# Patient Record
Sex: MALE | Race: WHITE | HISPANIC OR LATINO | Employment: OTHER | URBAN - METROPOLITAN AREA
[De-identification: names, ages, dates, MRNs, and addresses within clinical notes are randomized per-mention and may not be internally consistent; named-entity substitution may affect disease eponyms.]

---

## 2023-02-11 ENCOUNTER — HOSPITAL ENCOUNTER (EMERGENCY)
Dept: DATA CONVERSION | Facility: HOSPITAL | Age: 68
Discharge: HOME | End: 2023-02-12
Attending: EMERGENCY MEDICINE
Payer: MEDICARE

## 2023-02-11 DIAGNOSIS — J44.9 CHRONIC OBSTRUCTIVE PULMONARY DISEASE, UNSPECIFIED (MULTI): ICD-10-CM

## 2023-02-11 DIAGNOSIS — K22.2 ESOPHAGEAL OBSTRUCTION: ICD-10-CM

## 2023-02-11 DIAGNOSIS — R13.10 DYSPHAGIA, UNSPECIFIED: ICD-10-CM

## 2023-02-11 DIAGNOSIS — Z95.828 PRESENCE OF OTHER VASCULAR IMPLANTS AND GRAFTS: ICD-10-CM

## 2023-02-11 DIAGNOSIS — G47.33 OBSTRUCTIVE SLEEP APNEA (ADULT) (PEDIATRIC): ICD-10-CM

## 2023-02-11 DIAGNOSIS — Z86.711 PERSONAL HISTORY OF PULMONARY EMBOLISM: ICD-10-CM

## 2023-02-11 DIAGNOSIS — Z86.718 PERSONAL HISTORY OF OTHER VENOUS THROMBOSIS AND EMBOLISM: ICD-10-CM

## 2023-02-11 DIAGNOSIS — F32.9 MAJOR DEPRESSIVE DISORDER, SINGLE EPISODE, UNSPECIFIED: ICD-10-CM

## 2023-02-11 DIAGNOSIS — E11.40 TYPE 2 DIABETES MELLITUS WITH DIABETIC NEUROPATHY, UNSPECIFIED (MULTI): ICD-10-CM

## 2023-02-11 DIAGNOSIS — Z20.822 CONTACT WITH AND (SUSPECTED) EXPOSURE TO COVID-19: ICD-10-CM

## 2023-02-11 DIAGNOSIS — E78.5 HYPERLIPIDEMIA, UNSPECIFIED: ICD-10-CM

## 2023-02-11 DIAGNOSIS — R94.31 ABNORMAL ELECTROCARDIOGRAM (ECG) (EKG): ICD-10-CM

## 2023-02-11 DIAGNOSIS — K22.89 OTHER SPECIFIED DISEASE OF ESOPHAGUS: ICD-10-CM

## 2023-02-11 DIAGNOSIS — J96.01 ACUTE RESPIRATORY FAILURE WITH HYPOXIA (MULTI): ICD-10-CM

## 2023-02-11 DIAGNOSIS — K44.9 DIAPHRAGMATIC HERNIA WITHOUT OBSTRUCTION OR GANGRENE: ICD-10-CM

## 2023-02-11 DIAGNOSIS — T18.128A FOOD IN ESOPHAGUS CAUSING OTHER INJURY, INITIAL ENCOUNTER: ICD-10-CM

## 2023-02-11 DIAGNOSIS — E66.9 OBESITY, UNSPECIFIED: ICD-10-CM

## 2023-02-11 DIAGNOSIS — D64.9 ANEMIA, UNSPECIFIED: ICD-10-CM

## 2023-02-11 DIAGNOSIS — Z87.891 PERSONAL HISTORY OF NICOTINE DEPENDENCE: ICD-10-CM

## 2023-02-11 LAB — SARS-COV-2 RESULT: NOT DETECTED

## 2023-02-17 PROBLEM — M85.80 OSTEOPENIA: Status: ACTIVE | Noted: 2023-02-17

## 2023-02-17 PROBLEM — K63.5 COLON POLYP: Status: ACTIVE | Noted: 2023-02-17

## 2023-02-17 PROBLEM — K22.2 ESOPHAGEAL STRICTURE: Status: ACTIVE | Noted: 2023-02-17

## 2023-02-17 PROBLEM — E55.9 VITAMIN D DEFICIENCY: Status: ACTIVE | Noted: 2023-02-17

## 2023-02-17 PROBLEM — R07.81 RIB PAIN ON LEFT SIDE: Status: ACTIVE | Noted: 2023-02-17

## 2023-02-17 PROBLEM — N52.9 ED (ERECTILE DYSFUNCTION): Status: ACTIVE | Noted: 2023-02-17

## 2023-02-17 PROBLEM — L70.9 ACNE: Status: ACTIVE | Noted: 2023-02-17

## 2023-02-17 PROBLEM — G47.33 OBSTRUCTIVE SLEEP APNEA, ADULT: Status: ACTIVE | Noted: 2023-02-17

## 2023-02-17 PROBLEM — R35.1 NOCTURIA: Status: ACTIVE | Noted: 2023-02-17

## 2023-02-17 PROBLEM — R13.10 DYSPHAGIA: Status: ACTIVE | Noted: 2023-02-17

## 2023-02-17 PROBLEM — R94.4 DECREASED GFR: Status: ACTIVE | Noted: 2023-02-17

## 2023-02-17 PROBLEM — Z86.718 HISTORY OF DVT (DEEP VEIN THROMBOSIS): Status: ACTIVE | Noted: 2023-02-17

## 2023-02-17 PROBLEM — D50.9 IRON DEFICIENCY ANEMIA: Status: ACTIVE | Noted: 2023-02-17

## 2023-02-17 PROBLEM — E78.5 HYPERLIPIDEMIA: Status: ACTIVE | Noted: 2023-02-17

## 2023-02-17 PROBLEM — M10.9 GOUT: Status: ACTIVE | Noted: 2023-02-17

## 2023-02-17 PROBLEM — K22.89 ESOPHAGEAL MASS: Status: ACTIVE | Noted: 2023-02-17

## 2023-02-17 PROBLEM — R74.8 ELEVATED ALKALINE PHOSPHATASE LEVEL: Status: ACTIVE | Noted: 2023-02-17

## 2023-02-17 PROBLEM — K25.9 GASTRIC ULCER: Status: ACTIVE | Noted: 2023-02-17

## 2023-02-17 PROBLEM — K11.20 SIALOADENITIS: Status: ACTIVE | Noted: 2023-02-17

## 2023-02-17 PROBLEM — E66.9 OBESITY (BMI 30-39.9): Status: ACTIVE | Noted: 2023-02-17

## 2023-02-17 PROBLEM — F32.A DEPRESSION: Status: ACTIVE | Noted: 2023-02-17

## 2023-02-17 PROBLEM — R80.9 MICROALBUMINURIA: Status: ACTIVE | Noted: 2023-02-17

## 2023-02-17 PROBLEM — Z86.711 HISTORY OF PULMONARY EMBOLISM: Status: ACTIVE | Noted: 2023-02-17

## 2023-02-17 PROBLEM — E11.40 DIABETIC NEUROPATHY ASSOCIATED WITH TYPE 2 DIABETES MELLITUS (MULTI): Status: ACTIVE | Noted: 2023-02-17

## 2023-02-17 RX ORDER — ATORVASTATIN CALCIUM 10 MG/1
1 TABLET, FILM COATED ORAL NIGHTLY
COMMUNITY
Start: 2019-02-06 | End: 2024-01-22 | Stop reason: SDUPTHER

## 2023-02-17 RX ORDER — SYRINGE-NEEDLE,INSULIN,0.5 ML 27GX1/2"
SYRINGE, EMPTY DISPOSABLE MISCELLANEOUS
COMMUNITY
Start: 2017-09-23 | End: 2023-11-28 | Stop reason: WASHOUT

## 2023-02-17 RX ORDER — INSULIN GLARGINE 100 [IU]/ML
35 INJECTION, SOLUTION SUBCUTANEOUS NIGHTLY
COMMUNITY
Start: 2018-05-10 | End: 2023-07-24 | Stop reason: SDUPTHER

## 2023-02-17 RX ORDER — PIOGLITAZONEHYDROCHLORIDE 15 MG/1
1 TABLET ORAL DAILY
COMMUNITY
Start: 2017-09-23 | End: 2023-07-24 | Stop reason: ALTCHOICE

## 2023-02-17 RX ORDER — ASCORBIC ACID 125 MG
TABLET,CHEWABLE ORAL
COMMUNITY
End: 2023-07-24 | Stop reason: ALTCHOICE

## 2023-02-17 RX ORDER — ALLOPURINOL 300 MG/1
1 TABLET ORAL DAILY
COMMUNITY
Start: 2017-10-07 | End: 2023-07-24 | Stop reason: ALTCHOICE

## 2023-02-17 RX ORDER — SYRINGE,SAFETY WITH NEEDLE,1ML 25GX1"
SYRINGE (EA) MISCELLANEOUS
COMMUNITY
Start: 2017-09-23 | End: 2023-11-28 | Stop reason: WASHOUT

## 2023-02-17 RX ORDER — GABAPENTIN 300 MG/1
1 CAPSULE ORAL 3 TIMES DAILY
COMMUNITY
Start: 2017-12-14 | End: 2023-11-15 | Stop reason: ALTCHOICE

## 2023-02-17 RX ORDER — FERROUS SULFATE 325(65) MG
1 TABLET ORAL 2 TIMES DAILY
COMMUNITY
Start: 2016-08-08 | End: 2023-11-28 | Stop reason: WASHOUT

## 2023-02-17 RX ORDER — LANCETS 33 GAUGE
EACH MISCELLANEOUS 2 TIMES DAILY
COMMUNITY
End: 2023-11-28 | Stop reason: WASHOUT

## 2023-02-17 RX ORDER — BLOOD-GLUCOSE METER
EACH MISCELLANEOUS
COMMUNITY
Start: 2017-09-23 | End: 2023-11-28 | Stop reason: WASHOUT

## 2023-02-17 RX ORDER — SERTRALINE HYDROCHLORIDE 50 MG/1
50 TABLET, FILM COATED ORAL DAILY
COMMUNITY
Start: 2016-09-13 | End: 2024-02-13 | Stop reason: SDUPTHER

## 2023-02-17 RX ORDER — PANTOPRAZOLE SODIUM 40 MG/1
1 TABLET, DELAYED RELEASE ORAL
COMMUNITY
Start: 2016-07-22 | End: 2023-07-24 | Stop reason: ALTCHOICE

## 2023-02-17 RX ORDER — PHENYLEPHRINE HCL 10 MG
TABLET ORAL
COMMUNITY
End: 2023-07-24 | Stop reason: ALTCHOICE

## 2023-02-28 LAB — SARS-COV-2 RESULT: NOT DETECTED

## 2023-03-03 NOTE — H&P
History of Present Illness:   History Present Illness:  Reason for surgery: Food impaction   HPI:    67 M who underwent EGD with Dr. Huizar at [facility] in 11/22 for a food impaction. Food was removed from the lower esophagus. Class D esophagitis, Schatzki's  ring and HH noted. Also had food impaction in 1/21- dilated that time. No bx either time.    Has been on pantoprazole since 2019, increased to bid in 11/22. Has not had recurrent dysphagia since EGD in 11/22. Avoiding steak and apples with skin which had been giving him problems.     Has lost 30 lbs since summer, but is trying to lose wt through diet.       Patient reports that he has been experiencing dysphagia symptoms every time he eats, he is on pantoprazole twice a day do not get any overt reflux symptoms.  Last upper endoscopy  few months ago showed a large hiatal hernia and grade D esophagitis.  Along with a Schatzki ring.  He was recently seen in outpatient GI office, at that time another endoscopy was offered which patient refused.  However this time when discussion was done in the emergency room patient wants to proceed with endoscopy and if required surgical management of large hiatal hernia.      Allergies:        Allergies:  ·  No Known Allergies :     Home Medication Review:   Home Medications Reviewed: yes     Impression/Procedure:   ·  Impression and Planned Procedure: Food Impaction : Plan for EGD with removal of food bolus with possible dilation.       ERAS (Enhanced Recovery After Surgery):  ·  ERAS Patient: no       Vital Signs:  Temperature C: 36.6 degrees C   Temperature F: 97.8 degrees F   Heart Rate: 71 beats per minute   Respiratory Rate: 20 breath per minute   Blood Pressure Systolic: 140 mm/Hg   Blood Pressure Diastolic: 77 mm/Hg     Physical Exam Narrative:  ·  Physical Exam:    General appearance: No acute distress, cooperative.  Ears, nose, mouth, and throat: Moist mucous membranes, tongue normal; no oral lesions; Mallampati  Neck:  Supple, no lymphadenopathy or thyromegaly  Lungs: Clear to auscultation bilaterally  CV: Regular rate and rhythm, no murmur; no pitting edema in the lower extremities  Abd: soft, non-tender; non-distended; normal active bowel sounds; no scars      Physical Exam by System:    Eyes: PERRL, EOMI, clear sclera   Respiratory/Thorax: Patent airways, CTAB, normal  breath sounds with good chest expansion, thorax symmetric   Cardiovascular: Regular, rate and rhythm, no murmurs,  2+ equal pulses of the extremities, normal S 1and S 2   Skin: Warm and dry, no lesions, no rashes     Consent:   COVID-19 Consent:  ·  COVID-19 Risk Consent Surgeon has reviewed key risks related to the risk of mahamed COVID-19 and if they contract COVID-19 what the risks are.       Electronic Signatures:  Cheo Snowden)  (Signed 11-Feb-2023 23:14)   Authored: History of Present Illness, Allergies, Home  Medication Review, Impression/Procedure, ERAS, Physical Exam, Consent, Note Completion      Last Updated: 11-Feb-2023 23:14 by Cheo Snowden)

## 2023-04-04 ENCOUNTER — APPOINTMENT (OUTPATIENT)
Dept: PRIMARY CARE | Facility: CLINIC | Age: 68
End: 2023-04-04
Payer: MEDICARE

## 2023-04-18 ENCOUNTER — APPOINTMENT (OUTPATIENT)
Dept: PRIMARY CARE | Facility: CLINIC | Age: 68
End: 2023-04-18
Payer: MEDICARE

## 2023-05-22 ENCOUNTER — HOSPITAL ENCOUNTER (OUTPATIENT)
Dept: DATA CONVERSION | Facility: HOSPITAL | Age: 68
End: 2023-06-15
Attending: FAMILY MEDICINE
Payer: MEDICARE

## 2023-05-22 DIAGNOSIS — J98.11 ATELECTASIS: ICD-10-CM

## 2023-05-22 DIAGNOSIS — T85.848A PAIN DUE TO OTHER INTERNAL PROSTHETIC DEVICES, IMPLANTS AND GRAFTS, INITIAL ENCOUNTER: ICD-10-CM

## 2023-05-22 DIAGNOSIS — Z46.82 ENCOUNTER FOR FITTING AND ADJUSTMENT OF NON-VASCULAR CATHETER: ICD-10-CM

## 2023-05-22 DIAGNOSIS — E87.70 FLUID OVERLOAD, UNSPECIFIED: ICD-10-CM

## 2023-05-22 DIAGNOSIS — J90 PLEURAL EFFUSION, NOT ELSEWHERE CLASSIFIED: ICD-10-CM

## 2023-05-22 DIAGNOSIS — R91.8 OTHER NONSPECIFIC ABNORMAL FINDING OF LUNG FIELD: ICD-10-CM

## 2023-05-23 LAB
ALANINE AMINOTRANSFERASE (SGPT) (U/L) IN SER/PLAS: 19 U/L (ref 10–52)
ALBUMIN (G/DL) IN SER/PLAS: 2 G/DL (ref 3.4–5)
ALKALINE PHOSPHATASE (U/L) IN SER/PLAS: 632 U/L (ref 33–136)
AMORPHOUS CRYSTALS, URINE: ABNORMAL /HPF
ANION GAP IN SER/PLAS: 18 MMOL/L (ref 10–20)
APPEARANCE, URINE: ABNORMAL
ASPARTATE AMINOTRANSFERASE (SGOT) (U/L) IN SER/PLAS: 12 U/L (ref 9–39)
BACTERIA, URINE: ABNORMAL /HPF
BASOPHILS (10*3/UL) IN BLOOD BY AUTOMATED COUNT: 0.02 X10E9/L (ref 0–0.1)
BASOPHILS/100 LEUKOCYTES IN BLOOD BY AUTOMATED COUNT: 0.3 % (ref 0–2)
BILIRUBIN DIRECT (MG/DL) IN SER/PLAS: 0.1 MG/DL (ref 0–0.3)
BILIRUBIN TOTAL (MG/DL) IN SER/PLAS: 0.5 MG/DL (ref 0–1.2)
BILIRUBIN, URINE: NEGATIVE
BLOOD, URINE: NEGATIVE
CALCIUM (MG/DL) IN SER/PLAS: 7.6 MG/DL (ref 8.6–10.3)
CARBON DIOXIDE, TOTAL (MMOL/L) IN SER/PLAS: 22 MMOL/L (ref 21–32)
CHLORIDE (MMOL/L) IN SER/PLAS: 106 MMOL/L (ref 98–107)
COLOR, URINE: YELLOW
CREATINE KINASE (U/L) IN SER/PLAS: <10 U/L (ref 0–325)
CREATININE (MG/DL) IN SER/PLAS: 4.65 MG/DL (ref 0.5–1.3)
EOSINOPHILS (10*3/UL) IN BLOOD BY AUTOMATED COUNT: 0.09 X10E9/L (ref 0–0.7)
EOSINOPHILS/100 LEUKOCYTES IN BLOOD BY AUTOMATED COUNT: 1.4 % (ref 0–6)
ERYTHROCYTE DISTRIBUTION WIDTH (RATIO) BY AUTOMATED COUNT: 17.7 % (ref 11.5–14.5)
ERYTHROCYTE MEAN CORPUSCULAR HEMOGLOBIN CONCENTRATION (G/DL) BY AUTOMATED: 32.3 G/DL (ref 32–36)
ERYTHROCYTE MEAN CORPUSCULAR VOLUME (FL) BY AUTOMATED COUNT: 95 FL (ref 80–100)
ERYTHROCYTES (10*6/UL) IN BLOOD BY AUTOMATED COUNT: 3.05 X10E12/L (ref 4.5–5.9)
GFR MALE: 13 ML/MIN/1.73M2
GLUCOSE (MG/DL) IN SER/PLAS: 155 MG/DL (ref 74–99)
GLUCOSE, URINE: ABNORMAL MG/DL
HEMATOCRIT (%) IN BLOOD BY AUTOMATED COUNT: 29.1 % (ref 41–52)
HEMOGLOBIN (G/DL) IN BLOOD: 9.4 G/DL (ref 13.5–17.5)
IMMATURE GRANULOCYTES/100 LEUKOCYTES IN BLOOD BY AUTOMATED COUNT: 0.6 % (ref 0–0.9)
KETONES, URINE: NEGATIVE MG/DL
LEUKOCYTE ESTERASE, URINE: NEGATIVE
LEUKOCYTES (10*3/UL) IN BLOOD BY AUTOMATED COUNT: 6.3 X10E9/L (ref 4.4–11.3)
LYMPHOCYTES (10*3/UL) IN BLOOD BY AUTOMATED COUNT: 0.85 X10E9/L (ref 1.2–4.8)
LYMPHOCYTES/100 LEUKOCYTES IN BLOOD BY AUTOMATED COUNT: 13.5 % (ref 13–44)
MONOCYTES (10*3/UL) IN BLOOD BY AUTOMATED COUNT: 0.31 X10E9/L (ref 0.1–1)
MONOCYTES/100 LEUKOCYTES IN BLOOD BY AUTOMATED COUNT: 4.9 % (ref 2–10)
MUCUS, URINE: ABNORMAL /LPF
NEUTROPHILS (10*3/UL) IN BLOOD BY AUTOMATED COUNT: 5 X10E9/L (ref 1.2–7.7)
NEUTROPHILS/100 LEUKOCYTES IN BLOOD BY AUTOMATED COUNT: 79.3 % (ref 40–80)
NITRITE, URINE: NEGATIVE
PH, URINE: 5 (ref 5–8)
PLATELETS (10*3/UL) IN BLOOD AUTOMATED COUNT: 158 X10E9/L (ref 150–450)
POTASSIUM (MMOL/L) IN SER/PLAS: 3.2 MMOL/L (ref 3.5–5.3)
PROTEIN TOTAL: 5.6 G/DL (ref 6.4–8.2)
PROTEIN, URINE: ABNORMAL MG/DL
RBC, URINE: 3 /HPF (ref 0–5)
SODIUM (MMOL/L) IN SER/PLAS: 143 MMOL/L (ref 136–145)
SPECIFIC GRAVITY, URINE: 1.01 (ref 1–1.03)
UREA NITROGEN (MG/DL) IN SER/PLAS: 142 MG/DL (ref 6–23)
UROBILINOGEN, URINE: <2 MG/DL (ref 0–1.9)
WBC, URINE: 3 /HPF (ref 0–5)

## 2023-05-24 LAB
ANION GAP IN SER/PLAS: 16 MMOL/L (ref 10–20)
CALCIUM (MG/DL) IN SER/PLAS: 7.5 MG/DL (ref 8.6–10.3)
CARBON DIOXIDE, TOTAL (MMOL/L) IN SER/PLAS: 21 MMOL/L (ref 21–32)
CHLORIDE (MMOL/L) IN SER/PLAS: 109 MMOL/L (ref 98–107)
CREATININE (MG/DL) IN SER/PLAS: 4.51 MG/DL (ref 0.5–1.3)
ERYTHROCYTE DISTRIBUTION WIDTH (RATIO) BY AUTOMATED COUNT: 17.4 % (ref 11.5–14.5)
ERYTHROCYTE MEAN CORPUSCULAR HEMOGLOBIN CONCENTRATION (G/DL) BY AUTOMATED: 31.4 G/DL (ref 32–36)
ERYTHROCYTE MEAN CORPUSCULAR VOLUME (FL) BY AUTOMATED COUNT: 97 FL (ref 80–100)
ERYTHROCYTES (10*6/UL) IN BLOOD BY AUTOMATED COUNT: 2.36 X10E12/L (ref 4.5–5.9)
GFR MALE: 14 ML/MIN/1.73M2
GLUCOSE (MG/DL) IN SER/PLAS: 146 MG/DL (ref 74–99)
HEMATOCRIT (%) IN BLOOD BY AUTOMATED COUNT: 22.9 % (ref 41–52)
HEMOGLOBIN (G/DL) IN BLOOD: 7.2 G/DL (ref 13.5–17.5)
LEUKOCYTES (10*3/UL) IN BLOOD BY AUTOMATED COUNT: 7.2 X10E9/L (ref 4.4–11.3)
MAGNESIUM (MG/DL) IN SER/PLAS: 2.04 MG/DL (ref 1.6–2.4)
NATRIURETIC PEPTIDE B (PG/ML) IN SER/PLAS: 246 PG/ML (ref 0–99)
PHOSPHATE (MG/DL) IN SER/PLAS: 5.2 MG/DL (ref 2.5–4.9)
PLATELETS (10*3/UL) IN BLOOD AUTOMATED COUNT: 171 X10E9/L (ref 150–450)
POTASSIUM (MMOL/L) IN SER/PLAS: 3.4 MMOL/L (ref 3.5–5.3)
SODIUM (MMOL/L) IN SER/PLAS: 143 MMOL/L (ref 136–145)
UREA NITROGEN (MG/DL) IN SER/PLAS: 149 MG/DL (ref 6–23)

## 2023-05-25 LAB
ABO GROUP (TYPE) IN BLOOD: NORMAL
ABO GROUP (TYPE) IN BLOOD: NORMAL
ANION GAP IN SER/PLAS: 14 MMOL/L (ref 10–20)
ANTIBODY SCREEN: NORMAL
BASOPHILS (10*3/UL) IN BLOOD BY AUTOMATED COUNT: 0.01 X10E9/L (ref 0–0.1)
BASOPHILS/100 LEUKOCYTES IN BLOOD BY AUTOMATED COUNT: 0.2 % (ref 0–2)
C REACTIVE PROTEIN (MG/L) IN SER/PLAS: 6.18 MG/DL
CALCIDIOL (25 OH VITAMIN D3) (NG/ML) IN SER/PLAS: 17 NG/ML
CALCIUM (MG/DL) IN SER/PLAS: 6.8 MG/DL (ref 8.6–10.3)
CARBON DIOXIDE, TOTAL (MMOL/L) IN SER/PLAS: 19 MMOL/L (ref 21–32)
CHLORIDE (MMOL/L) IN SER/PLAS: 114 MMOL/L (ref 98–107)
COBALAMIN (VITAMIN B12) (PG/ML) IN SER/PLAS: 894 PG/ML (ref 211–911)
CREATININE (MG/DL) IN SER/PLAS: 3.6 MG/DL (ref 0.5–1.3)
EOSINOPHILS (10*3/UL) IN BLOOD BY AUTOMATED COUNT: 0.1 X10E9/L (ref 0–0.7)
EOSINOPHILS/100 LEUKOCYTES IN BLOOD BY AUTOMATED COUNT: 1.9 % (ref 0–6)
ERYTHROCYTE DISTRIBUTION WIDTH (RATIO) BY AUTOMATED COUNT: 17 % (ref 11.5–14.5)
ERYTHROCYTE MEAN CORPUSCULAR HEMOGLOBIN CONCENTRATION (G/DL) BY AUTOMATED: 31.3 G/DL (ref 32–36)
ERYTHROCYTE MEAN CORPUSCULAR VOLUME (FL) BY AUTOMATED COUNT: 97 FL (ref 80–100)
ERYTHROCYTES (10*6/UL) IN BLOOD BY AUTOMATED COUNT: 2.21 X10E12/L (ref 4.5–5.9)
FERRITIN (UG/LL) IN SER/PLAS: 1034 UG/L (ref 20–300)
FOLATE (NG/ML) IN SER/PLAS: 12.4 NG/ML
GFR MALE: 18 ML/MIN/1.73M2
GLUCOSE (MG/DL) IN SER/PLAS: 113 MG/DL (ref 74–99)
HEMATOCRIT (%) IN BLOOD BY AUTOMATED COUNT: 21.4 % (ref 41–52)
HEMOGLOBIN (G/DL) IN BLOOD: 6.7 G/DL (ref 13.5–17.5)
IMMATURE GRANULOCYTES/100 LEUKOCYTES IN BLOOD BY AUTOMATED COUNT: 1.1 % (ref 0–0.9)
IRON (UG/DL) IN SER/PLAS: 12 UG/DL (ref 35–150)
IRON BINDING CAPACITY (UG/DL) IN SER/PLAS: 91 UG/DL (ref 240–445)
IRON SATURATION (%) IN SER/PLAS: 13 % (ref 25–45)
LEUKOCYTES (10*3/UL) IN BLOOD BY AUTOMATED COUNT: 5.3 X10E9/L (ref 4.4–11.3)
LYMPHOCYTES (10*3/UL) IN BLOOD BY AUTOMATED COUNT: 0.78 X10E9/L (ref 1.2–4.8)
LYMPHOCYTES/100 LEUKOCYTES IN BLOOD BY AUTOMATED COUNT: 14.7 % (ref 13–44)
MAGNESIUM (MG/DL) IN SER/PLAS: 1.72 MG/DL (ref 1.6–2.4)
MONOCYTES (10*3/UL) IN BLOOD BY AUTOMATED COUNT: 0.36 X10E9/L (ref 0.1–1)
MONOCYTES/100 LEUKOCYTES IN BLOOD BY AUTOMATED COUNT: 6.8 % (ref 2–10)
NEUTROPHILS (10*3/UL) IN BLOOD BY AUTOMATED COUNT: 4 X10E9/L (ref 1.2–7.7)
NEUTROPHILS/100 LEUKOCYTES IN BLOOD BY AUTOMATED COUNT: 75.3 % (ref 40–80)
PHOSPHATE (MG/DL) IN SER/PLAS: 3.9 MG/DL (ref 2.5–4.9)
PLATELETS (10*3/UL) IN BLOOD AUTOMATED COUNT: 170 X10E9/L (ref 150–450)
POTASSIUM (MMOL/L) IN SER/PLAS: 3.5 MMOL/L (ref 3.5–5.3)
PROCALCITONIN: 0.25 NG/ML
RH FACTOR: NORMAL
RH FACTOR: NORMAL
SEDIMENTATION RATE, ERYTHROCYTE: 19 MM/H (ref 0–20)
SODIUM (MMOL/L) IN SER/PLAS: 143 MMOL/L (ref 136–145)
THYROTROPIN (MIU/L) IN SER/PLAS BY DETECTION LIMIT <= 0.05 MIU/L: 1.92 MIU/L (ref 0.44–3.98)
TRANSFERRIN (MG/DL) IN SER/PLAS: 90 MG/DL (ref 200–360)
UREA NITROGEN (MG/DL) IN SER/PLAS: 117 MG/DL (ref 6–23)

## 2023-05-26 LAB
ANION GAP IN SER/PLAS: 13 MMOL/L (ref 10–20)
CALCIUM (MG/DL) IN SER/PLAS: 7.6 MG/DL (ref 8.6–10.3)
CARBON DIOXIDE, TOTAL (MMOL/L) IN SER/PLAS: 22 MMOL/L (ref 21–32)
CHLORIDE (MMOL/L) IN SER/PLAS: 111 MMOL/L (ref 98–107)
CREATININE (MG/DL) IN SER/PLAS: 3.21 MG/DL (ref 0.5–1.3)
ERYTHROCYTE DISTRIBUTION WIDTH (RATIO) BY AUTOMATED COUNT: 18.2 % (ref 11.5–14.5)
ERYTHROCYTE MEAN CORPUSCULAR HEMOGLOBIN CONCENTRATION (G/DL) BY AUTOMATED: 31.3 G/DL (ref 32–36)
ERYTHROCYTE MEAN CORPUSCULAR VOLUME (FL) BY AUTOMATED COUNT: 96 FL (ref 80–100)
ERYTHROCYTES (10*6/UL) IN BLOOD BY AUTOMATED COUNT: 2.54 X10E12/L (ref 4.5–5.9)
GFR MALE: 20 ML/MIN/1.73M2
GLUCOSE (MG/DL) IN SER/PLAS: 118 MG/DL (ref 74–99)
HEMATOCRIT (%) IN BLOOD BY AUTOMATED COUNT: 24.3 % (ref 41–52)
HEMOGLOBIN (G/DL) IN BLOOD: 7.6 G/DL (ref 13.5–17.5)
LEUKOCYTES (10*3/UL) IN BLOOD BY AUTOMATED COUNT: 5.4 X10E9/L (ref 4.4–11.3)
MAGNESIUM (MG/DL) IN SER/PLAS: 1.84 MG/DL (ref 1.6–2.4)
PHOSPHATE (MG/DL) IN SER/PLAS: 3.7 MG/DL (ref 2.5–4.9)
PLATELETS (10*3/UL) IN BLOOD AUTOMATED COUNT: 183 X10E9/L (ref 150–450)
POTASSIUM (MMOL/L) IN SER/PLAS: 3.4 MMOL/L (ref 3.5–5.3)
SODIUM (MMOL/L) IN SER/PLAS: 143 MMOL/L (ref 136–145)
UREA NITROGEN (MG/DL) IN SER/PLAS: 117 MG/DL (ref 6–23)

## 2023-05-27 LAB
ANION GAP IN SER/PLAS: 12 MMOL/L (ref 10–20)
BASOPHILS (10*3/UL) IN BLOOD BY AUTOMATED COUNT: 0.01 X10E9/L (ref 0–0.1)
BASOPHILS/100 LEUKOCYTES IN BLOOD BY AUTOMATED COUNT: 0.2 % (ref 0–2)
CALCIUM (MG/DL) IN SER/PLAS: 6.8 MG/DL (ref 8.6–10.3)
CARBON DIOXIDE, TOTAL (MMOL/L) IN SER/PLAS: 20 MMOL/L (ref 21–32)
CHLORIDE (MMOL/L) IN SER/PLAS: 115 MMOL/L (ref 98–107)
CREATININE (MG/DL) IN SER/PLAS: 2.51 MG/DL (ref 0.5–1.3)
EOSINOPHILS (10*3/UL) IN BLOOD BY AUTOMATED COUNT: 0.08 X10E9/L (ref 0–0.7)
EOSINOPHILS/100 LEUKOCYTES IN BLOOD BY AUTOMATED COUNT: 1.3 % (ref 0–6)
ERYTHROCYTE DISTRIBUTION WIDTH (RATIO) BY AUTOMATED COUNT: 18.3 % (ref 11.5–14.5)
ERYTHROCYTE MEAN CORPUSCULAR HEMOGLOBIN CONCENTRATION (G/DL) BY AUTOMATED: 31.3 G/DL (ref 32–36)
ERYTHROCYTE MEAN CORPUSCULAR VOLUME (FL) BY AUTOMATED COUNT: 96 FL (ref 80–100)
ERYTHROCYTES (10*6/UL) IN BLOOD BY AUTOMATED COUNT: 2.39 X10E12/L (ref 4.5–5.9)
GFR MALE: 27 ML/MIN/1.73M2
GLUCOSE (MG/DL) IN SER/PLAS: 114 MG/DL (ref 74–99)
HEMATOCRIT (%) IN BLOOD BY AUTOMATED COUNT: 23 % (ref 41–52)
HEMOGLOBIN (G/DL) IN BLOOD: 7.2 G/DL (ref 13.5–17.5)
IMMATURE GRANULOCYTES/100 LEUKOCYTES IN BLOOD BY AUTOMATED COUNT: 4 % (ref 0–0.9)
LEUKOCYTES (10*3/UL) IN BLOOD BY AUTOMATED COUNT: 6.3 X10E9/L (ref 4.4–11.3)
LYMPHOCYTES (10*3/UL) IN BLOOD BY AUTOMATED COUNT: 0.9 X10E9/L (ref 1.2–4.8)
LYMPHOCYTES/100 LEUKOCYTES IN BLOOD BY AUTOMATED COUNT: 14.2 % (ref 13–44)
MAGNESIUM (MG/DL) IN SER/PLAS: 2.09 MG/DL (ref 1.6–2.4)
MONOCYTES (10*3/UL) IN BLOOD BY AUTOMATED COUNT: 0.5 X10E9/L (ref 0.1–1)
MONOCYTES/100 LEUKOCYTES IN BLOOD BY AUTOMATED COUNT: 7.9 % (ref 2–10)
NEUTROPHILS (10*3/UL) IN BLOOD BY AUTOMATED COUNT: 4.58 X10E9/L (ref 1.2–7.7)
NEUTROPHILS/100 LEUKOCYTES IN BLOOD BY AUTOMATED COUNT: 72.4 % (ref 40–80)
NRBC (PER 100 WBCS) BY AUTOMATED COUNT: 0.3 /100 WBC (ref 0–0)
PHOSPHATE (MG/DL) IN SER/PLAS: 2.7 MG/DL (ref 2.5–4.9)
PLATELETS (10*3/UL) IN BLOOD AUTOMATED COUNT: 187 X10E9/L (ref 150–450)
POTASSIUM (MMOL/L) IN SER/PLAS: 3.1 MMOL/L (ref 3.5–5.3)
SODIUM (MMOL/L) IN SER/PLAS: 144 MMOL/L (ref 136–145)
UREA NITROGEN (MG/DL) IN SER/PLAS: 87 MG/DL (ref 6–23)

## 2023-05-28 LAB — OCCULT BLOOD, STOOL X1: NEGATIVE

## 2023-05-29 LAB
ALANINE AMINOTRANSFERASE (SGPT) (U/L) IN SER/PLAS: 24 U/L (ref 10–52)
ALBUMIN (G/DL) IN SER/PLAS: 1.9 G/DL (ref 3.4–5)
ALKALINE PHOSPHATASE (U/L) IN SER/PLAS: 622 U/L (ref 33–136)
ASPARTATE AMINOTRANSFERASE (SGOT) (U/L) IN SER/PLAS: 17 U/L (ref 9–39)
BASOPHILS (10*3/UL) IN BLOOD BY AUTOMATED COUNT: 0.03 X10E9/L (ref 0–0.1)
BASOPHILS/100 LEUKOCYTES IN BLOOD BY AUTOMATED COUNT: 0.5 % (ref 0–2)
BILIRUBIN DIRECT (MG/DL) IN SER/PLAS: 0.1 MG/DL (ref 0–0.3)
BILIRUBIN TOTAL (MG/DL) IN SER/PLAS: 0.5 MG/DL (ref 0–1.2)
CREATINE KINASE (U/L) IN SER/PLAS: <10 U/L (ref 0–325)
EOSINOPHILS (10*3/UL) IN BLOOD BY AUTOMATED COUNT: 0.09 X10E9/L (ref 0–0.7)
EOSINOPHILS/100 LEUKOCYTES IN BLOOD BY AUTOMATED COUNT: 1.6 % (ref 0–6)
ERYTHROCYTE DISTRIBUTION WIDTH (RATIO) BY AUTOMATED COUNT: 18.4 % (ref 11.5–14.5)
ERYTHROCYTE MEAN CORPUSCULAR HEMOGLOBIN CONCENTRATION (G/DL) BY AUTOMATED: 31.7 G/DL (ref 32–36)
ERYTHROCYTE MEAN CORPUSCULAR VOLUME (FL) BY AUTOMATED COUNT: 96 FL (ref 80–100)
ERYTHROCYTES (10*6/UL) IN BLOOD BY AUTOMATED COUNT: 2.72 X10E12/L (ref 4.5–5.9)
HEMATOCRIT (%) IN BLOOD BY AUTOMATED COUNT: 26.2 % (ref 41–52)
HEMOGLOBIN (G/DL) IN BLOOD: 8.3 G/DL (ref 13.5–17.5)
IMMATURE GRANULOCYTES/100 LEUKOCYTES IN BLOOD BY AUTOMATED COUNT: 3.5 % (ref 0–0.9)
LEUKOCYTES (10*3/UL) IN BLOOD BY AUTOMATED COUNT: 5.8 X10E9/L (ref 4.4–11.3)
LYMPHOCYTES (10*3/UL) IN BLOOD BY AUTOMATED COUNT: 0.65 X10E9/L (ref 1.2–4.8)
LYMPHOCYTES/100 LEUKOCYTES IN BLOOD BY AUTOMATED COUNT: 11.2 % (ref 13–44)
MAGNESIUM (MG/DL) IN SER/PLAS: 1.99 MG/DL (ref 1.6–2.4)
MONOCYTES (10*3/UL) IN BLOOD BY AUTOMATED COUNT: 0.44 X10E9/L (ref 0.1–1)
MONOCYTES/100 LEUKOCYTES IN BLOOD BY AUTOMATED COUNT: 7.6 % (ref 2–10)
NATRIURETIC PEPTIDE B (PG/ML) IN SER/PLAS: 316 PG/ML (ref 0–99)
NEUTROPHILS (10*3/UL) IN BLOOD BY AUTOMATED COUNT: 4.37 X10E9/L (ref 1.2–7.7)
NEUTROPHILS/100 LEUKOCYTES IN BLOOD BY AUTOMATED COUNT: 75.6 % (ref 40–80)
PHOSPHATE (MG/DL) IN SER/PLAS: 2.7 MG/DL (ref 2.5–4.9)
PLATELETS (10*3/UL) IN BLOOD AUTOMATED COUNT: 187 X10E9/L (ref 150–450)
PROTEIN TOTAL: 5.1 G/DL (ref 6.4–8.2)

## 2023-05-30 LAB
ABO GROUP (TYPE) IN BLOOD: NORMAL
ANION GAP IN SER/PLAS: 11 MMOL/L (ref 10–20)
ANION GAP IN SER/PLAS: 13 MMOL/L (ref 10–20)
ANTIBODY SCREEN: NORMAL
CALCIUM (MG/DL) IN SER/PLAS: 6.2 MG/DL (ref 8.6–10.3)
CALCIUM (MG/DL) IN SER/PLAS: 7.2 MG/DL (ref 8.6–10.3)
CARBON DIOXIDE, TOTAL (MMOL/L) IN SER/PLAS: 21 MMOL/L (ref 21–32)
CARBON DIOXIDE, TOTAL (MMOL/L) IN SER/PLAS: 23 MMOL/L (ref 21–32)
CHLORIDE (MMOL/L) IN SER/PLAS: 110 MMOL/L (ref 98–107)
CHLORIDE (MMOL/L) IN SER/PLAS: 116 MMOL/L (ref 98–107)
CREATININE (MG/DL) IN SER/PLAS: 1.78 MG/DL (ref 0.5–1.3)
CREATININE (MG/DL) IN SER/PLAS: 2.26 MG/DL (ref 0.5–1.3)
ERYTHROCYTE DISTRIBUTION WIDTH (RATIO) BY AUTOMATED COUNT: 17.9 % (ref 11.5–14.5)
ERYTHROCYTE MEAN CORPUSCULAR HEMOGLOBIN CONCENTRATION (G/DL) BY AUTOMATED: 32 G/DL (ref 32–36)
ERYTHROCYTE MEAN CORPUSCULAR VOLUME (FL) BY AUTOMATED COUNT: 96 FL (ref 80–100)
ERYTHROCYTES (10*6/UL) IN BLOOD BY AUTOMATED COUNT: 2.4 X10E12/L (ref 4.5–5.9)
GFR MALE: 31 ML/MIN/1.73M2
GFR MALE: 41 ML/MIN/1.73M2
GLUCOSE (MG/DL) IN SER/PLAS: 106 MG/DL (ref 74–99)
GLUCOSE (MG/DL) IN SER/PLAS: 116 MG/DL (ref 74–99)
HEMATOCRIT (%) IN BLOOD BY AUTOMATED COUNT: 23.1 % (ref 41–52)
HEMOGLOBIN (G/DL) IN BLOOD: 7.4 G/DL (ref 13.5–17.5)
LEUKOCYTES (10*3/UL) IN BLOOD BY AUTOMATED COUNT: 3.9 X10E9/L (ref 4.4–11.3)
MAGNESIUM (MG/DL) IN SER/PLAS: 1.74 MG/DL (ref 1.6–2.4)
PHOSPHATE (MG/DL) IN SER/PLAS: 2.2 MG/DL (ref 2.5–4.9)
PLATELETS (10*3/UL) IN BLOOD AUTOMATED COUNT: 158 X10E9/L (ref 150–450)
POTASSIUM (MMOL/L) IN SER/PLAS: 3 MMOL/L (ref 3.5–5.3)
POTASSIUM (MMOL/L) IN SER/PLAS: 3.2 MMOL/L (ref 3.5–5.3)
RH FACTOR: NORMAL
SARS-COV-2 RESULT: NORMAL
SODIUM (MMOL/L) IN SER/PLAS: 143 MMOL/L (ref 136–145)
SODIUM (MMOL/L) IN SER/PLAS: 145 MMOL/L (ref 136–145)
UREA NITROGEN (MG/DL) IN SER/PLAS: 68 MG/DL (ref 6–23)
UREA NITROGEN (MG/DL) IN SER/PLAS: 84 MG/DL (ref 6–23)

## 2023-05-31 LAB
ANION GAP IN SER/PLAS: 12 MMOL/L (ref 10–20)
CALCIUM (MG/DL) IN SER/PLAS: 6.9 MG/DL (ref 8.6–10.3)
CARBON DIOXIDE, TOTAL (MMOL/L) IN SER/PLAS: 24 MMOL/L (ref 21–32)
CHLORIDE (MMOL/L) IN SER/PLAS: 112 MMOL/L (ref 98–107)
CREATININE (MG/DL) IN SER/PLAS: 1.76 MG/DL (ref 0.5–1.3)
ERYTHROCYTE DISTRIBUTION WIDTH (RATIO) BY AUTOMATED COUNT: 18.4 % (ref 11.5–14.5)
ERYTHROCYTE MEAN CORPUSCULAR HEMOGLOBIN CONCENTRATION (G/DL) BY AUTOMATED: 31.7 G/DL (ref 32–36)
ERYTHROCYTE MEAN CORPUSCULAR VOLUME (FL) BY AUTOMATED COUNT: 94 FL (ref 80–100)
ERYTHROCYTES (10*6/UL) IN BLOOD BY AUTOMATED COUNT: 2.87 X10E12/L (ref 4.5–5.9)
GFR MALE: 42 ML/MIN/1.73M2
GLUCOSE (MG/DL) IN SER/PLAS: 126 MG/DL (ref 74–99)
HEMATOCRIT (%) IN BLOOD BY AUTOMATED COUNT: 27.1 % (ref 41–52)
HEMOGLOBIN (G/DL) IN BLOOD: 8.6 G/DL (ref 13.5–17.5)
LEUKOCYTES (10*3/UL) IN BLOOD BY AUTOMATED COUNT: 4.9 X10E9/L (ref 4.4–11.3)
MAGNESIUM (MG/DL) IN SER/PLAS: 2.14 MG/DL (ref 1.6–2.4)
PHOSPHATE (MG/DL) IN SER/PLAS: 3 MG/DL (ref 2.5–4.9)
PLATELETS (10*3/UL) IN BLOOD AUTOMATED COUNT: 169 X10E9/L (ref 150–450)
POTASSIUM (MMOL/L) IN SER/PLAS: 3.5 MMOL/L (ref 3.5–5.3)
SODIUM (MMOL/L) IN SER/PLAS: 144 MMOL/L (ref 136–145)
UREA NITROGEN (MG/DL) IN SER/PLAS: 66 MG/DL (ref 6–23)

## 2023-06-01 LAB
ANION GAP IN SER/PLAS: 11 MMOL/L (ref 10–20)
CALCIUM (MG/DL) IN SER/PLAS: 7.1 MG/DL (ref 8.6–10.3)
CARBON DIOXIDE, TOTAL (MMOL/L) IN SER/PLAS: 24 MMOL/L (ref 21–32)
CHLORIDE (MMOL/L) IN SER/PLAS: 112 MMOL/L (ref 98–107)
CREATININE (MG/DL) IN SER/PLAS: 1.5 MG/DL (ref 0.5–1.3)
ERYTHROCYTE DISTRIBUTION WIDTH (RATIO) BY AUTOMATED COUNT: 18.2 % (ref 11.5–14.5)
ERYTHROCYTE MEAN CORPUSCULAR HEMOGLOBIN CONCENTRATION (G/DL) BY AUTOMATED: 33.1 G/DL (ref 32–36)
ERYTHROCYTE MEAN CORPUSCULAR VOLUME (FL) BY AUTOMATED COUNT: 94 FL (ref 80–100)
ERYTHROCYTES (10*6/UL) IN BLOOD BY AUTOMATED COUNT: 2.87 X10E12/L (ref 4.5–5.9)
GFR MALE: 51 ML/MIN/1.73M2
GLUCOSE (MG/DL) IN SER/PLAS: 123 MG/DL (ref 74–99)
HEMATOCRIT (%) IN BLOOD BY AUTOMATED COUNT: 26.9 % (ref 41–52)
HEMOGLOBIN (G/DL) IN BLOOD: 8.9 G/DL (ref 13.5–17.5)
LEUKOCYTES (10*3/UL) IN BLOOD BY AUTOMATED COUNT: 7.7 X10E9/L (ref 4.4–11.3)
PLATELETS (10*3/UL) IN BLOOD AUTOMATED COUNT: 171 X10E9/L (ref 150–450)
POTASSIUM (MMOL/L) IN SER/PLAS: 3.6 MMOL/L (ref 3.5–5.3)
SODIUM (MMOL/L) IN SER/PLAS: 143 MMOL/L (ref 136–145)
UREA NITROGEN (MG/DL) IN SER/PLAS: 53 MG/DL (ref 6–23)

## 2023-06-02 LAB
ANION GAP IN SER/PLAS: 10 MMOL/L (ref 10–20)
CALCIUM (MG/DL) IN SER/PLAS: 6.6 MG/DL (ref 8.6–10.3)
CARBON DIOXIDE, TOTAL (MMOL/L) IN SER/PLAS: 24 MMOL/L (ref 21–32)
CHLORIDE (MMOL/L) IN SER/PLAS: 112 MMOL/L (ref 98–107)
CREATININE (MG/DL) IN SER/PLAS: 1.23 MG/DL (ref 0.5–1.3)
ERYTHROCYTE DISTRIBUTION WIDTH (RATIO) BY AUTOMATED COUNT: 17.5 % (ref 11.5–14.5)
ERYTHROCYTE MEAN CORPUSCULAR HEMOGLOBIN CONCENTRATION (G/DL) BY AUTOMATED: 31.7 G/DL (ref 32–36)
ERYTHROCYTE MEAN CORPUSCULAR VOLUME (FL) BY AUTOMATED COUNT: 95 FL (ref 80–100)
ERYTHROCYTES (10*6/UL) IN BLOOD BY AUTOMATED COUNT: 2.8 X10E12/L (ref 4.5–5.9)
GFR MALE: 64 ML/MIN/1.73M2
GLUCOSE (MG/DL) IN SER/PLAS: 115 MG/DL (ref 74–99)
HEMATOCRIT (%) IN BLOOD BY AUTOMATED COUNT: 26.5 % (ref 41–52)
HEMOGLOBIN (G/DL) IN BLOOD: 8.4 G/DL (ref 13.5–17.5)
LEUKOCYTES (10*3/UL) IN BLOOD BY AUTOMATED COUNT: 6.1 X10E9/L (ref 4.4–11.3)
MAGNESIUM (MG/DL) IN SER/PLAS: 1.34 MG/DL (ref 1.6–2.4)
PHOSPHATE (MG/DL) IN SER/PLAS: 2.3 MG/DL (ref 2.5–4.9)
PLATELETS (10*3/UL) IN BLOOD AUTOMATED COUNT: 179 X10E9/L (ref 150–450)
POTASSIUM (MMOL/L) IN SER/PLAS: 3.2 MMOL/L (ref 3.5–5.3)
SODIUM (MMOL/L) IN SER/PLAS: 143 MMOL/L (ref 136–145)
UREA NITROGEN (MG/DL) IN SER/PLAS: 41 MG/DL (ref 6–23)

## 2023-06-03 LAB
ANION GAP IN SER/PLAS: 12 MMOL/L (ref 10–20)
CALCIUM (MG/DL) IN SER/PLAS: 7.3 MG/DL (ref 8.6–10.3)
CARBON DIOXIDE, TOTAL (MMOL/L) IN SER/PLAS: 25 MMOL/L (ref 21–32)
CHLORIDE (MMOL/L) IN SER/PLAS: 112 MMOL/L (ref 98–107)
CREATININE (MG/DL) IN SER/PLAS: 1.27 MG/DL (ref 0.5–1.3)
ERYTHROCYTE DISTRIBUTION WIDTH (RATIO) BY AUTOMATED COUNT: 17.8 % (ref 11.5–14.5)
ERYTHROCYTE MEAN CORPUSCULAR HEMOGLOBIN CONCENTRATION (G/DL) BY AUTOMATED: 30.5 G/DL (ref 32–36)
ERYTHROCYTE MEAN CORPUSCULAR VOLUME (FL) BY AUTOMATED COUNT: 96 FL (ref 80–100)
ERYTHROCYTES (10*6/UL) IN BLOOD BY AUTOMATED COUNT: 2.82 X10E12/L (ref 4.5–5.9)
GFR MALE: 62 ML/MIN/1.73M2
GLUCOSE (MG/DL) IN SER/PLAS: 109 MG/DL (ref 74–99)
HEMATOCRIT (%) IN BLOOD BY AUTOMATED COUNT: 27.2 % (ref 41–52)
HEMOGLOBIN (G/DL) IN BLOOD: 8.3 G/DL (ref 13.5–17.5)
IRON (UG/DL) IN SER/PLAS: 41 UG/DL (ref 35–150)
LEUKOCYTES (10*3/UL) IN BLOOD BY AUTOMATED COUNT: 5.6 X10E9/L (ref 4.4–11.3)
MAGNESIUM (MG/DL) IN SER/PLAS: 1.21 MG/DL (ref 1.6–2.4)
NRBC (PER 100 WBCS) BY AUTOMATED COUNT: 0.4 /100 WBC (ref 0–0)
PHOSPHATE (MG/DL) IN SER/PLAS: 3.1 MG/DL (ref 2.5–4.9)
PLATELETS (10*3/UL) IN BLOOD AUTOMATED COUNT: 236 X10E9/L (ref 150–450)
POTASSIUM (MMOL/L) IN SER/PLAS: 3.5 MMOL/L (ref 3.5–5.3)
PREALBUMIN (MG/DL) IN SERUM OR PLASMA: 10.6 MG/DL (ref 18–40)
SODIUM (MMOL/L) IN SER/PLAS: 145 MMOL/L (ref 136–145)
UREA NITROGEN (MG/DL) IN SER/PLAS: 35 MG/DL (ref 6–23)

## 2023-06-05 LAB
ANION GAP IN SER/PLAS: 12 MMOL/L (ref 10–20)
CALCIUM (MG/DL) IN SER/PLAS: 7.5 MG/DL (ref 8.6–10.3)
CARBON DIOXIDE, TOTAL (MMOL/L) IN SER/PLAS: 29 MMOL/L (ref 21–32)
CHLORIDE (MMOL/L) IN SER/PLAS: 108 MMOL/L (ref 98–107)
CREATININE (MG/DL) IN SER/PLAS: 1.14 MG/DL (ref 0.5–1.3)
ERYTHROCYTE DISTRIBUTION WIDTH (RATIO) BY AUTOMATED COUNT: 19 % (ref 11.5–14.5)
ERYTHROCYTE DISTRIBUTION WIDTH (RATIO) BY AUTOMATED COUNT: NORMAL
ERYTHROCYTE MEAN CORPUSCULAR HEMOGLOBIN CONCENTRATION (G/DL) BY AUTOMATED: 31.9 G/DL (ref 32–36)
ERYTHROCYTE MEAN CORPUSCULAR HEMOGLOBIN CONCENTRATION (G/DL) BY AUTOMATED: NORMAL
ERYTHROCYTE MEAN CORPUSCULAR VOLUME (FL) BY AUTOMATED COUNT: 96 FL (ref 80–100)
ERYTHROCYTE MEAN CORPUSCULAR VOLUME (FL) BY AUTOMATED COUNT: NORMAL
ERYTHROCYTES (10*6/UL) IN BLOOD BY AUTOMATED COUNT: 3.01 X10E12/L (ref 4.5–5.9)
ERYTHROCYTES (10*6/UL) IN BLOOD BY AUTOMATED COUNT: NORMAL
GFR MALE: 71 ML/MIN/1.73M2
GLUCOSE (MG/DL) IN SER/PLAS: 100 MG/DL (ref 74–99)
HEMATOCRIT (%) IN BLOOD BY AUTOMATED COUNT: 28.8 % (ref 41–52)
HEMATOCRIT (%) IN BLOOD BY AUTOMATED COUNT: NORMAL
HEMOGLOBIN (G/DL) IN BLOOD: 9.2 G/DL (ref 13.5–17.5)
HEMOGLOBIN (G/DL) IN BLOOD: NORMAL
LEUKOCYTES (10*3/UL) IN BLOOD BY AUTOMATED COUNT: 5.8 X10E9/L (ref 4.4–11.3)
LEUKOCYTES (10*3/UL) IN BLOOD BY AUTOMATED COUNT: NORMAL
MAGNESIUM (MG/DL) IN SER/PLAS: 1.32 MG/DL (ref 1.6–2.4)
NRBC (PER 100 WBCS) BY AUTOMATED COUNT: NORMAL
PHOSPHATE (MG/DL) IN SER/PLAS: 3.3 MG/DL (ref 2.5–4.9)
PLATELETS (10*3/UL) IN BLOOD AUTOMATED COUNT: 293 X10E9/L (ref 150–450)
PLATELETS (10*3/UL) IN BLOOD AUTOMATED COUNT: NORMAL
POTASSIUM (MMOL/L) IN SER/PLAS: 3.2 MMOL/L (ref 3.5–5.3)
SODIUM (MMOL/L) IN SER/PLAS: 146 MMOL/L (ref 136–145)
UREA NITROGEN (MG/DL) IN SER/PLAS: 26 MG/DL (ref 6–23)

## 2023-06-08 LAB
ANION GAP IN SER/PLAS: 11 MMOL/L (ref 10–20)
CALCIUM (MG/DL) IN SER/PLAS: 7.4 MG/DL (ref 8.6–10.3)
CARBON DIOXIDE, TOTAL (MMOL/L) IN SER/PLAS: 29 MMOL/L (ref 21–32)
CHLORIDE (MMOL/L) IN SER/PLAS: 112 MMOL/L (ref 98–107)
CREATININE (MG/DL) IN SER/PLAS: 1.11 MG/DL (ref 0.5–1.3)
ERYTHROCYTE DISTRIBUTION WIDTH (RATIO) BY AUTOMATED COUNT: 20.2 % (ref 11.5–14.5)
ERYTHROCYTE MEAN CORPUSCULAR HEMOGLOBIN CONCENTRATION (G/DL) BY AUTOMATED: 30.1 G/DL (ref 32–36)
ERYTHROCYTE MEAN CORPUSCULAR VOLUME (FL) BY AUTOMATED COUNT: 102 FL (ref 80–100)
ERYTHROCYTES (10*6/UL) IN BLOOD BY AUTOMATED COUNT: 3.21 X10E12/L (ref 4.5–5.9)
GFR MALE: 73 ML/MIN/1.73M2
GLUCOSE (MG/DL) IN SER/PLAS: 123 MG/DL (ref 74–99)
HEMATOCRIT (%) IN BLOOD BY AUTOMATED COUNT: 32.6 % (ref 41–52)
HEMOGLOBIN (G/DL) IN BLOOD: 9.8 G/DL (ref 13.5–17.5)
LEUKOCYTES (10*3/UL) IN BLOOD BY AUTOMATED COUNT: 5.8 X10E9/L (ref 4.4–11.3)
MAGNESIUM (MG/DL) IN SER/PLAS: 1.53 MG/DL (ref 1.6–2.4)
NRBC (PER 100 WBCS) BY AUTOMATED COUNT: 0.7 /100 WBC (ref 0–0)
PLATELETS (10*3/UL) IN BLOOD AUTOMATED COUNT: 262 X10E9/L (ref 150–450)
POTASSIUM (MMOL/L) IN SER/PLAS: 3.7 MMOL/L (ref 3.5–5.3)
SODIUM (MMOL/L) IN SER/PLAS: 148 MMOL/L (ref 136–145)
UREA NITROGEN (MG/DL) IN SER/PLAS: 26 MG/DL (ref 6–23)

## 2023-06-11 LAB
ANION GAP IN SER/PLAS: 14 MMOL/L (ref 10–20)
CALCIUM (MG/DL) IN SER/PLAS: 7.8 MG/DL (ref 8.6–10.3)
CARBON DIOXIDE, TOTAL (MMOL/L) IN SER/PLAS: 26 MMOL/L (ref 21–32)
CHLORIDE (MMOL/L) IN SER/PLAS: 99 MMOL/L (ref 98–107)
CREATININE (MG/DL) IN SER/PLAS: 1.08 MG/DL (ref 0.5–1.3)
ERYTHROCYTE DISTRIBUTION WIDTH (RATIO) BY AUTOMATED COUNT: 20.4 % (ref 11.5–14.5)
ERYTHROCYTE MEAN CORPUSCULAR HEMOGLOBIN CONCENTRATION (G/DL) BY AUTOMATED: 30.5 G/DL (ref 32–36)
ERYTHROCYTE MEAN CORPUSCULAR VOLUME (FL) BY AUTOMATED COUNT: 101 FL (ref 80–100)
ERYTHROCYTES (10*6/UL) IN BLOOD BY AUTOMATED COUNT: 3.53 X10E12/L (ref 4.5–5.9)
GFR MALE: 75 ML/MIN/1.73M2
GLUCOSE (MG/DL) IN SER/PLAS: 100 MG/DL (ref 74–99)
HEMATOCRIT (%) IN BLOOD BY AUTOMATED COUNT: 35.7 % (ref 41–52)
HEMOGLOBIN (G/DL) IN BLOOD: 10.9 G/DL (ref 13.5–17.5)
LEUKOCYTES (10*3/UL) IN BLOOD BY AUTOMATED COUNT: 6.8 X10E9/L (ref 4.4–11.3)
MAGNESIUM (MG/DL) IN SER/PLAS: 1.37 MG/DL (ref 1.6–2.4)
NRBC (PER 100 WBCS) BY AUTOMATED COUNT: 0.3 /100 WBC (ref 0–0)
PLATELETS (10*3/UL) IN BLOOD AUTOMATED COUNT: 235 X10E9/L (ref 150–450)
POTASSIUM (MMOL/L) IN SER/PLAS: 4.3 MMOL/L (ref 3.5–5.3)
SODIUM (MMOL/L) IN SER/PLAS: 135 MMOL/L (ref 136–145)
UREA NITROGEN (MG/DL) IN SER/PLAS: 20 MG/DL (ref 6–23)

## 2023-06-12 LAB
ANION GAP IN SER/PLAS: 17 MMOL/L (ref 10–20)
CALCIUM (MG/DL) IN SER/PLAS: 8.6 MG/DL (ref 8.6–10.3)
CARBON DIOXIDE, TOTAL (MMOL/L) IN SER/PLAS: 23 MMOL/L (ref 21–32)
CHLORIDE (MMOL/L) IN SER/PLAS: 101 MMOL/L (ref 98–107)
CREATININE (MG/DL) IN SER/PLAS: 1.13 MG/DL (ref 0.5–1.3)
ERYTHROCYTE DISTRIBUTION WIDTH (RATIO) BY AUTOMATED COUNT: 20.7 % (ref 11.5–14.5)
ERYTHROCYTE MEAN CORPUSCULAR HEMOGLOBIN CONCENTRATION (G/DL) BY AUTOMATED: 30.6 G/DL (ref 32–36)
ERYTHROCYTE MEAN CORPUSCULAR VOLUME (FL) BY AUTOMATED COUNT: 101 FL (ref 80–100)
ERYTHROCYTES (10*6/UL) IN BLOOD BY AUTOMATED COUNT: 3.81 X10E12/L (ref 4.5–5.9)
GFR MALE: 71 ML/MIN/1.73M2
GLUCOSE (MG/DL) IN SER/PLAS: 104 MG/DL (ref 74–99)
HEMATOCRIT (%) IN BLOOD BY AUTOMATED COUNT: 38.6 % (ref 41–52)
HEMOGLOBIN (G/DL) IN BLOOD: 11.8 G/DL (ref 13.5–17.5)
LEUKOCYTES (10*3/UL) IN BLOOD BY AUTOMATED COUNT: 6.8 X10E9/L (ref 4.4–11.3)
MAGNESIUM (MG/DL) IN SER/PLAS: 1.43 MG/DL (ref 1.6–2.4)
PHOSPHATE (MG/DL) IN SER/PLAS: 6.7 MG/DL (ref 2.5–4.9)
PLATELETS (10*3/UL) IN BLOOD AUTOMATED COUNT: 185 X10E9/L (ref 150–450)
POTASSIUM (MMOL/L) IN SER/PLAS: 5.5 MMOL/L (ref 3.5–5.3)
SODIUM (MMOL/L) IN SER/PLAS: 135 MMOL/L (ref 136–145)
UREA NITROGEN (MG/DL) IN SER/PLAS: 26 MG/DL (ref 6–23)

## 2023-06-14 LAB
ANION GAP IN SER/PLAS: 16 MMOL/L (ref 10–20)
CALCIUM (MG/DL) IN SER/PLAS: 8.9 MG/DL (ref 8.6–10.3)
CARBON DIOXIDE, TOTAL (MMOL/L) IN SER/PLAS: 24 MMOL/L (ref 21–32)
CHLORIDE (MMOL/L) IN SER/PLAS: 101 MMOL/L (ref 98–107)
CREATININE (MG/DL) IN SER/PLAS: 1.37 MG/DL (ref 0.5–1.3)
ERYTHROCYTE DISTRIBUTION WIDTH (RATIO) BY AUTOMATED COUNT: 20.2 % (ref 11.5–14.5)
ERYTHROCYTE MEAN CORPUSCULAR HEMOGLOBIN CONCENTRATION (G/DL) BY AUTOMATED: 30.6 G/DL (ref 32–36)
ERYTHROCYTE MEAN CORPUSCULAR VOLUME (FL) BY AUTOMATED COUNT: 101 FL (ref 80–100)
ERYTHROCYTES (10*6/UL) IN BLOOD BY AUTOMATED COUNT: 3.89 X10E12/L (ref 4.5–5.9)
GFR MALE: 57 ML/MIN/1.73M2
GLUCOSE (MG/DL) IN SER/PLAS: 113 MG/DL (ref 74–99)
HEMATOCRIT (%) IN BLOOD BY AUTOMATED COUNT: 39.2 % (ref 41–52)
HEMOGLOBIN (G/DL) IN BLOOD: 12 G/DL (ref 13.5–17.5)
LEUKOCYTES (10*3/UL) IN BLOOD BY AUTOMATED COUNT: 6.2 X10E9/L (ref 4.4–11.3)
MAGNESIUM (MG/DL) IN SER/PLAS: 2.01 MG/DL (ref 1.6–2.4)
PHOSPHATE (MG/DL) IN SER/PLAS: 5.1 MG/DL (ref 2.5–4.9)
PLATELETS (10*3/UL) IN BLOOD AUTOMATED COUNT: 199 X10E9/L (ref 150–450)
POTASSIUM (MMOL/L) IN SER/PLAS: 5 MMOL/L (ref 3.5–5.3)
SODIUM (MMOL/L) IN SER/PLAS: 136 MMOL/L (ref 136–145)
UREA NITROGEN (MG/DL) IN SER/PLAS: 38 MG/DL (ref 6–23)

## 2023-06-15 LAB
ANION GAP IN SER/PLAS: 14 MMOL/L (ref 10–20)
CALCIUM (MG/DL) IN SER/PLAS: 7.9 MG/DL (ref 8.6–10.3)
CARBON DIOXIDE, TOTAL (MMOL/L) IN SER/PLAS: 23 MMOL/L (ref 21–32)
CHLORIDE (MMOL/L) IN SER/PLAS: 105 MMOL/L (ref 98–107)
CREATININE (MG/DL) IN SER/PLAS: 1.25 MG/DL (ref 0.5–1.3)
ERYTHROCYTE DISTRIBUTION WIDTH (RATIO) BY AUTOMATED COUNT: 19.8 % (ref 11.5–14.5)
ERYTHROCYTE MEAN CORPUSCULAR HEMOGLOBIN CONCENTRATION (G/DL) BY AUTOMATED: 30.3 G/DL (ref 32–36)
ERYTHROCYTE MEAN CORPUSCULAR VOLUME (FL) BY AUTOMATED COUNT: 103 FL (ref 80–100)
ERYTHROCYTES (10*6/UL) IN BLOOD BY AUTOMATED COUNT: 3.48 X10E12/L (ref 4.5–5.9)
GFR MALE: 63 ML/MIN/1.73M2
GLUCOSE (MG/DL) IN SER/PLAS: 121 MG/DL (ref 74–99)
HEMATOCRIT (%) IN BLOOD BY AUTOMATED COUNT: 35.7 % (ref 41–52)
HEMOGLOBIN (G/DL) IN BLOOD: 10.8 G/DL (ref 13.5–17.5)
LEUKOCYTES (10*3/UL) IN BLOOD BY AUTOMATED COUNT: 5.8 X10E9/L (ref 4.4–11.3)
PLATELETS (10*3/UL) IN BLOOD AUTOMATED COUNT: 183 X10E9/L (ref 150–450)
POTASSIUM (MMOL/L) IN SER/PLAS: 4.5 MMOL/L (ref 3.5–5.3)
SODIUM (MMOL/L) IN SER/PLAS: 137 MMOL/L (ref 136–145)
UREA NITROGEN (MG/DL) IN SER/PLAS: 36 MG/DL (ref 6–23)

## 2023-06-22 ENCOUNTER — NURSING HOME VISIT (OUTPATIENT)
Dept: POST ACUTE CARE | Facility: EXTERNAL LOCATION | Age: 68
End: 2023-06-22
Payer: MEDICARE

## 2023-06-22 DIAGNOSIS — J98.51 MEDIASTINITIS: ICD-10-CM

## 2023-06-22 DIAGNOSIS — Z93.1 GASTROSTOMY IN PLACE (MULTI): ICD-10-CM

## 2023-06-22 DIAGNOSIS — K22.2 ESOPHAGEAL STRICTURE: ICD-10-CM

## 2023-06-22 DIAGNOSIS — T85.528A DISLODGED GASTROSTOMY TUBE: ICD-10-CM

## 2023-06-22 DIAGNOSIS — I82.532 CHRONIC DEEP VEIN THROMBOSIS (DVT) OF POPLITEAL VEIN OF LEFT LOWER EXTREMITY (MULTI): Primary | ICD-10-CM

## 2023-06-22 DIAGNOSIS — J86.9 EMPYEMA (MULTI): ICD-10-CM

## 2023-06-22 DIAGNOSIS — K22.0 ACHALASIA, ESOPHAGEAL: ICD-10-CM

## 2023-06-22 PROBLEM — I82.432 DEEP VEIN THROMBOSIS (DVT) OF POPLITEAL VEIN OF LEFT LOWER EXTREMITY (MULTI): Status: ACTIVE | Noted: 2023-06-22

## 2023-06-22 PROBLEM — E11.65 TYPE 2 DIABETES MELLITUS WITH HYPERGLYCEMIA (MULTI): Status: ACTIVE | Noted: 2023-06-22

## 2023-06-22 PROCEDURE — 99306 1ST NF CARE HIGH MDM 50: CPT | Performed by: INTERNAL MEDICINE

## 2023-06-22 ASSESSMENT — ENCOUNTER SYMPTOMS
DIZZINESS: 1
FATIGUE: 1
NERVOUS/ANXIOUS: 1
ABDOMINAL PAIN: 0
BRUISES/BLEEDS EASILY: 1
GASTROINTESTINAL NEGATIVE: 1
ACTIVITY CHANGE: 1
WOUND: 1
AGITATION: 0
EYES NEGATIVE: 1
ARTHRALGIAS: 1
ABDOMINAL DISTENTION: 0
WEAKNESS: 1
RESPIRATORY NEGATIVE: 1

## 2023-06-22 NOTE — LETTER
Patient: Levy Gregory  : 1955    Encounter Date: 2023    Subjective  Patient ID: Levy Gregory is a 67 y.o. male who is acute skilled care and presents for initial visit for skilled nursing.    67-year-old male patient has been having complications since beginning of the March.  Initially patient has achalasia and paraesophageal hiatus hernia, patient has a laparoscopic robot-assisted Heller myotomy with fundoplication on  and patient developed esophageal perforation requiring multiple surgeries with prolonged hospital course patient has at least 6 surgeries patient was having mediastinitis and mediastinal abscess, patient was found to have empyema, patient underwent a diagnostic laparotomy with the drain placement in the beginning with the endoscopy and stent placement with stent was dislodged another stent was placed right chest tube was placed on  patient has a second chest tube placed with thoracic surgery on 15 March patient has right VATS decortication bronchoscopy large empyema was drained patient has pigtail chest tube placement another esophageal stent was placed in the proximal site patient has Endo VAC placement Dobbhoff tube placement PEG tube placement they removed the stent perforation persisted on  he has a right thoracotomy against the esophagectomy with cervical esophagostomy has a revision of gastrostomy tube for esophageal perforation another surgery where PEG tube was placed placed jejunostomy tube was placed patient was on vasopressor patient was on ventilator patient has been having back-and-forth operating room visit SICU visit developed atrial fibrillation developed deep vein thrombosis.  Developed intra-abdominal retroperitoneal abscess intramuscular psoas hematoma the number of complications has been so substantial that this patient has been so tired and so annoyed about it and now after having last surgery for J-tube placement he has been brought  over here for skilled nursing and rehabilitation because he has been on the LTAC for enough amount of time prior to that patient was in a decent shape, prior to that patient has not have any major problem except for esophageal dysphagia and achalasia.  Today morning when I saw this patient he has a gastrostomy tube which has been used to give medications jejunostomy tube which has been used to give tube feeding and there is another tube on the chest wall it is not very much clear to me what kind of tube is that patient says that any pleasure eating he does it comes out through that tube so wonder it is a esophageal fistula or wart.  Patient is unable to ambulate he is quite listless.  There are several laboratories, several CAT scans and all were reviewed and reported and noted.         Review of Systems   Constitutional:  Positive for activity change and fatigue.   HENT: Negative.  Negative for congestion.    Eyes: Negative.    Respiratory: Negative.     Cardiovascular:  Positive for leg swelling.   Gastrointestinal: Negative.  Negative for abdominal distention and abdominal pain.        J tube and G tube   Endocrine: Positive for heat intolerance.   Musculoskeletal:  Positive for arthralgias.   Skin:  Positive for wound.   Neurological:  Positive for dizziness and weakness.   Hematological:  Bruises/bleeds easily.   Psychiatric/Behavioral:  Negative for agitation. The patient is nervous/anxious.        Objective  There were no vitals taken for this visit.    Physical Exam  Constitutional:       Appearance: He is normal weight. He is ill-appearing. He is not toxic-appearing.   HENT:      Head: Normocephalic.      Nose: Nose normal.   Eyes:      Conjunctiva/sclera: Conjunctivae normal.   Cardiovascular:      Rate and Rhythm: Normal rate and regular rhythm.      Pulses: Normal pulses.      Heart sounds: Normal heart sounds.      Comments: Chest tube left chest wall?  Pulmonary:      Effort: Pulmonary effort is  normal.      Breath sounds: Normal breath sounds.   Abdominal:      General: Abdomen is flat.      Palpations: Abdomen is soft.      Comments: G tube and J tube   Musculoskeletal:         General: No tenderness.      Cervical back: Normal range of motion and neck supple.   Skin:     General: Skin is warm and dry.   Neurological:      General: No focal deficit present.      Mental Status: He is oriented to person, place, and time. Mental status is at baseline.   Psychiatric:         Mood and Affect: Mood is anxious.         Assessment/Plan  Problem List Items Addressed This Visit       Esophageal stricture    Deep vein thrombosis (DVT) of popliteal vein of left lower extremity (CMS/HCC) - Primary    Dislodged gastrostomy tube    Empyema (CMS/HCC)    Gastrostomy in place (CMS/[facility])    Achalasia, esophageal    Mediastinitis   Patient is listed to be on Eliquis 2.5 mg, atorvastatin, his omeprazole, insulin glargine 6 units, metoprolol, sertraline, see Lodosyn, thiamine, melatonin, G-tube feeding.  Patient is absolutely listless.  It is still going to take some time and effort for this patient to recover, the reason for Eliquis is a DVT and atrial fibrillation.  One of the time patient was having intra-abdominal abscess which was resistant and MRSA patient's DVTs and psoas hematoma were managed with IVC filter on 20 April DVTs persist patient has a hyponatremia patient's creatinine was waxing and waning.  Nursing staff has to be very careful to manage this patient's condition here, jejunostomy tube feeding will be continued, flush can be done through G-tube, laboratories will be done twice a week, patient is absolutely listless and weak, last creatinine was 1.25, last hemoglobin was 10.8.  Not sure how well this patient will do but at this time patient will be monitored here for skilled nursing and rehab related related stay, all the tubes needs to be monitored and check for placement, remains at risk of fall, required  substantial amount of help and assist, we hope that patient will recover and improve as the time persist, mediastinitis has been resolved, intra-abdominal abscess must have been drained, any febrile state will be treated with imaging and acute care transfer.     Goals    None           Electronically Signed By: Scooter Carrasco MD   6/22/23 10:20 PM

## 2023-06-23 NOTE — PROGRESS NOTES
Subjective   Patient ID: Levy Gregory is a 67 y.o. male who is acute skilled care and presents for initial visit for skilled nursing.    67-year-old male patient has been having complications since beginning of the March.  Initially patient has achalasia and paraesophageal hiatus hernia, patient has a laparoscopic robot-assisted Heller myotomy with fundoplication on 1 March and patient developed esophageal perforation requiring multiple surgeries with prolonged hospital course patient has at least 6 surgeries patient was having mediastinitis and mediastinal abscess, patient was found to have empyema, patient underwent a diagnostic laparotomy with the drain placement in the beginning with the endoscopy and stent placement with stent was dislodged another stent was placed right chest tube was placed on 7 March patient has a second chest tube placed with thoracic surgery on 15 March patient has right VATS decortication bronchoscopy large empyema was drained patient has pigtail chest tube placement another esophageal stent was placed in the proximal site patient has Endo VAC placement Dobbhoff tube placement PEG tube placement they removed the stent perforation persisted on 5 March he has a right thoracotomy against the esophagectomy with cervical esophagostomy has a revision of gastrostomy tube for esophageal perforation another surgery where PEG tube was placed placed jejunostomy tube was placed patient was on vasopressor patient was on ventilator patient has been having back-and-forth operating room visit SICU visit developed atrial fibrillation developed deep vein thrombosis.  Developed intra-abdominal retroperitoneal abscess intramuscular psoas hematoma the number of complications has been so substantial that this patient has been so tired and so annoyed about it and now after having last surgery for J-tube placement he has been brought over here for skilled nursing and rehabilitation because he has been on  the LTAC for enough amount of time prior to that patient was in a decent shape, prior to that patient has not have any major problem except for esophageal dysphagia and achalasia.  Today morning when I saw this patient he has a gastrostomy tube which has been used to give medications jejunostomy tube which has been used to give tube feeding and there is another tube on the chest wall it is not very much clear to me what kind of tube is that patient says that any pleasure eating he does it comes out through that tube so wonder it is a esophageal fistula or wart.  Patient is unable to ambulate he is quite listless.  There are several laboratories, several CAT scans and all were reviewed and reported and noted.         Review of Systems   Constitutional:  Positive for activity change and fatigue.   HENT: Negative.  Negative for congestion.    Eyes: Negative.    Respiratory: Negative.     Cardiovascular:  Positive for leg swelling.   Gastrointestinal: Negative.  Negative for abdominal distention and abdominal pain.        J tube and G tube   Endocrine: Positive for heat intolerance.   Musculoskeletal:  Positive for arthralgias.   Skin:  Positive for wound.   Neurological:  Positive for dizziness and weakness.   Hematological:  Bruises/bleeds easily.   Psychiatric/Behavioral:  Negative for agitation. The patient is nervous/anxious.        Objective   There were no vitals taken for this visit.    Physical Exam  Constitutional:       Appearance: He is normal weight. He is ill-appearing. He is not toxic-appearing.   HENT:      Head: Normocephalic.      Nose: Nose normal.   Eyes:      Conjunctiva/sclera: Conjunctivae normal.   Cardiovascular:      Rate and Rhythm: Normal rate and regular rhythm.      Pulses: Normal pulses.      Heart sounds: Normal heart sounds.      Comments: Chest tube left chest wall?  Pulmonary:      Effort: Pulmonary effort is normal.      Breath sounds: Normal breath sounds.   Abdominal:      General:  Abdomen is flat.      Palpations: Abdomen is soft.      Comments: G tube and J tube   Musculoskeletal:         General: No tenderness.      Cervical back: Normal range of motion and neck supple.   Skin:     General: Skin is warm and dry.   Neurological:      General: No focal deficit present.      Mental Status: He is oriented to person, place, and time. Mental status is at baseline.   Psychiatric:         Mood and Affect: Mood is anxious.         Assessment/Plan   Problem List Items Addressed This Visit       Esophageal stricture    Deep vein thrombosis (DVT) of popliteal vein of left lower extremity (CMS/[facility]) - Primary    Dislodged gastrostomy tube    Empyema (CMS/[facility])    Gastrostomy in place (CMS/[facility])    Achalasia, esophageal    Mediastinitis   Patient is listed to be on Eliquis 2.5 mg, atorvastatin, his omeprazole, insulin glargine 6 units, metoprolol, sertraline, see Lodosyn, thiamine, melatonin, G-tube feeding.  Patient is absolutely listless.  It is still going to take some time and effort for this patient to recover, the reason for Eliquis is a DVT and atrial fibrillation.  One of the time patient was having intra-abdominal abscess which was resistant and MRSA patient's DVTs and psoas hematoma were managed with IVC filter on 20 April DVTs persist patient has a hyponatremia patient's creatinine was waxing and waning.  Nursing staff has to be very careful to manage this patient's condition here, jejunostomy tube feeding will be continued, flush can be done through G-tube, laboratories will be done twice a week, patient is absolutely listless and weak, last creatinine was 1.25, last hemoglobin was 10.8.  Not sure how well this patient will do but at this time patient will be monitored here for skilled nursing and rehab related related stay, all the tubes needs to be monitored and check for placement, remains at risk of fall, required substantial amount of help and assist, we hope that patient will recover and  improve as the time persist, mediastinitis has been resolved, intra-abdominal abscess must have been drained, any febrile state will be treated with imaging and acute care transfer.     Goals    None

## 2023-06-26 ENCOUNTER — NURSING HOME VISIT (OUTPATIENT)
Dept: POST ACUTE CARE | Facility: EXTERNAL LOCATION | Age: 68
End: 2023-06-26
Payer: MEDICARE

## 2023-06-26 DIAGNOSIS — K22.0 ACHALASIA, ESOPHAGEAL: Primary | ICD-10-CM

## 2023-06-26 DIAGNOSIS — K22.2 ESOPHAGEAL STRICTURE: ICD-10-CM

## 2023-06-26 DIAGNOSIS — J98.51 MEDIASTINITIS: ICD-10-CM

## 2023-06-26 DIAGNOSIS — Z93.1 GASTROSTOMY IN PLACE (MULTI): ICD-10-CM

## 2023-06-26 DIAGNOSIS — J86.9 EMPYEMA (MULTI): ICD-10-CM

## 2023-06-26 DIAGNOSIS — T85.528A DISLODGED GASTROSTOMY TUBE: ICD-10-CM

## 2023-06-26 DIAGNOSIS — Z86.711 HISTORY OF PULMONARY EMBOLISM: ICD-10-CM

## 2023-06-26 DIAGNOSIS — I82.432 DEEP VEIN THROMBOSIS (DVT) OF POPLITEAL VEIN OF LEFT LOWER EXTREMITY, UNSPECIFIED CHRONICITY (MULTI): ICD-10-CM

## 2023-06-26 PROCEDURE — 99310 SBSQ NF CARE HIGH MDM 45: CPT | Performed by: NURSE PRACTITIONER

## 2023-06-26 NOTE — LETTER
Patient: Levy Gregory  : 1955    Encounter Date: 2023    Name: Levy Gregory  YOB: 1955    INITIAL NP FOLLOW UP VISIT: SNF, multiple extended hospitalization, multiple surgeries    Levy Gregory is a 67 y.o. male who is here at [facility] care for skilled care after an extended hospitalization and multiple surgeries. Patient was initially admitted  for hiatal hernia and had an elective laparoscopic robotic assisted hiatal hernia repair, Heller myotomy with Shai fundoplication, and EGD.  Patient was discharged on the  without any problems.  Patient returned to the hospital  and had an extended hospitalization for esophageal perforation.  Patient was transferred to [facility] and was hospitalized from  through May 22.    Hospital course-Levy has a past medical history of COPD, type 2 diabetes, type II achalasia, para esophageal hernia status post robotic left endoscopy, Heller myotomy with Shai fundoplication on  with Dr. Hoang.  Presented back to the emergency room on  with shortness of breath chills and right upper quadrant pain.  CT showing possible esophageal perforation - patient underwent a diagnostic lap with drain placement, EGD with stent placement, right chest tube and MIGUELINA drains.  Patient had a second chest tube placed on .  On March 15 he went to the OR for R VATS, decortication, bronchoscopy and NG placement.  Large eyphema was seen on CT chest and patient underwent IR guided pigtail placement on .   back to the OR for EGD with possible stent replacement and nasal tube postpyloric placement.  Previous esophageal stent was found to have a migration below LES and MIGUELINA drain was visible traveling through the esophageal perforation.  A new esophageal stent was placed proximally.  On , drains found to be filled with bilious fluid and concern for a leak. Patient taken back to the  OR on March 31 and underwent EGD, removal of esophageal stent, Endo VAC placement and Dobbhoff tube placement as the pt was found to have a perforation.  CT obtained on April 4 showed previous findings as well as increasing pneumoperitoneum.  On April 5 patient back to the OR with right thoracotomy and esophagectomy with cervical esophagostomy and lap takedown of prior fundoplication, lysis of adhesions and revision of gastrostomy tube for esophageal perforation.  April 7 patient taken back to the OR for PEG replacement and J-tube placement.  Patient was successfully weaned and extubated as well and was taken off of drips on April 7 and 8th.  Patient was reintubated on April 10 due to respiratory fatigue as well and amiodarone drip initiated for uncontrolled A-fib with RVR.  Patient was also started on CVVH on April 10.  He was extubated on April 12 as well as CVVH discontinued.  CT showed intra-abdominal RP abscesses and intramuscular psoas hematoma.  Repeat CT on April 24 demonstrated increase in size of RP hematoma and decreased in size of the psoas hematoma.  He was transferred to regular medical floor on April 25 and developed rising leukocytosis and tachycardia.  Repeat CT showed pulmonary embolism. IVC filter was placed. Patient also was found to have calculus cholecystitis and on April 28 underwent percutaneous cholecystostomy tube placement by interventional radiology.  CT on May 7 showed persistent anastomotic leak from gastric stump.  Throughout hospitalization patient followed by infectious disease due to complicated abdominal abscesses as well as resistant MRSA and CR-acienobacter.  Patient was also treated for upper and lower extremity DVTs as well as pulmonary embolism.  He was started on heparin and then transition to Eliquis.  Patient was also given tube feeds per the J-tube.  Patient was discharged to an LTAC.   Patient hospitalized again from Jackie 15 through June 21, 2023 for a malfunctioning  "jejunostomy. On June 16, pt had J-tube replaced.  Patient is now here at [facility].  At this time the patient is sitting up in his bedside chair with family members at the bedside.  He appears to be comfortable and in no distress.  Patient explains all of the above and extended hospitalization along with multiple surgeries showing me his abdomen and drains.  Patient explains that he is able to walk but just needs to gain strength in pulling self up from chair to standing position. 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          REVIEW OF SYSTEMS:  Review of systems are negative except where noted in HPI.    BP (!) 87/49   Pulse 83   Temp 36.4 °C (97.6 °F)   Resp 16   Ht 1.854 m (6' 1\")   Wt 87.4 kg (192 lb 11.2 oz)   BMI 25.42 kg/m²      Physical Exam  Constitutional:       Comments: Chronically ill   HENT:      Head: Normocephalic.      Right Ear: External ear normal.      Left Ear: External ear normal.      Nose: Nose normal.      Mouth/Throat:      Mouth: Mucous membranes are moist.   Eyes:      Extraocular Movements: Extraocular movements intact.      Conjunctiva/sclera: Conjunctivae normal.      Pupils: Pupils are equal, round, and reactive to light.   Neck:      Comments: Cervical drain  Cardiovascular:      Rate and Rhythm: Normal rate and regular rhythm.      Pulses: Normal pulses.      Heart sounds: Normal heart sounds.   Pulmonary:      Effort: Pulmonary effort is normal.      Breath sounds: Normal breath sounds.      Comments: Diminished bilaterally  Abdominal:      General: Bowel sounds are normal. There is no distension.      Palpations: Abdomen is soft.      Tenderness: There is no abdominal tenderness.      Comments: Multiple healed lap sites  J-tube in place - drsg clean and dry   Genitourinary:     Comments: Not examined  Musculoskeletal:         General: Normal range of motion.      " Cervical back: Normal range of motion.      Comments: Generalized weakness  Trace edema   Skin:     General: Skin is warm and dry.      Capillary Refill: Capillary refill takes less than 2 seconds.   Neurological:      General: No focal deficit present.      Mental Status: He is alert and oriented to person, place, and time. Mental status is at baseline.   Psychiatric:         Mood and Affect: Mood normal.          Living will related issues reviewed-CODE STATUS: Full code    DISCHARGE PLANNING: no date at this time  Discharge Home when goals are met.   Follow up with PCP in 1-2 weeks after discharge from facility.   [facility] to follow after discharge for continued PT/OT and Nursing.    RECENT HOSPITALIZATION DATES: see HPI  All laboratories, diagnostic tests, progress notes, and consultation notes reviewed from recent hospitalization.     LABORATORIES OR DIAGNOSTIC TESTS DONE/REVIEWED IN FACILITY:  Due tomorrow  CBC diff and CMP weekly    MEDICATIONS REVIEWED AT THE FACILITY:  Tylenol liquid 160mg/5ml 20 ml Q4hr PRN  Eliquis 2.5 mg bid  Atorvastatin 10 mg daily  Docusate liquid 10 ml bid  Bowel protocol - MOM PRN, Docusate RS PRN, and Fleets Enema PRN  Esomeprazole 40 mg daily  Glargine 6 units at bedtime  Metoprolol tartrate 25 mg bid  Miralax 17 grams Q24 hr PRN  Sennosides 8.8 mg/5ml bid  Sertraline 50 mg daily  Tamsulosin 0.4 mg daily  Thiamine 100 mg daily    Assessment/Plan   Problem List Items Addressed This Visit          Cardiac and Vasculature    Mediastinitis       Coag and Thromboembolic    History of pulmonary embolism    Deep vein thrombosis (DVT) of popliteal vein of left lower extremity (CMS/HCC)       Gastrointestinal and Abdominal    Esophageal stricture    Dislodged gastrostomy tube    Gastrostomy in place (CMS/HCC)    Achalasia, esophageal - Primary       Pulmonary and Pneumonias    Empyema (CMS/HCC)       Skin Integrity:  Nursing to monitor skin integrity as patient is at risk for pressure  injuries.  Wound care per nursing  Cervical esophagostomy - pleasure liquids 8 - 10 ml at a time  J-tube site -Nephro 55/hr 250 ml Q4hr   See Facility notes for measurements/assessments  Turn and reposition Q 2 hours or more  Air mattress and when up in chair cushion reducing device  Dietician to evaluate and recommend:  Nutritional supplement:  Please monitor skin integrity and other pressure areas  Referral to wound clinic if appropriate:    PLAN:   Recent nursing evaluation and notes were reviewed.   Overall, patient is stable despite his/her chronic conditions.    #Afib/DVT: Eliquis to be maintained, IVC filter  #H/O Intra-abd abscess w/resistant MRSA  #J-tube: feedings - nursing to monitor closely  # Weakness and physical deconditioning: PT/OT/ST to maximize strength, function, and endurance all while maintaining safety.   Labs will done weekly and due tomorrow. BP readings will be monitoring. Patients condition will be monitored closely  F/up appts arranged per nursing.  Any decline or change in condition needs to be communicated with the physician or myself.    Discussion with nursing staff regarding ongoing care and management.  If needed, would communicate with family who are not present at this time.   There are no concerns at this time.    We will continue with the medications noted above.    We will continue to follow the patient here at the facility.    *Please note that nursing facility, outside laboratory agency, and  [facility] do not interface.     Completion of the note was done through Dragon voice recognition technology and may include   unintended or grammatical errors which may not have been recognized when finalizing the note.     Time: I spent 45 minutes or greater with the patient. Greater than 50% of this time was spent in counseling and or coordination of care. The time includes prep time of reviewing vital signs, report from direct nursing staff and or therapists, hospital documentation,  reviewing labs, radiographs, diagnostic tests and or consultations, time directly spent with the patient interviewing, examining, and education regarding diagnosis, treatments, and medications, as well as documentation in the electronic medical record, and reviewing the plan of care and any new orders with the patient, nursing staff and other staff directly related to the patients care.       MAGDALENA Sarkar       Electronically Signed By: MAGDALENA Sarkar   6/29/23  7:56 PM

## 2023-06-29 VITALS
HEART RATE: 83 BPM | HEIGHT: 73 IN | DIASTOLIC BLOOD PRESSURE: 49 MMHG | TEMPERATURE: 97.6 F | WEIGHT: 192.7 LBS | RESPIRATION RATE: 16 BRPM | BODY MASS INDEX: 25.54 KG/M2 | SYSTOLIC BLOOD PRESSURE: 87 MMHG

## 2023-06-29 NOTE — PROGRESS NOTES
Name: Levy Gregory  YOB: 1955    INITIAL NP FOLLOW UP VISIT: SNF, multiple extended hospitalization, multiple surgeries    Levy Gregory is a 67 y.o. male who [facility] at [facility] after an extended hospitalization and multiple surgeries. Patient was initially admitted March 1st for hiatal hernia and had an elective laparoscopic robotic assisted hiatal hernia repair, Heller myotomy with Shai fundoplication, and EGD.  Patient was discharged on the March 3rd without any problems.  Patient returned to the hospital March 7th and had an extended hospitalization for esophageal perforation.  Patient was transferred to [facility] and was hospitalized from March 9 through May 22.    Hospital course-Levy has a past medical history of COPD, type 2 diabetes, type II achalasia, para esophageal hernia status post robotic left endoscopy, Heller myotomy with Shai fundoplication on March 1st with Dr. Hoang.  Presented back to the emergency room on March 7th with shortness of breath chills and right upper quadrant pain.  CT showing possible esophageal perforation - patient underwent a diagnostic lap with drain placement, EGD with stent placement, right chest tube and MIGUELINA drains.  Patient had a second chest tube placed on March 13.  On March 15 he went to the OR for R VATS, decortication, bronchoscopy and NG placement.  Large eyphema was seen on CT chest and patient underwent IR guided pigtail placement on March 24.  March 27 back to the OR for EGD with possible stent replacement and nasal tube postpyloric placement.  Previous esophageal stent was found to have a migration below LES and MIGUELINA drain was visible traveling through the esophageal perforation.  A new esophageal stent was placed proximally.  On March 30th, drains found to be filled with bilious fluid and concern for a leak. Patient taken back to the OR on March 31 and underwent EGD, removal of esophageal stent, Endo VAC  placement and Dobbhoff tube placement as the pt was found to have a perforation.  CT obtained on April 4 showed previous findings as well as increasing pneumoperitoneum.  On April 5 patient back to the OR with right thoracotomy and esophagectomy with cervical esophagostomy and lap takedown of prior fundoplication, lysis of adhesions and revision of gastrostomy tube for esophageal perforation.  April 7 patient taken back to the OR for PEG replacement and J-tube placement.  Patient was successfully weaned and extubated as well and was taken off of drips on April 7 and 8th.  Patient was reintubated on April 10 due to respiratory fatigue as well and amiodarone drip initiated for uncontrolled A-fib with RVR.  Patient was also started on CVVH on April 10.  He was extubated on April 12 as well as CVVH discontinued.  CT showed intra-abdominal RP abscesses and intramuscular psoas hematoma.  Repeat CT on April 24 demonstrated increase in size of RP hematoma and decreased in size of the psoas hematoma.  He was transferred to regular medical floor on April 25 and developed rising leukocytosis and tachycardia.  Repeat CT showed pulmonary embolism. IVC filter was placed. Patient also was found to have calculus cholecystitis and on April 28 underwent percutaneous cholecystostomy tube placement by interventional radiology.  CT on May 7 showed persistent anastomotic leak from gastric stump.  Throughout hospitalization patient followed by infectious disease due to complicated abdominal abscesses as well as resistant MRSA and CR-acienobacter.  Patient was also treated for upper and lower extremity DVTs as well as pulmonary embolism.  He was started on heparin and then transition to Eliquis.  Patient was also given tube feeds per the J-tube.  Patient was discharged to an LTAC.   Patient hospitalized again from Jackie 15 through June 21, 2023 for a malfunctioning jejunostomy. On June 16, pt had J-tube replaced.  Patient is now here at  [facility].  At this time the patient is sitting up in his bedside chair with family members at the bedside.  He appears to be comfortable and in no distress.  Patient explains all of the above and extended hospitalization along with multiple surgeries showing me his abdomen and drains.  Patient explains that he is able to walk but just needs to gain strength in pulling self up from chair to standing position. 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          REVIEW OF SYSTEMS:  Review of systems are negative except where noted in HPI.    BP (!) 87/49   Pulse 83   Temp 36.4 °C (97.6 °F)   Resp 16   Ht 1.854 m (6' 1\")   Wt 87.4 kg (192 lb 11.2 oz)   BMI 25.42 kg/m²      Physical Exam  Constitutional:       Comments: Chronically ill   HENT:      Head: Normocephalic.      Right Ear: External ear normal.      Left Ear: External ear normal.      Nose: Nose normal.      Mouth/Throat:      Mouth: Mucous membranes are moist.   Eyes:      Extraocular Movements: Extraocular movements intact.      Conjunctiva/sclera: Conjunctivae normal.      Pupils: Pupils are equal, round, and reactive to light.   Neck:      Comments: Cervical drain  Cardiovascular:      Rate and Rhythm: Normal rate and regular rhythm.      Pulses: Normal pulses.      Heart sounds: Normal heart sounds.   Pulmonary:      Effort: Pulmonary effort is normal.      Breath sounds: Normal breath sounds.      Comments: Diminished bilaterally  Abdominal:      General: Bowel sounds are normal. There is no distension.      Palpations: Abdomen is soft.      Tenderness: There is no abdominal tenderness.      Comments: Multiple healed lap sites  J-tube in place - drsg clean and dry   Genitourinary:     Comments: Not examined  Musculoskeletal:         General: Normal range of motion.      Cervical back: Normal range of motion.      Comments: Generalized " weakness  Trace edema   Skin:     General: Skin is warm and dry.      Capillary Refill: Capillary refill takes less than 2 seconds.   Neurological:      General: No focal deficit present.      Mental Status: He is alert and oriented to person, place, and time. Mental status is at baseline.   Psychiatric:         Mood and Affect: Mood normal.          Living will related issues reviewed-CODE STATUS: Full code    DISCHARGE PLANNING: no date at this time  Discharge Home when goals are met.   Follow up with PCP in 1-2 weeks after discharge from facility.   [facility] to follow after discharge for continued PT/OT and Nursing.    RECENT HOSPITALIZATION DATES: see HPI  All laboratories, diagnostic tests, progress notes, and consultation notes reviewed from recent hospitalization.     LABORATORIES OR DIAGNOSTIC TESTS DONE/REVIEWED IN FACILITY:  Due tomorrow  CBC diff and CMP weekly    MEDICATIONS REVIEWED AT THE FACILITY:  Tylenol liquid 160mg/5ml 20 ml Q4hr PRN  Eliquis 2.5 mg bid  Atorvastatin 10 mg daily  Docusate liquid 10 ml bid  Bowel protocol - MOM PRN, Docusate RS PRN, and Fleets Enema PRN  Esomeprazole 40 mg daily  Glargine 6 units at bedtime  Metoprolol tartrate 25 mg bid  Miralax 17 grams Q24 hr PRN  Sennosides 8.8 mg/5ml bid  Sertraline 50 mg daily  Tamsulosin 0.4 mg daily  Thiamine 100 mg daily    Assessment/Plan    Problem List Items Addressed This Visit          Cardiac and Vasculature    Mediastinitis       Coag and Thromboembolic    History of pulmonary embolism    Deep vein thrombosis (DVT) of popliteal vein of left lower extremity (CMS/HCC)       Gastrointestinal and Abdominal    Esophageal stricture    Dislodged gastrostomy tube    Gastrostomy in place (CMS/HCC)    Achalasia, esophageal - Primary       Pulmonary and Pneumonias    Empyema (CMS/HCC)       Skin Integrity:  Nursing to monitor skin integrity as patient is at risk for pressure injuries.  Wound care per nursing  Cervical esophagostomy - pleasure  liquids 8 - 10 ml at a time  J-tube site -Nephro 55/hr 250 ml Q4hr   See Facility notes for measurements/assessments  Turn and reposition Q 2 hours or more  Air mattress and when up in chair cushion reducing device  Dietician to evaluate and recommend:  Nutritional supplement:  Please monitor skin integrity and other pressure areas  Referral to wound clinic if appropriate:    PLAN:   Recent nursing evaluation and notes were reviewed.   Overall, patient is stable despite his/her chronic conditions.    #Afib/DVT: Eliquis to be maintained, IVC filter  #H/O Intra-abd abscess w/resistant MRSA  #J-tube: feedings - nursing to monitor closely  # Weakness and physical deconditioning: PT/OT/ST to maximize strength, function, and endurance all while maintaining safety.   Labs will done weekly and due tomorrow. BP readings will be monitoring. Patients condition will be monitored closely  F/up appts arranged per nursing.  Any decline or change in condition needs to be communicated with the physician or myself.    Discussion with nursing staff regarding ongoing care and management.  If needed, would communicate with family who are not present at this time.   There are no concerns at this time.    We will continue with the medications noted above.    We will continue to follow the patient here at the facility.    *Please note that nursing facility, outside laboratory agency, and  [facility] do not interface.     Completion of the note was done through Dragon voice recognition technology and may include   unintended or grammatical errors which may not have been recognized when finalizing the note.     Time: I spent 45 minutes or greater with the patient. Greater than 50% of this time was spent in counseling and or coordination of care. The time includes prep time of reviewing vital signs, report from direct nursing staff and or therapists, hospital documentation, reviewing labs, radiographs, diagnostic tests and or consultations, time  directly spent with the patient interviewing, examining, and education regarding diagnosis, treatments, and medications, as well as documentation in the electronic medical record, and reviewing the plan of care and any new orders with the patient, nursing staff and other staff directly related to the patients care.       Teresa Rodriguez, APRN-CNP

## 2023-07-04 ENCOUNTER — NURSING HOME VISIT (OUTPATIENT)
Dept: POST ACUTE CARE | Facility: EXTERNAL LOCATION | Age: 68
End: 2023-07-04
Payer: MEDICARE

## 2023-07-04 VITALS — SYSTOLIC BLOOD PRESSURE: 120 MMHG | HEART RATE: 88 BPM | DIASTOLIC BLOOD PRESSURE: 57 MMHG

## 2023-07-04 DIAGNOSIS — J98.51 MEDIASTINITIS: ICD-10-CM

## 2023-07-04 DIAGNOSIS — T85.528A DISLODGED GASTROSTOMY TUBE: ICD-10-CM

## 2023-07-04 DIAGNOSIS — T85.528A JEJUNOSTOMY TUBE FELL OUT: ICD-10-CM

## 2023-07-04 DIAGNOSIS — I82.532 CHRONIC DEEP VEIN THROMBOSIS (DVT) OF POPLITEAL VEIN OF LEFT LOWER EXTREMITY (MULTI): ICD-10-CM

## 2023-07-04 DIAGNOSIS — J86.9 EMPYEMA (MULTI): ICD-10-CM

## 2023-07-04 DIAGNOSIS — K22.0 ACHALASIA, ESOPHAGEAL: Primary | ICD-10-CM

## 2023-07-04 DIAGNOSIS — Z93.1 GASTROSTOMY IN PLACE (MULTI): ICD-10-CM

## 2023-07-04 PROCEDURE — 99309 SBSQ NF CARE MODERATE MDM 30: CPT | Performed by: INTERNAL MEDICINE

## 2023-07-04 NOTE — PROGRESS NOTES
Subjective   Patient ID: Levy Gregory is a 67 y.o. male who is acute skilled care being seen and evaluated for multiple medical problems.    67-year-old male patient was seen again for follow-up, 1 night patient's the jejunostomy tube came out so has to be sent to the hospital but came back with the replacement of jejunostomy tube, patient is doing well interestingly unfortunately.  Laboratories were reviewed.  Patient remains on anticoagulation, patient remains on jejunostomy tube feeding, there is a bag on the left chest wall which has been showing some dark-colored liquids.  He has been having pleasure foods, medicines has been given through G-tube, most of the nutrition is given through jejunostomy tube feeding.  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  He is physical therapy progress was reviewed, no major follow-ups has been done as far as recent surgeries and thoracic complications.         Review of Systems  Constitutional:  Positive for activity change and fatigue.   HENT: Negative.  Negative for congestion.    Eyes: Negative.    Respiratory: Negative.     Cardiovascular:  Positive for leg swelling.   Gastrointestinal: Negative.  Negative for abdominal distention and abdominal pain.        J tube and G tube     Musculoskeletal:  Positive for arthralgias.   Skin:  Positive for wound.   Neurological: negative  Hematological:  Bruises/bleeds easily.   Psychiatric/Behavioral:  Negative for agitation. The patient is less  nervous/anxious.    Objective   /57   Pulse 88     Physical Exam  Constitutional:       Appearance: He is normal weight. He is  not anymore ill-appearing. He is not toxic-appearing.   HENT:      Head: Normocephalic.      Nose:  Nose normal.   Eyes:      Conjunctiva/sclera: Conjunctivae normal.   Cardiovascular:      Rate and Rhythm: Normal rate and regular rhythm.      Pulses: Normal pulses.      Heart sounds: Normal heart sounds.      Comments: Chest tube left chest wall?  Pulmonary:      Effort: Pulmonary effort is normal.      Breath sounds: Normal breath sounds.   Abdominal:      General: Abdomen is flat.      Palpations: Abdomen is soft.      Comments: G tube and J tube   Musculoskeletal:         General: No tenderness.      Cervical back: Normal range of motion and neck supple.   Skin:     General: Skin is warm and dry.   Neurological:      General: No focal deficit present.      Mental Status: He is oriented to person, place, and time. Mental status is at baseline.   Psychiatric:         Mood and Affect: Mood is improved.  Assessment/Plan   Problem List Items Addressed This Visit       Deep vein thrombosis (DVT) of popliteal vein of left lower extremity (CMS/HCC)    Dislodged gastrostomy tube    Empyema (CMS/HCC)    Gastrostomy in place (CMS/HCC)    Achalasia, esophageal - Primary    Mediastinitis     Other Visit Diagnoses       Jejunostomy tube fell out            Patient was assessed, ER visit was reviewed, laboratories were reviewed, jejunostomy tube was felt and inspected, gastrostomy tube was felt and inspected, chest wall tube was inspected and felt.  He is calm and comfortable, he is less anxious, his hemoglobin is not dropping, he is able to ambulate with the help and support, he has a DVT, he does not have any obvious atrial fibrillation.  Medications are reviewed, so far things has been going well but still lot of problems and concerns and delayed road of recovery for him.  Pain control is reasonable, periodic follow-up and assessment will be done regarding the patient's condition as multiple problems, complications, postoperative complications and prolonged hospitalization and multiple stoma tube required intensive  nursing care and medical follow-ups.     Goals    None

## 2023-07-04 NOTE — LETTER
Patient: Levy Gregory  : 1955    Encounter Date: 2023    Subjective  Patient ID: Levy Gregory is a 67 y.o. male who is acute skilled care being seen and evaluated for multiple medical problems.    67-year-old male patient was seen again for follow-up, 1 night patient's the jejunostomy tube came out so has to be sent to the hospital but came back with the replacement of jejunostomy tube, patient is doing well interestingly unfortunately.  Laboratories were reviewed.  Patient remains on anticoagulation, patient remains on jejunostomy tube feeding, there is a bag on the left chest wall which has been showing some dark-colored liquids.  He has been having pleasure foods, medicines has been given through G-tube, most of the nutrition is given through jejunostomy tube feeding.  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  He is physical therapy progress was reviewed, no major follow-ups has been done as far as recent surgeries and thoracic complications.         Review of Systems  Constitutional:  Positive for activity change and fatigue.   HENT: Negative.  Negative for congestion.    Eyes: Negative.    Respiratory: Negative.     Cardiovascular:  Positive for leg swelling.   Gastrointestinal: Negative.  Negative for abdominal distention and abdominal pain.        J tube and G tube     Musculoskeletal:  Positive for arthralgias.   Skin:  Positive for wound.   Neurological: negative  Hematological:  Bruises/bleeds easily.   Psychiatric/Behavioral:  Negative for agitation. The patient is less  nervous/anxious.    Objective  /57   Pulse 88     Physical Exam  Constitutional:       Appearance: He is normal weight. He is  not anymore ill-appearing.  He is not toxic-appearing.   HENT:      Head: Normocephalic.      Nose: Nose normal.   Eyes:      Conjunctiva/sclera: Conjunctivae normal.   Cardiovascular:      Rate and Rhythm: Normal rate and regular rhythm.      Pulses: Normal pulses.      Heart sounds: Normal heart sounds.      Comments: Chest tube left chest wall?  Pulmonary:      Effort: Pulmonary effort is normal.      Breath sounds: Normal breath sounds.   Abdominal:      General: Abdomen is flat.      Palpations: Abdomen is soft.      Comments: G tube and J tube   Musculoskeletal:         General: No tenderness.      Cervical back: Normal range of motion and neck supple.   Skin:     General: Skin is warm and dry.   Neurological:      General: No focal deficit present.      Mental Status: He is oriented to person, place, and time. Mental status is at baseline.   Psychiatric:         Mood and Affect: Mood is improved.  Assessment/Plan  Problem List Items Addressed This Visit       Deep vein thrombosis (DVT) of popliteal vein of left lower extremity (CMS/HCC)    Dislodged gastrostomy tube    Empyema (CMS/HCC)    Gastrostomy in place (CMS/HCC)    Achalasia, esophageal - Primary    Mediastinitis     Other Visit Diagnoses       Jejunostomy tube fell out            Patient was assessed, ER visit was reviewed, laboratories were reviewed, jejunostomy tube was felt and inspected, gastrostomy tube was felt and inspected, chest wall tube was inspected and felt.  He is calm and comfortable, he is less anxious, his hemoglobin is not dropping, he is able to ambulate with the help and support, he has a DVT, he does not have any obvious atrial fibrillation.  Medications are reviewed, so far things has been going well but still lot of problems and concerns and delayed road of recovery for him.  Pain control is reasonable, periodic follow-up and assessment will be done regarding the patient's condition as multiple problems, complications, postoperative complications and  prolonged hospitalization and multiple stoma tube required intensive nursing care and medical follow-ups.     Goals    None           Electronically Signed By: Scooter Carrasco MD   7/4/23  3:30 PM

## 2023-07-06 ENCOUNTER — NURSING HOME VISIT (OUTPATIENT)
Dept: POST ACUTE CARE | Facility: EXTERNAL LOCATION | Age: 68
End: 2023-07-06
Payer: MEDICARE

## 2023-07-06 VITALS
RESPIRATION RATE: 16 BRPM | BODY MASS INDEX: 25.63 KG/M2 | DIASTOLIC BLOOD PRESSURE: 73 MMHG | HEART RATE: 85 BPM | SYSTOLIC BLOOD PRESSURE: 113 MMHG | TEMPERATURE: 97.8 F | HEIGHT: 73 IN | WEIGHT: 193.4 LBS
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
## 2023-07-06 DIAGNOSIS — K22.0 ACHALASIA, ESOPHAGEAL: Primary | ICD-10-CM

## 2023-07-06 DIAGNOSIS — I95.1 ORTHOSTATIC HYPOTENSION: ICD-10-CM

## 2023-07-06 DIAGNOSIS — I82.432 DEEP VEIN THROMBOSIS (DVT) OF POPLITEAL VEIN OF LEFT LOWER EXTREMITY, UNSPECIFIED CHRONICITY (MULTI): ICD-10-CM

## 2023-07-06 DIAGNOSIS — J98.51 MEDIASTINITIS: ICD-10-CM
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
## 2023-07-06 PROCEDURE — 99309 SBSQ NF CARE MODERATE MDM 30: CPT | Performed by: NURSE PRACTITIONER

## 2023-07-06 NOTE — LETTER
Patient: Levy Gregory  : 1955    Encounter Date: 2023    Name: Levy Gregoyr  YOB: 1955    FOLLOW UP VISIT: SNF, Jejunostomy tube fell out 23 - ER visit, Fall 23 - ER visit, Orthostatic hypotensions, multiple recent hospitalizations - DVT of popliteal left lower extremity, Empyema, Achalasia, esophageal, and mediastinitis    SUBJECTIVE:  The patient is up in his room resting in bed at this time. Patient was seen in the ER yesterday due to a fall striking head. He has been have low blood pressure readings and positive for ortho hypotension. He was evaluated and no acute injuries to the head.   He is on Metoprolol tartrate 25 mg BID. Midodrine 5 mg was started yesterday TID. He is not having any obvious Afib and we will cute the M. Tartrate to 12.5 mg BID.   He is not having much pain. He is participating in therapy but still has problem pulling self up from chair to standing position. He is able to ambulate short distances w/walker w/support of therapy staff. Patient also had a problem w/tube on  - Jejunostomy tube fell out. Tube is in place now. TF's in place and patient tolerating. Patient denies CP or SOB. He has had no fevers or chills.     REVIEW OF SYSTEMS:   All review of systems are negative unless otherwise stated above under subjective.    LABS REVIEWED AT FACILITY:  CBC and Differential  REPORTED 2023 11:42  White Blood Cell Count   7.62 k/uL 3.70-11.00    RBC L 3.78 m/uL 4.20-6.00    Hemoglobin L 12.0 g/dL 13.0-17.0    Hematocrit L 37.5 % 39.0-51.0    MCV   99.2 fL 80.0-100.0    MCH   31.7 pg 26.0-34.0    MCHC   32.0 g/dL 30.5-36.0    RDW-CV H 16.5 % 11.5-15.0    Platelet Count   223 k/uL 150-400    MPV   10.2 fL 9.0-12.7    NRBC   0.0 /100 WBC    Absolute nRBC   <0.01 k/uL <0.01    Neut%   72.0 %    Abs Neut (Segs + Bands)   5.49 k/uL 1.45-7.50    Lymph%   19.0 %    Abs Lymph (Normal + Reactive)   1.45 k/uL 1.00-4.00    Mono%   4.0 %    Abs Mono   " 0.30 k/uL <0.87    Eosin%   1.0 %    Abs Eosin   0.08 k/uL <0.46    Baso%   2.0 %    Abs Baso H 0.15 k/uL <0.11    Moro %   2.0 %    Left Shift   Present        Platelet Estimate   Adequate        Red Cell Morph   See Comment        Reviewed: unremarkable  Diff Type   Manual        This is an appended report.  These results have been appended to a previously  verified report.         Comp Metabolic Panel  REPORTED 06/27/2023 11:04  Protein, Total   6.4 g/dL 6.3-8.0    Albumin L 2.7 g/dL 3.9-4.9    Calcium, Total   9.0 mg/dL 8.5-10.2    Bilirubin, Total   0.6 mg/dL 0.2-1.3    Alkaline Phosphatase H 1003 U/L     AST H 61 U/L 14-40    ALT H 104 U/L 10-54    Glucose   78 mg/dL 74-99  BUN H 32 mg/dL 9-24    Creatinine   0.86 mg/dL 0.73-1.22    Sodium L 134 mmol/L 136-144    Potassium   3.8 mmol/L 3.7-5.1    Chloride   99 mmol/L     CO2 L 19 mmol/L 22-30    Anion Gap   16 mmol/L 9-18    Estimated Glomerular Filtration Rate   95 mL/min/1.73 meters squared >=60      RECENT EMERGENCY ROOM VISIT LABS AND DIAGNOSTIC TESTS REVIEWED:  July 5, 2023 - H&H 11.5/35.4, sodium 138, chloride 96, BUN 28, CT of head shows no evidence of acute cortical infarct or intracranial hemorrhage or any evidence of displaced skull fracture.    MEDICATIONS REVIEWED AT FACILITY:  Midodrine 5 mg TID  Metoprolol tartrate 12.5 mg BID    Living will related issues reviewed-Code status: Full code    OBJECTIVE:  /73   Pulse 85   Temp 36.6 °C (97.8 °F)   Resp 16   Ht 1.854 m (6' 1\")   Wt 87.7 kg (193 lb 6.4 oz)   BMI 25.52 kg/m²   Physical Exam  Constitutional:       Comments: Chronically ill   HENT:      Head: Normocephalic.      Right Ear: External ear normal.      Left Ear: External ear normal.      Nose: Nose normal.      Mouth/Throat:      Mouth: Mucous membranes are moist.   Eyes:      Extraocular Movements: Extraocular movements intact.      Conjunctiva/sclera: Conjunctivae normal.      Pupils: Pupils are equal, round, and " reactive to light.   Neck:      Comments: Cervical drain  Cardiovascular:      Rate and Rhythm: Normal rate and regular rhythm.      Pulses: Normal pulses.      Heart sounds: Normal heart sounds.   Pulmonary:      Effort: Pulmonary effort is normal.      Breath sounds: Normal breath sounds.      Comments: Diminished bilaterally  Abdominal:      General: Bowel sounds are normal. There is no distension.      Palpations: Abdomen is soft.      Tenderness: There is no abdominal tenderness.      Comments: Multiple healed lap sites  J-tube in place - drsg clean and dry   Genitourinary:     Comments: Not examined  Musculoskeletal:         General: Normal range of motion.      Cervical back: Normal range of motion.      Comments: Generalized weakness  Trace edema   Skin:     General: Skin is warm and dry.      Capillary Refill: Capillary refill takes less than 2 seconds.   Neurological:      General: No focal deficit present.      Mental Status: He is alert and oriented to person, place, and time. Mental status is at baseline.   Psychiatric:         Mood and Affect: Mood normal.     Assessment/Plan  Problem List Items Addressed This Visit          Cardiac and Vasculature    Mediastinitis    Orthostatic hypotension       Coag and Thromboembolic    Deep vein thrombosis (DVT) of popliteal vein of left lower extremity (CMS/HCC)       Gastrointestinal and Abdominal    Achalasia, esophageal - Primary       Musculoskeletal and Injuries    Fall       Skin integrity:  Nursing to monitor skin integrity as patient is at risk for pressure injuries.  Wound care per nursing  Cervical esophagostomy - pleasure liquids 8 - 10 ml at a time  J-tube site -Nephro 55/hr 250 ml Q4hr   See Facility notes for measurements/assessments  Turn and reposition Q 2 hours or more  Air mattress and when up in chair cushion reducing device  Dietician to evaluate and recommend:  Nutritional supplement:  Please monitor skin integrity and other pressure  areas  Referral to wound clinic if appropriate:    PLAN:  Pt has been seen for follow up visit.  The patient is here at facility for PT/OT/ST to maximize strength, function, endurance and safety.  The patient is participating in therapy. Levy Gregory is making progress to the best of his/her ability.   Please see PT/OT/ST notes in the facility for detailed information regarding progression of patients progress.   Recent nursing evaluation and notes were reviewed.   Overall, patient is stable despite his/her chronic conditions.   #Ortho hypotension: Midodrine 5 mg TID   #Fall, striking head: no injuires  #Afib/DVT: Eliquis to be maintained, IVC filter, decreased Metoprolol to 12.5 mg bid  #H/O Intra-abd abscess w/resistant MRSA  #J-tube: feedings - nursing to monitor closely  Any decline or change in condition needs to be communicated with the physician or myself.    Discussion with nursing staff regarding ongoing care and management.  If needed, would communicate with family who are not present at this time.   There are no concerns at this time.  We will continue with the medications noted above.    We will continue to follow the patient here at the facility.    Discharge planning:   ATTENTION TO [employer]MENT [employer]:  Patient will require a hospital bed upon discharge as the patient requires his HOB to be at 45 degrees due to his tube feedings as well as assisting with repositioning that cannot be done in a regular bed.   Patient will require a wheelchair as the patient is unable to perform ADLS at a walker level standing up. The patient is unable to ambulate short distances safely due to orthostatic hypotension. Patient is able to propel self safely with the wheelchair and the home provides adequate space for the wheelchair accessibility.  The patient is room confined and will require a bedside commode.    Discharge Home when goals are met.   Follow up with PCP in 1-2 weeks after discharge from facility.    C to follow after discharge for continued PT/OT and Nursing.    *Please note that nursing facility, outside laboratory agency, and  [facility] do not interface.     Completion of the note was done through Dragon voice recognition technology and may include   unintended or grammatical errors which may not have been recognized when finalizing the note.     Time:    MAGDALENA Sarkar      Electronically Signed By: MAGDALENA Sarkar   7/6/23  5:21 PM

## 2023-07-06 NOTE — PROGRESS NOTES
Name: Levy Gregory  YOB: 1955    FOLLOW UP VISIT: SNF, Jejunostomy tube fell out 6/27/23 - ER visit, Fall 7/5/23 - ER visit, Orthostatic hypotensions, multiple recent hospitalizations - DVT of popliteal left lower extremity, Empyema, Achalasia, esophageal, and mediastinitis    SUBJECTIVE:  The patient is up in his room resting in bed at this time. Patient was seen in the ER yesterday due to a fall striking head. He has been have low blood pressure readings and positive for ortho hypotension. He was evaluated and no acute injuries to the head.   He is on Metoprolol tartrate 25 mg BID. Midodrine 5 mg was started yesterday TID. He is not having any obvious Afib and we will cute the M. Tartrate to 12.5 mg BID.   He is not having much pain. He is participating in therapy but still has problem pulling self up from chair to standing position. He is able to ambulate short distances w/walker w/support of therapy staff. Patient also had a problem w/tube on 6/27 - Jejunostomy tube fell out. Tube is in place now. TF's in place and patient tolerating. Patient denies CP or SOB. He has had no fevers or chills.     REVIEW OF SYSTEMS:   All review of systems are negative unless otherwise stated above under subjective.    LABS REVIEWED AT FACILITY:  CBC and Differential  REPORTED 06/27/2023 11:42  White Blood Cell Count   7.62 k/uL 3.70-11.00    RBC L 3.78 m/uL 4.20-6.00    Hemoglobin L 12.0 g/dL 13.0-17.0    Hematocrit L 37.5 % 39.0-51.0    MCV   99.2 fL 80.0-100.0    MCH   31.7 pg 26.0-34.0    MCHC   32.0 g/dL 30.5-36.0    RDW-CV H 16.5 % 11.5-15.0    Platelet Count   223 k/uL 150-400    MPV   10.2 fL 9.0-12.7    NRBC   0.0 /100 WBC    Absolute nRBC   <0.01 k/uL <0.01    Neut%   72.0 %    Abs Neut (Segs + Bands)   5.49 k/uL 1.45-7.50    Lymph%   19.0 %    Abs Lymph (Normal + Reactive)   1.45 k/uL 1.00-4.00    Mono%   4.0 %    Abs Mono   0.30 k/uL <0.87    Eosin%   1.0 %    Abs Eosin   0.08 k/uL <0.46    Baso%   " 2.0 %    Abs Baso H 0.15 k/uL <0.11    [place] %   2.0 %    Left Shift   Present        Platelet Estimate   Adequate        Red Cell Morph   See Comment        Reviewed: unremarkable  Diff Type   Manual        This is an appended report.  These results have been appended to a previously  verified report.         Comp Metabolic Panel  REPORTED 06/27/2023 11:04  Protein, Total   6.4 g/dL 6.3-8.0    Albumin L 2.7 g/dL 3.9-4.9    Calcium, Total   9.0 mg/dL 8.5-10.2    Bilirubin, Total   0.6 mg/dL 0.2-1.3    Alkaline Phosphatase H 1003 U/L     AST H 61 U/L 14-40    ALT H 104 U/L 10-54    Glucose   78 mg/dL 74-99  BUN H 32 mg/dL 9-24    Creatinine   0.86 mg/dL 0.73-1.22    Sodium L 134 mmol/L 136-144    Potassium   3.8 mmol/L 3.7-5.1    Chloride   99 mmol/L     CO2 L 19 mmol/L 22-30    Anion Gap   16 mmol/L 9-18    Estimated Glomerular Filtration Rate   95 mL/min/1.73 meters squared >=60      RECENT EMERGENCY ROOM VISIT LABS AND DIAGNOSTIC TESTS REVIEWED:  July 5, 2023 - H&H 11.5/35.4, sodium 138, chloride 96, BUN 28, CT of head shows no evidence of acute cortical infarct or intracranial hemorrhage or any evidence of displaced skull fracture.    MEDICATIONS REVIEWED AT FACILITY:  Midodrine 5 mg TID  Metoprolol tartrate 12.5 mg BID    Living will related issues reviewed-Code status: Full code    OBJECTIVE:  /73   Pulse 85   Temp 36.6 °C (97.8 °F)   Resp 16   Ht 1.854 m (6' 1\")   Wt 87.7 kg (193 lb 6.4 oz)   BMI 25.52 kg/m²   Physical Exam  Constitutional:       Comments: Chronically ill   HENT:      Head: Normocephalic.      Right Ear: External ear normal.      Left Ear: External ear normal.      Nose: Nose normal.      Mouth/Throat:      Mouth: Mucous membranes are moist.   Eyes:      Extraocular Movements: Extraocular movements intact.      Conjunctiva/sclera: Conjunctivae normal.      Pupils: Pupils are equal, round, and reactive to light.   Neck:      Comments: Cervical drain  Cardiovascular:      " Rate and Rhythm: Normal rate and regular rhythm.      Pulses: Normal pulses.      Heart sounds: Normal heart sounds.   Pulmonary:      Effort: Pulmonary effort is normal.      Breath sounds: Normal breath sounds.      Comments: Diminished bilaterally  Abdominal:      General: Bowel sounds are normal. There is no distension.      Palpations: Abdomen is soft.      Tenderness: There is no abdominal tenderness.      Comments: Multiple healed lap sites  J-tube in place - drsg clean and dry   Genitourinary:     Comments: Not examined  Musculoskeletal:         General: Normal range of motion.      Cervical back: Normal range of motion.      Comments: Generalized weakness  Trace edema   Skin:     General: Skin is warm and dry.      Capillary Refill: Capillary refill takes less than 2 seconds.   Neurological:      General: No focal deficit present.      Mental Status: He is alert and oriented to person, place, and time. Mental status is at baseline.   Psychiatric:         Mood and Affect: Mood normal.     Assessment/Plan   Problem List Items Addressed This Visit          Cardiac and Vasculature    Mediastinitis    Orthostatic hypotension       Coag and Thromboembolic    Deep vein thrombosis (DVT) of popliteal vein of left lower extremity (CMS/HCC)       Gastrointestinal and Abdominal    Achalasia, esophageal - Primary       Musculoskeletal and Injuries    Fall       Skin integrity:  Nursing to monitor skin integrity as patient is at risk for pressure injuries.  Wound care per nursing  Cervical esophagostomy - pleasure liquids 8 - 10 ml at a time  J-tube site -Nephro 55/hr 250 ml Q4hr   See Facility notes for measurements/assessments  Turn and reposition Q 2 hours or more  Air mattress and when up in chair cushion reducing device  Dietician to evaluate and recommend:  Nutritional supplement:  Please monitor skin integrity and other pressure areas  Referral to wound clinic if appropriate:    PLAN:  Pt has been seen for follow  up visit.  The patient is here at facility for PT/OT/ST to maximize strength, function, endurance and safety.  The patient is participating in therapy. Levy Gregory is making progress to the best of his/her ability.   Please see PT/OT/ST notes in the facility for detailed information regarding progression of patients progress.   Recent nursing evaluation and notes were reviewed.   Overall, patient is stable despite his/her chronic conditions.   #Ortho hypotension: Midodrine 5 mg TID   #Fall, striking head: no injuires  #Afib/DVT: Eliquis to be maintained, IVC filter, decreased Metoprolol to 12.5 mg bid  #H/O Intra-abd abscess w/resistant MRSA  #J-tube: feedings - nursing to monitor closely  Any decline or change in condition needs to be communicated with the physician or myself.    Discussion with nursing staff regarding ongoing care and management.  If needed, would communicate with family who are not present at this time.   There are no concerns at this time.  We will continue with the medications noted above.    We will continue to follow the patient here at the facility.    Discharge planning:   ATTENTION TO MEDICAL [employer]MENT [employer]:  Patient will require a hospital bed upon discharge as the patient requires his HOB to be at 45 degrees due to his tube feedings as well as assisting with repositioning that cannot be done in a regular bed.   Patient will require a wheelchair as the patient is unable to perform ADLS at a walker level standing up. The patient is unable to ambulate short distances safely due to orthostatic hypotension. Patient is able to propel self safely with the wheelchair and the home provides adequate space for the wheelchair accessibility.  The patient is room confined and will require a bedside commode.    Discharge Home when goals are met.   Follow up with PCP in 1-2 weeks after discharge from facility.   HHC to follow after discharge for continued PT/OT and Nursing.    *Please note that  nursing facility, outside laboratory agency, and  [facility] do not interface.     Completion of the note was done through Dragon voice recognition technology and may include   unintended or grammatical errors which may not have been recognized when finalizing the note.     Time:    IVÁN Sarkar-CNP

## 2023-07-10 ENCOUNTER — NURSING HOME VISIT (OUTPATIENT)
Dept: POST ACUTE CARE | Facility: EXTERNAL LOCATION | Age: 68
End: 2023-07-10
Payer: MEDICARE

## 2023-07-10 VITALS
DIASTOLIC BLOOD PRESSURE: 64 MMHG | WEIGHT: 194.2 LBS | RESPIRATION RATE: 17 BRPM | TEMPERATURE: 98.2 F | HEIGHT: 73 IN | SYSTOLIC BLOOD PRESSURE: 91 MMHG | HEART RATE: 98 BPM | BODY MASS INDEX: 25.74 KG/M2

## 2023-07-10 DIAGNOSIS — I82.432 DEEP VEIN THROMBOSIS (DVT) OF POPLITEAL VEIN OF LEFT LOWER EXTREMITY, UNSPECIFIED CHRONICITY (MULTI): ICD-10-CM

## 2023-07-10 DIAGNOSIS — I95.1 ORTHOSTATIC HYPOTENSION: ICD-10-CM

## 2023-07-10 DIAGNOSIS — K22.0 ACHALASIA, ESOPHAGEAL: Primary | ICD-10-CM

## 2023-07-10 DIAGNOSIS — J98.51 MEDIASTINITIS: ICD-10-CM

## 2023-07-10 DIAGNOSIS — K94.13 MALFUNCTION OF JEJUNOSTOMY TUBE (MULTI): ICD-10-CM

## 2023-07-10 DIAGNOSIS — R74.8 ELEVATED ALKALINE PHOSPHATASE LEVEL: ICD-10-CM

## 2023-07-10 PROCEDURE — 99310 SBSQ NF CARE HIGH MDM 45: CPT | Performed by: NURSE PRACTITIONER

## 2023-07-10 NOTE — PROGRESS NOTES
"Name: Levy Gregory  YOB: 1955    FOLLOW UP VISIT: SNF, ER visit 7/9 - 7/10/23 clogged J-tube, h/o several surgical procedures and prolonged hospitalization, Achalasia - esophageal hernia complicated by esophageal perforation and leaks. Several surgical procedures  3/7/23 diagnostic lap right thoracostomy tube  3/15/23 right VATS  3/31/23 EGD  4/5/23 right thoracotomy decortication cervical end esophagectomy  4/7/23 diagnostic lap, esther gastrostomy - G tube placement  4/13/23 EGD  4/20/23 IVC filter placement  4/28/23 Cholecystomy tube placement  6/16/23 J-tube placed per IR    SUBJECTIVE:  The patient is up in his room with his wife at the bedside. He appears comfortable and in no distress. He is reporting feeling tired due to his recent ER visit. Patient was sent to ER 7/9 for plugged up J-tube w/wooden stick that was used to attempt to unclog the J-tube. Patient was sent to  EMC and then tx to [facility]. He was seen by [facility] and stick was retrieved. Tube now flushing well.   Patient reporting feeling anxious and ambivalence regarding his insurance discharging. Update tomorrow. Long discussion w/patient and wife regarding his need for f/up w/PCP and surgery, medications, tubes and feedings, safety and type of vehicle needed to transport. Patient walking w/therapy but still needs assistance going from sitting to standing position. Mr. Gregory has a long road ahead of him.   Patient denies CP, SOB, Abd pain, N/V, bowel or bladder issues. He reports to [facility] to have dizziness when initially standing up.     REVIEW OF SYSTEMS:   All review of systems are negative unless otherwise stated above under subjective.    LABS REVIEWED FROM ER VISIT 7/9-7/10/23:    MEDICATIONS REVIEWED AT FACILITY:  No changes or initiation of new meds    Living will related issues reviewed-Code status: Full code    OBJECTIVE:  BP 91/64   Pulse 98   Temp 36.8 °C (98.2 °F)   Resp 17   Ht 1.854 m (6' 1\")   Wt 88.1 kg (194 " lb 3.2 oz)   BMI 25.62 kg/m²     Physical Exam  Constitutional:       Comments: Chronically ill   HENT:      Head: Normocephalic.      Right Ear: External ear normal.      Left Ear: External ear normal.      Nose: Nose normal.      Mouth/Throat:      Mouth: Mucous membranes are moist.   Eyes:      Extraocular Movements: Extraocular movements intact.      Conjunctiva/sclera: Conjunctivae normal.      Pupils: Pupils are equal, round, and reactive to light.   Neck:      Comments: Cervical drain  Cardiovascular:      Rate and Rhythm: Normal rate and regular rhythm.      Pulses: Normal pulses.      Heart sounds: Normal heart sounds.   Pulmonary:      Effort: Pulmonary effort is normal.      Breath sounds: Normal breath sounds.      Comments: Diminished bilaterally  Abdominal:      General: Bowel sounds are normal. There is no distension.      Palpations: Abdomen is soft.      Tenderness: There is no abdominal tenderness.      Comments: Multiple healed lap sites  J-tube in place - drsg clean and dry   Genitourinary:     Comments: Not examined  Musculoskeletal:         General: Normal range of motion.      Cervical back: Normal range of motion.      Comments: Generalized weakness  Trace edema   Skin:     General: Skin is warm and dry.      Capillary Refill: Capillary refill takes less than 2 seconds.   Neurological:      General: No focal deficit present.      Mental Status: He is alert and oriented to person, place, and time. Mental status is at baseline.   Psychiatric:         Mood and Affect: Mood normal.      Assessment/Plan   Problem List Items Addressed This Visit          Cardiac and Vasculature    Mediastinitis    Orthostatic hypotension       Coag and Thromboembolic    Deep vein thrombosis (DVT) of popliteal vein of left lower extremity (CMS/HCC)       Gastrointestinal and Abdominal    Achalasia, esophageal - Primary    Malfunction of jejunostomy tube (CMS/HCC)       Symptoms and Signs    Elevated alkaline  phosphatase level       Skin integrity:  Nursing to monitor skin integrity as patient is at risk for pressure injuries.  Wound care per nursing  Cervical esophagostomy - pleasure liquids 8 - 10 ml at a time  J-tube site -Nephro 55/hr 250 ml Q4hr   See Facility notes for measurements/assessments  Turn and reposition Q 2 hours or more  Air mattress and when up in chair cushion reducing device  Dietician to evaluate and recommend:  Nutritional supplement:  Please monitor skin integrity and other pressure areas  Referral to wound clinic if appropriate:    PLAN:  Pt has been seen for follow up visit.  The patient is here at facility for PT/OT/ST to maximize strength, function, endurance and safety after prolonged hospitalization and several surgical procedures for Achalasia - esophageal hernia complicated by esophageal perforation, leaks and several surgical procedures.  The patient is participating in therapy. Levy Gregory is making progress to the best of his/her ability.   Please see PT/OT/ST notes in the facility for detailed information regarding progression of patients progress.   Recent nursing evaluation and notes were reviewed.   Overall, patient is stable despite his/her chronic conditions.    #Clogged J-tube: ER visit - see HPI, nursing to monitor closely and no foreign objects inserted in tube. F/up w/DR Hoang in 10 to 14 days.   #Ortho hypotension: re timed Midodrine administration times w/nursing  #Afib/DVT: Heart is regular and no afib, Metoprolol tartrate 12.5 mg bid - re timed w/nursing, Eliquis is maintained.  Any decline or change in condition needs to be communicated with the physician or myself.    Discussion with nursing staff regarding ongoing care and management.  If needed, would communicate with family who are not present at this time.   There are no concerns at this time.  We will continue with the medications noted above.    We will continue to follow the patient here at the  facility.    Discharge planning: possibly by the end of the week.  Discharge Home when goals are met.   Follow up with PCP in 1-2 weeks after discharge from facility.   HHC to follow after discharge for continued PT/OT and Nursing.    *Please note that nursing facility, outside laboratory agency, and  [facility] do not interface.     Completion of the note was done through Dragon voice recognition technology and may include   unintended or grammatical errors which may not have been recognized when finalizing the note.     Time: I spent 45 minutes or greater with the patient and wife. Greater than 50% of this time was spent in counseling and or coordination of care. The time includes prep time of reviewing vital signs, report from direct nursing staff and or therapists, hospital ER documentation, reviewing labs, radiographs, diagnostic tests and or consultations, time directly spent with the patient interviewing, examining, and education regarding diagnosis, treatments, and medications, as well as documentation in the electronic medical record, and reviewing the plan of care and any new orders with the patient, nursing staff and other staff directly related to the patients care.       Teresa Rodriguez, APRN-CNP

## 2023-07-10 NOTE — LETTER
Patient: eLvy Gregory  : 1955    Encounter Date: 07/10/2023    Name: Levy Gregory  YOB: 1955    FOLLOW UP VISIT: SNF, ER visit  - 7/10/23 clogged J-tube, h/o several surgical procedures and prolonged hospitalization, Achalasia - esophageal hernia complicated by esophageal perforation and leaks. Several surgical procedures  3/7/23 diagnostic lap right thoracostomy tube  3/15/23 right VATS  3/31/23 EGD  23 right thoracotomy decortication cervical end esophagectomy  23 diagnostic lap, esther gastrostomy - G tube placement  23 EGD  23 IVC filter placement  23 Cholecystomy tube placement  23 J-tube placed per IR    SUBJECTIVE:  The patient is up in his room with his wife at the bedside. He appears comfortable and in no distress. He is reporting feeling tired due to his recent ER visit. Patient was sent to ER  for plugged up J-tube w/wooden stick that was used to attempt to unclog the J-tube. Patient was sent to  EMC and then tx to [facility]. He was seen by [facility] and stick was retrieved. Tube now flushing well.   Patient reporting feeling anxious and ambivalence regarding his insurance discharging. Update tomorrow. Long discussion w/patient and wife regarding his need for f/up w/PCP and surgery, medications, tubes and feedings, safety and type of vehicle needed to transport. Patient walking w/therapy but still needs assistance going from sitting to standing position. Mr. Gregory has a long road ahead of him.   Patient denies CP, SOB, Abd pain, N/V, bowel or bladder issues. He reports to [facility] to have dizziness when initially standing up.     REVIEW OF SYSTEMS:   All review of systems are negative unless otherwise stated above under subjective.    LABS REVIEWED FROM ER VISIT -7/10/23:    MEDICATIONS REVIEWED AT FACILITY:  No changes or initiation of new meds    Living will related issues reviewed-Code status: Full code    OBJECTIVE:  BP 91/64   Pulse 98    "Temp 36.8 °C (98.2 °F)   Resp 17   Ht 1.854 m (6' 1\")   Wt 88.1 kg (194 lb 3.2 oz)   BMI 25.62 kg/m²     Physical Exam  Constitutional:       Comments: Chronically ill   HENT:      Head: Normocephalic.      Right Ear: External ear normal.      Left Ear: External ear normal.      Nose: Nose normal.      Mouth/Throat:      Mouth: Mucous membranes are moist.   Eyes:      Extraocular Movements: Extraocular movements intact.      Conjunctiva/sclera: Conjunctivae normal.      Pupils: Pupils are equal, round, and reactive to light.   Neck:      Comments: Cervical drain  Cardiovascular:      Rate and Rhythm: Normal rate and regular rhythm.      Pulses: Normal pulses.      Heart sounds: Normal heart sounds.   Pulmonary:      Effort: Pulmonary effort is normal.      Breath sounds: Normal breath sounds.      Comments: Diminished bilaterally  Abdominal:      General: Bowel sounds are normal. There is no distension.      Palpations: Abdomen is soft.      Tenderness: There is no abdominal tenderness.      Comments: Multiple healed lap sites  J-tube in place - drsg clean and dry   Genitourinary:     Comments: Not examined  Musculoskeletal:         General: Normal range of motion.      Cervical back: Normal range of motion.      Comments: Generalized weakness  Trace edema   Skin:     General: Skin is warm and dry.      Capillary Refill: Capillary refill takes less than 2 seconds.   Neurological:      General: No focal deficit present.      Mental Status: He is alert and oriented to person, place, and time. Mental status is at baseline.   Psychiatric:         Mood and Affect: Mood normal.      Assessment/Plan  Problem List Items Addressed This Visit          Cardiac and Vasculature    Mediastinitis    Orthostatic hypotension       Coag and Thromboembolic    Deep vein thrombosis (DVT) of popliteal vein of left lower extremity (CMS/HCC)       Gastrointestinal and Abdominal    Achalasia, esophageal - Primary    Malfunction of " jejunostomy tube (CMS/HCC)       Symptoms and Signs    Elevated alkaline phosphatase level       Skin integrity:  Nursing to monitor skin integrity as patient is at risk for pressure injuries.  Wound care per nursing  Cervical esophagostomy - pleasure liquids 8 - 10 ml at a time  J-tube site -Nephro 55/hr 250 ml Q4hr   See Facility notes for measurements/assessments  Turn and reposition Q 2 hours or more  Air mattress and when up in chair cushion reducing device  Dietician to evaluate and recommend:  Nutritional supplement:  Please monitor skin integrity and other pressure areas  Referral to wound clinic if appropriate:    PLAN:  Pt has been seen for follow up visit.  The patient is here at facility for PT/OT/ST to maximize strength, function, endurance and safety after prolonged hospitalization and several surgical procedures for Achalasia - esophageal hernia complicated by esophageal perforation, leaks and several surgical procedures.  The patient is participating in therapy. Levy Gregory is making progress to the best of his/her ability.   Please see PT/OT/ST notes in the facility for detailed information regarding progression of patients progress.   Recent nursing evaluation and notes were reviewed.   Overall, patient is stable despite his/her chronic conditions.    #Clogged J-tube: ER visit - see HPI, nursing to monitor closely and no foreign objects inserted in tube. F/up w/DR Hoang in 10 to 14 days.   #Ortho hypotension: re timed Midodrine administration times w/nursing  #Afib/DVT: Heart is regular and no afib, Metoprolol tartrate 12.5 mg bid - re timed w/nursing, Eliquis is maintained.  Any decline or change in condition needs to be communicated with the physician or myself.    Discussion with nursing staff regarding ongoing care and management.  If needed, would communicate with family who are not present at this time.   There are no concerns at this time.  We will continue with the medications noted  above.    We will continue to follow the patient here at the facility.    Discharge planning: possibly by the end of the week.  Discharge Home when goals are met.   Follow up with PCP in 1-2 weeks after discharge from facility.   C to follow after discharge for continued PT/OT and Nursing.    *Please note that nursing facility, outside laboratory agency, and  [facility] do not interface.     Completion of the note was done through Dragon voice recognition technology and may include   unintended or grammatical errors which may not have been recognized when finalizing the note.     Time: I spent 45 minutes or greater with the patient and wife. Greater than 50% of this time was spent in counseling and or coordination of care. The time includes prep time of reviewing vital signs, report from direct nursing staff and or therapists, hospital ER documentation, reviewing labs, radiographs, diagnostic tests and or consultations, time directly spent with the patient interviewing, examining, and education regarding diagnosis, treatments, and medications, as well as documentation in the electronic medical record, and reviewing the plan of care and any new orders with the patient, nursing staff and other staff directly related to the patients care.       MAGDALENA Sarkar      Electronically Signed By: MAGDALENA Sarkar   7/10/23  8:29 PM

## 2023-07-13 ENCOUNTER — NURSING HOME VISIT (OUTPATIENT)
Dept: POST ACUTE CARE | Facility: EXTERNAL LOCATION | Age: 68
End: 2023-07-13
Payer: MEDICARE

## 2023-07-13 DIAGNOSIS — K22.0 ACHALASIA, ESOPHAGEAL: ICD-10-CM

## 2023-07-13 DIAGNOSIS — Z93.1 GASTROSTOMY IN PLACE (MULTI): ICD-10-CM

## 2023-07-13 DIAGNOSIS — K94.13 MALFUNCTION OF JEJUNOSTOMY TUBE (MULTI): ICD-10-CM

## 2023-07-13 DIAGNOSIS — R74.8 ELEVATED ALKALINE PHOSPHATASE LEVEL: Primary | ICD-10-CM

## 2023-07-13 PROCEDURE — 99309 SBSQ NF CARE MODERATE MDM 30: CPT | Performed by: NURSE PRACTITIONER

## 2023-07-13 NOTE — LETTER
Patient: Levy Gregory  : 1955    Encounter Date: 2023    Name: Levy Gregory  YOB: 1955    FOLLOW UP VISIT: SNF, clogged J-tube on -10/23, elevated liver enzymes  h/o several surgical procedures and prolonged hospitalization, Achalasia - esophageal hernia complicated by esophageal perforation and leaks.   Several surgical procedures  3/7/23 diagnostic lap right thoracostomy tube  3/15/23 right VATS  3/31/23 EGD  23 right thoracotomy decortication cervical end esophagectomy  23 diagnostic lap, esther gastrostomy - G tube placement  23 EGD  23 IVC filter placement  23 Cholecystomy tube placement  23 J-tube placed per IR     SUBJECTIVE:  The patient is up in his room resting in bed. He appears comfortable. TF's changed to run for 16 hr at 83cc/hr and extra water flush of 100 ml to maintain patency in addition to Water flush 250 ml Q4hr. Patient states he tolerating and does not have any complaints. He is not c/o abd discomfort.   He denies CP or SOB. BP are still running low 90/60's. He is not report dizziness.     REVIEW OF SYSTEMS:   All review of systems are negative unless otherwise stated above under subjective.    LABS REVIEWED AT FACILITY:  CBC and Differential  REPORTED 2023 09:52  White Blood Cell Count   8.09 k/uL 3.70-11.00    RBC L 3.47 m/uL 4.20-6.00    Hemoglobin L 11.0 g/dL 13.0-17.0    Hematocrit L 34.3 % 39.0-51.0    MCV   98.8 fL 80.0-100.0    MCH   31.7 pg 26.0-34.0    MCHC   32.1 g/dL 30.5-36.0    RDW-CV H 16.0 % 11.5-15.0    Platelet Count   277 k/uL 150-400    MPV   9.3 fL 9.0-12.7    Neut%   60.6 %    Abs Neut   4.91 k/uL 1.45-7.50    Lymph%   21.8 %    Abs Lymph   1.76 k/uL 1.00-4.00    Mono%   8.2 %    Abs Mono   0.66 k/uL <0.87    Eosin%   3.1 %    Abs Eosin   0.25 k/uL <0.46    Baso%   0.7 %    Abs Baso   0.06 k/uL <0.11    Immature Gran %%   5.6 %    Abs Immature Gran H 0.45 k/uL <0.10    Absolute nRBC   0.0 /100 WBC   "  Absolute nRBC   <0.01 k/uL <0.01    Diff Type   Auto        This is an appended report.  These results have been appended to a previously  verified report.         Comp Metabolic Panel  REPORTED 07/11/2023 09:33  Protein, Total   7.0 g/dL 6.3-8.0    Albumin L 3.4 g/dL 3.9-4.9    Calcium, Total   9.2 mg/dL 8.5-10.2    Bilirubin, Total   0.5 mg/dL 0.2-1.3    Alkaline Phosphatase H 998 U/L     AST H 81 U/L 14-40    ALT H 146 U/L 10-54    Glucose H 142 mg/dL 74-99  BUN H 36 mg/dL 9-24    Creatinine   0.96 mg/dL 0.73-1.22    Sodium L 135 mmol/L 136-144    Potassium   4.0 mmol/L 3.7-5.1    Chloride   99 mmol/L     CO2 L 21 mmol/L 22-30    Anion Gap   15 mmol/L 9-18    Estimated Glomerular Filtration Rate   87 mL/min/1.73 meters squared >=60      ER visit 7/9 labs: Alk phosphate 1074, , and AST 70    MEDICATIONS REVIEWED AT FACILITY:  No changes in current meds or initiation of new meds    Living will related issues reviewed-Code status: Full code    OBJECTIVE:  BP 94/64   Pulse 96   Temp 36.6 °C (97.8 °F)   Resp 18   Ht 1.854 m (6' 1\")   Wt 86.7 kg (191 lb 3.2 oz)   BMI 25.23 kg/m²     Physical Exam  Constitutional:       Comments: Chronically ill   HENT:      Head: Normocephalic.      Right Ear: External ear normal.      Left Ear: External ear normal.      Nose: Nose normal.      Mouth/Throat:      Mouth: Mucous membranes are moist.   Eyes:      Extraocular Movements: Extraocular movements intact.      Conjunctiva/sclera: Conjunctivae normal.      Pupils: Pupils are equal, round, and reactive to light.   Neck:      Comments: Cervical drain  Cardiovascular:      Rate and Rhythm: Normal rate and regular rhythm.      Pulses: Normal pulses.      Heart sounds: Normal heart sounds.   Pulmonary:      Effort: Pulmonary effort is normal.      Breath sounds: Normal breath sounds.      Comments: Diminished bilaterally  Abdominal:      General: Bowel sounds are normal. There is no distension.      " Palpations: Abdomen is soft.      Tenderness: There is no abdominal tenderness.      Comments: Multiple healed lap sites  J-tube in place - drsg clean and dry   Genitourinary:     Comments: Not examined  Musculoskeletal:         General: Normal range of motion.      Cervical back: Normal range of motion.      Comments: Generalized weakness  Trace edema   Skin:     General: Skin is warm and dry.      Capillary Refill: Capillary refill takes less than 2 seconds.   Neurological:      General: No focal deficit present.      Mental Status: He is alert and oriented to person, place, and time. Mental status is at baseline.   Psychiatric:         Mood and Affect: Mood normal.     Assessment/Plan  Problem List Items Addressed This Visit       Elevated alkaline phosphatase level - Primary    Gastrostomy in place (CMS/HCC)    Achalasia, esophageal    Malfunction of jejunostomy tube (CMS/HCC)       Skin integrity:  Nursing to monitor skin integrity as patient is at risk for pressure injuries.  Wound care per nursing  Cervical esophagostomy  J-tube  G-tube  See Facility notes for measurements/assessments  Turn and reposition Q 2 hours or more  Air mattress and when up in chair cushion reducing device  Dietician to evaluate and recommend:  Nutritional supplement:  Please monitor skin integrity and other pressure areas  Referral to wound clinic if appropriate:    PLAN:  Pt has been seen for follow up visit.  The patient is here at facility for PT/OT/ST to maximize strength, function, endurance and safety.  The patient is participating in therapy. Levy Gregory is making progress to the best of his/her ability.   Please see PT/OT/ST notes in the facility for detailed information regarding progression of patients progress.   Recent nursing evaluation and notes were reviewed.   #Elevated liver enzymes: labs due tomorrow. Steadily climbing last 3 labs 7/7, 7/9 ER, and 7/11 - labs ordered for AM. Patient does refuse labs from time  to time but did discuss the importance of obtaining  #Clogged J-tube: functioning and patient tolerating TF's.   #Ortho Hypotension: Midodrine, seems to be doing better with med re timed, however, still running in 90/60s  #Afib/DVT: Eliquis is maintained and M. Tartrate 12.5 mg BID (Heart rate regular, no irregularity)  Any decline or change in condition needs to be communicated with the physician or myself.    Discussion with nursing staff regarding ongoing care and management.  If needed, would communicate with family who are not present at this time.   There are no concerns at this time.  We will continue with the medications noted above.    We will continue to follow the patient here at the facility.    Discharge plannin23. In addition, patient will need an extender for his hospital bed due to his height of 73 inches.   Discharge Home when goals are met.   Follow up with PCP in 1-2 weeks after discharge from facility.   C to follow after discharge for continued PT/OT and Nursing.    *Please note that nursing facility, outside laboratory agency, and [facility] do not interface.     Completion of the note was done through Dragon voice recognition technology and may include   unintended or grammatical errors which may not have been recognized when finalizing the note.     Time:    MAGDALENA Sarkar      Electronically Signed By: MAGDALENA Sarkar   23  3:09 PM

## 2023-07-17 VITALS
SYSTOLIC BLOOD PRESSURE: 94 MMHG | BODY MASS INDEX: 25.34 KG/M2 | WEIGHT: 191.2 LBS | DIASTOLIC BLOOD PRESSURE: 64 MMHG | HEART RATE: 96 BPM | RESPIRATION RATE: 18 BRPM | HEIGHT: 73 IN | TEMPERATURE: 97.8 F

## 2023-07-17 NOTE — PROGRESS NOTES
Name: Levy Gregory  YOB: 1955    FOLLOW UP VISIT: SNF, clogged J-tube on 7/9-10/23, elevated liver enzymes  h/o several surgical procedures and prolonged hospitalization, Achalasia - esophageal hernia complicated by esophageal perforation and leaks.   Several surgical procedures  3/7/23 diagnostic lap right thoracostomy tube  3/15/23 right VATS  3/31/23 EGD  4/5/23 right thoracotomy decortication cervical end esophagectomy  4/7/23 diagnostic lap, esther gastrostomy - G tube placement  4/13/23 EGD  4/20/23 IVC filter placement  4/28/23 Cholecystomy tube placement  6/16/23 J-tube placed per IR     SUBJECTIVE:  The patient is up in his room resting in bed. He appears comfortable. TF's changed to run for 16 hr at 83cc/hr and extra water flush of 100 ml to maintain patency in addition to Water flush 250 ml Q4hr. Patient states he tolerating and does not have any complaints. He is not c/o abd discomfort.   He denies CP or SOB. BP are still running low 90/60's. He is not report dizziness.     REVIEW OF SYSTEMS:   All review of systems are negative unless otherwise stated above under subjective.    LABS REVIEWED AT FACILITY:  CBC and Differential  REPORTED 07/11/2023 09:52  White Blood Cell Count   8.09 k/uL 3.70-11.00    RBC L 3.47 m/uL 4.20-6.00    Hemoglobin L 11.0 g/dL 13.0-17.0    Hematocrit L 34.3 % 39.0-51.0    MCV   98.8 fL 80.0-100.0    MCH   31.7 pg 26.0-34.0    MCHC   32.1 g/dL 30.5-36.0    RDW-CV H 16.0 % 11.5-15.0    Platelet Count   277 k/uL 150-400    MPV   9.3 fL 9.0-12.7    Neut%   60.6 %    Abs Neut   4.91 k/uL 1.45-7.50    Lymph%   21.8 %    Abs Lymph   1.76 k/uL 1.00-4.00    Mono%   8.2 %    Abs Mono   0.66 k/uL <0.87    Eosin%   3.1 %    Abs Eosin   0.25 k/uL <0.46    Baso%   0.7 %    Abs Baso   0.06 k/uL <0.11    Immature Gran %%   5.6 %    Abs Immature Gran H 0.45 k/uL <0.10    Absolute nRBC   0.0 /100 WBC    Absolute nRBC   <0.01 k/uL <0.01    Diff Type   Auto        This is an  "appended report.  These results have been appended to a previously  verified report.         Comp Metabolic Panel  REPORTED 07/11/2023 09:33  Protein, Total   7.0 g/dL 6.3-8.0    Albumin L 3.4 g/dL 3.9-4.9    Calcium, Total   9.2 mg/dL 8.5-10.2    Bilirubin, Total   0.5 mg/dL 0.2-1.3    Alkaline Phosphatase H 998 U/L     AST H 81 U/L 14-40    ALT H 146 U/L 10-54    Glucose H 142 mg/dL 74-99  BUN H 36 mg/dL 9-24    Creatinine   0.96 mg/dL 0.73-1.22    Sodium L 135 mmol/L 136-144    Potassium   4.0 mmol/L 3.7-5.1    Chloride   99 mmol/L     CO2 L 21 mmol/L 22-30    Anion Gap   15 mmol/L 9-18    Estimated Glomerular Filtration Rate   87 mL/min/1.73 meters squared >=60      ER visit 7/9 labs: Alk phosphate 1074, , and AST 70    MEDICATIONS REVIEWED AT FACILITY:  No changes in current meds or initiation of new meds    Living will related issues reviewed-Code status: Full code    OBJECTIVE:  BP 94/64   Pulse 96   Temp 36.6 °C (97.8 °F)   Resp 18   Ht 1.854 m (6' 1\")   Wt 86.7 kg (191 lb 3.2 oz)   BMI 25.23 kg/m²     Physical Exam  Constitutional:       Comments: Chronically ill   HENT:      Head: Normocephalic.      Right Ear: External ear normal.      Left Ear: External ear normal.      Nose: Nose normal.      Mouth/Throat:      Mouth: Mucous membranes are moist.   Eyes:      Extraocular Movements: Extraocular movements intact.      Conjunctiva/sclera: Conjunctivae normal.      Pupils: Pupils are equal, round, and reactive to light.   Neck:      Comments: Cervical drain  Cardiovascular:      Rate and Rhythm: Normal rate and regular rhythm.      Pulses: Normal pulses.      Heart sounds: Normal heart sounds.   Pulmonary:      Effort: Pulmonary effort is normal.      Breath sounds: Normal breath sounds.      Comments: Diminished bilaterally  Abdominal:      General: Bowel sounds are normal. There is no distension.      Palpations: Abdomen is soft.      Tenderness: There is no abdominal tenderness. "      Comments: Multiple healed lap sites  J-tube in place - drsg clean and dry   Genitourinary:     Comments: Not examined  Musculoskeletal:         General: Normal range of motion.      Cervical back: Normal range of motion.      Comments: Generalized weakness  Trace edema   Skin:     General: Skin is warm and dry.      Capillary Refill: Capillary refill takes less than 2 seconds.   Neurological:      General: No focal deficit present.      Mental Status: He is alert and oriented to person, place, and time. Mental status is at baseline.   Psychiatric:         Mood and Affect: Mood normal.     Assessment/Plan   Problem List Items Addressed This Visit       Elevated alkaline phosphatase level - Primary    Gastrostomy in place (CMS/HCC)    Achalasia, esophageal    Malfunction of jejunostomy tube (CMS/HCC)       Skin integrity:  Nursing to monitor skin integrity as patient is at risk for pressure injuries.  Wound care per nursing  Cervical esophagostomy  J-tube  G-tube  See Facility notes for measurements/assessments  Turn and reposition Q 2 hours or more  Air mattress and when up in chair cushion reducing device  Dietician to evaluate and recommend:  Nutritional supplement:  Please monitor skin integrity and other pressure areas  Referral to wound clinic if appropriate:    PLAN:  Pt has been seen for follow up visit.  The patient is here at facility for PT/OT/ST to maximize strength, function, endurance and safety.  The patient is participating in therapy. Levy JOHNSON Colt is making progress to the best of his/her ability.   Please see PT/OT/ST notes in the facility for detailed information regarding progression of patients progress.   Recent nursing evaluation and notes were reviewed.   #Elevated liver enzymes: labs due tomorrow. Steadily climbing last 3 labs 7/7, 7/9 ER, and 7/11 - labs ordered for AM. Patient does refuse labs from time to time but did discuss the importance of obtaining  #Clogged J-tube:  functioning and patient tolerating TF's.   #Ortho Hypotension: Midodrine, seems to be doing better with med re timed, however, still running in 90/60s  #Afib/DVT: Eliquis is maintained and M. Tartrate 12.5 mg BID (Heart rate regular, no irregularity)  Any decline or change in condition needs to be communicated with the physician or myself.    Discussion with nursing staff regarding ongoing care and management.  If needed, would communicate with family who are not present at this time.   There are no concerns at this time.  We will continue with the medications noted above.    We will continue to follow the patient here at the facility.    Discharge plannin23. In addition, patient will need an extender for his hospital bed due to his height of 73 inches.   Discharge Home when goals are met.   Follow up with PCP in 1-2 weeks after discharge from facility.   C to follow after discharge for continued PT/OT and Nursing.    *Please note that nursing facility, outside laboratory agency, and  [facility] do not interface.     Completion of the note was done through Dragon voice recognition technology and may include   unintended or grammatical errors which may not have been recognized when finalizing the note.     Time:    Teresa Rodriguez, APRN-CNP

## 2023-07-19 ENCOUNTER — NURSING HOME VISIT (OUTPATIENT)
Dept: POST ACUTE CARE | Facility: EXTERNAL LOCATION | Age: 68
End: 2023-07-19
Payer: MEDICARE

## 2023-07-19 DIAGNOSIS — I82.532 CHRONIC DEEP VEIN THROMBOSIS (DVT) OF POPLITEAL VEIN OF LEFT LOWER EXTREMITY (MULTI): ICD-10-CM

## 2023-07-19 DIAGNOSIS — K83.1 CHOLESTASIS (CMS-HCC): ICD-10-CM

## 2023-07-19 DIAGNOSIS — K22.0 ACHALASIA, ESOPHAGEAL: ICD-10-CM

## 2023-07-19 DIAGNOSIS — R74.8 ELEVATED ALKALINE PHOSPHATASE LEVEL: Primary | ICD-10-CM

## 2023-07-19 DIAGNOSIS — K22.2 ESOPHAGEAL STRICTURE: ICD-10-CM

## 2023-07-19 DIAGNOSIS — I95.1 ORTHOSTATIC HYPOTENSION: ICD-10-CM

## 2023-07-19 DIAGNOSIS — J98.51 MEDIASTINITIS: ICD-10-CM

## 2023-07-19 DIAGNOSIS — I82.432 DEEP VEIN THROMBOSIS (DVT) OF POPLITEAL VEIN OF LEFT LOWER EXTREMITY, UNSPECIFIED CHRONICITY (MULTI): ICD-10-CM

## 2023-07-19 DIAGNOSIS — Z93.1 GASTROSTOMY IN PLACE (MULTI): ICD-10-CM

## 2023-07-19 PROCEDURE — 99309 SBSQ NF CARE MODERATE MDM 30: CPT | Performed by: INTERNAL MEDICINE

## 2023-07-19 NOTE — LETTER
Patient: Levy Gregory  : 1955    Encounter Date: 2023    Subjective  Patient ID: Levy Gregory is a 67 y.o. male who is acute skilled care being seen and evaluated for multiple medical problems.    Patient has been having significantly elevated alkaline phosphatase and LFTs.  Back-and-forth we thought what to do about it but it seems like this is a intrahepatic cholestasis secondary to all this complications he has undergone after having rupture of esophagus for achalasia myomectomy.  Usually very overt gastrointestinal visceral disturbance happens particularly luminal content in the luminal with Angella alkaline phosphatase could be elevated.  We did ultrasound of liver, patient has no gallbladder, they did not find any significant reason for extrahepatic cholestasis.  This cholestasis has been causing tremendous amount of itching.  Now I see that the patient is going to be discharged because patient has been out of the home since month of May back-and-forth hospitalization and multiple surgeries.  Patient has at this bag on the chest wall where any pleasure food or liquid comes out so it is a matter of surgery to be done in the future where both ends of the esophagus could be connected.  Patient is frustrated that the surgery is not scheduled till November.  He continues to receive jejunostomy tube feeding, he has been given medications through gastrostomy tube.  He has been ambulating around with the help of support, there was no family member at bedside.  Couple of times jejunostomy tube got malfunction, 1 time tube was clogged and nursing staff tried to unclog these tube and it has led to obstruction of the tube with wooden piece as they were trying to unclog the tube with Q-tip.  It was quite complicated situation this patient has face but he is tired of this and he wants to go home.  His jejunostomy tube feeding has been going well.         Review of Systems  Constitutional:  Positive for  activity change and fatigue.   HENT: Negative.  Negative for congestion.    Eyes: Negative.    Respiratory: Negative.     Cardiovascular:  Positive for leg swelling.   Gastrointestinal: Negative.  Negative for abdominal distention and abdominal pain.        J tube and G tube     Musculoskeletal:  Positive for arthralgias.   Skin:  Positive for wound. pruritus  Neurological: negative  Hematological:  Bruises/bleeds easily.   Psychiatric/Behavioral:  Negative for agitation  Objective  BP 99/63   Pulse 74   Wt 86.6 kg (191 lb)   BMI 25.20 kg/m²     Physical Exam  Constitutional:       Appearance: He is normal weight. He is  not anymore tired-appearing. He is not toxic-appearing.   HENT:      Head: Normocephalic.      Nose: Nose normal.   Eyes:      Conjunctiva/sclera: Conjunctivae normal.   Cardiovascular:      Rate and Rhythm: Normal rate and regular rhythm.      Pulses: Normal pulses.      Heart sounds: Normal heart sounds.      Comments: Chest tube left chest wall?  Pulmonary:      Effort: Pulmonary effort is normal.      Breath sounds: Normal breath sounds.   Abdominal:      General: Abdomen is flat.      Palpations: Abdomen is soft.      Comments: G tube and J tube   Musculoskeletal:         General: No tenderness.      Cervical back: Normal range of motion and neck supple.   Skin:     General: Skin is warm and dry.   Neurological:      General: No focal deficit present.      Mental Status: He is oriented to person, place, and time. Mental status is at baseline.  Assessment/Plan  Problem List Items Addressed This Visit       Elevated alkaline phosphatase level - Primary    Esophageal stricture    Deep vein thrombosis (DVT) of popliteal vein of left lower extremity (CMS/HCC)    Gastrostomy in place (CMS/HCC)    Achalasia, esophageal    Mediastinitis    Orthostatic hypotension     Other Visit Diagnoses       Cholestasis            There is a clinical jaundice, predominantly intrahepatic cholestasis with  transaminases elevated.  Ultrasound was reviewed, 5 nucleotidase and GGT level can be done to see whether this alkaline phosphatase is hepatic in origin or not and most likely 80s.  We cannot do much about it as cholestyramine may not be a good option to relieve this pruritus here because of jejunostomy tube and gastrostomy tube.  Enterohepatic circulation may not be going that well.  Patient has a DVT patient has hypercoagulable state because of prolonged mobility, patient's hemoglobin was reviewed, patient was having mediastinitis, empyema, decortication surgery.  Anemia persist.  Patient continued to remain hypotensive, beta-blockers were taken off, midodrine therapy has been started, still patient is quite miserable with the situation he has gone through he says.  They have arranged all the arrangements to be done at home particularly jejunostomy tube feeding and appropriate follow-up with the thoracic surgery and gastrointestinal surgery.  All the tubes were inspected, the patency of the tubes were reviewed, discussed with nursing staff, discussed with nurse practitioner.  Patient can be discharged as planned provided some work-up for cholestasis can be carried out.  Medications are reviewed, will take some time and effort for this patient to recover after having complicated esophageal surgery and back-and-forth surgeries and prolonged rehabilitation and hospitalization.     Goals    None           Electronically Signed By: Scooter Carrasco MD   7/23/23  6:02 PM

## 2023-07-20 ENCOUNTER — NURSING HOME VISIT (OUTPATIENT)
Dept: POST ACUTE CARE | Facility: EXTERNAL LOCATION | Age: 68
End: 2023-07-20
Payer: MEDICARE

## 2023-07-20 VITALS
HEART RATE: 97 BPM | RESPIRATION RATE: 16 BRPM | TEMPERATURE: 98 F | WEIGHT: 191.2 LBS | SYSTOLIC BLOOD PRESSURE: 106 MMHG | HEIGHT: 73 IN | BODY MASS INDEX: 25.34 KG/M2 | DIASTOLIC BLOOD PRESSURE: 77 MMHG

## 2023-07-20 DIAGNOSIS — I82.432 DEEP VEIN THROMBOSIS (DVT) OF POPLITEAL VEIN OF LEFT LOWER EXTREMITY, UNSPECIFIED CHRONICITY (MULTI): ICD-10-CM

## 2023-07-20 DIAGNOSIS — E11.65 TYPE 2 DIABETES MELLITUS WITH HYPERGLYCEMIA, WITH LONG-TERM CURRENT USE OF INSULIN (MULTI): ICD-10-CM

## 2023-07-20 DIAGNOSIS — Z79.4 TYPE 2 DIABETES MELLITUS WITH HYPERGLYCEMIA, WITH LONG-TERM CURRENT USE OF INSULIN (MULTI): ICD-10-CM

## 2023-07-20 DIAGNOSIS — I95.1 ORTHOSTATIC HYPOTENSION: ICD-10-CM

## 2023-07-20 DIAGNOSIS — R74.8 ELEVATED ALKALINE PHOSPHATASE LEVEL: ICD-10-CM

## 2023-07-20 DIAGNOSIS — K22.0 ACHALASIA, ESOPHAGEAL: Primary | ICD-10-CM

## 2023-07-20 DIAGNOSIS — Z93.1 GASTROSTOMY IN PLACE (MULTI): ICD-10-CM

## 2023-07-20 DIAGNOSIS — F32.A DEPRESSION, UNSPECIFIED DEPRESSION TYPE: ICD-10-CM

## 2023-07-20 DIAGNOSIS — K22.3 ESOPHAGEAL PERFORATION: ICD-10-CM

## 2023-07-20 PROCEDURE — 99316 NF DSCHRG MGMT 30 MIN+: CPT | Performed by: NURSE PRACTITIONER

## 2023-07-20 NOTE — LETTER
Patient: Levy Gregory  : 1955    Encounter Date: 2023    Name: Levy Gregory  YOB: 1955    Discharge VISIT:   Esophageal stricture, Achalasia - esophageal, esophageal perforation, DVT of left popliteal vein of left lower extremity, Empyema, Mediastinitis, G-tube in place for medications, J-tube in place for TF, multiple hospitalization/ER visits and multiple procedures.  3/1 - 3/3/23 - Hospitalization at [facility]  3/1/2023 - laparoscopic robotic assisted hiatal hernia repair, Heller myotomy, mary fundoplication, EGD, bilateral TAP blocks  3/7/23 - ER visit   3/7 - 23 - Hospitalization [facility]  3/7/23 diagnostic lap right thoracostomy tube  3/15/23 right VATS, chest washout/decortication, bronchoscopy  3/27/23 - upper endoscopy, esophageal stent exchange, placement of NG feeding tube, bronchoscopy  3/31/23 - Upper endoscopy, removal of stents, endovac palcement, dobhoff tube placement, PEG tube placement  3/31/23 - EGD, placement of endoluminal vac  4/3/23 - EGD, Endovac removal, endovac placement  23 - Right thoracotomy, esophagectomy, cervical end esophagostomy, cryoablation of intercostal nerve, laparoscopic take down of prior fundoplication, revivision of gastrostomy, EGD, bronchoscopy  23 - Lapraoscopic gastrostomy tube placement, mini laparotomy, jejunostomy tube placement  23 EGD  23 IVC filter placement  23 Cholecystomy tube placement (out at this time - date of removal not clear)  23 - Discharge to [facility] from [facility]   - 6/15/23 - [facility] stay  23 - F/up w/Dr Hoang surgeon  23 - F/up w/Dr Gongora surgeon - recommending CT imaging in 3 months and f/up (reconstructing the esophagus to be done by end of the year)  6/15/23 - ER  visit for J-tube complication - J-tube dislodged  6/15 - 2023 - Hospitalization [facility]  23 - J-tube placed per IR  23 - Admitted to [facility]  for SNF  6/27/23 - ER visit J-tube complication (dislodged)  7/5/23 - ER visit Fall sustaining no injury  7/9/23 - ER visit J-tube complication (clogged), sent to [facility] ER visit J-tube complication - f/up Dr Hoang in 2 weeks    SUBJECTIVE:  The patient is a 67 yr old male who is here at [facility] for skilled care after the above hospitalizations, LTCH stays and ER stays. The Patient is going to be discharged home on Friday. He appears to be doing better and participating in therapy has been going well. The patient continues to have his Meds administered via the G-tube and TF-Nephro administered via the J-tube. He has a Cervical esophagostomy drain that he can have liquids orally for pleasure. The patient has had increased liver enzymes since 7/7/23 - Pending US of RUQ, labs (5-nucleotidase, GGT, antimitochondrial antibody) in AM.     REVIEW OF SYSTEMS:   All review of systems are negative unless otherwise stated above under subjective.    LABS REVIEWED AT FACILITY:  CBC and Differential  REPORTED 07/17/2023 18:02  White Blood Cell Count   8.76 k/uL 3.70-11.00    RBC L 3.47 m/uL 4.20-6.00    Hemoglobin L 11.0 g/dL 13.0-17.0    Hematocrit L 34.7 % 39.0-51.0    MCV   100.0 fL 80.0-100.0    MCH   31.7 pg 26.0-34.0    MCHC   31.7 g/dL 30.5-36.0    RDW-CV H 15.9 % 11.5-15.0    Platelet Count   240 k/uL 150-400    MPV   9.9 fL 9.0-12.7    NRBC   0.0 /100 WBC    Absolute nRBC   <0.01 k/uL <0.01    Neut%   69.0 %    Abs Neut (Segs + Bands)   6.04 k/uL 1.45-7.50    Lymph%   19.0 %    Abs Lymph (Normal + Reactive)   1.66 k/uL 1.00-4.00    Mono%   5.0 %    Abs Mono   0.44 k/uL <0.87    Eosin%   3.0 %    Abs Eosin   0.26 k/uL <0.46    Baso%   0.0 %    Abs Baso   0.00 k/uL <0.11    [place] %   3.0 %    Myelo %   1.0 %    Left Shift   Present        Platelet Estimate   Adequate        Red Cell Morph   See Comment        Reviewed: unremarkable  Diff Type   Manual        NSR 7/14/2023  This is an appended report.  These results have  "been appended to a previously  verified report.         Comp Metabolic Panel  REPORTED 07/17/2023 17:32  Protein, Total   7.1 g/dL 6.3-8.0    Albumin L 3.3 g/dL 3.9-4.9    Calcium, Total   9.8 mg/dL 8.5-10.2    Bilirubin, Total   0.4 mg/dL 0.2-1.3    Alkaline Phosphatase H 863 U/L     AST H 89 U/L 14-40    ALT H 166 U/L 10-54    Glucose H 143 mg/dL 74-99    BUN H 39 mg/dL 9-24    Creatinine   0.87 mg/dL 0.73-1.22    Sodium L 133 mmol/L 136-144    Potassium   3.9 mmol/L 3.7-5.1    Chloride   97 mmol/L     CO2 L 20 mmol/L 22-30    Anion Gap   16 mmol/L 9-18    Estimated Glomerular Filtration Rate   95 mL/min/1.73 meters squared >=60      Hepatic Functn Panel  REPORTED 07/17/2023 17:34  Albumin L 3.6 g/dL 3.9-4.9    Bilirubin, Total   0.4 mg/dL 0.2-1.3    Bilirubin,Conjugated   <0.2 mg/dL <0.2    Alkaline Phosphatase H 869 U/L     AST H 84 U/L 14-40    ALT H 166 U/L 10-54    Protein, Total   6.8 g/dL 6.3-8.0      MEDICATIONS REVIEWED AT FACILITY:  Tylenol 160mg/5ml - 20 ml Q4hr PRN  Eliquis 2.5 mg BID  Atorvastatin 10 mg daily  Colace 100 mg bid  Bowel protocol - MOM PRN, Docusate RS PRN, and Fleets Enema PRN  Esomeprazole 40 mg daily  Hydrocortisone ointment 1% BID PRN  Glargine 6 units at bedtime  Melatonin 10 mg Q24 hr PRN  Midodrine 5 mg TID SBP >120  Miralax 17 grams Q24 hr PRN  KCL 10% - 7.5ml daily  Sennosides 8.8 mg/5ml give 5 ml BID  Sertraline 50 mg daily  Tamsulosin 0.4 mg daily  Thiamine 100 mg daily    Living will related issues reviewed-Code status: Full code    OBJECTIVE:  /77   Pulse 97   Temp 36.7 °C (98 °F)   Resp 16   Ht 1.854 m (6' 1\")   Wt 86.7 kg (191 lb 3.2 oz)   BMI 25.23 kg/m²     Physical Exam  Constitutional:       Comments: Chronically ill   HENT:      Head: Normocephalic.      Right Ear: External ear normal.      Left Ear: External ear normal.      Nose: Nose normal.      Mouth/Throat:      Mouth: Mucous membranes are moist.   Eyes:      Extraocular Movements: " Extraocular movements intact.      Conjunctiva/sclera: Conjunctivae normal.      Pupils: Pupils are equal, round, and reactive to light.   Neck:      Comments: Cervical drain  Cardiovascular:      Rate and Rhythm: Normal rate and regular rhythm.      Pulses: Normal pulses.      Heart sounds: Normal heart sounds.   Pulmonary:      Effort: Pulmonary effort is normal.      Breath sounds: Normal breath sounds.      Comments: Diminished bilaterally  Abdominal:      General: Bowel sounds are normal. There is no distension.      Palpations: Abdomen is soft.      Tenderness: There is no abdominal tenderness.      Comments: Multiple healed lap sites  J-tube in place - drsg clean and dry   Genitourinary:     Comments: Not examined  Musculoskeletal:         General: Normal range of motion.      Cervical back: Normal range of motion.      Comments: Generalized weakness  Trace edema   Skin:     General: Skin is warm and dry.      Capillary Refill: Capillary refill takes less than 2 seconds.   Neurological:      General: No focal deficit present.      Mental Status: He is alert and oriented to person, place, and time. Mental status is at baseline.   Psychiatric:         Mood and Affect: Mood normal.      Assessment/Plan  Problem List Items Addressed This Visit       Depression    Elevated alkaline phosphatase level    Deep vein thrombosis (DVT) of popliteal vein of left lower extremity (CMS/HCC)    Gastrostomy in place (CMS/HCC)    Achalasia, esophageal - Primary    Type 2 diabetes mellitus with hyperglycemia (CMS/HCC)    Orthostatic hypotension    Esophageal perforation       Skin integrity:  Nursing to monitor skin integrity as patient is at risk for pressure injuries.  Wound care per nursing  Cervical esophagostomy drain - oral liquids for pleasure  J-tube - TF only - Nephro 83 ml/hr to run 16 hr 5pm - til 9am, H2O flush 250 ml Q4hr and additional flush of 100 ml/hr BID to maintain patency.  G-tube - meds only - 15 ml of water  before and after medication. Clamp for 30 min after med administration  **Patient and wife both educated on both tubes/cervical stoma and care required. Instructed patient to not insert anything foreign into the tubing if blockage occurs.   See Facility notes for measurements/assessments  Turn and reposition Q 2 hours or more  Air mattress and when up in chair cushion reducing device  Dietician to evaluate and recommend:  Nutritional supplement:  Please monitor skin integrity and other pressure areas  Referral to wound clinic if appropriate:    PLAN:  Pt has been seen for follow up visit.  The patient is here at facility for PT/OT/ST to maximize strength, function, endurance and safety.  The patient is participating in therapy. Levy JOHNSON Colt is making progress to the best of his/her ability.   Please see PT/OT/ST notes in the facility for detailed information regarding progression of patients progress.   Recent nursing evaluation and notes were reviewed.   Overall, patient has a extensive history w/multiple complications. Patient is stable and discharge is planned for Friday 7/21/23. At this time, we are waiting for an US to be done at the facility concerning the elevated liver enzymes. Labs are also pending at this time.   #Achalasia, esophageal perforation, s/p multiple procedures:  #DM2:  #Orthostatic hypotension:  Any decline or change in condition needs to be communicated with the physician or myself.    Discussion with nursing staff regarding ongoing care and management.  If needed, would communicate with family who are not present at this time.   There are no concerns at this time.  We will continue with the medications noted above.    We will continue to follow the patient here at the facility.    Discharge planning: Discussion with Levy Gregory and family/friend representative - wife if present regarding discharge - follow up with PCP and other speciality physicians as instructed or as scheduled,  compliance with home medications, and safety within the home.   Discharge Home when goals are met - planned for Friday 7/21/23.  Follow up with PCP in 1-2 weeks after discharge from facility.   [facility] to follow after discharge for continued PT/OT and Nursing.  F/up Dr Hoang - 2 weeks (appt needs made - patient refusing to see)  F/up Dr Gongora - Sept w/CT imaging (appt to be made - patient to arrange per self)  *Please note that nursing facility, outside laboratory agency, and  [facility] do not interface.     Completion of the note was done through Dragon voice recognition technology and may include   unintended or grammatical errors which may not have been recognized when finalizing the note.     Time: I spent 31 minutes or greater with the patient reviewing discharge information which include compliance of medications, follow up appointments with PCP and or Speciality physicians. Greater than 50% of this time was spent in counseling and or coordination of care.       MAGDALENA Sarkar      Electronically Signed By: MAGDALENA Sarkar   7/20/23  3:59 PM

## 2023-07-21 PROCEDURE — G0180 MD CERTIFICATION HHA PATIENT: HCPCS | Performed by: FAMILY MEDICINE

## 2023-07-23 VITALS
WEIGHT: 191 LBS | BODY MASS INDEX: 25.2 KG/M2 | SYSTOLIC BLOOD PRESSURE: 99 MMHG | DIASTOLIC BLOOD PRESSURE: 63 MMHG | HEART RATE: 74 BPM

## 2023-07-23 NOTE — PROGRESS NOTES
Subjective   Patient ID: Levy Gregory is a 67 y.o. male who is acute skilled care being seen and evaluated for multiple medical problems.    Patient has been having significantly elevated alkaline phosphatase and LFTs.  Back-and-forth we thought what to do about it but it seems like this is a intrahepatic cholestasis secondary to all this complications he has undergone after having rupture of esophagus for achalasia myomectomy.  Usually very overt gastrointestinal visceral disturbance happens particularly luminal content in the luminal with Angella alkaline phosphatase could be elevated.  We did ultrasound of liver, patient has no gallbladder, they did not find any significant reason for extrahepatic cholestasis.  This cholestasis has been causing tremendous amount of itching.  Now I see that the patient is going to be discharged because patient has been out of the home since month of May back-and-forth hospitalization and multiple surgeries.  Patient has at this bag on the chest wall where any pleasure food or liquid comes out so it is a matter of surgery to be done in the future where both ends of the esophagus could be connected.  Patient is frustrated that the surgery is not scheduled till November.  He continues to receive jejunostomy tube feeding, he has been given medications through gastrostomy tube.  He has been ambulating around with the help of support, there was no family member at bedside.  Couple of times jejunostomy tube got malfunction, 1 time tube was clogged and nursing staff tried to unclog these tube and it has led to obstruction of the tube with wooden piece as they were trying to unclog the tube with Q-tip.  It was quite complicated situation this patient has face but he is tired of this and he wants to go home.  His jejunostomy tube feeding has been going well.         Review of Systems  Constitutional:  Positive for activity change and fatigue.   HENT: Negative.  Negative for congestion.     Eyes: Negative.    Respiratory: Negative.     Cardiovascular:  Positive for leg swelling.   Gastrointestinal: Negative.  Negative for abdominal distention and abdominal pain.        J tube and G tube     Musculoskeletal:  Positive for arthralgias.   Skin:  Positive for wound. pruritus  Neurological: negative  Hematological:  Bruises/bleeds easily.   Psychiatric/Behavioral:  Negative for agitation  Objective   BP 99/63   Pulse 74   Wt 86.6 kg (191 lb)   BMI 25.20 kg/m²     Physical Exam  Constitutional:       Appearance: He is normal weight. He is  not anymore tired-appearing. He is not toxic-appearing.   HENT:      Head: Normocephalic.      Nose: Nose normal.   Eyes:      Conjunctiva/sclera: Conjunctivae normal.   Cardiovascular:      Rate and Rhythm: Normal rate and regular rhythm.      Pulses: Normal pulses.      Heart sounds: Normal heart sounds.      Comments: Chest tube left chest wall?  Pulmonary:      Effort: Pulmonary effort is normal.      Breath sounds: Normal breath sounds.   Abdominal:      General: Abdomen is flat.      Palpations: Abdomen is soft.      Comments: G tube and J tube   Musculoskeletal:         General: No tenderness.      Cervical back: Normal range of motion and neck supple.   Skin:     General: Skin is warm and dry.   Neurological:      General: No focal deficit present.      Mental Status: He is oriented to person, place, and time. Mental status is at baseline.  Assessment/Plan   Problem List Items Addressed This Visit       Elevated alkaline phosphatase level - Primary    Esophageal stricture    Deep vein thrombosis (DVT) of popliteal vein of left lower extremity (CMS/HCC)    Gastrostomy in place (CMS/HCC)    Achalasia, esophageal    Mediastinitis    Orthostatic hypotension     Other Visit Diagnoses       Cholestasis            There is a clinical jaundice, predominantly intrahepatic cholestasis with transaminases elevated.  Ultrasound was reviewed, 5 nucleotidase and GGT level  can be done to see whether this alkaline phosphatase is hepatic in origin or not and most likely 80s.  We cannot do much about it as cholestyramine may not be a good option to relieve this pruritus here because of jejunostomy tube and gastrostomy tube.  Enterohepatic circulation may not be going that well.  Patient has a DVT patient has hypercoagulable state because of prolonged mobility, patient's hemoglobin was reviewed, patient was having mediastinitis, empyema, decortication surgery.  Anemia persist.  Patient continued to remain hypotensive, beta-blockers were taken off, midodrine therapy has been started, still patient is quite miserable with the situation he has gone through he says.  They have arranged all the arrangements to be done at home particularly jejunostomy tube feeding and appropriate follow-up with the thoracic surgery and gastrointestinal surgery.  All the tubes were inspected, the patency of the tubes were reviewed, discussed with nursing staff, discussed with nurse practitioner.  Patient can be discharged as planned provided some work-up for cholestasis can be carried out.  Medications are reviewed, will take some time and effort for this patient to recover after having complicated esophageal surgery and back-and-forth surgeries and prolonged rehabilitation and hospitalization.     Goals    None

## 2023-07-24 ENCOUNTER — OFFICE VISIT (OUTPATIENT)
Dept: PRIMARY CARE | Facility: CLINIC | Age: 68
End: 2023-07-24
Payer: MEDICARE

## 2023-07-24 ENCOUNTER — DOCUMENTATION (OUTPATIENT)
Dept: REHABILITATION | Facility: REHABILITATION | Age: 68
End: 2023-07-24

## 2023-07-24 VITALS — WEIGHT: 195 LBS | SYSTOLIC BLOOD PRESSURE: 116 MMHG | BODY MASS INDEX: 25.73 KG/M2 | DIASTOLIC BLOOD PRESSURE: 62 MMHG

## 2023-07-24 DIAGNOSIS — Z87.2 HISTORY OF PRURITUS OF SKIN: ICD-10-CM

## 2023-07-24 DIAGNOSIS — E11.65 TYPE 2 DIABETES MELLITUS WITH HYPERGLYCEMIA, WITH LONG-TERM CURRENT USE OF INSULIN (MULTI): ICD-10-CM

## 2023-07-24 DIAGNOSIS — D50.9 IRON DEFICIENCY ANEMIA, UNSPECIFIED IRON DEFICIENCY ANEMIA TYPE: ICD-10-CM

## 2023-07-24 DIAGNOSIS — R74.01 TRANSAMINITIS: ICD-10-CM

## 2023-07-24 DIAGNOSIS — Z86.711 HISTORY OF PULMONARY EMBOLISM: ICD-10-CM

## 2023-07-24 DIAGNOSIS — R74.8 ELEVATED ALKALINE PHOSPHATASE LEVEL: ICD-10-CM

## 2023-07-24 DIAGNOSIS — Z86.718 HISTORY OF DVT (DEEP VEIN THROMBOSIS): ICD-10-CM

## 2023-07-24 DIAGNOSIS — K22.3 ESOPHAGEAL PERFORATION: Primary | ICD-10-CM

## 2023-07-24 DIAGNOSIS — Z79.4 TYPE 2 DIABETES MELLITUS WITH HYPERGLYCEMIA, WITH LONG-TERM CURRENT USE OF INSULIN (MULTI): ICD-10-CM

## 2023-07-24 DIAGNOSIS — Z93.1 GASTROSTOMY IN PLACE (MULTI): ICD-10-CM

## 2023-07-24 DIAGNOSIS — E11.42 DIABETIC POLYNEUROPATHY ASSOCIATED WITH TYPE 2 DIABETES MELLITUS (MULTI): ICD-10-CM

## 2023-07-24 PROBLEM — K22.2 ESOPHAGEAL STRICTURE: Status: RESOLVED | Noted: 2023-02-17 | Resolved: 2023-07-24

## 2023-07-24 PROBLEM — K22.89 ESOPHAGEAL MASS: Status: RESOLVED | Noted: 2023-02-17 | Resolved: 2023-07-24

## 2023-07-24 PROBLEM — J86.9 EMPYEMA (MULTI): Status: RESOLVED | Noted: 2023-06-22 | Resolved: 2023-07-24

## 2023-07-24 PROBLEM — I48.20 CHRONIC ATRIAL FIBRILLATION (MULTI): Status: ACTIVE | Noted: 2023-07-24

## 2023-07-24 PROBLEM — K25.9 GASTRIC ULCER: Status: RESOLVED | Noted: 2023-02-17 | Resolved: 2023-07-24

## 2023-07-24 PROBLEM — T85.528A DISLODGED GASTROSTOMY TUBE: Status: RESOLVED | Noted: 2023-06-22 | Resolved: 2023-07-24

## 2023-07-24 PROBLEM — R13.10 DYSPHAGIA: Status: RESOLVED | Noted: 2023-02-17 | Resolved: 2023-07-24
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
## 2023-07-24 PROBLEM — D69.2 SOLAR PURPURA (CMS-HCC): Status: ACTIVE | Noted: 2023-07-24

## 2023-07-24 LAB
ALANINE AMINOTRANSFERASE (SGPT) (U/L) IN SER/PLAS: 100 U/L (ref 10–52)
ALBUMIN (G/DL) IN SER/PLAS: 3.5 G/DL (ref 3.4–5)
ALKALINE PHOSPHATASE (U/L) IN SER/PLAS: 676 U/L (ref 33–136)
ANION GAP IN SER/PLAS: 15 MMOL/L (ref 10–20)
ASPARTATE AMINOTRANSFERASE (SGOT) (U/L) IN SER/PLAS: 39 U/L (ref 9–39)
BASOPHILS (10*3/UL) IN BLOOD BY AUTOMATED COUNT: 0.05 X10E9/L (ref 0–0.1)
BASOPHILS/100 LEUKOCYTES IN BLOOD BY AUTOMATED COUNT: 0.4 % (ref 0–2)
BILIRUBIN TOTAL (MG/DL) IN SER/PLAS: 1 MG/DL (ref 0–1.2)
CALCIUM (MG/DL) IN SER/PLAS: 9.3 MG/DL (ref 8.6–10.3)
CARBON DIOXIDE, TOTAL (MMOL/L) IN SER/PLAS: 23 MMOL/L (ref 21–32)
CHLORIDE (MMOL/L) IN SER/PLAS: 94 MMOL/L (ref 98–107)
CREATININE (MG/DL) IN SER/PLAS: 1.02 MG/DL (ref 0.5–1.3)
EOSINOPHILS (10*3/UL) IN BLOOD BY AUTOMATED COUNT: 0.02 X10E9/L (ref 0–0.7)
EOSINOPHILS/100 LEUKOCYTES IN BLOOD BY AUTOMATED COUNT: 0.2 % (ref 0–6)
ERYTHROCYTE DISTRIBUTION WIDTH (RATIO) BY AUTOMATED COUNT: 15.5 % (ref 11.5–14.5)
ERYTHROCYTE MEAN CORPUSCULAR HEMOGLOBIN CONCENTRATION (G/DL) BY AUTOMATED: 32.2 G/DL (ref 32–36)
ERYTHROCYTE MEAN CORPUSCULAR VOLUME (FL) BY AUTOMATED COUNT: 99 FL (ref 80–100)
ERYTHROCYTES (10*6/UL) IN BLOOD BY AUTOMATED COUNT: 3.28 X10E12/L (ref 4.5–5.9)
GFR MALE: 81 ML/MIN/1.73M2
GLUCOSE (MG/DL) IN SER/PLAS: 167 MG/DL (ref 74–99)
HEMATOCRIT (%) IN BLOOD BY AUTOMATED COUNT: 32.6 % (ref 41–52)
HEMOGLOBIN (G/DL) IN BLOOD: 10.5 G/DL (ref 13.5–17.5)
IMMATURE GRANULOCYTES/100 LEUKOCYTES IN BLOOD BY AUTOMATED COUNT: 2.1 % (ref 0–0.9)
IRON (UG/DL) IN SER/PLAS: 39 UG/DL (ref 35–150)
IRON BINDING CAPACITY (UG/DL) IN SER/PLAS: 253 UG/DL (ref 240–445)
IRON SATURATION (%) IN SER/PLAS: 15 % (ref 25–45)
LEUKOCYTES (10*3/UL) IN BLOOD BY AUTOMATED COUNT: 11.1 X10E9/L (ref 4.4–11.3)
LYMPHOCYTES (10*3/UL) IN BLOOD BY AUTOMATED COUNT: 1.08 X10E9/L (ref 1.2–4.8)
LYMPHOCYTES/100 LEUKOCYTES IN BLOOD BY AUTOMATED COUNT: 9.7 % (ref 13–44)
MONOCYTES (10*3/UL) IN BLOOD BY AUTOMATED COUNT: 1 X10E9/L (ref 0.1–1)
MONOCYTES/100 LEUKOCYTES IN BLOOD BY AUTOMATED COUNT: 9 % (ref 2–10)
NEUTROPHILS (10*3/UL) IN BLOOD BY AUTOMATED COUNT: 8.75 X10E9/L (ref 1.2–7.7)
NEUTROPHILS/100 LEUKOCYTES IN BLOOD BY AUTOMATED COUNT: 78.6 % (ref 40–80)
PLATELETS (10*3/UL) IN BLOOD AUTOMATED COUNT: 238 X10E9/L (ref 150–450)
POTASSIUM (MMOL/L) IN SER/PLAS: 4.2 MMOL/L (ref 3.5–5.3)
PROTEIN TOTAL: 6.8 G/DL (ref 6.4–8.2)
SODIUM (MMOL/L) IN SER/PLAS: 128 MMOL/L (ref 136–145)
UREA NITROGEN (MG/DL) IN SER/PLAS: 44 MG/DL (ref 6–23)

## 2023-07-24 PROCEDURE — 85025 COMPLETE CBC W/AUTO DIFF WBC: CPT

## 2023-07-24 PROCEDURE — 80074 ACUTE HEPATITIS PANEL: CPT

## 2023-07-24 PROCEDURE — 1126F AMNT PAIN NOTED NONE PRSNT: CPT | Performed by: FAMILY MEDICINE

## 2023-07-24 PROCEDURE — 80053 COMPREHEN METABOLIC PANEL: CPT

## 2023-07-24 PROCEDURE — 86376 MICROSOMAL ANTIBODY EACH: CPT

## 2023-07-24 PROCEDURE — 3074F SYST BP LT 130 MM HG: CPT | Performed by: FAMILY MEDICINE

## 2023-07-24 PROCEDURE — 1159F MED LIST DOCD IN RCRD: CPT | Performed by: FAMILY MEDICINE

## 2023-07-24 PROCEDURE — 87522 HEPATITIS C REVRS TRNSCRPJ: CPT

## 2023-07-24 PROCEDURE — 99495 TRANSJ CARE MGMT MOD F2F 14D: CPT | Performed by: FAMILY MEDICINE

## 2023-07-24 PROCEDURE — 3078F DIAST BP <80 MM HG: CPT | Performed by: FAMILY MEDICINE

## 2023-07-24 PROCEDURE — 3044F HG A1C LEVEL LT 7.0%: CPT | Performed by: FAMILY MEDICINE

## 2023-07-24 PROCEDURE — 83550 IRON BINDING TEST: CPT

## 2023-07-24 PROCEDURE — 1160F RVW MEDS BY RX/DR IN RCRD: CPT | Performed by: FAMILY MEDICINE

## 2023-07-24 PROCEDURE — 83540 ASSAY OF IRON: CPT

## 2023-07-24 RX ORDER — INSULIN DETEMIR 100 [IU]/ML
35 INJECTION, SOLUTION SUBCUTANEOUS NIGHTLY
Qty: 31.5 ML | Refills: 0 | Status: SHIPPED | OUTPATIENT
Start: 2023-07-24 | End: 2023-12-12 | Stop reason: HOSPADM

## 2023-07-24 RX ORDER — POTASSIUM CHLORIDE 750 MG/1
10 CAPSULE, EXTENDED RELEASE ORAL DAILY
COMMUNITY
End: 2023-07-28

## 2023-07-24 RX ORDER — MIDODRINE HYDROCHLORIDE 5 MG/1
5 TABLET ORAL 3 TIMES DAILY
COMMUNITY
End: 2023-08-03 | Stop reason: SDUPTHER

## 2023-07-24 RX ORDER — SENNOSIDES 8.8 MG/5ML
5 LIQUID ORAL 2 TIMES DAILY
COMMUNITY
Start: 2023-05-22 | End: 2023-11-10 | Stop reason: ALTCHOICE

## 2023-07-24 RX ORDER — ESOMEPRAZOLE MAGNESIUM 40 MG/1
40 GRANULE, DELAYED RELEASE ORAL
COMMUNITY
End: 2024-02-07 | Stop reason: ALTCHOICE

## 2023-07-24 RX ORDER — HYDROXYZINE HYDROCHLORIDE 25 MG/1
25 TABLET, FILM COATED ORAL 2 TIMES DAILY
Qty: 60 TABLET | Refills: 0 | Status: SHIPPED | OUTPATIENT
Start: 2023-07-24 | End: 2023-12-12 | Stop reason: HOSPADM

## 2023-07-24 RX ORDER — TAMSULOSIN HYDROCHLORIDE 0.4 MG/1
0.4 CAPSULE ORAL
COMMUNITY
End: 2023-10-26 | Stop reason: ALTCHOICE

## 2023-07-24 RX ORDER — MAG HYDROX/ALUMINUM HYD/SIMETH 200-200-20
SUSPENSION, ORAL (FINAL DOSE FORM) ORAL 2 TIMES DAILY
COMMUNITY
End: 2023-11-28 | Stop reason: WASHOUT

## 2023-07-24 RX ORDER — LANOLIN ALCOHOL/MO/W.PET/CERES
100 CREAM (GRAM) TOPICAL DAILY
COMMUNITY
End: 2023-12-12 | Stop reason: HOSPADM

## 2023-07-24 NOTE — PROGRESS NOTES
Patient discharged from nursing facility on Friday 7/21/23.  Lab results from 7/20/2023 performed at [facility] resulted below. Asked nursing to FAX to PCP Dr Huizar.    Gamma Glutamyltransferase  REPORTED 07/20/2023 22:00  GGT H 784 U/L 10-70 PLDEF         5' Nucleotidase  REPORTED 07/23/2023 18:46  5'Nucleotidase H 110 U/L 0-15      CBC and Differential  REPORTED 07/20/2023 15:16  White Blood Cell Count   6.48 k/uL 3.70-11.00    RBC L 3.44 m/uL 4.20-6.00    Hemoglobin L 10.9 g/dL 13.0-17.0    Hematocrit L 34.3 % 39.0-51.0    MCV   99.7 fL 80.0-100.0    MCH   31.7 pg 26.0-34.0    MCHC   31.8 g/dL 30.5-36.0    RDW-CV H 15.9 % 11.5-15.0    Platelet Count   221 k/uL 150-400    MPV   10.3 fL 9.0-12.7    NRBC   0.0 /100 WBC    Absolute nRBC   <0.01 k/uL <0.01    Neut%   53.0 %    Abs Neut (Segs + Bands)   3.43 k/uL 1.45-7.50    Lymph%   21.0 %    Abs Lymph (Normal + Reactive)   1.36 k/uL 1.00-4.00    Mono%   12.0 %    Abs Mono   0.78 k/uL <0.87    Eosin%   4.0 %    Abs Eosin   0.26 k/uL <0.46    Baso%   3.0 %    Abs Baso H 0.19 k/uL <0.11    [place] %   3.0 %    Myelo %   4.0 %    Left Shift   Present        Platelet Estimate   Adequate        Red Cell Morph   See Comment        Reviewed: see results of individual morphologies  Target Cells   Few        Diff Type   Manual        ref 7/18  ref 7/19  This is an appended report.  These results have been appended to a previously  verified report.         Comp Metabolic Panel  REPORTED 07/20/2023 14:11  Protein, Total   6.8 g/dL 6.3-8.0    Albumin L 3.5 g/dL 3.9-4.9    Calcium, Total   9.6 mg/dL 8.5-10.2    Bilirubin, Total   0.4 mg/dL 0.2-1.3    Alkaline Phosphatase H 831 U/L     AST H 82 U/L 14-40    ALT H 156 U/L 10-54    Glucose H 129 mg/dL 74-99    BUN H 37 mg/dL 9-24    Creatinine   0.87 mg/dL 0.73-1.22    Sodium L 134 mmol/L 136-144    Potassium   3.9 mmol/L 3.7-5.1    Chloride   97 mmol/L     CO2 L 20 mmol/L 22-30    Anion Gap   17 mmol/L  9-18    Estimated Glomerular Filtration Rate   95 mL/min/1.73 meters squared >=60

## 2023-07-24 NOTE — PROGRESS NOTES
Subjective   Patient ID: [id number]     Levy Gregory is a 67 y.o. male who presents for No chief complaint on file..    HPI  Discharged for SNF [id number];  to schedule home pt;  first time out of institution in five months;  had achalasia and esophageal hernia repair but developed sepsis after release and from [facility] and then on vent for three weeks and subsequent multiple blood clots and PE;  had dialysis once;  discharged with esophageal bag and plans for reconstruction in NOV2023    Review of Systems    CARDIO- No chest pain or pressure, nausea, diaphoresis, paresthesias, dizziness, or syncope with or without exertion  GI-No blood in stool, tarry stools, pain, vomiting, heartburn, constipation or diarrhea  PULM-No wheezing, coughing or shortness of breath  UROL-No frequency, urgency, blood in urine, or incontinence  DERM   Objective     /62 (BP Location: Left arm, Patient Position: Sitting)      Physical Exam    Neck-supple without lymphadenopathy or thyromegaly; no carotid bruits  Throat- without erythema or exudate, uvula in midlineNeck-supple without lymphadenopathy or thyromegaly; no carotid bruits  Heart- regular rate and rhythm, normal s1 and s2 without murmur or gallop  Lungs-clear to auscultation  Abdomen-soft, positive bowel sounds, without masses, HSmegaly or pain     Assessment/Plan     Problem List Items Addressed This Visit       Diabetic neuropathy associated with type 2 diabetes mellitus (CMS/[facility])    Relevant Medications    insulin detemir (Levemir U-100 Insulin) 100 unit/mL injection    Elevated alkaline phosphatase level    Relevant Orders    Comprehensive Metabolic Panel    History of DVT (deep vein thrombosis)    History of pulmonary embolism    Iron deficiency anemia    Relevant Orders    CBC and Auto Differential    Iron and TIBC    Gastrostomy in place (CMS/[facility])    Relevant Orders    Referral to Physical Therapy    Referral to Home Care    Type 2 diabetes mellitus with  hyperglycemia (CMS/HCC)    Esophageal perforation - Primary     Dr Brielle Gongora DO         Relevant Orders    Referral to Physical Therapy    Referral to Home Care     Other Visit Diagnoses       History of pruritus of skin        Relevant Medications    hydrOXYzine HCL (Atarax) 25 mg tablet    Transaminitis        Relevant Orders    Thyroid Peroxidase (TPO) Antibody    Hepatitis panel, acute        Return in one week to be reevaluated.    Chris Huizar MD

## 2023-07-25 ENCOUNTER — TELEPHONE (OUTPATIENT)
Dept: PRIMARY CARE | Facility: CLINIC | Age: 68
End: 2023-07-25
Payer: MEDICARE

## 2023-07-25 DIAGNOSIS — B19.20 HEPATITIS C VIRUS INFECTION WITHOUT HEPATIC COMA, UNSPECIFIED CHRONICITY: ICD-10-CM

## 2023-07-25 DIAGNOSIS — D50.9 IRON DEFICIENCY ANEMIA, UNSPECIFIED IRON DEFICIENCY ANEMIA TYPE: Primary | ICD-10-CM

## 2023-07-25 DIAGNOSIS — E87.1 HYPONATREMIA: ICD-10-CM

## 2023-07-25 LAB
HEPATITIS A VIRUS IGM AB PRESENCE IN SER/PLAS BY IMMUNOASSAY: NONREACTIVE
HEPATITIS B VIRUS CORE IGM AB PRESENCE IN SER/PLAS BY IMMUNOASSY: NONREACTIVE
HEPATITIS B VIRUS SURFACE AG PRESENCE IN SERUM: NONREACTIVE
HEPATITIS C VIRUS AB PRESENCE IN SERUM: REACTIVE
THYROPEROXIDASE AB (IU/ML) IN SER/PLAS: <28 IU/ML

## 2023-07-25 NOTE — TELEPHONE ENCOUNTER
Wife Sharon calling. She is having trouble with the Flomax capsule, trying to crush it and get it through the feeding tube. She wants to know if there is something you can suggest or change. He uses [facility] in [facility].

## 2023-07-26 DIAGNOSIS — R74.01 TRANSAMINITIS: ICD-10-CM

## 2023-07-26 DIAGNOSIS — R76.8 HEPATITIS C ANTIBODY TEST POSITIVE: ICD-10-CM

## 2023-07-26 LAB
HCV PCR QUANT: NOT DETECTED IU/ML
HCV PCR QUANT: NOT DETECTED IU/ML
HCV RNA, PCR LOG: NORMAL LOG10 IU/ML
HCV RNA, PCR LOG: NORMAL LOG10 IU/ML

## 2023-07-26 NOTE — PROGRESS NOTES
Discussed with wife who states that liver ultrasound was already done. Order canceled but I placed a new referral for a hepatologist.

## 2023-07-27 ENCOUNTER — LAB (OUTPATIENT)
Dept: LAB | Facility: LAB | Age: 68
End: 2023-07-27
Payer: MEDICARE

## 2023-07-27 DIAGNOSIS — D50.9 IRON DEFICIENCY ANEMIA, UNSPECIFIED IRON DEFICIENCY ANEMIA TYPE: ICD-10-CM

## 2023-07-27 DIAGNOSIS — B19.20 HEPATITIS C VIRUS INFECTION WITHOUT HEPATIC COMA, UNSPECIFIED CHRONICITY: ICD-10-CM

## 2023-07-27 DIAGNOSIS — E87.1 HYPONATREMIA: ICD-10-CM

## 2023-07-27 LAB
CREATININE (MG/DL) IN URINE: 66.8 MG/DL (ref 20–370)
FERRITIN (UG/LL) IN SER/PLAS: 2764 UG/L (ref 20–300)
OSMOLALITY, RANDOM URINE: 374 MOSM/KG (ref 200–1200)
OSMOLALITY, SERUM: 281 MOSM/KG H2O (ref 280–300)
SODIUM URINE RANDOM: 15 MMOL/L
SODIUM/CREATININE (MMOL/G) IN URINE: 22 MMOL/G CREAT

## 2023-07-27 PROCEDURE — 83935 ASSAY OF URINE OSMOLALITY: CPT

## 2023-07-27 PROCEDURE — 84300 ASSAY OF URINE SODIUM: CPT

## 2023-07-27 PROCEDURE — 82570 ASSAY OF URINE CREATININE: CPT

## 2023-07-27 PROCEDURE — 82728 ASSAY OF FERRITIN: CPT

## 2023-07-27 PROCEDURE — 36415 COLL VENOUS BLD VENIPUNCTURE: CPT

## 2023-07-27 PROCEDURE — 87522 HEPATITIS C REVRS TRNSCRPJ: CPT

## 2023-07-27 PROCEDURE — 83930 ASSAY OF BLOOD OSMOLALITY: CPT

## 2023-07-28 DIAGNOSIS — E87.1 HYPONATREMIA: Primary | ICD-10-CM

## 2023-07-28 LAB
HCV PCR QUANT: NOT DETECTED IU/ML
HCV RNA, PCR LOG: NORMAL LOG10 IU/ML

## 2023-07-28 RX ORDER — SPIRONOLACTONE 25 MG/1
25 TABLET ORAL DAILY
Qty: 30 TABLET | Refills: 5 | Status: SHIPPED | OUTPATIENT
Start: 2023-07-28 | End: 2023-07-31 | Stop reason: SDUPTHER

## 2023-07-31 ENCOUNTER — OFFICE VISIT (OUTPATIENT)
Dept: PRIMARY CARE | Facility: CLINIC | Age: 68
End: 2023-07-31
Payer: MEDICARE

## 2023-07-31 VITALS
DIASTOLIC BLOOD PRESSURE: 68 MMHG | SYSTOLIC BLOOD PRESSURE: 116 MMHG | WEIGHT: 200 LBS | BODY MASS INDEX: 26.39 KG/M2 | TEMPERATURE: 98.1 F

## 2023-07-31 DIAGNOSIS — Z86.718 HISTORY OF DVT (DEEP VEIN THROMBOSIS): ICD-10-CM

## 2023-07-31 DIAGNOSIS — E87.1 HYPONATREMIA: ICD-10-CM

## 2023-07-31 DIAGNOSIS — R74.8 ELEVATED ALKALINE PHOSPHATASE LEVEL: ICD-10-CM

## 2023-07-31 DIAGNOSIS — R76.8 HEPATITIS C ANTIBODY POSITIVE IN BLOOD: Primary | ICD-10-CM

## 2023-07-31 PROCEDURE — 1159F MED LIST DOCD IN RCRD: CPT | Performed by: FAMILY MEDICINE

## 2023-07-31 PROCEDURE — 3008F BODY MASS INDEX DOCD: CPT | Performed by: FAMILY MEDICINE

## 2023-07-31 PROCEDURE — 3078F DIAST BP <80 MM HG: CPT | Performed by: FAMILY MEDICINE

## 2023-07-31 PROCEDURE — 99214 OFFICE O/P EST MOD 30 MIN: CPT | Performed by: FAMILY MEDICINE

## 2023-07-31 PROCEDURE — 3074F SYST BP LT 130 MM HG: CPT | Performed by: FAMILY MEDICINE

## 2023-07-31 PROCEDURE — 1126F AMNT PAIN NOTED NONE PRSNT: CPT | Performed by: FAMILY MEDICINE

## 2023-07-31 PROCEDURE — 3044F HG A1C LEVEL LT 7.0%: CPT | Performed by: FAMILY MEDICINE

## 2023-07-31 PROCEDURE — 1160F RVW MEDS BY RX/DR IN RCRD: CPT | Performed by: FAMILY MEDICINE

## 2023-07-31 RX ORDER — SPIRONOLACTONE 25 MG/1
12.5 TABLET ORAL DAILY
Qty: 45 TABLET | Refills: 1 | Status: SHIPPED | OUTPATIENT
Start: 2023-07-31 | End: 2023-11-04 | Stop reason: SDUPTHER

## 2023-07-31 RX ORDER — SPIRONOLACTONE 25 MG/1
25 TABLET ORAL DAILY
Qty: 30 TABLET | Refills: 5 | Status: SHIPPED | OUTPATIENT
Start: 2023-07-31 | End: 2023-07-31 | Stop reason: SDUPTHER

## 2023-07-31 NOTE — PATIENT INSTRUCTIONS
Decrease your J tube flushes by 50% due to Levy's low potassium.  Start the spironolactone as prescribed.  Return in one week.

## 2023-07-31 NOTE — PROGRESS NOTES
Subjective   Patient ID: [id number]     Levy Gregory is a 67 y.o. male who presents for No chief complaint on file..    HPI  Went to Emergency room three days ago due to blocked jejunostomy tube that opened spontaneously in Emergency;  since then he has been sleeping well; state that upon returning home his j tube came out twice and he replaced it on his own, and this morning has had increase in pus discharge (initial placement by Dr Hoang );      Review of Systems    CARDIO- No chest pain or pressure, nausea, diaphoresis, paresthesias, dizziness, or syncope with or without exertion  GI-No blood in stool, tarry stools, pain, vomiting, heartburn, constipation but has diarrhea  PULM-No wheezing, coughing or shortness of breath  ID-no fever    Objective     /68 (BP Location: Left arm, Patient Position: Sitting)   Temp 36.7 °C (98.1 °F) (Oral)   Wt 90.7 kg (200 lb)   BMI 26.39 kg/m²      Physical Exam  Gen-alert and oriented times three  Neck-supple without lymphadenopathy or thyromegaly; no carotid bruits  Heart- regular rate and rhythm, normal s1 and s2 without murmur or gallop  Lungs-clear to auscultation  Abdomen-soft, positive bowel sounds, without masses, HSmegaly or pain     Assessment/Plan     Problem List Items Addressed This Visit    None      Chris Huizar MD

## 2023-08-01 ENCOUNTER — TELEPHONE (OUTPATIENT)
Dept: PRIMARY CARE | Facility: CLINIC | Age: 68
End: 2023-08-01
Payer: MEDICARE

## 2023-08-01 NOTE — TELEPHONE ENCOUNTER
Symone is calling to let you know that the Pt has a dona with Dr Daley tomorrow for surgical repair of feeding tube

## 2023-08-02 ENCOUNTER — HOSPITAL ENCOUNTER (OUTPATIENT)
Dept: DATA CONVERSION | Facility: HOSPITAL | Age: 68
End: 2023-08-02
Attending: SURGERY | Admitting: SURGERY
Payer: MEDICARE

## 2023-08-02 DIAGNOSIS — K94.23 GASTROSTOMY MALFUNCTION (MULTI): ICD-10-CM

## 2023-08-02 DIAGNOSIS — Z53.8 PROCEDURE AND TREATMENT NOT CARRIED OUT FOR OTHER REASONS: ICD-10-CM

## 2023-08-02 DIAGNOSIS — Z93.4 OTHER ARTIFICIAL OPENINGS OF GASTROINTESTINAL TRACT STATUS (MULTI): ICD-10-CM

## 2023-08-02 LAB — POCT GLUCOSE: 155 MG/DL (ref 74–99)

## 2023-08-03 ENCOUNTER — TELEPHONE (OUTPATIENT)
Dept: PRIMARY CARE | Facility: CLINIC | Age: 68
End: 2023-08-03
Payer: MEDICARE

## 2023-08-03 DIAGNOSIS — I95.89 OTHER SPECIFIED HYPOTENSION: Primary | ICD-10-CM

## 2023-08-03 RX ORDER — MIDODRINE HYDROCHLORIDE 5 MG/1
5 TABLET ORAL 3 TIMES DAILY
Qty: 270 TABLET | Refills: 0 | Status: SHIPPED | OUTPATIENT
Start: 2023-08-03 | End: 2023-10-26 | Stop reason: ALTCHOICE

## 2023-08-03 NOTE — TELEPHONE ENCOUNTER
Refill:    Midodrine 5mg take one tab three times a day    Pharm: [facility] # [phone]    LR: med was filled while pt was in [facility] center  LV: 07/31/23   NV: 08/07/23

## 2023-08-07 ENCOUNTER — TELEPHONE (OUTPATIENT)
Dept: PRIMARY CARE | Facility: CLINIC | Age: 68
End: 2023-08-07

## 2023-08-07 ENCOUNTER — APPOINTMENT (OUTPATIENT)
Dept: PRIMARY CARE | Facility: CLINIC | Age: 68
End: 2023-08-07
Payer: MEDICARE

## 2023-08-07 NOTE — TELEPHONE ENCOUNTER
You started Levy on hydroxyzine and spironolactone last week and he is very dizzy now and can't even do PT. Symone wants to know if he can cut down on one or stop it completely.

## 2023-08-28 ENCOUNTER — TELEPHONE (OUTPATIENT)
Dept: PRIMARY CARE | Facility: CLINIC | Age: 68
End: 2023-08-28
Payer: MEDICARE

## 2023-08-28 ENCOUNTER — OFFICE VISIT (OUTPATIENT)
Dept: GERIATRIC MEDICINE | Age: 68
End: 2023-08-28

## 2023-08-28 DIAGNOSIS — E11.9 DIABETES MELLITUS WITHOUT COMPLICATION (HCC): ICD-10-CM

## 2023-08-28 DIAGNOSIS — J44.9 CHRONIC OBSTRUCTIVE PULMONARY DISEASE, UNSPECIFIED COPD TYPE (HCC): ICD-10-CM

## 2023-08-28 DIAGNOSIS — R53.1 WEAKNESS: ICD-10-CM

## 2023-08-28 DIAGNOSIS — G93.40 ENCEPHALOPATHY: ICD-10-CM

## 2023-08-28 DIAGNOSIS — J86.9 PYOTHORAX WITHOUT FISTULA (HCC): ICD-10-CM

## 2023-08-28 DIAGNOSIS — J15.9 BACTERIAL PNEUMONIA: Primary | ICD-10-CM

## 2023-08-28 RX ORDER — ASPIRIN 81 MG/1
81 TABLET ORAL DAILY
COMMUNITY
End: 2023-08-28 | Stop reason: CLARIF

## 2023-08-28 RX ORDER — PRAVASTATIN SODIUM 80 MG/1
80 TABLET ORAL NIGHTLY
COMMUNITY
End: 2023-08-28

## 2023-08-28 RX ORDER — FLUTICASONE PROPIONATE AND SALMETEROL 100; 50 UG/1; UG/1
1 POWDER RESPIRATORY (INHALATION) EVERY 12 HOURS
COMMUNITY
End: 2023-08-28

## 2023-08-28 RX ORDER — VALSARTAN 80 MG/1
80 TABLET ORAL DAILY
COMMUNITY
End: 2023-08-28 | Stop reason: CLARIF

## 2023-08-28 RX ORDER — METOPROLOL TARTRATE 50 MG/1
50 TABLET, FILM COATED ORAL 2 TIMES DAILY
COMMUNITY
End: 2023-08-28

## 2023-08-28 RX ORDER — AMLODIPINE BESYLATE 10 MG/1
10 TABLET ORAL DAILY
COMMUNITY
End: 2023-08-28 | Stop reason: CLARIF

## 2023-08-28 RX ORDER — ACETAMINOPHEN 325 MG/1
650 TABLET ORAL EVERY 6 HOURS PRN
COMMUNITY
End: 2023-08-28 | Stop reason: CLARIF

## 2023-08-28 RX ORDER — CHOLECALCIFEROL (VITAMIN D3) 125 MCG
500 CAPSULE ORAL DAILY
COMMUNITY
End: 2023-08-28 | Stop reason: CLARIF

## 2023-08-28 RX ORDER — ALBUTEROL SULFATE 90 UG/1
2 AEROSOL, METERED RESPIRATORY (INHALATION) EVERY 6 HOURS PRN
COMMUNITY
End: 2023-08-28 | Stop reason: CLARIF

## 2023-08-28 RX ORDER — NAPROXEN 375 MG/1
375 TABLET ORAL EVERY 12 HOURS PRN
COMMUNITY
End: 2023-08-28

## 2023-08-28 RX ORDER — ESTRADIOL 0.1 MG/G
2 CREAM VAGINAL EVERY OTHER DAY
COMMUNITY
End: 2023-08-28 | Stop reason: CLARIF

## 2023-08-28 RX ORDER — VALSARTAN 320 MG/1
320 TABLET ORAL DAILY
COMMUNITY
End: 2023-08-28

## 2023-08-29 NOTE — TELEPHONE ENCOUNTER
Pts spouse Sharon called back and pt is in SNF for the next 2 to 3 weeks and will call to sched follow as soon as pt is discharged.

## 2023-08-30 RX ORDER — FLUCONAZOLE 40 MG/ML
400 POWDER, FOR SUSPENSION ORAL DAILY
COMMUNITY

## 2023-08-30 RX ORDER — PHENOL 1.4 %
10 AEROSOL, SPRAY (ML) MUCOUS MEMBRANE NIGHTLY
COMMUNITY

## 2023-08-30 RX ORDER — ATORVASTATIN CALCIUM 10 MG/1
10 TABLET, FILM COATED ORAL DAILY
COMMUNITY

## 2023-08-30 RX ORDER — THIAMINE MONONITRATE (VIT B1) 100 MG
100 TABLET ORAL DAILY
COMMUNITY

## 2023-08-30 RX ORDER — ESOMEPRAZOLE MAGNESIUM 40 MG/1
40 FOR SUSPENSION ORAL DAILY
COMMUNITY

## 2023-08-30 RX ORDER — FERROUS SULFATE 300 MG/5ML
300 LIQUID (ML) ORAL DAILY
COMMUNITY

## 2023-08-30 RX ORDER — INSULIN LISPRO 100 [IU]/ML
0-6 INJECTION, SOLUTION INTRAVENOUS; SUBCUTANEOUS EVERY 6 HOURS PRN
COMMUNITY

## 2023-08-30 RX ORDER — LEVOFLOXACIN 25 MG/ML
750 SOLUTION ORAL DAILY
COMMUNITY

## 2023-08-30 RX ORDER — OXYCODONE HCL 5 MG/5 ML
5 SOLUTION, ORAL ORAL EVERY 4 HOURS PRN
COMMUNITY

## 2023-08-30 RX ORDER — HEPARIN SODIUM,PORCINE 10 UNIT/ML
3 VIAL (ML) INTRAVENOUS PRN
COMMUNITY

## 2023-08-30 RX ORDER — FINASTERIDE 5 MG/1
5 TABLET, FILM COATED ORAL DAILY
COMMUNITY

## 2023-08-30 RX ORDER — PIPERACILLIN SODIUM, TAZOBACTAM SODIUM 4; .5 G/20ML; G/20ML
100 INJECTION, POWDER, LYOPHILIZED, FOR SOLUTION INTRAVENOUS EVERY 6 HOURS
COMMUNITY

## 2023-08-30 RX ORDER — GABAPENTIN 250 MG/5ML
250 SOLUTION ORAL 3 TIMES DAILY
COMMUNITY

## 2023-09-02 ENCOUNTER — OFFICE VISIT (OUTPATIENT)
Dept: GERIATRIC MEDICINE | Age: 68
End: 2023-09-02

## 2023-09-02 DIAGNOSIS — D64.9 ANEMIA, UNSPECIFIED TYPE: ICD-10-CM

## 2023-09-02 DIAGNOSIS — I26.99 OTHER PULMONARY EMBOLISM WITHOUT ACUTE COR PULMONALE, UNSPECIFIED CHRONICITY (HCC): ICD-10-CM

## 2023-09-02 DIAGNOSIS — J90 PLEURAL EFFUSION: ICD-10-CM

## 2023-09-02 DIAGNOSIS — R53.1 WEAKNESS: Primary | ICD-10-CM

## 2023-09-02 DIAGNOSIS — F32.A DEPRESSION, UNSPECIFIED DEPRESSION TYPE: ICD-10-CM

## 2023-09-02 DIAGNOSIS — R13.10 DYSPHAGIA, UNSPECIFIED TYPE: ICD-10-CM

## 2023-09-04 ENCOUNTER — OFFICE VISIT (OUTPATIENT)
Dept: GERIATRIC MEDICINE | Age: 68
End: 2023-09-04
Payer: MEDICARE

## 2023-09-04 DIAGNOSIS — F32.A DEPRESSION, UNSPECIFIED DEPRESSION TYPE: ICD-10-CM

## 2023-09-04 DIAGNOSIS — D64.9 ANEMIA, UNSPECIFIED TYPE: ICD-10-CM

## 2023-09-04 DIAGNOSIS — I26.99 OTHER PULMONARY EMBOLISM WITHOUT ACUTE COR PULMONALE, UNSPECIFIED CHRONICITY (HCC): ICD-10-CM

## 2023-09-04 DIAGNOSIS — J90 PLEURAL EFFUSION: ICD-10-CM

## 2023-09-04 DIAGNOSIS — R53.1 WEAKNESS: Primary | ICD-10-CM

## 2023-09-04 DIAGNOSIS — R13.10 DYSPHAGIA, UNSPECIFIED TYPE: ICD-10-CM

## 2023-09-04 PROCEDURE — 99308 SBSQ NF CARE LOW MDM 20: CPT | Performed by: INTERNAL MEDICINE

## 2023-09-04 PROCEDURE — 1123F ACP DISCUSS/DSCN MKR DOCD: CPT | Performed by: INTERNAL MEDICINE

## 2023-09-05 ENCOUNTER — OFFICE VISIT (OUTPATIENT)
Dept: GERIATRIC MEDICINE | Age: 68
End: 2023-09-05
Payer: MEDICARE

## 2023-09-05 DIAGNOSIS — R53.1 WEAKNESS: Primary | ICD-10-CM

## 2023-09-05 DIAGNOSIS — J90 PLEURAL EFFUSION: ICD-10-CM

## 2023-09-05 DIAGNOSIS — F32.A DEPRESSION, UNSPECIFIED DEPRESSION TYPE: ICD-10-CM

## 2023-09-05 DIAGNOSIS — D64.9 ANEMIA, UNSPECIFIED TYPE: ICD-10-CM

## 2023-09-05 DIAGNOSIS — R13.10 DYSPHAGIA, UNSPECIFIED TYPE: ICD-10-CM

## 2023-09-05 DIAGNOSIS — I26.99 OTHER PULMONARY EMBOLISM WITHOUT ACUTE COR PULMONALE, UNSPECIFIED CHRONICITY (HCC): ICD-10-CM

## 2023-09-05 PROCEDURE — 1123F ACP DISCUSS/DSCN MKR DOCD: CPT | Performed by: INTERNAL MEDICINE

## 2023-09-05 PROCEDURE — 99309 SBSQ NF CARE MODERATE MDM 30: CPT | Performed by: INTERNAL MEDICINE

## 2023-09-06 VITALS
TEMPERATURE: 97.9 F | DIASTOLIC BLOOD PRESSURE: 77 MMHG | BODY MASS INDEX: 32.43 KG/M2 | HEIGHT: 73 IN | HEART RATE: 71 BPM | SYSTOLIC BLOOD PRESSURE: 140 MMHG | WEIGHT: 244.71 LBS | OXYGEN SATURATION: 98 % | RESPIRATION RATE: 20 BRPM

## 2023-09-07 VITALS — WEIGHT: 195.33 LBS | HEIGHT: 71 IN | BODY MASS INDEX: 27.35 KG/M2

## 2023-09-07 NOTE — PROGRESS NOTES
SNF PROGRESS NOTE      Cc- s/p esophageal perf with esophagectomy. Placement of G tube. Patient is a Mahi Baltimore 78 y.o. male who is being seen at [facility]. Patient had an esophageal perforation with esophagectomy. Noted to have a R pleural effusion R VATS was done. GJ tube was placed. She received 1 uniot PRBC. Patient remains with R chest tube. He also has GJ tube. Patient continues to get TF. No past medical history on file. Patient has no known allergies. VS reviewed    Gen- Alert and oriented x 3   Heart- RRR no murmur no LE edema   Lungs- CTA b/l no resp distress RA oxygen   Abd- bs x 4     + GJ tube   + R chest tube. + PICC. Assessment and Plan    S/p Esophageal Perf with esophagectomy   IV zosyn and levaquin  Follow up with thoracic surgery 9/7  Pleural Effusion s/p VATS  S/p thoracic surgery 9/7  HLD   On statin   Depression   On zoloft. PE/DVT  On eliquis   Anemia.    S/p 1 unit PRBC in the hospital   Dysphagia   GJ tube       Anna Calvo DO [facility]     Electronically signed by: Ronny Lopes DO on 9/2/2023

## 2023-09-10 ENCOUNTER — OFFICE VISIT (OUTPATIENT)
Dept: GERIATRIC MEDICINE | Age: 68
End: 2023-09-10
Payer: MEDICARE

## 2023-09-10 DIAGNOSIS — D64.9 ANEMIA, UNSPECIFIED TYPE: ICD-10-CM

## 2023-09-10 DIAGNOSIS — R53.1 WEAKNESS: Primary | ICD-10-CM

## 2023-09-10 DIAGNOSIS — J90 PLEURAL EFFUSION: ICD-10-CM

## 2023-09-10 DIAGNOSIS — F32.A DEPRESSION, UNSPECIFIED DEPRESSION TYPE: ICD-10-CM

## 2023-09-10 DIAGNOSIS — R13.10 DYSPHAGIA, UNSPECIFIED TYPE: ICD-10-CM

## 2023-09-10 DIAGNOSIS — I26.99 OTHER PULMONARY EMBOLISM WITHOUT ACUTE COR PULMONALE, UNSPECIFIED CHRONICITY (HCC): ICD-10-CM

## 2023-09-10 PROCEDURE — 99309 SBSQ NF CARE MODERATE MDM 30: CPT | Performed by: INTERNAL MEDICINE

## 2023-09-10 PROCEDURE — 1123F ACP DISCUSS/DSCN MKR DOCD: CPT | Performed by: INTERNAL MEDICINE

## 2023-09-11 ENCOUNTER — OFFICE VISIT (OUTPATIENT)
Dept: GERIATRIC MEDICINE | Age: 68
End: 2023-09-11
Payer: MEDICARE

## 2023-09-11 DIAGNOSIS — R13.10 DYSPHAGIA, UNSPECIFIED TYPE: ICD-10-CM

## 2023-09-11 DIAGNOSIS — D64.9 ANEMIA, UNSPECIFIED TYPE: ICD-10-CM

## 2023-09-11 DIAGNOSIS — R53.1 WEAKNESS: Primary | ICD-10-CM

## 2023-09-11 DIAGNOSIS — F32.A DEPRESSION, UNSPECIFIED DEPRESSION TYPE: ICD-10-CM

## 2023-09-11 DIAGNOSIS — I26.99 OTHER PULMONARY EMBOLISM WITHOUT ACUTE COR PULMONALE, UNSPECIFIED CHRONICITY (HCC): ICD-10-CM

## 2023-09-11 DIAGNOSIS — J90 PLEURAL EFFUSION: ICD-10-CM

## 2023-09-11 PROCEDURE — 99308 SBSQ NF CARE LOW MDM 20: CPT | Performed by: INTERNAL MEDICINE

## 2023-09-11 PROCEDURE — 1123F ACP DISCUSS/DSCN MKR DOCD: CPT | Performed by: INTERNAL MEDICINE

## 2023-09-11 NOTE — PROGRESS NOTES
SNF PROGRESS NOTE      Cc- s/p esophageal perf with esophagectomy. Placement of G tube. Patient is a [place] Sharples 78 y.o. male who is being seen at [facility]. Patient had an esophageal perforation with esophagectomy. Noted to have a R pleural effusion R VATS was done. GJ tube was placed. She received 1 uniot PRBC. Patient remains with R chest tube. He continues to have drainage. He also continues to have drainage from [address] [facility] tube. He continues to get tube feed. No past medical history on file. Patient has no known allergies. VS reviewed    Gen- Alert and oriented x 3   Heart- RRR no murmur no LE edema   Lungs- CTA b/l no resp distress RA oxygen   Abd- bs x 4      + GJ tube   + R chest tube. + PICC. Assessment and Plan    S/p Esophageal Perf with esophagectomy   IV zosyn and levaquin  Follow up with thoracic surgery 9/7  Pleural Effusion s/p VATS  S/p thoracic surgery 9/7  HLD   On statin   Depression   On zoloft. PE/DVT  On eliquis   Anemia.    S/p 1 unit PRBC in the hospital   Dysphagia         GJ tube       Sagar Landers DO [facility]     Electronically signed by: Markos Amaya DO on 9/4/2023

## 2023-09-11 NOTE — PROGRESS NOTES
SNF PROGRESS NOTE      Cc- s/p esophageal perf with esophagectomy. Placement of G tube. Patient is a Rissa Honor 78 y.o. male who is being seen at [facility]. Patient had an esophageal perforation with esophagectomy. Noted to have a R pleural effusion R VATS was done. GJ tube was placed. She received 1 uniot PRBC. Patient with GJ tube. He is getting medications through the tube and its immediately draining. Nursing concerned patient is not getting medication. No past medical history on file. Patient has no known allergies. VS reviewed      Gen- Alert and oriented x 3   Heart- RRR no murmur no LE edema   Lungs- CTA b/l no resp distress RA oxygen   Abd- bs x 4      + GJ tube   + R chest tube. + PICC. Assessment and Plan    S/p Esophageal Perf with esophagectomy   IV zosyn and levaquin  Follow up with thoracic surgery 9/7  Pleural Effusion s/p VATS  S/p thoracic surgery 9/7  HLD   On statin   Depression   On zoloft. PE/DVT  On eliquis   Anemia. S/p 1 unit PRBC in the hospital   Dysphagia         GJ tube     Sent to ER for eval of GJ tube.        Shona Prieto DO [facility]     Electronically signed by: Natasha Denis DO on 9/5/2023

## 2023-09-12 ENCOUNTER — OFFICE VISIT (OUTPATIENT)
Dept: GERIATRIC MEDICINE | Age: 68
End: 2023-09-12
Payer: MEDICARE

## 2023-09-12 DIAGNOSIS — I26.99 OTHER PULMONARY EMBOLISM WITHOUT ACUTE COR PULMONALE, UNSPECIFIED CHRONICITY (HCC): ICD-10-CM

## 2023-09-12 DIAGNOSIS — D64.9 ANEMIA, UNSPECIFIED TYPE: ICD-10-CM

## 2023-09-12 DIAGNOSIS — J90 PLEURAL EFFUSION: ICD-10-CM

## 2023-09-12 DIAGNOSIS — R13.10 DYSPHAGIA, UNSPECIFIED TYPE: ICD-10-CM

## 2023-09-12 DIAGNOSIS — R53.1 WEAKNESS: Primary | ICD-10-CM

## 2023-09-12 DIAGNOSIS — F32.A DEPRESSION, UNSPECIFIED DEPRESSION TYPE: ICD-10-CM

## 2023-09-12 PROCEDURE — 1123F ACP DISCUSS/DSCN MKR DOCD: CPT | Performed by: INTERNAL MEDICINE

## 2023-09-12 PROCEDURE — 99308 SBSQ NF CARE LOW MDM 20: CPT | Performed by: INTERNAL MEDICINE

## 2023-09-13 ENCOUNTER — OFFICE VISIT (OUTPATIENT)
Dept: GERIATRIC MEDICINE | Age: 68
End: 2023-09-13
Payer: MEDICARE

## 2023-09-13 DIAGNOSIS — R53.1 WEAKNESS: Primary | ICD-10-CM

## 2023-09-13 DIAGNOSIS — J90 PLEURAL EFFUSION: ICD-10-CM

## 2023-09-13 DIAGNOSIS — D64.9 ANEMIA, UNSPECIFIED TYPE: ICD-10-CM

## 2023-09-13 DIAGNOSIS — I26.99 OTHER PULMONARY EMBOLISM WITHOUT ACUTE COR PULMONALE, UNSPECIFIED CHRONICITY (HCC): ICD-10-CM

## 2023-09-13 DIAGNOSIS — R13.10 DYSPHAGIA, UNSPECIFIED TYPE: ICD-10-CM

## 2023-09-13 DIAGNOSIS — F32.A DEPRESSION, UNSPECIFIED DEPRESSION TYPE: ICD-10-CM

## 2023-09-13 PROCEDURE — 99308 SBSQ NF CARE LOW MDM 20: CPT | Performed by: INTERNAL MEDICINE

## 2023-09-13 PROCEDURE — 1123F ACP DISCUSS/DSCN MKR DOCD: CPT | Performed by: INTERNAL MEDICINE

## 2023-09-14 ENCOUNTER — OFFICE VISIT (OUTPATIENT)
Dept: GERIATRIC MEDICINE | Age: 68
End: 2023-09-14

## 2023-09-14 DIAGNOSIS — J90 PLEURAL EFFUSION: ICD-10-CM

## 2023-09-14 DIAGNOSIS — R13.10 DYSPHAGIA, UNSPECIFIED TYPE: ICD-10-CM

## 2023-09-14 DIAGNOSIS — R53.1 WEAKNESS: Primary | ICD-10-CM

## 2023-09-14 DIAGNOSIS — F32.A DEPRESSION, UNSPECIFIED DEPRESSION TYPE: ICD-10-CM

## 2023-09-14 DIAGNOSIS — I26.99 OTHER PULMONARY EMBOLISM WITHOUT ACUTE COR PULMONALE, UNSPECIFIED CHRONICITY (HCC): ICD-10-CM

## 2023-09-14 DIAGNOSIS — D64.9 ANEMIA, UNSPECIFIED TYPE: ICD-10-CM

## 2023-09-14 NOTE — H&P
History of Present Illness:   History Present Illness:  Reason for surgery: Food impaction   HPI:    67 M who underwent EGD with Dr. Huizar at [facility] in 11/22 for a food impaction. Food was removed from the lower esophagus. Class D esophagitis, Schatzki's  ring and HH noted. Also had food impaction in 1/21- dilated that time. No bx either time.    Has been on pantoprazole since 2019, increased to bid in 11/22. Has not had recurrent dysphagia since EGD in 11/22. Avoiding steak and apples with skin which had been giving him problems.     Has lost 30 lbs since summer, but is trying to lose wt through diet.       Patient reports that he has been experiencing dysphagia symptoms every time he eats, he is on pantoprazole twice a day do not get any overt reflux symptoms.  Last upper endoscopy  few months ago showed a large hiatal hernia and grade D esophagitis.  Along with a Schatzki ring.  He was recently seen in outpatient GI office, at that time another endoscopy was offered which patient refused.  However this time when discussion was done in the emergency room patient wants to proceed with endoscopy and if required surgical management of large hiatal hernia.      Allergies:        Allergies:  ·  No Known Allergies :     Home Medication Review:   Home Medications Reviewed: yes     Impression/Procedure:   ·  Impression and Planned Procedure: Food Impaction : Plan for EGD with removal of food bolus with possible dilation.       ERAS (Enhanced Recovery After Surgery):  ·  ERAS Patient: no       Vital Signs:  Temperature C: 36.6 degrees C   Temperature F: 97.8 degrees F   Heart Rate: 71 beats per minute   Respiratory Rate: 20 breath per minute   Blood Pressure Systolic: 140 mm/Hg   Blood Pressure Diastolic: 77 mm/Hg     Physical Exam Narrative:  ·  Physical Exam:    General appearance: No acute distress, cooperative.  Ears, nose, mouth, and throat: Moist mucous membranes, tongue normal; no oral lesions; Mallampati  Neck:  Supple, no lymphadenopathy or thyromegaly  Lungs: Clear to auscultation bilaterally  CV: Regular rate and rhythm, no murmur; no pitting edema in the lower extremities  Abd: soft, non-tender; non-distended; normal active bowel sounds; no scars      Physical Exam by System:    Eyes: PERRL, EOMI, clear sclera   Respiratory/Thorax: Patent airways, CTAB, normal  breath sounds with good chest expansion, thorax symmetric   Cardiovascular: Regular, rate and rhythm, no murmurs,  2+ equal pulses of the extremities, normal S 1and S 2   Skin: Warm and dry, no lesions, no rashes     Consent:   COVID-19 Consent:  ·  COVID-19 Risk Consent Surgeon has reviewed key risks related to the risk of mahamed COVID-19 and if they contract COVID-19 what the risks are.       Electronic Signatures:  Cheo Snowden)  (Signed 11-Feb-2023 23:14)   Authored: History of Present Illness, Allergies, Home  Medication Review, Impression/Procedure, ERAS, Physical Exam, Consent, Note Completion      Last Updated: 11-Feb-2023 23:14 by Cheo Snowden)

## 2023-09-15 ENCOUNTER — OFFICE VISIT (OUTPATIENT)
Dept: GERIATRIC MEDICINE | Age: 68
End: 2023-09-15

## 2023-09-15 DIAGNOSIS — D64.9 ANEMIA, UNSPECIFIED TYPE: ICD-10-CM

## 2023-09-15 DIAGNOSIS — F32.A DEPRESSION, UNSPECIFIED DEPRESSION TYPE: ICD-10-CM

## 2023-09-15 DIAGNOSIS — J90 PLEURAL EFFUSION: ICD-10-CM

## 2023-09-15 DIAGNOSIS — I26.99 OTHER PULMONARY EMBOLISM WITHOUT ACUTE COR PULMONALE, UNSPECIFIED CHRONICITY (HCC): ICD-10-CM

## 2023-09-15 DIAGNOSIS — R53.1 WEAKNESS: Primary | ICD-10-CM

## 2023-09-15 DIAGNOSIS — R13.10 DYSPHAGIA, UNSPECIFIED TYPE: ICD-10-CM

## 2023-09-17 ENCOUNTER — OFFICE VISIT (OUTPATIENT)
Dept: GERIATRIC MEDICINE | Age: 68
End: 2023-09-17

## 2023-09-17 DIAGNOSIS — I26.99 OTHER PULMONARY EMBOLISM WITHOUT ACUTE COR PULMONALE, UNSPECIFIED CHRONICITY (HCC): ICD-10-CM

## 2023-09-17 DIAGNOSIS — R13.10 DYSPHAGIA, UNSPECIFIED TYPE: ICD-10-CM

## 2023-09-17 DIAGNOSIS — J90 PLEURAL EFFUSION: ICD-10-CM

## 2023-09-17 DIAGNOSIS — R53.1 WEAKNESS: Primary | ICD-10-CM

## 2023-09-17 DIAGNOSIS — F32.A DEPRESSION, UNSPECIFIED DEPRESSION TYPE: ICD-10-CM

## 2023-09-17 DIAGNOSIS — D64.9 ANEMIA, UNSPECIFIED TYPE: ICD-10-CM

## 2023-09-18 ENCOUNTER — OFFICE VISIT (OUTPATIENT)
Dept: GERIATRIC MEDICINE | Age: 68
End: 2023-09-18

## 2023-09-18 DIAGNOSIS — I26.99 OTHER PULMONARY EMBOLISM WITHOUT ACUTE COR PULMONALE, UNSPECIFIED CHRONICITY (HCC): ICD-10-CM

## 2023-09-18 DIAGNOSIS — R13.10 DYSPHAGIA, UNSPECIFIED TYPE: ICD-10-CM

## 2023-09-18 DIAGNOSIS — F32.A DEPRESSION, UNSPECIFIED DEPRESSION TYPE: ICD-10-CM

## 2023-09-18 DIAGNOSIS — D64.9 ANEMIA, UNSPECIFIED TYPE: ICD-10-CM

## 2023-09-18 DIAGNOSIS — J90 PLEURAL EFFUSION: Primary | ICD-10-CM

## 2023-09-18 DIAGNOSIS — R53.1 WEAKNESS: ICD-10-CM

## 2023-09-18 NOTE — PROGRESS NOTES
SNF PROGRESS NOTE      Cc- s/p esophageal perf with esophagectomy. Placement of G tube. Patient is a Zi Bush 78 y.o. male who is being seen at [facility]. Patient had an esophageal perforation with esophagectomy. Noted to have a R pleural effusion R VATS was done. GJ tube was placed. She received 1 unit PRBC. Patient is laying in bed and feels horrible. He looks dry. He states that's he doesn't feel well. No past medical history on file. Patient has no known allergies. VS reviewed      Gen- Alert and oriented x 3   Heart- RRR no murmur no LE edema   Lungs- CTA b/l no resp distress RA oxygen   Abd- bs x 4      + GJ tube   + R chest tube. + PICC. Assessment and Plan    S/p Esophageal Perf with esophagectomy   IV zosyn until 9/27/23 and levaquin until 9/27/23  Fluconazole until 9/27/23  Follow up with thoracic surgery 9/7  Pleural Effusion s/p VATS  S/p thoracic surgery 9/7  HLD   On statin   Depression   On zoloft. PE/DVT  On eliquis   Anemia. S/p 1 unit PRBC in the hospital   Dysphagia         GJ tube   To clamp tube with meds  Nocturnal feed. Patient looks dry. Will give 2 L IVF.      Bernice Schmitt DO, [facility]     Electronically signed by: Malu Glaser DO on 9/10/2023

## 2023-09-18 NOTE — PROGRESS NOTES
SNF PROGRESS NOTE      Cc- s/p esophageal perf with esophagectomy. Placement of G tube. Patient is a Daniel Del Real 78 y.o. male who is being seen at [facility]. Patient had an esophageal perforation with esophagectomy. Noted to have a R pleural effusion R VATS was done. GJ tube was placed. She received 1 unit PRBC. Patient is looking better today. He remains getting Abx. He states that he is still really tired. No past medical history on file. Patient has no known allergies. VS reviewed      Gen- Alert and oriented x 3   Heart- RRR no murmur no LE edema   Lungs- CTA b/l no resp distress RA oxygen   Abd- bs x 4      + GJ tube   + R chest tube. + PICC. Assessment and Plan    S/p Esophageal Perf with esophagectomy   IV zosyn until 9/27/23 and levaquin until 9/27/23  Fluconazole until 9/27/23  Follow up with thoracic surgery 9/7  Pleural Effusion s/p VATS  S/p thoracic surgery 9/7  HLD   On statin   Depression   On zoloft. PE/DVT  On eliquis   Anemia. S/p 1 unit PRBC in the hospital   Dysphagia         GJ tube   To clamp tube with meds  Nocturnal feed. Continuing to get his remainder of the 2 L IVF. Will continue to monitor.        Kimberly Donovan DO [facility]     Electronically signed by: Selena Le DO on 9/11/2023

## 2023-09-19 ENCOUNTER — OFFICE VISIT (OUTPATIENT)
Dept: GERIATRIC MEDICINE | Age: 68
End: 2023-09-19

## 2023-09-19 DIAGNOSIS — R53.1 WEAKNESS: ICD-10-CM

## 2023-09-19 DIAGNOSIS — R13.10 DYSPHAGIA, UNSPECIFIED TYPE: ICD-10-CM

## 2023-09-19 DIAGNOSIS — F32.A DEPRESSION, UNSPECIFIED DEPRESSION TYPE: ICD-10-CM

## 2023-09-19 DIAGNOSIS — J90 PLEURAL EFFUSION: Primary | ICD-10-CM

## 2023-09-19 DIAGNOSIS — D64.9 ANEMIA, UNSPECIFIED TYPE: ICD-10-CM

## 2023-09-19 NOTE — PROGRESS NOTES
SNF PROGRESS NOTE      Cc- s/p esophageal perf with esophagectomy. Placement of G tube      Patient is a Chidi Velasco 78 y.o. male who is being seen at [facility]. Patient had an esophageal perforation with esophagectomy. Noted to have a R pleural effusion R VATS was done. GJ tube was placed. She received 1 unit PRBC. Nurse was concerned about patient spit bag. There was red fluid in the bag, and the patient was worried that this was blood. Then the patient had some clear liquid and the drainage was clear. It was determined that the patient had cranberry juice. No past medical history on file. Patient has no known allergies. VS reviewed      Gen- Alert and oriented x 3   Heart- RRR no murmur no LE edema   Lungs- CTA b/l no resp distress RA oxygen   Abd- bs x 4      + GJ tube   + left chest  Spit bag.   + PICC        Assessment and Plan    S/p Esophageal Perf with esophagectomy   IV zosyn until 9/27/23 and levaquin until 9/27/23  Fluconazole until 9/27/23  Pleural Effusion s/p VATS  S/p thoracic surgery 9/7  HLD   On statin   Depression   On zoloft. PE/DVT  On eliquis   Anemia. S/p 1 unit PRBC in the hospital   Dysphagia         GJ tube   To clamp tube with meds  Nocturnal feed.       Dinh Álvarez DO, [facility]     Electronically signed by: Osiel Turner DO on 9/13/2023

## 2023-09-19 NOTE — PROGRESS NOTES
SNF PROGRESS NOTE      Cc- s/p esophageal perf with esophagectomy. Placement of G tube      Patient is a Tom Carrasco 78 y.o. male who is being seen at [facility]. Patient had an esophageal perforation with esophagectomy. Noted to have a R pleural effusion R VATS was done. GJ tube was placed. She received 1 unit PRBC. Patient continues to get abx. He is feeling a little better. He remains getting tube feed with clamping. Feels better after IVF         No past medical history on file. Patient has no known allergies. VS reviewed    Gen- Alert and oriented x 3   Heart- RRR no murmur no LE edema   Lungs- CTA b/l no resp distress RA oxygen   Abd- bs x 4      + GJ tube   + left chest  Spit bag.   + PICC          Assessment and Plan    S/p Esophageal Perf with esophagectomy   IV zosyn until 9/27/23 and levaquin until 9/27/23  Fluconazole until 9/27/23  Follow up with thoracic surgery 9/7  Pleural Effusion s/p VATS  S/p thoracic surgery 9/7  HLD   On statin   Depression   On zoloft. PE/DVT  On eliquis   Anemia. S/p 1 unit PRBC in the hospital   Dysphagia         GJ tube   To clamp tube with meds  Nocturnal feed.       Brooke Moyer DO [facility]     Electronically signed by: Erich Hamilton DO on 9/12/2023 Where Do You Want The Question To Include Opioid Counseling Located?: Case Summary Tab

## 2023-09-20 ENCOUNTER — OFFICE VISIT (OUTPATIENT)
Dept: GERIATRIC MEDICINE | Age: 68
End: 2023-09-20

## 2023-09-20 DIAGNOSIS — R13.10 DYSPHAGIA, UNSPECIFIED TYPE: ICD-10-CM

## 2023-09-20 DIAGNOSIS — D64.9 ANEMIA, UNSPECIFIED TYPE: ICD-10-CM

## 2023-09-20 DIAGNOSIS — J90 PLEURAL EFFUSION: Primary | ICD-10-CM

## 2023-09-20 DIAGNOSIS — R53.1 WEAKNESS: ICD-10-CM

## 2023-09-20 DIAGNOSIS — F32.A DEPRESSION, UNSPECIFIED DEPRESSION TYPE: ICD-10-CM

## 2023-09-20 DIAGNOSIS — I26.99 OTHER PULMONARY EMBOLISM WITHOUT ACUTE COR PULMONALE, UNSPECIFIED CHRONICITY (HCC): ICD-10-CM

## 2023-09-20 NOTE — PROGRESS NOTES
SNF PROGRESS NOTE      Cc- s/p esophageal perf with esophagectomy. Placement of G tube      Patient is a Jae Ly 78 y.o. male who is being seen at [facility]. Patient had an esophageal perforation with esophagectomy. Noted to have a R pleural effusion R VATS was done. GJ tube was placed. She received 1 unit PRBC. Patient tells me that he had loose stool last night. He tells me that his stomach was upset and churning. No past medical history on file. Patient has no known allergies. VS reviewed      Gen- Alert and oriented x 3   Heart- RRR no murmur no LE edema   Lungs- CTA b/l no resp distress RA oxygen   Abd- bs x 4      + GJ tube   + left chest  Spit bag.   + PICC      Assessment and Plan    S/p Esophageal Perf with esophagectomy   IV zosyn until 9/27/23 and levaquin until 9/27/23  Fluconazole until 9/27/23  Pleural Effusion s/p VATS  S/p thoracic surgery 9/7  HLD   On statin   Depression   On zoloft. PE/DVT  On eliquis   Anemia. S/p 1 unit PRBC in the hospital   Dysphagia         GJ tube   To clamp tube with meds  Nocturnal feed.       Malena Prieto DO, [facility]     Electronically signed by: Waylon Mathews DO on 9/14/2023

## 2023-09-20 NOTE — PROGRESS NOTES
SNF PROGRESS NOTE      Cc- s/p esophageal perf with esophagectomy. Placement of G tube      Patient is a Reino Born 78 y.o. male who is being seen at [facility]. Patient had an esophageal perforation with esophagectomy. Noted to have a R pleural effusion R VATS was done. GJ tube was placed. She received 1 unit PRBC. The patient is complaining of thrush on the tounge. He otherwise feels weak today. His stomach was less upset with decreased tube feed rate         No past medical history on file. Patient has no known allergies. VS reviewed    Gen- Alert and oriented x 3   Heart- RRR no murmur no LE edema   Lungs- CTA b/l no resp distress RA oxygen   Abd- bs x 4      + GJ tube   + left chest  Spit bag.   + PICC          Assessment and Plan      S/p Esophageal Perf with esophagectomy   IV zosyn until 9/27/23 and levaquin until 9/27/23  Fluconazole until 9/27/23  Pleural Effusion s/p VATS  S/p thoracic surgery 9/7  HLD   On statin   Depression   On zoloft. PE/DVT  On eliquis   Anemia. S/p 1 unit PRBC in the hospital   Dysphagia      GJ tube   To clamp tube with meds  Nocturnal feed. Oral Thrush   Nystatin swish and spit since unable to swallow as this would come right out the spit bag.      Molly Patel DO, [facility]     Electronically signed by: Antony Vargas DO on 9/15/2023

## 2023-09-21 NOTE — PROGRESS NOTES
Patient Name: Luci Bhatti    YOB: 1955  Medical Record Number: [id number]    History of Present Illness:  72-year-old gentleman admitted here from [facility] hospitalized with shortness of breath weakness patient had complex pneumonia had evidence of pyothorax required chest tube placement patient had drain placed patient renal patient was stabilized. Patient did undergo course of therapy to support skin surveillance. Patient had encephalopathic episodes while hospitalized. Required aggressive respiratory meds functionally stabilized. IV antibiotics patient was stabilized transferred here for ongoing course of care. Patient seen in his room globally weak encephalopathic confused at his baseline coughing at times. Review of Systems   Unable to perform ROS: Mental status change   All other systems reviewed and are negative. Review of Systems: All 14 review of systems negative other than as stated above           No past medical history on file. No past surgical history on file. No current outpatient medications on file prior to visit. No current facility-administered medications on file prior to visit. No Known Allergies      No family history on file. Physical Exam:      Physical Exam  Vitals and nursing note reviewed. Constitutional:       Appearance: Normal appearance. He is normal weight. HENT:      Head: Atraumatic. Nose: Nose normal.      Mouth/Throat:      Mouth: Mucous membranes are moist.   Eyes:      Extraocular Movements: Extraocular movements intact. Cardiovascular:      Rate and Rhythm: Normal rate. Pulses: Normal pulses. Pulmonary:      Breath sounds: Rhonchi present. Abdominal:      Palpations: Abdomen is soft. Musculoskeletal:         General: Swelling present. Cervical back: Normal range of motion. Skin:     General: Skin is warm. Findings: Bruising present.    Neurological:      Mental Status: He is

## 2023-09-22 ENCOUNTER — OFFICE VISIT (OUTPATIENT)
Dept: GERIATRIC MEDICINE | Age: 68
End: 2023-09-22
Payer: MEDICARE

## 2023-09-22 DIAGNOSIS — F32.A DEPRESSION, UNSPECIFIED DEPRESSION TYPE: ICD-10-CM

## 2023-09-22 DIAGNOSIS — R13.10 DYSPHAGIA, UNSPECIFIED TYPE: ICD-10-CM

## 2023-09-22 DIAGNOSIS — D64.9 ANEMIA, UNSPECIFIED TYPE: ICD-10-CM

## 2023-09-22 DIAGNOSIS — J90 PLEURAL EFFUSION: Primary | ICD-10-CM

## 2023-09-22 DIAGNOSIS — R53.1 WEAKNESS: ICD-10-CM

## 2023-09-22 DIAGNOSIS — I26.99 OTHER PULMONARY EMBOLISM WITHOUT ACUTE COR PULMONALE, UNSPECIFIED CHRONICITY (HCC): ICD-10-CM

## 2023-09-22 PROCEDURE — 99308 SBSQ NF CARE LOW MDM 20: CPT | Performed by: INTERNAL MEDICINE

## 2023-09-22 PROCEDURE — 1123F ACP DISCUSS/DSCN MKR DOCD: CPT | Performed by: INTERNAL MEDICINE

## 2023-09-22 NOTE — PROGRESS NOTES
SNF PROGRESS NOTE      Cc- s/p esophageal perf with esophagectomy. Placement of G tube      Patient is a [place] 78 y.o. male who is being seen at [facility]. Patient had an esophageal perforation with esophagectomy. Noted to have a R pleural effusion R VATS was done. GJ tube was placed. She received 1 unit PRBC. Patient thrush is much improved. He is still tired. He states that when he gets the tube feed and the tube is clamped, he gets very nauseated. No past medical history on file. Patient has no known allergies. VS reviewed    Gen- Alert and oriented x 3   Heart- RRR no murmur no LE edema   Lungs- CTA b/l no resp distress RA oxygen   Abd- bs x 4      + GJ tube   + left chest  Spit bag.   + PICC          Assessment and Plan    S/p Esophageal Perf with esophagectomy   IV zosyn until 9/27/23 and levaquin until 9/27/23  Fluconazole until 9/27/23  Pleural Effusion s/p VATS  S/p thoracic surgery 9/7  HLD   On statin   Depression   On zoloft. PE/DVT  On eliquis   Anemia. S/p 1 unit PRBC in the hospital   Dysphagia      GJ tube   To clamp tube with meds  Nocturnal feed. Oral Thrush   Nystatin swish and spit since unable to swallow as this would come right out the spit bag.        Natalee Leblanc DO, [facility]     Electronically signed by: Reg Watson DO on 9/17/2023

## 2023-09-23 ENCOUNTER — OFFICE VISIT (OUTPATIENT)
Dept: GERIATRIC MEDICINE | Age: 68
End: 2023-09-23
Payer: MEDICARE

## 2023-09-23 DIAGNOSIS — R53.1 WEAKNESS: Primary | ICD-10-CM

## 2023-09-23 DIAGNOSIS — D64.9 ANEMIA, UNSPECIFIED TYPE: ICD-10-CM

## 2023-09-23 DIAGNOSIS — R13.10 DYSPHAGIA, UNSPECIFIED TYPE: ICD-10-CM

## 2023-09-23 DIAGNOSIS — F32.A DEPRESSION, UNSPECIFIED DEPRESSION TYPE: ICD-10-CM

## 2023-09-23 DIAGNOSIS — I26.99 OTHER PULMONARY EMBOLISM WITHOUT ACUTE COR PULMONALE, UNSPECIFIED CHRONICITY (HCC): ICD-10-CM

## 2023-09-23 DIAGNOSIS — J90 PLEURAL EFFUSION: ICD-10-CM

## 2023-09-23 PROCEDURE — 99308 SBSQ NF CARE LOW MDM 20: CPT | Performed by: INTERNAL MEDICINE

## 2023-09-23 PROCEDURE — 1123F ACP DISCUSS/DSCN MKR DOCD: CPT | Performed by: INTERNAL MEDICINE

## 2023-09-23 NOTE — PROGRESS NOTES
SNF PROGRESS NOTE      Cc- s/p esophageal perf with esophagectomy. Placement of G tube      Patient is a Baby Bladen 78 y.o. male who is being seen at [facility]. Patient had an esophageal perforation with esophagectomy. Noted to have a R pleural effusion R VATS was done. GJ tube was placed. She received 1 unit PRBC. Patient continues to get tube feed. He continues to get nausea post tube feed. He remains weak. No past medical history on file. Patient has no known allergies. VS reviewed      Gen- Alert and oriented x 3   Heart- RRR no murmur no LE edema   Lungs- CTA b/l no resp distress RA oxygen   Abd- bs x 4      + GJ tube   + left chest  Spit bag.   + PICC        Assessment and Plan    S/p Esophageal Perf with esophagectomy   IV zosyn until 9/27/23 and levaquin until 9/27/23  Fluconazole until 9/27/23  Pleural Effusion s/p VATS  S/p thoracic surgery 9/7  HLD   On statin   Depression   On zoloft. PE/DVT  On eliquis   Anemia. S/p 1 unit PRBC in the hospital   Dysphagia      GJ tube   To clamp tube with meds  Nocturnal feed. Oral Thrush   Nystatin swish and spit since unable to swallow as this would come right out the spit bag.        Natalee Leblanc DO, [facility]     Electronically signed by: Reg Watson DO on 9/18/2023

## 2023-09-23 NOTE — PROGRESS NOTES
SNF PROGRESS NOTE      Cc- s/p esophageal perf with esophagectomy. Placement of G tube      Patient is a Romie Bosworth 78 y.o. who is being seen at [facility]. Patient had an esophageal perforation with esophagectomy. Noted to have a R pleural effusion R VATS was done. GJ tube was placed. She received 1 unit PRBC. Patient continues to have Nausea after tube feed. No past medical history on file. Patient has no known allergies. VS reviewed    Gen- Alert and oriented x 3   Heart- RRR no murmur no LE edema   Lungs- CTA b/l no resp distress RA oxygen   Abd- bs x 4      + GJ tube   + left chest  Spit bag.   + PICC           Assessment and Plan    S/p Esophageal Perf with esophagectomy   IV zosyn until 9/27/23 and levaquin until 9/27/23  Fluconazole until 9/27/23  Pleural Effusion s/p VATS  S/p thoracic surgery 9/7  HLD   On statin   Depression   On zoloft. PE/DVT  On eliquis   Anemia. S/p 1 unit PRBC in the hospital   Dysphagia      GJ tube   To clamp tube with meds  Nocturnal feed. Oral Thrush   Nystatin swish and spit since unable to swallow as this would come right out the spit bag  Nausea   Will obtain RUQ US to r/o cholecystitis.        Cleveland Arteaga DO, [address]     Electronically signed by: Brandi Sandoval DO on 9/19/2023

## 2023-09-24 ENCOUNTER — OFFICE VISIT (OUTPATIENT)
Dept: GERIATRIC MEDICINE | Age: 68
End: 2023-09-24
Payer: MEDICARE

## 2023-09-24 DIAGNOSIS — I26.99 OTHER PULMONARY EMBOLISM WITHOUT ACUTE COR PULMONALE, UNSPECIFIED CHRONICITY (HCC): ICD-10-CM

## 2023-09-24 DIAGNOSIS — R53.1 WEAKNESS: Primary | ICD-10-CM

## 2023-09-24 DIAGNOSIS — F32.A DEPRESSION, UNSPECIFIED DEPRESSION TYPE: ICD-10-CM

## 2023-09-24 DIAGNOSIS — J90 PLEURAL EFFUSION: ICD-10-CM

## 2023-09-24 DIAGNOSIS — D64.9 ANEMIA, UNSPECIFIED TYPE: ICD-10-CM

## 2023-09-24 DIAGNOSIS — R13.10 DYSPHAGIA, UNSPECIFIED TYPE: ICD-10-CM

## 2023-09-24 PROCEDURE — 99308 SBSQ NF CARE LOW MDM 20: CPT | Performed by: INTERNAL MEDICINE

## 2023-09-24 PROCEDURE — 1123F ACP DISCUSS/DSCN MKR DOCD: CPT | Performed by: INTERNAL MEDICINE

## 2023-09-25 ENCOUNTER — OFFICE VISIT (OUTPATIENT)
Dept: GERIATRIC MEDICINE | Age: 68
End: 2023-09-25
Payer: MEDICARE

## 2023-09-25 DIAGNOSIS — R53.1 WEAKNESS: Primary | ICD-10-CM

## 2023-09-25 DIAGNOSIS — I26.99 OTHER PULMONARY EMBOLISM WITHOUT ACUTE COR PULMONALE, UNSPECIFIED CHRONICITY (HCC): ICD-10-CM

## 2023-09-25 DIAGNOSIS — U07.1 COVID-19: ICD-10-CM

## 2023-09-25 DIAGNOSIS — D64.9 ANEMIA, UNSPECIFIED TYPE: ICD-10-CM

## 2023-09-25 DIAGNOSIS — F32.A DEPRESSION, UNSPECIFIED DEPRESSION TYPE: ICD-10-CM

## 2023-09-25 DIAGNOSIS — J90 PLEURAL EFFUSION: ICD-10-CM

## 2023-09-25 DIAGNOSIS — R13.10 DYSPHAGIA, UNSPECIFIED TYPE: ICD-10-CM

## 2023-09-25 PROCEDURE — 1123F ACP DISCUSS/DSCN MKR DOCD: CPT | Performed by: INTERNAL MEDICINE

## 2023-09-25 PROCEDURE — 99309 SBSQ NF CARE MODERATE MDM 30: CPT | Performed by: INTERNAL MEDICINE

## 2023-09-25 NOTE — PROGRESS NOTES
SNF PROGRESS NOTE      Cc- s/p esophageal perf with esophagectomy. Placement of [address] tube      Patient is a Katia Boyce 78 y.o. male who is being seen at [facility]. Patient had an esophageal perforation with esophagectomy. Noted to have a R pleural effusion R VATS was done. GJ tube was placed. She received 1 unit PRBC. Patient continues to have nausea and feels generally run down. Will give IVF as he looks try. No past medical history on file. Patient has no known allergies. VS reviewed    Gen- Alert and oriented x 3   Heart- RRR no murmur no LE edema   Lungs- CTA b/l no resp distress RA oxygen   Abd- bs x 4      + GJ tube   + left chest  Spit bag.   + PICC          Assessment and Plan    S/p Esophageal Perf with esophagectomy   IV zosyn until 9/27/23 and levaquin until 9/27/23  Fluconazole until 9/27/23  Pleural Effusion s/p VATS  S/p thoracic surgery 9/7  HLD   On statin   Depression   On zoloft. PE/DVT  On eliquis   Anemia. S/p 1 unit PRBC in the hospital   Dysphagia      GJ tube   To clamp tube with meds  Nocturnal feed. Oral Thrush   Nystatin swish and spit since unable to swallow as this would come right out the spit bag  Nausea   Gall bladder sludge noted        Spoke with CT surgeon, appt arranged for Thursday.        Sherif Ca DO [facility]     Electronically signed by: Krys Rdz DO on 9/22/2023

## 2023-09-25 NOTE — PROGRESS NOTES
SNF PROGRESS NOTE      Cc-  s/p esophageal perf with esophagectomy. Placement of [address] tube      Patient is a Tom Mannt 78 y.o. male who is being seen at [facility]. Patient had an esophageal perforation with esophagectomy. Noted to have a R pleural effusion R VATS was done. GJ tube was placed. She received 1 unit PRBC. Patient received 2 L of IVF. He is having a bad day today and doesn't feel well. He is crabby, more than normal.         No past medical history on file. Patient has no known allergies. VS reviewed    Gen- Alert and oriented x 3   Heart- RRR no murmur no LE edema   Lungs- CTA b/l no resp distress RA oxygen   Abd- bs x 4      + GJ tube   + left chest  Spit bag.   + PICC        Assessment and Plan    S/p Esophageal Perf with esophagectomy   IV zosyn until 9/27/23 and levaquin until 9/27/23  Fluconazole until 9/27/23  Pleural Effusion s/p VATS  S/p thoracic surgery 9/7  HLD   On statin   Depression   On zoloft. PE/DVT  On eliquis   Anemia. S/p 1 unit PRBC in the hospital   Dysphagia      GJ tube   To clamp tube with meds  Nocturnal feed. Oral Thrush   Nystatin swish and spit since unable to swallow as this would come right out the spit bag  Nausea   Gall bladder sludge noted        Spoke with CT surgeon, appt arranged for Thursday.        Brooke Moyer DO [facility]     Electronically signed by: Erich Hamilton DO on 9/23/2023

## 2023-09-25 NOTE — PROGRESS NOTES
SNF PROGRESS NOTE      Cc- s/p esophageal perf with esophagectomy. Placement of [address] tube      Patient is a Emiliano Mar 78 y.o. male who is being seen at [facility]. Patient had an esophageal perforation with esophagectomy. Noted to have a R pleural effusion R VATS was done. GJ tube was placed. She received 1 unit PRBC. Patient is tired. HE states that he has been sleeping a lot this weekend. He states that he wants his secretions to slow down. No past medical history on file. Patient has no known allergies. VS reviewed    Gen- Alert and oriented x 3   Heart- RRR no murmur no LE edema   Lungs- CTA b/l no resp distress RA oxygen   Abd- bs x 4      + GJ tube   + left chest  Spit bag.   + PICC        Assessment and Plan    S/p Esophageal Perf with esophagectomy   IV zosyn until 9/27/23 and levaquin until 9/27/23  Fluconazole until 9/27/23  Pleural Effusion s/p VATS  S/p thoracic surgery 9/7  HLD   On statin   Depression   On zoloft. PE/DVT  On eliquis   Anemia. S/p 1 unit PRBC in the hospital   Dysphagia      GJ tube   To clamp tube with meds  Nocturnal feed. Oral Thrush   Nystatin swish and spit since unable to swallow as this would come right out the spit bag  Nausea   Gall bladder sludge noted        Spoke with CT surgeon, appt arranged for Thursday.        Annemarie Stephenson DO, [facility]     Electronically signed by: Marcle Bonner DO on 9/24/2023

## 2023-09-26 ENCOUNTER — OFFICE VISIT (OUTPATIENT)
Dept: GERIATRIC MEDICINE | Age: 68
End: 2023-09-26
Payer: MEDICARE

## 2023-09-26 DIAGNOSIS — R13.10 DYSPHAGIA, UNSPECIFIED TYPE: ICD-10-CM

## 2023-09-26 DIAGNOSIS — J90 PLEURAL EFFUSION: ICD-10-CM

## 2023-09-26 DIAGNOSIS — R53.1 WEAKNESS: Primary | ICD-10-CM

## 2023-09-26 DIAGNOSIS — F32.A DEPRESSION, UNSPECIFIED DEPRESSION TYPE: ICD-10-CM

## 2023-09-26 DIAGNOSIS — D64.9 ANEMIA, UNSPECIFIED TYPE: ICD-10-CM

## 2023-09-26 PROCEDURE — 99308 SBSQ NF CARE LOW MDM 20: CPT | Performed by: INTERNAL MEDICINE

## 2023-09-26 PROCEDURE — 1123F ACP DISCUSS/DSCN MKR DOCD: CPT | Performed by: INTERNAL MEDICINE

## 2023-09-26 NOTE — PROGRESS NOTES
SNF PROGRESS NOTE      Cc-  s/p esophageal perf with esophagectomy. Placement of [address] tube      Patient is a [place] L[place]ler 78 y.o. male  who is being seen at [facility]. Patient had an esophageal perforation with esophagectomy. Noted to have a R pleural effusion R VATS was done. GJ tube was placed. She received 1 unit PRBC. Unfortunately, He tested COVID + today. Was written for paxlovid and decadron to start. He remains weak and is complaining about secretions. ,             No past medical history on file. Patient has no known allergies. VS reviewed      Gen- Alert and oriented x 3   Heart- RRR no murmur no LE edema   Lungs- CTA b/l no resp distress RA oxygen   Abd- bs x 4      + GJ tube   + left chest  Spit bag.   + PICC        Assessment and Plan    S/p Esophageal Perf with esophagectomy   IV zosyn until 9/27/23 and levaquin until 9/27/23  Fluconazole until 9/27/23  Pleural Effusion s/p VATS  S/p thoracic surgery 9/7  HLD   On statin   Depression   On zoloft. PE/DVT  On eliquis   Anemia. S/p 1 unit PRBC in the hospital   Dysphagia      GJ tube   To clamp tube with meds  Nocturnal feed. Oral Thrush   Nystatin swish and spit since unable to swallow as this would come right out the spit bag  COVID 19  Tested positive 9/25/23  In isolation   Paxlovid and decadron started. Spoke with CT surgeon, appt arranged for virtual appt since patient is covid Positive.        Anika Kearns DO, [facility]     Electronically signed by: Kusum Mayo DO on 9/25/2023

## 2023-09-26 NOTE — PROGRESS NOTES
SNF PROGRESS NOTE      Cc- s/p esophageal perf with esophagectomy. Placement of [address] tube      Patient is a Leatha Beverage 78 y.o. male  who is being seen at [facility]. Patient had an esophageal perforation with esophagectomy. Noted to have a R pleural effusion R VATS was done. GJ tube was placed. She received 1 unit PRBC. He tested COVID + on Monday 9/25. Patient was started on Paxlovid and decadron. He is in isolation. He is standing up at the bedside with an aide. He states that he can't take the secretions anymore. No past medical history on file. Patient has no known allergies. VS reviewed    Gen- Alert and oriented x 3   Heart- RRR no murmur no LE edema   Lungs- CTA b/l no resp distress RA oxygen   Abd- bs x 4      + GJ tube   + left chest  Spit bag.   + PICC             Assessment and Plan    S/p Esophageal Perf with esophagectomy   IV zosyn until 9/27/23 and levaquin until 9/27/23  Fluconazole until 9/27/23  Pleural Effusion s/p VATS  S/p thoracic surgery 9/7  HLD   On statin   Depression   On zoloft. PE/DVT  On eliquis   Anemia. S/p 1 unit PRBC in the hospital   Dysphagia      GJ tube   To clamp tube with meds  Nocturnal feed. Oral Thrush   Nystatin swish and spit since unable to swallow as this would come right out the spit bag  COVID 19  Tested positive 9/25/23  In isolation   Paxlovid and decadron started. Spoke with CT surgeon, appt arranged for virtual appt since patient is covid Positive.        Hakeem Anderson DO, [facility]     Electronically signed by: Christine Keys DO on 9/26/2023

## 2023-09-27 ENCOUNTER — OFFICE VISIT (OUTPATIENT)
Dept: GERIATRIC MEDICINE | Age: 68
End: 2023-09-27

## 2023-09-27 DIAGNOSIS — F32.A DEPRESSION, UNSPECIFIED DEPRESSION TYPE: ICD-10-CM

## 2023-09-27 DIAGNOSIS — J90 PLEURAL EFFUSION: ICD-10-CM

## 2023-09-27 DIAGNOSIS — R13.10 DYSPHAGIA, UNSPECIFIED TYPE: ICD-10-CM

## 2023-09-27 DIAGNOSIS — U07.1 COVID-19: ICD-10-CM

## 2023-09-27 DIAGNOSIS — R53.1 WEAKNESS: Primary | ICD-10-CM

## 2023-09-27 DIAGNOSIS — D64.9 ANEMIA, UNSPECIFIED TYPE: ICD-10-CM

## 2023-09-28 ENCOUNTER — OFFICE VISIT (OUTPATIENT)
Dept: GERIATRIC MEDICINE | Age: 68
End: 2023-09-28

## 2023-09-28 DIAGNOSIS — R53.1 WEAKNESS: Primary | ICD-10-CM

## 2023-09-28 DIAGNOSIS — U07.1 COVID-19: ICD-10-CM

## 2023-09-28 DIAGNOSIS — J90 PLEURAL EFFUSION: ICD-10-CM

## 2023-09-28 DIAGNOSIS — F32.A DEPRESSION, UNSPECIFIED DEPRESSION TYPE: ICD-10-CM

## 2023-09-28 DIAGNOSIS — D64.9 ANEMIA, UNSPECIFIED TYPE: ICD-10-CM

## 2023-09-28 DIAGNOSIS — R13.10 DYSPHAGIA, UNSPECIFIED TYPE: ICD-10-CM

## 2023-09-28 NOTE — PROGRESS NOTES
SNF PROGRESS NOTE      Cc- s/p esophageal perf with esophagectomy. Placement of [address] tube      Patient is a Trina New 78 y.o. male who is being seen at [facility]. Patient had an esophageal perforation with esophagectomy. Noted to have a R pleural effusion R VATS was done. GJ tube was placed. She received 1 unit PRBC. He tested COVID + on Monday 9/25. Patient had virtual appt with CT surgery, plans are to replace current [facility] system. Patient is sitting up in the chair and actually looks the best he's looked in several days. He is on room air. He has improved congestion. No past medical history on file. Patient has no known allergies. VS reviewed    Gen- Alert and oriented x 3, wife at bedside and RN at bedside   Heart- RRR no murmur no LE edema   Lungs- CTA b/l no resp distress RA oxygen   Abd- bs x 4      + GJ tube   + left chest  Spit bag.   + PICC        Assessment and Plan    S/p Esophageal Perf with esophagectomy   IV zosyn until 9/27/23 and levaquin until 9/27/23  Fluconazole until 9/27/23  Pleural Effusion s/p VATS  S/p thoracic surgery 9/7  HLD   On statin   Depression   On zoloft. PE/DVT  On eliquis   Anemia. S/p 1 unit PRBC in the hospital   Dysphagia      GJ tube --> plan to replace system. To clamp tube with meds  Nocturnal feed. COVID 19  Tested positive 9/25/23  In isolation   Paxlovid and decadron started.           Natalee Sharma DO [facility]     Electronically signed by: Amarilis Kumar DO on 9/28/2023

## 2023-09-28 NOTE — PROGRESS NOTES
SNF PROGRESS NOTE      Cc- s/p esophageal perf with esophagectomy. Placement of [address] tube      Patient is a Kandi Gary 78 y.o. male who is being seen at [facility]. Patient had an esophageal perforation with esophagectomy. Noted to have a R pleural effusion R VATS was done. GJ tube was placed. She received 1 unit PRBC. He tested COVID + on Monday 9/25. Patient remains in isolation. He started on decadron and paxlovid. He was sent to the ED due to clogged JG tube. He is standing up and getting ready to go to the bathroom. Virtual appt with CT surgeon. No past medical history on file. Patient has no known allergies. VS reviewed    Gen- Alert and oriented x 3   Heart- RRR no murmur no LE edema   Lungs- CTA b/l no resp distress RA oxygen   Abd- bs x 4      + GJ tube   + left chest  Spit bag.   + PICC          Assessment and Plan    S/p Esophageal Perf with esophagectomy   IV zosyn until 9/27/23 and levaquin until 9/27/23  Fluconazole until 9/27/23  Pleural Effusion s/p VATS  S/p thoracic surgery 9/7  HLD   On statin   Depression   On zoloft. PE/DVT  On eliquis   Anemia. S/p 1 unit PRBC in the hospital   Dysphagia      GJ tube   To clamp tube with meds  Nocturnal feed. COVID 19  Tested positive 9/25/23  In isolation   Paxlovid and decadron started.          Virtual appt today with CT surgery       Marlen Goldman DO, [facility]     Electronically signed by: Nicole Palomino DO on 9/27/2023

## 2023-09-29 ENCOUNTER — OFFICE VISIT (OUTPATIENT)
Dept: GERIATRIC MEDICINE | Age: 68
End: 2023-09-29
Payer: MEDICARE

## 2023-09-29 VITALS — HEIGHT: 73 IN | BODY MASS INDEX: 26.56 KG/M2 | WEIGHT: 200.4 LBS

## 2023-09-29 DIAGNOSIS — J90 PLEURAL EFFUSION: ICD-10-CM

## 2023-09-29 DIAGNOSIS — R53.1 WEAKNESS: Primary | ICD-10-CM

## 2023-09-29 DIAGNOSIS — U07.1 COVID-19: ICD-10-CM

## 2023-09-29 DIAGNOSIS — F32.A DEPRESSION, UNSPECIFIED DEPRESSION TYPE: ICD-10-CM

## 2023-09-29 DIAGNOSIS — R13.10 DYSPHAGIA, UNSPECIFIED TYPE: ICD-10-CM

## 2023-09-29 DIAGNOSIS — D64.9 ANEMIA, UNSPECIFIED TYPE: ICD-10-CM

## 2023-09-29 PROCEDURE — 99308 SBSQ NF CARE LOW MDM 20: CPT | Performed by: INTERNAL MEDICINE

## 2023-09-29 PROCEDURE — 1123F ACP DISCUSS/DSCN MKR DOCD: CPT | Performed by: INTERNAL MEDICINE

## 2023-09-30 ENCOUNTER — HOSPITAL ENCOUNTER (EMERGENCY)
Facility: HOSPITAL | Age: 68
Discharge: OTHER INSTITUTION | End: 2023-09-30
Attending: STUDENT IN AN ORGANIZED HEALTH CARE EDUCATION/TRAINING PROGRAM
Payer: MEDICARE

## 2023-09-30 ENCOUNTER — OFFICE VISIT (OUTPATIENT)
Dept: GERIATRIC MEDICINE | Age: 68
End: 2023-09-30
Payer: MEDICARE

## 2023-09-30 VITALS
TEMPERATURE: 97.7 F | HEART RATE: 46 BPM | HEIGHT: 73 IN | SYSTOLIC BLOOD PRESSURE: 177 MMHG | OXYGEN SATURATION: 96 % | BODY MASS INDEX: 26.51 KG/M2 | RESPIRATION RATE: 18 BRPM | DIASTOLIC BLOOD PRESSURE: 106 MMHG | WEIGHT: 200 LBS

## 2023-09-30 DIAGNOSIS — K94.23 PEG TUBE MALFUNCTION (MULTI): Primary | ICD-10-CM

## 2023-09-30 DIAGNOSIS — R13.10 DYSPHAGIA, UNSPECIFIED TYPE: ICD-10-CM

## 2023-09-30 DIAGNOSIS — R53.1 WEAKNESS: Primary | ICD-10-CM

## 2023-09-30 DIAGNOSIS — F32.A DEPRESSION, UNSPECIFIED DEPRESSION TYPE: ICD-10-CM

## 2023-09-30 DIAGNOSIS — D64.9 ANEMIA, UNSPECIFIED TYPE: ICD-10-CM

## 2023-09-30 DIAGNOSIS — U07.1 COVID-19: ICD-10-CM

## 2023-09-30 DIAGNOSIS — J90 PLEURAL EFFUSION: ICD-10-CM

## 2023-09-30 PROCEDURE — 2500000004 HC RX 250 GENERAL PHARMACY W/ HCPCS (ALT 636 FOR OP/ED): Performed by: STUDENT IN AN ORGANIZED HEALTH CARE EDUCATION/TRAINING PROGRAM

## 2023-09-30 PROCEDURE — 99283 EMERGENCY DEPT VISIT LOW MDM: CPT | Performed by: STUDENT IN AN ORGANIZED HEALTH CARE EDUCATION/TRAINING PROGRAM

## 2023-09-30 PROCEDURE — 99309 SBSQ NF CARE MODERATE MDM 30: CPT | Performed by: INTERNAL MEDICINE

## 2023-09-30 PROCEDURE — 1123F ACP DISCUSS/DSCN MKR DOCD: CPT | Performed by: INTERNAL MEDICINE

## 2023-09-30 RX ORDER — SODIUM BICARBONATE 650 MG/1
650 TABLET ORAL ONCE
Status: COMPLETED | OUTPATIENT
Start: 2023-09-30 | End: 2023-09-30

## 2023-09-30 RX ADMIN — SODIUM BICARBONATE 650 MG: 650 TABLET ORAL at 13:57

## 2023-09-30 RX ADMIN — PANCRELIPASE 1 CAPSULE: 60000; 12000; 38000 CAPSULE, DELAYED RELEASE PELLETS ORAL at 12:20

## 2023-09-30 ASSESSMENT — PAIN - FUNCTIONAL ASSESSMENT: PAIN_FUNCTIONAL_ASSESSMENT: 0-10

## 2023-09-30 ASSESSMENT — PAIN SCALES - GENERAL
PAINLEVEL_OUTOF10: 0 - NO PAIN
PAINLEVEL_OUTOF10: 0 - NO PAIN

## 2023-09-30 ASSESSMENT — COLUMBIA-SUICIDE SEVERITY RATING SCALE - C-SSRS
6. HAVE YOU EVER DONE ANYTHING, STARTED TO DO ANYTHING, OR PREPARED TO DO ANYTHING TO END YOUR LIFE?: NO
1. IN THE PAST MONTH, HAVE YOU WISHED YOU WERE DEAD OR WISHED YOU COULD GO TO SLEEP AND NOT WAKE UP?: NO
2. HAVE YOU ACTUALLY HAD ANY THOUGHTS OF KILLING YOURSELF?: NO

## 2023-09-30 NOTE — ED PROVIDER NOTES
HPI   Chief Complaint   Patient presents with    CLOGGED PEG TUBE       Patient is a 67-year-old male presenting to the emergency department complaints of PEG tube problems.  Patient states that he is supposed to have his PEG tube removed and replaced this coming up week at [facility].  Patient states that he is having difficulty infusing his medications and his food into it.  Patient states that he was here last week for the same issue and the PEG tube was unclogged.  Patient states that he has no other pain or complaints and if not for this he would not be in the emergency department today.  Patient denies any fevers, chills, chest pain, difficulty breathing.    Review of Systems:   Gen.:  (-) fatigue, (-) fever, (-) chills  Eyes: (-) vision changes, (-) double vision,  ENT: (-) sore throat,  (-) nasal congestion, (-) ear pain  Cardiac: (-) chest pain, (-) palpitations,  Pulmonary: (-) shortness of breath, (-) cough  Neuro: (-) headache, (-) confusion  GI: (-) abdominal pain, (-) nausea, (-) vomiting, (-) diarrhea, (-) constipation  : (-) discharge, (-) dysuria, (-) frequency  Musculoskeletal: (-) extremity pain, (-) back pain, (-) swelling  Skin: (-) rashes  Review of systems is otherwise negative unless stated above or in history of present illness.                          No data recorded                Patient History   Past Medical History:   Diagnosis Date    Contusion of abdominal wall, initial encounter 01/12/2021    Contusion, flank    Diaphragmatic hernia without obstruction or gangrene 11/05/2013    Hiatal hernia    Hemoptysis 05/12/2020    Cough with hemoptysis    Melena 06/19/2019    Black stool    Pain in left knee 01/20/2017    Acute pain of left knee    Personal history of diseases of the blood and blood-forming organs and certain disorders involving the immune mechanism     History of bleeding disorder    Personal history of diseases of the blood and blood-forming organs and certain disorders  involving the immune mechanism     History of anemia    Personal history of other diseases of the digestive system     History of gastroesophageal reflux (GERD)    Personal history of other diseases of the digestive system 2017    History of gastroenteritis    Personal history of other diseases of the nervous system and sense organs     History of sleep apnea    Personal history of other diseases of the respiratory system 2019    History of bronchitis    Personal history of other endocrine, nutritional and metabolic disease     History of diabetes mellitus    Personal history of other infectious and parasitic diseases 2017    History of herpes labialis    Personal history of other specified conditions     History of snoring    Personal history of other specified conditions 2019    History of fatigue    Personal history of other specified conditions 2017    History of abdominal pain    Personal history of other specified conditions 2022    History of dysphagia    Personal history of pneumonia (recurrent) 2020    History of community acquired pneumonia    Unspecified asthma, uncomplicated 2017    AB (asthmatic bronchitis)    Unspecified asthma, uncomplicated 2020    AB (asthmatic bronchitis)     Past Surgical History:   Procedure Laterality Date    IR CVC PICC  2023    IR CVC PICC 20[place] [facility]    OTHER SURGICAL HISTORY  2019    Giovanna filter placement    OTHER SURGICAL HISTORY  2019    Hernia repair     Family History   Problem Relation Name Age of Onset    Diabetes Mother      Hypertension Mother      Diabetes type I Father       Social History     Tobacco Use    Smoking status: Former     Types: Cigarettes, Cigars     Start date: 1989     Quit date: 2023     Years since quittin.1    Smokeless tobacco: Never   Substance Use Topics    Alcohol use: Not Currently    Drug use: Never       Physical Exam   ED Triage  Vitals [09/30/23 1126]   Temp Heart Rate Resp BP   36.5 °C (97.7 °F) 50 18 139/79      SpO2 Temp Source Heart Rate Source Patient Position   95 % Temporal Monitor Sitting      BP Location FiO2 (%)     Left arm --       Physical Exam  Vitals and nursing note reviewed.   Constitutional:       General: He is not in acute distress.     Appearance: Normal appearance. He is not ill-appearing, toxic-appearing or diaphoretic.   HENT:      Head: Normocephalic and atraumatic.      Nose: Nose normal.      Mouth/Throat:      Mouth: Mucous membranes are dry.      Pharynx: No oropharyngeal exudate or posterior oropharyngeal erythema.   Eyes:      General: No scleral icterus.     Extraocular Movements: Extraocular movements intact.      Pupils: Pupils are equal, round, and reactive to light.   Cardiovascular:      Rate and Rhythm: Normal rate and regular rhythm.      Pulses: Normal pulses.      Heart sounds: Normal heart sounds. No murmur heard.     No friction rub. No gallop.   Pulmonary:      Effort: Pulmonary effort is normal. No respiratory distress.      Breath sounds: Normal breath sounds. No stridor. No wheezing, rhonchi or rales.   Chest:      Chest wall: No tenderness.      Comments: Patient has drainage back to his left upper chest.  Abdominal:      General: Abdomen is flat. There is no distension.      Palpations: Abdomen is soft. There is no mass.      Tenderness: There is no abdominal tenderness. There is no guarding.      Hernia: No hernia is present.      Comments: PEG tube present in the left upper abdominal quadrant without any apparent signs of infection.  It does appear to be draining gastric contents into an attached Maldonado bag.   Musculoskeletal:         General: No swelling, tenderness, deformity or signs of injury. Normal range of motion.      Cervical back: Normal range of motion and neck supple. No rigidity.   Skin:     General: Skin is warm and dry.      Capillary Refill: Capillary refill takes less than 2  seconds.      Coloration: Skin is not jaundiced or pale.      Findings: No bruising, erythema, lesion or rash.      Comments: PICC line right upper extremity without apparent signs of infection.   Neurological:      General: No focal deficit present.      Mental Status: He is alert and oriented to person, place, and time. Mental status is at baseline.   Psychiatric:         Mood and Affect: Mood normal.         Behavior: Behavior normal.         ED Course & MDM   ED Course as of 09/30/23 2029   Sat Sep 30, 2023   1605 Nursing attempted to clear the PEG tube using pancreatic enzymes.  This was unsuccessful.  I attempted to clear the PEG tube utilizing soda and was also unsuccessful with bulging of the purple port.  Continued attempts were discontinued.  I reviewed the patient's medical records and shows that he initially was a difficult placement as this was placed by cardiothoracic surgery.  Call was placed to Dr. Christin Corbin to discuss options at this point. [NW]      ED Course User Index  [NW] Melvin Johnson, DO         Diagnoses as of 09/30/23 2029   PEG tube malfunction (CMS/[facility])       Medical Decision Making  Patient is an overall well-appearing 67-year-old male presenting to the emergency department complaints of difficulty infusing medications and feeding and through his PEG tube.  It does appear to be draining gastric contents however patient states that the input tube is clogged.  We will attempt to unclog with mechanical pulsation/pancreatic enzyme/[employer] and reevaluate. Nursing staff myself were unsuccessful with unclogging.  I contacted the patient's CT surgeon Dr. Corbin and the case was discussed.  He recommended an attempt with a soft-tipped guidewire to clean out the purple jejunal port and if that was unsuccessful the patient could use the central gastric port for meds and feeds and that he would arrange to have the patient's PEG tube evaluated and changed next week.  I was able to use a soft  arterial guidewire to clean the purple jejunal port and the patient was given the instructions by his cardiothoracic surgeon.  Patient was discharged back to his facility without incident    Amount and/or Complexity of Data Reviewed  External Data Reviewed: notes.     Details: Outpatient CT surgery note dated 9/28/2023  Discussion of management or test interpretation with external provider(s): CT surgeon Dr. Corbin        Procedure  Procedures     Melvin Johnson,   09/30/23 2039

## 2023-10-01 ENCOUNTER — OFFICE VISIT (OUTPATIENT)
Dept: GERIATRIC MEDICINE | Age: 68
End: 2023-10-01
Payer: MEDICARE

## 2023-10-01 DIAGNOSIS — R13.10 DYSPHAGIA, UNSPECIFIED TYPE: ICD-10-CM

## 2023-10-01 DIAGNOSIS — R53.1 WEAKNESS: Primary | ICD-10-CM

## 2023-10-01 DIAGNOSIS — I26.99 OTHER PULMONARY EMBOLISM WITHOUT ACUTE COR PULMONALE, UNSPECIFIED CHRONICITY (HCC): ICD-10-CM

## 2023-10-01 DIAGNOSIS — F32.A DEPRESSION, UNSPECIFIED DEPRESSION TYPE: ICD-10-CM

## 2023-10-01 DIAGNOSIS — D64.9 ANEMIA, UNSPECIFIED TYPE: ICD-10-CM

## 2023-10-01 DIAGNOSIS — U07.1 COVID-19: ICD-10-CM

## 2023-10-01 DIAGNOSIS — J90 PLEURAL EFFUSION: ICD-10-CM

## 2023-10-01 PROCEDURE — 99308 SBSQ NF CARE LOW MDM 20: CPT | Performed by: INTERNAL MEDICINE

## 2023-10-01 PROCEDURE — 1123F ACP DISCUSS/DSCN MKR DOCD: CPT | Performed by: INTERNAL MEDICINE

## 2023-10-02 ENCOUNTER — OFFICE VISIT (OUTPATIENT)
Dept: GERIATRIC MEDICINE | Age: 68
End: 2023-10-02
Payer: MEDICARE

## 2023-10-02 DIAGNOSIS — R53.1 WEAKNESS: Primary | ICD-10-CM

## 2023-10-02 DIAGNOSIS — I26.99 OTHER PULMONARY EMBOLISM WITHOUT ACUTE COR PULMONALE, UNSPECIFIED CHRONICITY (HCC): ICD-10-CM

## 2023-10-02 DIAGNOSIS — J90 PLEURAL EFFUSION: ICD-10-CM

## 2023-10-02 DIAGNOSIS — F32.A DEPRESSION, UNSPECIFIED DEPRESSION TYPE: ICD-10-CM

## 2023-10-02 DIAGNOSIS — U07.1 COVID-19: ICD-10-CM

## 2023-10-02 DIAGNOSIS — D64.9 ANEMIA, UNSPECIFIED TYPE: ICD-10-CM

## 2023-10-02 DIAGNOSIS — R13.10 DYSPHAGIA, UNSPECIFIED TYPE: ICD-10-CM

## 2023-10-02 PROCEDURE — 1123F ACP DISCUSS/DSCN MKR DOCD: CPT | Performed by: INTERNAL MEDICINE

## 2023-10-02 PROCEDURE — 99308 SBSQ NF CARE LOW MDM 20: CPT | Performed by: INTERNAL MEDICINE

## 2023-10-03 ENCOUNTER — HOSPITAL ENCOUNTER (OUTPATIENT)
Facility: HOSPITAL | Age: 68
Setting detail: OBSERVATION
LOS: 1 days | Discharge: NURSING, REHAB OR LONG-TERM CARE | End: 2023-10-04
Attending: EMERGENCY MEDICINE | Admitting: STUDENT IN AN ORGANIZED HEALTH CARE EDUCATION/TRAINING PROGRAM
Payer: MEDICARE

## 2023-10-03 ENCOUNTER — PREP FOR PROCEDURE (OUTPATIENT)
Dept: NEUROSURGERY | Facility: HOSPITAL | Age: 68
End: 2023-10-03
Payer: MEDICARE

## 2023-10-03 DIAGNOSIS — E44.0 MALNUTRITION OF MODERATE DEGREE (MULTI): ICD-10-CM

## 2023-10-03 DIAGNOSIS — K94.13 MALFUNCTION OF JEJUNOSTOMY TUBE (MULTI): ICD-10-CM

## 2023-10-03 DIAGNOSIS — T85.598A: Primary | ICD-10-CM

## 2023-10-03 PROCEDURE — 99284 EMERGENCY DEPT VISIT MOD MDM: CPT | Performed by: EMERGENCY MEDICINE

## 2023-10-03 PROCEDURE — 85007 BL SMEAR W/DIFF WBC COUNT: CPT | Performed by: EMERGENCY MEDICINE

## 2023-10-03 PROCEDURE — 80048 BASIC METABOLIC PNL TOTAL CA: CPT | Performed by: EMERGENCY MEDICINE

## 2023-10-03 PROCEDURE — 85027 COMPLETE CBC AUTOMATED: CPT | Performed by: EMERGENCY MEDICINE

## 2023-10-03 PROCEDURE — 99285 EMERGENCY DEPT VISIT HI MDM: CPT | Performed by: NURSE PRACTITIONER

## 2023-10-03 PROCEDURE — 1200000002 HC GENERAL ROOM WITH TELEMETRY DAILY

## 2023-10-03 RX ORDER — DEXTROSE MONOHYDRATE 100 MG/ML
0.3 INJECTION, SOLUTION INTRAVENOUS ONCE AS NEEDED
Status: DISCONTINUED | OUTPATIENT
Start: 2023-10-03 | End: 2023-10-04 | Stop reason: HOSPADM

## 2023-10-03 RX ORDER — HEPARIN SODIUM 5000 [USP'U]/ML
5000 INJECTION, SOLUTION INTRAVENOUS; SUBCUTANEOUS EVERY 8 HOURS SCHEDULED
Status: DISCONTINUED | OUTPATIENT
Start: 2023-10-03 | End: 2023-10-04 | Stop reason: HOSPADM

## 2023-10-03 RX ORDER — DEXTROSE 50 % IN WATER (D50W) INTRAVENOUS SYRINGE
25
Status: DISCONTINUED | OUTPATIENT
Start: 2023-10-03 | End: 2023-10-04 | Stop reason: HOSPADM

## 2023-10-03 RX ORDER — INSULIN LISPRO 100 [IU]/ML
0-10 INJECTION, SOLUTION INTRAVENOUS; SUBCUTANEOUS EVERY 6 HOURS
Status: DISCONTINUED | OUTPATIENT
Start: 2023-10-03 | End: 2023-10-04 | Stop reason: HOSPADM

## 2023-10-03 RX ORDER — SODIUM CHLORIDE, SODIUM LACTATE, POTASSIUM CHLORIDE, CALCIUM CHLORIDE 600; 310; 30; 20 MG/100ML; MG/100ML; MG/100ML; MG/100ML
100 INJECTION, SOLUTION INTRAVENOUS CONTINUOUS
Status: DISCONTINUED | OUTPATIENT
Start: 2023-10-03 | End: 2023-10-04 | Stop reason: HOSPADM

## 2023-10-03 ASSESSMENT — COLUMBIA-SUICIDE SEVERITY RATING SCALE - C-SSRS
2. HAVE YOU ACTUALLY HAD ANY THOUGHTS OF KILLING YOURSELF?: NO
6. HAVE YOU EVER DONE ANYTHING, STARTED TO DO ANYTHING, OR PREPARED TO DO ANYTHING TO END YOUR LIFE?: NO
1. IN THE PAST MONTH, HAVE YOU WISHED YOU WERE DEAD OR WISHED YOU COULD GO TO SLEEP AND NOT WAKE UP?: NO

## 2023-10-04 ENCOUNTER — DOCUMENTATION (OUTPATIENT)
Dept: CASE MANAGEMENT | Facility: HOSPITAL | Age: 68
End: 2023-10-04

## 2023-10-04 ENCOUNTER — ANESTHESIA EVENT (OUTPATIENT)
Dept: OPERATING ROOM | Facility: HOSPITAL | Age: 68
End: 2023-10-04
Payer: MEDICARE

## 2023-10-04 ENCOUNTER — APPOINTMENT (OUTPATIENT)
Dept: PULMONOLOGY | Facility: CLINIC | Age: 68
End: 2023-10-04
Payer: MEDICARE

## 2023-10-04 ENCOUNTER — APPOINTMENT (OUTPATIENT)
Dept: RADIOLOGY | Facility: HOSPITAL | Age: 68
End: 2023-10-04
Payer: MEDICARE

## 2023-10-04 ENCOUNTER — ANESTHESIA (OUTPATIENT)
Dept: OPERATING ROOM | Facility: HOSPITAL | Age: 68
End: 2023-10-04
Payer: MEDICARE

## 2023-10-04 VITALS
RESPIRATION RATE: 18 BRPM | DIASTOLIC BLOOD PRESSURE: 74 MMHG | TEMPERATURE: 96.8 F | WEIGHT: 220.02 LBS | SYSTOLIC BLOOD PRESSURE: 129 MMHG | HEIGHT: 73 IN | OXYGEN SATURATION: 100 % | BODY MASS INDEX: 29.16 KG/M2 | HEART RATE: 55 BPM

## 2023-10-04 LAB
ANION GAP SERPL CALC-SCNC: 18 MMOL/L (ref 10–20)
ANION GAP SERPL CALC-SCNC: 19 MMOL/L (ref 10–20)
BASOPHILS # BLD MANUAL: 0 X10*3/UL (ref 0–0.1)
BASOPHILS NFR BLD MANUAL: 0 %
BUN SERPL-MCNC: 39 MG/DL (ref 6–23)
BUN SERPL-MCNC: 40 MG/DL (ref 6–23)
CALCIUM SERPL-MCNC: 9.2 MG/DL (ref 8.6–10.6)
CALCIUM SERPL-MCNC: 9.5 MG/DL (ref 8.6–10.6)
CHLORIDE SERPL-SCNC: 102 MMOL/L (ref 98–107)
CHLORIDE SERPL-SCNC: 103 MMOL/L (ref 98–107)
CO2 SERPL-SCNC: 20 MMOL/L (ref 21–32)
CO2 SERPL-SCNC: 20 MMOL/L (ref 21–32)
CREAT SERPL-MCNC: 1.25 MG/DL (ref 0.5–1.3)
CREAT SERPL-MCNC: 1.32 MG/DL (ref 0.5–1.3)
EOSINOPHIL # BLD MANUAL: 0.31 X10*3/UL (ref 0–0.7)
EOSINOPHIL NFR BLD MANUAL: 2.7 %
ERYTHROCYTE [DISTWIDTH] IN BLOOD BY AUTOMATED COUNT: 16.9 % (ref 11.5–14.5)
GFR SERPL CREATININE-BSD FRML MDRD: 59 ML/MIN/1.73M*2
GFR SERPL CREATININE-BSD FRML MDRD: 64 ML/MIN/1.73M*2
GLUCOSE BLD MANUAL STRIP-MCNC: 106 MG/DL (ref 74–99)
GLUCOSE BLD MANUAL STRIP-MCNC: 125 MG/DL (ref 74–99)
GLUCOSE SERPL-MCNC: 104 MG/DL (ref 74–99)
GLUCOSE SERPL-MCNC: 92 MG/DL (ref 74–99)
HCT VFR BLD AUTO: 35.7 % (ref 41–52)
HGB BLD-MCNC: 12.1 G/DL (ref 13.5–17.5)
IMM GRANULOCYTES # BLD AUTO: 1.23 X10*3/UL (ref 0–0.7)
IMM GRANULOCYTES NFR BLD AUTO: 10.9 % (ref 0–0.9)
LYMPHOCYTES # BLD MANUAL: 2.4 X10*3/UL (ref 1.2–4.8)
LYMPHOCYTES NFR BLD MANUAL: 21.2 %
MAGNESIUM SERPL-MCNC: 2.07 MG/DL (ref 1.6–2.4)
MCH RBC QN AUTO: 33.3 PG (ref 26–34)
MCHC RBC AUTO-ENTMCNC: 33.9 G/DL (ref 32–36)
MCV RBC AUTO: 98 FL (ref 80–100)
METAMYELOCYTES # BLD MANUAL: 0.1 X10*3/UL
METAMYELOCYTES NFR BLD MANUAL: 0.9 %
MONOCYTES # BLD MANUAL: 0.4 X10*3/UL (ref 0.1–1)
MONOCYTES NFR BLD MANUAL: 3.5 %
MYELOCYTES # BLD MANUAL: 0.6 X10*3/UL
MYELOCYTES NFR BLD MANUAL: 5.3 %
NEUTS SEG # BLD MANUAL: 7.5 X10*3/UL (ref 1.2–7)
NEUTS SEG NFR BLD MANUAL: 66.4 %
NRBC BLD-RTO: 0.2 /100 WBCS (ref 0–0)
PLATELET # BLD AUTO: 428 X10*3/UL (ref 150–450)
PMV BLD AUTO: 9.3 FL (ref 7.5–11.5)
POLYCHROMASIA BLD QL SMEAR: ABNORMAL
POTASSIUM SERPL-SCNC: 4.4 MMOL/L (ref 3.5–5.3)
POTASSIUM SERPL-SCNC: 4.5 MMOL/L (ref 3.5–5.3)
RBC # BLD AUTO: 3.63 X10*6/UL (ref 4.5–5.9)
RBC MORPH BLD: ABNORMAL
SODIUM SERPL-SCNC: 136 MMOL/L (ref 136–145)
SODIUM SERPL-SCNC: 137 MMOL/L (ref 136–145)
TOTAL CELLS COUNTED BLD: 113
WBC # BLD AUTO: 11.3 X10*3/UL (ref 4.4–11.3)

## 2023-10-04 PROCEDURE — 80048 BASIC METABOLIC PNL TOTAL CA: CPT | Performed by: STUDENT IN AN ORGANIZED HEALTH CARE EDUCATION/TRAINING PROGRAM

## 2023-10-04 PROCEDURE — 82947 ASSAY GLUCOSE BLOOD QUANT: CPT | Mod: 59

## 2023-10-04 PROCEDURE — 83735 ASSAY OF MAGNESIUM: CPT | Performed by: STUDENT IN AN ORGANIZED HEALTH CARE EDUCATION/TRAINING PROGRAM

## 2023-10-04 PROCEDURE — G0378 HOSPITAL OBSERVATION PER HR: HCPCS

## 2023-10-04 PROCEDURE — A43246 PR EDG PERCUTANEOUS PLACEMENT GASTROSTOMY TUBE: Performed by: ANESTHESIOLOGY

## 2023-10-04 PROCEDURE — 44372 SMALL BOWEL ENDOSCOPY: CPT | Performed by: STUDENT IN AN ORGANIZED HEALTH CARE EDUCATION/TRAINING PROGRAM

## 2023-10-04 PROCEDURE — C1892 INTRO/SHEATH,FIXED,PEEL-AWAY: HCPCS | Performed by: STUDENT IN AN ORGANIZED HEALTH CARE EDUCATION/TRAINING PROGRAM

## 2023-10-04 PROCEDURE — 2500000004 HC RX 250 GENERAL PHARMACY W/ HCPCS (ALT 636 FOR OP/ED): Performed by: ANESTHESIOLOGY

## 2023-10-04 PROCEDURE — P9045 ALBUMIN (HUMAN), 5%, 250 ML: HCPCS | Mod: JZ,JG

## 2023-10-04 PROCEDURE — 2500000005 HC RX 250 GENERAL PHARMACY W/O HCPCS

## 2023-10-04 PROCEDURE — 74018 RADEX ABDOMEN 1 VIEW: CPT | Performed by: RADIOLOGY

## 2023-10-04 PROCEDURE — 2580000001 HC RX 258 IV SOLUTIONS: Performed by: STUDENT IN AN ORGANIZED HEALTH CARE EDUCATION/TRAINING PROGRAM

## 2023-10-04 PROCEDURE — 7100000001 HC RECOVERY ROOM TIME - INITIAL BASE CHARGE: Performed by: STUDENT IN AN ORGANIZED HEALTH CARE EDUCATION/TRAINING PROGRAM

## 2023-10-04 PROCEDURE — 2500000004 HC RX 250 GENERAL PHARMACY W/ HCPCS (ALT 636 FOR OP/ED)

## 2023-10-04 PROCEDURE — 3700000001 HC GENERAL ANESTHESIA TIME - INITIAL BASE CHARGE: Performed by: STUDENT IN AN ORGANIZED HEALTH CARE EDUCATION/TRAINING PROGRAM

## 2023-10-04 PROCEDURE — 3700000002 HC GENERAL ANESTHESIA TIME - EACH INCREMENTAL 1 MINUTE: Performed by: STUDENT IN AN ORGANIZED HEALTH CARE EDUCATION/TRAINING PROGRAM

## 2023-10-04 PROCEDURE — 82947 ASSAY GLUCOSE BLOOD QUANT: CPT

## 2023-10-04 PROCEDURE — 2500000004 HC RX 250 GENERAL PHARMACY W/ HCPCS (ALT 636 FOR OP/ED): Performed by: NURSE ANESTHETIST, CERTIFIED REGISTERED

## 2023-10-04 PROCEDURE — 2500000005 HC RX 250 GENERAL PHARMACY W/O HCPCS: Performed by: NURSE ANESTHETIST, CERTIFIED REGISTERED

## 2023-10-04 PROCEDURE — 49440 PLACE GASTROSTOMY TUBE PERC: CPT | Performed by: STUDENT IN AN ORGANIZED HEALTH CARE EDUCATION/TRAINING PROGRAM

## 2023-10-04 PROCEDURE — 2500000004 HC RX 250 GENERAL PHARMACY W/ HCPCS (ALT 636 FOR OP/ED): Mod: JZ,JG

## 2023-10-04 PROCEDURE — 2720000007 HC OR 272 NO HCPCS: Performed by: STUDENT IN AN ORGANIZED HEALTH CARE EDUCATION/TRAINING PROGRAM

## 2023-10-04 PROCEDURE — 2500000004 HC RX 250 GENERAL PHARMACY W/ HCPCS (ALT 636 FOR OP/ED): Performed by: STUDENT IN AN ORGANIZED HEALTH CARE EDUCATION/TRAINING PROGRAM

## 2023-10-04 PROCEDURE — 7100000009 HC PHASE TWO TIME - INITIAL BASE CHARGE: Performed by: STUDENT IN AN ORGANIZED HEALTH CARE EDUCATION/TRAINING PROGRAM

## 2023-10-04 PROCEDURE — 85610 PROTHROMBIN TIME: CPT | Performed by: STUDENT IN AN ORGANIZED HEALTH CARE EDUCATION/TRAINING PROGRAM

## 2023-10-04 PROCEDURE — 7100000010 HC PHASE TWO TIME - EACH INCREMENTAL 1 MINUTE: Performed by: STUDENT IN AN ORGANIZED HEALTH CARE EDUCATION/TRAINING PROGRAM

## 2023-10-04 PROCEDURE — 7100000002 HC RECOVERY ROOM TIME - EACH INCREMENTAL 1 MINUTE: Performed by: STUDENT IN AN ORGANIZED HEALTH CARE EDUCATION/TRAINING PROGRAM

## 2023-10-04 PROCEDURE — 3600000009 HC OR TIME - EACH INCREMENTAL 1 MINUTE - PROCEDURE LEVEL FOUR: Performed by: STUDENT IN AN ORGANIZED HEALTH CARE EDUCATION/TRAINING PROGRAM

## 2023-10-04 PROCEDURE — 74018 RADEX ABDOMEN 1 VIEW: CPT

## 2023-10-04 PROCEDURE — 3600000004 HC OR TIME - INITIAL BASE CHARGE - PROCEDURE LEVEL FOUR: Performed by: STUDENT IN AN ORGANIZED HEALTH CARE EDUCATION/TRAINING PROGRAM

## 2023-10-04 RX ORDER — OXYCODONE HYDROCHLORIDE 5 MG/1
5 TABLET ORAL EVERY 4 HOURS PRN
Status: DISCONTINUED | OUTPATIENT
Start: 2023-10-04 | End: 2023-10-04 | Stop reason: HOSPADM

## 2023-10-04 RX ORDER — ONDANSETRON HYDROCHLORIDE 2 MG/ML
4 INJECTION, SOLUTION INTRAVENOUS ONCE AS NEEDED
Status: DISCONTINUED | OUTPATIENT
Start: 2023-10-04 | End: 2023-10-04 | Stop reason: HOSPADM

## 2023-10-04 RX ORDER — LIDOCAINE HYDROCHLORIDE 10 MG/ML
0.1 INJECTION, SOLUTION EPIDURAL; INFILTRATION; INTRACAUDAL; PERINEURAL ONCE
Status: DISCONTINUED | OUTPATIENT
Start: 2023-10-04 | End: 2023-10-04 | Stop reason: HOSPADM

## 2023-10-04 RX ORDER — SUCCINYLCHOLINE CHLORIDE 20 MG/ML
INJECTION INTRAMUSCULAR; INTRAVENOUS AS NEEDED
Status: DISCONTINUED | OUTPATIENT
Start: 2023-10-04 | End: 2023-10-04

## 2023-10-04 RX ORDER — LABETALOL HYDROCHLORIDE 5 MG/ML
5 INJECTION, SOLUTION INTRAVENOUS ONCE AS NEEDED
Status: DISCONTINUED | OUTPATIENT
Start: 2023-10-04 | End: 2023-10-04 | Stop reason: HOSPADM

## 2023-10-04 RX ORDER — PROPOFOL 10 MG/ML
INJECTION, EMULSION INTRAVENOUS AS NEEDED
Status: DISCONTINUED | OUTPATIENT
Start: 2023-10-04 | End: 2023-10-04

## 2023-10-04 RX ORDER — SODIUM CHLORIDE, SODIUM LACTATE, POTASSIUM CHLORIDE, CALCIUM CHLORIDE 600; 310; 30; 20 MG/100ML; MG/100ML; MG/100ML; MG/100ML
100 INJECTION, SOLUTION INTRAVENOUS CONTINUOUS
Status: DISCONTINUED | OUTPATIENT
Start: 2023-10-04 | End: 2023-10-04 | Stop reason: HOSPADM

## 2023-10-04 RX ORDER — DIPHENHYDRAMINE HYDROCHLORIDE 50 MG/ML
25 INJECTION INTRAMUSCULAR; INTRAVENOUS ONCE AS NEEDED
Status: DISCONTINUED | OUTPATIENT
Start: 2023-10-04 | End: 2023-10-04 | Stop reason: HOSPADM

## 2023-10-04 RX ORDER — HEPARIN SODIUM 5000 [USP'U]/ML
INJECTION, SOLUTION INTRAVENOUS; SUBCUTANEOUS
Status: COMPLETED
Start: 2023-10-04 | End: 2023-10-04

## 2023-10-04 RX ORDER — PROPOFOL 10 MG/ML
INJECTION, EMULSION INTRAVENOUS
Status: DISCONTINUED
Start: 2023-10-04 | End: 2023-10-04 | Stop reason: HOSPADM

## 2023-10-04 RX ORDER — ALBUMIN HUMAN 50 G/1000ML
SOLUTION INTRAVENOUS AS NEEDED
Status: DISCONTINUED | OUTPATIENT
Start: 2023-10-04 | End: 2023-10-04

## 2023-10-04 RX ORDER — ALBUTEROL SULFATE 0.83 MG/ML
2.5 SOLUTION RESPIRATORY (INHALATION) ONCE AS NEEDED
Status: DISCONTINUED | OUTPATIENT
Start: 2023-10-04 | End: 2023-10-04 | Stop reason: HOSPADM

## 2023-10-04 RX ORDER — ETOMIDATE 2 MG/ML
INJECTION INTRAVENOUS AS NEEDED
Status: DISCONTINUED | OUTPATIENT
Start: 2023-10-04 | End: 2023-10-04

## 2023-10-04 RX ORDER — BUPIVACAINE HYDROCHLORIDE 2.5 MG/ML
INJECTION, SOLUTION INFILTRATION; PERINEURAL AS NEEDED
Status: DISCONTINUED | OUTPATIENT
Start: 2023-10-04 | End: 2023-10-04 | Stop reason: HOSPADM

## 2023-10-04 RX ADMIN — PROPOFOL 20 MG: 10 INJECTION, EMULSION INTRAVENOUS at 15:22

## 2023-10-04 RX ADMIN — HYDROMORPHONE HYDROCHLORIDE 0.5 MG: 2 INJECTION, SOLUTION INTRAMUSCULAR; INTRAVENOUS; SUBCUTANEOUS at 16:06

## 2023-10-04 RX ADMIN — HEPARIN SODIUM 5000 UNITS: 5000 INJECTION, SOLUTION INTRAVENOUS; SUBCUTANEOUS at 00:42

## 2023-10-04 RX ADMIN — HEPARIN SODIUM 5000 UNITS: 5000 INJECTION INTRAVENOUS; SUBCUTANEOUS at 00:42

## 2023-10-04 RX ADMIN — HEPARIN SODIUM 5000 UNITS: 5000 INJECTION, SOLUTION INTRAVENOUS; SUBCUTANEOUS at 08:00

## 2023-10-04 RX ADMIN — ALBUMIN (HUMAN) 250 ML: 12.5 SOLUTION INTRAVENOUS at 14:56

## 2023-10-04 RX ADMIN — HYDROMORPHONE HYDROCHLORIDE 0.5 MG: 2 INJECTION, SOLUTION INTRAMUSCULAR; INTRAVENOUS; SUBCUTANEOUS at 16:11

## 2023-10-04 RX ADMIN — HYDROMORPHONE HYDROCHLORIDE 0.5 MG: 2 INJECTION, SOLUTION INTRAMUSCULAR; INTRAVENOUS; SUBCUTANEOUS at 16:19

## 2023-10-04 RX ADMIN — SODIUM CHLORIDE, POTASSIUM CHLORIDE, SODIUM LACTATE AND CALCIUM CHLORIDE: 600; 310; 30; 20 INJECTION, SOLUTION INTRAVENOUS at 15:01

## 2023-10-04 RX ADMIN — SODIUM CHLORIDE, POTASSIUM CHLORIDE, SODIUM LACTATE AND CALCIUM CHLORIDE 100 ML/HR: 600; 310; 30; 20 INJECTION, SOLUTION INTRAVENOUS at 11:33

## 2023-10-04 RX ADMIN — SODIUM CHLORIDE, POTASSIUM CHLORIDE, SODIUM LACTATE AND CALCIUM CHLORIDE 100 ML/HR: 600; 310; 30; 20 INJECTION, SOLUTION INTRAVENOUS at 00:41

## 2023-10-04 RX ADMIN — HYDROMORPHONE HYDROCHLORIDE 0.5 MG: 2 INJECTION, SOLUTION INTRAMUSCULAR; INTRAVENOUS; SUBCUTANEOUS at 16:31

## 2023-10-04 RX ADMIN — ETOMIDATE 14 MG: 2 INJECTION INTRAVENOUS at 15:08

## 2023-10-04 RX ADMIN — SUCCINYLCHOLINE CHLORIDE 160 MG: 20 INJECTION, SOLUTION INTRAMUSCULAR; INTRAVENOUS at 15:08

## 2023-10-04 RX ADMIN — ALBUMIN (HUMAN) 250 ML: 12.5 SOLUTION INTRAVENOUS at 14:58

## 2023-10-04 ASSESSMENT — LIFESTYLE VARIABLES
HAVE YOU EVER FELT YOU SHOULD CUT DOWN ON YOUR DRINKING: NO
EVER FELT BAD OR GUILTY ABOUT YOUR DRINKING: NO
EVER HAD A DRINK FIRST THING IN THE MORNING TO STEADY YOUR NERVES TO GET RID OF A HANGOVER: NO
HAVE PEOPLE ANNOYED YOU BY CRITICIZING YOUR DRINKING: NO

## 2023-10-04 ASSESSMENT — PAIN SCALES - GENERAL
PAINLEVEL_OUTOF10: 5 - MODERATE PAIN
PAINLEVEL_OUTOF10: 5 - MODERATE PAIN
PAINLEVEL_OUTOF10: 0 - NO PAIN
PAINLEVEL_OUTOF10: 0 - NO PAIN
PAINLEVEL_OUTOF10: 8
PAINLEVEL_OUTOF10: 0 - NO PAIN
PAINLEVEL_OUTOF10: 0 - NO PAIN
PAINLEVEL_OUTOF10: 8
PAINLEVEL_OUTOF10: 7

## 2023-10-04 ASSESSMENT — PAIN - FUNCTIONAL ASSESSMENT
PAIN_FUNCTIONAL_ASSESSMENT: 0-10

## 2023-10-04 NOTE — PROGRESS NOTES
SNF PROGRESS NOTE      Cc- s/p esophageal perf with esophagectomy. Placement of [address] tube      Patient is a Katia Boyce 78 y.o. male who is being seen at [facility]. Patient had an esophageal perforation with esophagectomy. Noted to have a R pleural effusion R VATS was done. GJ tube was placed. She received 1 unit PRBC. He tested COVID + on Monday 9/25. Plan for changing with tube 10/9/23    Patient is having urinary symptoms. He is having urinary retention. Nurse attempted to place pires several times last night and he was hitting his own head and slapped her hand away when she was trying to inflate the balloon. No past medical history on file. Patient has no known allergies. VS reviewed      Gen- Alert and oriented x 3  Heart- RRR no murmur no LE edema   Lungs- CTA b/l no resp distress RA oxygen   Abd- bs x 4      + GJ tube   + left chest  Spit bag.   + PICC        Assessment and Plan      S/p Esophageal Perf with esophagectomy   S/p IV zosyn until 9/27/23 and levaquin until 9/27/23  S/p Fluconazole until 9/27/23  Pleural Effusion s/p VATS  S/p thoracic surgery 9/7  HLD   On statin   Depression   On zoloft. PE/DVT  On eliquis   Anemia. S/p 1 unit PRBC in the hospital   Dysphagia      GJ tube --> plan to replace system. To clamp tube with meds  Nocturnal feed.      COVID 19  Tested positive 9/25/23  In isolation   Paxlovid and decadron started    Sherif Ca DO [facility]     Electronically signed by: Krys Rdz DO on 10/2/2023

## 2023-10-04 NOTE — PROGRESS NOTES
SNF PROGRESS NOTE      Cc- s/p esophageal perf with esophagectomy. Placement of [address] tube      Patient is a Kole Bender 78 y.o. male who is being seen at [facility]. Patient had an esophageal perforation with esophagectomy. Noted to have a R pleural effusion R VATS was done. GJ tube was placed. She received 1 unit PRBC. He tested COVID + on Monday 9/25. Plan for changing with tube 10/9/23    No past medical history on file. Patient has no known allergies. VS reviewed      Gen- Alert and oriented x 3  Heart- RRR no murmur no LE edema   Lungs- CTA b/l no resp distress RA oxygen   Abd- bs x 4      + GJ tube   + left chest  Spit bag.   + PICC        Assessment and Plan    S/p Esophageal Perf with esophagectomy   S/p IV zosyn until 9/27/23 and levaquin until 9/27/23  S/p Fluconazole until 9/27/23  Pleural Effusion s/p VATS  S/p thoracic surgery 9/7  HLD   On statin   Depression   On zoloft. PE/DVT  On eliquis   Anemia. S/p 1 unit PRBC in the hospital   Dysphagia      GJ tube --> plan to replace system. To clamp tube with meds  Nocturnal feed.      COVID 19  Tested positive 9/25/23  In isolation   Paxlovid and decadron started      Evan Hernandez DO [facility]     Electronically signed by: Venus Alfaro DO on 9/29/2023

## 2023-10-04 NOTE — ANESTHESIA POSTPROCEDURE EVALUATION
Patient: Levy Gregory    Procedure Summary       Date: 10/04/23 Room / Location: [facility] OR 14 / Virtual [facility] OR    Anesthesia Start: 1500 Anesthesia Stop: 1557    Procedures:       EGD (pediatric scope) with gastrostomy tube with jejunal extension      Insertion Gastrostomy Tube Percutaneous Diagnosis:       Malfunction of jejunostomy tube (CMS/HCC)      (Malfunction of jejunostomy tube (CMS/HCC) [K94.13])    Surgeons: Brielle Gongora DO Responsible Provider: Yonas Bhat MD    Anesthesia Type: general ASA Status: 3            Anesthesia Type: general    Vitals Value Taken Time   /57 10/04/23 1557   Temp 36 10/04/23 1557   Pulse 70 10/04/23 1557   Resp 13 10/04/23 1557   SpO2 100 10/04/23 1557       Anesthesia Post Evaluation    Patient location during evaluation: PACU  Patient participation: complete - patient participated  Level of consciousness: awake  Pain management: adequate  Multimodal analgesia pain management approach  Airway patency: patent  Cardiovascular status: acceptable and hemodynamically stable  Respiratory status: acceptable  Hydration status: euvolemic        No notable events documented.

## 2023-10-04 NOTE — ED PROVIDER NOTES
HPI   Chief Complaint   Patient presents with    G Tube complications     Pt sent from [facility] for G tube complications       Mr. Gregory is a 67 year old male with pmhx significant for esophageal perforation s/p esophagectomy with GJ-tube presenting for GJ-tube complication. Pt states that he has had issues with the J-tube limb constantly getting clogged. He states that 3 days ago a bubble in the tube burst and he is now unable to get food and medicine through the tube. The G-tube limb is still able to drain contents from his stomach. Pt states these complications have prevented him from getting food for almost 3 days. He is able to drink still. Pt states he still produces stool and has not noticed any blood. Pt recently had urinary catheter placed today because his bladder was distended. Pt admits to light-headedness and mild nausea. Pt denies fever, chills, chest pain, SOB, abdominal pain, and pain/redness at GJ-tube insertion site.              ROS: 10-point review of systems was performed and was negative except per HPI.             No data recorded                Patient History   Past Medical History:   Diagnosis Date    Contusion of abdominal wall, initial encounter 01/12/2021    Contusion, flank    Diaphragmatic hernia without obstruction or gangrene 11/05/2013    Hiatal hernia    Hemoptysis 05/12/2020    Cough with hemoptysis    Melena 06/19/2019    Black stool    Pain in left knee 01/20/2017    Acute pain of left knee    Personal history of diseases of the blood and blood-forming organs and certain disorders involving the immune mechanism     History of bleeding disorder    Personal history of diseases of the blood and blood-forming organs and certain disorders involving the immune mechanism     History of anemia    Personal history of other diseases of the digestive system     History of gastroesophageal reflux (GERD)    Personal history of other diseases of the digestive system 06/21/2017     History of gastroenteritis    Personal history of other diseases of the nervous system and sense organs     History of sleep apnea    Personal history of other diseases of the respiratory system 2019    History of bronchitis    Personal history of other endocrine, nutritional and metabolic disease     History of diabetes mellitus    Personal history of other infectious and parasitic diseases 2017    History of herpes labialis    Personal history of other specified conditions     History of snoring    Personal history of other specified conditions 2019    History of fatigue    Personal history of other specified conditions 2017    History of abdominal pain    Personal history of other specified conditions 2022    History of dysphagia    Personal history of pneumonia (recurrent) 2020    History of community acquired pneumonia    Unspecified asthma, uncomplicated 2017    AB (asthmatic bronchitis)    Unspecified asthma, uncomplicated 2020    AB (asthmatic bronchitis)     Past Surgical History:   Procedure Laterality Date    IR CVC PICC  2023    IR CVC PICC 2023 [facility]    OTHER SURGICAL HISTORY  2019    Giovanna filter placement    OTHER SURGICAL HISTORY  2019    Hernia repair     Family History   Problem Relation Name Age of Onset    Diabetes Mother      Hypertension Mother      Diabetes type I Father       Social History     Tobacco Use    Smoking status: Former     Types: Cigarettes, Cigars     Start date: 1989     Quit date: 2023     Years since quittin.1    Smokeless tobacco: Never   Substance Use Topics    Alcohol use: Not Currently    Drug use: Never       Physical Exam   ED Triage Vitals [10/03/23 1932]   Temp Heart Rate Resp BP   37 °C (98.6 °F) 78 20 95/60      SpO2 Temp Source Heart Rate Source Patient Position   100 % Oral -- --      BP Location FiO2 (%)     -- --       Physical Exam  Constitutional:        Appearance: Normal appearance.   HENT:      Head: Normocephalic and atraumatic.      Mouth/Throat:      Pharynx: Oropharynx is clear.   Cardiovascular:      Rate and Rhythm: Normal rate and regular rhythm.      Pulses: Normal pulses.      Heart sounds: Normal heart sounds.   Pulmonary:      Effort: Pulmonary effort is normal.      Breath sounds: Normal breath sounds.   Abdominal:      General: Abdomen is flat.      Palpations: Abdomen is soft.      Tenderness: There is no abdominal tenderness.      Comments: Proximal J-tube limb is ruptured. G-tube limb is intact and draining properly.    Musculoskeletal:      Cervical back: Normal range of motion and neck supple.   Skin:     General: Skin is warm and dry.      Capillary Refill: Capillary refill takes less than 2 seconds.   Neurological:      General: No focal deficit present.      Mental Status: He is alert and oriented to person, place, and time.   Psychiatric:         Mood and Affect: Mood normal.         Behavior: Behavior normal.         ED Course & MDM   Diagnoses as of 10/03/23 5614   Malnutrition of moderate degree (CMS/HCC)   Impaired oral gastric feeding tube, initial encounter       Medical Decision Making  This is a 67 year old male with pmhx significant for esophageal perforation s/p esophagectomy with GJ-tube presenting for GJ-tube complications. Pt has no signs of infection at port insertion site and is hemodynamically stable in ED. Given that the patient has not had nutritional intake in 3 days, will check CBC and CMP. Thoracic surgery consulted and will admit the patient to their service.        Procedure  Procedures

## 2023-10-04 NOTE — PROGRESS NOTES
Levy Gregory is a 67 y.o. male on day 1 of admission presenting with Malnutrition of moderate degree (CMS/HCC).  Met with patient and spoke with spouse Symone via telephone to discuss discharge planning.  Patient is to return to the [facility].  Patient declines to transport due to patient's deconditioning.  Transport has been arrranged via [facility] via [facility] [phone] Awarded 6:00pm EDT.  Patient and bedside RN made aware of plan.   Referral created in Care Port and bedside RN encouraged to send clinical with patient.

## 2023-10-04 NOTE — PROGRESS NOTES
SNF PROGRESS NOTE      Cc-  s/p esophageal perf with esophagectomy. Placement of [address] tube      Patient is a Bianca Purchase 78 y.o. male who is being seen at [facility]. Patient had an esophageal perforation with esophagectomy. Noted to have a R pleural effusion R VATS was done. GJ tube was placed. She received 1 unit PRBC. He tested COVID + on Monday 9/25. Plan for changing with tube 10/9/23    Patient is very frustrated because his tube was clogged this am. He doesn't want to be sent out. No past medical history on file. Patient has no known allergies. VS reviewed      Gen- Alert and oriented x 3  Heart- RRR no murmur no LE edema   Lungs- CTA b/l no resp distress RA oxygen   Abd- bs x 4      + GJ tube   + left chest  Spit bag.   + PICC           Assessment and Plan      S/p Esophageal Perf with esophagectomy   S/p IV zosyn until 9/27/23 and levaquin until 9/27/23  S/p Fluconazole until 9/27/23  Pleural Effusion s/p VATS  S/p thoracic surgery 9/7  HLD   On statin   Depression   On zoloft. PE/DVT  On eliquis   Anemia. S/p 1 unit PRBC in the hospital   Dysphagia      GJ tube --> plan to replace system. To clamp tube with meds  Nocturnal feed. COVID 19  Tested positive 9/25/23  In isolation   Paxlovid and decadron started      Patient needs sent out to get tube unclogged.      Mal Acosta DO, [facility]     Electronically signed by: Laquita Winn DO on 9/30/2023

## 2023-10-04 NOTE — H&P
History Of Present Illness  Levy Gregory is a 67 y.o. male presenting with broken GJ tube.  He has a complex history of achalasia s/p robotic Heller myotomy c/b esophageal perforation requiring esophagectomy with cervical esophagostomy and laparoscopic takedown of  prior fundoplication. He was most recently admitted  in August and underwent removal of G tube with percutaneous placement of G tube with J extension with Dr. Daley and Dr. Corbin. Since that time, the J tube has gotten clogged repeatedly for which he has come to the ED multiple times. The family is usually able to unclog it but three days ago the J portion of the tube burst and since then he has not had any medications for feeds. The G tube remains vented with bilious output.   He otherwise denies fevers, chills, abdominal pains, changes in bowel movements, nausea. He is still tolerating PO with normal output. He has a aguilera catheter in place and has not noticed any change in output. He feels weak.     PMHx: Anxiety/depression, HLD, GERD, COPD (quit 2016), HTN, DVT, IDT2D     PSHx: robotic Heller myotomy with mary fundoplication, R VATSA decortication, esophagectomy w/ cervical esophagostomy, G tube placement, G tube removal with GJ placement     FamHx: non contributory to presenting concern     SocHx: former smoker (quit in 2016), denies EtOH and illicit drug use     Meds: Hx of lower and upper extremity DVT on Eliquis. Last dose 8/15 at 8:30AM.      ROS: 12-point ROS negative except per HPI above        Allergies  Patient has no known allergies.     Physical Exam  Constitutional:       Appearance: He is ill-appearing.   HENT:      Head: Normocephalic and atraumatic.   Eyes:      Extraocular Movements: Extraocular movements intact.      Conjunctiva/sclera: Conjunctivae normal.      Pupils: Pupils are equal, round, and reactive to light.   Neck:      Comments: Esophagostomy appliance in place with white output  Cardiovascular:      Rate and Rhythm:  "Normal rate and regular rhythm.   Pulmonary:      Effort: Pulmonary effort is normal.   Abdominal:      General: Abdomen is flat. There is no distension.      Palpations: Abdomen is soft.      Tenderness: There is no abdominal tenderness.      Comments: GJ tube in place, visible hole in J portion of tube. G portion hooked to bag with bilious output.    Genitourinary:     Comments: Aguilera in place with dark yellow urine  Musculoskeletal:      Cervical back: Normal range of motion.   Skin:     General: Skin is warm and dry.   Neurological:      Mental Status: He is alert.          Last Recorded Vitals  Blood pressure 95/60, pulse 78, temperature 37 °C (98.6 °F), temperature source Oral, resp. rate 20, height 1.854 m (6' 1\"), weight 99.8 kg (220 lb), SpO2 100 %.    Assessment/Plan    M with hx of achalasia s/p robotic Heller myotomy c/b esophageal perforation requiring esophagectomy with cervical esophagostomy and laparoscopic takedown of  prior fundoplication presenting with broken GJ tube (J portion) and inability to take tube feeds.       PLAN  Neuro:   - holding home meds (sertraline)   - no pain at this time     CV:   - Continuous telemetry  - q4h VS  - holding home spironolactone 25mg     Pulm:   - hx PE on Eliquis; hold AC at this time  - maintain SaO2 > 92%, on room air at this time  - IS, OOB     GI: esophagostomy for esophageal anastomotic leak, GJ balloon tube placed for enteral access 8/18  - OR tomorrow with Dr. Gongora for GJ tube exchange  - Venting  G tube  - okay to eat, no oral meds (will not be absorbed due to esophagostomy). spit fistula pouched.      : urinary retention  - maintain aguilera  - hold home finasteride  - monitor I/Os  - IVFs while no TF     Endo: T2DM  - takes Lantus 35U every night at home - monitor glucose and restart as needed   - BG monitoring with SSI     Heme: hx of DVT/PE on home Eliquis. 1 u prbc given 8/22 for 6.6/21  - hold AC at this time        DISPOSITION:  - admit, OR " tomorrow     Seen and evaluated independently,  discussed with attending physician Dr. Fransisca Barragan MD  Thoracic Surgery [id number]

## 2023-10-04 NOTE — ANESTHESIA PREPROCEDURE EVALUATION
Patient: Levy Gregory    Procedure Information       Date/Time: 10/04/23 1200    Procedures:       EGD (pediatric scope) with gastrostomy tube with jejunal extension - Needs pediatric scope      Insertion Gastrostomy Tube Percutaneous    Location: [facility] 14 / [facility]    Surgeons: Brielle Gongora, DO            Relevant Problems   Cardiovascular   (+) Chronic atrial fibrillation (CMS/HCC)   (+) Hyperlipidemia   (+) Rib pain on left side   (+) Solar purpura (CMS/HCC)      Endocrine   (+) Diabetic neuropathy associated with type 2 diabetes mellitus (CMS/HCC)   (+) Hyponatremia   (+) Type 2 diabetes mellitus with hyperglycemia (CMS/HCC)      Neuro/Psych   (+) Depression      Pulmonary   (+) Obstructive sleep apnea, adult      Hematology   (+) Iron deficiency anemia       Clinical information reviewed:   Tobacco  Allergies  Meds   Med Hx  Surg Hx   Fam Hx  Soc Hx        NPO Detail:  No data recorded     Physical Exam    Airway  Mallampati: III  TM distance: >3 FB  Neck ROM: full     Cardiovascular   Rhythm: regular  Rate: normal     Dental    Pulmonary    Abdominal            Anesthesia Plan    ASA 3     general     Anesthetic plan and risks discussed with patient.

## 2023-10-04 NOTE — ED PROVIDER NOTES
Emergency Medicine Transition of Care Note.    I received Levy Gregory in signout at 0700. Please see the previous ED provider note for all HPI, PE and MDM up to the time of signout. At the time of signout, patient has been admitted to the thoracic surgery service with plan for GJ tube exchange tomorrow. Patient remains in stable condition while awaiting admission    In brief Levy Gregory is an 67 y.o. male presenting for   Chief Complaint   Patient presents with    G Tube complications     Pt sent from [facility] for G tube complications     At the time of signout we were awaiting:    Diagnoses as of 10/04/23 1538   Malnutrition of moderate degree (CMS/HCC)   Impaired oral gastric feeding tube, initial encounter         Final diagnoses:   [E44.0] Malnutrition of moderate degree (CMS/HCC)   [T85.598A] Impaired oral gastric feeding tube, initial encounter             IVÁN Graham-MAGDALENA Cervantes  10/04/23 1539

## 2023-10-04 NOTE — BRIEF OP NOTE
Date: 10/4/2023  OR Location: [facility] OR    Name: Levy Gregory, : 1955, Age: 67 y.o., MRN: [id number], Sex: male    Diagnosis  Pre-op Diagnosis     * Malfunction of jejunostomy tube (CMS/HCC) [K94.13] Post-op Diagnosis     * Malfunction of jejunostomy tube (CMS/HCC) [K94.13]     Procedures  EGD (pediatric scope)  84811 - [place] EGD PERCUTANEOUS PLACEMENT GASTROSTOMY TUBE    Insertion of new Gastrostomy Tube and placement of new Jejunal Tube Percutaneous  19922 - [place] INSERT GASTROSTOMY TUBE PERCUTANEOUS      Surgeons      * Brielle Gongora - Primary    Resident/Fellow/Other Assistant:  Joan Saldana MD (fellow)    Procedure Summary  Anesthesia: General  ASA: III  Anesthesia Staff: Anesthesiologist: Yonas Bhat MD  CRNA: IVÁN Angeles-CRNA  Anesthesia Resident: Osiris Mariano DO  Estimated Blood Loss: Less than 5 mL  Intra-op Medications:   Medication Name Total Dose   dextrose 10 % in water (D10W) infusion Cannot be calculated   dextrose 50 % injection 25 g Cannot be calculated   glucagon (Glucagen) injection 1 mg Cannot be calculated   heparin (porcine) injection 5,000 Units Cannot be calculated   insulin lispro (HumaLOG) injection 0-10 Units Cannot be calculated              Anesthesia Record               Intraprocedure I/O Totals          Intake    lactated Ringer's infusion 1525.00 mL    albumin human bottle 5% 500.00 mL    Total Intake 2025 mL       Output    Est. Blood Loss 1 mL    NG/OG Tube Output 10 mL    Total Output 11 mL       Net    Net Volume  mL          Specimen: No specimens collected     Staff:   Circulator: Nilsa Yoon RN  Relief Circulator: Lise Kraus RN  Scrub Person: Niyah Huizar      Findings: Gastropleural fistula    Complications:  None; patient tolerated the procedure well.     Disposition: PACU - hemodynamically stable.  Condition: stable  Specimens Collected: No specimens collected      Joan Saldana MD  Cardiothoracic Surgery  Fellow  PGY-6  Pager: [phone]

## 2023-10-04 NOTE — ANESTHESIA PROCEDURE NOTES
Airway  Date/Time: 10/4/2023 3:09 PM  Urgency: elective    Airway not difficult    Staffing  Performed: CRNA   Authorized by: Yonas Bhat MD    Performed by: IVÁN Angeles-AMY  Patient location during procedure: OR    Indications and Patient Condition  Preoxygenated: yes  Patient position: sniffing  Mask difficulty assessment: 0 - not attempted    Final Airway Details  Final airway type: endotracheal airway      Successful airway: ETT  Cuffed: yes   Successful intubation technique: video laryngoscopy  Facilitating devices/methods: cricoid pressure  Endotracheal tube insertion site: oral  Blade size: #4  ETT size (mm): 7.0  Cormack-Lehane Classification: grade I - full view of glottis  Placement verified by: capnometry   Measured from: lips  ETT to lips (cm): 23  Number of attempts at approach: 1    Additional Comments  RSI with cricoid maintained

## 2023-10-05 ENCOUNTER — OFFICE VISIT (OUTPATIENT)
Dept: GERIATRIC MEDICINE | Age: 68
End: 2023-10-05

## 2023-10-05 DIAGNOSIS — F32.A DEPRESSION, UNSPECIFIED DEPRESSION TYPE: ICD-10-CM

## 2023-10-05 DIAGNOSIS — J90 PLEURAL EFFUSION: ICD-10-CM

## 2023-10-05 DIAGNOSIS — I26.99 OTHER PULMONARY EMBOLISM WITHOUT ACUTE COR PULMONALE, UNSPECIFIED CHRONICITY (HCC): ICD-10-CM

## 2023-10-05 DIAGNOSIS — R53.1 WEAKNESS: Primary | ICD-10-CM

## 2023-10-05 DIAGNOSIS — D64.9 ANEMIA, UNSPECIFIED TYPE: ICD-10-CM

## 2023-10-05 DIAGNOSIS — R13.10 DYSPHAGIA, UNSPECIFIED TYPE: ICD-10-CM

## 2023-10-05 DIAGNOSIS — U07.1 COVID-19: ICD-10-CM

## 2023-10-05 LAB
APTT PPP: 30 SECONDS (ref 27–38)
INR PPP: 1.1 (ref 0.9–1.1)
PROTHROMBIN TIME: 12.8 SECONDS (ref 9.8–12.8)

## 2023-10-05 NOTE — PROGRESS NOTES
ulcerations  Psych-  Mood and affect normal. Alert and oriented x 3     + G & J tube   + left chest  Spit bag.   + PICC    LABS:  Lab Results   Component Value Date/Time     08/29/2023 12:00 AM    K 3.6 08/29/2023 12:00 AM    CL 96 08/29/2023 12:00 AM    CO2 24 08/29/2023 12:00 AM    BUN 28 08/29/2023 12:00 AM    CREATININE 1.45 08/29/2023 12:00 AM    GLUCOSE 134 08/29/2023 12:00 AM    GLUCOSE 121 06/15/2023 05:05 AM    CALCIUM 9.0 08/29/2023 12:00 AM    LABGLOM 53 08/29/2023 12:00 AM      Lab Results   Component Value Date    WBC 11.70 08/29/2023    HGB 7.5 (A) 08/29/2023    HCT 24.6 (A) 08/29/2023    .2 08/29/2023     08/29/2023             ASSESSMENT/ PLAN[de-identified]      S/p Esophageal Perf with esophagectomy   S/p IV zosyn until 9/27/23 and levaquin until 9/27/23  S/p Fluconazole until 9/27/23  Pleural Effusion s/p VATS  S/p thoracic surgery 9/7  HLD   On statin   Depression   On zoloft. PE/DVT  On eliquis   Anemia. S/p 1 unit PRBC in the hospital   Dysphagia      GJ tube system replaced on 10/4/23 with  J and G tube    COVID 19  Tested positive 9/25/23  In isolation   Paxlovid and decadron started            [facility]     Electronically signed 10/5/2023        NOTE: This report was transcribed using voice recognition software. Every effort was made to ensure accuracy; however, inadvertent computerized transcription errors may be present.

## 2023-10-05 NOTE — OP NOTE
EGD (pediatric scope), Insertion of new Gastrostomy Tube and placement of new Jejunal Tube Percutaneous Operative Note     Date: 10/4/2023  OR Location: [facility] OR    Name: Levy Gregory, : 1955, Age: 67 y.o., MRN: [id number], Sex: male    Diagnosis  Pre-op Diagnosis     * Malfunction of jejunostomy tube (CMS/HCC) [K94.13] Post-op Diagnosis     * Malfunction of jejunostomy tube (CMS/HCC) [K94.13]     Procedures  Gastroscopy, placement of percutaneous gastrotomy tube and wire guided jejunostomy tube      Surgeons      * Brielle Gongora - Primary    Resident/Fellow/Other Assistant:  Madisyn Saldana MD PGY6     Procedure Summary  Anesthesia: General  ASA: III  Anesthesia Staff: Anesthesiologist: Yonas Bhat MD  CRNA: IVÁN Angeles-CRNA  Anesthesia Resident: Osiris Mariano DO  Estimated Blood Loss: 3mL  Intra-op Medications:   Medication Name Total Dose   dextrose 10 % in water (D10W) infusion Cannot be calculated   dextrose 50 % injection 25 g Cannot be calculated   glucagon (Glucagen) injection 1 mg Cannot be calculated   heparin (porcine) injection 5,000 Units Cannot be calculated   insulin lispro (HumaLOG) injection 0-10 Units Cannot be calculated              Anesthesia Record               Intraprocedure I/O Totals          Intake    lactated Ringer's infusion 1525.00 mL    albumin human bottle 5% 500.00 mL    Total Intake 2025 mL       Output    Est. Blood Loss 1 mL    NG/OG Tube Output 10 mL    Total Output 11 mL       Net    Net Volume  mL          Specimen: No specimens collected     Staff:   Circulator: Nilsa Yoon RN  Relief Circulator: Lise Kraus RN  Scrub Person: Niyah Huizar         Drains and/or Catheters:   Gastrostomy/Enterostomy LUQ (Active)   Drain Status Clamped;Open to gravity drainage 10/04/23 1750   Drainage Appearance Bile 10/04/23 1700   Site Description Reddened 10/04/23 170   Dressing Status Clean;Dry 10/04/23 1744   Dressing Type Open to air  10/04/23 1700   Output (mL) 525 mL 10/04/23 1400       Urethral Catheter (Active)   Site Assessment Clean;Skin intact;Bleeding 10/04/23 1700   Collection Container Standard drainage bag 10/04/23 1751   Securement Method Leg strap;Securing device (Describe) 10/04/23 1600   Output (mL) 100 mL 10/04/23 1744       Tourniquet Times:         Implants: 20 Fr G tube, 16 Fr J tube    Findings: Gastric conduit well appearing    Indications: Levy Gregory is an 67 y.o. male who is having surgery for Malfunction of jejunostomy tube (CMS/[facility]) [K94.13]. This is not the first tube replacement he has had after a complicated hx with achalasia and large esophageal perforation s/p esophagectomy with esophagotomy. Had gastric stump leak causing gastropleural fistula and required venting gastrostomy tube but also nutritional access. Because of his ongoing difficulties with his tubes clogging, etc he requested an alternate option.    The patient was seen in the preoperative area. The risks, benefits, complications, treatment options, non-operative alternatives, expected recovery and outcomes were discussed with the patient. The possibilities of reaction to medication, pulmonary aspiration, injury to surrounding structures, bleeding, recurrent infection, the need for additional procedures, failure to diagnose a condition, and creating a complication requiring transfusion or operation were discussed with the patient. The patient concurred with the proposed plan, giving informed consent.  The site of surgery was properly noted/marked if necessary per policy. The patient has been actively warmed in preoperative area. Preoperative antibiotics are not indicated. Venous thrombosis prophylaxis have been ordered including bilateral sequential compression devices    Procedure Details: Patient and procedural information confirmed on entrance to the OR. General anesthesia obtained, SLETT placed. His malfunctioning gastrojejunostomy tube was  removed. The pediatric scope was inserted through his gastrostomy site. The stomach was well appearing. There was a very small area near the corner of what appeared to be our previous gastric staple line which still appeared abnormal. We traversed the pylorus easily. The scope was pulled back into the stomach and a site just medial to his prior tube was chosen with good 1:1 indentation. Needle inserted through the abdominal and gastric wall and a wire passed. Serial dilators were used and ultimately we placed a 20Fr G tube. 3mL water placed in the balloon. We the advanced the pediatric scope back through the pylorus, through the duodenum and into the jejunum. A wire was passed and 16 Fr J tube placed over the wire via seldinger technique into the jejunum and the wire removed. The balloon, just inside the stomach, was filled with 3mL of water. Both plates were secured to the skin with Nylon suture and return of bilious dranage confirmed placement. Patient was extubated and taken to PACU in stable condition.     Complications:  None; patient tolerated the procedure well.    Disposition: PACU - hemodynamically stable.  Condition: stable       Attending Attestation: I was present for the entire procedure.    Brielle Gongora  Phone Number: [phone]

## 2023-10-11 DIAGNOSIS — E44.0 MALNUTRITION OF MODERATE DEGREE (MULTI): ICD-10-CM

## 2023-10-11 DIAGNOSIS — K22.0 ACHALASIA, ESOPHAGEAL: ICD-10-CM

## 2023-10-11 DIAGNOSIS — K22.3 ESOPHAGEAL PERFORATION: ICD-10-CM

## 2023-10-11 DIAGNOSIS — K22.3 ESOPHAGEAL PERFORATION: Primary | ICD-10-CM

## 2023-10-11 DIAGNOSIS — Z93.1 GASTROSTOMY IN PLACE (MULTI): ICD-10-CM

## 2023-10-11 DIAGNOSIS — K22.89 OTHER SPECIFIED DISEASE OF ESOPHAGUS: ICD-10-CM

## 2023-10-17 ENCOUNTER — OFFICE VISIT (OUTPATIENT)
Dept: GERIATRIC MEDICINE | Age: 68
End: 2023-10-17
Payer: MEDICARE

## 2023-10-17 DIAGNOSIS — D64.9 ANEMIA, UNSPECIFIED TYPE: ICD-10-CM

## 2023-10-17 DIAGNOSIS — F32.A DEPRESSION, UNSPECIFIED DEPRESSION TYPE: ICD-10-CM

## 2023-10-17 DIAGNOSIS — R13.10 DYSPHAGIA, UNSPECIFIED TYPE: ICD-10-CM

## 2023-10-17 DIAGNOSIS — U07.1 COVID-19: ICD-10-CM

## 2023-10-17 DIAGNOSIS — R53.1 WEAKNESS: Primary | ICD-10-CM

## 2023-10-17 DIAGNOSIS — J90 PLEURAL EFFUSION: ICD-10-CM

## 2023-10-17 PROCEDURE — 1123F ACP DISCUSS/DSCN MKR DOCD: CPT | Performed by: INTERNAL MEDICINE

## 2023-10-17 PROCEDURE — 99310 SBSQ NF CARE HIGH MDM 45: CPT | Performed by: INTERNAL MEDICINE

## 2023-10-18 ENCOUNTER — HOSPITAL ENCOUNTER (OUTPATIENT)
Facility: HOSPITAL | Age: 68
Setting detail: OUTPATIENT SURGERY
End: 2023-10-18
Attending: UROLOGY | Admitting: UROLOGY
Payer: MEDICARE

## 2023-10-18 DIAGNOSIS — E44.0 MALNUTRITION OF MODERATE DEGREE (MULTI): Primary | ICD-10-CM

## 2023-10-19 ENCOUNTER — APPOINTMENT (OUTPATIENT)
Dept: RADIOLOGY | Facility: HOSPITAL | Age: 68
End: 2023-10-19
Payer: MEDICARE

## 2023-10-19 ENCOUNTER — HOSPITAL ENCOUNTER (OUTPATIENT)
Facility: HOSPITAL | Age: 68
Setting detail: OBSERVATION
LOS: 1 days | Discharge: NURSING, REHAB OR LONG-TERM CARE | End: 2023-10-21
Attending: STUDENT IN AN ORGANIZED HEALTH CARE EDUCATION/TRAINING PROGRAM | Admitting: STUDENT IN AN ORGANIZED HEALTH CARE EDUCATION/TRAINING PROGRAM
Payer: MEDICARE

## 2023-10-19 DIAGNOSIS — K94.13 MALFUNCTION OF JEJUNOSTOMY TUBE (MULTI): Primary | ICD-10-CM

## 2023-10-19 DIAGNOSIS — Z86.718 HISTORY OF DVT (DEEP VEIN THROMBOSIS): ICD-10-CM

## 2023-10-19 DIAGNOSIS — E44.0 MODERATE PROTEIN-CALORIE MALNUTRITION (MULTI): Primary | ICD-10-CM

## 2023-10-19 DIAGNOSIS — E44.0 MODERATE PROTEIN-CALORIE MALNUTRITION (MULTI): ICD-10-CM

## 2023-10-19 DIAGNOSIS — T85.598A: ICD-10-CM

## 2023-10-19 LAB
ABO GROUP (TYPE) IN BLOOD: NORMAL
ALBUMIN SERPL BCP-MCNC: 3.6 G/DL (ref 3.4–5)
ALP SERPL-CCNC: 1971 U/L (ref 33–136)
ALT SERPL W P-5'-P-CCNC: 175 U/L (ref 10–52)
ANION GAP SERPL CALC-SCNC: 17 MMOL/L (ref 10–20)
ANTIBODY SCREEN: NORMAL
AST SERPL W P-5'-P-CCNC: 101 U/L (ref 9–39)
BASOPHILS # BLD AUTO: 0.05 X10*3/UL (ref 0–0.1)
BASOPHILS NFR BLD AUTO: 0.6 %
BILIRUB SERPL-MCNC: 0.8 MG/DL (ref 0–1.2)
BUN SERPL-MCNC: 32 MG/DL (ref 6–23)
CALCIUM SERPL-MCNC: 10.6 MG/DL (ref 8.6–10.6)
CHLORIDE SERPL-SCNC: 102 MMOL/L (ref 98–107)
CO2 SERPL-SCNC: 21 MMOL/L (ref 21–32)
CREAT SERPL-MCNC: 0.92 MG/DL (ref 0.5–1.3)
EOSINOPHIL # BLD AUTO: 0.14 X10*3/UL (ref 0–0.7)
EOSINOPHIL NFR BLD AUTO: 1.7 %
ERYTHROCYTE [DISTWIDTH] IN BLOOD BY AUTOMATED COUNT: 14.2 % (ref 11.5–14.5)
GFR SERPL CREATININE-BSD FRML MDRD: >90 ML/MIN/1.73M*2
GLUCOSE SERPL-MCNC: 92 MG/DL (ref 74–99)
HCT VFR BLD AUTO: 34 % (ref 41–52)
HGB BLD-MCNC: 11.7 G/DL (ref 13.5–17.5)
IMM GRANULOCYTES # BLD AUTO: 0.09 X10*3/UL (ref 0–0.7)
IMM GRANULOCYTES NFR BLD AUTO: 1.1 % (ref 0–0.9)
LYMPHOCYTES # BLD AUTO: 2.35 X10*3/UL (ref 1.2–4.8)
LYMPHOCYTES NFR BLD AUTO: 29.1 %
MCH RBC QN AUTO: 33.3 PG (ref 26–34)
MCHC RBC AUTO-ENTMCNC: 34.4 G/DL (ref 32–36)
MCV RBC AUTO: 97 FL (ref 80–100)
MONOCYTES # BLD AUTO: 0.6 X10*3/UL (ref 0.1–1)
MONOCYTES NFR BLD AUTO: 7.4 %
NEUTROPHILS # BLD AUTO: 4.84 X10*3/UL (ref 1.2–7.7)
NEUTROPHILS NFR BLD AUTO: 60.1 %
NRBC BLD-RTO: 0 /100 WBCS (ref 0–0)
PLATELET # BLD AUTO: 200 X10*3/UL (ref 150–450)
PMV BLD AUTO: 10.2 FL (ref 7.5–11.5)
POTASSIUM SERPL-SCNC: 4.1 MMOL/L (ref 3.5–5.3)
PROT SERPL-MCNC: 7.3 G/DL (ref 6.4–8.2)
RBC # BLD AUTO: 3.51 X10*6/UL (ref 4.5–5.9)
RH FACTOR (ANTIGEN D): NORMAL
SODIUM SERPL-SCNC: 136 MMOL/L (ref 136–145)
WBC # BLD AUTO: 8.1 X10*3/UL (ref 4.4–11.3)

## 2023-10-19 PROCEDURE — 86901 BLOOD TYPING SEROLOGIC RH(D): CPT

## 2023-10-19 PROCEDURE — 85025 COMPLETE CBC W/AUTO DIFF WBC: CPT | Mod: CMCLAB

## 2023-10-19 PROCEDURE — 84450 TRANSFERASE (AST) (SGOT): CPT | Mod: CMCLAB

## 2023-10-19 PROCEDURE — 36415 COLL VENOUS BLD VENIPUNCTURE: CPT | Mod: CMCLAB

## 2023-10-19 PROCEDURE — 82247 BILIRUBIN TOTAL: CPT | Mod: CMCLAB

## 2023-10-19 PROCEDURE — 74018 RADEX ABDOMEN 1 VIEW: CPT | Mod: FY

## 2023-10-19 PROCEDURE — 99285 EMERGENCY DEPT VISIT HI MDM: CPT | Performed by: EMERGENCY MEDICINE

## 2023-10-19 PROCEDURE — 74018 RADEX ABDOMEN 1 VIEW: CPT | Performed by: RADIOLOGY

## 2023-10-19 PROCEDURE — 96361 HYDRATE IV INFUSION ADD-ON: CPT | Performed by: EMERGENCY MEDICINE

## 2023-10-19 PROCEDURE — 96374 THER/PROPH/DIAG INJ IV PUSH: CPT | Performed by: EMERGENCY MEDICINE

## 2023-10-19 PROCEDURE — 96372 THER/PROPH/DIAG INJ SC/IM: CPT | Performed by: STUDENT IN AN ORGANIZED HEALTH CARE EDUCATION/TRAINING PROGRAM

## 2023-10-19 PROCEDURE — 84460 ALANINE AMINO (ALT) (SGPT): CPT | Mod: CMCLAB

## 2023-10-19 PROCEDURE — 1200000002 HC GENERAL ROOM WITH TELEMETRY DAILY

## 2023-10-19 PROCEDURE — C9113 INJ PANTOPRAZOLE SODIUM, VIA: HCPCS | Performed by: STUDENT IN AN ORGANIZED HEALTH CARE EDUCATION/TRAINING PROGRAM

## 2023-10-19 PROCEDURE — 2500000004 HC RX 250 GENERAL PHARMACY W/ HCPCS (ALT 636 FOR OP/ED): Performed by: STUDENT IN AN ORGANIZED HEALTH CARE EDUCATION/TRAINING PROGRAM

## 2023-10-19 RX ORDER — INSULIN LISPRO 100 [IU]/ML
0-10 INJECTION, SOLUTION INTRAVENOUS; SUBCUTANEOUS EVERY 6 HOURS
Status: DISCONTINUED | OUTPATIENT
Start: 2023-10-19 | End: 2023-10-22 | Stop reason: HOSPADM

## 2023-10-19 RX ORDER — DEXTROSE 50 % IN WATER (D50W) INTRAVENOUS SYRINGE
25
Status: DISCONTINUED | OUTPATIENT
Start: 2023-10-19 | End: 2023-10-22 | Stop reason: HOSPADM

## 2023-10-19 RX ORDER — THIAMINE HYDROCHLORIDE 100 MG/ML
100 INJECTION, SOLUTION INTRAMUSCULAR; INTRAVENOUS DAILY
Status: DISCONTINUED | OUTPATIENT
Start: 2023-10-19 | End: 2023-10-22 | Stop reason: HOSPADM

## 2023-10-19 RX ORDER — OXYCODONE HYDROCHLORIDE 5 MG/1
5 TABLET ORAL ONCE
Status: DISCONTINUED | OUTPATIENT
Start: 2023-10-19 | End: 2023-10-19

## 2023-10-19 RX ORDER — DEXTROSE MONOHYDRATE 100 MG/ML
0.3 INJECTION, SOLUTION INTRAVENOUS ONCE AS NEEDED
Status: DISCONTINUED | OUTPATIENT
Start: 2023-10-19 | End: 2023-10-22 | Stop reason: HOSPADM

## 2023-10-19 RX ORDER — HEPARIN SODIUM 5000 [USP'U]/ML
5000 INJECTION, SOLUTION INTRAVENOUS; SUBCUTANEOUS EVERY 8 HOURS
Status: DISCONTINUED | OUTPATIENT
Start: 2023-10-19 | End: 2023-10-22 | Stop reason: HOSPADM

## 2023-10-19 RX ORDER — DEXTROSE MONOHYDRATE AND SODIUM CHLORIDE 5; .45 G/100ML; G/100ML
75 INJECTION, SOLUTION INTRAVENOUS CONTINUOUS
Status: DISCONTINUED | OUTPATIENT
Start: 2023-10-19 | End: 2023-10-22 | Stop reason: HOSPADM

## 2023-10-19 RX ORDER — HYDROMORPHONE HYDROCHLORIDE 1 MG/ML
0.2 INJECTION, SOLUTION INTRAMUSCULAR; INTRAVENOUS; SUBCUTANEOUS
Status: DISCONTINUED | OUTPATIENT
Start: 2023-10-19 | End: 2023-10-22 | Stop reason: HOSPADM

## 2023-10-19 RX ORDER — PANTOPRAZOLE SODIUM 40 MG/10ML
40 INJECTION, POWDER, LYOPHILIZED, FOR SOLUTION INTRAVENOUS DAILY
Status: DISCONTINUED | OUTPATIENT
Start: 2023-10-19 | End: 2023-10-22 | Stop reason: HOSPADM

## 2023-10-19 RX ADMIN — PANTOPRAZOLE SODIUM 40 MG: 40 INJECTION, POWDER, FOR SOLUTION INTRAVENOUS at 23:29

## 2023-10-19 RX ADMIN — SODIUM CHLORIDE, POTASSIUM CHLORIDE, SODIUM LACTATE AND CALCIUM CHLORIDE 500 ML: 600; 310; 30; 20 INJECTION, SOLUTION INTRAVENOUS at 23:29

## 2023-10-19 RX ADMIN — HEPARIN SODIUM 5000 UNITS: 5000 INJECTION INTRAVENOUS; SUBCUTANEOUS at 23:29

## 2023-10-19 RX ADMIN — HYDROMORPHONE HYDROCHLORIDE 0.2 MG: 1 INJECTION, SOLUTION INTRAMUSCULAR; INTRAVENOUS; SUBCUTANEOUS at 22:22

## 2023-10-19 SDOH — SOCIAL STABILITY: SOCIAL INSECURITY: HAS ANYONE EVER THREATENED TO HURT YOUR FAMILY OR YOUR PETS?: NO

## 2023-10-19 SDOH — SOCIAL STABILITY: SOCIAL INSECURITY: ARE YOU OR HAVE YOU BEEN THREATENED OR ABUSED PHYSICALLY, EMOTIONALLY, OR SEXUALLY BY ANYONE?: NO

## 2023-10-19 SDOH — SOCIAL STABILITY: SOCIAL INSECURITY: DOES ANYONE TRY TO KEEP YOU FROM HAVING/CONTACTING OTHER FRIENDS OR DOING THINGS OUTSIDE YOUR HOME?: NO

## 2023-10-19 SDOH — SOCIAL STABILITY: SOCIAL INSECURITY: DO YOU FEEL ANYONE HAS EXPLOITED OR TAKEN ADVANTAGE OF YOU FINANCIALLY OR OF YOUR PERSONAL PROPERTY?: NO

## 2023-10-19 SDOH — SOCIAL STABILITY: SOCIAL INSECURITY: HAVE YOU HAD THOUGHTS OF HARMING ANYONE ELSE?: NO

## 2023-10-19 SDOH — SOCIAL STABILITY: SOCIAL INSECURITY: ABUSE: ADULT

## 2023-10-19 SDOH — SOCIAL STABILITY: SOCIAL INSECURITY: ARE THERE ANY APPARENT SIGNS OF INJURIES/BEHAVIORS THAT COULD BE RELATED TO ABUSE/NEGLECT?: NO

## 2023-10-19 SDOH — SOCIAL STABILITY: SOCIAL INSECURITY: WERE YOU ABLE TO COMPLETE ALL THE BEHAVIORAL HEALTH SCREENINGS?: YES

## 2023-10-19 SDOH — SOCIAL STABILITY: SOCIAL INSECURITY: DO YOU FEEL UNSAFE GOING BACK TO THE PLACE WHERE YOU ARE LIVING?: NO

## 2023-10-19 ASSESSMENT — ENCOUNTER SYMPTOMS
STRIDOR: 0
DIARRHEA: 0
CHILLS: 0
SHORTNESS OF BREATH: 0
DIAPHORESIS: 0
DIFFICULTY URINATING: 0
APNEA: 0
CHEST TIGHTNESS: 0
ARTHRALGIAS: 0
COLOR CHANGE: 0
SORE THROAT: 0
FATIGUE: 1
APPETITE CHANGE: 0
NAUSEA: 0
ABDOMINAL PAIN: 0
TROUBLE SWALLOWING: 1
FEVER: 0
UNEXPECTED WEIGHT CHANGE: 1
DIZZINESS: 1
ABDOMINAL DISTENTION: 0
WHEEZING: 0
HEADACHES: 1
WOUND: 0
PALPITATIONS: 0

## 2023-10-19 ASSESSMENT — ACTIVITIES OF DAILY LIVING (ADL)
JUDGMENT_ADEQUATE_SAFELY_COMPLETE_DAILY_ACTIVITIES: YES
PATIENT'S MEMORY ADEQUATE TO SAFELY COMPLETE DAILY ACTIVITIES?: YES
ADEQUATE_TO_COMPLETE_ADL: YES
GROOMING: NEEDS ASSISTANCE
HEARING - LEFT EAR: FUNCTIONAL
TOILETING: NEEDS ASSISTANCE
HEARING - RIGHT EAR: FUNCTIONAL
BATHING: NEEDS ASSISTANCE
DRESSING YOURSELF: NEEDS ASSISTANCE
WALKS IN HOME: NEEDS ASSISTANCE
FEEDING YOURSELF: UNABLE TO ASSESS
ASSISTIVE_DEVICE: WALKER
LACK_OF_TRANSPORTATION: NO

## 2023-10-19 ASSESSMENT — LIFESTYLE VARIABLES
HAVE YOU EVER FELT YOU SHOULD CUT DOWN ON YOUR DRINKING: NO
AUDIT-C TOTAL SCORE: 0
EVER HAD A DRINK FIRST THING IN THE MORNING TO STEADY YOUR NERVES TO GET RID OF A HANGOVER: NO
HOW OFTEN DO YOU HAVE 6 OR MORE DRINKS ON ONE OCCASION: NEVER
HAVE PEOPLE ANNOYED YOU BY CRITICIZING YOUR DRINKING: NO
HOW OFTEN DO YOU HAVE A DRINK CONTAINING ALCOHOL: NEVER
EVER FELT BAD OR GUILTY ABOUT YOUR DRINKING: NO
SKIP TO QUESTIONS 9-10: 1
HOW MANY STANDARD DRINKS CONTAINING ALCOHOL DO YOU HAVE ON A TYPICAL DAY: PATIENT DOES NOT DRINK
AUDIT-C TOTAL SCORE: 0
REASON UNABLE TO ASSESS: YES

## 2023-10-19 ASSESSMENT — COGNITIVE AND FUNCTIONAL STATUS - GENERAL
TURNING FROM BACK TO SIDE WHILE IN FLAT BAD: A LITTLE
STANDING UP FROM CHAIR USING ARMS: A LITTLE
EATING MEALS: A LITTLE
PERSONAL GROOMING: A LITTLE
DAILY ACTIVITIY SCORE: 18
PATIENT BASELINE BEDBOUND: NO
MOVING TO AND FROM BED TO CHAIR: A LITTLE
CLIMB 3 TO 5 STEPS WITH RAILING: A LOT
MOBILITY SCORE: 16
DRESSING REGULAR UPPER BODY CLOTHING: A LITTLE
HELP NEEDED FOR BATHING: A LITTLE
WALKING IN HOSPITAL ROOM: A LOT
DRESSING REGULAR LOWER BODY CLOTHING: A LITTLE
MOVING FROM LYING ON BACK TO SITTING ON SIDE OF FLAT BED WITH BEDRAILS: A LITTLE
TOILETING: A LITTLE

## 2023-10-19 ASSESSMENT — PATIENT HEALTH QUESTIONNAIRE - PHQ9
2. FEELING DOWN, DEPRESSED OR HOPELESS: NOT AT ALL
SUM OF ALL RESPONSES TO PHQ9 QUESTIONS 1 & 2: 0
1. LITTLE INTEREST OR PLEASURE IN DOING THINGS: NOT AT ALL

## 2023-10-19 ASSESSMENT — PAIN SCALES - GENERAL
PAINLEVEL_OUTOF10: 10 - WORST POSSIBLE PAIN
PAINLEVEL_OUTOF10: 0 - NO PAIN

## 2023-10-19 ASSESSMENT — COLUMBIA-SUICIDE SEVERITY RATING SCALE - C-SSRS
6. HAVE YOU EVER DONE ANYTHING, STARTED TO DO ANYTHING, OR PREPARED TO DO ANYTHING TO END YOUR LIFE?: NO
2. HAVE YOU ACTUALLY HAD ANY THOUGHTS OF KILLING YOURSELF?: NO
1. IN THE PAST MONTH, HAVE YOU WISHED YOU WERE DEAD OR WISHED YOU COULD GO TO SLEEP AND NOT WAKE UP?: NO

## 2023-10-20 ENCOUNTER — ANESTHESIA EVENT (OUTPATIENT)
Dept: OPERATING ROOM | Facility: HOSPITAL | Age: 68
End: 2023-10-20
Payer: MEDICARE

## 2023-10-20 ENCOUNTER — ANESTHESIA (OUTPATIENT)
Dept: OPERATING ROOM | Facility: HOSPITAL | Age: 68
End: 2023-10-20
Payer: MEDICARE

## 2023-10-20 ENCOUNTER — APPOINTMENT (OUTPATIENT)
Dept: RADIOLOGY | Facility: HOSPITAL | Age: 68
End: 2023-10-20
Payer: MEDICARE

## 2023-10-20 LAB
ALBUMIN SERPL BCP-MCNC: 3.6 G/DL (ref 3.4–5)
ALP SERPL-CCNC: 1859 U/L (ref 33–136)
ALT SERPL W P-5'-P-CCNC: 160 U/L (ref 10–52)
ANION GAP SERPL CALC-SCNC: 17 MMOL/L (ref 10–20)
APTT PPP: 38 SECONDS (ref 27–38)
AST SERPL W P-5'-P-CCNC: 92 U/L (ref 9–39)
BILIRUB DIRECT SERPL-MCNC: 0.3 MG/DL (ref 0–0.3)
BILIRUB SERPL-MCNC: 0.8 MG/DL (ref 0–1.2)
BUN SERPL-MCNC: 33 MG/DL (ref 6–23)
CALCIUM SERPL-MCNC: 10.1 MG/DL (ref 8.6–10.6)
CHLORIDE SERPL-SCNC: 103 MMOL/L (ref 98–107)
CO2 SERPL-SCNC: 21 MMOL/L (ref 21–32)
CREAT SERPL-MCNC: 0.98 MG/DL (ref 0.5–1.3)
ERYTHROCYTE [DISTWIDTH] IN BLOOD BY AUTOMATED COUNT: 14.3 % (ref 11.5–14.5)
GFR SERPL CREATININE-BSD FRML MDRD: 85 ML/MIN/1.73M*2
GLUCOSE BLD MANUAL STRIP-MCNC: 107 MG/DL (ref 74–99)
GLUCOSE BLD MANUAL STRIP-MCNC: 123 MG/DL (ref 74–99)
GLUCOSE BLD MANUAL STRIP-MCNC: 141 MG/DL (ref 74–99)
GLUCOSE BLD MANUAL STRIP-MCNC: 143 MG/DL (ref 74–99)
GLUCOSE BLD MANUAL STRIP-MCNC: 165 MG/DL (ref 74–99)
GLUCOSE SERPL-MCNC: 116 MG/DL (ref 74–99)
HCT VFR BLD AUTO: 33.6 % (ref 41–52)
HGB BLD-MCNC: 11 G/DL (ref 13.5–17.5)
INR PPP: 1.3 (ref 0.9–1.1)
MCH RBC QN AUTO: 33.1 PG (ref 26–34)
MCHC RBC AUTO-ENTMCNC: 32.7 G/DL (ref 32–36)
MCV RBC AUTO: 101 FL (ref 80–100)
NRBC BLD-RTO: 0 /100 WBCS (ref 0–0)
PHOSPHATE SERPL-MCNC: 4.7 MG/DL (ref 2.5–4.9)
PLATELET # BLD AUTO: 186 X10*3/UL (ref 150–450)
PMV BLD AUTO: 10.3 FL (ref 7.5–11.5)
POTASSIUM SERPL-SCNC: 4.1 MMOL/L (ref 3.5–5.3)
PROT SERPL-MCNC: 6.6 G/DL (ref 6.4–8.2)
PROTHROMBIN TIME: 14.4 SECONDS (ref 9.8–12.8)
RBC # BLD AUTO: 3.32 X10*6/UL (ref 4.5–5.9)
SODIUM SERPL-SCNC: 137 MMOL/L (ref 136–145)
WBC # BLD AUTO: 7.7 X10*3/UL (ref 4.4–11.3)

## 2023-10-20 PROCEDURE — 2500000004 HC RX 250 GENERAL PHARMACY W/ HCPCS (ALT 636 FOR OP/ED): Performed by: STUDENT IN AN ORGANIZED HEALTH CARE EDUCATION/TRAINING PROGRAM

## 2023-10-20 PROCEDURE — 2500000004 HC RX 250 GENERAL PHARMACY W/ HCPCS (ALT 636 FOR OP/ED)

## 2023-10-20 PROCEDURE — 96372 THER/PROPH/DIAG INJ SC/IM: CPT | Performed by: STUDENT IN AN ORGANIZED HEALTH CARE EDUCATION/TRAINING PROGRAM

## 2023-10-20 PROCEDURE — 84100 ASSAY OF PHOSPHORUS: CPT | Mod: CMCLAB | Performed by: STUDENT IN AN ORGANIZED HEALTH CARE EDUCATION/TRAINING PROGRAM

## 2023-10-20 PROCEDURE — 85027 COMPLETE CBC AUTOMATED: CPT | Mod: CMCLAB | Performed by: STUDENT IN AN ORGANIZED HEALTH CARE EDUCATION/TRAINING PROGRAM

## 2023-10-20 PROCEDURE — 99222 1ST HOSP IP/OBS MODERATE 55: CPT

## 2023-10-20 PROCEDURE — 3600000002 HC OR TIME - INITIAL BASE CHARGE - PROCEDURE LEVEL TWO: Performed by: STUDENT IN AN ORGANIZED HEALTH CARE EDUCATION/TRAINING PROGRAM

## 2023-10-20 PROCEDURE — 82248 BILIRUBIN DIRECT: CPT | Mod: CMCLAB | Performed by: STUDENT IN AN ORGANIZED HEALTH CARE EDUCATION/TRAINING PROGRAM

## 2023-10-20 PROCEDURE — 44373 SMALL BOWEL ENDOSCOPY: CPT | Performed by: STUDENT IN AN ORGANIZED HEALTH CARE EDUCATION/TRAINING PROGRAM

## 2023-10-20 PROCEDURE — 82947 ASSAY GLUCOSE BLOOD QUANT: CPT | Mod: CMCLAB

## 2023-10-20 PROCEDURE — 85730 THROMBOPLASTIN TIME PARTIAL: CPT | Mod: CMCLAB | Performed by: STUDENT IN AN ORGANIZED HEALTH CARE EDUCATION/TRAINING PROGRAM

## 2023-10-20 PROCEDURE — C9113 INJ PANTOPRAZOLE SODIUM, VIA: HCPCS | Performed by: STUDENT IN AN ORGANIZED HEALTH CARE EDUCATION/TRAINING PROGRAM

## 2023-10-20 PROCEDURE — 2500000002 HC RX 250 W HCPCS SELF ADMINISTERED DRUGS (ALT 637 FOR MEDICARE OP, ALT 636 FOR OP/ED): Performed by: STUDENT IN AN ORGANIZED HEALTH CARE EDUCATION/TRAINING PROGRAM

## 2023-10-20 PROCEDURE — 3700000001 HC GENERAL ANESTHESIA TIME - INITIAL BASE CHARGE: Performed by: STUDENT IN AN ORGANIZED HEALTH CARE EDUCATION/TRAINING PROGRAM

## 2023-10-20 PROCEDURE — 74018 RADEX ABDOMEN 1 VIEW: CPT | Mod: FY,FR

## 2023-10-20 PROCEDURE — 7100000001 HC RECOVERY ROOM TIME - INITIAL BASE CHARGE: Performed by: STUDENT IN AN ORGANIZED HEALTH CARE EDUCATION/TRAINING PROGRAM

## 2023-10-20 PROCEDURE — 2500000001 HC RX 250 WO HCPCS SELF ADMINISTERED DRUGS (ALT 637 FOR MEDICARE OP)

## 2023-10-20 PROCEDURE — 71045 X-RAY EXAM CHEST 1 VIEW: CPT | Performed by: RADIOLOGY

## 2023-10-20 PROCEDURE — 2720000007 HC OR 272 NO HCPCS: Performed by: STUDENT IN AN ORGANIZED HEALTH CARE EDUCATION/TRAINING PROGRAM

## 2023-10-20 PROCEDURE — A49452 PR REPLACEMENT GASTRO-JEJUNOSTOMY TUBE PERCUTANEOUS: Performed by: ANESTHESIOLOGY

## 2023-10-20 PROCEDURE — 80069 RENAL FUNCTION PANEL: CPT | Mod: CMCLAB | Performed by: STUDENT IN AN ORGANIZED HEALTH CARE EDUCATION/TRAINING PROGRAM

## 2023-10-20 PROCEDURE — A4217 STERILE WATER/SALINE, 500 ML: HCPCS | Performed by: STUDENT IN AN ORGANIZED HEALTH CARE EDUCATION/TRAINING PROGRAM

## 2023-10-20 PROCEDURE — 96374 THER/PROPH/DIAG INJ IV PUSH: CPT | Performed by: EMERGENCY MEDICINE

## 2023-10-20 PROCEDURE — 2780000003 HC OR 278 NO HCPCS: Performed by: STUDENT IN AN ORGANIZED HEALTH CARE EDUCATION/TRAINING PROGRAM

## 2023-10-20 PROCEDURE — 74018 RADEX ABDOMEN 1 VIEW: CPT | Mod: FOREIGN READ | Performed by: RADIOLOGY

## 2023-10-20 PROCEDURE — G0378 HOSPITAL OBSERVATION PER HR: HCPCS

## 2023-10-20 PROCEDURE — 3600000007 HC OR TIME - EACH INCREMENTAL 1 MINUTE - PROCEDURE LEVEL TWO: Performed by: STUDENT IN AN ORGANIZED HEALTH CARE EDUCATION/TRAINING PROGRAM

## 2023-10-20 PROCEDURE — 3700000002 HC GENERAL ANESTHESIA TIME - EACH INCREMENTAL 1 MINUTE: Performed by: STUDENT IN AN ORGANIZED HEALTH CARE EDUCATION/TRAINING PROGRAM

## 2023-10-20 PROCEDURE — 71045 X-RAY EXAM CHEST 1 VIEW: CPT | Mod: FY

## 2023-10-20 PROCEDURE — 7100000002 HC RECOVERY ROOM TIME - EACH INCREMENTAL 1 MINUTE: Performed by: STUDENT IN AN ORGANIZED HEALTH CARE EDUCATION/TRAINING PROGRAM

## 2023-10-20 PROCEDURE — 2500000005 HC RX 250 GENERAL PHARMACY W/O HCPCS: Performed by: STUDENT IN AN ORGANIZED HEALTH CARE EDUCATION/TRAINING PROGRAM

## 2023-10-20 RX ORDER — PHENYLEPHRINE 10 MG/250 ML(40 MCG/ML)IN 0.9 % SOD.CHLORIDE INTRAVENOUS
CONTINUOUS PRN
Status: DISCONTINUED | OUTPATIENT
Start: 2023-10-20 | End: 2023-10-20

## 2023-10-20 RX ORDER — DEXAMETHASONE SODIUM PHOSPHATE 4 MG/ML
INJECTION, SOLUTION INTRA-ARTICULAR; INTRALESIONAL; INTRAMUSCULAR; INTRAVENOUS; SOFT TISSUE AS NEEDED
Status: DISCONTINUED | OUTPATIENT
Start: 2023-10-20 | End: 2023-10-20

## 2023-10-20 RX ORDER — CEFAZOLIN 1 G/1
INJECTION, POWDER, FOR SOLUTION INTRAVENOUS AS NEEDED
Status: DISCONTINUED | OUTPATIENT
Start: 2023-10-20 | End: 2023-10-20

## 2023-10-20 RX ORDER — LIDOCAINE HYDROCHLORIDE 20 MG/ML
INJECTION, SOLUTION INFILTRATION; PERINEURAL AS NEEDED
Status: DISCONTINUED | OUTPATIENT
Start: 2023-10-20 | End: 2023-10-20

## 2023-10-20 RX ORDER — PHENYLEPHRINE HCL IN 0.9% NACL 0.4MG/10ML
SYRINGE (ML) INTRAVENOUS AS NEEDED
Status: DISCONTINUED | OUTPATIENT
Start: 2023-10-20 | End: 2023-10-20

## 2023-10-20 RX ORDER — PROPOFOL 10 MG/ML
INJECTION, EMULSION INTRAVENOUS AS NEEDED
Status: DISCONTINUED | OUTPATIENT
Start: 2023-10-20 | End: 2023-10-20

## 2023-10-20 RX ORDER — MIDODRINE HYDROCHLORIDE 10 MG/1
5 TABLET ORAL 3 TIMES DAILY
Status: DISCONTINUED | OUTPATIENT
Start: 2023-10-20 | End: 2023-10-22 | Stop reason: HOSPADM

## 2023-10-20 RX ORDER — NEOSTIGMINE METHYLSULFATE 1 MG/ML
INJECTION, SOLUTION INTRAVENOUS AS NEEDED
Status: DISCONTINUED | OUTPATIENT
Start: 2023-10-20 | End: 2023-10-20

## 2023-10-20 RX ORDER — GABAPENTIN 300 MG/1
300 CAPSULE ORAL 2 TIMES DAILY
Status: DISCONTINUED | OUTPATIENT
Start: 2023-10-20 | End: 2023-10-22 | Stop reason: HOSPADM

## 2023-10-20 RX ORDER — FERROUS SULFATE 325(65) MG
1 TABLET ORAL 2 TIMES DAILY
Status: DISCONTINUED | OUTPATIENT
Start: 2023-10-20 | End: 2023-10-22 | Stop reason: HOSPADM

## 2023-10-20 RX ORDER — ROCURONIUM BROMIDE 10 MG/ML
INJECTION, SOLUTION INTRAVENOUS AS NEEDED
Status: DISCONTINUED | OUTPATIENT
Start: 2023-10-20 | End: 2023-10-20

## 2023-10-20 RX ORDER — LANOLIN ALCOHOL/MO/W.PET/CERES
100 CREAM (GRAM) TOPICAL DAILY
Status: DISCONTINUED | OUTPATIENT
Start: 2023-10-20 | End: 2023-10-20

## 2023-10-20 RX ORDER — ESOMEPRAZOLE MAGNESIUM 40 MG/1
40 GRANULE, DELAYED RELEASE ORAL
Status: DISCONTINUED | OUTPATIENT
Start: 2023-10-21 | End: 2023-10-20

## 2023-10-20 RX ORDER — INSULIN LISPRO 100 [IU]/ML
0-5 INJECTION, SOLUTION INTRAVENOUS; SUBCUTANEOUS
Status: DISCONTINUED | OUTPATIENT
Start: 2023-10-20 | End: 2023-10-20

## 2023-10-20 RX ORDER — LIDOCAINE HYDROCHLORIDE 10 MG/ML
0.1 INJECTION, SOLUTION EPIDURAL; INFILTRATION; INTRACAUDAL; PERINEURAL ONCE
Status: DISCONTINUED | OUTPATIENT
Start: 2023-10-20 | End: 2023-10-20 | Stop reason: HOSPADM

## 2023-10-20 RX ORDER — LABETALOL HYDROCHLORIDE 5 MG/ML
5 INJECTION, SOLUTION INTRAVENOUS ONCE AS NEEDED
Status: DISCONTINUED | OUTPATIENT
Start: 2023-10-20 | End: 2023-10-20 | Stop reason: HOSPADM

## 2023-10-20 RX ORDER — GLYCOPYRROLATE 0.2 MG/ML
INJECTION INTRAMUSCULAR; INTRAVENOUS AS NEEDED
Status: DISCONTINUED | OUTPATIENT
Start: 2023-10-20 | End: 2023-10-20

## 2023-10-20 RX ORDER — SODIUM CHLORIDE, SODIUM LACTATE, POTASSIUM CHLORIDE, CALCIUM CHLORIDE 600; 310; 30; 20 MG/100ML; MG/100ML; MG/100ML; MG/100ML
100 INJECTION, SOLUTION INTRAVENOUS CONTINUOUS
Status: DISCONTINUED | OUTPATIENT
Start: 2023-10-20 | End: 2023-10-20 | Stop reason: HOSPADM

## 2023-10-20 RX ORDER — FENTANYL CITRATE 50 UG/ML
INJECTION, SOLUTION INTRAMUSCULAR; INTRAVENOUS AS NEEDED
Status: DISCONTINUED | OUTPATIENT
Start: 2023-10-20 | End: 2023-10-20

## 2023-10-20 RX ORDER — SERTRALINE HYDROCHLORIDE 50 MG/1
50 TABLET, FILM COATED ORAL DAILY
Status: DISCONTINUED | OUTPATIENT
Start: 2023-10-20 | End: 2023-10-22 | Stop reason: HOSPADM

## 2023-10-20 RX ORDER — MAG HYDROX/ALUMINUM HYD/SIMETH 200-200-20
SUSPENSION, ORAL (FINAL DOSE FORM) ORAL 2 TIMES DAILY
Status: DISCONTINUED | OUTPATIENT
Start: 2023-10-20 | End: 2023-10-22 | Stop reason: HOSPADM

## 2023-10-20 RX ORDER — TAMSULOSIN HYDROCHLORIDE 0.4 MG/1
0.4 CAPSULE ORAL
Status: DISCONTINUED | OUTPATIENT
Start: 2023-10-21 | End: 2023-10-22 | Stop reason: HOSPADM

## 2023-10-20 RX ORDER — SODIUM CHLORIDE, SODIUM LACTATE, POTASSIUM CHLORIDE, CALCIUM CHLORIDE 600; 310; 30; 20 MG/100ML; MG/100ML; MG/100ML; MG/100ML
INJECTION, SOLUTION INTRAVENOUS CONTINUOUS PRN
Status: DISCONTINUED | OUTPATIENT
Start: 2023-10-20 | End: 2023-10-20

## 2023-10-20 RX ORDER — ONDANSETRON HYDROCHLORIDE 2 MG/ML
INJECTION, SOLUTION INTRAVENOUS AS NEEDED
Status: DISCONTINUED | OUTPATIENT
Start: 2023-10-20 | End: 2023-10-20

## 2023-10-20 RX ORDER — SODIUM CHLORIDE 0.9 G/100ML
IRRIGANT IRRIGATION AS NEEDED
Status: DISCONTINUED | OUTPATIENT
Start: 2023-10-20 | End: 2023-10-20 | Stop reason: HOSPADM

## 2023-10-20 RX ADMIN — HYDROMORPHONE HYDROCHLORIDE 0.5 MG: 2 INJECTION, SOLUTION INTRAMUSCULAR; INTRAVENOUS; SUBCUTANEOUS at 15:06

## 2023-10-20 RX ADMIN — ROCURONIUM BROMIDE 50 MG: 50 INJECTION, SOLUTION INTRAVENOUS at 12:35

## 2023-10-20 RX ADMIN — PANTOPRAZOLE SODIUM 40 MG: 40 INJECTION, POWDER, FOR SOLUTION INTRAVENOUS at 09:48

## 2023-10-20 RX ADMIN — INSULIN LISPRO 2 UNITS: 100 INJECTION, SOLUTION INTRAVENOUS; SUBCUTANEOUS at 18:22

## 2023-10-20 RX ADMIN — PROPOFOL 90 MG: 10 INJECTION, EMULSION INTRAVENOUS at 12:35

## 2023-10-20 RX ADMIN — FENTANYL CITRATE 25 MCG: 50 INJECTION, SOLUTION INTRAMUSCULAR; INTRAVENOUS at 12:35

## 2023-10-20 RX ADMIN — MIDODRINE HYDROCHLORIDE 5 MG: 10 TABLET ORAL at 18:21

## 2023-10-20 RX ADMIN — HYDROMORPHONE HYDROCHLORIDE 0.5 MG: 1 INJECTION, SOLUTION INTRAMUSCULAR; INTRAVENOUS; SUBCUTANEOUS at 13:49

## 2023-10-20 RX ADMIN — Medication 160 MCG: at 12:41

## 2023-10-20 RX ADMIN — THIAMINE HYDROCHLORIDE 100 MG: 100 INJECTION, SOLUTION INTRAMUSCULAR; INTRAVENOUS at 16:50

## 2023-10-20 RX ADMIN — HYDROMORPHONE HYDROCHLORIDE 0.5 MG: 2 INJECTION, SOLUTION INTRAMUSCULAR; INTRAVENOUS; SUBCUTANEOUS at 14:01

## 2023-10-20 RX ADMIN — HYDROMORPHONE HYDROCHLORIDE 0.5 MG: 2 INJECTION, SOLUTION INTRAMUSCULAR; INTRAVENOUS; SUBCUTANEOUS at 15:20

## 2023-10-20 RX ADMIN — Medication 80 MCG: at 12:35

## 2023-10-20 RX ADMIN — HYDROCORTISONE: 1 OINTMENT TOPICAL at 21:35

## 2023-10-20 RX ADMIN — HYDROMORPHONE HYDROCHLORIDE 0.5 MG: 2 INJECTION, SOLUTION INTRAMUSCULAR; INTRAVENOUS; SUBCUTANEOUS at 14:22

## 2023-10-20 RX ADMIN — SODIUM CHLORIDE, POTASSIUM CHLORIDE, SODIUM LACTATE AND CALCIUM CHLORIDE: 600; 310; 30; 20 INJECTION, SOLUTION INTRAVENOUS at 12:20

## 2023-10-20 RX ADMIN — HEPARIN SODIUM 5000 UNITS: 5000 INJECTION INTRAVENOUS; SUBCUTANEOUS at 09:48

## 2023-10-20 RX ADMIN — DEXAMETHASONE SODIUM PHOSPHATE 4 MG: 4 INJECTION, SOLUTION INTRA-ARTICULAR; INTRALESIONAL; INTRAMUSCULAR; INTRAVENOUS; SOFT TISSUE at 12:48

## 2023-10-20 RX ADMIN — Medication 160 MCG: at 12:45

## 2023-10-20 RX ADMIN — HEPARIN SODIUM 5000 UNITS: 5000 INJECTION INTRAVENOUS; SUBCUTANEOUS at 16:50

## 2023-10-20 RX ADMIN — NEOSTIGMINE METHYLSULFATE 4 MG: 1 INJECTION INTRAVENOUS at 13:23

## 2023-10-20 RX ADMIN — HYDROMORPHONE HYDROCHLORIDE 0.2 MG: 1 INJECTION, SOLUTION INTRAMUSCULAR; INTRAVENOUS; SUBCUTANEOUS at 16:51

## 2023-10-20 RX ADMIN — LIDOCAINE HYDROCHLORIDE 5 ML: 20 INJECTION, SOLUTION INFILTRATION; PERINEURAL at 12:35

## 2023-10-20 RX ADMIN — SERTRALINE HYDROCHLORIDE 50 MG: 50 TABLET ORAL at 21:35

## 2023-10-20 RX ADMIN — ONDANSETRON 4 MG: 2 INJECTION INTRAMUSCULAR; INTRAVENOUS at 13:09

## 2023-10-20 RX ADMIN — DEXTROSE AND SODIUM CHLORIDE 75 ML/HR: 5; 450 INJECTION, SOLUTION INTRAVENOUS at 02:43

## 2023-10-20 RX ADMIN — PROPOFOL 10 MG: 10 INJECTION, EMULSION INTRAVENOUS at 13:26

## 2023-10-20 RX ADMIN — GLYCOPYRROLATE 0.6 MG: 0.2 INJECTION INTRAMUSCULAR; INTRAVENOUS at 13:23

## 2023-10-20 RX ADMIN — HYDROMORPHONE HYDROCHLORIDE 0.5 MG: 1 INJECTION, SOLUTION INTRAMUSCULAR; INTRAVENOUS; SUBCUTANEOUS at 13:52

## 2023-10-20 SDOH — SOCIAL STABILITY: SOCIAL NETWORK: HOW OFTEN DO YOU ATTEND CHURCH OR RELIGIOUS SERVICES?: NEVER

## 2023-10-20 SDOH — ECONOMIC STABILITY: INCOME INSECURITY: IN THE PAST 12 MONTHS, HAS THE ELECTRIC, GAS, OIL, OR WATER COMPANY THREATENED TO SHUT OFF SERVICE IN YOUR HOME?: NO

## 2023-10-20 SDOH — ECONOMIC STABILITY: TRANSPORTATION INSECURITY
IN THE PAST 12 MONTHS, HAS LACK OF TRANSPORTATION KEPT YOU FROM MEETINGS, WORK, OR FROM GETTING THINGS NEEDED FOR DAILY LIVING?: NO

## 2023-10-20 SDOH — ECONOMIC STABILITY: INCOME INSECURITY: IN THE LAST 12 MONTHS, WAS THERE A TIME WHEN YOU WERE NOT ABLE TO PAY THE MORTGAGE OR RENT ON TIME?: NO

## 2023-10-20 SDOH — ECONOMIC STABILITY: FOOD INSECURITY: WITHIN THE PAST 12 MONTHS, THE FOOD YOU BOUGHT JUST DIDN'T LAST AND YOU DIDN'T HAVE MONEY TO GET MORE.: NEVER TRUE

## 2023-10-20 SDOH — SOCIAL STABILITY: SOCIAL NETWORK
DO YOU BELONG TO ANY CLUBS OR ORGANIZATIONS SUCH AS CHURCH GROUPS UNIONS, FRATERNAL OR ATHLETIC GROUPS, OR SCHOOL GROUPS?: NO

## 2023-10-20 SDOH — HEALTH STABILITY: PHYSICAL HEALTH: ON AVERAGE, HOW MANY MINUTES DO YOU ENGAGE IN EXERCISE AT THIS LEVEL?: 30 MIN

## 2023-10-20 SDOH — SOCIAL STABILITY: SOCIAL INSECURITY
WITHIN THE LAST YEAR, HAVE YOU BEEN KICKED, HIT, SLAPPED, OR OTHERWISE PHYSICALLY HURT BY YOUR PARTNER OR EX-PARTNER?: NO

## 2023-10-20 SDOH — ECONOMIC STABILITY: INCOME INSECURITY: HOW HARD IS IT FOR YOU TO PAY FOR THE VERY BASICS LIKE FOOD, HOUSING, MEDICAL CARE, AND HEATING?: NOT VERY HARD

## 2023-10-20 SDOH — SOCIAL STABILITY: SOCIAL INSECURITY
WITHIN THE LAST YEAR, HAVE TO BEEN RAPED OR FORCED TO HAVE ANY KIND OF SEXUAL ACTIVITY BY YOUR PARTNER OR EX-PARTNER?: NO

## 2023-10-20 SDOH — ECONOMIC STABILITY: HOUSING INSECURITY: IN THE LAST 12 MONTHS, HOW MANY PLACES HAVE YOU LIVED?: 1

## 2023-10-20 SDOH — ECONOMIC STABILITY: FOOD INSECURITY: WITHIN THE PAST 12 MONTHS, YOU WORRIED THAT YOUR FOOD WOULD RUN OUT BEFORE YOU GOT MONEY TO BUY MORE.: NEVER TRUE

## 2023-10-20 SDOH — ECONOMIC STABILITY: HOUSING INSECURITY
IN THE LAST 12 MONTHS, WAS THERE A TIME WHEN YOU DID NOT HAVE A STEADY PLACE TO SLEEP OR SLEPT IN A SHELTER (INCLUDING NOW)?: NO

## 2023-10-20 SDOH — SOCIAL STABILITY: SOCIAL NETWORK: HOW OFTEN DO YOU ATTENT MEETINGS OF THE CLUB OR ORGANIZATION YOU BELONG TO?: NEVER

## 2023-10-20 SDOH — ECONOMIC STABILITY: TRANSPORTATION INSECURITY
IN THE PAST 12 MONTHS, HAS THE LACK OF TRANSPORTATION KEPT YOU FROM MEDICAL APPOINTMENTS OR FROM GETTING MEDICATIONS?: NO

## 2023-10-20 SDOH — SOCIAL STABILITY: SOCIAL NETWORK: ARE YOU MARRIED, WIDOWED, DIVORCED, SEPARATED, NEVER MARRIED, OR LIVING WITH A PARTNER?: WIDOWED

## 2023-10-20 SDOH — SOCIAL STABILITY: SOCIAL INSECURITY: WITHIN THE LAST YEAR, HAVE YOU BEEN AFRAID OF YOUR PARTNER OR EX-PARTNER?: NO

## 2023-10-20 SDOH — SOCIAL STABILITY: SOCIAL NETWORK
IN A TYPICAL WEEK, HOW MANY TIMES DO YOU TALK ON THE PHONE WITH FAMILY, FRIENDS, OR NEIGHBORS?: MORE THAN THREE TIMES A WEEK

## 2023-10-20 SDOH — HEALTH STABILITY: MENTAL HEALTH: CURRENT SMOKER: 0

## 2023-10-20 SDOH — HEALTH STABILITY: MENTAL HEALTH
STRESS IS WHEN SOMEONE FEELS TENSE, NERVOUS, ANXIOUS, OR CAN'T SLEEP AT NIGHT BECAUSE THEIR MIND IS TROUBLED. HOW STRESSED ARE YOU?: TO SOME EXTENT

## 2023-10-20 SDOH — SOCIAL STABILITY: SOCIAL INSECURITY: WITHIN THE LAST YEAR, HAVE YOU BEEN HUMILIATED OR EMOTIONALLY ABUSED IN OTHER WAYS BY YOUR PARTNER OR EX-PARTNER?: NO

## 2023-10-20 ASSESSMENT — PAIN SCALES - GENERAL
PAINLEVEL_OUTOF10: 8
PAINLEVEL_OUTOF10: 10 - WORST POSSIBLE PAIN
PAINLEVEL_OUTOF10: 2
PAINLEVEL_OUTOF10: 10 - WORST POSSIBLE PAIN
PAINLEVEL_OUTOF10: 8
PAINLEVEL_OUTOF10: 10 - WORST POSSIBLE PAIN
PAINLEVEL_OUTOF10: 8
PAINLEVEL_OUTOF10: 4
PAINLEVEL_OUTOF10: 10 - WORST POSSIBLE PAIN
PAIN_LEVEL: 10
PAINLEVEL_OUTOF10: 0 - NO PAIN
PAINLEVEL_OUTOF10: 5 - MODERATE PAIN
PAINLEVEL_OUTOF10: 10 - WORST POSSIBLE PAIN
PAINLEVEL_OUTOF10: 8
PAINLEVEL_OUTOF10: 10 - WORST POSSIBLE PAIN

## 2023-10-20 ASSESSMENT — COGNITIVE AND FUNCTIONAL STATUS - GENERAL
MOBILITY SCORE: 17
EATING MEALS: A LITTLE
HELP NEEDED FOR BATHING: A LITTLE
DRESSING REGULAR LOWER BODY CLOTHING: A LITTLE
MOVING TO AND FROM BED TO CHAIR: A LITTLE
PERSONAL GROOMING: A LITTLE
WALKING IN HOSPITAL ROOM: A LITTLE
MOVING FROM LYING ON BACK TO SITTING ON SIDE OF FLAT BED WITH BEDRAILS: A LITTLE
TOILETING: A LITTLE
DAILY ACTIVITIY SCORE: 18
CLIMB 3 TO 5 STEPS WITH RAILING: A LOT
TURNING FROM BACK TO SIDE WHILE IN FLAT BAD: A LITTLE
DRESSING REGULAR UPPER BODY CLOTHING: A LITTLE
STANDING UP FROM CHAIR USING ARMS: A LITTLE

## 2023-10-20 ASSESSMENT — PAIN - FUNCTIONAL ASSESSMENT
PAIN_FUNCTIONAL_ASSESSMENT: 0-10

## 2023-10-20 NOTE — H&P
History Of Present Illness  Levy Gregory is a 68 y.o. male presenting with 2 weeks of concern for refluxing tube feeds.  He has a complex history of achalasia status post robotic Heller myotomy complicated by esophageal perforation requiring esophagectomy with cervical esophagostomy and laparoscopic takedown of prior fundoplication.  He has been admitted once for G-tube removal and placement of G-tube with J extension, but this became clogged frequently and then broke.  He was then admitted earlier in October for placement of percutaneous gastrostomy and wire-guided jejunostomy tube.  Those worked initially but he has been having issues as stated for about 2 weeks.  He notes large volumes of tube feed appearing fluid draining out of his G-tube.  He does not notice any reflux medications.  He reports about a 10 pound weight loss and headaches.  He denies any fevers or chills.  He does not report any significant abdominal pain.  He is having loose bowel movements which is his baseline since starting tube feeds.  He does not know any blood noticed any blood in his bowel movements or in the output from his jejunostomy.  He notes no change in character of the output from his esophagostomy.  He does not note any significant drainage from around either tube.  He does note some skin excoriation around his J-tube site. .     Past Medical History  Past Medical History:   Diagnosis Date    Contusion of abdominal wall, initial encounter 01/12/2021    Contusion, flank    Diabetes mellitus (CMS/HCC)     Diaphragmatic hernia without obstruction or gangrene 11/05/2013    Hiatal hernia    Dysphagia     Hemoptysis 05/12/2020    Cough with hemoptysis    Melena 06/19/2019    Black stool    Pain in left knee 01/20/2017    Acute pain of left knee    Personal history of diseases of the blood and blood-forming organs and certain disorders involving the immune mechanism     History of bleeding disorder    Personal history of diseases  of the blood and blood-forming organs and certain disorders involving the immune mechanism     History of anemia    Personal history of other diseases of the digestive system     History of gastroesophageal reflux (GERD)    Personal history of other diseases of the digestive system 06/21/2017    History of gastroenteritis    Personal history of other diseases of the nervous system and sense organs     History of sleep apnea    Personal history of other diseases of the respiratory system 06/19/2019    History of bronchitis    Personal history of other endocrine, nutritional and metabolic disease     History of diabetes mellitus    Personal history of other infectious and parasitic diseases 03/07/2017    History of herpes labialis    Personal history of other specified conditions     History of snoring    Personal history of other specified conditions 05/29/2019    History of fatigue    Personal history of other specified conditions 06/19/2017    History of abdominal pain    Personal history of other specified conditions 06/30/2022    History of dysphagia    Personal history of pneumonia (recurrent) 02/18/2020    History of community acquired pneumonia    Sleep apnea        Surgical History  Past Surgical History:   Procedure Laterality Date    IR CVC PICC  8/16/2023    IR CVC PICC 8/16/2023 [facility]PATIENT LEGAC[facility]    OTHER SURGICAL HISTORY  01/21/2019    Giovanna filter placement    OTHER SURGICAL HISTORY  01/21/2019    Hernia repair        Social History  He reports that he quit smoking about 2 months ago. His smoking use included cigarettes and cigars. He started smoking about 34 years ago. He has never used smokeless tobacco. He reports that he does not currently use alcohol. He reports that he does not use drugs.    Family History  Family History   Problem Relation Name Age of Onset    Diabetes Mother      Hypertension Mother      Diabetes type I Father          Allergies  Patient has no known allergies.    Review  "of Systems  Negative except per HPI     Physical Exam  Constitutional:       Comments: Slender and pale   HENT:      Head: Normocephalic.   Eyes:      Extraocular Movements: Extraocular movements intact.      Conjunctiva/sclera: Conjunctivae normal.   Cardiovascular:      Rate and Rhythm: Normal rate.   Pulmonary:      Effort: Pulmonary effort is normal. No respiratory distress.   Abdominal:      General: There is no distension.      Palpations: Abdomen is soft.      Comments: G tube and J tube both to gravity, G tube with some surrounding drainage   Musculoskeletal:         General: Normal range of motion.   Skin:     General: Skin is warm and dry.   Neurological:      General: No focal deficit present.      Mental Status: He is alert.          Last Recorded Vitals  Blood pressure 100/70, pulse 75, temperature 36.7 °C (98.1 °F), temperature source Temporal, resp. rate 18, height 1.778 m (5' 10\"), weight 78.5 kg (173 lb 1 oz), SpO2 95 %.    Relevant Results        Scheduled medications  heparin, 5,000 Units, subcutaneous, q8h  insulin lispro, 0-10 Units, subcutaneous, q6h  pantoprazole, 40 mg, intravenous, Daily  thiamine, 100 mg, intravenous, Daily      Continuous medications  dextrose 5%-0.45 % sodium chloride, 75 mL/hr, Last Rate: 75 mL/hr (10/20/23 0651)      PRN medications  PRN medications: dextrose 10 % in water (D10W), dextrose, glucagon, HYDROmorphone    Results for orders placed or performed during the hospital encounter of 10/19/23 (from the past 24 hour(s))   CBC and Auto Differential   Result Value Ref Range    WBC 8.1 4.4 - 11.3 x10*3/uL    nRBC 0.0 0.0 - 0.0 /100 WBCs    RBC 3.51 (L) 4.50 - 5.90 x10*6/uL    Hemoglobin 11.7 (L) 13.5 - 17.5 g/dL    Hematocrit 34.0 (L) 41.0 - 52.0 %    MCV 97 80 - 100 fL    MCH 33.3 26.0 - 34.0 pg    MCHC 34.4 32.0 - 36.0 g/dL    RDW 14.2 11.5 - 14.5 %    Platelets 200 150 - 450 x10*3/uL    MPV 10.2 7.5 - 11.5 fL    Neutrophils % 60.1 40.0 - 80.0 %    Immature " Granulocytes %, Automated 1.1 (H) 0.0 - 0.9 %    Lymphocytes % 29.1 13.0 - 44.0 %    Monocytes % 7.4 2.0 - 10.0 %    Eosinophils % 1.7 0.0 - 6.0 %    Basophils % 0.6 0.0 - 2.0 %    Neutrophils Absolute 4.84 1.20 - 7.70 x10*3/uL    Immature Granulocytes Absolute, Automated 0.09 0.00 - 0.70 x10*3/uL    Lymphocytes Absolute 2.35 1.20 - 4.80 x10*3/uL    Monocytes Absolute 0.60 0.10 - 1.00 x10*3/uL    Eosinophils Absolute 0.14 0.00 - 0.70 x10*3/uL    Basophils Absolute 0.05 0.00 - 0.10 x10*3/uL   Comprehensive metabolic panel   Result Value Ref Range    Glucose 92 74 - 99 mg/dL    Sodium 136 136 - 145 mmol/L    Potassium 4.1 3.5 - 5.3 mmol/L    Chloride 102 98 - 107 mmol/L    Bicarbonate 21 21 - 32 mmol/L    Anion Gap 17 10 - 20 mmol/L    Urea Nitrogen 32 (H) 6 - 23 mg/dL    Creatinine 0.92 0.50 - 1.30 mg/dL    eGFR >90 >60 mL/min/1.73m*2    Calcium 10.6 8.6 - 10.6 mg/dL    Albumin 3.6 3.4 - 5.0 g/dL    Alkaline Phosphatase 1,971 (H) 33 - 136 U/L    Total Protein 7.3 6.4 - 8.2 g/dL     (H) 9 - 39 U/L    Bilirubin, Total 0.8 0.0 - 1.2 mg/dL     (H) 10 - 52 U/L   Type and Screen   Result Value Ref Range    ABO TYPE O     Rh TYPE POS     ANTIBODY SCREEN NEG    POCT GLUCOSE   Result Value Ref Range    POCT Glucose 107 (H) 74 - 99 mg/dL   CBC   Result Value Ref Range    WBC 7.7 4.4 - 11.3 x10*3/uL    nRBC 0.0 0.0 - 0.0 /100 WBCs    RBC 3.32 (L) 4.50 - 5.90 x10*6/uL    Hemoglobin 11.0 (L) 13.5 - 17.5 g/dL    Hematocrit 33.6 (L) 41.0 - 52.0 %     (H) 80 - 100 fL    MCH 33.1 26.0 - 34.0 pg    MCHC 32.7 32.0 - 36.0 g/dL    RDW 14.3 11.5 - 14.5 %    Platelets 186 150 - 450 x10*3/uL    MPV 10.3 7.5 - 11.5 fL   Coagulation Screen   Result Value Ref Range    Protime 14.4 (H) 9.8 - 12.8 seconds    INR 1.3 (H) 0.9 - 1.1    aPTT 38 27 - 38 seconds   Hepatic Function Panel   Result Value Ref Range    Albumin 3.6 3.4 - 5.0 g/dL    Bilirubin, Total 0.8 0.0 - 1.2 mg/dL    Bilirubin, Direct 0.3 0.0 - 0.3 mg/dL     Alkaline Phosphatase 1,859 (H) 33 - 136 U/L     (H) 10 - 52 U/L    AST 92 (H) 9 - 39 U/L    Total Protein 6.6 6.4 - 8.2 g/dL   Phosphorus   Result Value Ref Range    Phosphorus 4.7 2.5 - 4.9 mg/dL   Basic Metabolic Panel   Result Value Ref Range    Glucose 116 (H) 74 - 99 mg/dL    Sodium 137 136 - 145 mmol/L    Potassium 4.1 3.5 - 5.3 mmol/L    Chloride 103 98 - 107 mmol/L    Bicarbonate 21 21 - 32 mmol/L    Anion Gap 17 10 - 20 mmol/L    Urea Nitrogen 33 (H) 6 - 23 mg/dL    Creatinine 0.98 0.50 - 1.30 mg/dL    eGFR 85 >60 mL/min/1.73m*2    Calcium 10.1 8.6 - 10.6 mg/dL   POCT GLUCOSE   Result Value Ref Range    POCT Glucose 123 (H) 74 - 99 mg/dL      XR abdomen 1 view    Result Date: 10/19/2023  Interpreted By:  Hussain Hurst and Dervishi Mario STUDY: XR ABDOMEN 1 VIEW;  10/19/2023 10:16 pm   INDICATION: Signs/Symptoms:Evaluate J tube positioning.   COMPARISON: Abdominal radiograph 10/04/2023 CT chest abdomen pelvis on 09/05/2023   ACCESSION NUMBER(S): [id number]   ORDERING CLINICIAN: SIVA DONIS   FINDINGS: AP radiograph of lower chest and upper abdomen x1:   Peg tube projects over the left upper abdomen with a course of the tube, probably in the stomach and the tip overlying the middle gastric body. There is a IVC filter noted.   There is a nonobstructive bowel gas pattern. Embolization coil in the left upper abdomen.   Visualized lungs are clear.   Osseous structures demonstrate no acute bony changes.       1.  Peg tube with tip, probably in the middle gastric body. Recommend injection of Gastrografin through PEG tube. 2. No acute radiographic findings of the lower chest and upper abdomen.     I personally reviewed the images/study and I agree with the findings as stated. This study was interpreted at [place], [place].   MACRO: None   Signed by: Hussain Hurst 10/19/2023 10:23 PM Dictation workstation:   [id number]       Assessment/Plan   Principal  Problem:    Malfunction of jejunostomy tube (CMS/HCC)  Active Problems:    Esophageal perforation    Moderate protein-calorie malnutrition (CMS/HCC)      Plan to proceed to OR for EGD and jejunostomy tube revision       Lili Lowe MD  Thoracic Surgery [id number]

## 2023-10-20 NOTE — PROGRESS NOTES
"Levy Gregory is a 68 y.o. male on day 1 of admission presenting with Malfunction of jejunostomy tube (CMS/HCC).  Met with patient and spoke with wife on the telephone to introduce myself, role and discuss discharge planning.  Patient currently at [facility] for skilled placement until 10/30 and wishes to return per wife.  A referral with clinical updates has been provided to [facility].  Will continue to monitor patient for all home going needs.        Last Recorded Vitals  Blood pressure 100/64, pulse 70, temperature 37 °C (98.6 °F), resp. rate 18, height 1.778 m (5' 10\"), weight 78.5 kg (173 lb 1 oz), SpO2 99 %.  Intake/Output last 3 Shifts:  I/O last 3 completed shifts:  In: 810 (10.3 mL/kg) [I.V.:310 (3.9 mL/kg); IV Piggyback:500]  Out: 950 (12.1 mL/kg) [Urine:500 (0.2 mL/kg/hr); Emesis/NG output:350; Drains:100]  Weight: 78.5 kg       Assessment/Plan   Principal Problem:    Malfunction of jejunostomy tube (CMS/HCC)  Active Problems:    Esophageal perforation    Moderate protein-calorie malnutrition (CMS/HCC)      Elliot Hansen RN      "

## 2023-10-20 NOTE — NURSING NOTE
Pt was admitted to [place] [id number]. Pt had a medication patch on left side of back. RN asked pt what the patch was for. Pt stated that he had the patch on for over a week and that he did not need it. RN educated pt to leave patch alone. Pt stated that he did not need it and then removed it from his back. MD Rodriguez deal.

## 2023-10-20 NOTE — ED PROVIDER NOTES
HPI   Chief Complaint   Patient presents with    Post-op Problem     J tube placed 1 month ago. Scheduled for surgery in am       HPI    Patient Lvey Gregory is a 68m with PMHx of HTN, HLD, DVT, IDDM, achalasia s/p robotic Heller myotomy with ELIEZER fundoplication, R VATSA decortication, esophagectomy w/ cervical esophagostomy, G-tube placement, G-tube removal with GJ placement who presents to [facility] ED with malnutrition of moderate degree with 20lb weight loss over the last few weeks secondary to J tube malfunction with plan for surgery tomorrow wb Dr. Gongora. Denies constitutional symptoms fever, chills, chest pain, deep calf pain, shortness of breath.          No data recorded                Patient History   Past Medical History:   Diagnosis Date    Contusion of abdominal wall, initial encounter 01/12/2021    Contusion, flank    Diabetes mellitus (CMS/HCC)     Diaphragmatic hernia without obstruction or gangrene 11/05/2013    Hiatal hernia    Dysphagia     Hemoptysis 05/12/2020    Cough with hemoptysis    Melena 06/19/2019    Black stool    Pain in left knee 01/20/2017    Acute pain of left knee    Personal history of diseases of the blood and blood-forming organs and certain disorders involving the immune mechanism     History of bleeding disorder    Personal history of diseases of the blood and blood-forming organs and certain disorders involving the immune mechanism     History of anemia    Personal history of other diseases of the digestive system     History of gastroesophageal reflux (GERD)    Personal history of other diseases of the digestive system 06/21/2017    History of gastroenteritis    Personal history of other diseases of the nervous system and sense organs     History of sleep apnea    Personal history of other diseases of the respiratory system 06/19/2019    History of bronchitis    Personal history of other endocrine, nutritional and metabolic disease     History of diabetes mellitus     Personal history of other infectious and parasitic diseases 2017    History of herpes labialis    Personal history of other specified conditions     History of snoring    Personal history of other specified conditions 2019    History of fatigue    Personal history of other specified conditions 2017    History of abdominal pain    Personal history of other specified conditions 2022    History of dysphagia    Personal history of pneumonia (recurrent) 2020    History of community acquired pneumonia    Sleep apnea      Past Surgical History:   Procedure Laterality Date    IR CVC PICC  2023    IR CVC PICC 2023 [facility]    OTHER SURGICAL HISTORY  2019    Giovanna filter placement    OTHER SURGICAL HISTORY  2019    Hernia repair     Family History   Problem Relation Name Age of Onset    Diabetes Mother      Hypertension Mother      Diabetes type I Father       Social History     Tobacco Use    Smoking status: Former     Types: Cigarettes, Cigars     Start date: 1989     Quit date: 2023     Years since quittin.2    Smokeless tobacco: Never   Substance Use Topics    Alcohol use: Not Currently    Drug use: Never       Physical Exam   ED Triage Vitals [10/19/23 2004]   Temp Heart Rate Resp BP   36.5 °C (97.7 °F) 80 16 99/73      SpO2 Temp Source Heart Rate Source Patient Position   99 % Temporal Monitor Lying      BP Location FiO2 (%)     Left arm --       Physical Exam  HENT:      Head: Normocephalic and atraumatic.      Nose: Nose normal.      Mouth/Throat:      Mouth: Mucous membranes are moist.      Pharynx: Oropharynx is clear.   Eyes:      General: No scleral icterus.     Extraocular Movements: Extraocular movements intact.      Conjunctiva/sclera: Conjunctivae normal.      Pupils: Pupils are equal, round, and reactive to light.   Cardiovascular:      Rate and Rhythm: Normal rate and regular rhythm.      Pulses: Normal pulses.      Heart  sounds: Normal heart sounds. No murmur heard.     No gallop.   Pulmonary:      Effort: Pulmonary effort is normal. No respiratory distress.      Breath sounds: Normal breath sounds. No wheezing or rales.   Abdominal:      Palpations: Abdomen is soft.      Tenderness: There is no abdominal tenderness.      Comments: J-tube in place   Musculoskeletal:      Cervical back: Neck supple.      Right lower leg: No edema.      Left lower leg: No edema.   Skin:     General: Skin is warm and dry.      Capillary Refill: Capillary refill takes less than 2 seconds.   Neurological:      General: No focal deficit present.      Mental Status: He is alert and oriented to person, place, and time.   Psychiatric:         Mood and Affect: Mood normal.         Behavior: Behavior normal.         ED Course & MDM     Patient Levy Gregory is a 68m with PMHx of HTN, HLD, DVT, IDDM, achalasia s/p robotic Heller myotomy with ELIEZER fundoplication, R VATSA decortication, esophagectomy w/ cervical esophagostomy, G-tube placement, G-tube removal with GJ placement who presents to [facility] ED with malnutrition of moderate degree with 20lb weight loss over the last few weeks secondary to J tube malfunction with plan for surgery tomorrow wby Dr. Gongora.    Medical Decision Making    Plan to get CBC with diff, CMP, type and screen. Made NPO to avoid tube feeds for tomorrow.    Procedure  Procedures     Chris Oviedo DPM  Resident  10/19/23 2031

## 2023-10-20 NOTE — H&P
History Of Present Illness  Levy Gregory is a 68 y.o. male presenting with 2 weeks of concern for refluxing tube feeds.  He has a complex history of achalasia status post robotic Heller myotomy complicated by esophageal perforation requiring esophagectomy with cervical esophagostomy and laparoscopic takedown of prior fundoplication.  He has been admitted once for G-tube removal and placement of G-tube with J extension, but this became clogged frequently and then broke.  He was then admitted earlier in October for placement of percutaneous gastrostomy and wire-guided jejunostomy tube.  Those worked initially but he has been having issues as stated for about 2 weeks.  He notes large volumes of tube feed appearing fluid draining out of his G-tube.  He does not notice any reflux medications.  He reports about a 10 pound weight loss and headaches.  He denies any fevers or chills.  He does not report any significant abdominal pain.  He is having loose bowel movements which is his baseline since starting tube feeds.  He does not know any blood noticed any blood in his bowel movements or in the output from his jejunostomy.  He notes no change in character of the output from his esophagostomy.  He does not note any significant drainage from around either tube.  He does note some skin excoriation around his J-tube site.     Past Medical History  Past Medical History:   Diagnosis Date    Contusion of abdominal wall, initial encounter 01/12/2021    Contusion, flank    Diabetes mellitus (CMS/HCC)     Diaphragmatic hernia without obstruction or gangrene 11/05/2013    Hiatal hernia    Dysphagia     Hemoptysis 05/12/2020    Cough with hemoptysis    Melena 06/19/2019    Black stool    Pain in left knee 01/20/2017    Acute pain of left knee    Personal history of diseases of the blood and blood-forming organs and certain disorders involving the immune mechanism     History of bleeding disorder    Personal history of diseases of  the blood and blood-forming organs and certain disorders involving the immune mechanism     History of anemia    Personal history of other diseases of the digestive system     History of gastroesophageal reflux (GERD)    Personal history of other diseases of the digestive system 06/21/2017    History of gastroenteritis    Personal history of other diseases of the nervous system and sense organs     History of sleep apnea    Personal history of other diseases of the respiratory system 06/19/2019    History of bronchitis    Personal history of other endocrine, nutritional and metabolic disease     History of diabetes mellitus    Personal history of other infectious and parasitic diseases 03/07/2017    History of herpes labialis    Personal history of other specified conditions     History of snoring    Personal history of other specified conditions 05/29/2019    History of fatigue    Personal history of other specified conditions 06/19/2017    History of abdominal pain    Personal history of other specified conditions 06/30/2022    History of dysphagia    Personal history of pneumonia (recurrent) 02/18/2020    History of community acquired pneumonia    Sleep apnea        Surgical History  Past Surgical History:   Procedure Laterality Date    IR CVC PICC  8/16/2023    IR CVC PICC 8/16/2023 [facility] INPATIENT LEGAC[facility]    OTHER SURGICAL HISTORY  01/21/2019    [place] filter placement    OTHER SURGICAL HISTORY  01/21/2019    Hernia repair        Social History  He reports that he quit smoking about 2 months ago. His smoking use included cigarettes and cigars. He started smoking about 34 years ago. He has never used smokeless tobacco. He reports that he does not currently use alcohol. He reports that he does not use drugs.    Family History  Family History   Problem Relation Name Age of Onset    Diabetes Mother      Hypertension Mother      Diabetes type I Father          Allergies  Patient has no known allergies.    Review of  Systems   Constitutional:  Positive for fatigue and unexpected weight change. Negative for appetite change, chills, diaphoresis and fever.   HENT:  Positive for trouble swallowing. Negative for congestion, dental problem and sore throat.    Respiratory:  Negative for apnea, chest tightness, shortness of breath, wheezing and stridor.    Cardiovascular:  Negative for chest pain, palpitations and leg swelling.   Gastrointestinal:  Negative for abdominal distention, abdominal pain, diarrhea and nausea.   Genitourinary:  Negative for difficulty urinating.   Musculoskeletal:  Negative for arthralgias.   Skin:  Negative for color change, rash and wound.   Neurological:  Positive for dizziness and headaches.        Physical Exam  Constitutional:       General: He is not in acute distress.     Comments: Thin appearing   HENT:      Head: Normocephalic and atraumatic.      Mouth/Throat:      Mouth: Mucous membranes are moist.      Pharynx: No oropharyngeal exudate.      Comments: Left upper chest esophagostomy, healthy appearing, large volume of saliva.  Eyes:      General: No scleral icterus.     Extraocular Movements: Extraocular movements intact.      Conjunctiva/sclera: Conjunctivae normal.   Cardiovascular:      Rate and Rhythm: Normal rate and regular rhythm.      Pulses: Normal pulses.   Pulmonary:      Effort: Pulmonary effort is normal. No respiratory distress.      Breath sounds: No stridor. No wheezing.   Chest:      Chest wall: No tenderness.   Abdominal:      General: Abdomen is flat. There is no distension.      Palpations: Abdomen is soft.      Tenderness: There is no abdominal tenderness.      Comments: G-tube in place to Maldonado bag with tube feed appearing fluids slowly draining via tube.    J-tube in place, capped, some skin excoriation surrounding.    Minimally tender around the tubes, no significant drainage around either tube   Musculoskeletal:      Cervical back: Neck supple. No rigidity.   Neurological:  "     Mental Status: He is alert.          Last Recorded Vitals  Blood pressure 99/73, pulse 80, temperature 36.5 °C (97.7 °F), temperature source Temporal, resp. rate 16, height 1.778 m (5' 10\"), weight 88.9 kg (196 lb), SpO2 99 %.    Relevant Results        XR abdomen 1 view    Result Date: 10/19/2023  STUDY:  [XR ABDOMEN 1 VIEW];  [10/19/2023 10:16 pm] INDICATION:  [Signs/Symptoms:Evaluate J tube positioning]. COMPARISON:  [Abdominal radiograph 10/04/2023] ACCESSION NUMBER(S):  [[id number]] ORDERING CLINICIAN:  [SIVA DONIS] FINDINGS: AP radiograph of lower chest and upper abdomen x1: Gastrojejunostomy projects over the epigastrium and left upper quadrant with a course of the tube and tip overlying the proximal jejunum. There is a IVC filter noted. [There is a nonobstructive bowel gas pattern.] Limited evaluation of pneumoperitoneum on supine imaging, however no gross evidence of free air is noted.  [Visualized lungs are clear.]  [Osseous structures demonstrate no acute bony changes.] IMPRESSION:  [Gastrojejunostomy tube projecting the expected position.] No acute radiographic findings of the lower chest and upper abdomen.     XR abdomen 1 view    Result Date: 10/4/2023  Interpreted By:  Mary Landin and Dervishi Mario STUDY: XR ABDOMEN 1 VIEW;  10/4/2023 6:16 pm   INDICATION: Signs/Symptoms:s/p placement of g-tube as well as j-tube. Evaluate for pneumoperitoneum.   COMPARISON: Abdominal radiograph 09/05/2023.   ACCESSION NUMBER(S): [id number]   ORDERING CLINICIAN: YUNG PEREIRA   FINDINGS:   G-tube projecting over the stomach. A tubular structure projecting over the left upper quadrant of the abdomen, likely corresponding to clinically described J-tube. Presumed embolization coils projecting over the left upper quadrant of the abdomen. IVC filter is projecting over L3 vertebral body.   Nonobstructive bowel gas pattern. Limited evaluation of pneumoperitoneum on supine imaging, however no " gross evidence of free air is noted. Atelectasis/scarring and trace effusion/pleural thickening in lung bases. Osseous structures demonstrate no acute bony changes.       1.  Status post G-tube placement which projects over the gastric region without evidence of ananda pneumoperitoneum within the limitations of a supine radiograph.   I personally reviewed the images/study and I agree with the findings as stated. This study was interpreted at [facility], [place], [place].   MACRO: None   Signed by: Mary Parks 10/4/2023 8:03 PM Dictation workstation:   [id number]       Assessment/Plan   Active Problems:    Malfunction of jejunostomy tube (CMS/HCC)    Esophageal perforation      This is a 68-year-old man with complex history of esophageal perforation now status post esophagectomy with cervical esophagostomy who presents with intolerance of tube feeds via J-tube.  He has been having some weight loss from this.  Overall he is nontoxic-appearing, and I do not think there is anything else untoward going on.  He last took his Eliquis in the evening of 10/18/2023.    He has planned for EGD and J-tube revision tomorrow with Dr. Gongora  We will obtain consent tonight  We will we will admit him to [facility] with telemetry tonight  While he is in the ED we will get preop labs including a CBC CMP and type and screen  We will get a KUB while he is in the ED  N.p.o.  We will hold any tube feeds and enteral medications  Give him IV fluids with dextrose  IV PPI  IV thiamine  Subcu heparin DVT prophylaxis  Dispo pending or tomorrow     Discussed with Dr. Gongora    I spent 45 minutes in the professional and overall care of this patient.      Otilio Negrete MD  Thoracic [id number]

## 2023-10-20 NOTE — CONSULTS
Nutrition Note:  Reason for Assessment: Tube feeding recommendations (consult)    Recent TF intolerence (reflux) & 10# weight loss reported by patient > plan for EGD today for correction of J-extension.     Pt off the floor -- bedside RN reports pt supposed to discharge today after procedure?    Per [employer], most recent inpatient stay pt was utilizing TwoCal HN but that was back in August 2023. H/o several tube feed formulas including Nepro, Vital 1.5    Dietary Orders (From admission, onward)       Start     Ordered    10/19/23 2356  May Participate in Room Service  Once        Question:  .  Answer:  Yes    10/19/23 2355    10/19/23 2045  NPO Diet; Effective now  Diet effective now         10/19/23 2045                     Estimated Needs:   Total Energy Estimated Needs (kCal): 2000 kCal  Total Estimated Energy Need per Day (kCal/kg): 25 kCal/kg (of acutal wt)    Total Protein Estimated Needs (g): 95 g  Total Protein Estimated Needs (g/kg): 1.2 g/kg (of actual BW)    Total Fluid Estimated Needs (mL): 2350 mL  Total Fluid Estimated Needs (mL/kg): 30 mL/kg (or per MD)          Nutrition Prescription:  Most recently used TwoCal HN @ 50 mls/hr + one Prostat AWC daily  Re-consult if not discharging and need further assistance with TF regimen.

## 2023-10-20 NOTE — ANESTHESIA PREPROCEDURE EVALUATION
Patient: Levy Gregory    Procedure Information       Date: 10/20/23    Procedures:       Esophagogastroduodenoscopy (Abdomen)      Insertion Gastrojejunostomy Tube Percutaneous    Location: [facility]    Surgeons: Brielle Gongora, DO            Relevant Problems   Cardiovascular   (+) Chronic atrial fibrillation (CMS/HCC)   (+) Hyperlipidemia   (+) Rib pain on left side   (+) Solar purpura (CMS/HCC)      Endocrine   (+) Diabetic neuropathy associated with type 2 diabetes mellitus (CMS/HCC)   (+) Hyponatremia   (+) Type 2 diabetes mellitus with hyperglycemia (CMS/HCC)      Neuro/Psych   (+) Depression      Pulmonary   (+) Obstructive sleep apnea, adult      Hematology   (+) Iron deficiency anemia       Clinical information reviewed:   Tobacco  Allergies  Meds   Med Hx  Surg Hx   Fam Hx  Soc Hx        NPO Detail:  No data recorded     Physical Exam    Airway  Mallampati: II  TM distance: >3 FB  Neck ROM: full     Cardiovascular   Rhythm: regular  Rate: normal     Dental    Pulmonary   Breath sounds clear to auscultation     Abdominal            Anesthesia Plan    ASA 2     general     intravenous induction   Trial extubation is planned.    Plan discussed with resident.

## 2023-10-20 NOTE — CARE PLAN
The patient's goals for the shift include  go back to facility    The clinical goals for the shift include obtain working route for nutrition      Problem: Nutrition  Goal: Less than 5 days NPO/clear liquids  Outcome: Progressing  Goal: Nutrition support goals are met within 48 hrs  Outcome: Progressing  Goal: BG  mg/dL  Outcome: Progressing  Goal: Lab values WNL  Outcome: Progressing  Goal: Electrolytes WNL  Outcome: Progressing  Goal: Promote healing  Outcome: Progressing  Goal: Maintain stable weight  Outcome: Progressing     Problem: Skin  Goal: Decreased wound size/increased tissue granulation at next dressing change  Outcome: Progressing  Goal: Participates in plan/prevention/treatment measures  Outcome: Progressing  Goal: Prevent/manage excess moisture  Outcome: Progressing  Goal: Prevent/minimize sheer/friction injuries  Outcome: Progressing  Goal: Promote/optimize nutrition  Outcome: Progressing  Goal: Promote skin healing  Outcome: Progressing     Problem: Pain  Goal: Takes deep breaths with improved pain control throughout the shift  Outcome: Progressing  Goal: Turns in bed with improved pain control throughout the shift  Outcome: Progressing  Goal: Walks with improved pain control throughout the shift  Outcome: Progressing  Goal: Performs ADL's with improved pain control throughout shift  Outcome: Progressing

## 2023-10-20 NOTE — ANESTHESIA PREPROCEDURE EVALUATION
Patient: Levy Gregory    Procedure Information       Date/Time: 10/20/23 1302    Procedures:       Esophagogastroduodenoscopy (Abdomen) - with J tube revision      Line Placement Subcutaneous Port    Location: [facility] [place] [facility] / [facility] [place]    Surgeons: Brielle Gongora, DO            Relevant Problems   Cardiovascular   (+) Chronic atrial fibrillation (CMS/HCC)   (+) Hyperlipidemia   (+) Rib pain on left side   (+) Solar purpura (CMS/HCC)      Endocrine   (+) Diabetic neuropathy associated with type 2 diabetes mellitus (CMS/HCC)   (+) Hyponatremia   (+) Type 2 diabetes mellitus with hyperglycemia (CMS/HCC)      Neuro/Psych   (+) Depression      Pulmonary   (+) Obstructive sleep apnea, adult      Hematology   (+) Iron deficiency anemia       Clinical information reviewed:   Tobacco  Allergies  Meds   Med Hx  Surg Hx   Fam Hx  Soc Hx        NPO Detail:  No data recorded     Physical Exam    Airway  Mallampati: III  TM distance: >3 FB  Neck ROM: full     Cardiovascular   Rhythm: regular  Rate: normal     Dental    Pulmonary   Breath sounds clear to auscultation     Abdominal - normal exam             Anesthesia Plan    ASA 3     general     The patient is not a current smoker.  Patient did not smoke on day of procedure.    intravenous induction   Postoperative administration of opioids is intended.  Trial extubation is planned.  Anesthetic plan and risks discussed with patient.  Use of blood products discussed with patient who.    Plan discussed with resident and attending.

## 2023-10-20 NOTE — CARE PLAN
Problem: Nutrition  Goal: Less than 5 days NPO/clear liquids  Outcome: Progressing  Goal: Oral intake greater than 50%  Outcome: Progressing  Goal: Oral intake greater 75%  Outcome: Progressing  Goal: Consume prescribed supplement  Outcome: Progressing  Goal: Adequate PO fluid intake  Outcome: Progressing  Goal: Nutrition support goals are met within 48 hrs  Outcome: Progressing  Goal: Nutrition support is meeting 75% of nutrient needs  Outcome: Progressing  Goal: Tube feed tolerance  Outcome: Progressing  Goal: BG  mg/dL  Outcome: Progressing  Goal: Lab values WNL  Outcome: Progressing  Goal: Electrolytes WNL  Outcome: Progressing  Goal: Promote healing  Outcome: Progressing  Goal: Maintain stable weight  Outcome: Progressing  Goal: Reduce weight from edema/fluid  Outcome: Progressing  Goal: Gradual weight gain  Outcome: Progressing  Goal: Improve ostomy output  Outcome: Progressing     Problem: Skin  Goal: Decreased wound size/increased tissue granulation at next dressing change  Outcome: Progressing  Goal: Participates in plan/prevention/treatment measures  Outcome: Progressing  Goal: Prevent/manage excess moisture  Outcome: Progressing  Flowsheets (Taken 10/20/2023 0333)  Prevent/manage excess moisture:   Monitor for/manage infection if present   Moisturize dry skin  Goal: Prevent/minimize sheer/friction injuries  Outcome: Progressing  Goal: Promote/optimize nutrition  Outcome: Progressing  Goal: Promote skin healing  Outcome: Progressing     Problem: Pain  Goal: Takes deep breaths with improved pain control throughout the shift  Outcome: Progressing  Goal: Turns in bed with improved pain control throughout the shift  Outcome: Progressing  Goal: Walks with improved pain control throughout the shift  Outcome: Progressing  Goal: Performs ADL's with improved pain control throughout shift  Outcome: Progressing  Goal: Participates in PT with improved pain control throughout the shift  Outcome:  Progressing  Goal: Free from opioid side effects throughout the shift  Outcome: Progressing  Goal: Free from acute confusion related to pain meds throughout the shift  Outcome: Progressing   The patient's goals for the shift include      The clinical goals for the shift include pain control

## 2023-10-20 NOTE — ANESTHESIA PROCEDURE NOTES
Airway  Date/Time: 10/20/2023 12:37 PM  Urgency: elective    Airway not difficult    Staffing  Performed: resident   Authorized by: Arias Calderon MD    Performed by: Aakash Posadas MD  Patient location during procedure: OR    Indications and Patient Condition  Indications for airway management: anesthesia and airway protection  Spontaneous ventilation: present  Sedation level: deep  Preoxygenated: yes  Patient position: sniffing  MILS maintained throughout  Mask difficulty assessment: 2 - vent by mask + OA or adjuvant +/- NMBA  Planned trial extubation    Final Airway Details  Final airway type: endotracheal airway      Successful airway: ETT  Cuffed: yes   Successful intubation technique: direct laryngoscopy  Endotracheal tube insertion site: oral  Blade: Abhi  Blade size: #4  ETT size (mm): 7.5  Cormack-Lehane Classification: grade IIb - view of arytenoids or posterior of glottis only  Placement verified by: chest auscultation and capnometry   Measured from: lips  ETT to lips (cm): 22  Number of attempts at approach: 1  Number of other approaches attempted: 0

## 2023-10-20 NOTE — BRIEF OP NOTE
Date: 10/20/2023  OR Location: [facility] OR    Name: Levy Gregory, : 1955, Age: 68 y.o., MRN: [id number], Sex: male    Diagnosis  Pre-op Diagnosis     * Moderate protein-calorie malnutrition (CMS/HCC) [E44.0] Post-op Diagnosis     * Moderate protein-calorie malnutrition (CMS/HCC) [E44.0]     Procedures  1) Revision of jejunostomy tube  2) Gastrostomy tube replacement    Surgeons      * Brielle Gongora - Primary    Resident/Fellow/Other Assistant:  Surgeon(s) and Role: Lili Lowe MD    Procedure Summary  Anesthesia: General  ASA: III  Anesthesia Staff: Anesthesiologist: Arias Calderon MD  CRNA: [facility] [place]-CRNA  Anesthesia Resident: Aakash Posadas MD  Estimated Blood Loss: 5mL  Intra-op Medications:   Medication Name Total Dose   sodium chloride 0.9 % irrigation solution 1,000 mL   HYDROmorphone (Dilaudid) injection 0.2 mg 1 mg              Anesthesia Record               Intraprocedure I/O Totals          Intake    lactated Ringer's 350.00 mL    Total Intake 350 mL       Output    Urine 100 mL    Est. Blood Loss 3 mL    Total Output 103 mL       Net    Net Volume 247 mL          Specimen: No specimens collected     Staff:   Circulator: Shauna Galvan RN  Relief Circulator: Lise Kraus RN  Relief Scrub: Shauna Galvan RN  Scrub Person: Jacinta Hernandez RN      Findings: Scope performed through gastrostomy tube tract, jejunostomy tube positioning confirmed under fluoro, G tube lateral to J tube    Complications:  None; patient tolerated the procedure well.     Disposition: PACU - hemodynamically stable.  Condition: stable  Specimens Collected: No specimens collected  Attending Attestation:     Brielle Gongora  Phone Number: [phone]

## 2023-10-20 NOTE — ANESTHESIA POSTPROCEDURE EVALUATION
Patient: Levy Gregory    Procedure Summary       Date: 10/20/23 Room / Location: [facility] OR [facility] / [facility] [place]    Anesthesia Start: 1220 Anesthesia Stop: 1354    Procedures:       Esophagogastroduodenoscopy (Abdomen)      Line Placement Subcutaneous Port (Abdomen) Diagnosis:       Moderate protein-calorie malnutrition (CMS/HCC)      (Moderate protein-calorie malnutrition (CMS/HCC) [E44.0])    Surgeons: Brielle Gongora DO Responsible Provider: Arias Calderon MD    Anesthesia Type: general ASA Status: 3            Anesthesia Type: general    Vitals Value Taken Time   /94 10/20/23 1355   Temp 36.0 10/20/23 1355   Pulse 80 10/20/23 1355   Resp 10 10/20/23 1355   SpO2 100 10/20/23 1355       Anesthesia Post Evaluation    Patient location during evaluation: PACU  Patient participation: complete - patient participated  Level of consciousness: awake and alert  Pain score: 10  Pain management: adequate  Airway patency: patent  Cardiovascular status: acceptable  Respiratory status: acceptable  Hydration status: acceptable        No notable events documented.

## 2023-10-21 ENCOUNTER — DOCUMENTATION (OUTPATIENT)
Dept: INPATIENT UNIT | Facility: HOSPITAL | Age: 68
End: 2023-10-21
Payer: MEDICARE

## 2023-10-21 VITALS
HEIGHT: 70 IN | SYSTOLIC BLOOD PRESSURE: 108 MMHG | DIASTOLIC BLOOD PRESSURE: 70 MMHG | HEART RATE: 61 BPM | BODY MASS INDEX: 24.78 KG/M2 | WEIGHT: 173.06 LBS | OXYGEN SATURATION: 100 % | TEMPERATURE: 97.3 F | RESPIRATION RATE: 16 BRPM

## 2023-10-21 LAB
GLUCOSE BLD MANUAL STRIP-MCNC: 106 MG/DL (ref 74–99)
GLUCOSE BLD MANUAL STRIP-MCNC: 116 MG/DL (ref 74–99)
GLUCOSE BLD MANUAL STRIP-MCNC: 116 MG/DL (ref 74–99)
GLUCOSE BLD MANUAL STRIP-MCNC: 139 MG/DL (ref 74–99)

## 2023-10-21 PROCEDURE — 2500000001 HC RX 250 WO HCPCS SELF ADMINISTERED DRUGS (ALT 637 FOR MEDICARE OP)

## 2023-10-21 PROCEDURE — 96372 THER/PROPH/DIAG INJ SC/IM: CPT | Performed by: STUDENT IN AN ORGANIZED HEALTH CARE EDUCATION/TRAINING PROGRAM

## 2023-10-21 PROCEDURE — G0378 HOSPITAL OBSERVATION PER HR: HCPCS

## 2023-10-21 PROCEDURE — 2500000004 HC RX 250 GENERAL PHARMACY W/ HCPCS (ALT 636 FOR OP/ED): Performed by: STUDENT IN AN ORGANIZED HEALTH CARE EDUCATION/TRAINING PROGRAM

## 2023-10-21 PROCEDURE — 96361 HYDRATE IV INFUSION ADD-ON: CPT | Performed by: EMERGENCY MEDICINE

## 2023-10-21 PROCEDURE — C9113 INJ PANTOPRAZOLE SODIUM, VIA: HCPCS | Performed by: STUDENT IN AN ORGANIZED HEALTH CARE EDUCATION/TRAINING PROGRAM

## 2023-10-21 PROCEDURE — 2500000004 HC RX 250 GENERAL PHARMACY W/ HCPCS (ALT 636 FOR OP/ED)

## 2023-10-21 PROCEDURE — 82947 ASSAY GLUCOSE BLOOD QUANT: CPT | Mod: CMCLAB

## 2023-10-21 PROCEDURE — 96374 THER/PROPH/DIAG INJ IV PUSH: CPT | Performed by: EMERGENCY MEDICINE

## 2023-10-21 RX ADMIN — FERROUS SULFATE TAB 325 MG (65 MG ELEMENTAL FE) 325 MG: 325 (65 FE) TAB at 20:38

## 2023-10-21 RX ADMIN — PANTOPRAZOLE SODIUM 40 MG: 40 INJECTION, POWDER, FOR SOLUTION INTRAVENOUS at 09:53

## 2023-10-21 RX ADMIN — MIDODRINE HYDROCHLORIDE 5 MG: 10 TABLET ORAL at 15:03

## 2023-10-21 RX ADMIN — HYDROCORTISONE: 1 OINTMENT TOPICAL at 09:00

## 2023-10-21 RX ADMIN — GABAPENTIN 300 MG: 300 CAPSULE ORAL at 20:38

## 2023-10-21 RX ADMIN — HEPARIN SODIUM 5000 UNITS: 5000 INJECTION INTRAVENOUS; SUBCUTANEOUS at 16:58

## 2023-10-21 RX ADMIN — SERTRALINE HYDROCHLORIDE 50 MG: 50 TABLET ORAL at 09:52

## 2023-10-21 RX ADMIN — FERROUS SULFATE TAB 325 MG (65 MG ELEMENTAL FE) 325 MG: 325 (65 FE) TAB at 09:52

## 2023-10-21 RX ADMIN — MIDODRINE HYDROCHLORIDE 5 MG: 10 TABLET ORAL at 20:38

## 2023-10-21 RX ADMIN — DEXTROSE AND SODIUM CHLORIDE 75 ML/HR: 5; 450 INJECTION, SOLUTION INTRAVENOUS at 20:38

## 2023-10-21 RX ADMIN — HEPARIN SODIUM 5000 UNITS: 5000 INJECTION INTRAVENOUS; SUBCUTANEOUS at 09:52

## 2023-10-21 RX ADMIN — TAMSULOSIN HYDROCHLORIDE 0.4 MG: 0.4 CAPSULE ORAL at 09:52

## 2023-10-21 RX ADMIN — HYDROCORTISONE: 1 OINTMENT TOPICAL at 20:39

## 2023-10-21 RX ADMIN — THIAMINE HYDROCHLORIDE 100 MG: 100 INJECTION, SOLUTION INTRAMUSCULAR; INTRAVENOUS at 09:53

## 2023-10-21 RX ADMIN — HYDROMORPHONE HYDROCHLORIDE 0.2 MG: 1 INJECTION, SOLUTION INTRAMUSCULAR; INTRAVENOUS; SUBCUTANEOUS at 20:30

## 2023-10-21 RX ADMIN — MIDODRINE HYDROCHLORIDE 5 MG: 10 TABLET ORAL at 09:52

## 2023-10-21 RX ADMIN — GABAPENTIN 300 MG: 300 CAPSULE ORAL at 09:52

## 2023-10-21 ASSESSMENT — PAIN SCALES - GENERAL: PAINLEVEL_OUTOF10: 7

## 2023-10-21 NOTE — NURSING NOTE
RN spoke with MD Maribeth Ivory with thoracic surg team about the plan of care for the pt. MD Maribeth Ivory is aware that the goldform has to be completed.

## 2023-10-21 NOTE — DISCHARGE SUMMARY
Discharge Diagnosis  Malfunction of jejunostomy tube (CMS/[facility])    Issues Requiring Follow-Up  Follow up with Dr. Gongora    Test Results Pending At Discharge  Pending Labs       No current pending labs.          Hospital Course   Mr. Gregory is a 68 y.o. male presenting with 2 weeks of concern for refluxing tube feeds.  He has a complex history of achalasia status post robotic Heller myotomy complicated by esophageal perforation requiring esophagectomy with cervical esophagostomy and laparoscopic takedown of prior fundoplication.  He has been admitted once for G-tube removal and placement of G-tube with J extension, but this became clogged frequently and then broke.  He was then admitted earlier in October for placement of percutaneous gastrostomy and wire-guided jejunostomy tube.  Those worked initially but he has been having issues as stated for about 2 weeks.  Also reports a 10 pound weight loss.  Patient taken to OR on 10/20 for uncomplicated J tube revision and G tube exchange, which he tolerated well. Postoperatively G tube vented and J tube capped for one day. Discharged back to his SNF on POD1 with instructions to restart tube feeds via J tube and to follow up with Dr. Gongora.     Pertinent Physical Exam At Time of Discharge  Physical Exam  General: resting in bed, NAD  HEENT: normocephalic  CV: regular rate and rhythm  Pulm: nonlabored respiratory effort on room air  Abd: soft, nondistended, appropriately tender around incision sites, G tube vented to aguilera bag with gastric contents and small amount of drainage around insertion site, J tube capped  MSK: MEJIA  Neuro: awake and alert, no focal deficits  Psych: appropriate mood and behavior     Home Medications     Medication List      CONTINUE taking these medications     apixaban 2.5 mg tablet; Commonly known as: Eliquis; Take 1 tablet (2.5   mg) by mouth 2 times a day. Do not start before October 21, 2023.   atorvastatin 10 mg tablet; Commonly known as:  "Lipitor   esomeprazole 40 mg packet; Commonly known as: NexIUM   ferrous sulfate 325 (65 Fe) MG tablet   Flomax 0.4 mg 24 hr capsule; Generic drug: tamsulosin   gabapentin 300 mg capsule; Commonly known as: Neurontin   hydrocortisone 1 % ointment   hydrOXYzine HCL 25 mg tablet; Commonly known as: Atarax; Take 1 tablet   (25 mg) by mouth 2 times a day.   * insulin syringe-needle U-100 0.5 mL 30 gauge x 5/16\" syringe   * insulin syringe-needle U-100 1/2 mL 27 gauge x 1/2\" syringe   lancets 33 gauge misc   Levemir U-100 Insulin 100 unit/mL injection; Generic drug: insulin   detemir; Inject 35 Units under the skin once daily at bedtime. Take as   directed per insulin instructions.   midodrine 5 mg tablet; Commonly known as: Proamatine; Take 1 tablet (5   mg) by mouth 3 times a day.   OneTouch Verio test strips strip; Generic drug: blood sugar diagnostic   senna 8.8 mg/5 mL syrup; Commonly known as: Senokot   sertraline 50 mg tablet; Commonly known as: Zoloft   spironolactone 25 mg tablet; Commonly known as: Aldactone; Take 0.5   tablets (12.5 mg) by mouth once daily.   thiamine 100 mg tablet; Commonly known as: Vitamin B-1  * This list has 2 medication(s) that are the same as other medications   prescribed for you. Read the directions carefully, and ask your doctor or   other care provider to review them with you.       Outpatient Follow-Up  Future Appointments   Date Time Provider Department [place]   10/25/2023 10:15 AM JARAD RUELAS   10/26/2023  1:00 PM Brielle Gongora DO [id number] [facility]   11/4/2023  9:00 AM Chris Huizar MD [id number] [place]   12/19/2023  9:00 AM Viktor Guzman MD [id number] [place]       Lili Lowe MD  "

## 2023-10-21 NOTE — PROGRESS NOTES
"Levy Gregory is a 68 y.o. male on day 1 of admission presenting with Malfunction of jejunostomy tube (CMS/HCC).  Patient is medically ready for discharge.  Transportation has been requested for 11AM return to [facility] via [facility] [phone].  Report number [phone].  Will need completed gold form.        Last Recorded Vitals  Blood pressure 110/70, pulse 80, temperature 36.7 °C (98.1 °F), resp. rate 20, height 1.778 m (5' 10\"), weight 78.5 kg (173 lb 1 oz), SpO2 96 %.  Intake/Output last 3 Shifts:  I/O last 3 completed shifts:  In: 1463.8 (18.6 mL/kg) [I.V.:963.8 (12.3 mL/kg); IV Piggyback:500]  Out: 1153 (14.7 mL/kg) [Urine:700 (0.2 mL/kg/hr); Emesis/NG output:350; Drains:100; Blood:3]  Weight: 78.5 kg       Assessment/Plan   Principal Problem:    Malfunction of jejunostomy tube (CMS/HCC)  Active Problems:    Esophageal perforation    Moderate protein-calorie malnutrition (CMS/HCC)      Elliot Hansen RN      "

## 2023-10-21 NOTE — OP NOTE
Esophagogastroduodenoscopy, Line Placement Subcutaneous Port Operative Note     Date: 10/20/2023  OR Location: [facility] OR    Name: Levy Gregory, : 1955, Age: 68 y.o., MRN: [id number], Sex: male    Diagnosis  Pre-op Diagnosis     * Moderate protein-calorie malnutrition (CMS/HCC) [E44.0] Post-op Diagnosis     * Moderate protein-calorie malnutrition (CMS/HCC) [E44.0]     Procedures  Gastroscopy, revision jejunostomy tube and gastrostomy tube    Surgeons      * Brielle Gongora - Primary    Resident/Fellow/Other Assistant:  Surgeon(s) and Role:    Procedure Summary  Anesthesia: General  ASA: III  Anesthesia Staff: Anesthesiologist: Arias Calderon MD  CRNA: IVÁN Angeles-CRNA  Anesthesia Resident: Aakash Posadas MD  Estimated Blood Loss: 10mL  Intra-op Medications:   Medication Name Total Dose   sodium chloride 0.9 % irrigation solution 1,000 mL   HYDROmorphone (Dilaudid) injection 0.2 mg 1 mg   HYDROmorphone PF (Dilaudid) injection 0.5 mg 1 mg              Anesthesia Record               Intraprocedure I/O Totals          Intake    lactated Ringer's 350.00 mL    Total Intake 350 mL       Output    Urine 100 mL    Est. Blood Loss 3 mL    Total Output 103 mL       Net    Net Volume 247 mL          Specimen: No specimens collected     Staff:   Circulator: Shauna Galvan RN  Relief Circulator: Lise Kraus RN  Relief Scrub: Shauna Galvan RN  Scrub Person: Jacinta Hernandez RN         Drains and/or Catheters:   Open Drain Left;Superior Chest (Active)   Site Description Healing 10/20/23 1043   Dressing Status Dry;Clean 10/20/23 1550   Output (mL) 100 mL 10/20/23 0644       Gastrostomy/Enterostomy LUQ (Active)   Surrounding Skin Intact 10/20/23 1044   Drain Status Open to gravity drainage 10/20/23 1550   Drainage Appearance Straw;Yellow;Cloudy 10/20/23 1550   Dressing Status Clean;Dry 10/20/23 1044   Dressing Type Open to air 10/20/23 0900   Output (mL) 350 mL 10/20/23 0644        Gastrostomy/Enterostomy Jejunostomy LLQ (Active)   Surrounding Skin Intact 10/20/23 1044   Drain Status Clamped 10/20/23 0900   Dressing Status Clean;Dry 10/20/23 1044   Dressing Type Open to air 10/20/23 0900       Urethral Catheter (Active)   Site Assessment Clean 10/20/23 1342   Collection Container Standard drainage bag 10/20/23 1342   Securement Method Securing device (Describe) 10/20/23 1550   Reason for Continuing Urinary Catheterization chronic urinary retention 10/20/23 1550   Output (mL) 500 mL 10/20/23 0644             Implants: 16 Fr jejunostomy tube (medial)  20Fr gastrostomy tube (lateral)    Findings: prior jejunostomy tube flipped back into stomach    Indications: Levy Gregory is an 68 y.o. male who is having surgery for Moderate protein-calorie malnutrition (CMS/HCC) [E44.0]. He is s/p esophagectomy with diversion after a complicated esophageal perforation after Heller Myotomy for his achalasia. Patient had new gastrostomy and jejunostomy tube placed on 10/4 after repeat clogging of his PEG-J tube. Over the last few days has developed increased output from his venting G tube c/w appearance of tube feeds and has had continued weight loss.     The patient was seen in the preoperative area. The risks, benefits, complications, treatment options, non-operative alternatives, expected recovery and outcomes were discussed with the patient. The possibilities of reaction to medication, pulmonary aspiration, injury to surrounding structures, bleeding, recurrent infection, the need for additional procedures, failure to diagnose a condition, and creating a complication requiring transfusion or operation were discussed with the patient. The patient concurred with the proposed plan, giving informed consent.  The site of surgery was properly noted/marked if necessary per policy. The patient has been actively warmed in preoperative area. Preoperative antibiotics have been ordered and given within 1 hours of  incision. Venous thrombosis prophylaxis have been ordered including bilateral sequential compression devices    Procedure Details: Patient was brought into the operating suite and procedural information was confirmed.  General anesthesia induced single-lumen endotracheal tube was placed.  His previous gastrostomy tube was opened and a wire passed through this site to ensure the tract remained we then passed a jejunostomy tube over the wire and removed his prior lateral to and placed the pediatric endoscope through this old site.  We then carefully passed a 16 Icelandic jejunostomy tube after several attempts through the pylorus and confirmed placement with endoscopy.  The proximal duodenum was normal.  We then advanced the tube further through the duodenum into the jejunum.  This was confirmed with C arm OR chest x-ray.  We then filled the balloon of his jejunostomy tube (more medial on the abdomen) with 8 mL of water.  Lastly, we placed a 20 Icelandic G-tube through the more lateral/older gastrostomy site and again inflated this balloon with 8 mL of water.  The gastrostomy and jejunostomy tubes were secured to the skin with 0 Ethibond suture.  The gastrostomy tube was placed to a Maldonado bag for drainage.  The patient was then extubated and taken to PACU in stable condition.    Complications:  None; patient tolerated the procedure well.    Disposition: PACU - hemodynamically stable.  Condition: stable         Additional Details: Small amount of granulation tissue and minimal purulent  fluid at the medial tube site noted at the beginning of procedure    Attending Attestation: I was present and scrubbed for the entire procedure.    Brielle Gongora  Phone Number: [phone]

## 2023-10-21 NOTE — PROGRESS NOTES
Patient is confirmed for discharge to [facility] today at 1315. Medical team, nursing, facility and unit secretary updated on discharge time. Discharge orders sent to facility via [facility].     Addendum 1429: Notified by LT3  [facility] had to outsource transport to another company. Spoke with Sushma [phone] at [facility] who informed the new transport company is [facility] [phone]. Spoke with [facility] [phone] who confirmed a new eta of 2000. Medical team, facility, and nursing updated of above.     Mya Irizarry RN  Transitional Care Coordinator/TCC  [id number]

## 2023-10-22 NOTE — CARE PLAN
Problem: Nutrition  Goal: Less than 5 days NPO/clear liquids  Outcome: Met  Goal: Oral intake greater than 50%  Outcome: Met  Goal: Oral intake greater 75%  Outcome: Met  Goal: Consume prescribed supplement  Outcome: Met  Goal: Adequate PO fluid intake  Outcome: Met  Goal: Nutrition support goals are met within 48 hrs  Outcome: Met  Goal: Nutrition support is meeting 75% of nutrient needs  Outcome: Met  Goal: Tube feed tolerance  Outcome: Met  Goal: BG  mg/dL  Outcome: Met  Goal: Lab values WNL  Outcome: Met  Goal: Electrolytes WNL  Outcome: Met  Goal: Promote healing  Outcome: Met  Goal: Maintain stable weight  Outcome: Met  Goal: Reduce weight from edema/fluid  Outcome: Met  Goal: Gradual weight gain  Outcome: Met  Goal: Improve ostomy output  Outcome: Met     Problem: Skin  Goal: Decreased wound size/increased tissue granulation at next dressing change  Outcome: Met  Goal: Participates in plan/prevention/treatment measures  Outcome: Met  Goal: Prevent/manage excess moisture  Outcome: Met  Goal: Prevent/minimize sheer/friction injuries  Outcome: Met  Goal: Promote/optimize nutrition  Outcome: Met  Goal: Promote skin healing  Outcome: Met     Problem: Pain  Goal: Takes deep breaths with improved pain control throughout the shift  Outcome: Met  Goal: Turns in bed with improved pain control throughout the shift  Outcome: Met  Goal: Walks with improved pain control throughout the shift  Outcome: Met  Goal: Performs ADL's with improved pain control throughout shift  Outcome: Met  Goal: Participates in PT with improved pain control throughout the shift  Outcome: Met  Goal: Free from opioid side effects throughout the shift  Outcome: Met  Goal: Free from acute confusion related to pain meds throughout the shift  Outcome: Met   The patient's goals for the shift include      The clinical goals for the shift include obtain working route for nutrition

## 2023-10-22 NOTE — NURSING NOTE
Patient been advised that preferred method of transportation is via [facility] but if patient request to be transported by family it is approved per Legal. Nursing Charge, Nursing Supervisor and Thoracic MD Lili were all aware and ok with them. Patient left the department via wheelchair with family and belongings at 2215.  Per [facility], they will be arriving around [phone] but did not come and patient's family told the nurse if it can be cancelled and family will be transporting the patient per car. [facility] which the facility that the patient will be receiving is aware of patient's mode of transportation.

## 2023-10-23 ENCOUNTER — OFFICE VISIT (OUTPATIENT)
Dept: GERIATRIC MEDICINE | Age: 68
End: 2023-10-23

## 2023-10-23 DIAGNOSIS — F32.A DEPRESSION, UNSPECIFIED DEPRESSION TYPE: ICD-10-CM

## 2023-10-23 DIAGNOSIS — D64.9 ANEMIA, UNSPECIFIED TYPE: ICD-10-CM

## 2023-10-23 DIAGNOSIS — J90 PLEURAL EFFUSION: ICD-10-CM

## 2023-10-23 DIAGNOSIS — R53.1 WEAKNESS: Primary | ICD-10-CM

## 2023-10-23 DIAGNOSIS — R13.10 DYSPHAGIA, UNSPECIFIED TYPE: ICD-10-CM

## 2023-10-25 ENCOUNTER — HOSPITAL ENCOUNTER (OUTPATIENT)
Dept: RADIOLOGY | Facility: HOSPITAL | Age: 68
Discharge: HOME | End: 2023-10-25
Payer: MEDICARE

## 2023-10-25 DIAGNOSIS — Z93.1 GASTROSTOMY IN PLACE (MULTI): ICD-10-CM

## 2023-10-25 DIAGNOSIS — K22.3 ESOPHAGEAL PERFORATION: ICD-10-CM

## 2023-10-25 DIAGNOSIS — K22.0 ACHALASIA, ESOPHAGEAL: ICD-10-CM

## 2023-10-25 PROCEDURE — 74177 CT ABD & PELVIS W/CONTRAST: CPT

## 2023-10-25 PROCEDURE — 2550000001 HC RX 255 CONTRASTS: Performed by: STUDENT IN AN ORGANIZED HEALTH CARE EDUCATION/TRAINING PROGRAM

## 2023-10-25 PROCEDURE — 74177 CT ABD & PELVIS W/CONTRAST: CPT | Mod: MG

## 2023-10-25 PROCEDURE — 71260 CT THORAX DX C+: CPT

## 2023-10-25 RX ADMIN — IOHEXOL 75 ML: 350 INJECTION, SOLUTION INTRAVENOUS at 10:53

## 2023-10-25 NOTE — PROGRESS NOTES
SNF PROGRESS NOTE      Cc- GJ tube replacement       Patient is a Jae Ly 80 y.o. male who is being seen at [facility]. Patient had an esophageal perforation with esophagectomy. Noted to have a R pleural effusion R VATS was done. GJ tube was placed. She received 1 unit PRBC. He tested COVID + on Monday 9/25. He had his GJ tube exchanged out for a new system on 10/4/23. Patient has had a significant amount of weight loss. It almost seems like its coming right out. No past medical history on file. Patient has no known allergies. VS reviewed    Gen- Alert and oriented x 3, thin  Heart- RRR no murmur no LE edema   Lungs- CTA b/l no resp distress RA oxygen   Abd- bs x 4  + Tubes, J and G tube           Assessment and Plan    S/p Esophageal Perf with esophagectomy   S/p IV zosyn until 9/27/23 and levaquin until 9/27/23  S/p Fluconazole until 9/27/23  Pleural Effusion s/p VATS  S/p thoracic surgery 9/7  HLD   On statin   Depression   On zoloft. PE/DVT  On eliquis   Anemia. S/p 1 unit PRBC in the hospital   Dysphagia      GJ tube system replaced on 10/4/23 with  J and G tube    COVID 19  Tested positive 9/25/23  In isolation   S/p Paxlovid and decadron     Due to weight loss, talked with surgeon, they would like a tube study done at [facility], who stated that they would not do this and they need to see surgeon. Spoke with Dr Destini Wilkins who wants patient to be sent downtown.    Malena Prieto DO, [facility]     Electronically signed by: Waylon Mathews DO on 10/17/2023

## 2023-10-26 ENCOUNTER — LAB (OUTPATIENT)
Dept: LAB | Facility: HOSPITAL | Age: 68
End: 2023-10-26
Payer: MEDICARE

## 2023-10-26 ENCOUNTER — OFFICE VISIT (OUTPATIENT)
Dept: SURGERY | Facility: HOSPITAL | Age: 68
End: 2023-10-26
Payer: MEDICARE

## 2023-10-26 VITALS
BODY MASS INDEX: 25.35 KG/M2 | RESPIRATION RATE: 17 BRPM | SYSTOLIC BLOOD PRESSURE: 92 MMHG | HEART RATE: 85 BPM | DIASTOLIC BLOOD PRESSURE: 58 MMHG | WEIGHT: 176.7 LBS | OXYGEN SATURATION: 100 % | TEMPERATURE: 97.2 F

## 2023-10-26 DIAGNOSIS — K22.0 ACHALASIA, ESOPHAGEAL: Primary | ICD-10-CM

## 2023-10-26 DIAGNOSIS — K22.3 ESOPHAGEAL PERFORATION: ICD-10-CM

## 2023-10-26 DIAGNOSIS — E46 MALNUTRITION, UNSPECIFIED TYPE (MULTI): ICD-10-CM

## 2023-10-26 DIAGNOSIS — E44.0 MALNUTRITION OF MODERATE DEGREE (MULTI): ICD-10-CM

## 2023-10-26 DIAGNOSIS — Z95.828 S/P PICC CENTRAL LINE PLACEMENT: ICD-10-CM

## 2023-10-26 DIAGNOSIS — K22.0 ACHALASIA, ESOPHAGEAL: ICD-10-CM

## 2023-10-26 LAB
ALBUMIN SERPL BCP-MCNC: 4.2 G/DL (ref 3.4–5)
ALP SERPL-CCNC: 1920 U/L (ref 33–136)
ALT SERPL W P-5'-P-CCNC: 148 U/L (ref 10–52)
ANION GAP SERPL CALC-SCNC: 17 MMOL/L (ref 10–20)
AST SERPL W P-5'-P-CCNC: 98 U/L (ref 9–39)
BILIRUB SERPL-MCNC: 0.6 MG/DL (ref 0–1.2)
BUN SERPL-MCNC: 37 MG/DL (ref 6–23)
CALCIUM SERPL-MCNC: 11.2 MG/DL (ref 8.6–10.3)
CHLORIDE SERPL-SCNC: 97 MMOL/L (ref 98–107)
CO2 SERPL-SCNC: 24 MMOL/L (ref 21–32)
CREAT SERPL-MCNC: 1.18 MG/DL (ref 0.5–1.3)
ERYTHROCYTE [DISTWIDTH] IN BLOOD BY AUTOMATED COUNT: 14.5 % (ref 11.5–14.5)
GFR SERPL CREATININE-BSD FRML MDRD: 68 ML/MIN/1.73M*2
GLUCOSE SERPL-MCNC: 118 MG/DL (ref 74–99)
HCT VFR BLD AUTO: 38.6 % (ref 41–52)
HGB BLD-MCNC: 13.5 G/DL (ref 13.5–17.5)
MCH RBC QN AUTO: 34.3 PG (ref 26–34)
MCHC RBC AUTO-ENTMCNC: 35 G/DL (ref 32–36)
MCV RBC AUTO: 98 FL (ref 80–100)
NRBC BLD-RTO: 0 /100 WBCS (ref 0–0)
PLATELET # BLD AUTO: 290 X10*3/UL (ref 150–450)
PMV BLD AUTO: 9.1 FL (ref 7.5–11.5)
POTASSIUM SERPL-SCNC: 4.3 MMOL/L (ref 3.5–5.3)
PREALB SERPL-MCNC: 32.5 MG/DL (ref 18–40)
PROT SERPL-MCNC: 8.2 G/DL (ref 6.4–8.2)
RBC # BLD AUTO: 3.94 X10*6/UL (ref 4.5–5.9)
SODIUM SERPL-SCNC: 134 MMOL/L (ref 136–145)
TRANSFERRIN SERPL-MCNC: 190 MG/DL (ref 200–360)
WBC # BLD AUTO: 8.8 X10*3/UL (ref 4.4–11.3)

## 2023-10-26 PROCEDURE — 84466 ASSAY OF TRANSFERRIN: CPT

## 2023-10-26 PROCEDURE — 82247 BILIRUBIN TOTAL: CPT

## 2023-10-26 PROCEDURE — 1160F RVW MEDS BY RX/DR IN RCRD: CPT | Performed by: STUDENT IN AN ORGANIZED HEALTH CARE EDUCATION/TRAINING PROGRAM

## 2023-10-26 PROCEDURE — 1111F DSCHRG MED/CURRENT MED MERGE: CPT | Performed by: STUDENT IN AN ORGANIZED HEALTH CARE EDUCATION/TRAINING PROGRAM

## 2023-10-26 PROCEDURE — 3066F NEPHROPATHY DOC TX: CPT | Performed by: STUDENT IN AN ORGANIZED HEALTH CARE EDUCATION/TRAINING PROGRAM

## 2023-10-26 PROCEDURE — 85027 COMPLETE CBC AUTOMATED: CPT

## 2023-10-26 PROCEDURE — 3008F BODY MASS INDEX DOCD: CPT | Performed by: STUDENT IN AN ORGANIZED HEALTH CARE EDUCATION/TRAINING PROGRAM

## 2023-10-26 PROCEDURE — 84134 ASSAY OF PREALBUMIN: CPT

## 2023-10-26 PROCEDURE — 99214 OFFICE O/P EST MOD 30 MIN: CPT | Performed by: STUDENT IN AN ORGANIZED HEALTH CARE EDUCATION/TRAINING PROGRAM

## 2023-10-26 PROCEDURE — 3074F SYST BP LT 130 MM HG: CPT | Performed by: STUDENT IN AN ORGANIZED HEALTH CARE EDUCATION/TRAINING PROGRAM

## 2023-10-26 PROCEDURE — 3044F HG A1C LEVEL LT 7.0%: CPT | Performed by: STUDENT IN AN ORGANIZED HEALTH CARE EDUCATION/TRAINING PROGRAM

## 2023-10-26 PROCEDURE — 80048 BASIC METABOLIC PNL TOTAL CA: CPT

## 2023-10-26 PROCEDURE — 1159F MED LIST DOCD IN RCRD: CPT | Performed by: STUDENT IN AN ORGANIZED HEALTH CARE EDUCATION/TRAINING PROGRAM

## 2023-10-26 PROCEDURE — 3078F DIAST BP <80 MM HG: CPT | Performed by: STUDENT IN AN ORGANIZED HEALTH CARE EDUCATION/TRAINING PROGRAM

## 2023-10-26 PROCEDURE — 1125F AMNT PAIN NOTED PAIN PRSNT: CPT | Performed by: STUDENT IN AN ORGANIZED HEALTH CARE EDUCATION/TRAINING PROGRAM

## 2023-10-26 RX ORDER — MIDODRINE HYDROCHLORIDE 5 MG/1
5 TABLET ORAL 3 TIMES DAILY
COMMUNITY
End: 2024-02-07 | Stop reason: SDUPTHER

## 2023-10-26 RX ORDER — POTASSIUM CHLORIDE 750 MG/1
20 CAPSULE, EXTENDED RELEASE ORAL DAILY
COMMUNITY
End: 2023-11-28 | Stop reason: WASHOUT

## 2023-10-26 ASSESSMENT — ENCOUNTER SYMPTOMS
DEPRESSION: 0
OCCASIONAL FEELINGS OF UNSTEADINESS: 1
LOSS OF SENSATION IN FEET: 0

## 2023-10-26 ASSESSMENT — PAIN SCALES - GENERAL: PAINLEVEL: 7

## 2023-10-26 NOTE — PROGRESS NOTES
C/C:  Follow up visit    History Of Present Illness  Levy Gregory is a 68 y.o. male presenting for follow up - since his last visit he has required two separate feeding tube replacements for issues with clogging and then with jejunostomy tube malposition. Most recently had a G and separate J tube replacements (on 10/20/2023). Since that time has been doing well. Denies any acute issues with his tube feeds. Has started to regain some of the weight he lost.   Does report he is still having intermittent headaches (mostly frontal, unsure exactly what brings them on/relieves them).     He presents today with a CT C/A/P also with IV contrast to evaluate the GI system and prepare for potential gastric pull up.     By way of review: patient has a notable PMHx including h/o COPD, T2DM, type II achalasia, paraesophageal hernia s/p robotic Laparoscopic Heller myotomy with mary fundoplication on 3/1 with Dr. Hoang. He presented to ED on 3/7 after discharge with SOB, chills, RUQ pain. CT completed concerning for esophageal perforation and he underwent diagnostic lap with drain placement, EGD with stent placement x2, R chest tube on 3/7. He was transferred to  SICU on 3/9 for further management. Thoracic surgery was consulted. A second chest tube was placed on 3/13 by thoracic surgery. On 3/15 he went to OR for R VATs, decortication, bronchoscopy, and NJ placement . Large empyema was seen on CT chest and he underwent IR guided pigtail placement on 3/24.   On 3/27 he went to OR for EGD with esophageal stent replacement and nasal tube post pyloric placement with Dr. Hoang, previous esophageal stent found to have migrated below LES and MIGUELINA drain visible and traveling through esophageal perforation. A new esophageal stent was placed more proximally. On 3/30 all drains with bilious fluid with concern for persistent leak. He returned to OR on 3/31 with thoracic and fuentes surgery and underwent EGD, removal of esophageal stent,  endovac placement, dobhoff tube placement, PEG tube placement. He returned to OR on 4/3 for endovac replacement with Dr. Gongora and was found to have perforation from 42 to 35 cm from the lips. CT   C/A/P obtained on 4/4 showed previous findings as well as increased pneumoperitoneum.   On 4/5, he returned to OR for right thoracotomy and esophagectomy with cervical esophagostomy and lap takedown of prior fundoplication, lysis of adhesions, and revision of gastrostomy tube for esophageal perforation with thoracic surgery. Transferred to SICU post-op. On 4/7 return to OR for PEG replacement and J tube placement with thoracic. While in SICU he was weaned off of pressors on 4/7. Successfully extubated on 4/8. Reintubated on 4/10 d/t respiratory fatigue. Amio drip initiated on the same day for uncontrolled Afib with RVR. He was also started on CVVH on 4/10. He was extubated in ICU on 4/12. CVVH discontinued. CT CAP completed which demonstrated intraabdominal RP abscesses and intramuscular psoas hematoma. CT CAP repeated on 4/24, which demonstrated increase in size of   RP hematoma and decrease in size of psoas hematoma. He was transferred to [facility] on 4/25. While on [facility], he developed rising leukocytosis and tachycardia and repeat CT PE and CT C/A/P with PO and IV contrast completed which was negative for PE, showed decreasing size of hematomas, and likely acalculous cholecystitis visualized. On 4/28 he underwent percutaneous cholecystostomy tube placement by IR, tube connected to accordion drain and remains in place at time of discharge. Repeat CT CA/P on 5/7 with evidence of persistent anastomotic leak from gastric stump. MIGUELINA drain remain in place.      Throughout hospitalization infectious disease was consulted for complicated abdominal abscesses as well resistant MRSA and CR-acinetobacter for which he was placed on contact precautions. Antibiotic regimen completed on 5/18 and per ID should follow up with infectious  disease/primary attending at LTAC facility. Vascular medicine also consulted during hospitalization for management of DVTs and RPB/psoas hematomas. IVC filter was placed on 4/20. Repeat Duplex ultrasound completed on 5/2 showed persistent LE and UE DVTs. He was started on a low intensity heparin drip x24 hours and transitioned to PO Eliquis which he will continue at discharge and follow up with vascular medicine as outpatient.      He was evaluated by nutrition throughout hospital course and was started on tube feeds via J-tube which he tolerated well and will continue on continuous feeding schedule at nursing facility. Nephrology was also consulted during hospitalization for elevated BUN/creatinine and hypernatremia. Hypernatremia resolved and BUN/Creatinine continues to wax and wane. He was evaluated by nephrology prior to discharge and per nephrology recommendations, he will discharge with weekly RFP labs with close monitoring with nephrology attending at LTAC facility.   He was discharged to nursing facility with R chest tube to waterseal and abdominal drains to bulbs on 5/22/23    Past Medical History  He has a past medical history of Contusion of abdominal wall, initial encounter (01/12/2021), Diabetes mellitus (CMS/[facility]), Diaphragmatic hernia without obstruction or gangrene (11/05/2013), Dysphagia, Hemoptysis (05/12/2020), Melena (06/19/2019), Pain in left knee (01/20/2017), Personal history of diseases of the blood and blood-forming organs and certain disorders involving the immune mechanism, Personal history of diseases of the blood and blood-forming organs and certain disorders involving the immune mechanism, Personal history of other diseases of the digestive system, Personal history of other diseases of the digestive system (06/21/2017), Personal history of other diseases of the nervous system and sense organs, Personal history of other diseases of the respiratory system (06/19/2019), Personal history of  "other endocrine, nutritional and metabolic disease, Personal history of other infectious and parasitic diseases (03/07/2017), Personal history of other specified conditions, Personal history of other specified conditions (05/29/2019), Personal history of other specified conditions (06/19/2017), Personal history of other specified conditions (06/30/2022), Personal history of pneumonia (recurrent) (02/18/2020), and Sleep apnea.    Social History  He reports that he quit smoking about 3 months ago. His smoking use included cigarettes and cigars. He started smoking about 34 years ago. He has never used smokeless tobacco. He reports that he does not currently use alcohol. He reports that he does not use drugs.      Medications    Current Outpatient Medications:     apixaban (Eliquis) 2.5 mg tablet, Take 1 tablet (2.5 mg) by mouth 2 times a day. Do not start before October 21, 2023., Disp: , Rfl:     atorvastatin (Lipitor) 10 mg tablet, Take 1 tablet (10 mg) by mouth once daily at bedtime., Disp: , Rfl:     blood sugar diagnostic (OneTouch Verio test strips) strip, TEST TWICE DAILY OR AS DIRECTED, Disp: , Rfl:     esomeprazole (NexIUM) 40 mg packet, Take 40 mg by mouth once daily in the morning. Take before meals., Disp: , Rfl:     ferrous sulfate 325 (65 Fe) MG tablet, Take 1 tablet (325 mg) by mouth 2 times a day., Disp: , Rfl:     gabapentin (Neurontin) 300 mg capsule, Take 1 capsule (300 mg) by mouth 3 times a day., Disp: , Rfl:     hydrocortisone 1 % ointment, Apply topically 2 times a day., Disp: , Rfl:     hydrOXYzine HCL (Atarax) 25 mg tablet, Take 1 tablet (25 mg) by mouth 2 times a day., Disp: 60 tablet, Rfl: 0    insulin detemir (Levemir U-100 Insulin) 100 unit/mL injection, Inject 35 Units under the skin once daily at bedtime. Take as directed per insulin instructions., Disp: 31.5 mL, Rfl: 0    insulin syringe-needle U-100 0.5 mL 30 gauge x 5/16\" syringe, USE AS DIRECTED., Disp: , Rfl:     insulin " "syringe-needle U-100 1/2 mL 27 gauge x 1/2\" syringe, Use daily or as directed, Disp: , Rfl:     lancets 33 gauge misc, 2 times a day., Disp: , Rfl:     midodrine (Proamatine) 5 mg tablet, Take 1 tablet (5 mg) by mouth 3 times a day. Gita from [facility], Disp: , Rfl:     senna (Senokot) 8.8 mg/5 mL syrup, 5 mL twice a day., Disp: , Rfl:     sertraline (Zoloft) 50 mg tablet, Take 1 tablet (50 mg) by mouth once daily., Disp: , Rfl:     spironolactone (Aldactone) 25 mg tablet, Take 0.5 tablets (12.5 mg) by mouth once daily., Disp: 45 tablet, Rfl: 1    thiamine 100 mg tablet, Take 1 tablet (100 mg) by mouth once daily., Disp: , Rfl:     Allergies  Patient has no known allergies.    Review of Systems:  Review of Systems   Constitutional: No fevers, chills, unexpected weight change  HENT: No sore throat, congestion, or nasal drainage  Eyes: No visual changes or eye itching  Respiratory: see HPI. No cough, worsening dyspnea, wheezing  Cardiac: No chest pain, palpitations, or lower extremity edema  Gastrointestinal: No nausea, vomiting, diarrhea. No abdominal pain  Genitourinary: No dysuria or hematuria  Musculoskeletal: No back pain. No significant myalgias or arthralgias  Neurologic: No headaches, dizziness, or seizures.  Hematologic: No east bleeding or bruising.  Psychiatric: No anxiety or depression.    Physical Exam:  Physical Exam  There were no vitals taken for this visit.  Constitutional:       General: Patient is not in acute distress.     Appearance: Normal appearance; not ill-appearing.   HENT:      Head: Normocephalic.      Nose: No congestion or rhinorrhea.   Cardiovascular:      Rate and Rhythm: Normal rate and regular rhythm.      Pulses: Normal pulses.   Pulmonary:      Effort: Pulmonary effort is normal. No respiratory distress.  No conversational dyspnea     Breath sounds: No stridor. No wheezing.   Abdominal:      General: There is no distension.      Palpations: Abdomen is soft.      " Tenderness: There is no abdominal tenderness.   Musculoskeletal:         General: No swelling, tenderness or deformity. Normal range of motion.      Cervical back: Normal range of motion. No rigidity. Utilizing wheel chair  Lymphadenopathy:      Cervical: No cervical adenopathy.   Skin:     General: Skin is warm and dry.   Neurological:      General: No focal deficit present.      Mental Status: Patient is alert and oriented to person, place, and time.   Psychiatric:         Mood and Affect: Mood normal.       Relevant Results:       Imaging:      CT chest abdomen pelvis w IV contrast    Result Date: 10/26/2023  Interpreted By:  Giuliana Okeefe, STUDY: CT CHEST ABDOMEN PELVIS W IV CONTRAST; ;  10/25/2023 10:52 am   INDICATION: Signs/Symptoms:posto follow up, eval for surgery.   COMPARISON: 09/05/2023   ACCESSION NUMBER(S): [id number]   ORDERING CLINICIAN: YUNG PEREIRA   TECHNIQUE: Imaging was performed from thoracic apex through ischial tuberosities with axial, coronal and sagittal images reconstructed. Patient received 75 mL Omnipaque 350 intravenously.   FINDINGS: Trachea and mainstem bronchi are patent with no mass. Previous debris has cleared.   Right pleural effusion is decreased in size and there is less atelectasis in the right lower lobe than on the prior exam. There is minimal atelectasis in the left lower lobe. Scattered nodules of 4 mm and less appear unchanged. No interval enlarging nodules are noted.   Right PICC line is present with the tip terminating in the right atrium. Aorta is atherosclerotic with no aneurysmal dilatation. Main pulmonary artery is normal in size.   Heart is normal in size. There is no pericardial effusion.   Patient has esophagostomy left upper chest wall. There is a small amount of dependent fluid in the upper esophagus. In the subcarinal region along the expected path of the esophagus there is a fluid collection with rim enhancement which appears to have surgical clips  along the superior and inferior margins. The density of the central portion of the collection is about 14 Hounsfield units. The collection measures 7 x 1.6 x 3.5 cm. Reviewing the operative report for the esophagectomy on 04/05/2023, no distal stump was left in place and the staple line at the gastric cardia correlates with the operative report of stapling and then subsequent discarding of the distal esophageal stump. Therefore the current rim enhancing fluid collection is most likely a postoperative seroma or hematoma. An infected collection is also possible given that the patient has had empyema. The collection is directly contiguous with the right pleural fluid.   Densely calcified lymph nodes are present in the mediastinum and left hilum. There are several enlarged subcarinal lymph nodes which are similar to the prior study and probably reactive given the patient's history of thoracic infection. Right retrocrural enlarged lymph node similarly are unchanged and most likely reactive.   Visualized thyroid appears normal.   Right posterolateral chest wall scar is noted with partial absence of the right 6th rib from the patient's right thoracotomy.   No mass is noted in the liver. There is no intra or extrahepatic biliary dilatation. Gallbladder is contracted with no calcified stones noted..   No mass or inflammation is noted involving the pancreas.   Spleen is normal in size with numerous calcifications consistent with old granulomatous disease.   Adrenal glands appear normal. Kidneys enhance symmetrically with no hydronephrosis noted. 1.1 cm cyst mid posteromedial left kidney appears unchanged.   Left posterior perirenal collection has decreased in size since the prior study. There is a small persisting rim enhancing minimal fluid collection which has an AP dimension of 0.8 cm compared to 2.7 cm previously.   A gastrostomy tube with balloon extends into the proximal to mid body of the stomach. Entering slightly  more medially along the abdominal wall closer to midline is a GJ tube with the balloon in the distal body of the stomach. The jejunostomy tube extends into proximal duodenal by an estimated length of about 15 cm. Stomach is decompressed. Small bowel loops are nondilated. Air-fluid level is noted in content in the rectum and sigmoid which indicates diarrhea. Colon is nondilated. There is no mesenteric inflammation. No pneumoperitoneum or ascites is present.   Aorta and iliac arteries contain patchy atherosclerosis. There is no aneurysmal dilatation. Vena cava filter is centered at L3 level and appears unchanged in position from the prior study. Retroperitoneal lymph nodes are smaller in size, with maximum short axis diameter on axial images 9 mm compared to 12 mm on the prior study.   Urinary bladder is empty with a Maldonado catheter present. Prostate gland appears unchanged from the prior study and is not grossly enlarged.   No abdominal wall hernia is identified.   Compression deformities of T12 and L1 are similar to the prior exam. Multilevel degenerative changes are present..       The left posterior perirenal collection has decreased in size and reactive lymph nodes in the retroperitoneum have decreased in size.   There is a rim enhancing tubular collection in the lower posterior mediastinum in the area of the esophagectomy and abutting the staple line at the gastric margin and contiguous with the right pleural fluid collection. Postoperative seroma or hematoma is most common. However given that the patient has had empyema, the chronic abscess can not be excluded.   The right pleural fluid collection in the associated atelectasis are decreased since the prior exam.   Reactive mediastinal lymph nodes are not significantly changed.     MACRO: None.   Signed by: Giuliana Okeefe 10/26/2023 8:07 AM Dictation workstation:   [id number]    XR chest 1 view    Result Date: 10/21/2023  Interpreted By:  Alfredo Lewis, STUDY:  XR CHEST 1 VIEW;  10/20/2023 3:59 am   INDICATION: Signs/Symptoms:For thoracic AM rounds.   COMPARISON: 09/07/2023.   ACCESSION NUMBER(S): [id number]   ORDERING CLINICIAN: SIVA DONIS       1.  Improved aeration of the combination of right-sided pleural effusion and the airspace opacity of the right lower lung. 2. Trace bilateral pleural effusions with mild compressive atelectasis. 3. Small right apical pneumothorax with 3 mm pleural separation. Follow-up recommended. 4. Right upper extremity PICC line with the tip in the atrial caval junction. 5. Cardiac silhouette is mildly enlarged. Aorta is tortuous. 6.       MACRO: None   Signed by: Alfredo Lewis 10/21/2023 12:34 PM Dictation workstation:   [id number]    XR abdomen 1 view    Result Date: 10/20/2023  STUDY: Abdomen Radiographs;  10/20/2023, 2:32PM. INDICATION: Status post J tube revision and G tube replacement. COMPARISON: CTA chest/CT AP 8/6/2023. ACCESSION NUMBER(S): [id number] ORDERING CLINICIAN: TECHNIQUE:  One view(s) of the abdomen. FINDINGS:  A jejunostomy tube is in place. The exact position is difficult to determine on this single image without contrast. Bowel gas pattern is normal without obstruction or ileus.  There are no convincing calculi or abnormal calcifications.  No focal osseous abnormalities.      Jejunostomy tube is in place. The position is difficult to determine without contrast. Recommend repeat examination after injection of contrast into the tube. Signed by Benito Cross MD    FL feeding tube placement    Result Date: 10/20/2023  These images are not reportable by radiology and will not be interpreted by  Radiologists.    XR abdomen 1 view    Result Date: 10/19/2023  Interpreted By:  Hussain Hurst and Dervishi Mario STUDY: XR ABDOMEN 1 VIEW;  10/19/2023 10:16 pm   INDICATION: Signs/Symptoms:Evaluate J tube positioning.   COMPARISON: Abdominal radiograph 10/04/2023 CT chest abdomen pelvis on 09/05/2023   ACCESSION NUMBER(S):  [id number]   ORDERING CLINICIAN: SIVA DONIS   FINDINGS: AP radiograph of lower chest and upper abdomen x1:   Peg tube projects over the left upper abdomen with a course of the tube, probably in the stomach and the tip overlying the middle gastric body. There is a IVC filter noted.   There is a nonobstructive bowel gas pattern. Embolization coil in the left upper abdomen.   Visualized lungs are clear.   Osseous structures demonstrate no acute bony changes.       1.  Peg tube with tip, probably in the middle gastric body. Recommend injection of Gastrografin through PEG tube. 2. No acute radiographic findings of the lower chest and upper abdomen.     I personally reviewed the images/study and I agree with the findings as stated. This study was interpreted at [place], [place].   MACRO: None   Signed by: Hussain Hurst 10/19/2023 10:23 PM Dictation workstation:   [id number]    XR abdomen 1 view    Result Date: 10/4/2023  Interpreted By:  Mary Landin and Dervishi Mario STUDY: XR ABDOMEN 1 VIEW;  10/4/2023 6:16 pm   INDICATION: Signs/Symptoms:s/p placement of g-tube as well as j-tube. Evaluate for pneumoperitoneum.   COMPARISON: Abdominal radiograph 09/05/2023.   ACCESSION NUMBER(S): [id number]   ORDERING CLINICIAN: YUNG PEREIRA   FINDINGS:   G-tube projecting over the stomach. A tubular structure projecting over the left upper quadrant of the abdomen, likely corresponding to clinically described J-tube. Presumed embolization coils projecting over the left upper quadrant of the abdomen. IVC filter is projecting over L3 vertebral body.   Nonobstructive bowel gas pattern. Limited evaluation of pneumoperitoneum on supine imaging, however no gross evidence of free air is noted. Atelectasis/scarring and trace effusion/pleural thickening in lung bases. Osseous structures demonstrate no acute bony changes.       1.  Status post G-tube placement which projects  "over the gastric region without evidence of ananda pneumoperitoneum within the limitations of a supine radiograph.   I personally reviewed the images/study and I agree with the findings as stated. This study was interpreted at [facility], [place], [place].   MACRO: None   Signed by: Mary Parks 10/4/2023 8:03 PM Dictation workstation:   [id number]       Pulmonary Functions Testing Results:    No results found for: \"FEV1\", \"FVC\", \"RMW5SMC\", \"TLC\", \"DLCO\"    CT Chest/Abd/Pelv was personally reviewed     Assessment/Plan   Diagnoses and all orders for this visit:  Achalasia, esophageal  Esophageal perforation  Malnutrition, unspecified type (CMS/HCC)      Levy Gregory is a 68 y.o. male seen today for follow up after undergoing an esophagectomy with and cervical esophagotomy on 4/5/2023 and feeding tube placement for a post-Heller myotomy esophageal perforation. . Based on his imaging today I think it is reasonable to attempt a gastric pull up with colon interposition as a back up plan. He needs nutrition labs so I can see where his values are -- may have to delay if suboptimal; d/w patient that if not wnl will have him meet with nutrition. In the meantime he should continue PT/exercise. Goal for surgery end of November if labs optimized.      Brielle Gongora, DO  Thoracic & Esophageal Surgery     "

## 2023-10-27 ENCOUNTER — TELEPHONE (OUTPATIENT)
Dept: HEMATOLOGY/ONCOLOGY | Facility: HOSPITAL | Age: 68
End: 2023-10-27
Payer: MEDICARE

## 2023-10-27 ENCOUNTER — APPOINTMENT (OUTPATIENT)
Dept: HEMATOLOGY/ONCOLOGY | Facility: CLINIC | Age: 68
End: 2023-10-27
Payer: MEDICARE

## 2023-10-27 DIAGNOSIS — E44.0 MALNUTRITION OF MODERATE DEGREE (MULTI): ICD-10-CM

## 2023-10-31 ENCOUNTER — PREP FOR PROCEDURE (OUTPATIENT)
Dept: SURGERY | Facility: HOSPITAL | Age: 68
End: 2023-10-31
Payer: MEDICARE

## 2023-10-31 ENCOUNTER — TELEPHONE (OUTPATIENT)
Dept: PRIMARY CARE | Facility: CLINIC | Age: 68
End: 2023-10-31
Payer: MEDICARE

## 2023-10-31 DIAGNOSIS — K22.3 ESOPHAGEAL PERFORATION: Primary | ICD-10-CM

## 2023-10-31 RX ORDER — VANCOMYCIN HYDROCHLORIDE 1 G/200ML
1000 INJECTION, SOLUTION INTRAVENOUS ONCE
Status: CANCELLED | OUTPATIENT
Start: 2023-11-29 | End: 2023-10-31

## 2023-10-31 RX ORDER — HEPARIN SODIUM 5000 [USP'U]/ML
5000 INJECTION, SOLUTION INTRAVENOUS; SUBCUTANEOUS ONCE
Status: CANCELLED | OUTPATIENT
Start: 2023-10-31 | End: 2023-10-31

## 2023-10-31 NOTE — TELEPHONE ENCOUNTER
[place] from [facility]; patient was discharged from skilled nursing facility today. Will you follow for home care?

## 2023-11-01 ENCOUNTER — DOCUMENTATION (OUTPATIENT)
Dept: CARE COORDINATION | Facility: CLINIC | Age: 68
End: 2023-11-01
Payer: MEDICARE

## 2023-11-01 ENCOUNTER — PATIENT OUTREACH (OUTPATIENT)
Dept: CARE COORDINATION | Facility: CLINIC | Age: 68
End: 2023-11-01
Payer: MEDICARE

## 2023-11-01 DIAGNOSIS — K94.13: ICD-10-CM

## 2023-11-01 NOTE — PROGRESS NOTES
Discharge Facility:[facility] 10.19-10.21.23. dcd to snf  Discharge Diagnosis:Malfunction of jejunostomy tube     Admission Date:snf 10.21.31  Discharge Date: 10.31.23    PCP Appointment Date:11.4.23  Specialist Appointment Date:   Hospital Encounter and Summary: Linked   See discharge assessment below for further details     Engagement  Call Start Time: 1338 (11/1/2023  1:38 PM)    Medications  Medications reviewed with patient/caregiver?: Yes (11/1/2023  1:38 PM)  Is the patient having any side effects they believe may be caused by any medication additions or changes?: No (11/1/2023  1:38 PM)  Does the patient have all medications ordered at discharge?: Yes (11/1/2023  1:38 PM)  Is the patient taking all medications as directed (includes completed medication regime)?: Yes (11/1/2023  1:38 PM)  Care Management Interventions: Provided patient education (11/1/2023  1:38 PM)    Appointments  Does the patient have a primary care provider?: Yes (11/1/2023  1:38 PM)  Care Management Interventions: Verified appointment date/time/provider (11/1/2023  1:38 PM)  Has the patient kept scheduled appointments due by today?: Yes (11/1/2023  1:38 PM)  Care Management Interventions: Advised patient to keep appointment (11/1/2023  1:38 PM)    Self Management  What is the home health agency?: [facility] (11/1/2023  1:38 PM)  Has home health visited the patient within 72 hours of discharge?: Unsure (11/1/2023  1:38 PM)  What Durable Medical Equipment (DME) was ordered?: n/a (11/1/2023  1:38 PM)    Patient Teaching  Does the patient have access to their discharge instructions?: Yes (11/1/2023  1:38 PM)  Care Management Interventions: Reviewed instructions with patient (11/1/2023  1:38 PM)  What is the patient's perception of their health status since discharge?: Improving (11/1/2023  1:38 PM)  Patient/Caregiver Education Comments: Spoke with patient. he states tibe functioning normally now and has gained some weight.  Feeling stronger after admitted to snf and had daily therapy. Do not have dc med list from snf to reconcile any med changes. reminded of follow up appt. (11/1/2023  1:38 PM)

## 2023-11-02 ENCOUNTER — PREP FOR PROCEDURE (OUTPATIENT)
Dept: SURGERY | Facility: HOSPITAL | Age: 68
End: 2023-11-02
Payer: MEDICARE

## 2023-11-02 DIAGNOSIS — K22.3 ESOPHAGEAL PERFORATION: Primary | ICD-10-CM

## 2023-11-02 RX ORDER — POLYETHYLENE GLYCOL-3350 AND ELECTROLYTES 236; 6.74; 5.86; 2.97; 22.74 G/274.31G; G/274.31G; G/274.31G; G/274.31G; G/274.31G
POWDER, FOR SOLUTION ORAL
Qty: 4000 ML | Refills: 0 | Status: SHIPPED | OUTPATIENT
Start: 2023-11-02 | End: 2023-11-03

## 2023-11-04 ENCOUNTER — TELEPHONE (OUTPATIENT)
Dept: PRIMARY CARE | Facility: CLINIC | Age: 68
End: 2023-11-04

## 2023-11-04 ENCOUNTER — OFFICE VISIT (OUTPATIENT)
Dept: PRIMARY CARE | Facility: CLINIC | Age: 68
End: 2023-11-04
Payer: MEDICARE

## 2023-11-04 VITALS — DIASTOLIC BLOOD PRESSURE: 60 MMHG | SYSTOLIC BLOOD PRESSURE: 89 MMHG

## 2023-11-04 DIAGNOSIS — Z79.4 TYPE 2 DIABETES MELLITUS WITH HYPERGLYCEMIA, WITH LONG-TERM CURRENT USE OF INSULIN (MULTI): ICD-10-CM

## 2023-11-04 DIAGNOSIS — E87.1 HYPONATREMIA: ICD-10-CM

## 2023-11-04 DIAGNOSIS — E44.0 MODERATE PROTEIN-CALORIE MALNUTRITION (MULTI): ICD-10-CM

## 2023-11-04 DIAGNOSIS — D69.2 SOLAR PURPURA (CMS-HCC): ICD-10-CM

## 2023-11-04 DIAGNOSIS — R80.9 MICROALBUMINURIA: ICD-10-CM

## 2023-11-04 DIAGNOSIS — I48.20 CHRONIC ATRIAL FIBRILLATION (MULTI): ICD-10-CM

## 2023-11-04 DIAGNOSIS — I95.2 HYPOTENSION DUE TO DRUGS: ICD-10-CM

## 2023-11-04 DIAGNOSIS — E11.49 OTHER DIABETIC NEUROLOGICAL COMPLICATION ASSOCIATED WITH TYPE 2 DIABETES MELLITUS (MULTI): Primary | ICD-10-CM

## 2023-11-04 DIAGNOSIS — E11.65 TYPE 2 DIABETES MELLITUS WITH HYPERGLYCEMIA, WITH LONG-TERM CURRENT USE OF INSULIN (MULTI): ICD-10-CM

## 2023-11-04 DIAGNOSIS — E44.0 MALNUTRITION OF MODERATE DEGREE (MULTI): ICD-10-CM

## 2023-11-04 PROBLEM — E66.9 OBESITY (BMI 30-39.9): Status: RESOLVED | Noted: 2023-02-17 | Resolved: 2023-11-04

## 2023-11-04 LAB — POC HEMOGLOBIN A1C: 5.6 % (ref 4.2–6.5)

## 2023-11-04 PROCEDURE — 3074F SYST BP LT 130 MM HG: CPT | Performed by: FAMILY MEDICINE

## 2023-11-04 PROCEDURE — 3008F BODY MASS INDEX DOCD: CPT | Performed by: FAMILY MEDICINE

## 2023-11-04 PROCEDURE — 3078F DIAST BP <80 MM HG: CPT | Performed by: FAMILY MEDICINE

## 2023-11-04 PROCEDURE — 1160F RVW MEDS BY RX/DR IN RCRD: CPT | Performed by: FAMILY MEDICINE

## 2023-11-04 PROCEDURE — 3066F NEPHROPATHY DOC TX: CPT | Performed by: FAMILY MEDICINE

## 2023-11-04 PROCEDURE — 1125F AMNT PAIN NOTED PAIN PRSNT: CPT | Performed by: FAMILY MEDICINE

## 2023-11-04 PROCEDURE — 1111F DSCHRG MED/CURRENT MED MERGE: CPT | Performed by: FAMILY MEDICINE

## 2023-11-04 PROCEDURE — 99214 OFFICE O/P EST MOD 30 MIN: CPT | Performed by: FAMILY MEDICINE

## 2023-11-04 PROCEDURE — 1159F MED LIST DOCD IN RCRD: CPT | Performed by: FAMILY MEDICINE

## 2023-11-04 PROCEDURE — 99495 TRANSJ CARE MGMT MOD F2F 14D: CPT | Performed by: FAMILY MEDICINE

## 2023-11-04 PROCEDURE — 3044F HG A1C LEVEL LT 7.0%: CPT | Performed by: FAMILY MEDICINE

## 2023-11-04 PROCEDURE — 83036 HEMOGLOBIN GLYCOSYLATED A1C: CPT | Performed by: FAMILY MEDICINE

## 2023-11-04 RX ORDER — SPIRONOLACTONE 25 MG/1
12.5 TABLET ORAL DAILY
Qty: 45 TABLET | Refills: 1 | Status: SHIPPED | OUTPATIENT
Start: 2023-11-04 | End: 2023-11-07 | Stop reason: SDUPTHER

## 2023-11-04 NOTE — TELEPHONE ENCOUNTER
Maura ([facility] nurse) calling to let you know that his wheelchair fell out from under him yesterday. Also his pulse has been running high.

## 2023-11-04 NOTE — PATIENT INSTRUCTIONS
.Follow up fasting (no alcohol for 48 hours and just water for 14 hours) in three months for your next routine appointment.  In general, take any medications on schedule (except for types of Insulin).

## 2023-11-04 NOTE — PROGRESS NOTES
Subjective   Patient ID: [id number]     Levy Gregory is a 68 y.o. male who presents for Hospital Follow-up.    HPI  Admitted to [facility] for malfunctioining jejunostomy tube and dischargedd 21OCT to SNF and discharged from SNF 31OCT but had urinary retention while there and Didirenu Correa placed a aguilera with plans to keep it in until surgery 28NOV esophagus reconstruciton by Dr Luciano Gongora  Sugars at home have been betweek 100 and 135 and he has not required insulin    Review of Systems  NEURO-gets headaches that come and go and never wake him up; may be associated with watching TV;  has been doing video exercises;  ENT-no nasal congestion  ID-no fever  CARDIO- No chest pain or pressure, nausea, diaphoresis, paresthesias;  getting lightheaded when he stands up suddenly  GI-No blood in stool, tarry stools, pain, vomiting, heartburn, constipation or diarrhea  PULM-No wheezing, coughing or shortness of breath    Objective     BP 89/60 (BP Location: Left arm, Patient Position: Sitting)      Physical Exam  Neck-supple without lymphadenopathy or thyromegaly; no carotid bruits  Throat- without erythema or exudate, uvula in midlineNeck-supple without lymphadenopathy or thyromegaly; no carotid bruits  Heart- regular rate and rhythm, normal s1 and s2 without murmur or gallop  Lungs-clear to auscultation  Abdomen-soft, positive bowel sounds, without masses, HSmegaly but I stender to palpation generally (since he had his surgeries);  no rebound or Rovsing  ENT- canals patent and normal tympanic membranes  NOSE-normal mucosa     Assessment/Plan     Problem List Items Addressed This Visit       Diabetic neuropathy associated with type 2 diabetes mellitus (CMS/HCC) - Primary    Microalbuminuria    Type 2 diabetes mellitus with hyperglycemia (CMS/HCC)    Relevant Orders    POCT glycosylated hemoglobin (Hb A1C) manually resulted    Solar purpura (CMS/HCC)    Chronic atrial fibrillation (CMS/HCC)    Hyponatremia    Relevant  Medications    spironolactone (Aldactone) 25 mg tablet    Malnutrition of moderate degree (CMS/HCC)    Moderate protein-calorie malnutrition (CMS/HCC)    Hypotension due to drugs     Follow up fasting (no alcohol for 48 hours and just water for 14 hours) in three months for your next routine appointment.  In general, take any medications on schedule (except for types of Insulin).      Chris Huizar MD

## 2023-11-06 NOTE — TELEPHONE ENCOUNTER
Rosita thought that you were going to decrease his spironolactone but the same dose that he's been on (25mg 1/2 tab every day) was sent in. Please advise.

## 2023-11-07 DIAGNOSIS — E87.1 HYPONATREMIA: ICD-10-CM

## 2023-11-07 PROCEDURE — G0180 MD CERTIFICATION HHA PATIENT: HCPCS | Performed by: FAMILY MEDICINE

## 2023-11-07 RX ORDER — SPIRONOLACTONE 25 MG/1
12.5 TABLET ORAL EVERY OTHER DAY
Qty: 23 TABLET | Refills: 1 | Status: SHIPPED | OUTPATIENT
Start: 2023-11-07 | End: 2023-12-12 | Stop reason: HOSPADM

## 2023-11-08 ENCOUNTER — TELEMEDICINE CLINICAL SUPPORT (OUTPATIENT)
Dept: PREADMISSION TESTING | Facility: HOSPITAL | Age: 68
End: 2023-11-08
Payer: MEDICARE

## 2023-11-09 PROCEDURE — G0180 MD CERTIFICATION HHA PATIENT: HCPCS | Performed by: FAMILY MEDICINE

## 2023-11-10 ENCOUNTER — PRE-ADMISSION TESTING (OUTPATIENT)
Dept: PREADMISSION TESTING | Facility: HOSPITAL | Age: 68
End: 2023-11-10
Payer: MEDICARE

## 2023-11-10 ENCOUNTER — HOSPITAL ENCOUNTER (OUTPATIENT)
Dept: RADIOLOGY | Facility: HOSPITAL | Age: 68
Discharge: HOME | End: 2023-11-10
Payer: MEDICARE

## 2023-11-10 ENCOUNTER — ANESTHESIA EVENT (OUTPATIENT)
Dept: OPERATING ROOM | Facility: HOSPITAL | Age: 68
DRG: 326 | End: 2023-11-10
Payer: MEDICARE

## 2023-11-10 VITALS
TEMPERATURE: 97.5 F | DIASTOLIC BLOOD PRESSURE: 67 MMHG | OXYGEN SATURATION: 99 % | HEIGHT: 73 IN | HEART RATE: 82 BPM | BODY MASS INDEX: 24.18 KG/M2 | WEIGHT: 182.4 LBS | SYSTOLIC BLOOD PRESSURE: 96 MMHG

## 2023-11-10 DIAGNOSIS — Z01.818 ENCOUNTER FOR PERIOPERATIVE CONSULTATION: Primary | ICD-10-CM

## 2023-11-10 DIAGNOSIS — Z01.818 ENCOUNTER FOR PERIOPERATIVE CONSULTATION: ICD-10-CM

## 2023-11-10 DIAGNOSIS — I48.20 CHRONIC ATRIAL FIBRILLATION (MULTI): ICD-10-CM

## 2023-11-10 LAB
ABO GROUP (TYPE) IN BLOOD: NORMAL
ALBUMIN SERPL BCP-MCNC: 4.3 G/DL (ref 3.4–5)
ALP SERPL-CCNC: 1175 U/L (ref 33–136)
ALT SERPL W P-5'-P-CCNC: 118 U/L (ref 10–52)
ANION GAP SERPL CALC-SCNC: 20 MMOL/L (ref 10–20)
ANTIBODY SCREEN: NORMAL
APTT PPP: 40 SECONDS (ref 27–38)
AST SERPL W P-5'-P-CCNC: 95 U/L (ref 9–39)
BILIRUB SERPL-MCNC: 0.6 MG/DL (ref 0–1.2)
BUN SERPL-MCNC: 54 MG/DL (ref 6–23)
CALCIUM SERPL-MCNC: 10.3 MG/DL (ref 8.6–10.6)
CHLORIDE SERPL-SCNC: 92 MMOL/L (ref 98–107)
CO2 SERPL-SCNC: 24 MMOL/L (ref 21–32)
CREAT SERPL-MCNC: 1.27 MG/DL (ref 0.5–1.3)
ERYTHROCYTE [DISTWIDTH] IN BLOOD BY AUTOMATED COUNT: 14.3 % (ref 11.5–14.5)
GFR SERPL CREATININE-BSD FRML MDRD: 62 ML/MIN/1.73M*2
GLUCOSE SERPL-MCNC: 115 MG/DL (ref 74–99)
HCT VFR BLD AUTO: 38.9 % (ref 41–52)
HGB BLD-MCNC: 12.8 G/DL (ref 13.5–17.5)
INR PPP: 1.1 (ref 0.9–1.1)
MAGNESIUM SERPL-MCNC: 2.43 MG/DL (ref 1.6–2.4)
MCH RBC QN AUTO: 33.3 PG (ref 26–34)
MCHC RBC AUTO-ENTMCNC: 32.9 G/DL (ref 32–36)
MCV RBC AUTO: 101 FL (ref 80–100)
NRBC BLD-RTO: 0 /100 WBCS (ref 0–0)
PHOSPHATE SERPL-MCNC: 4.1 MG/DL (ref 2.5–4.9)
PLATELET # BLD AUTO: 235 X10*3/UL (ref 150–450)
POTASSIUM SERPL-SCNC: 4.4 MMOL/L (ref 3.5–5.3)
PROT SERPL-MCNC: 7.7 G/DL (ref 6.4–8.2)
PROTHROMBIN TIME: 12.7 SECONDS (ref 9.8–12.8)
RBC # BLD AUTO: 3.84 X10*6/UL (ref 4.5–5.9)
RH FACTOR (ANTIGEN D): NORMAL
SODIUM SERPL-SCNC: 132 MMOL/L (ref 136–145)
WBC # BLD AUTO: 9.5 X10*3/UL (ref 4.4–11.3)

## 2023-11-10 PROCEDURE — 80069 RENAL FUNCTION PANEL: CPT

## 2023-11-10 PROCEDURE — 99215 OFFICE O/P EST HI 40 MIN: CPT | Performed by: ANESTHESIOLOGY

## 2023-11-10 PROCEDURE — 71046 X-RAY EXAM CHEST 2 VIEWS: CPT | Mod: FY

## 2023-11-10 PROCEDURE — 85027 COMPLETE CBC AUTOMATED: CPT

## 2023-11-10 PROCEDURE — 87081 CULTURE SCREEN ONLY: CPT

## 2023-11-10 PROCEDURE — 83735 ASSAY OF MAGNESIUM: CPT

## 2023-11-10 PROCEDURE — 85730 THROMBOPLASTIN TIME PARTIAL: CPT

## 2023-11-10 PROCEDURE — 86900 BLOOD TYPING SEROLOGIC ABO: CPT

## 2023-11-10 PROCEDURE — 71046 X-RAY EXAM CHEST 2 VIEWS: CPT | Performed by: RADIOLOGY

## 2023-11-10 PROCEDURE — 84450 TRANSFERASE (AST) (SGOT): CPT

## 2023-11-10 RX ORDER — CHLORHEXIDINE GLUCONATE ORAL RINSE 1.2 MG/ML
15 SOLUTION DENTAL DAILY
Qty: 473 ML | Refills: 0 | Status: SHIPPED | OUTPATIENT
Start: 2023-11-10 | End: 2023-11-12

## 2023-11-10 RX ORDER — CHLORHEXIDINE GLUCONATE 40 MG/ML
1 SOLUTION TOPICAL DAILY
Qty: 473 ML | Refills: 0 | Status: SHIPPED | OUTPATIENT
Start: 2023-11-10 | End: 2023-11-15

## 2023-11-10 ASSESSMENT — DUKE ACTIVITY SCORE INDEX (DASI)
CAN YOU DO MODERATE WORK AROUND THE HOUSE LIKE VACUUMING, SWEEPING FLOORS OR CARRYING GROCERIES: NO
CAN YOU PARTICIPATE IN STRENOUS SPORTS LIKE SWIMMING, SINGLES TENNIS, FOOTBALL, BASKETBALL, OR SKIING: NO
CAN YOU TAKE CARE OF YOURSELF (EAT, DRESS, BATHE, OR USE TOILET): NO
TOTAL_SCORE: 1.75
CAN YOU RUN A SHORT DISTANCE: NO
CAN YOU HAVE SEXUAL RELATIONS: NO
CAN YOU CLIMB A FLIGHT OF STAIRS OR WALK UP A HILL: NO
CAN YOU DO YARD WORK LIKE RAKING LEAVES, WEEDING OR PUSHING A MOWER: NO
CAN YOU WALK A BLOCK OR TWO ON LEVEL GROUND: NO
CAN YOU DO HEAVY WORK AROUND THE HOUSE LIKE SCRUBBING FLOORS OR LIFTING AND MOVING HEAVY FURNITURE: NO
DASI METS SCORE: 3
CAN YOU WALK INDOORS, SUCH AS AROUND YOUR HOUSE: YES
CAN YOU PARTICIPATE IN MODERATE RECREATIONAL ACTIVITIES LIKE GOLF, BOWLING, DANCING, DOUBLES TENNIS OR THROWING A BASEBALL OR FOOTBALL: NO
CAN YOU DO LIGHT WORK AROUND THE HOUSE LIKE DUSTING OR WASHING DISHES: NO

## 2023-11-10 ASSESSMENT — ENCOUNTER SYMPTOMS
CONSTITUTIONAL NEGATIVE: 1
ENDOCRINE NEGATIVE: 1
LIGHT-HEADEDNESS: 1
VISUAL CHANGE: 1
RESPIRATORY NEGATIVE: 1
DIFFICULTY URINATING: 1
NECK NEGATIVE: 1
GASTROINTESTINAL NEGATIVE: 1
DYSPNEA WITH EXERTION: 1
MUSCULOSKELETAL NEGATIVE: 1

## 2023-11-10 NOTE — PREPROCEDURE INSTRUCTIONS
NPO Instructions:    Do not eat any food after midnight the night before your surgery/procedure.    Additional Instructions:     Begin using your Hibiclens  Review your medication instructions, stop indicated medications  Review your medication instructions, stop indicated medications  You will be contacted regarding the time of your arrival to facility and surgery time  Do not eat any food after Midnight  Review your medication instructions, take indicated medications  Wear  comfortable loose fitting clothing  Do not use moisturizers, creams, lotions or perfume  All jewelry and valuables should be left at home

## 2023-11-10 NOTE — CPM/PAT H&P
CPM/PAT Evaluation       Name: Levy Gregory (Levy Gregory)  /Age: 1955/68 y.o.     In-Person       Chief Complaint: Perioperative risk stratification.     HPI    Past Medical History:   Diagnosis Date    Achalasia, esophageal     per patient of esophagus    Anemia     Contusion of abdominal wall, initial encounter 2021    Contusion, flank    COPD (chronic obstructive pulmonary disease) (CMS/[facility])     Diabetes mellitus (CMS/[facility])     F/W PCP    DVT (deep venous thrombosis) (CMS/[facility])     DVT (deep venous thrombosis) (CMS/[facility])     IVC filter placed on 2023 and on Eliquis    Dysphagia     GERD (gastroesophageal reflux disease)     Hemoptysis 2020    Cough with hemoptysis    Herpes labialis     Hiatal hernia     Hernia Repair    Hyperlipidemia     Hypotension due to drugs     Irregular heart beat     AFIB: patient denies    Pain in left knee     Peripheral neuropathy     Personal history of other diseases of the respiratory system 2019    History of bronchitis    Sleep apnea     Patient states does not have sleep apnea since martinez loss and does not use CPAP    Solar purpura (CMS/[facility])        Past Surgical History:   Procedure Laterality Date    ESOPHAGOGASTRECTOMY      ESOPHAGOGASTRODUODENOSCOPY      with stents x2    GASTROSTOMY TUBE PLACEMENT N/A     IR CVC PICC  2023    IR CVC PICC 2023 [facility]    OTHER SURGICAL HISTORY  2019    Giovanna filter placement    UMBILICAL HERNIA REPAIR N/A        Patient Sexual activity questions deferred to the physician.    Family History   Problem Relation Name Age of Onset    Diabetes Mother      Hypertension Mother      Diabetes type I Father         No Known Allergies    Prior to Admission medications    Medication Sig Start Date End Date Taking? Authorizing Provider   apixaban (Eliquis) 2.5 mg tablet Take 1 tablet (2.5 mg) by mouth 2 times a day. Do not start before 2023. 10/21/23  Joe VALDEZ  "MD Chrissy   atorvastatin (Lipitor) 10 mg tablet Take 1 tablet (10 mg) by mouth once daily at bedtime. 2/6/19  Yes Historical Provider, MD   esomeprazole (NexIUM) 40 mg packet Take 40 mg by mouth once daily in the morning. Take before meals.   Yes Historical Provider, MD   ferrous sulfate 325 (65 Fe) MG tablet Take 1 tablet (325 mg) by mouth 2 times a day. 8/8/16  Yes Historical Provider, MD   gabapentin (Neurontin) 300 mg capsule Take 1 capsule (300 mg) by mouth 3 times a day. 12/14/17  Yes Historical Provider, MD   hydrocortisone 1 % ointment Apply topically 2 times a day.   Yes Historical Provider, MD   lancets 33 gauge misc 2 times a day.   Yes Historical Provider, MD   midodrine (Proamatine) 5 mg tablet Take 1 tablet (5 mg) by mouth 3 times a day. [facility] from [facility]   Yes Historical Provider, MD   potassium chloride ER (Micro-K) 10 mEq ER capsule Take 2 capsules (20 mEq) by mouth once daily. Do not crush or chew.   Yes Historical Provider, MD   sertraline (Zoloft) 50 mg tablet Take 1.5 tablets (75 mg) by mouth once daily. 9/13/16  Yes Historical Provider, MD   spironolactone (Aldactone) 25 mg tablet Take 0.5 tablets (12.5 mg) by mouth every other day. 11/7/23 5/5/24 Yes Chris Huizar MD   thiamine 100 mg tablet Take 1 tablet (100 mg) by mouth once daily.   Yes Historical Provider, MD   blood sugar diagnostic (OneTouch Verio test strips) strip TEST TWICE DAILY OR AS DIRECTED 9/23/17   Historical Provider, MD   hydrOXYzine HCL (Atarax) 25 mg tablet Take 1 tablet (25 mg) by mouth 2 times a day.  Patient not taking: Reported on 11/10/2023 7/24/23 8/23/23  Chris Huizar MD   insulin detemir (Levemir U-100 Insulin) 100 unit/mL injection Inject 35 Units under the skin once daily at bedtime. Take as directed per insulin instructions.  Patient not taking: Reported on 11/10/2023 7/24/23 10/22/23  Chris Huizar MD   insulin syringe-needle U-100 0.5 mL 30 gauge x 5/16\" syringe USE AS DIRECTED. " "9/23/17   Historical Provider, MD   insulin syringe-needle U-100 1/2 mL 27 gauge x 1/2\" syringe Use daily or as directed 9/23/17   Historical Provider, MD   polyethylene glycol-electrolytes (GaviLyte-G) 420 gram solution Starting at noon on day prior to procedure drink 8 ounces (240 mL) every 30 minutes until all gone or stools are clear. May add flavor packet. 11/2/23 11/3/23  Brielle Gongora,    senna (Senokot) 8.8 mg/5 mL syrup 5 mL twice a day. 5/22/23 11/10/23  Historical Provider, MD   spironolactone (Aldactone) 25 mg tablet Take 0.5 tablets (12.5 mg) by mouth once daily. 7/31/23 11/4/23  Chris Huizar MD   spironolactone (Aldactone) 25 mg tablet Take 0.5 tablets (12.5 mg) by mouth once daily. 11/4/23 11/7/23  Chris Huizar MD        PAT ROS:   Constitutional:   neg    Neuro/Psych:    light-headedness  Eyes:    vision loss   use of corrective lenses  Ears:   neg    Nose:   neg    Mouth:   neg    Throat:   neg    Neck:   neg    Cardio:    peripheral edema   OSHEA  Respiratory:   neg    Endocrine:   neg    GI:   neg    :    difficulty urinating  Musculoskeletal:   neg    Hematologic:   neg    Skin:  neg        Physical Exam  Vitals and nursing note reviewed. Physical exam within normal limits.   Constitutional:       Appearance: Normal appearance. He is obese.   HENT:      Head: Normocephalic and atraumatic.      Nose: Nose normal.      Mouth/Throat:      Mouth: Mucous membranes are moist.   Eyes:      Extraocular Movements: Extraocular movements intact.      Pupils: Pupils are equal, round, and reactive to light.   Cardiovascular:      Rate and Rhythm: Normal rate. Rhythm irregular.   Pulmonary:      Effort: Pulmonary effort is normal.      Breath sounds: Normal breath sounds and air entry.   Chest:      Comments: Pt has stoma in L chest   Abdominal:      General: Bowel sounds are normal.      Palpations: Abdomen is soft.          Comments: Pt had a J tube and a drain.   Genitourinary:     Comments: " Maldonado catheter   Musculoskeletal:         General: Normal range of motion.      Cervical back: Normal range of motion.   Skin:     General: Skin is warm.   Neurological:      General: No focal deficit present.      Mental Status: He is alert and oriented to person, place, and time.   Psychiatric:         Mood and Affect: Mood normal.         Behavior: Behavior normal.          PAT AIRWAY:   Airway:     Mallampati::  III    Neck ROM::  Full      Visit Vitals  BP 96/67   Pulse 82   Temp 36.4 °C (97.5 °F) (Oral)       DASI Risk Score      Flowsheet Row Most Recent Value   DASI SCORE 1.75   METS Score (Will be calculated only when all the questions are answered) 3          Caprini DVT Assessment    No data to display       Modified Frailty Index    No data to display       CHADS2 Stroke Risk  Current as of 12 minutes ago        4% 3 - 100%: High Risk   2 - 3%: Medium Risk   0 - 2%: Low Risk     Last Change:           This score determines the patient's risk of having a stroke if the patient has atrial fibrillation.          Points Metrics   0 Has Congestive Heart Failure:  No     Patients with congestive heart failure get 1 point.    Current as of 12 minutes ago   1 Has Hypertension:  Yes     Patients with hypertension get 1 point.    Current as of 12 minutes ago   0 Age:  68     Patients who are 75 years of age or older get 1 point.    Current as of 12 minutes ago   1 Has Diabetes:  Yes     Patients with diabetes get 1 point.    Current as of 12 minutes ago   0 Had Stroke:  No  Had TIA:  No  Had Thromboembolism:  No     Patients who have had a stroke, TIA, or thromboembolism get 2 points.    Current as of 12 minutes ago             Revised Cardiac Risk Index    No data to display       Apfel Simplified Score    No data to display       Risk Analysis Index Results This Encounter    No data found in the last 1 encounters.       Stop Bang Score      Flowsheet Row Most Recent Value   Do you snore loudly? 0   Do you often  feel tired or fatigued after your sleep? 0   Has anyone ever observed you stop breathing in your sleep? 0   Do you have or are you being treated for high blood pressure? 0   Recent BMI (Calculated) 24.8   Is BMI greater than 35 kg/m2? 0=No   Age older than 50 years old? 1=Yes   Is your neck circumference greater than 17 inches (Male) or 16 inches (Female)? 0   Gender - Male 1=Yes   STOP-BANG Total Score 2            Assessment and Plan:     67 y/o male scheduled for transhiatal esophagectomy substernal gastric pullup on 11/29/23 with Dr. Gongora secondary to complicated paraesophageal hernia repair requiring esophagectomy. Presents to [facility] today for perioperative risk stratification.     HEENT/Airway: No significant diagnosis or significant finding on chart review.  Cardiovascular:Afib, hx of HTN, HDL.  1. Left ventricular systolic function is low normal with a 50-55% estimated ejection fraction.  2. Poorly visualized anatomical structures due to suboptimal image quality.  3. There is normal right ventricular global systolic function.  4. LV cavity size is normal and stroke volume seems to be normal.    METS<4.   RCRI:Class II risk. 6.0 % 30 day risk of death, MI or cardiac arrest.  Pulmonary: COPD.  STOP-BANG: Low risk for moderate to severe MARTIN.  ARISCAT score: High risk. 42.1 % risk of in-hospital post-op pulmonary complication.  PRODOGY score: High risk of respiratory depression episodes.  Renal/Urologic: No significant diagnosis or significant finding on chart review.  Endocrine:DMII.  Hematologic:DVT, s/p IVC filter, anemia . Caprini score:Highest risk. 10.7 % VTE risk.  Gastrointestinal: Hx of achalasia s/p, paraesophageal hernia s/p robotic Laparoscopic Heller myotomy with mary fundoplication c/b perforation s/p esophagostomy and prolonged hospital stay, J tube placement.  Neurological/Psychiatric:Peripheral neuropathy.  Infectious disease: No significant diagnosis or significant finding on chart  review.  Musculoskeletal: No significant diagnosis or significant finding on chart review.

## 2023-11-12 LAB — STAPHYLOCOCCUS SPEC CULT: NORMAL

## 2023-11-15 ENCOUNTER — PATIENT OUTREACH (OUTPATIENT)
Dept: CARE COORDINATION | Facility: CLINIC | Age: 68
End: 2023-11-15
Payer: MEDICARE

## 2023-11-15 ENCOUNTER — TELEPHONE (OUTPATIENT)
Dept: PRIMARY CARE | Facility: CLINIC | Age: 68
End: 2023-11-15
Payer: MEDICARE

## 2023-11-15 DIAGNOSIS — E11.49 OTHER DIABETIC NEUROLOGICAL COMPLICATION ASSOCIATED WITH TYPE 2 DIABETES MELLITUS (MULTI): ICD-10-CM

## 2023-11-15 RX ORDER — GABAPENTIN 250 MG/5ML
6 SOLUTION ORAL 3 TIMES DAILY
Qty: 540 ML | Refills: 2 | Status: SHIPPED | OUTPATIENT
Start: 2023-11-15 | End: 2024-02-07 | Stop reason: ALTCHOICE

## 2023-11-15 NOTE — TELEPHONE ENCOUNTER
Refill:    Gabapentin 250mL /5mL oral solution take 6mL three times a day    Pharm:DM # [phone]    LR: filled by hosp / SNF   LV: 11/04/23   NV: 11/21/23

## 2023-11-15 NOTE — PROGRESS NOTES
Call regarding appt. with PCP after hospitalization  At time of outreach call the patient feels as if their condition has returned to baseline since last visit.  Reviewed with patient the PCP appointment and any questions or concerns regarding the appt

## 2023-11-20 NOTE — PROGRESS NOTES
Subjective   Patient ID: [id number]     Levy Gregory is a 68 y.o. male who presents for Cerumen Impaction.    HPI  Levy is complaining that his ears are clogged for the last few weeks    Review of Systems  General-denies weakness or myalgias; no unexplained fever or chills  NECK-no stiffness, swelling or pain    Objective     BP 82/59 (BP Location: Left arm, Patient Position: Sitting)   Temp 36.7 °C (98 °F) (Oral)   Wt 84.4 kg (186 lb)   BMI 24.54 kg/m²      Physical Exam  Ears-normal pinnae, and canals, with normal landmarks and dull light reflex of tympanic membranes bilaterally  Nose-septum in the midline, violaceous mucosa bilaterally  Throat- without erythema or exudate, uvula in midline    Assessment/Plan     Problem List Items Addressed This Visit       Dysfunction of both eustachian tubes - Primary    Relevant Medications    fluticasone (Flonase) 50 mcg/actuation nasal spray     To use your nasal spray:  Close the nostril that is not receiving the medication. Do this by gently pressing on that side of your nose. Gently insert the bottle tip into the other nostril and point it towards the outside of the nostril. Breathe in normally through that nostril as you discharge the medicine.     Chris Huizar MD

## 2023-11-21 ENCOUNTER — OFFICE VISIT (OUTPATIENT)
Dept: PRIMARY CARE | Facility: CLINIC | Age: 68
End: 2023-11-21
Payer: MEDICARE

## 2023-11-21 VITALS
BODY MASS INDEX: 24.54 KG/M2 | SYSTOLIC BLOOD PRESSURE: 82 MMHG | DIASTOLIC BLOOD PRESSURE: 59 MMHG | WEIGHT: 186 LBS | TEMPERATURE: 98 F

## 2023-11-21 DIAGNOSIS — H69.93 DYSFUNCTION OF BOTH EUSTACHIAN TUBES: Primary | ICD-10-CM

## 2023-11-21 PROCEDURE — 1160F RVW MEDS BY RX/DR IN RCRD: CPT | Performed by: FAMILY MEDICINE

## 2023-11-21 PROCEDURE — 99213 OFFICE O/P EST LOW 20 MIN: CPT | Performed by: FAMILY MEDICINE

## 2023-11-21 PROCEDURE — 3078F DIAST BP <80 MM HG: CPT | Performed by: FAMILY MEDICINE

## 2023-11-21 PROCEDURE — 3008F BODY MASS INDEX DOCD: CPT | Performed by: FAMILY MEDICINE

## 2023-11-21 PROCEDURE — 1159F MED LIST DOCD IN RCRD: CPT | Performed by: FAMILY MEDICINE

## 2023-11-21 PROCEDURE — 3074F SYST BP LT 130 MM HG: CPT | Performed by: FAMILY MEDICINE

## 2023-11-21 PROCEDURE — 3044F HG A1C LEVEL LT 7.0%: CPT | Performed by: FAMILY MEDICINE

## 2023-11-21 PROCEDURE — 1125F AMNT PAIN NOTED PAIN PRSNT: CPT | Performed by: FAMILY MEDICINE

## 2023-11-21 PROCEDURE — 3066F NEPHROPATHY DOC TX: CPT | Performed by: FAMILY MEDICINE

## 2023-11-21 RX ORDER — FLUTICASONE PROPIONATE 50 MCG
2 SPRAY, SUSPENSION (ML) NASAL DAILY
Qty: 16 G | Refills: 11 | Status: SHIPPED | OUTPATIENT
Start: 2023-11-21 | End: 2023-12-12 | Stop reason: HOSPADM

## 2023-11-21 NOTE — PATIENT INSTRUCTIONS
To use your nasal spray:  Close the nostril that is not receiving the medication. Do this by gently pressing on that side of your nose. Gently insert the bottle tip into the other nostril and point it towards the outside of the nostril. Breathe in normally through that nostril as you discharge the medicine.

## 2023-11-27 LAB
ABO GROUP (TYPE) IN BLOOD: NORMAL
ANTIBODY SCREEN: NORMAL
RH FACTOR (ANTIGEN D): NORMAL

## 2023-11-27 PROCEDURE — 36415 COLL VENOUS BLD VENIPUNCTURE: CPT

## 2023-11-27 PROCEDURE — 86901 BLOOD TYPING SEROLOGIC RH(D): CPT | Performed by: STUDENT IN AN ORGANIZED HEALTH CARE EDUCATION/TRAINING PROGRAM

## 2023-11-27 PROCEDURE — 86922 COMPATIBILITY TEST ANTIGLOB: CPT

## 2023-11-28 RX ORDER — POTASSIUM CHLORIDE 20 MEQ/15ML
10 SOLUTION ORAL DAILY
COMMUNITY
End: 2023-12-12 | Stop reason: HOSPADM

## 2023-11-29 ENCOUNTER — HOSPITAL ENCOUNTER (INPATIENT)
Facility: HOSPITAL | Age: 68
LOS: 13 days | Discharge: HOME | DRG: 326 | End: 2023-12-12
Attending: STUDENT IN AN ORGANIZED HEALTH CARE EDUCATION/TRAINING PROGRAM | Admitting: PHYSICIAN ASSISTANT
Payer: MEDICARE

## 2023-11-29 ENCOUNTER — ANESTHESIA (OUTPATIENT)
Dept: OPERATING ROOM | Facility: HOSPITAL | Age: 68
DRG: 326 | End: 2023-11-29
Payer: MEDICARE

## 2023-11-29 ENCOUNTER — APPOINTMENT (OUTPATIENT)
Dept: RADIOLOGY | Facility: HOSPITAL | Age: 68
DRG: 326 | End: 2023-11-29
Payer: MEDICARE

## 2023-11-29 DIAGNOSIS — K22.3 ESOPHAGEAL PERFORATION: Primary | ICD-10-CM

## 2023-11-29 DIAGNOSIS — K22.0 ACHALASIA, ESOPHAGEAL: ICD-10-CM

## 2023-11-29 DIAGNOSIS — Z86.718 HISTORY OF DVT (DEEP VEIN THROMBOSIS): ICD-10-CM

## 2023-11-29 DIAGNOSIS — Z93.1 GASTROSTOMY IN PLACE (MULTI): ICD-10-CM

## 2023-11-29 DIAGNOSIS — I48.20 CHRONIC ATRIAL FIBRILLATION (MULTI): ICD-10-CM

## 2023-11-29 DIAGNOSIS — R13.19 OTHER DYSPHAGIA: ICD-10-CM

## 2023-11-29 DIAGNOSIS — I82.432 DEEP VEIN THROMBOSIS (DVT) OF POPLITEAL VEIN OF LEFT LOWER EXTREMITY, UNSPECIFIED CHRONICITY (MULTI): ICD-10-CM

## 2023-11-29 PROBLEM — Z79.01 ANTICOAGULANT LONG-TERM USE: Status: ACTIVE | Noted: 2023-11-29

## 2023-11-29 PROBLEM — R79.89 ELEVATED LIVER FUNCTION TESTS: Status: ACTIVE | Noted: 2023-11-29

## 2023-11-29 LAB
ALBUMIN SERPL BCP-MCNC: 3.8 G/DL (ref 3.4–5)
ALBUMIN SERPL BCP-MCNC: 3.8 G/DL (ref 3.4–5)
ALP SERPL-CCNC: 460 U/L (ref 33–136)
ALT SERPL W P-5'-P-CCNC: 95 U/L (ref 10–52)
ANION GAP BLDA CALCULATED.4IONS-SCNC: 12 MMO/L (ref 10–25)
ANION GAP BLDA CALCULATED.4IONS-SCNC: 12 MMO/L (ref 10–25)
ANION GAP BLDA CALCULATED.4IONS-SCNC: 13 MMO/L (ref 10–25)
ANION GAP BLDA CALCULATED.4IONS-SCNC: 6 MMO/L (ref 10–25)
ANION GAP BLDA CALCULATED.4IONS-SCNC: 8 MMO/L (ref 10–25)
ANION GAP SERPL CALC-SCNC: 19 MMOL/L (ref 10–20)
APTT PPP: 32 SECONDS (ref 27–38)
AST SERPL W P-5'-P-CCNC: 105 U/L (ref 9–39)
BASE EXCESS BLDA CALC-SCNC: -0.2 MMOL/L (ref -2–3)
BASE EXCESS BLDA CALC-SCNC: -0.8 MMOL/L (ref -2–3)
BASE EXCESS BLDA CALC-SCNC: 0.9 MMOL/L (ref -2–3)
BASE EXCESS BLDA CALC-SCNC: 5.1 MMOL/L (ref -2–3)
BASE EXCESS BLDA CALC-SCNC: 5.8 MMOL/L (ref -2–3)
BILIRUB DIRECT SERPL-MCNC: 0.5 MG/DL (ref 0–0.3)
BILIRUB SERPL-MCNC: 1 MG/DL (ref 0–1.2)
BODY TEMPERATURE: 37 DEGREES CELSIUS
BUN SERPL-MCNC: 45 MG/DL (ref 6–23)
CA-I BLD-SCNC: 1.07 MMOL/L (ref 1.1–1.33)
CA-I BLDA-SCNC: 1.11 MMOL/L (ref 1.1–1.33)
CA-I BLDA-SCNC: 1.12 MMOL/L (ref 1.1–1.33)
CA-I BLDA-SCNC: 1.14 MMOL/L (ref 1.1–1.33)
CA-I BLDA-SCNC: 1.16 MMOL/L (ref 1.1–1.33)
CA-I BLDA-SCNC: 1.24 MMOL/L (ref 1.1–1.33)
CALCIUM SERPL-MCNC: 8.6 MG/DL (ref 8.6–10.6)
CHLORIDE BLDA-SCNC: 100 MMOL/L (ref 98–107)
CHLORIDE BLDA-SCNC: 102 MMOL/L (ref 98–107)
CHLORIDE BLDA-SCNC: 102 MMOL/L (ref 98–107)
CHLORIDE BLDA-SCNC: 104 MMOL/L (ref 98–107)
CHLORIDE BLDA-SCNC: 99 MMOL/L (ref 98–107)
CHLORIDE SERPL-SCNC: 99 MMOL/L (ref 98–107)
CO2 SERPL-SCNC: 23 MMOL/L (ref 21–32)
COHGB MFR BLDA: 1.6 %
CREAT SERPL-MCNC: 1.22 MG/DL (ref 0.5–1.3)
DO-HGB MFR BLDA: 0.5 % (ref 0–5)
ERYTHROCYTE [DISTWIDTH] IN BLOOD BY AUTOMATED COUNT: 14.6 % (ref 11.5–14.5)
FIBRINOGEN PPP-MCNC: 252 MG/DL (ref 200–400)
GFR SERPL CREATININE-BSD FRML MDRD: 65 ML/MIN/1.73M*2
GLUCOSE BLD MANUAL STRIP-MCNC: 130 MG/DL (ref 74–99)
GLUCOSE BLD MANUAL STRIP-MCNC: 136 MG/DL (ref 74–99)
GLUCOSE BLD MANUAL STRIP-MCNC: 147 MG/DL (ref 74–99)
GLUCOSE BLD MANUAL STRIP-MCNC: 151 MG/DL (ref 74–99)
GLUCOSE BLD MANUAL STRIP-MCNC: 164 MG/DL (ref 74–99)
GLUCOSE BLDA-MCNC: 139 MG/DL (ref 74–99)
GLUCOSE BLDA-MCNC: 152 MG/DL (ref 74–99)
GLUCOSE BLDA-MCNC: 160 MG/DL (ref 74–99)
GLUCOSE BLDA-MCNC: 165 MG/DL (ref 74–99)
GLUCOSE BLDA-MCNC: 177 MG/DL (ref 74–99)
GLUCOSE SERPL-MCNC: 150 MG/DL (ref 74–99)
HCO3 BLDA-SCNC: 23.2 MMOL/L (ref 22–26)
HCO3 BLDA-SCNC: 24.8 MMOL/L (ref 22–26)
HCO3 BLDA-SCNC: 25.2 MMOL/L (ref 22–26)
HCO3 BLDA-SCNC: 29.8 MMOL/L (ref 22–26)
HCO3 BLDA-SCNC: 29.9 MMOL/L (ref 22–26)
HCT VFR BLD AUTO: 28.2 % (ref 41–52)
HCT VFR BLD EST: 24 % (ref 41–52)
HCT VFR BLD EST: 30 % (ref 41–52)
HCT VFR BLD EST: 31 % (ref 41–52)
HCT VFR BLD EST: 31 % (ref 41–52)
HCT VFR BLD EST: 35 % (ref 41–52)
HGB BLD-MCNC: 9.6 G/DL (ref 13.5–17.5)
HGB BLDA-MCNC: 10.1 G/DL (ref 13.5–17.5)
HGB BLDA-MCNC: 10.1 G/DL (ref 13.5–17.5)
HGB BLDA-MCNC: 10.4 G/DL (ref 13.5–17.5)
HGB BLDA-MCNC: 10.4 G/DL (ref 13.5–17.5)
HGB BLDA-MCNC: 11.5 G/DL (ref 13.5–17.5)
HGB BLDA-MCNC: 7.9 G/DL (ref 13.5–17.5)
INHALED O2 CONCENTRATION: 21 %
INHALED O2 CONCENTRATION: 50 %
INHALED O2 CONCENTRATION: 6 %
INR PPP: 1.2 (ref 0.9–1.1)
LACTATE BLDA-SCNC: 1.8 MMOL/L (ref 0.4–2)
LACTATE BLDA-SCNC: 1.8 MMOL/L (ref 0.4–2)
LACTATE BLDA-SCNC: 2.1 MMOL/L (ref 0.4–2)
LACTATE BLDA-SCNC: 2.6 MMOL/L (ref 0.4–2)
LACTATE BLDA-SCNC: 2.6 MMOL/L (ref 0.4–2)
MAGNESIUM SERPL-MCNC: 1.65 MG/DL (ref 1.6–2.4)
MCH RBC QN AUTO: 33.9 PG (ref 26–34)
MCHC RBC AUTO-ENTMCNC: 34 G/DL (ref 32–36)
MCV RBC AUTO: 100 FL (ref 80–100)
METHGB MFR BLDA: 1.2 % (ref 0–1.5)
NRBC BLD-RTO: 0 /100 WBCS (ref 0–0)
OXYHGB MFR BLDA: 93 % (ref 94–98)
OXYHGB MFR BLDA: 96.7 % (ref 94–98)
OXYHGB MFR BLDA: 96.9 % (ref 94–98)
OXYHGB MFR BLDA: 97.2 % (ref 94–98)
PCO2 BLDA: 35 MM HG (ref 38–42)
PCO2 BLDA: 38 MM HG (ref 38–42)
PCO2 BLDA: 40 MM HG (ref 38–42)
PCO2 BLDA: 41 MM HG (ref 38–42)
PCO2 BLDA: 44 MM HG (ref 38–42)
PH BLDA: 7.39 PH (ref 7.38–7.42)
PH BLDA: 7.43 PH (ref 7.38–7.42)
PH BLDA: 7.43 PH (ref 7.38–7.42)
PH BLDA: 7.44 PH (ref 7.38–7.42)
PH BLDA: 7.48 PH (ref 7.38–7.42)
PHOSPHATE SERPL-MCNC: 4.9 MG/DL (ref 2.5–4.9)
PLATELET # BLD AUTO: 279 X10*3/UL (ref 150–450)
PO2 BLDA: 140 MM HG (ref 85–95)
PO2 BLDA: 167 MM HG (ref 85–95)
PO2 BLDA: 186 MM HG (ref 85–95)
PO2 BLDA: 74 MM HG (ref 85–95)
PO2 BLDA: 98 MM HG (ref 85–95)
POC FINGERSTICK BLOOD GLUCOSE: 136 MG/DL (ref 70–100)
POTASSIUM BLDA-SCNC: 3.5 MMOL/L (ref 3.5–5.3)
POTASSIUM BLDA-SCNC: 3.5 MMOL/L (ref 3.5–5.3)
POTASSIUM BLDA-SCNC: 3.7 MMOL/L (ref 3.5–5.3)
POTASSIUM BLDA-SCNC: 3.8 MMOL/L (ref 3.5–5.3)
POTASSIUM BLDA-SCNC: 4 MMOL/L (ref 3.5–5.3)
POTASSIUM SERPL-SCNC: 3.5 MMOL/L (ref 3.5–5.3)
PROT SERPL-MCNC: 6 G/DL (ref 6.4–8.2)
PROTHROMBIN TIME: 13.2 SECONDS (ref 9.8–12.8)
RBC # BLD AUTO: 2.83 X10*6/UL (ref 4.5–5.9)
SAO2 % BLDA: 100 % (ref 94–100)
SAO2 % BLDA: 96 % (ref 94–100)
SODIUM BLDA-SCNC: 132 MMOL/L (ref 136–145)
SODIUM BLDA-SCNC: 133 MMOL/L (ref 136–145)
SODIUM BLDA-SCNC: 135 MMOL/L (ref 136–145)
SODIUM BLDA-SCNC: 135 MMOL/L (ref 136–145)
SODIUM BLDA-SCNC: 137 MMOL/L (ref 136–145)
SODIUM SERPL-SCNC: 137 MMOL/L (ref 136–145)
WBC # BLD AUTO: 14.7 X10*3/UL (ref 4.4–11.3)

## 2023-11-29 PROCEDURE — 3600000004 HC OR TIME - INITIAL BASE CHARGE - PROCEDURE LEVEL FOUR: Performed by: STUDENT IN AN ORGANIZED HEALTH CARE EDUCATION/TRAINING PROGRAM

## 2023-11-29 PROCEDURE — 0DHA0UZ INSERTION OF FEEDING DEVICE INTO JEJUNUM, OPEN APPROACH: ICD-10-PCS | Performed by: STUDENT IN AN ORGANIZED HEALTH CARE EDUCATION/TRAINING PROGRAM

## 2023-11-29 PROCEDURE — 82947 ASSAY GLUCOSE BLOOD QUANT: CPT

## 2023-11-29 PROCEDURE — 2500000004 HC RX 250 GENERAL PHARMACY W/ HCPCS (ALT 636 FOR OP/ED): Performed by: ANESTHESIOLOGIST ASSISTANT

## 2023-11-29 PROCEDURE — P9045 ALBUMIN (HUMAN), 5%, 250 ML: HCPCS | Mod: JZ | Performed by: PHYSICIAN ASSISTANT

## 2023-11-29 PROCEDURE — 2500000004 HC RX 250 GENERAL PHARMACY W/ HCPCS (ALT 636 FOR OP/ED): Performed by: STUDENT IN AN ORGANIZED HEALTH CARE EDUCATION/TRAINING PROGRAM

## 2023-11-29 PROCEDURE — P9047 ALBUMIN (HUMAN), 25%, 50ML: HCPCS | Mod: JZ | Performed by: PHYSICIAN ASSISTANT

## 2023-11-29 PROCEDURE — 3700000002 HC GENERAL ANESTHESIA TIME - EACH INCREMENTAL 1 MINUTE: Performed by: STUDENT IN AN ORGANIZED HEALTH CARE EDUCATION/TRAINING PROGRAM

## 2023-11-29 PROCEDURE — 83605 ASSAY OF LACTIC ACID: CPT | Performed by: PHYSICIAN ASSISTANT

## 2023-11-29 PROCEDURE — 82248 BILIRUBIN DIRECT: CPT | Performed by: PHYSICIAN ASSISTANT

## 2023-11-29 PROCEDURE — 82962 GLUCOSE BLOOD TEST: CPT | Performed by: COLON & RECTAL SURGERY

## 2023-11-29 PROCEDURE — 43124 REMOVAL OF ESOPHAGUS: CPT | Performed by: STUDENT IN AN ORGANIZED HEALTH CARE EDUCATION/TRAINING PROGRAM

## 2023-11-29 PROCEDURE — P9045 ALBUMIN (HUMAN), 5%, 250 ML: HCPCS | Mod: JZ,JG | Performed by: STUDENT IN AN ORGANIZED HEALTH CARE EDUCATION/TRAINING PROGRAM

## 2023-11-29 PROCEDURE — 82435 ASSAY OF BLOOD CHLORIDE: CPT | Performed by: PHYSICIAN ASSISTANT

## 2023-11-29 PROCEDURE — 82375 ASSAY CARBOXYHB QUANT: CPT

## 2023-11-29 PROCEDURE — 88309 TISSUE EXAM BY PATHOLOGIST: CPT | Performed by: STUDENT IN AN ORGANIZED HEALTH CARE EDUCATION/TRAINING PROGRAM

## 2023-11-29 PROCEDURE — 83735 ASSAY OF MAGNESIUM: CPT | Performed by: PHYSICIAN ASSISTANT

## 2023-11-29 PROCEDURE — 2020000001 HC ICU ROOM DAILY

## 2023-11-29 PROCEDURE — 36620 INSERTION CATHETER ARTERY: CPT | Performed by: STUDENT IN AN ORGANIZED HEALTH CARE EDUCATION/TRAINING PROGRAM

## 2023-11-29 PROCEDURE — 2500000004 HC RX 250 GENERAL PHARMACY W/ HCPCS (ALT 636 FOR OP/ED): Mod: JZ | Performed by: PHYSICIAN ASSISTANT

## 2023-11-29 PROCEDURE — 2500000005 HC RX 250 GENERAL PHARMACY W/O HCPCS: Performed by: STUDENT IN AN ORGANIZED HEALTH CARE EDUCATION/TRAINING PROGRAM

## 2023-11-29 PROCEDURE — 85027 COMPLETE CBC AUTOMATED: CPT | Performed by: PHYSICIAN ASSISTANT

## 2023-11-29 PROCEDURE — A44005 PR FREEING BOWEL ADHESION,ENTEROLYSIS: Performed by: ANESTHESIOLOGY

## 2023-11-29 PROCEDURE — 85018 HEMOGLOBIN: CPT

## 2023-11-29 PROCEDURE — 84484 ASSAY OF TROPONIN QUANT: CPT | Performed by: STUDENT IN AN ORGANIZED HEALTH CARE EDUCATION/TRAINING PROGRAM

## 2023-11-29 PROCEDURE — 82040 ASSAY OF SERUM ALBUMIN: CPT | Performed by: PHYSICIAN ASSISTANT

## 2023-11-29 PROCEDURE — 71045 X-RAY EXAM CHEST 1 VIEW: CPT | Performed by: RADIOLOGY

## 2023-11-29 PROCEDURE — 94760 N-INVAS EAR/PLS OXIMETRY 1: CPT

## 2023-11-29 PROCEDURE — 84132 ASSAY OF SERUM POTASSIUM: CPT | Performed by: STUDENT IN AN ORGANIZED HEALTH CARE EDUCATION/TRAINING PROGRAM

## 2023-11-29 PROCEDURE — 84295 ASSAY OF SERUM SODIUM: CPT | Performed by: PHYSICIAN ASSISTANT

## 2023-11-29 PROCEDURE — 88311 DECALCIFY TISSUE: CPT | Performed by: STUDENT IN AN ORGANIZED HEALTH CARE EDUCATION/TRAINING PROGRAM

## 2023-11-29 PROCEDURE — 84132 ASSAY OF SERUM POTASSIUM: CPT

## 2023-11-29 PROCEDURE — 3600000009 HC OR TIME - EACH INCREMENTAL 1 MINUTE - PROCEDURE LEVEL FOUR: Performed by: STUDENT IN AN ORGANIZED HEALTH CARE EDUCATION/TRAINING PROGRAM

## 2023-11-29 PROCEDURE — 99291 CRITICAL CARE FIRST HOUR: CPT | Performed by: STUDENT IN AN ORGANIZED HEALTH CARE EDUCATION/TRAINING PROGRAM

## 2023-11-29 PROCEDURE — 2500000004 HC RX 250 GENERAL PHARMACY W/ HCPCS (ALT 636 FOR OP/ED): Mod: JZ,JG | Performed by: STUDENT IN AN ORGANIZED HEALTH CARE EDUCATION/TRAINING PROGRAM

## 2023-11-29 PROCEDURE — 44015 INSERT NEEDLE CATH BOWEL: CPT | Performed by: STUDENT IN AN ORGANIZED HEALTH CARE EDUCATION/TRAINING PROGRAM

## 2023-11-29 PROCEDURE — 0DW60UZ REVISION OF FEEDING DEVICE IN STOMACH, OPEN APPROACH: ICD-10-PCS | Performed by: STUDENT IN AN ORGANIZED HEALTH CARE EDUCATION/TRAINING PROGRAM

## 2023-11-29 PROCEDURE — 0DW50YZ REVISION OF OTHER DEVICE IN ESOPHAGUS, OPEN APPROACH: ICD-10-PCS | Performed by: STUDENT IN AN ORGANIZED HEALTH CARE EDUCATION/TRAINING PROGRAM

## 2023-11-29 PROCEDURE — 2500000005 HC RX 250 GENERAL PHARMACY W/O HCPCS: Performed by: ANESTHESIOLOGIST ASSISTANT

## 2023-11-29 PROCEDURE — A4217 STERILE WATER/SALINE, 500 ML: HCPCS | Performed by: STUDENT IN AN ORGANIZED HEALTH CARE EDUCATION/TRAINING PROGRAM

## 2023-11-29 PROCEDURE — 88309 TISSUE EXAM BY PATHOLOGIST: CPT | Mod: TC,SUR | Performed by: STUDENT IN AN ORGANIZED HEALTH CARE EDUCATION/TRAINING PROGRAM

## 2023-11-29 PROCEDURE — C9113 INJ PANTOPRAZOLE SODIUM, VIA: HCPCS | Performed by: PHYSICIAN ASSISTANT

## 2023-11-29 PROCEDURE — 85384 FIBRINOGEN ACTIVITY: CPT | Performed by: PHYSICIAN ASSISTANT

## 2023-11-29 PROCEDURE — 93010 ELECTROCARDIOGRAM REPORT: CPT | Performed by: INTERNAL MEDICINE

## 2023-11-29 PROCEDURE — 99223 1ST HOSP IP/OBS HIGH 75: CPT | Performed by: STUDENT IN AN ORGANIZED HEALTH CARE EDUCATION/TRAINING PROGRAM

## 2023-11-29 PROCEDURE — P9047 ALBUMIN (HUMAN), 25%, 50ML: HCPCS | Mod: JZ,JG | Performed by: STUDENT IN AN ORGANIZED HEALTH CARE EDUCATION/TRAINING PROGRAM

## 2023-11-29 PROCEDURE — 85610 PROTHROMBIN TIME: CPT | Performed by: PHYSICIAN ASSISTANT

## 2023-11-29 PROCEDURE — 88307 TISSUE EXAM BY PATHOLOGIST: CPT | Mod: TC,SUR | Performed by: STUDENT IN AN ORGANIZED HEALTH CARE EDUCATION/TRAINING PROGRAM

## 2023-11-29 PROCEDURE — 2780000003 HC OR 278 NO HCPCS: Performed by: STUDENT IN AN ORGANIZED HEALTH CARE EDUCATION/TRAINING PROGRAM

## 2023-11-29 PROCEDURE — 3700000001 HC GENERAL ANESTHESIA TIME - INITIAL BASE CHARGE: Performed by: STUDENT IN AN ORGANIZED HEALTH CARE EDUCATION/TRAINING PROGRAM

## 2023-11-29 PROCEDURE — 0DNU0ZZ RELEASE OMENTUM, OPEN APPROACH: ICD-10-PCS | Performed by: STUDENT IN AN ORGANIZED HEALTH CARE EDUCATION/TRAINING PROGRAM

## 2023-11-29 PROCEDURE — 2720000007 HC OR 272 NO HCPCS: Performed by: STUDENT IN AN ORGANIZED HEALTH CARE EDUCATION/TRAINING PROGRAM

## 2023-11-29 PROCEDURE — 2500000002 HC RX 250 W HCPCS SELF ADMINISTERED DRUGS (ALT 637 FOR MEDICARE OP, ALT 636 FOR OP/ED): Performed by: PHYSICIAN ASSISTANT

## 2023-11-29 PROCEDURE — 96372 THER/PROPH/DIAG INJ SC/IM: CPT | Performed by: STUDENT IN AN ORGANIZED HEALTH CARE EDUCATION/TRAINING PROGRAM

## 2023-11-29 PROCEDURE — 96372 THER/PROPH/DIAG INJ SC/IM: CPT | Performed by: PHYSICIAN ASSISTANT

## 2023-11-29 PROCEDURE — 37799 UNLISTED PX VASCULAR SURGERY: CPT | Performed by: PHYSICIAN ASSISTANT

## 2023-11-29 PROCEDURE — 84100 ASSAY OF PHOSPHORUS: CPT | Performed by: PHYSICIAN ASSISTANT

## 2023-11-29 PROCEDURE — 71045 X-RAY EXAM CHEST 1 VIEW: CPT | Mod: FY

## 2023-11-29 PROCEDURE — 99223 1ST HOSP IP/OBS HIGH 75: CPT | Performed by: PHYSICIAN ASSISTANT

## 2023-11-29 PROCEDURE — 44015 INSERT NEEDLE CATH BOWEL: CPT | Performed by: THORACIC SURGERY (CARDIOTHORACIC VASCULAR SURGERY)

## 2023-11-29 PROCEDURE — 43124 REMOVAL OF ESOPHAGUS: CPT | Performed by: THORACIC SURGERY (CARDIOTHORACIC VASCULAR SURGERY)

## 2023-11-29 PROCEDURE — 88307 TISSUE EXAM BY PATHOLOGIST: CPT | Performed by: STUDENT IN AN ORGANIZED HEALTH CARE EDUCATION/TRAINING PROGRAM

## 2023-11-29 PROCEDURE — 82330 ASSAY OF CALCIUM: CPT | Performed by: PHYSICIAN ASSISTANT

## 2023-11-29 RX ORDER — CALCIUM GLUCONATE 20 MG/ML
1 INJECTION, SOLUTION INTRAVENOUS EVERY 6 HOURS PRN
Status: DISCONTINUED | OUTPATIENT
Start: 2023-11-29 | End: 2023-12-03

## 2023-11-29 RX ORDER — PHENYLEPHRINE 10 MG/250 ML(40 MCG/ML)IN 0.9 % SOD.CHLORIDE INTRAVENOUS
.1-2 CONTINUOUS
Status: DISCONTINUED | OUTPATIENT
Start: 2023-11-30 | End: 2023-12-01

## 2023-11-29 RX ORDER — PHENYLEPHRINE 10 MG/250 ML(40 MCG/ML)IN 0.9 % SOD.CHLORIDE INTRAVENOUS
CONTINUOUS PRN
Status: DISCONTINUED | OUTPATIENT
Start: 2023-11-29 | End: 2023-11-29

## 2023-11-29 RX ORDER — VANCOMYCIN 1.5 G/300ML
INJECTION, SOLUTION INTRAVENOUS AS NEEDED
Status: DISCONTINUED | OUTPATIENT
Start: 2023-11-29 | End: 2023-11-29

## 2023-11-29 RX ORDER — SODIUM CHLORIDE 0.9 G/100ML
IRRIGANT IRRIGATION AS NEEDED
Status: DISCONTINUED | OUTPATIENT
Start: 2023-11-29 | End: 2023-11-29 | Stop reason: HOSPADM

## 2023-11-29 RX ORDER — PHENYLEPHRINE HYDROCHLORIDE 10 MG/ML
INJECTION INTRAVENOUS
Status: DISPENSED
Start: 2023-11-29 | End: 2023-11-30

## 2023-11-29 RX ORDER — POTASSIUM CHLORIDE 29.8 MG/ML
40 INJECTION INTRAVENOUS EVERY 6 HOURS PRN
Status: DISCONTINUED | OUTPATIENT
Start: 2023-11-29 | End: 2023-12-03

## 2023-11-29 RX ORDER — MAGNESIUM SULFATE HEPTAHYDRATE 40 MG/ML
4 INJECTION, SOLUTION INTRAVENOUS EVERY 6 HOURS PRN
Status: DISCONTINUED | OUTPATIENT
Start: 2023-11-29 | End: 2023-12-03

## 2023-11-29 RX ORDER — INSULIN LISPRO 100 [IU]/ML
0-10 INJECTION, SOLUTION INTRAVENOUS; SUBCUTANEOUS EVERY 4 HOURS
Status: DISCONTINUED | OUTPATIENT
Start: 2023-11-29 | End: 2023-12-03

## 2023-11-29 RX ORDER — ALBUMIN HUMAN 50 G/1000ML
25 SOLUTION INTRAVENOUS ONCE
Status: COMPLETED | OUTPATIENT
Start: 2023-11-29 | End: 2023-11-29

## 2023-11-29 RX ORDER — DEXTROSE 50 % IN WATER (D50W) INTRAVENOUS SYRINGE
25
Status: DISCONTINUED | OUTPATIENT
Start: 2023-11-29 | End: 2023-12-03

## 2023-11-29 RX ORDER — SUCCINYLCHOLINE CHLORIDE 20 MG/ML
INJECTION INTRAMUSCULAR; INTRAVENOUS AS NEEDED
Status: DISCONTINUED | OUTPATIENT
Start: 2023-11-29 | End: 2023-11-29

## 2023-11-29 RX ORDER — PROPOFOL 10 MG/ML
INJECTION, EMULSION INTRAVENOUS AS NEEDED
Status: DISCONTINUED | OUTPATIENT
Start: 2023-11-29 | End: 2023-11-29

## 2023-11-29 RX ORDER — PHENYLEPHRINE HCL IN 0.9% NACL 0.4MG/10ML
SYRINGE (ML) INTRAVENOUS AS NEEDED
Status: DISCONTINUED | OUTPATIENT
Start: 2023-11-29 | End: 2023-11-29

## 2023-11-29 RX ORDER — FENTANYL CITRATE 50 UG/ML
INJECTION, SOLUTION INTRAMUSCULAR; INTRAVENOUS AS NEEDED
Status: DISCONTINUED | OUTPATIENT
Start: 2023-11-29 | End: 2023-11-29

## 2023-11-29 RX ORDER — SODIUM CHLORIDE, SODIUM LACTATE, POTASSIUM CHLORIDE, CALCIUM CHLORIDE 600; 310; 30; 20 MG/100ML; MG/100ML; MG/100ML; MG/100ML
5 INJECTION, SOLUTION INTRAVENOUS CONTINUOUS
Status: DISCONTINUED | OUTPATIENT
Start: 2023-11-29 | End: 2023-12-03

## 2023-11-29 RX ORDER — DEXTROSE MONOHYDRATE 100 MG/ML
0.3 INJECTION, SOLUTION INTRAVENOUS ONCE AS NEEDED
Status: DISCONTINUED | OUTPATIENT
Start: 2023-11-29 | End: 2023-12-03

## 2023-11-29 RX ORDER — HYDROMORPHONE HYDROCHLORIDE 1 MG/ML
0.4 INJECTION, SOLUTION INTRAMUSCULAR; INTRAVENOUS; SUBCUTANEOUS EVERY 4 HOURS PRN
Status: DISCONTINUED | OUTPATIENT
Start: 2023-11-29 | End: 2023-12-01

## 2023-11-29 RX ORDER — ONDANSETRON HYDROCHLORIDE 2 MG/ML
INJECTION, SOLUTION INTRAVENOUS AS NEEDED
Status: DISCONTINUED | OUTPATIENT
Start: 2023-11-29 | End: 2023-11-29

## 2023-11-29 RX ORDER — CEFAZOLIN 1 G/1
INJECTION, POWDER, FOR SOLUTION INTRAVENOUS AS NEEDED
Status: DISCONTINUED | OUTPATIENT
Start: 2023-11-29 | End: 2023-11-29

## 2023-11-29 RX ORDER — PANTOPRAZOLE SODIUM 40 MG/10ML
40 INJECTION, POWDER, LYOPHILIZED, FOR SOLUTION INTRAVENOUS DAILY
Status: DISCONTINUED | OUTPATIENT
Start: 2023-11-29 | End: 2023-12-03

## 2023-11-29 RX ORDER — PHENYLEPHRINE 10 MG/250 ML(40 MCG/ML)IN 0.9 % SOD.CHLORIDE INTRAVENOUS
.1-2 CONTINUOUS
Status: DISCONTINUED | OUTPATIENT
Start: 2023-11-29 | End: 2023-11-29

## 2023-11-29 RX ORDER — HEPARIN SODIUM 5000 [USP'U]/ML
5000 INJECTION, SOLUTION INTRAVENOUS; SUBCUTANEOUS ONCE
Status: COMPLETED | OUTPATIENT
Start: 2023-11-29 | End: 2023-11-29

## 2023-11-29 RX ORDER — ROCURONIUM BROMIDE 10 MG/ML
INJECTION, SOLUTION INTRAVENOUS AS NEEDED
Status: DISCONTINUED | OUTPATIENT
Start: 2023-11-29 | End: 2023-11-29

## 2023-11-29 RX ORDER — HEPARIN SODIUM 5000 [USP'U]/ML
5000 INJECTION, SOLUTION INTRAVENOUS; SUBCUTANEOUS EVERY 8 HOURS
Status: DISCONTINUED | OUTPATIENT
Start: 2023-11-29 | End: 2023-12-03

## 2023-11-29 RX ORDER — BUPIVACAINE HCL/EPINEPHRINE 0.25-.0005
VIAL (ML) INJECTION AS NEEDED
Status: DISCONTINUED | OUTPATIENT
Start: 2023-11-29 | End: 2023-11-29 | Stop reason: HOSPADM

## 2023-11-29 RX ORDER — HYDROMORPHONE HYDROCHLORIDE 1 MG/ML
0.2 INJECTION, SOLUTION INTRAMUSCULAR; INTRAVENOUS; SUBCUTANEOUS EVERY 4 HOURS PRN
Status: DISCONTINUED | OUTPATIENT
Start: 2023-11-29 | End: 2023-12-01

## 2023-11-29 RX ORDER — HYDROMORPHONE HYDROCHLORIDE 1 MG/ML
INJECTION, SOLUTION INTRAMUSCULAR; INTRAVENOUS; SUBCUTANEOUS AS NEEDED
Status: DISCONTINUED | OUTPATIENT
Start: 2023-11-29 | End: 2023-11-29

## 2023-11-29 RX ORDER — ALBUMIN HUMAN 250 G/1000ML
SOLUTION INTRAVENOUS CONTINUOUS PRN
Status: DISCONTINUED | OUTPATIENT
Start: 2023-11-29 | End: 2023-11-29

## 2023-11-29 RX ORDER — MAGNESIUM SULFATE HEPTAHYDRATE 40 MG/ML
2 INJECTION, SOLUTION INTRAVENOUS EVERY 6 HOURS PRN
Status: DISCONTINUED | OUTPATIENT
Start: 2023-11-29 | End: 2023-12-03

## 2023-11-29 RX ORDER — CEFAZOLIN SODIUM 2 G/100ML
2 INJECTION, SOLUTION INTRAVENOUS EVERY 8 HOURS
Status: COMPLETED | OUTPATIENT
Start: 2023-11-29 | End: 2023-11-30

## 2023-11-29 RX ORDER — ALBUMIN HUMAN 50 G/1000ML
SOLUTION INTRAVENOUS AS NEEDED
Status: DISCONTINUED | OUTPATIENT
Start: 2023-11-29 | End: 2023-11-29

## 2023-11-29 RX ORDER — CALCIUM GLUCONATE 20 MG/ML
2 INJECTION, SOLUTION INTRAVENOUS EVERY 6 HOURS PRN
Status: DISCONTINUED | OUTPATIENT
Start: 2023-11-29 | End: 2023-12-03

## 2023-11-29 RX ORDER — FERROUS SULFATE 300 MG/5ML
60 LIQUID (ML) ORAL DAILY
COMMUNITY
End: 2024-02-07 | Stop reason: ALTCHOICE

## 2023-11-29 RX ORDER — ALBUMIN HUMAN 250 G/1000ML
25 SOLUTION INTRAVENOUS EVERY 6 HOURS
Status: COMPLETED | OUTPATIENT
Start: 2023-11-29 | End: 2023-11-30

## 2023-11-29 RX ORDER — ESMOLOL HYDROCHLORIDE 10 MG/ML
INJECTION INTRAVENOUS AS NEEDED
Status: DISCONTINUED | OUTPATIENT
Start: 2023-11-29 | End: 2023-11-29

## 2023-11-29 RX ADMIN — FENTANYL CITRATE 50 MCG: 50 INJECTION, SOLUTION INTRAMUSCULAR; INTRAVENOUS at 09:32

## 2023-11-29 RX ADMIN — SODIUM CHLORIDE, POTASSIUM CHLORIDE, SODIUM LACTATE AND CALCIUM CHLORIDE 100 ML/HR: 600; 310; 30; 20 INJECTION, SOLUTION INTRAVENOUS at 16:27

## 2023-11-29 RX ADMIN — ROCURONIUM BROMIDE 60 MG: 10 INJECTION INTRAVENOUS at 09:07

## 2023-11-29 RX ADMIN — SUGAMMADEX 200 MG: 100 INJECTION, SOLUTION INTRAVENOUS at 14:39

## 2023-11-29 RX ADMIN — Medication 0.5 MCG/KG/MIN: at 12:35

## 2023-11-29 RX ADMIN — ESMOLOL HYDROCHLORIDE 30 MG: 10 INJECTION, SOLUTION INTRAVENOUS at 12:29

## 2023-11-29 RX ADMIN — Medication 200 MCG: at 14:50

## 2023-11-29 RX ADMIN — Medication 200 MCG: at 13:28

## 2023-11-29 RX ADMIN — HEPARIN SODIUM 5000 UNITS: 5000 INJECTION INTRAVENOUS; SUBCUTANEOUS at 09:22

## 2023-11-29 RX ADMIN — Medication 160 MCG: at 12:52

## 2023-11-29 RX ADMIN — INSULIN LISPRO 2 UNITS: 100 INJECTION, SOLUTION INTRAVENOUS; SUBCUTANEOUS at 16:57

## 2023-11-29 RX ADMIN — HYDROMORPHONE HYDROCHLORIDE 0.2 MG: 1 INJECTION, SOLUTION INTRAMUSCULAR; INTRAVENOUS; SUBCUTANEOUS at 20:27

## 2023-11-29 RX ADMIN — Medication 160 MCG: at 12:25

## 2023-11-29 RX ADMIN — Medication 120 MCG: at 08:49

## 2023-11-29 RX ADMIN — Medication 80 MCG: at 12:15

## 2023-11-29 RX ADMIN — ESMOLOL HYDROCHLORIDE 10 MG: 10 INJECTION, SOLUTION INTRAVENOUS at 14:45

## 2023-11-29 RX ADMIN — VANCOMYCIN 1.3 G: 1.5 INJECTION, SOLUTION INTRAVENOUS at 09:15

## 2023-11-29 RX ADMIN — ONDANSETRON 4 MG: 2 INJECTION INTRAMUSCULAR; INTRAVENOUS at 14:00

## 2023-11-29 RX ADMIN — PANTOPRAZOLE SODIUM 40 MG: 40 INJECTION, POWDER, FOR SOLUTION INTRAVENOUS at 16:57

## 2023-11-29 RX ADMIN — ROCURONIUM BROMIDE 10 MG: 10 INJECTION INTRAVENOUS at 10:12

## 2023-11-29 RX ADMIN — Medication 160 MCG: at 12:41

## 2023-11-29 RX ADMIN — Medication 120 MCG: at 09:06

## 2023-11-29 RX ADMIN — Medication 160 MCG: at 12:38

## 2023-11-29 RX ADMIN — Medication 200 MCG: at 12:31

## 2023-11-29 RX ADMIN — Medication 80 MCG: at 11:08

## 2023-11-29 RX ADMIN — SODIUM CHLORIDE, POTASSIUM CHLORIDE, SODIUM LACTATE AND CALCIUM CHLORIDE 500 ML: 600; 310; 30; 20 INJECTION, SOLUTION INTRAVENOUS at 16:27

## 2023-11-29 RX ADMIN — SODIUM CHLORIDE, POTASSIUM CHLORIDE, SODIUM LACTATE AND CALCIUM CHLORIDE 125 ML/HR: 600; 310; 30; 20 INJECTION, SOLUTION INTRAVENOUS at 20:27

## 2023-11-29 RX ADMIN — SODIUM CHLORIDE, SODIUM LACTATE, POTASSIUM CHLORIDE, AND CALCIUM CHLORIDE: 600; 310; 30; 20 INJECTION, SOLUTION INTRAVENOUS at 08:49

## 2023-11-29 RX ADMIN — Medication 80 MCG: at 12:24

## 2023-11-29 RX ADMIN — ALBUMIN HUMAN 25 G: 0.25 SOLUTION INTRAVENOUS at 23:37

## 2023-11-29 RX ADMIN — ALBUMIN HUMAN 250 ML: 0.05 INJECTION, SOLUTION INTRAVENOUS at 10:25

## 2023-11-29 RX ADMIN — Medication 160 MCG: at 12:34

## 2023-11-29 RX ADMIN — Medication 160 MCG: at 13:45

## 2023-11-29 RX ADMIN — CEFAZOLIN SODIUM 2 G: 2 INJECTION, SOLUTION INTRAVENOUS at 20:26

## 2023-11-29 RX ADMIN — ROCURONIUM BROMIDE 30 MG: 10 INJECTION INTRAVENOUS at 11:58

## 2023-11-29 RX ADMIN — POTASSIUM CHLORIDE 40 MEQ: 29.8 INJECTION, SOLUTION INTRAVENOUS at 18:24

## 2023-11-29 RX ADMIN — CEFAZOLIN 2 G: 1 INJECTION, POWDER, FOR SOLUTION INTRAMUSCULAR; INTRAVENOUS at 09:06

## 2023-11-29 RX ADMIN — ROCURONIUM BROMIDE 20 MG: 10 INJECTION INTRAVENOUS at 10:41

## 2023-11-29 RX ADMIN — Medication 80 MCG: at 13:26

## 2023-11-29 RX ADMIN — CEFAZOLIN 2 G: 1 INJECTION, POWDER, FOR SOLUTION INTRAMUSCULAR; INTRAVENOUS at 13:06

## 2023-11-29 RX ADMIN — HYDROMORPHONE HYDROCHLORIDE 0.25 MG: 1 INJECTION, SOLUTION INTRAMUSCULAR; INTRAVENOUS; SUBCUTANEOUS at 14:34

## 2023-11-29 RX ADMIN — SODIUM CHLORIDE: 9 INJECTION, SOLUTION INTRAVENOUS at 09:15

## 2023-11-29 RX ADMIN — Medication 120 MCG: at 12:30

## 2023-11-29 RX ADMIN — SUCCINYLCHOLINE CHLORIDE 140 MG: 20 INJECTION, SOLUTION INTRAMUSCULAR; INTRAVENOUS at 08:49

## 2023-11-29 RX ADMIN — Medication 160 MCG: at 09:19

## 2023-11-29 RX ADMIN — Medication 80 MCG: at 11:52

## 2023-11-29 RX ADMIN — ALBUMIN HUMAN 250 ML: 0.05 INJECTION, SOLUTION INTRAVENOUS at 14:49

## 2023-11-29 RX ADMIN — ALBUMIN HUMAN 25 G: 0.05 INJECTION, SOLUTION INTRAVENOUS at 17:39

## 2023-11-29 RX ADMIN — Medication 80 MCG: at 10:49

## 2023-11-29 RX ADMIN — INSULIN LISPRO 2 UNITS: 100 INJECTION, SOLUTION INTRAVENOUS; SUBCUTANEOUS at 23:37

## 2023-11-29 RX ADMIN — ESMOLOL HYDROCHLORIDE 20 MG: 10 INJECTION, SOLUTION INTRAVENOUS at 12:03

## 2023-11-29 RX ADMIN — FENTANYL CITRATE 50 MCG: 50 INJECTION, SOLUTION INTRAMUSCULAR; INTRAVENOUS at 11:17

## 2023-11-29 RX ADMIN — SODIUM CHLORIDE, POTASSIUM CHLORIDE, SODIUM LACTATE AND CALCIUM CHLORIDE 500 ML: 600; 310; 30; 20 INJECTION, SOLUTION INTRAVENOUS at 21:47

## 2023-11-29 RX ADMIN — ALBUMIN HUMAN 25 G: 0.25 SOLUTION INTRAVENOUS at 17:42

## 2023-11-29 RX ADMIN — Medication 0.4 MCG/KG/MIN: at 18:45

## 2023-11-29 RX ADMIN — ALBUMIN HUMAN: 0.25 SOLUTION INTRAVENOUS at 09:15

## 2023-11-29 RX ADMIN — SODIUM CHLORIDE, POTASSIUM CHLORIDE, SODIUM LACTATE AND CALCIUM CHLORIDE 500 ML: 600; 310; 30; 20 INJECTION, SOLUTION INTRAVENOUS at 20:04

## 2023-11-29 RX ADMIN — ALBUMIN HUMAN 250 ML: 0.05 INJECTION, SOLUTION INTRAVENOUS at 10:51

## 2023-11-29 RX ADMIN — Medication 80 MCG: at 11:30

## 2023-11-29 RX ADMIN — ESMOLOL HYDROCHLORIDE 20 MG: 10 INJECTION, SOLUTION INTRAVENOUS at 13:20

## 2023-11-29 RX ADMIN — Medication 200 MCG: at 12:26

## 2023-11-29 RX ADMIN — Medication 200 MCG: at 14:55

## 2023-11-29 RX ADMIN — ESMOLOL HYDROCHLORIDE 50 MG: 10 INJECTION, SOLUTION INTRAVENOUS at 12:28

## 2023-11-29 RX ADMIN — PROPOFOL 100 MG: 10 INJECTION, EMULSION INTRAVENOUS at 09:06

## 2023-11-29 RX ADMIN — SODIUM CHLORIDE, SODIUM LACTATE, POTASSIUM CHLORIDE, AND CALCIUM CHLORIDE: 600; 310; 30; 20 INJECTION, SOLUTION INTRAVENOUS at 13:47

## 2023-11-29 RX ADMIN — MAGNESIUM SULFATE 4 G: 4 INJECTION INTRAVENOUS at 18:24

## 2023-11-29 RX ADMIN — HEPARIN SODIUM 5000 UNITS: 5000 INJECTION INTRAVENOUS; SUBCUTANEOUS at 20:26

## 2023-11-29 RX ADMIN — PROPOFOL 100 MG: 10 INJECTION, EMULSION INTRAVENOUS at 08:49

## 2023-11-29 SDOH — HEALTH STABILITY: MENTAL HEALTH: CURRENT SMOKER: 0

## 2023-11-29 ASSESSMENT — PAIN - FUNCTIONAL ASSESSMENT
PAIN_FUNCTIONAL_ASSESSMENT: 0-10

## 2023-11-29 ASSESSMENT — ACTIVITIES OF DAILY LIVING (ADL)
DRESSING YOURSELF: NEEDS ASSISTANCE
BATHING: NEEDS ASSISTANCE
ADEQUATE_TO_COMPLETE_ADL: YES
PATIENT'S MEMORY ADEQUATE TO SAFELY COMPLETE DAILY ACTIVITIES?: YES
HEARING - RIGHT EAR: FUNCTIONAL
JUDGMENT_ADEQUATE_SAFELY_COMPLETE_DAILY_ACTIVITIES: YES
GROOMING: NEEDS ASSISTANCE
FEEDING YOURSELF: NEEDS ASSISTANCE
HEARING - LEFT EAR: FUNCTIONAL
WALKS IN HOME: NEEDS ASSISTANCE
TOILETING: NEEDS ASSISTANCE

## 2023-11-29 ASSESSMENT — ENCOUNTER SYMPTOMS
ABDOMINAL PAIN: 0
CHILLS: 0
ABDOMINAL DISTENTION: 0
NAUSEA: 0
COUGH: 0
WHEEZING: 0
SHORTNESS OF BREATH: 0
FEVER: 0

## 2023-11-29 ASSESSMENT — COGNITIVE AND FUNCTIONAL STATUS - GENERAL: PATIENT BASELINE BEDBOUND: NO

## 2023-11-29 ASSESSMENT — PAIN SCALES - GENERAL
PAINLEVEL_OUTOF10: 0 - NO PAIN
PAINLEVEL_OUTOF10: 0 - NO PAIN
PAINLEVEL_OUTOF10: 6
PAINLEVEL_OUTOF10: 6

## 2023-11-29 NOTE — H&P
Updated H&P:  Patient seen and note below has been reviewed. No unforseen health issues. He remains at his baseline state of health.    Plan: OR today    ---------------------------------------------------------------------------------------------------------     C/C:  Follow up visit     History Of Present Illness  Levy Gregory is a 68 y.o. male presenting for follow up - since his last visit he has required two separate feeding tube replacements for issues with clogging and then with jejunostomy tube malposition. Most recently had a G and separate J tube replacements (on 10/20/2023). Since that time has been doing well. Denies any acute issues with his tube feeds. Has started to regain some of the weight he lost.   Does report he is still having intermittent headaches (mostly frontal, unsure exactly what brings them on/relieves them).      He presents today with a CT C/A/P also with IV contrast to evaluate the GI system and prepare for potential gastric pull up.      By way of review: patient has a notable PMHx including h/o COPD, T2DM, type II achalasia, paraesophageal hernia s/p robotic Laparoscopic Heller myotomy with mary fundoplication on 3/1 with Dr. Hoang. He presented to ED on 3/7 after discharge with SOB, chills, RUQ pain. CT completed concerning for esophageal perforation and he underwent diagnostic lap with drain placement, EGD with stent placement x2, R chest tube on 3/7. He was transferred to  SICU on 3/9 for further management. Thoracic surgery was consulted. A second chest tube was placed on 3/13 by thoracic surgery. On 3/15 he went to OR for R VATs, decortication, bronchoscopy, and NJ placement . Large empyema was seen on CT chest and he underwent IR guided pigtail placement on 3/24.   On 3/27 he went to OR for EGD with esophageal stent replacement and nasal tube post pyloric placement with Dr. Hoang, previous esophageal stent found to have migrated below LES and MIGUELINA drain visible and  traveling through esophageal perforation. A new esophageal stent was placed more proximally. On 3/30 all drains with bilious fluid with concern for persistent leak. He returned to OR on 3/31 with thoracic and fuentes surgery and underwent EGD, removal of esophageal stent, endovac placement, dobhoff tube placement, PEG tube placement. He returned to OR on 4/3 for endovac replacement with Dr. Gongora and was found to have perforation from 42 to 35 cm from the lips. CT   C/A/P obtained on 4/4 showed previous findings as well as increased pneumoperitoneum.   On 4/5, he returned to OR for right thoracotomy and esophagectomy with cervical esophagostomy and lap takedown of prior fundoplication, lysis of adhesions, and revision of gastrostomy tube for esophageal perforation with thoracic surgery. Transferred to SICU post-op. On 4/7 return to OR for PEG replacement and J tube placement with thoracic. While in SICU he was weaned off of pressors on 4/7. Successfully extubated on 4/8. Reintubated on 4/10 d/t respiratory fatigue. Amio drip initiated on the same day for uncontrolled Afib with RVR. He was also started on CVVH on 4/10. He was extubated in ICU on 4/12. CVVH discontinued. CT CAP completed which demonstrated intraabdominal RP abscesses and intramuscular psoas hematoma. CT CAP repeated on 4/24, which demonstrated increase in size of   RP hematoma and decrease in size of psoas hematoma. He was transferred to [facility] on 4/25. While on [facility], he developed rising leukocytosis and tachycardia and repeat CT PE and CT C/A/P with PO and IV contrast completed which was negative for PE, showed decreasing size of hematomas, and likely acalculous cholecystitis visualized. On 4/28 he underwent percutaneous cholecystostomy tube placement by IR, tube connected to accordion drain and remains in place at time of discharge. Repeat CT CA/P on 5/7 with evidence of persistent anastomotic leak from gastric stump. MIGUELINA drain remain in place.       Throughout hospitalization infectious disease was consulted for complicated abdominal abscesses as well resistant MRSA and CR-acinetobacter for which he was placed on contact precautions. Antibiotic regimen completed on 5/18 and per ID should follow up with infectious disease/primary attending at LTAC facility. Vascular medicine also consulted during hospitalization for management of DVTs and RPB/psoas hematomas. IVC filter was placed on 4/20. Repeat Duplex ultrasound completed on 5/2 showed persistent LE and UE DVTs. He was started on a low intensity heparin drip x24 hours and transitioned to PO Eliquis which he will continue at discharge and follow up with vascular medicine as outpatient.      He was evaluated by nutrition throughout hospital course and was started on tube feeds via J-tube which he tolerated well and will continue on continuous feeding schedule at nursing facility. Nephrology was also consulted during hospitalization for elevated BUN/creatinine and hypernatremia. Hypernatremia resolved and BUN/Creatinine continues to wax and wane. He was evaluated by nephrology prior to discharge and per nephrology recommendations, he will discharge with weekly RFP labs with close monitoring with nephrology attending at LTAC facility.   He was discharged to nursing facility with R chest tube to waterseal and abdominal drains to bulbs on 5/22/23     Past Medical History  He has a past medical history of Contusion of abdominal wall, initial encounter (01/12/2021), Diabetes mellitus (CMS/[facility]), Diaphragmatic hernia without obstruction or gangrene (11/05/2013), Dysphagia, Hemoptysis (05/12/2020), Melena (06/19/2019), Pain in left knee (01/20/2017), Personal history of diseases of the blood and blood-forming organs and certain disorders involving the immune mechanism, Personal history of diseases of the blood and blood-forming organs and certain disorders involving the immune mechanism, Personal history of other  diseases of the digestive system, Personal history of other diseases of the digestive system (06/21/2017), Personal history of other diseases of the nervous system and sense organs, Personal history of other diseases of the respiratory system (06/19/2019), Personal history of other endocrine, nutritional and metabolic disease, Personal history of other infectious and parasitic diseases (03/07/2017), Personal history of other specified conditions, Personal history of other specified conditions (05/29/2019), Personal history of other specified conditions (06/19/2017), Personal history of other specified conditions (06/30/2022), Personal history of pneumonia (recurrent) (02/18/2020), and Sleep apnea.     Social History  He reports that he quit smoking about 3 months ago. His smoking use included cigarettes and cigars. He started smoking about 34 years ago. He has never used smokeless tobacco. He reports that he does not currently use alcohol. He reports that he does not use drugs.        Medications     Current Outpatient Medications:     apixaban (Eliquis) 2.5 mg tablet, Take 1 tablet (2.5 mg) by mouth 2 times a day. Do not start before October 21, 2023., Disp: , Rfl:     atorvastatin (Lipitor) 10 mg tablet, Take 1 tablet (10 mg) by mouth once daily at bedtime., Disp: , Rfl:     blood sugar diagnostic (OneTouch Verio test strips) strip, TEST TWICE DAILY OR AS DIRECTED, Disp: , Rfl:     esomeprazole (NexIUM) 40 mg packet, Take 40 mg by mouth once daily in the morning. Take before meals., Disp: , Rfl:     ferrous sulfate 325 (65 Fe) MG tablet, Take 1 tablet (325 mg) by mouth 2 times a day., Disp: , Rfl:     gabapentin (Neurontin) 300 mg capsule, Take 1 capsule (300 mg) by mouth 3 times a day., Disp: , Rfl:     hydrocortisone 1 % ointment, Apply topically 2 times a day., Disp: , Rfl:     hydrOXYzine HCL (Atarax) 25 mg tablet, Take 1 tablet (25 mg) by mouth 2 times a day., Disp: 60 tablet, Rfl: 0    insulin detemir  "(Levemir U-100 Insulin) 100 unit/mL injection, Inject 35 Units under the skin once daily at bedtime. Take as directed per insulin instructions., Disp: 31.5 mL, Rfl: 0    insulin syringe-needle U-100 0.5 mL 30 gauge x 5/16\" syringe, USE AS DIRECTED., Disp: , Rfl:     insulin syringe-needle U-100 1/2 mL 27 gauge x 1/2\" syringe, Use daily or as directed, Disp: , Rfl:     lancets 33 gauge misc, 2 times a day., Disp: , Rfl:     midodrine (Proamatine) 5 mg tablet, Take 1 tablet (5 mg) by mouth 3 times a day. Gita from [facility], Disp: , Rfl:     senna (Senokot) 8.8 mg/5 mL syrup, 5 mL twice a day., Disp: , Rfl:     sertraline (Zoloft) 50 mg tablet, Take 1 tablet (50 mg) by mouth once daily., Disp: , Rfl:     spironolactone (Aldactone) 25 mg tablet, Take 0.5 tablets (12.5 mg) by mouth once daily., Disp: 45 tablet, Rfl: 1    thiamine 100 mg tablet, Take 1 tablet (100 mg) by mouth once daily., Disp: , Rfl:      Allergies  Patient has no known allergies.     Review of Systems:  Review of Systems   Constitutional: No fevers, chills, unexpected weight change  HENT: No sore throat, congestion, or nasal drainage  Eyes: No visual changes or eye itching  Respiratory: see HPI. No cough, worsening dyspnea, wheezing  Cardiac: No chest pain, palpitations, or lower extremity edema  Gastrointestinal: No nausea, vomiting, diarrhea. No abdominal pain  Genitourinary: No dysuria or hematuria  Musculoskeletal: No back pain. No significant myalgias or arthralgias  Neurologic: No headaches, dizziness, or seizures.  Hematologic: No east bleeding or bruising.  Psychiatric: No anxiety or depression.     Physical Exam:  Physical Exam  There were no vitals taken for this visit.  Constitutional:       General: Patient is not in acute distress.     Appearance: Normal appearance; not ill-appearing.   HENT:      Head: Normocephalic.      Nose: No congestion or rhinorrhea.   Cardiovascular:      Rate and Rhythm: Normal rate and regular " rhythm.      Pulses: Normal pulses.   Pulmonary:      Effort: Pulmonary effort is normal. No respiratory distress.  No conversational dyspnea     Breath sounds: No stridor. No wheezing.   Abdominal:      General: There is no distension.      Palpations: Abdomen is soft.      Tenderness: There is no abdominal tenderness.   Musculoskeletal:         General: No swelling, tenderness or deformity. Normal range of motion.      Cervical back: Normal range of motion. No rigidity. Utilizing wheel chair  Lymphadenopathy:      Cervical: No cervical adenopathy.   Skin:     General: Skin is warm and dry.   Neurological:      General: No focal deficit present.      Mental Status: Patient is alert and oriented to person, place, and time.   Psychiatric:         Mood and Affect: Mood normal.         Relevant Results:         Imaging:        CT chest abdomen pelvis w IV contrast     Result Date: 10/26/2023  Interpreted By:  Giuliana Okeefe, STUDY: CT CHEST ABDOMEN PELVIS W IV CONTRAST; ;  10/25/2023 10:52 am   INDICATION: Signs/Symptoms:posto follow up, eval for surgery.   COMPARISON: 09/05/2023   ACCESSION NUMBER(S): [id number]   ORDERING CLINICIAN: YUNG PEREIRA   TECHNIQUE: Imaging was performed from thoracic apex through ischial tuberosities with axial, coronal and sagittal images reconstructed. Patient received 75 mL Omnipaque 350 intravenously.   FINDINGS: Trachea and mainstem bronchi are patent with no mass. Previous debris has cleared.   Right pleural effusion is decreased in size and there is less atelectasis in the right lower lobe than on the prior exam. There is minimal atelectasis in the left lower lobe. Scattered nodules of 4 mm and less appear unchanged. No interval enlarging nodules are noted.   Right PICC line is present with the tip terminating in the right atrium. Aorta is atherosclerotic with no aneurysmal dilatation. Main pulmonary artery is normal in size.   Heart is normal in size. There is no pericardial  effusion.   Patient has esophagostomy left upper chest wall. There is a small amount of dependent fluid in the upper esophagus. In the subcarinal region along the expected path of the esophagus there is a fluid collection with rim enhancement which appears to have surgical clips along the superior and inferior margins. The density of the central portion of the collection is about 14 Hounsfield units. The collection measures 7 x 1.6 x 3.5 cm. Reviewing the operative report for the esophagectomy on 04/05/2023, no distal stump was left in place and the staple line at the gastric cardia correlates with the operative report of stapling and then subsequent discarding of the distal esophageal stump. Therefore the current rim enhancing fluid collection is most likely a postoperative seroma or hematoma. An infected collection is also possible given that the patient has had empyema. The collection is directly contiguous with the right pleural fluid.   Densely calcified lymph nodes are present in the mediastinum and left hilum. There are several enlarged subcarinal lymph nodes which are similar to the prior study and probably reactive given the patient's history of thoracic infection. Right retrocrural enlarged lymph node similarly are unchanged and most likely reactive.   Visualized thyroid appears normal.   Right posterolateral chest wall scar is noted with partial absence of the right 6th rib from the patient's right thoracotomy.   No mass is noted in the liver. There is no intra or extrahepatic biliary dilatation. Gallbladder is contracted with no calcified stones noted..   No mass or inflammation is noted involving the pancreas.   Spleen is normal in size with numerous calcifications consistent with old granulomatous disease.   Adrenal glands appear normal. Kidneys enhance symmetrically with no hydronephrosis noted. 1.1 cm cyst mid posteromedial left kidney appears unchanged.   Left posterior perirenal collection has  decreased in size since the prior study. There is a small persisting rim enhancing minimal fluid collection which has an AP dimension of 0.8 cm compared to 2.7 cm previously.   A gastrostomy tube with balloon extends into the proximal to mid body of the stomach. Entering slightly more medially along the abdominal wall closer to midline is a GJ tube with the balloon in the distal body of the stomach. The jejunostomy tube extends into proximal duodenal by an estimated length of about 15 cm. Stomach is decompressed. Small bowel loops are nondilated. Air-fluid level is noted in content in the rectum and sigmoid which indicates diarrhea. Colon is nondilated. There is no mesenteric inflammation. No pneumoperitoneum or ascites is present.   Aorta and iliac arteries contain patchy atherosclerosis. There is no aneurysmal dilatation. Vena cava filter is centered at L3 level and appears unchanged in position from the prior study. Retroperitoneal lymph nodes are smaller in size, with maximum short axis diameter on axial images 9 mm compared to 12 mm on the prior study.   Urinary bladder is empty with a Maldonado catheter present. Prostate gland appears unchanged from the prior study and is not grossly enlarged.   No abdominal wall hernia is identified.   Compression deformities of T12 and L1 are similar to the prior exam. Multilevel degenerative changes are present..        The left posterior perirenal collection has decreased in size and reactive lymph nodes in the retroperitoneum have decreased in size.   There is a rim enhancing tubular collection in the lower posterior mediastinum in the area of the esophagectomy and abutting the staple line at the gastric margin and contiguous with the right pleural fluid collection. Postoperative seroma or hematoma is most common. However given that the patient has had empyema, the chronic abscess can not be excluded.   The right pleural fluid collection in the associated atelectasis are  decreased since the prior exam.   Reactive mediastinal lymph nodes are not significantly changed.     MACRO: None.   Signed by: Giuliana Okeefe 10/26/2023 8:07 AM Dictation workstation:   [id number]     XR chest 1 view     Result Date: 10/21/2023  Interpreted By:  Alfredo Lewis, STUDY: XR CHEST 1 VIEW;  10/20/2023 3:59 am   INDICATION: Signs/Symptoms:For thoracic AM rounds.   COMPARISON: 09/07/2023.   ACCESSION NUMBER(S): [id number]   ORDERING CLINICIAN: SIVA DONIS        1.  Improved aeration of the combination of right-sided pleural effusion and the airspace opacity of the right lower lung. 2. Trace bilateral pleural effusions with mild compressive atelectasis. 3. Small right apical pneumothorax with 3 mm pleural separation. Follow-up recommended. 4. Right upper extremity PICC line with the tip in the atrial caval junction. 5. Cardiac silhouette is mildly enlarged. Aorta is tortuous. 6.       MACRO: None   Signed by: Alfredo Lewis 10/21/2023 12:34 PM Dictation workstation:   [id number]     XR abdomen 1 view     Result Date: 10/20/2023  STUDY: Abdomen Radiographs;  10/20/2023, 2:32PM. INDICATION: Status post J tube revision and G tube replacement. COMPARISON: CTA chest/CT AP 8/6/2023. ACCESSION NUMBER(S): [id number] ORDERING CLINICIAN: TECHNIQUE:  One view(s) of the abdomen. FINDINGS:  A jejunostomy tube is in place. The exact position is difficult to determine on this single image without contrast. Bowel gas pattern is normal without obstruction or ileus.  There are no convincing calculi or abnormal calcifications.  No focal osseous abnormalities.       Jejunostomy tube is in place. The position is difficult to determine without contrast. Recommend repeat examination after injection of contrast into the tube. Signed by Benito Cross MD     FL feeding tube placement     Result Date: 10/20/2023  These images are not reportable by radiology and will not be interpreted by  Radiologists.     XR abdomen 1  view     Result Date: 10/19/2023  Interpreted By:  Hussain Hurst and Dervishi Mario STUDY: XR ABDOMEN 1 VIEW;  10/19/2023 10:16 pm   INDICATION: Signs/Symptoms:Evaluate J tube positioning.   COMPARISON: Abdominal radiograph 10/04/2023 CT chest abdomen pelvis on 09/05/2023   ACCESSION NUMBER(S): [id number]   ORDERING CLINICIAN: SIVA DONIS   FINDINGS: AP radiograph of lower chest and upper abdomen x1:   Peg tube projects over the left upper abdomen with a course of the tube, probably in the stomach and the tip overlying the middle gastric body. There is a IVC filter noted.   There is a nonobstructive bowel gas pattern. Embolization coil in the left upper abdomen.   Visualized lungs are clear.   Osseous structures demonstrate no acute bony changes.        1.  Peg tube with tip, probably in the middle gastric body. Recommend injection of Gastrografin through PEG tube. 2. No acute radiographic findings of the lower chest and upper abdomen.     I personally reviewed the images/study and I agree with the findings as stated. This study was interpreted at [place], [place].   MACRO: None   Signed by: Hussain Hurst 10/19/2023 10:23 PM Dictation workstation:   [id number]     XR abdomen 1 view     Result Date: 10/4/2023  Interpreted By:  Mary Landin and Dervishi Mario STUDY: XR ABDOMEN 1 VIEW;  10/4/2023 6:16 pm   INDICATION: Signs/Symptoms:s/p placement of g-tube as well as j-tube. Evaluate for pneumoperitoneum.   COMPARISON: Abdominal radiograph 09/05/2023.   ACCESSION NUMBER(S): [id number]   ORDERING CLINICIAN: YUNG PEREIRA   FINDINGS:   G-tube projecting over the stomach. A tubular structure projecting over the left upper quadrant of the abdomen, likely corresponding to clinically described J-tube. Presumed embolization coils projecting over the left upper quadrant of the abdomen. IVC filter is projecting over L3 vertebral body.   Nonobstructive bowel  "gas pattern. Limited evaluation of pneumoperitoneum on supine imaging, however no gross evidence of free air is noted. Atelectasis/scarring and trace effusion/pleural thickening in lung bases. Osseous structures demonstrate no acute bony changes.        1.  Status post G-tube placement which projects over the gastric region without evidence of ananda pneumoperitoneum within the limitations of a supine radiograph.   I personally reviewed the images/study and I agree with the findings as stated. This study was interpreted at [facility], [place], [place].   MACRO: None   Signed by: Mary Parks 10/4/2023 8:03 PM Dictation workstation:   [id number]        Pulmonary Functions Testing Results:     No results found for: \"FEV1\", \"FVC\", \"MCZ4XQE\", \"TLC\", \"DLCO\"     CT Chest/Abd/Pelv was personally reviewed        Assessment/Plan   Diagnoses and all orders for this visit:  Achalasia, esophageal  Esophageal perforation  Malnutrition, unspecified type (CMS/HCC)        Levy Gregory is a 68 y.o. male seen today for follow up after undergoing an esophagectomy with and cervical esophagotomy on 4/5/2023 and feeding tube placement for a post-Heller myotomy esophageal perforation. . Based on his imaging today I think it is reasonable to attempt a gastric pull up with colon interposition as a back up plan. He needs nutrition labs so I can see where his values are -- may have to delay if suboptimal; d/w patient that if not wnl will have him meet with nutrition. In the meantime he should continue PT/exercise. Goal for surgery end of November if labs optimized.        Brielle Gongora, DO  Thoracic & Esophageal Surgery   "

## 2023-11-29 NOTE — ANESTHESIA PROCEDURE NOTES
Airway  Date/Time: 11/29/2023 9:08 AM  Urgency: elective      Staffing  Performed: resident   Authorized by: Yonas Bhat MD    Performed by: Chante Strange MD  Patient location during procedure: OR    Indications and Patient Condition  Indications for airway management: anesthesia  Spontaneous ventilation: present  Sedation level: deep  Preoxygenated: yes  Patient position: sniffing  MILS maintained throughout  Mask difficulty assessment: 0 - not attempted  Planned trial extubation    Final Airway Details  Final airway type: endotracheal airway      Successful airway: ETT  Cuffed: yes   Successful intubation technique: direct laryngoscopy  Facilitating devices/methods: cricoid pressure and intubating stylet  Endotracheal tube insertion site: oral  Blade: Abhi  Blade size: #4  ETT size (mm): 7.5  Cormack-Lehane Classification: grade I - full view of glottis  Placement verified by: chest auscultation   Inital cuff pressure (cm H2O): 8  Measured from: lips  ETT to lips (cm): 21  Number of attempts at approach: 1  Number of other approaches attempted: 0

## 2023-11-29 NOTE — OP NOTE
laparotomy, lysis of adhesions, gastric pullup, jejunostomy placement Operative Note     Date: 2023  OR Location: [facility] OR    Name: Levy Gregory, : 1955, Age: 68 y.o., MRN: [id number], Sex: male    Diagnosis  Pre-op Diagnosis     * Esophageal perforation [K22.3] Post-op Diagnosis     * Esophageal perforation [K22.3]     Procedures  laparotomy, lysis of adhesions, gastric pullup, jejunostomy placement  07069 - ID TOT/PRTL ESPHG W/O RCNSTJ W/CRV ESOPHAGOSTOMY      Surgeons      * Brielle Gongora - Primary    Resident/Fellow/Other Assistant:  Surgeon(s) and Role:     * Flaco Lloyd MD - Assisting - present to assist as no qualified resident or fellow available    Procedure Summary  Anesthesia: General  ASA: III  Anesthesia Staff: Anesthesiologist: Norm Powell MD; Yonas Bhat MD  CRNA: IVÁN Drummond-CRNA  C-AA: AGATHA Vazquez  Anesthesia Resident: Chante Strange MD  Estimated Blood Loss: 400mL  Intra-op Medications:   Medication Name Total Dose   sodium chloride 0.9 % irrigation solution 1,000 mL   heparin (porcine) injection 5,000 Units 5,000 Units              Anesthesia Record               Intraprocedure I/O Totals          Intake    LR 1000.00 mL    NaCl 0.9 % 500.00 mL    Propofol Drip 0.00 mL    The total shown is the total volume documented since Anesthesia Start was filed.    Phenylephrine Drip 0.00 mL    The total shown is the total volume documented since Anesthesia Start was filed.    albumin human 25 % 50.00 mL    Total Intake 1550 mL       Output    Urine 850 mL    Est. Blood Loss 250 mL    Total Output 1100 mL       Net    Net Volume 450 mL          Specimen:   ID Type Source Tests Collected by Time   1 : left sternal clavicular joint Tissue BONE RESECTION SURGICAL PATHOLOGY EXAM Brielle Gongora,  2023 1233   2 : Esophageal stump Tissue ESOPHAGUS, ESOPHAGECTOMY SPECIMEN SURGICAL PATHOLOGY EXAM Brielle Gongora, DO 2023 1246         Staff:   Circulator: Chelsey Gaytan, PRIYA; Claudia Atkins, RN; Shauna Galvan, PRIYA  Relief Circulator: Sylvia Ortiz RN  Relief Scrub: Benito Kraus  Scrub Person: Renetta Kuhn; Anjana Arevalo         Drains and/or Catheters:   Closed/Suction Drain Left;Anterior Chest Bulb 7 Fr. (Active)   Dressing Status Clean;Dry 11/29/23 1415   Status To bulb suction 11/29/23 1415       NG/OG/Feeding Tube NG - Beckham sump 16 Fr Left nostril (Active)       Gastrostomy/Enterostomy Jejunostomy 14 Fr. LLQ (Active)   Dressing Status Clean;Dry 11/29/23 1416       Urethral Catheter Straight-tip 16 Fr. (Active)   Collection Container Standard drainage bag 11/29/23 1105   Securement Method Securing device (Describe) 11/29/23 1105       Tourniquet Times:         Implants:     Findings: Intact gastric conduit w strong pulsatile Rt GEA, significant adhesions from prior surgeries    Indications: Levy Gregory is an 68 y.o. male who is having surgery for Esophageal perforation [K22.3] previously after Heller myotomy performed for achalasia. He has been in discontinuity for months and recent imaging and nutrition labs show him to be a good candidate at this time for reconstruction    The patient was seen in the preoperative area. The risks, benefits, complications, treatment options, non-operative alternatives, expected recovery and outcomes were discussed with the patient. The possibilities of reaction to medication, pulmonary aspiration, injury to surrounding structures, bleeding, recurrent infection, the need for additional procedures, failure to diagnose a condition, and creating a complication requiring transfusion or operation were discussed with the patient. The patient concurred with the proposed plan, giving informed consent.  The site of surgery was properly noted/marked if necessary per policy. The patient has been actively warmed in preoperative area. Preoperative antibiotics have been ordered and given within 1 hours of  incision. Venous thrombosis prophylaxis have been ordered including bilateral sequential compression devices and chemical prophylaxis    Procedure Details: Patient was brought into the operating suite and procedure information was confirmed.  He was intubated with a single-lumen endotracheal tube after general anesthesia was obtained.  Adequate IV access and arterial line was placed.  His abdomen, chest, and left neck were checked prepped and draped in usual sterile fashion.  We made a midline laparotomy incision and carefully entered the peritoneum with Metzenbaum scissors.  He had prior mesh from ventral hernia surgery which was cut along the midline.  We began by lysing adhesions and did this for more than 2 hours.  There were omental adhesions to the abdominal wall as well as several areas of bowel.  He also had 2 prior feeding tube sites which were carefully taken down from the left upper quadrant.  Significant adhesions were present between the lesser curve of the stomach and the liver.  These were very carefully cauterized using the harmonic energy device.  We  the gastrocolic ligament after identifying our right gastroepiploic artery which had a strong pulse.  We carried this dissection cephalad towards the short gastrics and  these up to the level of the crura where his prior gastric stump was.  We identified the suture along her previous gastric stump and carefully took this down.  There was significant adhesions in this area from a gastric stump leak.  A small enterotomy was made which was later closed with a stapler.  We generously mobilized the gastric conduit.  We then removed the xiphoid process and created a substernal tunnel using blunt dissection.  A circular incision was made around the patient's esophagostomy and the stoma was carefully mobilized from the subclavian position along his left chest wall.  We mobilized this back towards the lower previous cervical incision.  We then  made a left neck incision along the anterior border of the sternocleidomastoid muscle and carried this down along the mid line of the manubrium.  Using the oscillating saw we removed the left sternoclavicular joint to allow more room for gastric conduit.  We then completed our substernal tunnel.  The gastric conduit was very carefully passed from the abdomen up to the left neck in the substernal space.  A gastrotomy was created along the tip of the conduit, which was pink and well appearing. Using a 60 mm green endoscopic stapler we created the posterior anastomosis.The NG tube was carefully fed across our anastomosis under direct visualization and secured in place at 50cm (later bridled). We then did a 2 layered hand-sewn closure of our anterior wall with 4-0 interrupted vicryl sutures followed by inverting 3-0 silk pops. A fat pad along the greater curve was layed on top of our anastomosis and gently tacked down with loose ties of several of our silk sutures. The neck and prior stoma incisions were carefully irrigated.  A 19 Fr MIGUELINA drain was placed along our anastomosis, into our substernal space, and beneath our prior ostomy site. The left chest site was closed with vertical mattress 2-0 Nylon sutures. The left cervical incision was closed with layered vicryl and monocryl running subcuticular.  We then turned our attention back to the abdomen and located the ligament of Treitz.  30 cm distal to this we chose a site for our jejunostomy tube.  We made an enterotomy and placed a 14 Finnish whistle-tip catheter.  This was Witzel in place.  Pursestring sutures were placed between the fascia and the bowel surrounding the tube and the tube was exteriorized.  The abdomen was then thoroughly irrigated and hemostasis was confirmed.  The lower midline laparotomy was closed with interrupted Prolene sutures to reapproximate his prior mesh as well as a running #1 looped PDS from each direction. Skin and subcutaneous tissues were  sutured closed. Patient was extubated and taken to ICU in stable condition.     Complications:  None; patient tolerated the procedure well.    Disposition: ICU - extubated and stable.  Condition: stable         Attending Attestation: I was present and scrubbed for the key portions of the procedure.    Brielle Gongora  Phone Number: [phone]

## 2023-11-29 NOTE — SIGNIFICANT EVENT
68 y.o. with PMHx of COPD, T2DM, GERD, type II achalasia. Patient has a history of paraesophageal hernia s/p robotic laparoscopic Heller myotomy with fundoplication on 3/1 with Dr. Hoang that was complicated by esophageal perforation leading for a prolonged hospitalization from 03/2023-5/2023. His course was complicated by a mediastinal abscess and R empyema with polymicrobial including Acinetobacter baumanii (CRAB) and MRSA requiring multiple surgical interventions including a VATS, multiple esophageal stents and ultimately a right thoracotomy and esophagectomy with lap takedown of prior fundoplication, lysis of adhesions, and revision of gastrostomy tube for esophageal perforation in april 2023.  His course was further complicated with Afib RVR, CORA required CRRT, Retroperitoneal/Psoas hematoma, upper and lower extremities DVTs sp IVC filter placement 4/20, and acalculous cholecystitis sp percutaneous cholecystostomy tube placement by IR 4/27.    Of note, he was readmit in August 2023 with worsening Empyema with cultures growing  cultures showing Pseudomonas, E.coli and Kleb oxytoca, and Saccharomyces cerevisae from right plural fluid, treated with Fluconazole, Levaquin, and Zosyn. He presents to SICU today sp laparotomy, lysis of adhesions, gastric pullup, and jejunostomy placement with Dr. Gongora. He arrives to SICU extubated, on 0.5 of phenylephrine.     Plan:  Neuro: dilaudid PCA for pain control, holding home Zoloft, atarax, gabapentin and thiamine   CV: on 0.5 of phenylephrine, attempting to wean as able, goal MAPs of 60-65. Volume resuscitation as clinically indicated, trending lactate. Echo in april 2023 showed EF of 75%. Holding home midodrine and eliquis. Start prophylactic metop once of mercedes and able   Pulm: postop ABG and chest xray, wean as able, avoid NT suctioning, avoid positive pressure  GI: strict NPO, PPI, head of the bed 30deg, NG to suction   : maintenance fluids, replace aguilera as this one  is from LtAchx 1month, hx of urinary retention    Heme: post op CBC and coags, eliquis on hold, SYLVESTER   Endo: SSI  ID: periop ancef for now, has completed previous course of antibiotics   Lines: piccline fromprevious admission can probably come out tomorrow, vivien Myers foley  Dispo: Continue ICU care for now     I have discussed the case with the resident/mid level provider. I have personally performed a history, physical exam, and my own medical decision making. I have reviewed the note and agree with the findings and plan with the following exceptions as identified below  I have personally provided 45 minutes of critical care time exclusive of time spent on separately billable procedures. Time includes review of laboratory data, radiology results, discussion with consultants, and monitoring for potential decompensation. Interventions were performed as documented below.    Goldie Rubio MD  Anesthesia and Critical Care staff

## 2023-11-29 NOTE — ANESTHESIA PREPROCEDURE EVALUATION
Patient: Levy Gregory    Procedure Information       Date/Time: 11/29/23 0815    Procedures:       Esophagectomy Transhiatal - Substernal gastric pull up, cervical anastomosis; possible colon interposition with Dr. Padilla      Substernal gastric pull up, cervical anastomosis; possible colon interposition    Location: [facility] OR 20 / [facility] OR    Surgeons: Brielle Gongora DO; Norm Padilla MD            Relevant Problems   Anesthesia (within normal limits)   No history of complications History of anesthesia complications      Cardiovascular   (+) Chronic atrial fibrillation (CMS/HCC)   (+) Hyperlipidemia   (+) Rib pain on left side   (+) Solar purpura (CMS/HCC)      Endocrine   (+) Diabetic neuropathy associated with type 2 diabetes mellitus (CMS/HCC)   (+) Hyponatremia   (+) Type 2 diabetes mellitus with hyperglycemia (CMS/HCC)      GI  Esophagectomy      /Renal (within normal limits)      Neuro/Psych   (+) Depression      Pulmonary   (+) Obstructive sleep apnea, adult      GI/Hepatic   (+) Elevated liver function tests      Hematology  UE & LE DVT   (+) Anticoagulant long-term use   (+) Iron deficiency anemia       Clinical information reviewed:   Tobacco  Allergies  Meds   Med Hx  Surg Hx   Fam Hx  Soc Hx        NPO Detail:  NPO/Void Status  Carbonhydrate Drink Given Prior to Surgery? : N  Date of Last Liquid: 11/28/23  Time of Last Liquid: 1800  Date of Last Solid: 11/28/23  Time of Last Solid: 1800  Last Intake Type: Clear fluids  Time of Last Void: 0636         Physical Exam    Airway  Mallampati: III  TM distance: >3 FB  Neck ROM: full     Cardiovascular   Rhythm: regular  Rate: normal     Dental    Pulmonary   Breath sounds clear to auscultation     Abdominal          Vitals:    11/29/23 0635   BP: 124/75   Pulse: 66   Resp: 16   Temp: 36.7 °C (98.1 °F)   SpO2: 99%       Past Surgical History:   Procedure Laterality Date    ESOPHAGOGASTRECTOMY      ESOPHAGOGASTRODUODENOSCOPY       with stents x2    GASTROSTOMY TUBE PLACEMENT N/A     IR CVC PICC  08/16/2023    IR CVC PICC 8/16/2023 [facility]    OTHER SURGICAL HISTORY  01/21/2019    Iola filter placement    UMBILICAL HERNIA REPAIR N/A 2013     Past Medical History:   Diagnosis Date    Achalasia, esophageal     per patient of esophagus    Anemia     Contusion of abdominal wall, initial encounter 01/12/2021    Contusion, flank    COPD (chronic obstructive pulmonary disease) (CMS/[facility])     Diabetes mellitus (CMS/[facility])     F/W PCP    DVT (deep venous thrombosis) (CMS/HCC)     DVT (deep venous thrombosis) (CMS/[facility])     IVC filter placed on 04/20/2023 and on Eliquis    Dysphagia     GERD (gastroesophageal reflux disease)     Hemoptysis 05/12/2020    Cough with hemoptysis    Herpes labialis     Hiatal hernia     Hernia Repair    Hyperlipidemia     Hypotension due to drugs     Irregular heart beat     AFIB: patient denies    Pain in left knee     Peripheral neuropathy     Personal history of other diseases of the respiratory system 06/19/2019    History of bronchitis    Sleep apnea     Patient states does not have sleep apnea since martinez loss and does not use CPAP    Solar purpura (CMS/[facility])        Current Facility-Administered Medications:     heparin (porcine) injection 5,000 Units, 5,000 Units, subcutaneous, Once, Brielle Gongora, DO  Prior to Admission medications    Medication Sig Start Date End Date Taking? Authorizing Provider   apixaban (Eliquis) 2.5 mg tablet Take 1 tablet (2.5 mg) by mouth 2 times a day. Do not start before October 21, 2023. 10/21/23   Lili Lowe MD   atorvastatin (Lipitor) 10 mg tablet Take 1 tablet (10 mg) by mouth once daily at bedtime. 2/6/19   Historical Provider, MD   esomeprazole (NexIUM) 40 mg packet Take 40 mg by mouth once daily in the morning. Take before meals.    Historical Provider, MD   fluticasone (Flonase) 50 mcg/actuation nasal spray Administer 2 sprays into each nostril once  "daily. Shake gently. Before first use, prime pump. After use, clean tip and replace cap. Use this for two weeks after symptoms appear even though you should feel better within a few days of allergen exposure.  Patient not taking: Reported on 11/28/2023 11/21/23 12/21/23  Chris Huizar MD   gabapentin 250 mg/5 mL (5 mL) solution Take 6 mL by mouth 3 times a day. 11/15/23 2/13/24  Chris Huizar MD   hydrOXYzine HCL (Atarax) 25 mg tablet Take 1 tablet (25 mg) by mouth 2 times a day. 7/24/23 8/23/23  Chris Huizar MD   insulin detemir (Levemir U-100 Insulin) 100 unit/mL injection Inject 35 Units under the skin once daily at bedtime. Take as directed per insulin instructions.  Patient taking differently: Inject 35 Units under the skin once daily at bedtime. 11/28/23:  Wife states he only takes a dose if blood sugar >/= 150 7/24/23 10/22/23  Chris Huizar MD   midodrine (Proamatine) 5 mg tablet Take 1 tablet (5 mg) by mouth 3 times a day.    Historical Provider, MD   potassium chloride 20 mEq/15 mL liquid Take 7.5 mL (10 mEq) by mouth once daily.    Historical Provider, MD   sertraline (Zoloft) 50 mg tablet Take 1.5 tablets (75 mg) by mouth once daily. 9/13/16   Historical Provider, MD   spironolactone (Aldactone) 25 mg tablet Take 0.5 tablets (12.5 mg) by mouth every other day.  Patient taking differently: Take 6.25 mg by mouth once daily. 11/7/23 5/5/24  Chris Huizar MD   thiamine 100 mg tablet Take 1 tablet (100 mg) by mouth once daily.    Historical Provider, MD   blood sugar diagnostic (OneTouch Verio test strips) strip TEST TWICE DAILY OR AS DIRECTED 9/23/17 11/28/23  Historical Provider, MD   ferrous sulfate 325 (65 Fe) MG tablet Take 1 tablet (325 mg) by mouth 2 times a day. 8/8/16 11/28/23  Historical Provider, MD   hydrocortisone 1 % ointment Apply topically 2 times a day.  11/28/23  Historical Provider, MD   insulin syringe-needle U-100 0.5 mL 30 gauge x 5/16\" syringe USE AS DIRECTED. 9/23/17 11/28/23  " "Historical Provider, MD   insulin syringe-needle U-100 1/2 mL 27 gauge x 1/2\" syringe Use daily or as directed 17  Historical Provider, MD   lancets 33 gauge misc 2 times a day.  23  Historical Provider, MD   potassium chloride ER (Micro-K) 10 mEq ER capsule Take 2 capsules (20 mEq) by mouth once daily. Do not crush or chew.  23  Historical Provider, MD     No Known Allergies  Social History     Tobacco Use    Smoking status: Former     Types: Cigarettes, Cigars     Start date: 1989     Quit date: 2023     Years since quittin.7     Passive exposure: Past    Smokeless tobacco: Never   Substance Use Topics    Alcohol use: Not Currently         Chemistry    Lab Results   Component Value Date/Time     (L) 11/10/2023 1115    K 4.4 11/10/2023 1115    CL 92 (L) 11/10/2023 1115    CO2 24 11/10/2023 1115    BUN 54 (H) 11/10/2023 1115    CREATININE 1.27 11/10/2023 1115    Lab Results   Component Value Date/Time    CALCIUM 10.3 11/10/2023 1115    ALKPHOS 1,175 (H) 11/10/2023 1115    AST 95 (H) 11/10/2023 1115     (H) 11/10/2023 1115    BILITOT 0.6 11/10/2023 1115          Lab Results   Component Value Date/Time    WBC 9.5 11/10/2023 1115    HGB 12.8 (L) 11/10/2023 1115    HCT 38.9 (L) 11/10/2023 1115     11/10/2023 1115     Lab Results   Component Value Date/Time    PROTIME 12.7 11/10/2023 1115    INR 1.1 11/10/2023 1115     No results found for this or any previous visit (from the past 4464 hour(s)).    Anesthesia Plan    ASA 3     general     The patient is not a current smoker.    intravenous induction   Postoperative administration of opioids is intended.  Trial extubation is planned.  Anesthetic plan and risks discussed with patient.  Use of blood products discussed with patient who consented to blood products.    Plan discussed with resident and attending.      "

## 2023-11-29 NOTE — PROGRESS NOTES
Pharmacy Medication History Review    Levy Gregory is a 68 y.o. male admitted for Esophageal perforation. Pharmacy reviewed the patient's [id number] medications and allergies for accuracy.    The list below reflects the updated PTA list. Comments regarding how patient may be taking medications differently can be found in the Admit Orders Activity  Prior to Admission Medications   Prescriptions Last Dose Informant Patient Reported?   apixaban (Eliquis) 2.5 mg tablet 11/27/2023 Spouse/Significant Other Yes   Sig: Take 1 tablet (2.5 mg) by mouth 2 times a day. Do not start before October 21, 2023.   atorvastatin (Lipitor) 10 mg tablet 11/29/2023 Spouse/Significant Other Yes   Sig: Take 1 tablet (10 mg) by mouth once daily at bedtime.   esomeprazole (NexIUM) 40 mg packet 11/29/2023 Spouse/Significant Other Yes   Sig: Take 40 mg by mouth once daily in the morning. Take before meals.   ferrous sulfate 300 mg (60 mg iron)/5 mL syrup 11/27/2023  Yes   Sig: Take 5 mL (60 mg of iron) by mouth once daily.   fluticasone (Flonase) 50 mcg/actuation nasal spray Not Taking at not taking Spouse/Significant Other Yes   Sig: Administer 2 sprays into each nostril once daily. Shake gently. Before first use, prime pump. After use, clean tip and replace cap. Use this for two weeks after symptoms appear even though you should feel better within a few days of allergen exposure.   Patient not taking: Reported on 11/28/2023   gabapentin 250 mg/5 mL (5 mL) solution 11/29/2023 Spouse/Significant Other Yes   Sig: Take 6 mL by mouth 3 times a day.   hydrOXYzine HCL (Atarax) 25 mg tablet 11/28/2023  Yes   Sig: Take 1 tablet (25 mg) by mouth 2 times a day.   insulin detemir (Levemir U-100 Insulin) 100 unit/mL injection Past Month Care Giver Yes   Sig: Inject 35 Units under the skin once daily at bedtime. Take as directed per insulin instructions.   Patient taking differently: Inject 35 Units under the skin once daily at bedtime.  11/28/23:  Wife states he only takes a dose if blood sugar >/= 150   midodrine (Proamatine) 5 mg tablet 11/29/2023 Care Giver, Spouse/Significant Other Yes   Sig: Take 1 tablet (5 mg) by mouth 3 times a day.   potassium chloride 20 mEq/15 mL liquid 11/27/2023 Spouse/Significant Other Yes   Sig: Take 7.5 mL (10 mEq) by mouth once daily.   sertraline (Zoloft) 50 mg tablet 11/29/2023 Spouse/Significant Other Yes   Sig: Take 1.5 tablets (75 mg) by mouth once daily.   spironolactone (Aldactone) 25 mg tablet 11/27/2023 Spouse/Significant Other Yes   Sig: Take 0.5 tablets (12.5 mg) by mouth every other day.   Patient taking differently: Take 6.25 mg by mouth once daily.   thiamine 100 mg tablet 11/22/2023 Spouse/Significant Other Yes   Sig: Take 1 tablet (100 mg) by mouth once daily.      Facility-Administered Medications: None        The list below reflects the updated allergy list. Please review each documented allergy for additional clarification and justification.  Allergies  Reviewed by Lindsey Call PharmD on 11/29/2023   No Known Allergies         Patient declines M2B at discharge. Pharmacy has been updated to [facility].    Sources used: Pharmacy dispense history, OARRs, patient interview (spouse, Symone, is main caregiver and provided medication history-read off medication list and vials), and primary care note from 11/21    Below are additional concerns with the patient's PTA list.  -uses long-acting insulin as needed when pt's blood sugar is above 150  -uses a saline and heparin (10units/ml) twice daily to help clear line    Lindsey Call PharmD, [facility]  Transitions of Care Pharmacist  [facility]  Please reach out via Secure Chat for questions, or if no response call [employer] or [employer]

## 2023-11-29 NOTE — H&P
Surgical Intensive Care Unit H& P     History Of Present Illness  Levy Gregory is a 68 y.o. male presenting to SICU sp laparotomy, lysis of adhesions, gastric pullup, and jejunostomy placement with Dr. Gongora 11/29/2023.    His PMHx is significant is significant for COPD, T2DM, GERD, type II achalasia, paraesophageal hernia s/p robotic Laparoscopic Heller myotomy with mary fundoplication on 3/1 with Dr. Hoang complicated by esophageal perforation   Leading for a prolonged  hospitalization from 03/2023-5/2023. Course was complicated by a mediastinal abscess and R empyema with polymicrobial including Acinetobacter baumanii (CRAB) and MRSA . He required multiple surgical interventions:   -3/15 Right VATS with Gu   -3/24 IR guided pigtail placement  -3/27 EGD with esophageal stent replacement with Gu   -3/31  EGD, removal of esophageal stent, endovac placement, dobhoff tube placement, PEG tube placement ( Gu and Thoracic)  -4/5 right thoracotomy and esophagectomy with cervical esophagostomy and lap takedown of prior fundoplication, lysis of adhesions, and revision of gastrostomy tube for esophageal perforation ( Thoracic surgery)   - 4/7 PEG replacement and J tube placement ( Thoracic surgery)     His course was further complicated with Afib RVR, CORA required CRRT, Retroperitoneal/Psoas hematoma, upper and lower extremities DVTs sp IVC filter placement 4/20, and acalculous cholecystitis sp percutaneous cholecystostomy tube placement by IR 4/27     He was readmitted in August 2023 with worsening Empyema with cultures growing  cultures showing Pseudomonas, E.coli and Kleb oxytoca, and Saccharomyces cerevisae from right plural fluid, treated with Fluconazole, Levaquin, and Zosyn.  Most recently, he has been having issues with  feeding tube clogging and  then with jejunostomy tube malposition. Most recently had a G and separate J tube replacements (on 10/20/2023).  "Otherwise he has been doing well. He was brought back today (11/29/23 ) for Gastric pull up     OR course:  EBL: 250 ml  UOP: 450 ml  Crystalloid: 2400 ml  Colloid: 750 ml Albumin 5%, 50 ml Albumin 25 %  Products: None  Intubation: Easy intubation   Access: Piccline,  Emily       PMHx: COPD, T2DM, type II achalasia, paraesophageal hernia    PSHx: As outlined in the HPI    FamHx: non contributory    SocHx: former smoker (quit in 2016), denies EtOH and illicit drug use      Allergies  Patient has no known allergies.    Review of Systems   Constitutional:  Negative for chills and fever.   HENT:  Negative for congestion.    Respiratory:  Negative for cough, shortness of breath and wheezing.    Gastrointestinal:  Negative for abdominal distention, abdominal pain and nausea.   Genitourinary:         + urinary retention    Skin:  Negative for rash.        Physical Exam  Constitutional:       Appearance: Normal appearance.   HENT:      Head: Normocephalic and atraumatic.      Nose:      Comments: Left nare NGT to LIWS with bilious appearing drainage   Neck:      Comments: MIGUELINA drain with serosanguinous drainage   Cardiovascular:      Rate and Rhythm: Regular rhythm. Tachycardia present.   Abdominal:      General: Bowel sounds are normal.      Palpations: Abdomen is soft.      Comments: J tube capped   Genitourinary:     Comments: Maldonado draining dark appearing urine   Musculoskeletal:      Cervical back: Neck supple.      Right lower leg: Edema present.      Left lower leg: Edema present.   Neurological:      General: No focal deficit present.      Mental Status: He is alert and oriented to person, place, and time.        Last Recorded Vitals  Blood pressure 100/70, pulse 104, temperature 36.8 °C (98.2 °F), temperature source Temporal, resp. rate 19, height 1.85 m (6' 0.84\"), weight 86 kg (189 lb 9.5 oz), SpO2 100 %.    Relevant Results  No current facility-administered medications on file prior to encounter.     Current " "Outpatient Medications on File Prior to Encounter   Medication Sig Dispense Refill    apixaban (Eliquis) 2.5 mg tablet Take 1 tablet (2.5 mg) by mouth 2 times a day. Do not start before October 21, 2023.      atorvastatin (Lipitor) 10 mg tablet Take 1 tablet (10 mg) by mouth once daily at bedtime.      esomeprazole (NexIUM) 40 mg packet Take 40 mg by mouth once daily in the morning. Take before meals.      ferrous sulfate 300 mg (60 mg iron)/5 mL syrup Take 5 mL (60 mg of iron) by mouth once daily.      hydrOXYzine HCL (Atarax) 25 mg tablet Take 1 tablet (25 mg) by mouth 2 times a day. 60 tablet 0    insulin detemir (Levemir U-100 Insulin) 100 unit/mL injection Inject 35 Units under the skin once daily at bedtime. Take as directed per insulin instructions. (Patient taking differently: Inject 35 Units under the skin once daily at bedtime. 11/28/23:  Wife states he only takes a dose if blood sugar >/= 150) 31.5 mL 0    midodrine (Proamatine) 5 mg tablet Take 1 tablet (5 mg) by mouth 3 times a day.      potassium chloride 20 mEq/15 mL liquid Take 7.5 mL (10 mEq) by mouth once daily.      sertraline (Zoloft) 50 mg tablet Take 1.5 tablets (75 mg) by mouth once daily.      thiamine 100 mg tablet Take 1 tablet (100 mg) by mouth once daily.      [DISCONTINUED] blood sugar diagnostic (OneTouch Verio test strips) strip TEST TWICE DAILY OR AS DIRECTED      [DISCONTINUED] ferrous sulfate 325 (65 Fe) MG tablet Take 1 tablet (325 mg) by mouth 2 times a day.      [DISCONTINUED] hydrocortisone 1 % ointment Apply topically 2 times a day.      [DISCONTINUED] insulin syringe-needle U-100 0.5 mL 30 gauge x 5/16\" syringe USE AS DIRECTED.      [DISCONTINUED] insulin syringe-needle U-100 1/2 mL 27 gauge x 1/2\" syringe Use daily or as directed      [DISCONTINUED] lancets 33 gauge misc 2 times a day.      [DISCONTINUED] potassium chloride ER (Micro-K) 10 mEq ER capsule Take 2 capsules (20 mEq) by mouth once daily. Do not crush or chew.   "     Component      Latest Ref Rng 11/10/2023   GLUCOSE      74 - 99 mg/dL 115 (H)    SODIUM      136 - 145 mmol/L 132 (L)    POTASSIUM      3.5 - 5.3 mmol/L 4.4    CHLORIDE      98 - 107 mmol/L 92 (L)    Bicarbonate      21 - 32 mmol/L 24    Anion Gap      10 - 20 mmol/L 20    Blood Urea Nitrogen      6 - 23 mg/dL 54 (H)    Creatinine      0.50 - 1.30 mg/dL 1.27    EGFR      >60 mL/min/1.73m*2 62    Calcium      8.6 - 10.6 mg/dL 10.3    Albumin      3.4 - 5.0 g/dL 4.3    Alkaline Phosphatase      33 - 136 U/L 1,175 (H)    Total Protein      6.4 - 8.2 g/dL 7.7    AST      9 - 39 U/L 95 (H)    Bilirubin Total      0.0 - 1.2 mg/dL 0.6    ALT      10 - 52 U/L 118 (H)    WBC      4.4 - 11.3 x10*3/uL 9.5    nRBC      0.0 - 0.0 /100 WBCs 0.0    RBC      4.50 - 5.90 x10*6/uL 3.84 (L)    HEMOGLOBIN      13.5 - 17.5 g/dL 12.8 (L)    HEMATOCRIT      41.0 - 52.0 % 38.9 (L)    MCV      80 - 100 fL 101 (H)    MCH      26.0 - 34.0 pg 33.3    MCHC      32.0 - 36.0 g/dL 32.9    RED CELL DISTRIBUTION WIDTH      11.5 - 14.5 % 14.3    Platelets      150 - 450 x10*3/uL 235    Protime      9.8 - 12.8 seconds 12.7    INR      0.9 - 1.1  1.1    aPTT      27 - 38 seconds 40 (H)    MAGNESIUM      1.60 - 2.40 mg/dL 2.43 (H)    PHOSPHORUS      2.5 - 4.9 mg/dL 4.1          Assessment/Plan   Levy Gregory is a 68 y.o. male presenting to SICU sp laparotomy, lysis of adhesions, gastric pullup, jejunostomy placement with Dr. Gongora 11/29/2023.    His PMHx is significant is significant for COPD, T2DM, GERD, type II achalasia, paraesophageal hernia s/p robotic Laparoscopic Heller myotomy with mary fundoplication on 3/1 with Dr. Hoang complicated by esophageal perforation   Leading for a prolonged  hospitalization from 03/2023-5/2023. Course was complicated by a mediastinal abscess and R empyema with polymicrobial including Acinetobacter baumanii (CRAB) and MRSA . He required multiple surgical interventions:   -3/15 Right VATS with Gu   -3/24  IR guided pigtail placement  -3/27 EGD with esophageal stent replacement with Gu   -3/31  EGD, removal of esophageal stent, endovac placement, dobhoff tube placement, PEG tube placement ( Gu and Thoracic)  -4/5 right thoracotomy and esophagectomy with cervical esophagostomy and lap takedown of prior fundoplication, lysis of adhesions, and revision of gastrostomy tube for esophageal perforation ( Thoracic surgery)   - 4/7 PEG replacement and J tube placement ( Thoracic surgery)     His course was further complicated with Afib RVR, CORA required CRRT, Retroperitoneal/Psoas hematoma, upper and lower extremities DVTs sp IVC filter placement 4/20, and acalculous cholecystitis sp percutaneous cholecystostomy tube placement by IR 4/27     He was readmitted in August 2023 with worsening Empyema with cultures growing  cultures showing Pseudomonas, E.coli and Kleb oxytoca, and Saccharomyces cerevisae from right plural fluid, treated with Fluconazole, Levaquin, and Zosyn.  Most recently, he has been having issues with  feeding tube clogging and  then with jejunostomy tube malposition. Most recently had a G and separate J tube replacements (on 10/20/2023). Otherwise he has been doing well. He was brought back today (11/29/23 )for Gastric pull up     Plan:  NEURO: History of diabetic neuropathy and depression .  Acute post-op pain.   - ongoing neuro/pain assessments  - Pain control with PRN  hydromorphone   - Lidoderm patches surrounding surgical incisions  - PT/OT consult  - Home meds: Zoloft 75 mg daily, Atarax 25 mg BID, Gabapentin 300 mg TID, thiamone 100 mg daily     CV: History of HLD , Hx of Afib . Most recent Echo ( 08/2023) with suboptimal quality . Pervious Echo ( 04/2023): normal LV (EF 70-75%) & RV.   Arrived to SICU on phenylphrine infusion 0.5. Baseline SBP 90's-100's  - sp 500 ml LR and 500 ml Albumin post op   -Wean Phenylphrine infusion as tolerated  - continuous EKG/ABP monitoring  - goal map range  65-90  - volume resuscitate as clinically indicated  - start Metoprolol 5mg IV q6h for arrhythmia prophylaxis when blood pressure allows  - Home meds: Eliquis 2.5 mg BID, Lipitor 10 mg daily, Midodrine 5 mg TID, Aldactone 12.5 mg QOD     PULM: History of COPD. 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 Most recent Empyema was in August 2023  Arrived to SICU extubated on 10L SFM.  - Wean FiO2 as tolerated.    - Q1h incentive spirometry while awake  - F/U post op CXR  - Avoid positive pressure ventilation and NT suctioning as able      GI: Hx Paraesophageal hernia s/p robotic Laparoscopic Heller myotomy with mary fundoplication on 3/1 with Dr. Hoang complicated by esophageal perforation leading to multiple intervention as outlined above  and eventually a right thoracotomy and esophagectomy with cervical esophagostomy and lap takedown of prior fundoplication on 4/5.  He is now sp laparotomy, lysis of adhesions, gastric pullup, jejunostomy placement 11/29  Elevated Alk phos in the 1000's range of unclear etiology. Trending down  - Maintain strict NPO  - PPI for GI prophylaxis  - Maintain HOB up at least 30 degress at all times secondary to high risk of aspiration  - Send daily amylase levels from MIGUELINA drain starting POD 1125  - NG to LIWS  - Do not replace or reposition NG tube if it becomes dislodged, call thoracic surgery  -Follow daily LFTs    : Baseline sCr ~ 0.9. Oliguric CORA March 2023, required CRRT, recovered and returted to baseline . Urinary retention, has a chronic aguilera   - Check renal function panel post op and daily.   - Maintenance IVF, LR @125 ml/hr  - Maintain U/O >0.5-1ml/kg/hr  - Replete electrolytes to goal K>4, Mg>2, Phos>2.5, ionized Ca>1.10  -Aguilera exchange today. Current one is old from LTAC    HEME: Acute blood loss anemia.    Victoriano UE DVT and LLE DVT diagnosed March 2023, On Eliquis   - Check CBC and coags post op and daily  - Ongoing monitoring for s/s  bleeding  -Start SQH tonight   -Home Meds: Eliquis     ENDO: IDDM2. 9/25/23 A1C 4.7  - Q4h BG   - SSI Lispro per ICU protocol    ID:  Hx mediastinal abscess and R empyema with polymicrobial including Acinetobacter baumanii (CRAB) and MRSA ( 04/2023)  Recurrent Empyema with cultures growing  cultures showing Pseudomonas, E.coli and Kleb oxytoca, and Saccharomyces cerevisae ( 08/2023)    - trend temp q4, wbc daily  - zaheer-op cefazolin x24hrs  - ongoing monitoring for s/s infection  -Keep on contact isolation given Hx Acinetobacter baumanii (CRAB)    Lines:  -Piccline --> unclear when placed. Reevaluate on going need tomorrow  - PIV  -11/29 A line   Dispo: Admit to ICU. Patient seen and discussed with ICU attending Ramiro     Critical care time: 60 min    Vishnu White PA-C   Team phone [phone]

## 2023-11-29 NOTE — ANESTHESIA PROCEDURE NOTES
Arterial Line:    Date/Time: 11/29/2023 9:09 AM    Staffing  Performed: resident   Authorized by: Yonas Bhat MD    Performed by: Chante Strange MD    An arterial line was placed. Procedure performed using surface landmarks.in the OR for the following indication(s): continuous blood pressure monitoring and blood sampling needed.    A 20 gauge (size), 1 and 3/4 inch (length), Angiocath (type) catheter was placed into the Right radial artery, secured by Tegaderm,   Seldinger technique not used.  Events:  patient tolerated procedure well with no complications.

## 2023-11-29 NOTE — ANESTHESIA POSTPROCEDURE EVALUATION
Patient: Levy Gregory    Procedure Summary       Date: 11/29/23 Room / Location: [facility] OR 20 / [facility] OR    Anesthesia Start: 0834 Anesthesia Stop: 1509    Procedure: laparotomy, lysis of adhesions, gastric pullup, jejunostomy placement Diagnosis:       Esophageal perforation      (Esophageal perforation [K22.3])    Surgeons: Brielle Gongora DO Responsible Provider: Norm Powell MD    Anesthesia Type: general ASA Status: 3            Anesthesia Type: general    Vitals Value Taken Time   BP 85/57 11/29/23 1518   Temp 36.6 11/29/23 1524   Pulse 103 11/29/23 1523   Resp 19 11/29/23 1523   SpO2 100 % 11/29/23 1523   Vitals shown include unvalidated device data.    Anesthesia Post Evaluation    Patient location during evaluation: ICU  Patient participation: complete - patient participated  Level of consciousness: awake and alert  Pain management: satisfactory to patient  Airway patency: patent  Cardiovascular status: hemodynamically stable  Respiratory status: acceptable  Hydration status: acceptable  Postoperative Nausea and Vomiting: none  Comments: Patient transported to TSICU extubated, awake and alert on 8L SM with transport monitors applied and VSS on 0.5mcg/kg/min of phenylephrine. Report to NP, RN. 12 lead EKG ready in room on admission.         Encounter Notable Events   Notable Event Outcome Phase Comment   Hypotension Ongoing Treatment Intraprocedure Phenylephrine infusion continued in TSICU, 12 lead EKG ordered.

## 2023-11-30 ENCOUNTER — APPOINTMENT (OUTPATIENT)
Dept: RADIOLOGY | Facility: HOSPITAL | Age: 68
DRG: 326 | End: 2023-11-30
Payer: MEDICARE

## 2023-11-30 ENCOUNTER — APPOINTMENT (OUTPATIENT)
Dept: CARDIOLOGY | Facility: HOSPITAL | Age: 68
DRG: 326 | End: 2023-11-30
Payer: MEDICARE

## 2023-11-30 PROBLEM — I95.9 HYPOTENSION: Status: ACTIVE | Noted: 2023-11-30

## 2023-11-30 LAB
ABO GROUP (TYPE) IN BLOOD: NORMAL
ALBUMIN SERPL BCP-MCNC: 3.9 G/DL (ref 3.4–5)
ALBUMIN SERPL BCP-MCNC: 4.2 G/DL (ref 3.4–5)
AMYLASE FLD-CCNC: 41 U/L
ANION GAP SERPL CALC-SCNC: 13 MMOL/L (ref 10–20)
ANION GAP SERPL CALC-SCNC: 16 MMOL/L (ref 10–20)
ANTIBODY SCREEN: NORMAL
APTT PPP: 33 SECONDS (ref 27–38)
APTT PPP: 34 SECONDS (ref 27–38)
BLOOD EXPIRATION DATE: NORMAL
BLOOD EXPIRATION DATE: NORMAL
BUN SERPL-MCNC: 33 MG/DL (ref 6–23)
BUN SERPL-MCNC: 41 MG/DL (ref 6–23)
CA-I BLD-SCNC: 1.07 MMOL/L (ref 1.1–1.33)
CA-I BLD-SCNC: 1.13 MMOL/L (ref 1.1–1.33)
CALCIUM SERPL-MCNC: 8.6 MG/DL (ref 8.6–10.6)
CALCIUM SERPL-MCNC: 8.8 MG/DL (ref 8.6–10.6)
CARDIAC TROPONIN I PNL SERPL HS: 19 NG/L (ref 0–53)
CHLORIDE SERPL-SCNC: 103 MMOL/L (ref 98–107)
CHLORIDE SERPL-SCNC: 105 MMOL/L (ref 98–107)
CO2 SERPL-SCNC: 25 MMOL/L (ref 21–32)
CO2 SERPL-SCNC: 28 MMOL/L (ref 21–32)
CREAT SERPL-MCNC: 1.05 MG/DL (ref 0.5–1.3)
CREAT SERPL-MCNC: 1.15 MG/DL (ref 0.5–1.3)
DISPENSE STATUS: NORMAL
DISPENSE STATUS: NORMAL
ERYTHROCYTE [DISTWIDTH] IN BLOOD BY AUTOMATED COUNT: 15.2 % (ref 11.5–14.5)
ERYTHROCYTE [DISTWIDTH] IN BLOOD BY AUTOMATED COUNT: 17.5 % (ref 11.5–14.5)
ERYTHROCYTE [DISTWIDTH] IN BLOOD BY AUTOMATED COUNT: 18.3 % (ref 11.5–14.5)
GFR SERPL CREATININE-BSD FRML MDRD: 70 ML/MIN/1.73M*2
GFR SERPL CREATININE-BSD FRML MDRD: 78 ML/MIN/1.73M*2
GLUCOSE BLD MANUAL STRIP-MCNC: 102 MG/DL (ref 74–99)
GLUCOSE BLD MANUAL STRIP-MCNC: 113 MG/DL (ref 74–99)
GLUCOSE BLD MANUAL STRIP-MCNC: 117 MG/DL (ref 74–99)
GLUCOSE BLD MANUAL STRIP-MCNC: 121 MG/DL (ref 74–99)
GLUCOSE BLD MANUAL STRIP-MCNC: 139 MG/DL (ref 74–99)
GLUCOSE BLD MANUAL STRIP-MCNC: 156 MG/DL (ref 74–99)
GLUCOSE SERPL-MCNC: 140 MG/DL (ref 74–99)
GLUCOSE SERPL-MCNC: 97 MG/DL (ref 74–99)
HCT VFR BLD AUTO: 22.8 % (ref 41–52)
HCT VFR BLD AUTO: 25.4 % (ref 41–52)
HCT VFR BLD AUTO: 26.1 % (ref 41–52)
HGB BLD-MCNC: 7.7 G/DL (ref 13.5–17.5)
HGB BLD-MCNC: 8.6 G/DL (ref 13.5–17.5)
HGB BLD-MCNC: 8.7 G/DL (ref 13.5–17.5)
INR PPP: 1.3 (ref 0.9–1.1)
INR PPP: 1.4 (ref 0.9–1.1)
MAGNESIUM SERPL-MCNC: 2.24 MG/DL (ref 1.6–2.4)
MAGNESIUM SERPL-MCNC: 2.42 MG/DL (ref 1.6–2.4)
MCH RBC QN AUTO: 32.2 PG (ref 26–34)
MCH RBC QN AUTO: 32.7 PG (ref 26–34)
MCH RBC QN AUTO: 33.9 PG (ref 26–34)
MCHC RBC AUTO-ENTMCNC: 33.3 G/DL (ref 32–36)
MCHC RBC AUTO-ENTMCNC: 33.8 G/DL (ref 32–36)
MCHC RBC AUTO-ENTMCNC: 33.9 G/DL (ref 32–36)
MCV RBC AUTO: 100 FL (ref 80–100)
MCV RBC AUTO: 97 FL (ref 80–100)
MCV RBC AUTO: 97 FL (ref 80–100)
NRBC BLD-RTO: 0 /100 WBCS (ref 0–0)
PHOSPHATE SERPL-MCNC: 2.5 MG/DL (ref 2.5–4.9)
PHOSPHATE SERPL-MCNC: 3 MG/DL (ref 2.5–4.9)
PLATELET # BLD AUTO: 146 X10*3/UL (ref 150–450)
PLATELET # BLD AUTO: 151 X10*3/UL (ref 150–450)
PLATELET # BLD AUTO: 191 X10*3/UL (ref 150–450)
POTASSIUM SERPL-SCNC: 4 MMOL/L (ref 3.5–5.3)
POTASSIUM SERPL-SCNC: 4.1 MMOL/L (ref 3.5–5.3)
PRODUCT BLOOD TYPE: 5100
PRODUCT BLOOD TYPE: 5100
PRODUCT CODE: NORMAL
PRODUCT CODE: NORMAL
PROTHROMBIN TIME: 14.3 SECONDS (ref 9.8–12.8)
PROTHROMBIN TIME: 15.6 SECONDS (ref 9.8–12.8)
RBC # BLD AUTO: 2.27 X10*6/UL (ref 4.5–5.9)
RBC # BLD AUTO: 2.63 X10*6/UL (ref 4.5–5.9)
RBC # BLD AUTO: 2.7 X10*6/UL (ref 4.5–5.9)
RH FACTOR (ANTIGEN D): NORMAL
SODIUM SERPL-SCNC: 140 MMOL/L (ref 136–145)
SODIUM SERPL-SCNC: 142 MMOL/L (ref 136–145)
UNIT ABO: NORMAL
UNIT ABO: NORMAL
UNIT NUMBER: NORMAL
UNIT NUMBER: NORMAL
UNIT RH: NORMAL
UNIT RH: NORMAL
UNIT VOLUME: 350
UNIT VOLUME: 350
WBC # BLD AUTO: 10.1 X10*3/UL (ref 4.4–11.3)
WBC # BLD AUTO: 12.1 X10*3/UL (ref 4.4–11.3)
WBC # BLD AUTO: 9.1 X10*3/UL (ref 4.4–11.3)
XM INTEP: NORMAL
XM INTEP: NORMAL

## 2023-11-30 PROCEDURE — 97166 OT EVAL MOD COMPLEX 45 MIN: CPT | Mod: GO

## 2023-11-30 PROCEDURE — 93970 EXTREMITY STUDY: CPT

## 2023-11-30 PROCEDURE — 2500000004 HC RX 250 GENERAL PHARMACY W/ HCPCS (ALT 636 FOR OP/ED)

## 2023-11-30 PROCEDURE — 93970 EXTREMITY STUDY: CPT | Performed by: RADIOLOGY

## 2023-11-30 PROCEDURE — 2500000004 HC RX 250 GENERAL PHARMACY W/ HCPCS (ALT 636 FOR OP/ED): Mod: JZ | Performed by: STUDENT IN AN ORGANIZED HEALTH CARE EDUCATION/TRAINING PROGRAM

## 2023-11-30 PROCEDURE — 93005 ELECTROCARDIOGRAM TRACING: CPT

## 2023-11-30 PROCEDURE — 2500000001 HC RX 250 WO HCPCS SELF ADMINISTERED DRUGS (ALT 637 FOR MEDICARE OP): Performed by: ANESTHESIOLOGY

## 2023-11-30 PROCEDURE — P9047 ALBUMIN (HUMAN), 25%, 50ML: HCPCS | Mod: JZ | Performed by: PHYSICIAN ASSISTANT

## 2023-11-30 PROCEDURE — 36430 TRANSFUSION BLD/BLD COMPNT: CPT

## 2023-11-30 PROCEDURE — 83735 ASSAY OF MAGNESIUM: CPT | Performed by: PHYSICIAN ASSISTANT

## 2023-11-30 PROCEDURE — 80069 RENAL FUNCTION PANEL: CPT | Performed by: PHYSICIAN ASSISTANT

## 2023-11-30 PROCEDURE — 2500000004 HC RX 250 GENERAL PHARMACY W/ HCPCS (ALT 636 FOR OP/ED): Performed by: PHYSICIAN ASSISTANT

## 2023-11-30 PROCEDURE — C9113 INJ PANTOPRAZOLE SODIUM, VIA: HCPCS | Performed by: PHYSICIAN ASSISTANT

## 2023-11-30 PROCEDURE — 82330 ASSAY OF CALCIUM: CPT | Performed by: PHYSICIAN ASSISTANT

## 2023-11-30 PROCEDURE — 96372 THER/PROPH/DIAG INJ SC/IM: CPT | Performed by: PHYSICIAN ASSISTANT

## 2023-11-30 PROCEDURE — 99231 SBSQ HOSP IP/OBS SF/LOW 25: CPT

## 2023-11-30 PROCEDURE — 71045 X-RAY EXAM CHEST 1 VIEW: CPT | Performed by: RADIOLOGY

## 2023-11-30 PROCEDURE — P9045 ALBUMIN (HUMAN), 5%, 250 ML: HCPCS | Mod: JZ | Performed by: STUDENT IN AN ORGANIZED HEALTH CARE EDUCATION/TRAINING PROGRAM

## 2023-11-30 PROCEDURE — 82150 ASSAY OF AMYLASE: CPT | Performed by: PHYSICIAN ASSISTANT

## 2023-11-30 PROCEDURE — 2500000004 HC RX 250 GENERAL PHARMACY W/ HCPCS (ALT 636 FOR OP/ED): Performed by: ANESTHESIOLOGY

## 2023-11-30 PROCEDURE — 99291 CRITICAL CARE FIRST HOUR: CPT | Performed by: PHYSICIAN ASSISTANT

## 2023-11-30 PROCEDURE — 97161 PT EVAL LOW COMPLEX 20 MIN: CPT | Mod: GP

## 2023-11-30 PROCEDURE — 85730 THROMBOPLASTIN TIME PARTIAL: CPT | Performed by: PHYSICIAN ASSISTANT

## 2023-11-30 PROCEDURE — 71045 X-RAY EXAM CHEST 1 VIEW: CPT | Mod: FY

## 2023-11-30 PROCEDURE — 99291 CRITICAL CARE FIRST HOUR: CPT | Performed by: STUDENT IN AN ORGANIZED HEALTH CARE EDUCATION/TRAINING PROGRAM

## 2023-11-30 PROCEDURE — 85027 COMPLETE CBC AUTOMATED: CPT | Performed by: PHYSICIAN ASSISTANT

## 2023-11-30 PROCEDURE — 37799 UNLISTED PX VASCULAR SURGERY: CPT | Performed by: PHYSICIAN ASSISTANT

## 2023-11-30 PROCEDURE — 2500000001 HC RX 250 WO HCPCS SELF ADMINISTERED DRUGS (ALT 637 FOR MEDICARE OP): Performed by: PHYSICIAN ASSISTANT

## 2023-11-30 PROCEDURE — 97530 THERAPEUTIC ACTIVITIES: CPT | Mod: GP

## 2023-11-30 PROCEDURE — 86901 BLOOD TYPING SEROLOGIC RH(D): CPT | Performed by: STUDENT IN AN ORGANIZED HEALTH CARE EDUCATION/TRAINING PROGRAM

## 2023-11-30 PROCEDURE — 85610 PROTHROMBIN TIME: CPT | Performed by: PHYSICIAN ASSISTANT

## 2023-11-30 PROCEDURE — P9016 RBC LEUKOCYTES REDUCED: HCPCS

## 2023-11-30 PROCEDURE — 82947 ASSAY GLUCOSE BLOOD QUANT: CPT

## 2023-11-30 PROCEDURE — 2020000001 HC ICU ROOM DAILY

## 2023-11-30 RX ORDER — ONDANSETRON HYDROCHLORIDE 2 MG/ML
4 INJECTION, SOLUTION INTRAVENOUS EVERY 8 HOURS PRN
Status: DISCONTINUED | OUTPATIENT
Start: 2023-11-30 | End: 2023-12-03

## 2023-11-30 RX ORDER — ALBUMIN HUMAN 50 G/1000ML
12.5 SOLUTION INTRAVENOUS ONCE
Status: COMPLETED | OUTPATIENT
Start: 2023-11-30 | End: 2023-11-30

## 2023-11-30 RX ORDER — HYDROMORPHONE HYDROCHLORIDE 1 MG/ML
0.2 INJECTION, SOLUTION INTRAMUSCULAR; INTRAVENOUS; SUBCUTANEOUS ONCE
Status: COMPLETED | OUTPATIENT
Start: 2023-11-30 | End: 2023-11-30

## 2023-11-30 RX ORDER — MIDODRINE HYDROCHLORIDE 10 MG/1
10 TABLET ORAL EVERY 8 HOURS
Status: DISCONTINUED | OUTPATIENT
Start: 2023-11-30 | End: 2023-11-30

## 2023-11-30 RX ORDER — MIDODRINE HYDROCHLORIDE 10 MG/1
15 TABLET ORAL EVERY 8 HOURS
Status: DISCONTINUED | OUTPATIENT
Start: 2023-11-30 | End: 2023-12-03

## 2023-11-30 RX ORDER — ONDANSETRON HYDROCHLORIDE 2 MG/ML
INJECTION, SOLUTION INTRAVENOUS
Status: COMPLETED
Start: 2023-11-30 | End: 2023-11-30

## 2023-11-30 RX ORDER — MIDODRINE HYDROCHLORIDE 10 MG/1
5 TABLET ORAL ONCE
Status: COMPLETED | OUTPATIENT
Start: 2023-11-30 | End: 2023-11-30

## 2023-11-30 RX ORDER — ONDANSETRON 4 MG/1
4 TABLET, FILM COATED ORAL EVERY 8 HOURS PRN
Status: DISCONTINUED | OUTPATIENT
Start: 2023-11-30 | End: 2023-12-01

## 2023-11-30 RX ADMIN — ALBUMIN HUMAN 12.5 G: 0.05 INJECTION, SOLUTION INTRAVENOUS at 05:10

## 2023-11-30 RX ADMIN — Medication 0.8 MCG/KG/MIN: at 05:22

## 2023-11-30 RX ADMIN — ONDANSETRON HYDROCHLORIDE 4 MG: 2 INJECTION, SOLUTION INTRAVENOUS at 12:30

## 2023-11-30 RX ADMIN — ALBUMIN HUMAN 25 G: 0.25 SOLUTION INTRAVENOUS at 10:04

## 2023-11-30 RX ADMIN — SODIUM CHLORIDE, POTASSIUM CHLORIDE, SODIUM LACTATE AND CALCIUM CHLORIDE 125 ML/HR: 600; 310; 30; 20 INJECTION, SOLUTION INTRAVENOUS at 01:35

## 2023-11-30 RX ADMIN — Medication 0.4 MCG/KG/MIN: at 00:50

## 2023-11-30 RX ADMIN — HEPARIN SODIUM 5000 UNITS: 5000 INJECTION INTRAVENOUS; SUBCUTANEOUS at 04:29

## 2023-11-30 RX ADMIN — SODIUM CHLORIDE, POTASSIUM CHLORIDE, SODIUM LACTATE AND CALCIUM CHLORIDE 125 ML/HR: 600; 310; 30; 20 INJECTION, SOLUTION INTRAVENOUS at 20:34

## 2023-11-30 RX ADMIN — PANTOPRAZOLE SODIUM 40 MG: 40 INJECTION, POWDER, FOR SOLUTION INTRAVENOUS at 08:32

## 2023-11-30 RX ADMIN — SODIUM CHLORIDE, POTASSIUM CHLORIDE, SODIUM LACTATE AND CALCIUM CHLORIDE 125 ML/HR: 600; 310; 30; 20 INJECTION, SOLUTION INTRAVENOUS at 09:36

## 2023-11-30 RX ADMIN — CEFAZOLIN SODIUM 2 G: 2 INJECTION, SOLUTION INTRAVENOUS at 04:29

## 2023-11-30 RX ADMIN — MIDODRINE HYDROCHLORIDE 15 MG: 10 TABLET ORAL at 23:36

## 2023-11-30 RX ADMIN — CALCIUM GLUCONATE 1 G: 20 INJECTION, SOLUTION INTRAVENOUS at 15:11

## 2023-11-30 RX ADMIN — HYDROMORPHONE HYDROCHLORIDE 0.2 MG: 1 INJECTION, SOLUTION INTRAMUSCULAR; INTRAVENOUS; SUBCUTANEOUS at 12:15

## 2023-11-30 RX ADMIN — MIDODRINE HYDROCHLORIDE 15 MG: 10 TABLET ORAL at 15:08

## 2023-11-30 RX ADMIN — INSULIN LISPRO 2 UNITS: 100 INJECTION, SOLUTION INTRAVENOUS; SUBCUTANEOUS at 08:33

## 2023-11-30 RX ADMIN — HYDROMORPHONE HYDROCHLORIDE 0.4 MG: 1 INJECTION, SOLUTION INTRAMUSCULAR; INTRAVENOUS; SUBCUTANEOUS at 10:09

## 2023-11-30 RX ADMIN — ONDANSETRON 4 MG: 2 INJECTION INTRAMUSCULAR; INTRAVENOUS at 12:30

## 2023-11-30 RX ADMIN — MIDODRINE HYDROCHLORIDE 5 MG: 10 TABLET ORAL at 10:23

## 2023-11-30 RX ADMIN — ALBUMIN HUMAN 25 G: 0.25 SOLUTION INTRAVENOUS at 04:29

## 2023-11-30 RX ADMIN — HEPARIN SODIUM 5000 UNITS: 5000 INJECTION INTRAVENOUS; SUBCUTANEOUS at 20:15

## 2023-11-30 RX ADMIN — HYDROMORPHONE HYDROCHLORIDE 0.4 MG: 1 INJECTION, SOLUTION INTRAMUSCULAR; INTRAVENOUS; SUBCUTANEOUS at 15:11

## 2023-11-30 RX ADMIN — HYDROMORPHONE HYDROCHLORIDE 0.4 MG: 1 INJECTION, SOLUTION INTRAMUSCULAR; INTRAVENOUS; SUBCUTANEOUS at 20:15

## 2023-11-30 RX ADMIN — MIDODRINE HYDROCHLORIDE 10 MG: 10 TABLET ORAL at 08:32

## 2023-11-30 RX ADMIN — HYDROMORPHONE HYDROCHLORIDE 0.2 MG: 1 INJECTION, SOLUTION INTRAMUSCULAR; INTRAVENOUS; SUBCUTANEOUS at 08:33

## 2023-11-30 SDOH — SOCIAL STABILITY: SOCIAL INSECURITY: HAS ANYONE EVER THREATENED TO HURT YOUR FAMILY OR YOUR PETS?: NO

## 2023-11-30 SDOH — SOCIAL STABILITY: SOCIAL INSECURITY: DO YOU FEEL ANYONE HAS EXPLOITED OR TAKEN ADVANTAGE OF YOU FINANCIALLY OR OF YOUR PERSONAL PROPERTY?: NO

## 2023-11-30 SDOH — SOCIAL STABILITY: SOCIAL INSECURITY: DOES ANYONE TRY TO KEEP YOU FROM HAVING/CONTACTING OTHER FRIENDS OR DOING THINGS OUTSIDE YOUR HOME?: NO

## 2023-11-30 SDOH — SOCIAL STABILITY: SOCIAL INSECURITY: DO YOU FEEL UNSAFE GOING BACK TO THE PLACE WHERE YOU ARE LIVING?: NO

## 2023-11-30 SDOH — SOCIAL STABILITY: SOCIAL INSECURITY: HAVE YOU HAD THOUGHTS OF HARMING ANYONE ELSE?: NO

## 2023-11-30 SDOH — SOCIAL STABILITY: SOCIAL INSECURITY: ARE YOU OR HAVE YOU BEEN THREATENED OR ABUSED PHYSICALLY, EMOTIONALLY, OR SEXUALLY BY ANYONE?: NO

## 2023-11-30 SDOH — SOCIAL STABILITY: SOCIAL INSECURITY: WERE YOU ABLE TO COMPLETE ALL THE BEHAVIORAL HEALTH SCREENINGS?: YES

## 2023-11-30 SDOH — SOCIAL STABILITY: SOCIAL INSECURITY: ARE THERE ANY APPARENT SIGNS OF INJURIES/BEHAVIORS THAT COULD BE RELATED TO ABUSE/NEGLECT?: NO

## 2023-11-30 SDOH — SOCIAL STABILITY: SOCIAL INSECURITY: ABUSE: ADULT

## 2023-11-30 ASSESSMENT — COGNITIVE AND FUNCTIONAL STATUS - GENERAL
STANDING UP FROM CHAIR USING ARMS: TOTAL
EATING MEALS: A LITTLE
PATIENT BASELINE BEDBOUND: NO
DRESSING REGULAR LOWER BODY CLOTHING: A LITTLE
DRESSING REGULAR UPPER BODY CLOTHING: A LOT
CLIMB 3 TO 5 STEPS WITH RAILING: TOTAL
PERSONAL GROOMING: A LITTLE
TOILETING: A LITTLE
TOILETING: TOTAL
DAILY ACTIVITIY SCORE: 18
MOVING TO AND FROM BED TO CHAIR: TOTAL
HELP NEEDED FOR BATHING: A LOT
MOBILITY SCORE: 24
DRESSING REGULAR LOWER BODY CLOTHING: TOTAL
PERSONAL GROOMING: A LITTLE
EATING MEALS: TOTAL
MOBILITY SCORE: 8
TURNING FROM BACK TO SIDE WHILE IN FLAT BAD: A LOT
HELP NEEDED FOR BATHING: A LITTLE
WALKING IN HOSPITAL ROOM: TOTAL
DAILY ACTIVITIY SCORE: 10
DRESSING REGULAR UPPER BODY CLOTHING: A LITTLE
MOVING FROM LYING ON BACK TO SITTING ON SIDE OF FLAT BED WITH BEDRAILS: A LOT

## 2023-11-30 ASSESSMENT — ACTIVITIES OF DAILY LIVING (ADL)
ADL_ASSISTANCE: NEEDS ASSISTANCE
ADL_ASSISTANCE: NEEDS ASSISTANCE
BATHING_ASSISTANCE: MAXIMAL
LACK_OF_TRANSPORTATION: NO

## 2023-11-30 ASSESSMENT — PAIN SCALES - GENERAL
PAINLEVEL_OUTOF10: 10 - WORST POSSIBLE PAIN
PAINLEVEL_OUTOF10: 0 - NO PAIN
PAINLEVEL_OUTOF10: 10 - WORST POSSIBLE PAIN
PAINLEVEL_OUTOF10: 10 - WORST POSSIBLE PAIN
PAINLEVEL_OUTOF10: 0 - NO PAIN
PAINLEVEL_OUTOF10: 10 - WORST POSSIBLE PAIN
PAINLEVEL_OUTOF10: 10 - WORST POSSIBLE PAIN
PAINLEVEL_OUTOF10: 6
PAINLEVEL_OUTOF10: 5 - MODERATE PAIN
PAINLEVEL_OUTOF10: 3

## 2023-11-30 ASSESSMENT — LIFESTYLE VARIABLES
HOW OFTEN DO YOU HAVE A DRINK CONTAINING ALCOHOL: NEVER
PRESCIPTION_ABUSE_PAST_12_MONTHS: NO
SUBSTANCE_ABUSE_PAST_12_MONTHS: NO
HOW OFTEN DO YOU HAVE 6 OR MORE DRINKS ON ONE OCCASION: NEVER
HOW MANY STANDARD DRINKS CONTAINING ALCOHOL DO YOU HAVE ON A TYPICAL DAY: PATIENT DOES NOT DRINK
AUDIT-C TOTAL SCORE: 0
AUDIT-C TOTAL SCORE: 0
SKIP TO QUESTIONS 9-10: 1

## 2023-11-30 ASSESSMENT — PAIN DESCRIPTION - LOCATION: LOCATION: ABDOMEN

## 2023-11-30 ASSESSMENT — PAIN - FUNCTIONAL ASSESSMENT
PAIN_FUNCTIONAL_ASSESSMENT: 0-10

## 2023-11-30 ASSESSMENT — PATIENT HEALTH QUESTIONNAIRE - PHQ9
2. FEELING DOWN, DEPRESSED OR HOPELESS: NOT AT ALL
1. LITTLE INTEREST OR PLEASURE IN DOING THINGS: NOT AT ALL
SUM OF ALL RESPONSES TO PHQ9 QUESTIONS 1 & 2: 0

## 2023-11-30 NOTE — PROGRESS NOTES
Acute Pain Service    Postop Pain HPI -   Palliative: relieved with IV analgesics and regional local anesthetics  Provocative: movement  Quality:  burning and aching  Radiation:  none  Severity:  5/10  Timing: constant    24-HOUR OPIOID CONSUMPTION:  0.8mg dilaudid    Scheduled medications  [Held by provider] heparin (porcine), 5,000 Units, subcutaneous, q8h  insulin lispro, 0-10 Units, subcutaneous, q4h  midodrine, 15 mg, j-tube, q8h  ondansetron, , ,   pantoprazole, 40 mg, intravenous, Daily      Continuous medications  lactated Ringer's, 125 mL/hr, Last Rate: 125 mL/hr (11/30/23 1200)  phenylephrine, 0.1-2 mcg/kg/min, Last Rate: 0.15 mcg/kg/min (11/30/23 1230)      PRN medications  PRN medications: calcium gluconate, calcium gluconate, dextrose 10 % in water (D10W), dextrose, glucagon, HYDROmorphone, HYDROmorphone, magnesium sulfate, magnesium sulfate, ondansetron, ondansetron **OR** ondansetron, potassium chloride     Physical Exam:  Constitutional:  no distress, alert and cooperative  Eyes: clear sclera  Head/Neck: No apparent injury, trachea midline  Respiratory/Thorax: Patent airways, thorax symmetric, breathing comfortably  Cardiovascular: no pitting edema  Gastrointestinal: Nondistended  Musculoskeletal: ROM intact  Extremities: no clubbing  Neurological: alert, ely x4  Psychological: Appropriate affect    Results for orders placed or performed during the hospital encounter of 11/29/23 (from the past 24 hour(s))   POCT GLUCOSE   Result Value Ref Range    POCT Glucose 164 (H) 74 - 99 mg/dL   Fibrinogen   Result Value Ref Range    Fibrinogen 252 200 - 400 mg/dL   CBC   Result Value Ref Range    WBC 14.7 (H) 4.4 - 11.3 x10*3/uL    nRBC 0.0 0.0 - 0.0 /100 WBCs    RBC 2.83 (L) 4.50 - 5.90 x10*6/uL    Hemoglobin 9.6 (L) 13.5 - 17.5 g/dL    Hematocrit 28.2 (L) 41.0 - 52.0 %     80 - 100 fL    MCH 33.9 26.0 - 34.0 pg    MCHC 34.0 32.0 - 36.0 g/dL    RDW 14.6 (H) 11.5 - 14.5 %    Platelets 279 150 - 450  x10*3/uL   Coagulation Screen   Result Value Ref Range    Protime 13.2 (H) 9.8 - 12.8 seconds    INR 1.2 (H) 0.9 - 1.1    aPTT 32 27 - 38 seconds   Renal Function Panel   Result Value Ref Range    Glucose 150 (H) 74 - 99 mg/dL    Sodium 137 136 - 145 mmol/L    Potassium 3.5 3.5 - 5.3 mmol/L    Chloride 99 98 - 107 mmol/L    Bicarbonate 23 21 - 32 mmol/L    Anion Gap 19 10 - 20 mmol/L    Urea Nitrogen 45 (H) 6 - 23 mg/dL    Creatinine 1.22 0.50 - 1.30 mg/dL    eGFR 65 >60 mL/min/1.73m*2    Calcium 8.6 8.6 - 10.6 mg/dL    Phosphorus 4.9 2.5 - 4.9 mg/dL    Albumin 3.8 3.4 - 5.0 g/dL   Magnesium   Result Value Ref Range    Magnesium 1.65 1.60 - 2.40 mg/dL   Calcium, Ionized   Result Value Ref Range    POCT Calcium, Ionized 1.07 (L) 1.1 - 1.33 mmol/L   Hepatic function panel   Result Value Ref Range    Albumin 3.8 3.4 - 5.0 g/dL    Bilirubin, Total 1.0 0.0 - 1.2 mg/dL    Bilirubin, Direct 0.5 (H) 0.0 - 0.3 mg/dL    Alkaline Phosphatase 460 (H) 33 - 136 U/L    ALT 95 (H) 10 - 52 U/L     (H) 9 - 39 U/L    Total Protein 6.0 (L) 6.4 - 8.2 g/dL   Troponin I, High Sensitivity   Result Value Ref Range    Troponin I, High Sensitivity 19 0 - 53 ng/L   Blood Gas Arterial Full Panel   Result Value Ref Range    POCT pH, Arterial 7.39 7.38 - 7.42 pH    POCT pCO2, Arterial 41 38 - 42 mm Hg    POCT pO2, Arterial 186 (H) 85 - 95 mm Hg    POCT SO2, Arterial 100 94 - 100 %    POCT Oxy Hemoglobin, Arterial 97.2 94.0 - 98.0 %    POCT Hematocrit Calculated, Arterial 31.0 (L) 41.0 - 52.0 %    POCT Sodium, Arterial 135 (L) 136 - 145 mmol/L    POCT Potassium, Arterial 3.5 3.5 - 5.3 mmol/L    POCT Chloride, Arterial 102 98 - 107 mmol/L    POCT Ionized Calcium, Arterial 1.11 1.10 - 1.33 mmol/L    POCT Glucose, Arterial 165 (H) 74 - 99 mg/dL    POCT Lactate, Arterial 2.6 (H) 0.4 - 2.0 mmol/L    POCT Base Excess, Arterial -0.2 -2.0 - 3.0 mmol/L    POCT HCO3 Calculated, Arterial 24.8 22.0 - 26.0 mmol/L    POCT Hemoglobin, Arterial 10.4 (L)  13.5 - 17.5 g/dL    POCT Anion Gap, Arterial 12 10 - 25 mmo/L    Patient Temperature 37.0 degrees Celsius    FiO2 6 %   Blood Gas Arterial Full Panel   Result Value Ref Range    POCT pH, Arterial 7.43 (H) 7.38 - 7.42 pH    POCT pCO2, Arterial 35 (L) 38 - 42 mm Hg    POCT pO2, Arterial 74 (L) 85 - 95 mm Hg    POCT SO2, Arterial 96 94 - 100 %    POCT Oxy Hemoglobin, Arterial 93.0 (L) 94.0 - 98.0 %    POCT Hematocrit Calculated, Arterial 31.0 (L) 41.0 - 52.0 %    POCT Sodium, Arterial 135 (L) 136 - 145 mmol/L    POCT Potassium, Arterial 3.5 3.5 - 5.3 mmol/L    POCT Chloride, Arterial 102 98 - 107 mmol/L    POCT Ionized Calcium, Arterial 1.12 1.10 - 1.33 mmol/L    POCT Glucose, Arterial 160 (H) 74 - 99 mg/dL    POCT Lactate, Arterial 2.6 (H) 0.4 - 2.0 mmol/L    POCT Base Excess, Arterial -0.8 -2.0 - 3.0 mmol/L    POCT HCO3 Calculated, Arterial 23.2 22.0 - 26.0 mmol/L    POCT Hemoglobin, Arterial 10.4 (L) 13.5 - 17.5 g/dL    POCT Anion Gap, Arterial 13 10 - 25 mmo/L    Patient Temperature 37.0 degrees Celsius    FiO2 21 %   POCT GLUCOSE   Result Value Ref Range    POCT Glucose 147 (H) 74 - 99 mg/dL   POCT GLUCOSE   Result Value Ref Range    POCT Glucose 130 (H) 74 - 99 mg/dL   POCT GLUCOSE   Result Value Ref Range    POCT Glucose 151 (H) 74 - 99 mg/dL   Blood Gas Arterial Full Panel Unsolicited   Result Value Ref Range    POCT pH, Arterial 7.43 (H) 7.38 - 7.42 pH    POCT pCO2, Arterial 38 38 - 42 mm Hg    POCT pO2, Arterial 98 (H) 85 - 95 mm Hg    POCT SO2, Arterial 100 94 - 100 %    POCT Oxy Hemoglobin, Arterial 96.7 94.0 - 98.0 %    POCT Hematocrit Calculated, Arterial 24.0 (L) 41.0 - 52.0 %    POCT Sodium, Arterial 137 136 - 145 mmol/L    POCT Potassium, Arterial 4.0 3.5 - 5.3 mmol/L    POCT Chloride, Arterial 104 98 - 107 mmol/L    POCT Ionized Calcium, Arterial 1.14 1.10 - 1.33 mmol/L    POCT Glucose, Arterial 152 (H) 74 - 99 mg/dL    POCT Lactate, Arterial 2.1 (H) 0.4 - 2.0 mmol/L    POCT Base Excess, Arterial 0.9  -2.0 - 3.0 mmol/L    POCT HCO3 Calculated, Arterial 25.2 22.0 - 26.0 mmol/L    POCT Hemoglobin, Arterial 7.9 (L) 13.5 - 17.5 g/dL    POCT Anion Gap, Arterial 12 10 - 25 mmo/L    Patient Temperature 37.0 degrees Celsius   POCT GLUCOSE   Result Value Ref Range    POCT Glucose 139 (H) 74 - 99 mg/dL   Type and screen   Result Value Ref Range    ABO TYPE O     Rh TYPE POS     ANTIBODY SCREEN NEG    CBC   Result Value Ref Range    WBC 12.1 (H) 4.4 - 11.3 x10*3/uL    nRBC 0.0 0.0 - 0.0 /100 WBCs    RBC 2.27 (L) 4.50 - 5.90 x10*6/uL    Hemoglobin 7.7 (L) 13.5 - 17.5 g/dL    Hematocrit 22.8 (L) 41.0 - 52.0 %     80 - 100 fL    MCH 33.9 26.0 - 34.0 pg    MCHC 33.8 32.0 - 36.0 g/dL    RDW 15.2 (H) 11.5 - 14.5 %    Platelets 191 150 - 450 x10*3/uL   Coagulation Screen   Result Value Ref Range    Protime 14.3 (H) 9.8 - 12.8 seconds    INR 1.3 (H) 0.9 - 1.1    aPTT 34 27 - 38 seconds   Renal Function Panel   Result Value Ref Range    Glucose 140 (H) 74 - 99 mg/dL    Sodium 140 136 - 145 mmol/L    Potassium 4.1 3.5 - 5.3 mmol/L    Chloride 103 98 - 107 mmol/L    Bicarbonate 25 21 - 32 mmol/L    Anion Gap 16 10 - 20 mmol/L    Urea Nitrogen 41 (H) 6 - 23 mg/dL    Creatinine 1.15 0.50 - 1.30 mg/dL    eGFR 70 >60 mL/min/1.73m*2    Calcium 8.8 8.6 - 10.6 mg/dL    Phosphorus 3.0 2.5 - 4.9 mg/dL    Albumin 3.9 3.4 - 5.0 g/dL   Magnesium   Result Value Ref Range    Magnesium 2.42 (H) 1.60 - 2.40 mg/dL   Calcium, Ionized   Result Value Ref Range    POCT Calcium, Ionized 1.13 1.1 - 1.33 mmol/L   POCT GLUCOSE   Result Value Ref Range    POCT Glucose 156 (H) 74 - 99 mg/dL   Electrocardiogram, 12-lead PRN ACS symptoms   Result Value Ref Range    Ventricular Rate 101 BPM    Atrial Rate 101 BPM    SD Interval 168 ms    QRS Duration 144 ms    QT Interval 444 ms    QTC Calculation(Bazett) 575 ms    P Axis 17 degrees    R Axis -70 degrees    T Axis 54 degrees    QRS Count 17 beats    Q Onset 208 ms    T Offset 430 ms    QTC Fredericia 528 ms    POCT GLUCOSE   Result Value Ref Range    POCT Glucose 113 (H) 74 - 99 mg/dL   Coagulation Screen   Result Value Ref Range    Protime 15.6 (H) 9.8 - 12.8 seconds    INR 1.4 (H) 0.9 - 1.1    aPTT 33 27 - 38 seconds   Calcium, Ionized   Result Value Ref Range    POCT Calcium, Ionized 1.07 (L) 1.1 - 1.33 mmol/L      Recommendations:     - IV Mg 1g Q8H x 3 doses, target 2.25  - IV Toradol 30mg Q6H for max 5 days per surgical service  - Tylenol 975mg Q8H, time 3 hours between tylenol and toradol  - PO Robaxin 1000mg QID  - Gabapentin 300mg at bedtime  - Oxycodone 5/10mg Q4H for mod/severe pain  - Dilaudid 0.2mg Q4H for breakthrough pain  - Remainder of pain management per primary team  - Acute pain service will sign off    Acute Pain Team  pg [phone] ph [phone]

## 2023-11-30 NOTE — PROGRESS NOTES
"Levy Gregory is a 68 y.o. male on day 1 of admission presenting with Esophageal perforation.    Subjective   Overnight, patient required variable amounts of phenylephrine, most recently at a rate of 0.8.  Also received 1L bolus of LR, 25g IV albumin, and 12.5g IV albumin. Getting 1u pRBC while on rounds. States that he did not sleep well overnight because of busy nature of ICU but otherwise feels ok. Pain moderately controlled. Denies flatus, BM.     Objective   General: NAD  HEENT: NGT in nares, to LIWS with bilious output in cannister  Resp: nonlabored breathing on NC  CV: sinus tachycardia on monitor, on 0.8 of phenylephrine  Abd: soft, appropriately TTP. Incisions covered with island dressings, c/d/i  : aguilera draining clear yellow urine  MSK: moves all extremities  Ext: minimal LE edema  Psych: appropriate mood and behavior    Last Recorded Vitals  Blood pressure 105/62, pulse 105, temperature 37 °C (98.6 °F), temperature source Temporal, resp. rate (!) 27, height 1.85 m (6' 0.84\"), weight 86 kg (189 lb 9.5 oz), SpO2 94 %.  Intake/Output last 3 Shifts:  I/O last 3 completed shifts:  In: 5370.5 (62.4 mL/kg) [I.V.:2320.5 (27 mL/kg); IV Piggyback:3050]  Out: 2465 (28.7 mL/kg) [Urine:1685 (0.5 mL/kg/hr); Emesis/NG output:350; Drains:180; Blood:250]  Weight: 86 kg     Relevant Results  Results for orders placed or performed during the hospital encounter of 11/29/23 (from the past 24 hour(s))   BLOOD GAS ARTERIAL FULL PANEL   Result Value Ref Range    POCT pH, Arterial 7.48 (H) 7.38 - 7.42 pH    POCT pCO2, Arterial 40 38 - 42 mm Hg    POCT pO2, Arterial 167 (H) 85 - 95 mm Hg    POCT SO2, Arterial 100 94 - 100 %    POCT Oxy Hemoglobin, Arterial 96.9 94.0 - 98.0 %    POCT Hematocrit Calculated, Arterial 35.0 (L) 41.0 - 52.0 %    POCT Sodium, Arterial 133 (L) 136 - 145 mmol/L    POCT Potassium, Arterial 3.7 3.5 - 5.3 mmol/L    POCT Chloride, Arterial 99 98 - 107 mmol/L    POCT Ionized Calcium, Arterial 1.24 1.10 - " 1.33 mmol/L    POCT Glucose, Arterial 139 (H) 74 - 99 mg/dL    POCT Lactate, Arterial 1.8 0.4 - 2.0 mmol/L    POCT Base Excess, Arterial 5.8 (H) -2.0 - 3.0 mmol/L    POCT HCO3 Calculated, Arterial 29.8 (H) 22.0 - 26.0 mmol/L    POCT Hemoglobin, Arterial 11.5 (L) 13.5 - 17.5 g/dL    POCT Anion Gap, Arterial 8 (L) 10 - 25 mmo/L    Patient Temperature 37.0 degrees Celsius    FiO2 50 %   Blood Gas Arterial Full Panel Unsolicited   Result Value Ref Range    POCT pH, Arterial 7.44 (H) 7.38 - 7.42 pH    POCT pCO2, Arterial 44 (H) 38 - 42 mm Hg    POCT pO2, Arterial 140 (H) 85 - 95 mm Hg    POCT SO2, Arterial 100 94 - 100 %    POCT Oxy Hemoglobin, Arterial 96.7 94.0 - 98.0 %    POCT Hematocrit Calculated, Arterial 30.0 (L) 41.0 - 52.0 %    POCT Sodium, Arterial 132 (L) 136 - 145 mmol/L    POCT Potassium, Arterial 3.8 3.5 - 5.3 mmol/L    POCT Chloride, Arterial 100 98 - 107 mmol/L    POCT Ionized Calcium, Arterial 1.16 1.10 - 1.33 mmol/L    POCT Glucose, Arterial 177 (H) 74 - 99 mg/dL    POCT Lactate, Arterial 1.8 0.4 - 2.0 mmol/L    POCT Base Excess, Arterial 5.1 (H) -2.0 - 3.0 mmol/L    POCT HCO3 Calculated, Arterial 29.9 (H) 22.0 - 26.0 mmol/L    POCT Hemoglobin, Arterial 10.1 (L) 13.5 - 17.5 g/dL    POCT Anion Gap, Arterial 6 (L) 10 - 25 mmo/L    Patient Temperature 37.0 degrees Celsius   Coox Panel, Arterial Unsolicited   Result Value Ref Range    POCT Hemoglobin, Arterial 10.1 (L) 13.5 - 17.5 g/dL    POCT Oxy Hemoglobin, Arterial 96.7 94.0 - 98.0 %    POCT Carboxyhemoglobin, Arterial 1.6 %    POCT Methemoglobin, Arterial 1.2 0.0 - 1.5 %    POCT Deoxy Hemoglobin, Arterial 0.5 0.0 - 5.0 %   POCT GLUCOSE   Result Value Ref Range    POCT Glucose 164 (H) 74 - 99 mg/dL   Fibrinogen   Result Value Ref Range    Fibrinogen 252 200 - 400 mg/dL   CBC   Result Value Ref Range    WBC 14.7 (H) 4.4 - 11.3 x10*3/uL    nRBC 0.0 0.0 - 0.0 /100 WBCs    RBC 2.83 (L) 4.50 - 5.90 x10*6/uL    Hemoglobin 9.6 (L) 13.5 - 17.5 g/dL    Hematocrit  28.2 (L) 41.0 - 52.0 %     80 - 100 fL    MCH 33.9 26.0 - 34.0 pg    MCHC 34.0 32.0 - 36.0 g/dL    RDW 14.6 (H) 11.5 - 14.5 %    Platelets 279 150 - 450 x10*3/uL   Coagulation Screen   Result Value Ref Range    Protime 13.2 (H) 9.8 - 12.8 seconds    INR 1.2 (H) 0.9 - 1.1    aPTT 32 27 - 38 seconds   Renal Function Panel   Result Value Ref Range    Glucose 150 (H) 74 - 99 mg/dL    Sodium 137 136 - 145 mmol/L    Potassium 3.5 3.5 - 5.3 mmol/L    Chloride 99 98 - 107 mmol/L    Bicarbonate 23 21 - 32 mmol/L    Anion Gap 19 10 - 20 mmol/L    Urea Nitrogen 45 (H) 6 - 23 mg/dL    Creatinine 1.22 0.50 - 1.30 mg/dL    eGFR 65 >60 mL/min/1.73m*2    Calcium 8.6 8.6 - 10.6 mg/dL    Phosphorus 4.9 2.5 - 4.9 mg/dL    Albumin 3.8 3.4 - 5.0 g/dL   Magnesium   Result Value Ref Range    Magnesium 1.65 1.60 - 2.40 mg/dL   Calcium, Ionized   Result Value Ref Range    POCT Calcium, Ionized 1.07 (L) 1.1 - 1.33 mmol/L   Hepatic function panel   Result Value Ref Range    Albumin 3.8 3.4 - 5.0 g/dL    Bilirubin, Total 1.0 0.0 - 1.2 mg/dL    Bilirubin, Direct 0.5 (H) 0.0 - 0.3 mg/dL    Alkaline Phosphatase 460 (H) 33 - 136 U/L    ALT 95 (H) 10 - 52 U/L     (H) 9 - 39 U/L    Total Protein 6.0 (L) 6.4 - 8.2 g/dL   Troponin I, High Sensitivity   Result Value Ref Range    Troponin I, High Sensitivity 19 0 - 53 ng/L   Blood Gas Arterial Full Panel   Result Value Ref Range    POCT pH, Arterial 7.39 7.38 - 7.42 pH    POCT pCO2, Arterial 41 38 - 42 mm Hg    POCT pO2, Arterial 186 (H) 85 - 95 mm Hg    POCT SO2, Arterial 100 94 - 100 %    POCT Oxy Hemoglobin, Arterial 97.2 94.0 - 98.0 %    POCT Hematocrit Calculated, Arterial 31.0 (L) 41.0 - 52.0 %    POCT Sodium, Arterial 135 (L) 136 - 145 mmol/L    POCT Potassium, Arterial 3.5 3.5 - 5.3 mmol/L    POCT Chloride, Arterial 102 98 - 107 mmol/L    POCT Ionized Calcium, Arterial 1.11 1.10 - 1.33 mmol/L    POCT Glucose, Arterial 165 (H) 74 - 99 mg/dL    POCT Lactate, Arterial 2.6 (H) 0.4 -  2.0 mmol/L    POCT Base Excess, Arterial -0.2 -2.0 - 3.0 mmol/L    POCT HCO3 Calculated, Arterial 24.8 22.0 - 26.0 mmol/L    POCT Hemoglobin, Arterial 10.4 (L) 13.5 - 17.5 g/dL    POCT Anion Gap, Arterial 12 10 - 25 mmo/L    Patient Temperature 37.0 degrees Celsius    FiO2 6 %   Blood Gas Arterial Full Panel   Result Value Ref Range    POCT pH, Arterial 7.43 (H) 7.38 - 7.42 pH    POCT pCO2, Arterial 35 (L) 38 - 42 mm Hg    POCT pO2, Arterial 74 (L) 85 - 95 mm Hg    POCT SO2, Arterial 96 94 - 100 %    POCT Oxy Hemoglobin, Arterial 93.0 (L) 94.0 - 98.0 %    POCT Hematocrit Calculated, Arterial 31.0 (L) 41.0 - 52.0 %    POCT Sodium, Arterial 135 (L) 136 - 145 mmol/L    POCT Potassium, Arterial 3.5 3.5 - 5.3 mmol/L    POCT Chloride, Arterial 102 98 - 107 mmol/L    POCT Ionized Calcium, Arterial 1.12 1.10 - 1.33 mmol/L    POCT Glucose, Arterial 160 (H) 74 - 99 mg/dL    POCT Lactate, Arterial 2.6 (H) 0.4 - 2.0 mmol/L    POCT Base Excess, Arterial -0.8 -2.0 - 3.0 mmol/L    POCT HCO3 Calculated, Arterial 23.2 22.0 - 26.0 mmol/L    POCT Hemoglobin, Arterial 10.4 (L) 13.5 - 17.5 g/dL    POCT Anion Gap, Arterial 13 10 - 25 mmo/L    Patient Temperature 37.0 degrees Celsius    FiO2 21 %   POCT GLUCOSE   Result Value Ref Range    POCT Glucose 147 (H) 74 - 99 mg/dL   POCT GLUCOSE   Result Value Ref Range    POCT Glucose 130 (H) 74 - 99 mg/dL   POCT GLUCOSE   Result Value Ref Range    POCT Glucose 151 (H) 74 - 99 mg/dL   Blood Gas Arterial Full Panel Unsolicited   Result Value Ref Range    POCT pH, Arterial 7.43 (H) 7.38 - 7.42 pH    POCT pCO2, Arterial 38 38 - 42 mm Hg    POCT pO2, Arterial 98 (H) 85 - 95 mm Hg    POCT SO2, Arterial 100 94 - 100 %    POCT Oxy Hemoglobin, Arterial 96.7 94.0 - 98.0 %    POCT Hematocrit Calculated, Arterial 24.0 (L) 41.0 - 52.0 %    POCT Sodium, Arterial 137 136 - 145 mmol/L    POCT Potassium, Arterial 4.0 3.5 - 5.3 mmol/L    POCT Chloride, Arterial 104 98 - 107 mmol/L    POCT Ionized Calcium,  Arterial 1.14 1.10 - 1.33 mmol/L    POCT Glucose, Arterial 152 (H) 74 - 99 mg/dL    POCT Lactate, Arterial 2.1 (H) 0.4 - 2.0 mmol/L    POCT Base Excess, Arterial 0.9 -2.0 - 3.0 mmol/L    POCT HCO3 Calculated, Arterial 25.2 22.0 - 26.0 mmol/L    POCT Hemoglobin, Arterial 7.9 (L) 13.5 - 17.5 g/dL    POCT Anion Gap, Arterial 12 10 - 25 mmo/L    Patient Temperature 37.0 degrees Celsius   POCT GLUCOSE   Result Value Ref Range    POCT Glucose 139 (H) 74 - 99 mg/dL   Type and screen   Result Value Ref Range    ABO TYPE O     Rh TYPE POS     ANTIBODY SCREEN NEG    CBC   Result Value Ref Range    WBC 12.1 (H) 4.4 - 11.3 x10*3/uL    nRBC 0.0 0.0 - 0.0 /100 WBCs    RBC 2.27 (L) 4.50 - 5.90 x10*6/uL    Hemoglobin 7.7 (L) 13.5 - 17.5 g/dL    Hematocrit 22.8 (L) 41.0 - 52.0 %     80 - 100 fL    MCH 33.9 26.0 - 34.0 pg    MCHC 33.8 32.0 - 36.0 g/dL    RDW 15.2 (H) 11.5 - 14.5 %    Platelets 191 150 - 450 x10*3/uL   Coagulation Screen   Result Value Ref Range    Protime 14.3 (H) 9.8 - 12.8 seconds    INR 1.3 (H) 0.9 - 1.1    aPTT 34 27 - 38 seconds   Renal Function Panel   Result Value Ref Range    Glucose 140 (H) 74 - 99 mg/dL    Sodium 140 136 - 145 mmol/L    Potassium 4.1 3.5 - 5.3 mmol/L    Chloride 103 98 - 107 mmol/L    Bicarbonate 25 21 - 32 mmol/L    Anion Gap 16 10 - 20 mmol/L    Urea Nitrogen 41 (H) 6 - 23 mg/dL    Creatinine 1.15 0.50 - 1.30 mg/dL    eGFR 70 >60 mL/min/1.73m*2    Calcium 8.8 8.6 - 10.6 mg/dL    Phosphorus 3.0 2.5 - 4.9 mg/dL    Albumin 3.9 3.4 - 5.0 g/dL   Magnesium   Result Value Ref Range    Magnesium 2.42 (H) 1.60 - 2.40 mg/dL   Calcium, Ionized   Result Value Ref Range    POCT Calcium, Ionized 1.13 1.1 - 1.33 mmol/L     CXR 11/29:  1. Hazy opacification of bilateral lung bases with obscuration of the  costophrenic angles likely represent trace bilateral pleural effusion  with atelectasis.    Assessment/Plan   Principal Problem:    Esophageal perforation  Active Problems:    Anticoagulant  long-term use    Elevated liver function tests    This is a 69 y/o M with a history of esophageal perforation after Heller myotomy and fundoplication for achalasia who is s/p esophagectomy and cervical esophagostomy in 04/2023, and now s/p gastric pull up and cervical anastomosis on 11/29. Patient has been in ICU for monitoring post-operatively, requiring phenylephrine pressor support. Has required 1u pRBC post-op.    Plan:  Neuro - pain control per ICU  Resp - encourage IS. wean O2 as tolerated, goal SpO2 >92%. CXR daily  CV - wean pressors as tolerated, ok for midodrine through J tube, MAP goal of 60  GI - NPO, maintain NGT to LIWS, hold off on TF, continue PPI, obtain amylase from MIUGELINA drain   - LR @ 125, 25 g albumin q6h x 24 hrs, maintain aguilera  ID - 24 hr periop ancef completed  Heme - hold SQH, repeat CBC at noon (ok to resume SQH after if H/H stable)  Endo - continue SSI  MSK - OOB as tolerated, PT/OT  DVT ppx - lvx, SCDs    Dispo: SICU    Discussed with attending, Dr. Gongora.    Carmen Madrigal MD  PGY-2 General Surgery  Thoracic Surgery [id number]

## 2023-11-30 NOTE — PROGRESS NOTES
Occupational Therapy    Evaluation    Patient Name: Levy Gregory  MRN: [id number]  Today's Date: 11/30/2023  Time Calculation  Start Time: 1354  Stop Time: 1417  Time Calculation (min): 23 min    Assessment  IP OT Assessment  OT Assessment: Pt is a 68 year old male who demonstrates decreased strength, balance, activity tolerance, and coordination, which impedes occupational performance.  Prognosis: Good  Evaluation/Treatment Tolerance: Patient limited by pain  Medical Staff Made Aware: Yes  End of Session Communication: Bedside nurse  End of Session Patient Position: Bed, 3 rail up (US in room at end of session.)  Plan:  Treatment Interventions: ADL retraining, Functional transfer training, UE strengthening/ROM, Endurance training, Cognitive reorientation, Patient/family training, Equipment evaluation/education, Neuromuscular reeducation, Compensatory technique education, Fine motor coordination activities  OT Frequency: 3 times per week  OT Discharge Recommendations: Moderate intensity level of continued care  OT Recommended Transfer Status: Assist of 2  OT - OK to Discharge: Yes    Subjective   Current Problem:  1. Esophageal perforation  Surgical Pathology Exam    Surgical Pathology Exam      2. Chronic atrial fibrillation (CMS/HCC)  CANCELED: Transthoracic Echo (TTE) Limited    CANCELED: Transthoracic Echo (TTE) Limited      3. Achalasia, esophageal        4. Gastrostomy in place (CMS/HCC)        5. Deep vein thrombosis (DVT) of popliteal vein of left lower extremity, unspecified chronicity (CMS/HCC)  Vascular US lower extremity venous duplex bilateral    Vascular US lower extremity venous duplex bilateral    Vascular US upper extremity venous duplex bilateral    Vascular US upper extremity venous duplex bilateral    CANCELED: Lower extremity venous duplex bilateral    CANCELED: Vascular US upper extremity venous duplex bilateral    CANCELED: Lower extremity venous duplex bilateral    CANCELED: Vascular US  upper extremity venous duplex bilateral    CANCELED: US nonvascular extremity US extremity nonvascular real time w image documentation complete    CANCELED: US nonvascular extremity US extremity nonvascular real time w image documentation complete    CANCELED: US nonvascular extremity US extremity nonvascular real time w image documentation complete    CANCELED: US nonvascular extremity US extremity nonvascular real time w image documentation complete        General:  Reason for Referral: s/p laparotomy, lysis of adhesions, gastric pullup, and jejunostomy placement on 11/29  Past Medical History Relevant to Rehab: PMH: COPD, T2DM, GERD, type II achalasia. Prolonged hospitalization 3/2023-5/2023 d/t complications from esophageal hernia surgery. Pt admitted to LTAC and readmitted to hospital 8/2023 for worsening Empyema.  Prior to Session Communication: Bedside nurse  Patient Position Received: Bed, 3 rail up, Alarm off, not on at start of session  Family/Caregiver Present: No  General Comment: Pt supine in bed upon arrival. Sad affect. Declined mobility with max encouragement but agreeable to bed level ADLs. On .1 mercedes, 3 L NC.     Precautions:  Medical Precautions: Abdominal precautions, Fall precautions  Post-Surgical Precautions: Abdominal surgery precautions  Precautions Comment: Contact precautions, MAP 65-90, assumed MITT protocol to LUE    Vital Signs:  Heart Rate: 93 (Post: 97)  Resp: 13 (Post: 31)  SpO2: 95 % (Post: 94)  BP: (!) 97/48 (post: 102/65)  MAP (mmHg): 65 (Post: 76)    Pain:  Pain Assessment  Pain Assessment: 0-10  Pain Score: 3  Pain Location: Abdomen (LUE near incision)    Lines/Tubes/Drains:  Arterial Line 11/29/23 Right Radial (Active)   Number of days: 1       PICC - Adult 10/16/23 Double lumen Right Basilic vein (Active)   Number of days: 45       Urethral Catheter Straight-tip 16 Fr. (Active)   Number of days: 1       Closed/Suction Drain Left;Anterior Chest Bulb 7 Fr. (Active)   Number of  days: 1       NG/OG/Feeding Tube NG - [place] sump 16 Fr Left nostril (Active)   Number of days: 1       Gastrostomy/Enterostomy Jejunostomy 14 Fr. LLQ (Active)   Number of days: 1         Objective   Cognition:  Overall Cognitive Status: Within Functional Limits  Orientation Level: Oriented X4  Following Commands: Follows all commands and directions without difficulty     Confusion Assessment Method (CAM)  Acute Onset and Fluctuating Course (1A): Yes  Acute Onset and Fluctuating Course (1B): No  Inattention (2): No  Disorganized Thinking (3): No  Rate Patient's Level of Consciousness (4): Alert (Normal), No  Delirium Present: No     Home Living:  Type of Home: House  Lives With: Spouse  Home Adaptive Equipment: Walker rolling or standard, Wheelchair-manual (lift chair)  Home Layout: Two level, Able to live on main level with bedroom/bathroom  Home Access: Stairs to enter with rails  Entrance Stairs-Rails: Both  Entrance Stairs-Number of Steps: 3  Bathroom Shower/Tub: Walk-in shower  Bathroom Equipment: Built-in shower seat  Home Living Comments: Pt reports he has been home from facility for ~1 month     Prior Function:  Level of [place]: Needs assistance with functional transfers, Needs assistance with homemaking, Needs assistance with ADLs  Receives Help From:  (Spouse)  ADL Assistance: Needs assistance (assist to don socks, denies need for assist for other ADLs)  Homemaking Assistance:  (Wife performs all IADLs)  Ambulatory Assistance: Needs assistance (Walks short distances with wheelchair follow and FWW per pt report)  Vocational: Retired  Leisure: Likes fishing  Prior Function Comments: Pt reports continued therapy services and that he exercises most days     ADL:  Eating Assistance: Total  Eating Deficit: NPO (At baseline)  Grooming Assistance: Stand by  Grooming Deficit: Setup (Oral care with suction brush and right hand)  Bathing Assistance: Maximal  Bathing Deficit:  (Anticipated)  UE Dressing  Assistance: Moderate  UE Dressing Deficit:  (Anticipated)  LE Dressing Assistance: Total  Toileting Assistance with Device: Total    Activity Tolerance:  Early Mobility/Exercise Safety Screen: Proceed with mobilization - No exclusion criteria met     Bed Mobility/Transfers: Bed Mobility  Bed Mobility: No (Pt refused OOB mobility with max encouragement 2/2 pain)   and Transfers  Transfer: No      Sensation:  Light Touch: No apparent deficits    Strength:  Strength Comments: RUE >/=3+/5, resistance not applied 2/2 precautions. LUE shoulder AROM limited to ~50 degrees 2/2 pain at incision site. L distal UE >/=3/5     Coordination:  Movements are Fluid and Coordinated: No  Upper Body Coordination: LUE limited 2/2 recent procedure     Outcome Measures: [facility] Daily Activity  Putting on and taking off regular lower body clothing: Total  Bathing (including washing, rinsing, drying): A lot  Putting on and taking off regular upper body clothing: A lot  Toileting, which includes using toilet, bedpan or urinal: Total  Taking care of personal grooming such as brushing teeth: A little  Eating Meals: Total  Daily Activity - Total Score: 10        ICU Mobility Screen  Early Mobility/Exercise Safety Screen: Proceed with mobilization - No exclusion criteria met,          Education Documentation  Body Mechanics, taught by Jamia Le OT at 11/30/2023  2:50 PM.  Learner: Patient  Readiness: Acceptance  Method: Explanation, Demonstration  Response: Verbalizes Understanding    Precautions, taught by Jamia Le OT at 11/30/2023  2:50 PM.  Learner: Patient  Readiness: Acceptance  Method: Explanation, Demonstration  Response: Verbalizes Understanding    ADL Training, taught by Jamia Le OT at 11/30/2023  2:50 PM.  Learner: Patient  Readiness: Acceptance  Method: Explanation, Demonstration  Response: Verbalizes Understanding    Education Comments  No comments found.        Goals:   Encounter  Problems       Encounter Problems (Active)       ADLs       Patient with complete upper body dressing with minimal assist  level of assistance donning and doffing all UE clothes       Start:  11/30/23    Expected End:  12/14/23            Patient with complete lower body dressing with minimal assist  level of assistance donning and doffing all LE clothes  with PRN adaptive equipment.        Start:  11/30/23    Expected End:  12/14/23               COGNITION/SAFETY       Pt will maintain precautions with 100% carryover during functional tasks, ADLs, and mobility without cueing.        Start:  11/30/23    Expected End:  12/14/23               TRANSFERS       Patient will complete functional transfer to chair with least restrictive device with minimal assist  level of assistance.       Start:  11/30/23    Expected End:  12/14/23            Patient will complete sit to stand transfer with minimal assist  level of assistance and least restrictive device in order to improve safety and prepare for out of bed mobility.       Start:  11/30/23    Expected End:  12/14/23 11/30/23 at 2:51 PM   Jamia Le, OT   Rehab Office: [phone]

## 2023-11-30 NOTE — PROGRESS NOTES
"                       Surgical Intensive Care Unit Progress Note     Subjective   Phenylphrine infusion increased to 1mcg/kg/min   Received 500 ml LR x 2  Hemoglobin down to 7.7 from 9.6. 2 uRBC ordered     Objective     Physical Exam  Constitutional:       Comments: Chronically ill appearing     Neck:      Comments: MIGUELINA drain with serosanguinous drainage  Cardiovascular:      Rate and Rhythm: Normal rate and regular rhythm.   Pulmonary:      Effort: Pulmonary effort is normal.      Comments: Decreased BS bilaterally  Abdominal:      General: Bowel sounds are normal.      Palpations: Abdomen is soft.      Comments: J tubed capped   Musculoskeletal:      Cervical back: Neck supple.      Right lower leg: Edema present.      Left lower leg: Edema present.   Neurological:      General: No focal deficit present.      Mental Status: He is alert and oriented to person, place, and time.   Psychiatric:         Mood and Affect: Mood normal.         Behavior: Behavior normal.         Last Recorded Vitals  Blood pressure 93/65, pulse 95, temperature 36.7 °C (98.1 °F), temperature source Temporal, resp. rate 19, height 1.85 m (6' 0.84\"), weight 86 kg (189 lb 9.5 oz), SpO2 99 %.  Intake/Output last 3 Shifts:  I/O last 3 completed shifts:  In: 5370.5 (62.4 mL/kg) [I.V.:2320.5 (27 mL/kg); IV Piggyback:3050]  Out: 2465 (28.7 mL/kg) [Urine:1685 (0.5 mL/kg/hr); Emesis/NG output:350; Drains:180; Blood:250]  Weight: 86 kg     Relevant Results  Scheduled medications  albumin human, 25 g, intravenous, q6h  [Held by provider] heparin (porcine), 5,000 Units, subcutaneous, q8h  insulin lispro, 0-10 Units, subcutaneous, q4h  midodrine, 15 mg, j-tube, q8h  midodrine, 5 mg, j-tube, Once  pantoprazole, 40 mg, intravenous, Daily      Continuous medications  lactated Ringer's, 125 mL/hr, Last Rate: 125 mL/hr (11/30/23 0936)  phenylephrine, 0.1-2 mcg/kg/min, Last Rate: 0.4 mcg/kg/min (11/30/23 0800)      PRN medications  PRN medications: calcium " gluconate, calcium gluconate, dextrose 10 % in water (D10W), dextrose, glucagon, HYDROmorphone, HYDROmorphone, magnesium sulfate, magnesium sulfate, potassium chloride       Results for orders placed or performed during the hospital encounter of 11/29/23 (from the past 24 hour(s))   Blood Gas Arterial Full Panel Unsolicited   Result Value Ref Range    POCT pH, Arterial 7.44 (H) 7.38 - 7.42 pH    POCT pCO2, Arterial 44 (H) 38 - 42 mm Hg    POCT pO2, Arterial 140 (H) 85 - 95 mm Hg    POCT SO2, Arterial 100 94 - 100 %    POCT Oxy Hemoglobin, Arterial 96.7 94.0 - 98.0 %    POCT Hematocrit Calculated, Arterial 30.0 (L) 41.0 - 52.0 %    POCT Sodium, Arterial 132 (L) 136 - 145 mmol/L    POCT Potassium, Arterial 3.8 3.5 - 5.3 mmol/L    POCT Chloride, Arterial 100 98 - 107 mmol/L    POCT Ionized Calcium, Arterial 1.16 1.10 - 1.33 mmol/L    POCT Glucose, Arterial 177 (H) 74 - 99 mg/dL    POCT Lactate, Arterial 1.8 0.4 - 2.0 mmol/L    POCT Base Excess, Arterial 5.1 (H) -2.0 - 3.0 mmol/L    POCT HCO3 Calculated, Arterial 29.9 (H) 22.0 - 26.0 mmol/L    POCT Hemoglobin, Arterial 10.1 (L) 13.5 - 17.5 g/dL    POCT Anion Gap, Arterial 6 (L) 10 - 25 mmo/L    Patient Temperature 37.0 degrees Celsius   Coox Panel, Arterial Unsolicited   Result Value Ref Range    POCT Hemoglobin, Arterial 10.1 (L) 13.5 - 17.5 g/dL    POCT Oxy Hemoglobin, Arterial 96.7 94.0 - 98.0 %    POCT Carboxyhemoglobin, Arterial 1.6 %    POCT Methemoglobin, Arterial 1.2 0.0 - 1.5 %    POCT Deoxy Hemoglobin, Arterial 0.5 0.0 - 5.0 %   POCT GLUCOSE   Result Value Ref Range    POCT Glucose 164 (H) 74 - 99 mg/dL   Fibrinogen   Result Value Ref Range    Fibrinogen 252 200 - 400 mg/dL   CBC   Result Value Ref Range    WBC 14.7 (H) 4.4 - 11.3 x10*3/uL    nRBC 0.0 0.0 - 0.0 /100 WBCs    RBC 2.83 (L) 4.50 - 5.90 x10*6/uL    Hemoglobin 9.6 (L) 13.5 - 17.5 g/dL    Hematocrit 28.2 (L) 41.0 - 52.0 %     80 - 100 fL    MCH 33.9 26.0 - 34.0 pg    MCHC 34.0 32.0 - 36.0 g/dL     RDW 14.6 (H) 11.5 - 14.5 %    Platelets 279 150 - 450 x10*3/uL   Coagulation Screen   Result Value Ref Range    Protime 13.2 (H) 9.8 - 12.8 seconds    INR 1.2 (H) 0.9 - 1.1    aPTT 32 27 - 38 seconds   Renal Function Panel   Result Value Ref Range    Glucose 150 (H) 74 - 99 mg/dL    Sodium 137 136 - 145 mmol/L    Potassium 3.5 3.5 - 5.3 mmol/L    Chloride 99 98 - 107 mmol/L    Bicarbonate 23 21 - 32 mmol/L    Anion Gap 19 10 - 20 mmol/L    Urea Nitrogen 45 (H) 6 - 23 mg/dL    Creatinine 1.22 0.50 - 1.30 mg/dL    eGFR 65 >60 mL/min/1.73m*2    Calcium 8.6 8.6 - 10.6 mg/dL    Phosphorus 4.9 2.5 - 4.9 mg/dL    Albumin 3.8 3.4 - 5.0 g/dL   Magnesium   Result Value Ref Range    Magnesium 1.65 1.60 - 2.40 mg/dL   Calcium, Ionized   Result Value Ref Range    POCT Calcium, Ionized 1.07 (L) 1.1 - 1.33 mmol/L   Hepatic function panel   Result Value Ref Range    Albumin 3.8 3.4 - 5.0 g/dL    Bilirubin, Total 1.0 0.0 - 1.2 mg/dL    Bilirubin, Direct 0.5 (H) 0.0 - 0.3 mg/dL    Alkaline Phosphatase 460 (H) 33 - 136 U/L    ALT 95 (H) 10 - 52 U/L     (H) 9 - 39 U/L    Total Protein 6.0 (L) 6.4 - 8.2 g/dL   Troponin I, High Sensitivity   Result Value Ref Range    Troponin I, High Sensitivity 19 0 - 53 ng/L   Blood Gas Arterial Full Panel   Result Value Ref Range    POCT pH, Arterial 7.39 7.38 - 7.42 pH    POCT pCO2, Arterial 41 38 - 42 mm Hg    POCT pO2, Arterial 186 (H) 85 - 95 mm Hg    POCT SO2, Arterial 100 94 - 100 %    POCT Oxy Hemoglobin, Arterial 97.2 94.0 - 98.0 %    POCT Hematocrit Calculated, Arterial 31.0 (L) 41.0 - 52.0 %    POCT Sodium, Arterial 135 (L) 136 - 145 mmol/L    POCT Potassium, Arterial 3.5 3.5 - 5.3 mmol/L    POCT Chloride, Arterial 102 98 - 107 mmol/L    POCT Ionized Calcium, Arterial 1.11 1.10 - 1.33 mmol/L    POCT Glucose, Arterial 165 (H) 74 - 99 mg/dL    POCT Lactate, Arterial 2.6 (H) 0.4 - 2.0 mmol/L    POCT Base Excess, Arterial -0.2 -2.0 - 3.0 mmol/L    POCT HCO3 Calculated, Arterial 24.8  22.0 - 26.0 mmol/L    POCT Hemoglobin, Arterial 10.4 (L) 13.5 - 17.5 g/dL    POCT Anion Gap, Arterial 12 10 - 25 mmo/L    Patient Temperature 37.0 degrees Celsius    FiO2 6 %   Blood Gas Arterial Full Panel   Result Value Ref Range    POCT pH, Arterial 7.43 (H) 7.38 - 7.42 pH    POCT pCO2, Arterial 35 (L) 38 - 42 mm Hg    POCT pO2, Arterial 74 (L) 85 - 95 mm Hg    POCT SO2, Arterial 96 94 - 100 %    POCT Oxy Hemoglobin, Arterial 93.0 (L) 94.0 - 98.0 %    POCT Hematocrit Calculated, Arterial 31.0 (L) 41.0 - 52.0 %    POCT Sodium, Arterial 135 (L) 136 - 145 mmol/L    POCT Potassium, Arterial 3.5 3.5 - 5.3 mmol/L    POCT Chloride, Arterial 102 98 - 107 mmol/L    POCT Ionized Calcium, Arterial 1.12 1.10 - 1.33 mmol/L    POCT Glucose, Arterial 160 (H) 74 - 99 mg/dL    POCT Lactate, Arterial 2.6 (H) 0.4 - 2.0 mmol/L    POCT Base Excess, Arterial -0.8 -2.0 - 3.0 mmol/L    POCT HCO3 Calculated, Arterial 23.2 22.0 - 26.0 mmol/L    POCT Hemoglobin, Arterial 10.4 (L) 13.5 - 17.5 g/dL    POCT Anion Gap, Arterial 13 10 - 25 mmo/L    Patient Temperature 37.0 degrees Celsius    FiO2 21 %   POCT GLUCOSE   Result Value Ref Range    POCT Glucose 147 (H) 74 - 99 mg/dL   POCT GLUCOSE   Result Value Ref Range    POCT Glucose 130 (H) 74 - 99 mg/dL   POCT GLUCOSE   Result Value Ref Range    POCT Glucose 151 (H) 74 - 99 mg/dL   Blood Gas Arterial Full Panel Unsolicited   Result Value Ref Range    POCT pH, Arterial 7.43 (H) 7.38 - 7.42 pH    POCT pCO2, Arterial 38 38 - 42 mm Hg    POCT pO2, Arterial 98 (H) 85 - 95 mm Hg    POCT SO2, Arterial 100 94 - 100 %    POCT Oxy Hemoglobin, Arterial 96.7 94.0 - 98.0 %    POCT Hematocrit Calculated, Arterial 24.0 (L) 41.0 - 52.0 %    POCT Sodium, Arterial 137 136 - 145 mmol/L    POCT Potassium, Arterial 4.0 3.5 - 5.3 mmol/L    POCT Chloride, Arterial 104 98 - 107 mmol/L    POCT Ionized Calcium, Arterial 1.14 1.10 - 1.33 mmol/L    POCT Glucose, Arterial 152 (H) 74 - 99 mg/dL    POCT Lactate, Arterial  2.1 (H) 0.4 - 2.0 mmol/L    POCT Base Excess, Arterial 0.9 -2.0 - 3.0 mmol/L    POCT HCO3 Calculated, Arterial 25.2 22.0 - 26.0 mmol/L    POCT Hemoglobin, Arterial 7.9 (L) 13.5 - 17.5 g/dL    POCT Anion Gap, Arterial 12 10 - 25 mmo/L    Patient Temperature 37.0 degrees Celsius   POCT GLUCOSE   Result Value Ref Range    POCT Glucose 139 (H) 74 - 99 mg/dL   Type and screen   Result Value Ref Range    ABO TYPE O     Rh TYPE POS     ANTIBODY SCREEN NEG    CBC   Result Value Ref Range    WBC 12.1 (H) 4.4 - 11.3 x10*3/uL    nRBC 0.0 0.0 - 0.0 /100 WBCs    RBC 2.27 (L) 4.50 - 5.90 x10*6/uL    Hemoglobin 7.7 (L) 13.5 - 17.5 g/dL    Hematocrit 22.8 (L) 41.0 - 52.0 %     80 - 100 fL    MCH 33.9 26.0 - 34.0 pg    MCHC 33.8 32.0 - 36.0 g/dL    RDW 15.2 (H) 11.5 - 14.5 %    Platelets 191 150 - 450 x10*3/uL   Coagulation Screen   Result Value Ref Range    Protime 14.3 (H) 9.8 - 12.8 seconds    INR 1.3 (H) 0.9 - 1.1    aPTT 34 27 - 38 seconds   Renal Function Panel   Result Value Ref Range    Glucose 140 (H) 74 - 99 mg/dL    Sodium 140 136 - 145 mmol/L    Potassium 4.1 3.5 - 5.3 mmol/L    Chloride 103 98 - 107 mmol/L    Bicarbonate 25 21 - 32 mmol/L    Anion Gap 16 10 - 20 mmol/L    Urea Nitrogen 41 (H) 6 - 23 mg/dL    Creatinine 1.15 0.50 - 1.30 mg/dL    eGFR 70 >60 mL/min/1.73m*2    Calcium 8.8 8.6 - 10.6 mg/dL    Phosphorus 3.0 2.5 - 4.9 mg/dL    Albumin 3.9 3.4 - 5.0 g/dL   Magnesium   Result Value Ref Range    Magnesium 2.42 (H) 1.60 - 2.40 mg/dL   Calcium, Ionized   Result Value Ref Range    POCT Calcium, Ionized 1.13 1.1 - 1.33 mmol/L   POCT GLUCOSE   Result Value Ref Range    POCT Glucose 156 (H) 74 - 99 mg/dL   Electrocardiogram, 12-lead PRN ACS symptoms   Result Value Ref Range    Ventricular Rate 101 BPM    Atrial Rate 101 BPM    CO Interval 168 ms    QRS Duration 144 ms    QT Interval 444 ms    QTC Calculation(Bazett) 575 ms    P Axis 17 degrees    R Axis -70 degrees    T Axis 54 degrees    QRS Count 17 beats     Q Onset 208 ms    T Offset 430 ms    QTC Fredericia 528 ms      Assessment/Plan     Levy Gregory is a 68 y.o. male presenting to SICU sp laparotomy, lysis of adhesions, gastric pullup, jejunostomy placement with Dr. Gongora 11/29/2023.     His PMHx is significant is significant for COPD, T2DM, GERD, type II achalasia, paraesophageal hernia s/p robotic Laparoscopic Heller myotomy with mary fundoplication on 3/1 with Dr. Hoang complicated by esophageal perforation Leading for a prolonged  hospitalization from 03/2023-5/2023. Course was complicated by a mediastinal abscess and R empyema with polymicrobial including Acinetobacter baumanii (CRAB) and MRSA . He required multiple surgical interventions:   -3/15 Right VATS with Gu   -3/24 IR guided pigtail placement  -3/27 EGD with esophageal stent replacement with Gu   -3/31  EGD, removal of esophageal stent, endovac placement, dobhoff tube placement, PEG tube placement ( Gu and Thoracic)  -4/5 right thoracotomy and esophagectomy with cervical esophagostomy and lap takedown of prior fundoplication, lysis of adhesions, and revision of gastrostomy tube for esophageal perforation ( Thoracic surgery)   - 4/7 PEG replacement and J tube placement ( Thoracic surgery)      His course was further complicated with Afib RVR, CORA required CRRT, Retroperitoneal/Psoas hematoma, upper and lower extremities DVTs sp IVC filter placement 4/20, and acalculous cholecystitis sp percutaneous cholecystostomy tube placement by IR 4/27      He was readmitted in August 2023 with worsening Empyema with cultures showing Pseudomonas, E.coli, Kleb oxytoca, and Saccharomyces cerevisae from right plural fluid, treated with Fluconazole, Levaquin, and Zosyn.  Most recently, he has been having issues with  feeding tube clogging and  then with jejunostomy tube malposition. Most recently had a G and separate J tube replacements (on 10/20/2023). Otherwise he has been doing well. He was brought  back on 11/29/23 for Gastric pull up      Plan:  NEURO: History of diabetic neuropathy and depression .  Acute post-op pain.   - ongoing neuro/pain assessments  - Pain control with PRN  hydromorphone   - Lidoderm patches surrounding surgical incisions  - PT/OT consult  - Home meds: Zoloft 75 mg daily, Atarax 25 mg BID, Gabapentin 300 mg TID, thiamone 100 mg daily      CV: History of HLD , Hx of Afib . Most recent Echo ( 08/2023) with suboptimal quality . Pervious Echo ( 04/2023): normal LV (EF 70-75%) & RV.   Arrived to SICU on phenylphrine infusion 0.5 mcg/kg/min ( sp 500 ml LR and 500 ml Albumin post op ). Phenylphrine infusion increased to 1mcg/kg/min overnight, received 500 ml LR x 2  Baseline SBP 90's-100's  -start Midodrine 15 mg q 8  - sp 1 uRBC , awaiting a second unit  -Phenylphrine weaned to 0.3  mcg/kg/min, continue weaning for MAP goal > 65. Ideally we should avoid pressors  - continuous EKG/ABP monitoring  - goal map range 65-90  - volume resuscitate as clinically indicated  - start Metoprolol 5mg IV q6h for arrhythmia prophylaxis when blood pressure allows  - Home meds: Eliquis 2.5 mg BID, Lipitor 10 mg daily, Midodrine 5 mg TID, Aldactone 12.5 mg QOD      PULM: History of COPD. 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 Most recent Empyema was in August 2023. Currently on 3 LNC   - Wean NC  as tolerated.    - Q1h incentive spirometry while awake  - F/U post op CXR  - Avoid positive pressure ventilation and NT suctioning as able        GI: Hx Paraesophageal hernia s/p robotic Laparoscopic Heller myotomy with mary fundoplication on 3/1 with Dr. Hoang complicated by esophageal perforation leading to multiple intervention as outlined above  and eventually a right thoracotomy and esophagectomy with cervical esophagostomy and lap takedown of prior fundoplication on 4/5.  He is now sp laparotomy, lysis of adhesions, gastric pullup, jejunostomy placement 11/29  Elevated Alk  phos in the 1000's range of unclear etiology. Trending down  - Maintain strict NPO  - PPI for GI prophylaxis  - Maintain HOB up at least 30 degress at all times secondary to high risk of aspiration  - Send daily amylase levels from MIGUELINA drain starting POD 1  - NG to LIWS  - Do not replace or reposition NG tube if it becomes dislodged, call thoracic surgery  -Follow daily LFTs     : Baseline sCr ~ 0.9. Oliguric CORA March 2023, required CRRT, recovered and returted to baseline . Urinary retention, has a chronic aguilera . Changed on arrival to SICU 11/29  - Check renal function panel post daily and PRN  - Maintenance IVF, LR @125 ml/hr  - Maintain U/O >0.5-1ml/kg/hr  - Replete electrolytes to goal K>4, Mg>2, Phos>2.5, ionized Ca>1.10     HEME: Acute blood loss anemia. Hemoglobin down to 7.7 from 9.6.    Victoriano UE DVT and LLE DVT diagnosed March 2023, On Eliquis   -Transfuse 2 uRBC and recheck CBC  - Check CBC and coags  daily and PRN  - Ongoing monitoring for s/s bleeding  -Hold SQH for now. Resume of H/H stable  -Home Meds: Eliquis   -Repeat US upper and lower extremities to FU on previous known DVTs     ENDO: IDDM2. 9/25/23 A1C 4.7  - Q4h BG   - SSI Lispro per ICU protocol     ID:  Hx mediastinal abscess and R empyema with polymicrobial including Acinetobacter baumanii (CRAB) and MRSA ( 04/2023)  Recurrent Empyema with cultures growing  cultures showing Pseudomonas, E.coli and Kleb oxytoca, and Saccharomyces cerevisae ( 08/2023)     - trend temp q4, wbc daily  - Finished perioperative Cefazolin  - ongoing monitoring for s/s infection  -Keep on contact isolation given Hx Acinetobacter baumanii (CRAB)     Lines:  -Piccline --> unclear when placed. Discuss with thoracic team removal  - PIV  -11/29 A line     Dispo: Continue SICU care. Patient seen and discussed with ICU attending Ramiro      Critical Care time : 60 min      Vishnu White PA-C   Team phone [phone]

## 2023-11-30 NOTE — CARE PLAN
Problem: Pain  Goal: My pain/discomfort is manageable  11/30/2023 0143 by Rachael Huber RN  Outcome: Progressing  11/30/2023 0142 by Rachael Huber RN  Outcome: Progressing     Problem: Safety  Goal: Patient will be injury free during hospitalization  11/30/2023 0143 by Rachael Huber RN  Outcome: Progressing  11/30/2023 0142 by Rachael Huber RN  Outcome: Progressing  Goal: I will remain free of falls  11/30/2023 0143 by Rachael Huber RN  Outcome: Progressing  11/30/2023 0142 by Rachael Huber RN  Outcome: Progressing     Problem: Daily Care  Goal: Daily care needs are met  11/30/2023 0143 by Rachael Huber RN  Outcome: Progressing  11/30/2023 0142 by Rachael Huber RN  Outcome: Progressing     Problem: Psychosocial Needs  Goal: Demonstrates ability to cope with hospitalization/illness  11/30/2023 0143 by Rachael Huber RN  Outcome: Progressing  11/30/2023 0142 by Rachael Huber RN  Outcome: Progressing  Goal: Collaborate with me, my family, and caregiver to identify my specific goals  11/30/2023 0143 by Rachael Huber RN  Outcome: Progressing  11/30/2023 0142 by Rachael Huber RN  Outcome: Progressing     Problem: Discharge Barriers  Goal: My discharge needs are met  11/30/2023 0143 by Rachael Huber RN  Outcome: Progressing  11/30/2023 0142 by Rachael Huber RN  Outcome: Progressing

## 2023-11-30 NOTE — PROGRESS NOTES
"Levy Gregory is a 68 y.o. male on day 1 of admission presenting with Esophageal perforation.    Subjective   MARK overnight, still on mercedes drip this AM       Objective         Last Recorded Vitals  Blood pressure 98/66, pulse 96, temperature 36.7 °C (98.1 °F), temperature source Temporal, resp. rate 20, height 1.85 m (6' 0.84\"), weight 86 kg (189 lb 9.5 oz), SpO2 98 %.  Intake/Output last 3 Shifts:  I/O last 3 completed shifts:  In: 5370.5 (62.4 mL/kg) [I.V.:2320.5 (27 mL/kg); IV Piggyback:3050]  Out: 2465 (28.7 mL/kg) [Urine:1685 (0.5 mL/kg/hr); Emesis/NG output:350; Drains:180; Blood:250]  Weight: 86 kg     Relevant Results    This patient has a central line   Reason for the central line remaining today? Line unnecessary, will be removed today      Assessment/Plan   Principal Problem:    Esophageal perforation  Active Problems:    Anticoagulant long-term use    Elevated liver function tests    68 y.o. with PMHx of COPD, T2DM, GERD, type II achalasia. Patient has a history of paraesophageal hernia s/p robotic laparoscopic Heller myotomy with fundoplication on 3/1 with Dr. Hoang that was complicated by esophageal perforation leading for a prolonged hospitalization from 03/2023-5/2023. His course was complicated by a mediastinal abscess and R empyema with polymicrobial including Acinetobacter baumanii (CRAB) and MRSA requiring multiple surgical interventions including a VATS, multiple esophageal stents and ultimately a right thoracotomy and esophagectomy with lap takedown of prior fundoplication, lysis of adhesions, and revision of gastrostomy tube for esophageal perforation in april 2023.  His course was further complicated with Afib RVR, CORA required CRRT, Retroperitoneal/Psoas hematoma, upper and lower extremities DVTs sp IVC filter placement 4/20, and acalculous cholecystitis sp percutaneous cholecystostomy tube placement by IR 4/27.    Of note, he was readmit in August 2023 with worsening Empyema with " cultures growing  cultures showing Pseudomonas, E.coli and Kleb oxytoca, and Saccharomyces cerevisae from right plural fluid, treated with Fluconazole, Levaquin, and Zosyn. Now POD1 of laparotomy, lysis of adhesions, gastric pullup, and jejunostomy placement with Dr. Gongora.      Plan:  Neuro: PRN dilaudid for pain control, holding home Zoloft, atarax, gabapentin and thiamine as still NPO, PT/OT and OOB today   CV: remains on 0.5 of phenylephrine, attempting to wean as able, goal MAPs of 60-65. Sp 1.5L of lr and 500cc of albumin postp, lactate normal.  Echo in april 2023 showed EF of 75%. Resume home midodrine at 15 TID, holding home eliquis. Start prophylactic metop once of mercedes and able   Pulm: on 3L NC, wean as able, avoid NT suctioning, avoid positive pressure  GI: strict NPO,advance diet per surgical recs PPI, head of the bed 30deg, NG to suction   : maintenance fluids at 125cc/h, maintain aguilera for hx of urinary retention    Heme: Hb down to 7.7 this AM ,transfuse 2PRBC, SYLVESTER on hold for now, eliquis on hold. DVT study to evaluate for DVT and need to continue eliquis   Endo: SSI  ID: periop ancef for now, has completed previous course of antibiotics   Lines:dc piccline, PIVs, vivien, aguilera  Dispo: Continue ICU care for now      I have discussed the case with the resident/mid level provider. I have personally performed a history, physical exam, and my own medical decision making. I have reviewed the note and agree with the findings and plan with the following exceptions as identified below  I have personally provided 40 minutes of critical care time exclusive of time spent on separately billable procedures. Time includes review of laboratory data, radiology results, discussion with consultants, and monitoring for potential decompensation. Interventions were performed as documented below.       I spent 40 minutes in the professional and overall care of this patient.      Goldie Rubio MD

## 2023-12-01 ENCOUNTER — APPOINTMENT (OUTPATIENT)
Dept: RADIOLOGY | Facility: HOSPITAL | Age: 68
DRG: 326 | End: 2023-12-01
Payer: MEDICARE

## 2023-12-01 ENCOUNTER — PATIENT OUTREACH (OUTPATIENT)
Dept: CARE COORDINATION | Facility: CLINIC | Age: 68
End: 2023-12-01
Payer: MEDICARE

## 2023-12-01 LAB
ALBUMIN SERPL BCP-MCNC: 3.5 G/DL (ref 3.4–5)
ALBUMIN SERPL BCP-MCNC: 3.6 G/DL (ref 3.4–5)
ALBUMIN SERPL BCP-MCNC: 3.6 G/DL (ref 3.4–5)
ALP SERPL-CCNC: 207 U/L (ref 33–136)
ALT SERPL W P-5'-P-CCNC: 54 U/L (ref 10–52)
AMYLASE FLD-CCNC: 24 U/L
ANION GAP BLDA CALCULATED.4IONS-SCNC: 7 MMO/L (ref 10–25)
ANION GAP SERPL CALC-SCNC: 14 MMOL/L (ref 10–20)
ANION GAP SERPL CALC-SCNC: 15 MMOL/L (ref 10–20)
APTT PPP: 32 SECONDS (ref 27–38)
APTT PPP: 32 SECONDS (ref 27–38)
AST SERPL W P-5'-P-CCNC: 69 U/L (ref 9–39)
ATRIAL RATE: 101 BPM
BASE EXCESS BLDA CALC-SCNC: 2.2 MMOL/L (ref -2–3)
BILIRUB DIRECT SERPL-MCNC: 0.4 MG/DL (ref 0–0.3)
BILIRUB SERPL-MCNC: 1.2 MG/DL (ref 0–1.2)
BODY TEMPERATURE: 37 DEGREES CELSIUS
BUN SERPL-MCNC: 27 MG/DL (ref 6–23)
BUN SERPL-MCNC: 28 MG/DL (ref 6–23)
CA-I BLD-SCNC: 1.21 MMOL/L (ref 1.1–1.33)
CA-I BLD-SCNC: 1.21 MMOL/L (ref 1.1–1.33)
CA-I BLDA-SCNC: 1.23 MMOL/L (ref 1.1–1.33)
CALCIUM SERPL-MCNC: 8.9 MG/DL (ref 8.6–10.6)
CALCIUM SERPL-MCNC: 8.9 MG/DL (ref 8.6–10.6)
CHLORIDE BLDA-SCNC: 108 MMOL/L (ref 98–107)
CHLORIDE SERPL-SCNC: 105 MMOL/L (ref 98–107)
CHLORIDE SERPL-SCNC: 105 MMOL/L (ref 98–107)
CO2 SERPL-SCNC: 26 MMOL/L (ref 21–32)
CO2 SERPL-SCNC: 26 MMOL/L (ref 21–32)
CREAT SERPL-MCNC: 1 MG/DL (ref 0.5–1.3)
CREAT SERPL-MCNC: 1.01 MG/DL (ref 0.5–1.3)
ERYTHROCYTE [DISTWIDTH] IN BLOOD BY AUTOMATED COUNT: 18.1 % (ref 11.5–14.5)
ERYTHROCYTE [DISTWIDTH] IN BLOOD BY AUTOMATED COUNT: 18.5 % (ref 11.5–14.5)
GFR SERPL CREATININE-BSD FRML MDRD: 82 ML/MIN/1.73M*2
GFR SERPL CREATININE-BSD FRML MDRD: 82 ML/MIN/1.73M*2
GLUCOSE BLD MANUAL STRIP-MCNC: 103 MG/DL (ref 74–99)
GLUCOSE BLD MANUAL STRIP-MCNC: 106 MG/DL (ref 74–99)
GLUCOSE BLD MANUAL STRIP-MCNC: 113 MG/DL (ref 74–99)
GLUCOSE BLD MANUAL STRIP-MCNC: 125 MG/DL (ref 74–99)
GLUCOSE BLD MANUAL STRIP-MCNC: 126 MG/DL (ref 74–99)
GLUCOSE BLD MANUAL STRIP-MCNC: 133 MG/DL (ref 74–99)
GLUCOSE BLDA-MCNC: 120 MG/DL (ref 74–99)
GLUCOSE SERPL-MCNC: 106 MG/DL (ref 74–99)
GLUCOSE SERPL-MCNC: 112 MG/DL (ref 74–99)
HCO3 BLDA-SCNC: 26.5 MMOL/L (ref 22–26)
HCT VFR BLD AUTO: 24.2 % (ref 41–52)
HCT VFR BLD AUTO: 24.9 % (ref 41–52)
HCT VFR BLD EST: 26 % (ref 41–52)
HGB BLD-MCNC: 8.2 G/DL (ref 13.5–17.5)
HGB BLD-MCNC: 8.6 G/DL (ref 13.5–17.5)
HGB BLDA-MCNC: 8.8 G/DL (ref 13.5–17.5)
INHALED O2 CONCENTRATION: 40 %
INR PPP: 1.3 (ref 0.9–1.1)
INR PPP: 1.3 (ref 0.9–1.1)
LACTATE BLDA-SCNC: 0.9 MMOL/L (ref 0.4–2)
MAGNESIUM SERPL-MCNC: 2.12 MG/DL (ref 1.6–2.4)
MAGNESIUM SERPL-MCNC: 2.23 MG/DL (ref 1.6–2.4)
MCH RBC QN AUTO: 32.3 PG (ref 26–34)
MCH RBC QN AUTO: 33.1 PG (ref 26–34)
MCHC RBC AUTO-ENTMCNC: 33.9 G/DL (ref 32–36)
MCHC RBC AUTO-ENTMCNC: 34.5 G/DL (ref 32–36)
MCV RBC AUTO: 94 FL (ref 80–100)
MCV RBC AUTO: 98 FL (ref 80–100)
NRBC BLD-RTO: 0 /100 WBCS (ref 0–0)
NRBC BLD-RTO: 0 /100 WBCS (ref 0–0)
OXYHGB MFR BLDA: 96.3 % (ref 94–98)
P AXIS: 17 DEGREES
PCO2 BLDA: 39 MM HG (ref 38–42)
PH BLDA: 7.44 PH (ref 7.38–7.42)
PHOSPHATE SERPL-MCNC: 2 MG/DL (ref 2.5–4.9)
PHOSPHATE SERPL-MCNC: 2.6 MG/DL (ref 2.5–4.9)
PLATELET # BLD AUTO: 156 X10*3/UL (ref 150–450)
PLATELET # BLD AUTO: 163 X10*3/UL (ref 150–450)
PO2 BLDA: 86 MM HG (ref 85–95)
POTASSIUM BLDA-SCNC: 3.4 MMOL/L (ref 3.5–5.3)
POTASSIUM SERPL-SCNC: 3.6 MMOL/L (ref 3.5–5.3)
POTASSIUM SERPL-SCNC: 4 MMOL/L (ref 3.5–5.3)
PR INTERVAL: 168 MS
PROT SERPL-MCNC: 5.4 G/DL (ref 6.4–8.2)
PROTHROMBIN TIME: 14.5 SECONDS (ref 9.8–12.8)
PROTHROMBIN TIME: 14.7 SECONDS (ref 9.8–12.8)
Q ONSET: 208 MS
QRS COUNT: 17 BEATS
QRS DURATION: 144 MS
QT INTERVAL: 444 MS
QTC CALCULATION(BAZETT): 575 MS
QTC FREDERICIA: 528 MS
R AXIS: -70 DEGREES
RBC # BLD AUTO: 2.48 X10*6/UL (ref 4.5–5.9)
RBC # BLD AUTO: 2.66 X10*6/UL (ref 4.5–5.9)
SAO2 % BLDA: 99 % (ref 94–100)
SODIUM BLDA-SCNC: 138 MMOL/L (ref 136–145)
SODIUM SERPL-SCNC: 141 MMOL/L (ref 136–145)
SODIUM SERPL-SCNC: 142 MMOL/L (ref 136–145)
T AXIS: 54 DEGREES
T OFFSET: 430 MS
VENTRICULAR RATE: 101 BPM
WBC # BLD AUTO: 10.2 X10*3/UL (ref 4.4–11.3)
WBC # BLD AUTO: 10.9 X10*3/UL (ref 4.4–11.3)

## 2023-12-01 PROCEDURE — 2020000001 HC ICU ROOM DAILY

## 2023-12-01 PROCEDURE — 71045 X-RAY EXAM CHEST 1 VIEW: CPT

## 2023-12-01 PROCEDURE — 2500000004 HC RX 250 GENERAL PHARMACY W/ HCPCS (ALT 636 FOR OP/ED): Performed by: STUDENT IN AN ORGANIZED HEALTH CARE EDUCATION/TRAINING PROGRAM

## 2023-12-01 PROCEDURE — 2500000004 HC RX 250 GENERAL PHARMACY W/ HCPCS (ALT 636 FOR OP/ED): Performed by: NURSE PRACTITIONER

## 2023-12-01 PROCEDURE — 2500000001 HC RX 250 WO HCPCS SELF ADMINISTERED DRUGS (ALT 637 FOR MEDICARE OP): Performed by: NURSE PRACTITIONER

## 2023-12-01 PROCEDURE — 99291 CRITICAL CARE FIRST HOUR: CPT | Performed by: ANESTHESIOLOGY

## 2023-12-01 PROCEDURE — 2500000001 HC RX 250 WO HCPCS SELF ADMINISTERED DRUGS (ALT 637 FOR MEDICARE OP): Performed by: ANESTHESIOLOGY

## 2023-12-01 PROCEDURE — 85610 PROTHROMBIN TIME: CPT | Performed by: PHYSICIAN ASSISTANT

## 2023-12-01 PROCEDURE — 2500000005 HC RX 250 GENERAL PHARMACY W/O HCPCS: Performed by: NURSE PRACTITIONER

## 2023-12-01 PROCEDURE — 2500000004 HC RX 250 GENERAL PHARMACY W/ HCPCS (ALT 636 FOR OP/ED): Performed by: PHYSICIAN ASSISTANT

## 2023-12-01 PROCEDURE — 83735 ASSAY OF MAGNESIUM: CPT | Performed by: PHYSICIAN ASSISTANT

## 2023-12-01 PROCEDURE — 2500000004 HC RX 250 GENERAL PHARMACY W/ HCPCS (ALT 636 FOR OP/ED): Mod: JZ

## 2023-12-01 PROCEDURE — 71045 X-RAY EXAM CHEST 1 VIEW: CPT | Performed by: RADIOLOGY

## 2023-12-01 PROCEDURE — 94660 CPAP INITIATION&MGMT: CPT

## 2023-12-01 PROCEDURE — 2500000001 HC RX 250 WO HCPCS SELF ADMINISTERED DRUGS (ALT 637 FOR MEDICARE OP)

## 2023-12-01 PROCEDURE — P9045 ALBUMIN (HUMAN), 5%, 250 ML: HCPCS | Mod: JZ

## 2023-12-01 PROCEDURE — 2500000005 HC RX 250 GENERAL PHARMACY W/O HCPCS

## 2023-12-01 PROCEDURE — 82805 BLOOD GASES W/O2 SATURATION: CPT | Performed by: PHYSICIAN ASSISTANT

## 2023-12-01 PROCEDURE — 82150 ASSAY OF AMYLASE: CPT | Performed by: STUDENT IN AN ORGANIZED HEALTH CARE EDUCATION/TRAINING PROGRAM

## 2023-12-01 PROCEDURE — P9047 ALBUMIN (HUMAN), 25%, 50ML: HCPCS | Mod: JZ | Performed by: NURSE PRACTITIONER

## 2023-12-01 PROCEDURE — 85027 COMPLETE CBC AUTOMATED: CPT | Performed by: PHYSICIAN ASSISTANT

## 2023-12-01 PROCEDURE — C9113 INJ PANTOPRAZOLE SODIUM, VIA: HCPCS | Performed by: PHYSICIAN ASSISTANT

## 2023-12-01 PROCEDURE — 82947 ASSAY GLUCOSE BLOOD QUANT: CPT

## 2023-12-01 PROCEDURE — 84100 ASSAY OF PHOSPHORUS: CPT | Performed by: PHYSICIAN ASSISTANT

## 2023-12-01 PROCEDURE — 99291 CRITICAL CARE FIRST HOUR: CPT | Performed by: NURSE PRACTITIONER

## 2023-12-01 PROCEDURE — 94640 AIRWAY INHALATION TREATMENT: CPT

## 2023-12-01 PROCEDURE — 82040 ASSAY OF SERUM ALBUMIN: CPT | Performed by: NURSE PRACTITIONER

## 2023-12-01 PROCEDURE — 99291 CRITICAL CARE FIRST HOUR: CPT | Performed by: STUDENT IN AN ORGANIZED HEALTH CARE EDUCATION/TRAINING PROGRAM

## 2023-12-01 PROCEDURE — 37799 UNLISTED PX VASCULAR SURGERY: CPT | Performed by: PHYSICIAN ASSISTANT

## 2023-12-01 PROCEDURE — 2500000002 HC RX 250 W HCPCS SELF ADMINISTERED DRUGS (ALT 637 FOR MEDICARE OP, ALT 636 FOR OP/ED): Performed by: NURSE PRACTITIONER

## 2023-12-01 PROCEDURE — 82330 ASSAY OF CALCIUM: CPT | Performed by: PHYSICIAN ASSISTANT

## 2023-12-01 PROCEDURE — 2500000004 HC RX 250 GENERAL PHARMACY W/ HCPCS (ALT 636 FOR OP/ED)

## 2023-12-01 PROCEDURE — 96372 THER/PROPH/DIAG INJ SC/IM: CPT | Performed by: PHYSICIAN ASSISTANT

## 2023-12-01 RX ORDER — OXYCODONE HCL 5 MG/5 ML
5 SOLUTION, ORAL ORAL EVERY 4 HOURS PRN
Status: DISCONTINUED | OUTPATIENT
Start: 2023-12-01 | End: 2023-12-03

## 2023-12-01 RX ORDER — SODIUM CHLORIDE FOR INHALATION 3 %
3 VIAL, NEBULIZER (ML) INHALATION EVERY 6 HOURS SCHEDULED
Status: DISCONTINUED | OUTPATIENT
Start: 2023-12-01 | End: 2023-12-02

## 2023-12-01 RX ORDER — KETOROLAC TROMETHAMINE 15 MG/ML
15 INJECTION, SOLUTION INTRAMUSCULAR; INTRAVENOUS EVERY 6 HOURS
Status: DISCONTINUED | OUTPATIENT
Start: 2023-12-01 | End: 2023-12-03

## 2023-12-01 RX ORDER — PHENYLEPHRINE 10 MG/250 ML(40 MCG/ML)IN 0.9 % SOD.CHLORIDE INTRAVENOUS
.1-2 CONTINUOUS
Status: DISCONTINUED | OUTPATIENT
Start: 2023-12-01 | End: 2023-12-03

## 2023-12-01 RX ORDER — LIDOCAINE 560 MG/1
1 PATCH PERCUTANEOUS; TOPICAL; TRANSDERMAL DAILY
Status: DISCONTINUED | OUTPATIENT
Start: 2023-12-01 | End: 2023-12-03

## 2023-12-01 RX ORDER — ALBUMIN HUMAN 50 G/1000ML
12.5 SOLUTION INTRAVENOUS ONCE
Status: COMPLETED | OUTPATIENT
Start: 2023-12-01 | End: 2023-12-01

## 2023-12-01 RX ORDER — METHOCARBAMOL 100 MG/ML
1000 INJECTION, SOLUTION INTRAMUSCULAR; INTRAVENOUS EVERY 8 HOURS
Status: COMPLETED | OUTPATIENT
Start: 2023-12-01 | End: 2023-12-02

## 2023-12-01 RX ORDER — THIAMINE HYDROCHLORIDE 100 MG/ML
100 INJECTION, SOLUTION INTRAMUSCULAR; INTRAVENOUS DAILY
Status: DISCONTINUED | OUTPATIENT
Start: 2023-12-01 | End: 2023-12-03

## 2023-12-01 RX ORDER — OXYCODONE HCL 5 MG/5 ML
10 SOLUTION, ORAL ORAL EVERY 4 HOURS PRN
Status: DISCONTINUED | OUTPATIENT
Start: 2023-12-01 | End: 2023-12-03

## 2023-12-01 RX ORDER — ALBUMIN HUMAN 50 G/1000ML
SOLUTION INTRAVENOUS
Status: COMPLETED
Start: 2023-12-01 | End: 2023-12-01

## 2023-12-01 RX ORDER — GUAIFENESIN 100 MG/5ML
200 SOLUTION ORAL EVERY 4 HOURS PRN
Status: DISCONTINUED | OUTPATIENT
Start: 2023-12-01 | End: 2023-12-01

## 2023-12-01 RX ORDER — GABAPENTIN 250 MG/5ML
300 SOLUTION ORAL EVERY 8 HOURS SCHEDULED
Status: DISCONTINUED | OUTPATIENT
Start: 2023-12-01 | End: 2023-12-03

## 2023-12-01 RX ORDER — GUAIFENESIN 100 MG/5ML
200 SOLUTION ORAL EVERY 6 HOURS
Status: DISCONTINUED | OUTPATIENT
Start: 2023-12-01 | End: 2023-12-03

## 2023-12-01 RX ORDER — HYDROMORPHONE HYDROCHLORIDE 1 MG/ML
0.2 INJECTION, SOLUTION INTRAMUSCULAR; INTRAVENOUS; SUBCUTANEOUS ONCE
Status: COMPLETED | OUTPATIENT
Start: 2023-12-01 | End: 2023-12-01

## 2023-12-01 RX ORDER — FUROSEMIDE 10 MG/ML
20 INJECTION INTRAMUSCULAR; INTRAVENOUS ONCE
Status: COMPLETED | OUTPATIENT
Start: 2023-12-01 | End: 2023-12-01

## 2023-12-01 RX ORDER — MAGNESIUM SULFATE 1 G/100ML
1 INJECTION INTRAVENOUS EVERY 8 HOURS
Status: COMPLETED | OUTPATIENT
Start: 2023-12-01 | End: 2023-12-02

## 2023-12-01 RX ORDER — ALBUTEROL SULFATE 0.83 MG/ML
2.5 SOLUTION RESPIRATORY (INHALATION) EVERY 6 HOURS
Status: DISCONTINUED | OUTPATIENT
Start: 2023-12-01 | End: 2023-12-02

## 2023-12-01 RX ORDER — HYDROMORPHONE HYDROCHLORIDE 1 MG/ML
0.2 INJECTION, SOLUTION INTRAMUSCULAR; INTRAVENOUS; SUBCUTANEOUS EVERY 4 HOURS PRN
Status: DISCONTINUED | OUTPATIENT
Start: 2023-12-01 | End: 2023-12-03

## 2023-12-01 RX ORDER — ACETAMINOPHEN 160 MG/5ML
650 SOLUTION ORAL EVERY 6 HOURS
Status: DISCONTINUED | OUTPATIENT
Start: 2023-12-01 | End: 2023-12-03

## 2023-12-01 RX ORDER — ALBUMIN HUMAN 250 G/1000ML
25 SOLUTION INTRAVENOUS EVERY 6 HOURS
Status: COMPLETED | OUTPATIENT
Start: 2023-12-01 | End: 2023-12-02

## 2023-12-01 RX ADMIN — OXYCODONE HYDROCHLORIDE 10 MG: 5 SOLUTION ORAL at 12:13

## 2023-12-01 RX ADMIN — GABAPENTIN 300 MG: 250 SOLUTION ORAL at 13:38

## 2023-12-01 RX ADMIN — METHOCARBAMOL 1000 MG: 100 INJECTION INTRAMUSCULAR; INTRAVENOUS at 09:04

## 2023-12-01 RX ADMIN — MAGNESIUM SULFATE HEPTAHYDRATE 1 G: 1 INJECTION, SOLUTION INTRAVENOUS at 17:20

## 2023-12-01 RX ADMIN — ALBUMIN HUMAN 12.5 G: 50 SOLUTION INTRAVENOUS at 14:00

## 2023-12-01 RX ADMIN — ALBUMIN HUMAN 25 G: 0.25 SOLUTION INTRAVENOUS at 17:20

## 2023-12-01 RX ADMIN — ACETAMINOPHEN 650 MG: 650 SOLUTION ORAL at 10:00

## 2023-12-01 RX ADMIN — HYDROMORPHONE HYDROCHLORIDE 0.4 MG: 1 INJECTION, SOLUTION INTRAMUSCULAR; INTRAVENOUS; SUBCUTANEOUS at 03:22

## 2023-12-01 RX ADMIN — ALBUTEROL SULFATE 2.5 MG: 2.5 SOLUTION RESPIRATORY (INHALATION) at 19:47

## 2023-12-01 RX ADMIN — ALBUMIN (HUMAN) 12.5 G: 12.5 SOLUTION INTRAVENOUS at 14:00

## 2023-12-01 RX ADMIN — GUAIFENESIN 200 MG: 200 SOLUTION ORAL at 21:27

## 2023-12-01 RX ADMIN — ACETAMINOPHEN 650 MG: 650 SOLUTION ORAL at 14:45

## 2023-12-01 RX ADMIN — HEPARIN SODIUM 5000 UNITS: 5000 INJECTION INTRAVENOUS; SUBCUTANEOUS at 12:13

## 2023-12-01 RX ADMIN — SODIUM CHLORIDE, POTASSIUM CHLORIDE, SODIUM LACTATE AND CALCIUM CHLORIDE 500 ML: 600; 310; 30; 20 INJECTION, SOLUTION INTRAVENOUS at 03:06

## 2023-12-01 RX ADMIN — HYDROMORPHONE HYDROCHLORIDE 0.2 MG: 1 INJECTION, SOLUTION INTRAMUSCULAR; INTRAVENOUS; SUBCUTANEOUS at 05:18

## 2023-12-01 RX ADMIN — ALBUMIN HUMAN 25 G: 0.25 SOLUTION INTRAVENOUS at 21:27

## 2023-12-01 RX ADMIN — PANTOPRAZOLE SODIUM 40 MG: 40 INJECTION, POWDER, FOR SOLUTION INTRAVENOUS at 08:02

## 2023-12-01 RX ADMIN — HEPARIN SODIUM 5000 UNITS: 5000 INJECTION INTRAVENOUS; SUBCUTANEOUS at 21:27

## 2023-12-01 RX ADMIN — LIDOCAINE 1 PATCH: 4 PATCH TOPICAL at 09:04

## 2023-12-01 RX ADMIN — ALBUTEROL SULFATE 2.5 MG: 2.5 SOLUTION RESPIRATORY (INHALATION) at 11:21

## 2023-12-01 RX ADMIN — ALBUMIN HUMAN 12.5 G: 50 SOLUTION INTRAVENOUS at 14:45

## 2023-12-01 RX ADMIN — GUAIFENESIN 200 MG: 200 SOLUTION ORAL at 09:04

## 2023-12-01 RX ADMIN — MIDODRINE HYDROCHLORIDE 15 MG: 10 TABLET ORAL at 08:02

## 2023-12-01 RX ADMIN — Medication: at 08:30

## 2023-12-01 RX ADMIN — KETOROLAC TROMETHAMINE 15 MG: 15 INJECTION, SOLUTION INTRAMUSCULAR; INTRAVENOUS at 21:28

## 2023-12-01 RX ADMIN — SERTRALINE 75 MG: 50 TABLET, FILM COATED ORAL at 11:01

## 2023-12-01 RX ADMIN — FUROSEMIDE 20 MG: 10 INJECTION, SOLUTION INTRAVENOUS at 06:31

## 2023-12-01 RX ADMIN — ALBUMIN HUMAN 12.5 G: 0.05 INJECTION, SOLUTION INTRAVENOUS at 14:45

## 2023-12-01 RX ADMIN — SODIUM CHLORIDE 30 MG/ML INHALATION SOLUTION 3 ML: 30 SOLUTION INHALANT at 11:22

## 2023-12-01 RX ADMIN — KETOROLAC TROMETHAMINE 15 MG: 15 INJECTION, SOLUTION INTRAMUSCULAR; INTRAVENOUS at 14:48

## 2023-12-01 RX ADMIN — ACETAMINOPHEN 650 MG: 650 SOLUTION ORAL at 21:28

## 2023-12-01 RX ADMIN — SODIUM CHLORIDE 30 MG/ML INHALATION SOLUTION 3 ML: 30 SOLUTION INHALANT at 19:48

## 2023-12-01 RX ADMIN — HEPARIN SODIUM 5000 UNITS: 5000 INJECTION INTRAVENOUS; SUBCUTANEOUS at 04:53

## 2023-12-01 RX ADMIN — POTASSIUM CHLORIDE 40 MEQ: 29.8 INJECTION, SOLUTION INTRAVENOUS at 13:39

## 2023-12-01 RX ADMIN — KETOROLAC TROMETHAMINE 15 MG: 15 INJECTION, SOLUTION INTRAMUSCULAR; INTRAVENOUS at 10:00

## 2023-12-01 RX ADMIN — GABAPENTIN 300 MG: 250 SOLUTION ORAL at 21:27

## 2023-12-01 RX ADMIN — GUAIFENESIN 200 MG: 200 SOLUTION ORAL at 13:38

## 2023-12-01 RX ADMIN — SODIUM PHOSPHATE, MONOBASIC, MONOHYDRATE AND SODIUM PHOSPHATE, DIBASIC, ANHYDROUS 21 MMOL: 142; 276 INJECTION, SOLUTION INTRAVENOUS at 11:01

## 2023-12-01 RX ADMIN — METHOCARBAMOL 1000 MG: 100 INJECTION INTRAMUSCULAR; INTRAVENOUS at 16:25

## 2023-12-01 RX ADMIN — Medication 0.5 MCG/KG/MIN: at 14:45

## 2023-12-01 RX ADMIN — MIDODRINE HYDROCHLORIDE 15 MG: 10 TABLET ORAL at 14:48

## 2023-12-01 RX ADMIN — MAGNESIUM SULFATE HEPTAHYDRATE 1 G: 1 INJECTION, SOLUTION INTRAVENOUS at 11:01

## 2023-12-01 RX ADMIN — THIAMINE HYDROCHLORIDE 100 MG: 100 INJECTION, SOLUTION INTRAMUSCULAR; INTRAVENOUS at 13:38

## 2023-12-01 ASSESSMENT — PAIN SCALES - GENERAL
PAINLEVEL_OUTOF10: 2
PAINLEVEL_OUTOF10: 3
PAINLEVEL_OUTOF10: 7
PAINLEVEL_OUTOF10: 6
PAINLEVEL_OUTOF10: 4
PAINLEVEL_OUTOF10: 3
PAINLEVEL_OUTOF10: 10 - WORST POSSIBLE PAIN
PAINLEVEL_OUTOF10: 0 - NO PAIN
PAINLEVEL_OUTOF10: 2

## 2023-12-01 ASSESSMENT — PAIN - FUNCTIONAL ASSESSMENT
PAIN_FUNCTIONAL_ASSESSMENT: 0-10

## 2023-12-01 NOTE — CONSULTS
"Nutrition Initial Assessment:   Nutrition Assessment    Reason for Assessment: Provider consult order, Tube feeding recommendations    Patient is a 68 y.o. male presenting to SICU sp laparotomy, lysis of adhesions, gastric pullup, jejunostomy placement with Dr. Gongora 11/29/2023. Most recently, he has been having issues with  feeding tube clogging and  then with jejunostomy tube malposition. Most recently had a G and separate J tube replacements (on 10/20/2023). MD noted that the pt denies any issues wit his tube feeds. He is at high risk for aspiration.     Nutrition History:  Unable to obtain direct nutrition history from pt as he was sleeping at both times of RD attempted visit. There was a nurse at bedside both times. She states does not know of his feeding regimen prior to admission. Unable to determine nutrition history at this time.     Anthropometrics:  Height: 185 cm (6' 0.84\")   Weight: 86 kg (189 lb 9.5 oz)   BMI (Calculated): 25.13  IBW/kg (Dietitian Calculated): 83.6 kg  Percent of IBW: 103 %       Weight History:   Wt Readings from Last 15 Encounters:   11/29/23 86 kg (189 lb 9.5 oz)   11/21/23 84.4 kg (186 lb)   11/10/23 82.7 kg (182 lb 6.4 oz)   10/26/23 80.2 kg (176 lb 11.2 oz)   10/20/23 78.5 kg (173 lb 1 oz)   10/04/23 99.8 kg (220 lb 0.3 oz)   09/30/23 90.7 kg (200 lb)   07/31/23 90.7 kg (200 lb)   07/24/23 88.5 kg (195 lb)   07/20/23 86.7 kg (191 lb 3.2 oz)   07/19/23 86.6 kg (191 lb)   07/13/23 86.7 kg (191 lb 3.2 oz)   07/10/23 88.1 kg (194 lb 3.2 oz)   07/06/23 87.7 kg (193 lb 6.4 oz)   06/26/23 87.4 kg (192 lb 11.2 oz)       Weight Change %:  Unable to quantify exact weight change as the pt's weight seems to have been fluctuating over the last 6 months. Current weight is consistent weight from June, however it seems it had starting to trend upwards and then back down. Weight was charted from 78.5kg-99.8kg during the month of October.     Nutrition Focused Physical Exam Findings:  defer: Pt " undergoing care with nursing during both visit attempts.   Subcutaneous Fat Loss:      Muscle Wasting:  Temporalis: Defer (pt undergoing  nursing care during RD visit)  Edema:  Edema: +1 trace  Edema Location: generalized  Physical Findings:  Skin: Positive (stage I pressure injury to coccyx, wound to penis, incisions to the left upper chest, midline abdomen, and left neck)    Nutrition Significant Labs:  CBC Trend:   Results from last 7 days   Lab Units 12/01/23  0306 11/30/23 2022 11/30/23 1235 11/30/23  0431   WBC AUTO x10*3/uL 10.2 10.1 9.1 12.1*   RBC AUTO x10*6/uL 2.48* 2.63* 2.70* 2.27*   HEMOGLOBIN g/dL 8.2* 8.6* 8.7* 7.7*   HEMATOCRIT % 24.2* 25.4* 26.1* 22.8*   MCV fL 98 97 97 100   PLATELETS AUTO x10*3/uL 156 146* 151 191    , BMP Trend:   Results from last 7 days   Lab Units 12/01/23 0306 11/30/23 1235 11/30/23 0431 11/29/23  1525   GLUCOSE mg/dL 106* 97 140* 150*   CALCIUM mg/dL 8.9 8.6 8.8 8.6   SODIUM mmol/L 141 142 140 137   POTASSIUM mmol/L 4.0 4.0 4.1 3.5   CO2 mmol/L 26 28 25 23   CHLORIDE mmol/L 105 105 103 99   BUN mg/dL 27* 33* 41* 45*   CREATININE mg/dL 1.00 1.05 1.15 1.22    , Renal Lab Trend:   Results from last 7 days   Lab Units 12/01/23 0306 11/30/23 1235 11/30/23  0431 11/29/23  1525   POTASSIUM mmol/L 4.0 4.0 4.1 3.5   PHOSPHORUS mg/dL 2.0* 2.5 3.0 4.9   SODIUM mmol/L 141 142 140 137   MAGNESIUM mg/dL 2.12 2.24 2.42* 1.65   EGFR mL/min/1.73m*2 82 78 70 65   BUN mg/dL 27* 33* 41* 45*   CREATININE mg/dL 1.00 1.05 1.15 1.22        Nutrition Specific Medications:  Scheduled medications  acetaminophen, 650 mg, j-tube, q6h  albuterol, 2.5 mg, nebulization, q6h  gabapentin, 300 mg, j-tube, q8h SYLVESTER  guaiFENesin, 200 mg, j-tube, q6h  heparin (porcine), 5,000 Units, subcutaneous, q8h  insulin lispro, 0-10 Units, subcutaneous, q4h  ketorolac, 15 mg, intravenous, q6h  lidocaine, 1 patch, transdermal, Daily  magnesium sulfate, 1 g, intravenous, q8h  methocarbamol, 1,000 mg, intravenous,  q8h  midodrine, 15 mg, j-tube, q8h  pantoprazole, 40 mg, intravenous, Daily  sertraline, 75 mg, j-tube, Daily  sodium chloride, 3 mL, nebulization, q6h SYLVESTER  sodium phosphate, 21 mmol, intravenous, Once  surgical lubricant, , ,       Continuous medications  lactated Ringer's, 5 mL/hr, Last Rate: 5 mL/hr (12/01/23 0827)      PRN medications  PRN medications: calcium gluconate, calcium gluconate, dextrose 10 % in water (D10W), dextrose, glucagon, HYDROmorphone, magnesium sulfate, magnesium sulfate, [DISCONTINUED] ondansetron **OR** ondansetron, oxyCODONE, oxyCODONE, oxygen, potassium chloride, surgical lubricant      I/O:    ;    I/O last 3 completed shifts:  In: 7070.8 (82.2 mL/kg) [I.V.:5330.8 (62 mL/kg); Blood:900; NG/GT:190; IV Piggyback:650]  Out: 2765 (32.2 mL/kg) [Urine:1955 (0.6 mL/kg/hr); Emesis/NG output:650; Drains:160]  Weight: 86 kg   I/O this shift:  In: 359 [I.V.:314; NG/GT:45]  Out: 1580 [Urine:700; Emesis/NG output:850; Drains:30]    Dietary Orders (From admission, onward)       Start     Ordered    11/29/23 1616  NPO Diet; Effective now  Diet effective now         11/29/23 1617                     Estimated Needs:   Total Energy Estimated Needs (kCal): 2030 kCal  Method for Estimating Needs: MSJ (1691) x 1.2  Total Protein Estimated Needs (g): 112 g (+)  Method for Estimating Needs: 1.3g/kg (+)  Total Fluid Estimated Needs (mL):  (per MD)    Nutrition Diagnosis   Nutrition Diagnosis:  Malnutrition Diagnosis  Patient has Malnutrition Diagnosis: No    Nutrition Diagnosis  Patient has Nutrition Diagnosis: Yes  Diagnosis Status (1): New  Nutrition Diagnosis 1: Increased nutrient needs  Related to (1): increased metabolic need  As Evidenced by (1): s/p laparatomy, lysis of adhesions, gastirc pullip, j tube placement, stage I pressure injury       Nutrition Interventions/Recommendations   Nutrition Interventions and Recommendations:        Nutrition Prescription:  Recommend starting TF when medically  feasible    Impact peptide 1.5 @ 60ml/hr goal   Start at 20ml/hr and increase by [id number] until goal is reached  FWF per MD/team   Recommend 100mg thiamine x 5-7 days d/t uncertainty of last nutrition intake  Monitor RFP+Mg daily, replete per protocol   If major shift in electrolytes or glucose noted, do not further advance    Impact Peptide 1.5 @ 60ml/hr provides 2160kcal, 135gm protein, and 1112ml free water         Nutrition Interventions:   Food and/or Nutrient Delivery Interventions  Interventions: Enteral intake  Enteral Intake: Modify schedule of enteral nutrition  Goal: Impact Peptide 1.5 @ 60ml/hr      Nutrition Monitoring and Evaluation   Monitoring/Evaluation:   Food/Nutrient Related History Monitoring  Monitoring and Evaluation Plan: Energy intake  Energy Intake: Estimated energy intake  Criteria: meet > 75% estimated energy needs    Body Composition/Growth/Weight History  Monitoring and Evaluation Plan: Weight, Weight change  Criteria: maintain stable wt    Biochemical Data, Medical Tests and Procedures  Monitoring and Evaluation Plan: Electrolyte/renal panel, Glucose/endocrine profile  Criteria: WNL      Time Spent/Follow-up Reminder:   Follow Up  Time Spent (min): 60 minutes  Last Date of Nutrition Visit: 12/01/23  Nutrition Follow-Up Needed?: Dietitian to reassess per policy  Follow up Comment: LIZZIE urias

## 2023-12-01 NOTE — CARE PLAN
Problem: Pain  Goal: My pain/discomfort is manageable  Outcome: Progressing     Problem: Safety  Goal: Patient will be injury free during hospitalization  Outcome: Progressing  Goal: I will remain free of falls  Outcome: Progressing     Problem: Daily Care  Goal: Daily care needs are met  Outcome: Progressing     Problem: Psychosocial Needs  Goal: Demonstrates ability to cope with hospitalization/illness  Outcome: Progressing  Goal: Collaborate with me, my family, and caregiver to identify my specific goals  Outcome: Progressing     Problem: Discharge Barriers  Goal: My discharge needs are met  Outcome: Progressing     Problem: Pain  Goal: STG - Patient verbalizes a reduction in pain level  Outcome: Progressing   The patient's goals for the shift include      The clinical goals for the shift include hemodynamic stability

## 2023-12-01 NOTE — PROGRESS NOTES
Patient seen on rounds with ICU team, pertinent labs and study results noted, patient examined, consultant input noted where applicable. Assessment and plan discussed with ICU team.    Critical care time: 44 min    ID Statement: 67 yo s/p Heller myotomy in 3/2023 complicated by esophageal perforation with subsequent esophageal stenting, esophagectomy and VATS procedure. Other post-op complications include CORA, DVT with UVC filer placement. Most recently patient underwent gastric pull up, ISA and jejunostomy tube placement on 11/30/23 and admitted to SICU for complex medical and surgical management. PMHx includes COPD, DM type 2, diabetic neuropathy.     Assessment==>Plan:     CNS: Awake, oriented x3, no focal deficits. Post-op pain limiting recovery. ==> Multimodal pain management.     CV: Post-op hypotension resolved, off phenylephrine infusion, PO Midodrine started. Heart reate 103. ==> Cont [place] monitoring and continuous EKG. Cont Midodrine.     PULM: Post-op respiratory function decreased due to limited cough secondary to pain. CXR shows L lung field opacity, likely due to mucous plugging. Oxygenation remains acceptable with high flow NC, minimal dyspnea.  ==> Vigorous pulmonary toilet per RT, advance supplemental O2 as indicated via Airvo. Consider bronchoscopy if resp status worsens.      : Renal function unchanged, Cr stable, UOP adequate. ==> Maintain negative fluid balance.     HEME: Anemia stable with hgb 8.7, platelet count normal. L popliteal DVT.  ==> Cont subcutaneous heparin and SCDs, no need for anticoagulation at this time.     GI: NPO, NG tube in place to suction. ==> Start trickle tube feeds per J-tube, cont PPI.     ENDO: Adequate glycemic control. ==> Cont insulin sliding scale.     ID: Normal WBC (9.1), afebrile. Periop antibiotics completed. ==> Cont to monitor for signs of infection.

## 2023-12-01 NOTE — PROGRESS NOTES
Spiritual Care Visit    Clinical Encounter Type  Routine Visit: Introduction    Scientologist Encounters  Scientologist Needs: Prayer         Sacramental Encounters  Other Sacrament: P&B S    Patient was asleep so did not disturb, received a prayer and blessing by Fr. Gavin Sargent,  Buddhism .

## 2023-12-01 NOTE — PROGRESS NOTES
"Levy Gregory is a 68 y.o. male on day 2 of admission presenting with Esophageal perforation.    Subjective   Patient is off phenylephrine since 4 PM yesterday. Had a few episodes of desaturation overnight and also endorses inability to cough up sputum this morning. He does not feel short of breath, however. Has been tachycardic to low 100's overnight. He denies passing flatus or BM.     Objective   General: NAD  HEENT: NGT in nares, to LIWS with bilious output in cannister  Resp: nonlabored breathing on NC  CV: sinus tachycardia on monitor  Abd: soft, appropriately TTP. Incisions appear c/d/I, dressings removed this AM  : aguilera draining clear yellow urine  MSK: moves all extremities  Ext: minimal LE edema  Psych: appropriate mood and behavior    Last Recorded Vitals  Blood pressure 120/76, pulse 110, temperature 37 °C (98.6 °F), temperature source Temporal, resp. rate 24, height 1.85 m (6' 0.84\"), weight 86 kg (189 lb 9.5 oz), SpO2 94 %.  Intake/Output last 3 Shifts:  I/O last 3 completed shifts:  In: 7070.8 (82.2 mL/kg) [I.V.:5330.8 (62 mL/kg); Blood:900; NG/GT:190; IV Piggyback:650]  Out: 2765 (32.2 mL/kg) [Urine:1955 (0.6 mL/kg/hr); Emesis/NG output:650; Drains:160]  Weight: 86 kg     Relevant Results  Results for orders placed or performed during the hospital encounter of 11/29/23 (from the past 24 hour(s))   POCT GLUCOSE   Result Value Ref Range    POCT Glucose 156 (H) 74 - 99 mg/dL   Electrocardiogram, 12-lead PRN ACS symptoms   Result Value Ref Range    Ventricular Rate 101 BPM    Atrial Rate 101 BPM    MA Interval 168 ms    QRS Duration 144 ms    QT Interval 444 ms    QTC Calculation(Bazett) 575 ms    P Axis 17 degrees    R Axis -70 degrees    T Axis 54 degrees    QRS Count 17 beats    Q Onset 208 ms    T Offset 430 ms    QTC Fredericia 528 ms   POCT GLUCOSE   Result Value Ref Range    POCT Glucose 113 (H) 74 - 99 mg/dL   CBC   Result Value Ref Range    WBC 9.1 4.4 - 11.3 x10*3/uL    nRBC 0.0 0.0 - " 0.0 /100 WBCs    RBC 2.70 (L) 4.50 - 5.90 x10*6/uL    Hemoglobin 8.7 (L) 13.5 - 17.5 g/dL    Hematocrit 26.1 (L) 41.0 - 52.0 %    MCV 97 80 - 100 fL    MCH 32.2 26.0 - 34.0 pg    MCHC 33.3 32.0 - 36.0 g/dL    RDW 17.5 (H) 11.5 - 14.5 %    Platelets 151 150 - 450 x10*3/uL   Coagulation Screen   Result Value Ref Range    Protime 15.6 (H) 9.8 - 12.8 seconds    INR 1.4 (H) 0.9 - 1.1    aPTT 33 27 - 38 seconds   Renal Function Panel   Result Value Ref Range    Glucose 97 74 - 99 mg/dL    Sodium 142 136 - 145 mmol/L    Potassium 4.0 3.5 - 5.3 mmol/L    Chloride 105 98 - 107 mmol/L    Bicarbonate 28 21 - 32 mmol/L    Anion Gap 13 10 - 20 mmol/L    Urea Nitrogen 33 (H) 6 - 23 mg/dL    Creatinine 1.05 0.50 - 1.30 mg/dL    eGFR 78 >60 mL/min/1.73m*2    Calcium 8.6 8.6 - 10.6 mg/dL    Phosphorus 2.5 2.5 - 4.9 mg/dL    Albumin 4.2 3.4 - 5.0 g/dL   Magnesium   Result Value Ref Range    Magnesium 2.24 1.60 - 2.40 mg/dL   Calcium, Ionized   Result Value Ref Range    POCT Calcium, Ionized 1.07 (L) 1.1 - 1.33 mmol/L   Amylase, Fluid   Result Value Ref Range    Amylase, Fluid 41 Not established. U/L   POCT GLUCOSE   Result Value Ref Range    POCT Glucose 121 (H) 74 - 99 mg/dL   POCT GLUCOSE   Result Value Ref Range    POCT Glucose 102 (H) 74 - 99 mg/dL   CBC   Result Value Ref Range    WBC 10.1 4.4 - 11.3 x10*3/uL    nRBC 0.0 0.0 - 0.0 /100 WBCs    RBC 2.63 (L) 4.50 - 5.90 x10*6/uL    Hemoglobin 8.6 (L) 13.5 - 17.5 g/dL    Hematocrit 25.4 (L) 41.0 - 52.0 %    MCV 97 80 - 100 fL    MCH 32.7 26.0 - 34.0 pg    MCHC 33.9 32.0 - 36.0 g/dL    RDW 18.3 (H) 11.5 - 14.5 %    Platelets 146 (L) 150 - 450 x10*3/uL   POCT GLUCOSE   Result Value Ref Range    POCT Glucose 117 (H) 74 - 99 mg/dL   CBC   Result Value Ref Range    WBC 10.2 4.4 - 11.3 x10*3/uL    nRBC 0.0 0.0 - 0.0 /100 WBCs    RBC 2.48 (L) 4.50 - 5.90 x10*6/uL    Hemoglobin 8.2 (L) 13.5 - 17.5 g/dL    Hematocrit 24.2 (L) 41.0 - 52.0 %    MCV 98 80 - 100 fL    MCH 33.1 26.0 - 34.0 pg     MCHC 33.9 32.0 - 36.0 g/dL    RDW 18.5 (H) 11.5 - 14.5 %    Platelets 156 150 - 450 x10*3/uL   Coagulation Screen   Result Value Ref Range    Protime 14.5 (H) 9.8 - 12.8 seconds    INR 1.3 (H) 0.9 - 1.1    aPTT 32 27 - 38 seconds   Renal Function Panel   Result Value Ref Range    Glucose 106 (H) 74 - 99 mg/dL    Sodium 141 136 - 145 mmol/L    Potassium 4.0 3.5 - 5.3 mmol/L    Chloride 105 98 - 107 mmol/L    Bicarbonate 26 21 - 32 mmol/L    Anion Gap 14 10 - 20 mmol/L    Urea Nitrogen 27 (H) 6 - 23 mg/dL    Creatinine 1.00 0.50 - 1.30 mg/dL    eGFR 82 >60 mL/min/1.73m*2    Calcium 8.9 8.6 - 10.6 mg/dL    Phosphorus 2.0 (L) 2.5 - 4.9 mg/dL    Albumin 3.6 3.4 - 5.0 g/dL   Magnesium   Result Value Ref Range    Magnesium 2.12 1.60 - 2.40 mg/dL   Calcium, Ionized   Result Value Ref Range    POCT Calcium, Ionized 1.21 1.1 - 1.33 mmol/L   POCT GLUCOSE   Result Value Ref Range    POCT Glucose 113 (H) 74 - 99 mg/dL     CXR 11/30:  1. Left lung base and retrocardiac opacity, more confluent and dense compared to prior study. Correlate with left basilar infiltrate/infection and concern for mucous plugging and partial/complete left lower lobe collapse.  2. Persistent bibasilar pleural effusion.    Assessment/Plan   Principal Problem:    Esophageal perforation  Active Problems:    Anticoagulant long-term use    Elevated liver function tests    Hypotension    This is a 69 y/o M with a history of esophageal perforation after Heller myotomy and fundoplication for achalasia who is s/p esophagectomy and cervical esophagostomy in 04/2023, and now s/p gastric pull up and cervical anastomosis on 11/29. Patient has been in ICU for monitoring post-operatively, required 1u pRBC on POD1. He was also on phenylephrine gtt until POD1 but that has since been weaned off.     Plan:  Neuro - pain control per ICU, appreciate acute pain recs  Resp - 12/1 CXR shows white out of L lung field, recommend aggressive RT. Please repeat CXR in PM. encourage  IS. wean O2 as tolerated, goal SpO2 >92%. CXR daily  CV - continue midodrine through J tube as needed, MAP goal of 60  GI - NPO, maintain NGT to LIWS, ok for trickle feeds through J tube, continue PPI, obtain daily amylase from MIGUELINA drain x 5 days (WNL on POD1)   - LR @ 125, maintain aguilera  Heme - H/H stable, DVT US + for chronic nonocclusive L popliteal vein thrombus, continue SQH for DVT ppx  Endo - continue SSI  MSK - OOB as tolerated, PT/OT  DVT ppx - SQH, SCDs    Dispo: SICU    Discussed with attending, Dr. Gongora.    Carmen Madrigal MD  PGY-2 General Surgery  Thoracic Surgery [id number]

## 2023-12-01 NOTE — PROGRESS NOTES
"                       Surgical Intensive Care Unit Progress Note     Subjective       Objective     Physical Exam  Vitals and nursing note reviewed.   Constitutional:       Appearance: He is ill-appearing.      Comments: Chronically ill appearing     HENT:      Head: Normocephalic.      Nose: Nose normal.      Comments: NG in place to LIWS, bridled     Mouth/Throat:      Mouth: Mucous membranes are moist.   Eyes:      Pupils: Pupils are equal, round, and reactive to light.   Neck:      Comments: MIGUELINA drain with serosanguinous drainage. Limited neck mobility secondary to pain  Cardiovascular:      Rate and Rhythm: Normal rate and regular rhythm.   Pulmonary:      Effort: Pulmonary effort is normal.      Comments: Decreased BS bilaterally  Abdominal:      General: Bowel sounds are normal.      Palpations: Abdomen is soft.      Comments: J tubed capped. ML abdominal incision RAINA, no drainage or erythema   Genitourinary:     Comments: Maldonado catheter in place with clear yellow drainage  Musculoskeletal:      Cervical back: Neck supple.      Right lower leg: Edema present.      Left lower leg: Edema present.   Skin:     General: Skin is warm.      Capillary Refill: Capillary refill takes less than 2 seconds.      Coloration: Skin is pale.   Neurological:      General: No focal deficit present.      Mental Status: He is alert and oriented to person, place, and time.   Psychiatric:         Mood and Affect: Mood normal.         Behavior: Behavior normal.         Last Recorded Vitals  Blood pressure 120/76, pulse 110, temperature 36.4 °C (97.5 °F), temperature source Temporal, resp. rate 24, height 1.85 m (6' 0.84\"), weight 86 kg (189 lb 9.5 oz), SpO2 94 %.    Heart Rate:  []   Temp:  [36.4 °C (97.5 °F)-37.1 °C (98.8 °F)]   Resp:  [13-36]   BP: ()/(48-86)   SpO2:  [20 %-100 %]    Intake/Output last 3 Shifts:  I/O last 3 completed shifts:  In: 7070.8 (82.2 mL/kg) [I.V.:5330.8 (62 mL/kg); Blood:900; NG/GT:190; IV " Piggyback:650]  Out: 2765 (32.2 mL/kg) [Urine:1955 (0.6 mL/kg/hr); Emesis/NG output:650; Drains:160]  Weight: 86 kg       Intake/Output Summary (Last 24 hours) at 12/1/2023 0831  Last data filed at 12/1/2023 0600  Gross per 24 hour   Intake 4018.77 ml   Output 1485 ml   Net 2533.77 ml        Relevant Results  Scheduled medications  guaiFENesin, 200 mg, j-tube, q6h  heparin (porcine), 5,000 Units, subcutaneous, q8h  insulin lispro, 0-10 Units, subcutaneous, q4h  lidocaine, 1 patch, transdermal, Daily  magnesium sulfate, 1 g, intravenous, q8h  methocarbamol, 1,000 mg, intravenous, q8h  midodrine, 15 mg, j-tube, q8h  pantoprazole, 40 mg, intravenous, Daily  surgical lubricant, , ,       Continuous medications  lactated Ringer's, 5 mL/hr, Last Rate: 125 mL/hr (11/30/23 2034)  phenylephrine, 0.1-2 mcg/kg/min, Last Rate: Stopped (11/30/23 1600)      PRN medications  PRN medications: calcium gluconate, calcium gluconate, dextrose 10 % in water (D10W), dextrose, glucagon, HYDROmorphone, HYDROmorphone, magnesium sulfate, magnesium sulfate, ondansetron **OR** ondansetron, potassium chloride, surgical lubricant       Results for orders placed or performed during the hospital encounter of 11/29/23 (from the past 24 hour(s))   Electrocardiogram, 12-lead PRN ACS symptoms   Result Value Ref Range    Ventricular Rate 101 BPM    Atrial Rate 101 BPM    IA Interval 168 ms    QRS Duration 144 ms    QT Interval 444 ms    QTC Calculation(Bazett) 575 ms    P Axis 17 degrees    R Axis -70 degrees    T Axis 54 degrees    QRS Count 17 beats    Q Onset 208 ms    T Offset 430 ms    QTC Fredericia 528 ms   POCT GLUCOSE   Result Value Ref Range    POCT Glucose 113 (H) 74 - 99 mg/dL   CBC   Result Value Ref Range    WBC 9.1 4.4 - 11.3 x10*3/uL    nRBC 0.0 0.0 - 0.0 /100 WBCs    RBC 2.70 (L) 4.50 - 5.90 x10*6/uL    Hemoglobin 8.7 (L) 13.5 - 17.5 g/dL    Hematocrit 26.1 (L) 41.0 - 52.0 %    MCV 97 80 - 100 fL    MCH 32.2 26.0 - 34.0 pg    MCHC 33.3  32.0 - 36.0 g/dL    RDW 17.5 (H) 11.5 - 14.5 %    Platelets 151 150 - 450 x10*3/uL   Coagulation Screen   Result Value Ref Range    Protime 15.6 (H) 9.8 - 12.8 seconds    INR 1.4 (H) 0.9 - 1.1    aPTT 33 27 - 38 seconds   Renal Function Panel   Result Value Ref Range    Glucose 97 74 - 99 mg/dL    Sodium 142 136 - 145 mmol/L    Potassium 4.0 3.5 - 5.3 mmol/L    Chloride 105 98 - 107 mmol/L    Bicarbonate 28 21 - 32 mmol/L    Anion Gap 13 10 - 20 mmol/L    Urea Nitrogen 33 (H) 6 - 23 mg/dL    Creatinine 1.05 0.50 - 1.30 mg/dL    eGFR 78 >60 mL/min/1.73m*2    Calcium 8.6 8.6 - 10.6 mg/dL    Phosphorus 2.5 2.5 - 4.9 mg/dL    Albumin 4.2 3.4 - 5.0 g/dL   Magnesium   Result Value Ref Range    Magnesium 2.24 1.60 - 2.40 mg/dL   Calcium, Ionized   Result Value Ref Range    POCT Calcium, Ionized 1.07 (L) 1.1 - 1.33 mmol/L   Amylase, Fluid   Result Value Ref Range    Amylase, Fluid 41 Not established. U/L   POCT GLUCOSE   Result Value Ref Range    POCT Glucose 121 (H) 74 - 99 mg/dL   POCT GLUCOSE   Result Value Ref Range    POCT Glucose 102 (H) 74 - 99 mg/dL   CBC   Result Value Ref Range    WBC 10.1 4.4 - 11.3 x10*3/uL    nRBC 0.0 0.0 - 0.0 /100 WBCs    RBC 2.63 (L) 4.50 - 5.90 x10*6/uL    Hemoglobin 8.6 (L) 13.5 - 17.5 g/dL    Hematocrit 25.4 (L) 41.0 - 52.0 %    MCV 97 80 - 100 fL    MCH 32.7 26.0 - 34.0 pg    MCHC 33.9 32.0 - 36.0 g/dL    RDW 18.3 (H) 11.5 - 14.5 %    Platelets 146 (L) 150 - 450 x10*3/uL   POCT GLUCOSE   Result Value Ref Range    POCT Glucose 117 (H) 74 - 99 mg/dL   CBC   Result Value Ref Range    WBC 10.2 4.4 - 11.3 x10*3/uL    nRBC 0.0 0.0 - 0.0 /100 WBCs    RBC 2.48 (L) 4.50 - 5.90 x10*6/uL    Hemoglobin 8.2 (L) 13.5 - 17.5 g/dL    Hematocrit 24.2 (L) 41.0 - 52.0 %    MCV 98 80 - 100 fL    MCH 33.1 26.0 - 34.0 pg    MCHC 33.9 32.0 - 36.0 g/dL    RDW 18.5 (H) 11.5 - 14.5 %    Platelets 156 150 - 450 x10*3/uL   Coagulation Screen   Result Value Ref Range    Protime 14.5 (H) 9.8 - 12.8 seconds    INR 1.3  (H) 0.9 - 1.1    aPTT 32 27 - 38 seconds   Renal Function Panel   Result Value Ref Range    Glucose 106 (H) 74 - 99 mg/dL    Sodium 141 136 - 145 mmol/L    Potassium 4.0 3.5 - 5.3 mmol/L    Chloride 105 98 - 107 mmol/L    Bicarbonate 26 21 - 32 mmol/L    Anion Gap 14 10 - 20 mmol/L    Urea Nitrogen 27 (H) 6 - 23 mg/dL    Creatinine 1.00 0.50 - 1.30 mg/dL    eGFR 82 >60 mL/min/1.73m*2    Calcium 8.9 8.6 - 10.6 mg/dL    Phosphorus 2.0 (L) 2.5 - 4.9 mg/dL    Albumin 3.6 3.4 - 5.0 g/dL   Magnesium   Result Value Ref Range    Magnesium 2.12 1.60 - 2.40 mg/dL   Calcium, Ionized   Result Value Ref Range    POCT Calcium, Ionized 1.21 1.1 - 1.33 mmol/L   POCT GLUCOSE   Result Value Ref Range    POCT Glucose 113 (H) 74 - 99 mg/dL   POCT GLUCOSE   Result Value Ref Range    POCT Glucose 133 (H) 74 - 99 mg/dL      Assessment/Plan     Levy Gregory is a 68 y.o. male presenting to SICU sp laparotomy, lysis of adhesions, gastric pullup, jejunostomy placement with Dr. Gongora 11/29/2023.     His PMHx is significant is significant for COPD, T2DM, GERD, type II achalasia, paraesophageal hernia s/p robotic Laparoscopic Heller myotomy with mary fundoplication on 3/1 with Dr. Hoang complicated by esophageal perforation Leading for a prolonged  hospitalization from 03/2023-5/2023. Course was complicated by a mediastinal abscess and R empyema with polymicrobial including Acinetobacter baumanii (CRAB) and MRSA . He required multiple surgical interventions:   -3/15 Right VATS with Gu   -3/24 IR guided pigtail placement  -3/27 EGD with esophageal stent replacement with Gu   -3/31  EGD, removal of esophageal stent, endovac placement, dobhoff tube placement, PEG tube placement ( Ug and Thoracic)  -4/5 right thoracotomy and esophagectomy with cervical esophagostomy and lap takedown of prior fundoplication, lysis of adhesions, and revision of gastrostomy tube for esophageal perforation ( Thoracic surgery)   - 4/7 PEG replacement  and J tube placement ( Thoracic surgery)      His course was further complicated with Afib RVR, CORA required CRRT, Retroperitoneal/Psoas hematoma, upper and lower extremities DVTs sp IVC filter placement 4/20, and acalculous cholecystitis sp percutaneous cholecystostomy tube placement by IR 4/27      He was readmitted in August 2023 with worsening Empyema with cultures showing Pseudomonas, E.coli, Kleb oxytoca, and Saccharomyces cerevisae from right plural fluid, treated with Fluconazole, Levaquin, and Zosyn.  Most recently, he has been having issues with  feeding tube clogging and  then with jejunostomy tube malposition. Most recently had a G and separate J tube replacements (on 10/20/2023). Otherwise he has been doing well. He was brought back on 11/29/23 for Gastric pull up, j-tube placement, and partial L clavicle removal     Plan:  NEURO: History of diabetic neuropathy and depression .  Acute post-op pain mostly neck/chest pain and stiffness from clavicle removal site.   - ongoing neuro/pain assessments  - place on scheduled acetaminophen, start oxycodone liquid prn, keep iv hydromorphone for breakthrough pain  - add scheduled robaxin, juan diego, magnesium and toradol per pain team recs  - Lidoderm patches surrounding L chest/clavicular incision  - resume zoloft   - PT/OT following -> edge of bed or OOB daily  - Post ICU service consult  - Home meds: Zoloft 75 mg daily, Atarax 25 mg BID, Gabapentin 300 mg TID, thiamine 100 mg daily      CV: History of HLD , Hx of Afib. Baseline SBP 90's-100's, on midodrine 5mg q8h. Most recent Echo ( 08/2023) with suboptimal quality. Previous TTE 4/2023: EF 70-75%, normal RV function.   Arrived to SICU on phenylphrine infusion 0.5 mcg/kg/min. Phenylphrine infusion increased to 1mcg/kg/min overnight 11/29-11/30 and received 500 ml LR x 2. Started on midodrine 15mg q8h 11/30. Transfused 2 rbc, weaned of phenyl infusion in afternoon 11/30. Hemodynamics intact overnight without  additional vasoactive support. ST this am, 100-110 range.  - continue midodrine 15 mg q 8  - continuous EKG/ABP monitoring  - goal map range 65-90  - KVO IVF, volume resuscitate as clinically indicated  - start metoprolol 5mg IV q6h for arrhythmia prophylaxis when blood pressure allows -> possibly later today  - Home meds: Eliquis 2.5 mg BID, Lipitor 10 mg daily, Midodrine 5 mg TID, Aldactone 12.5 mg QOD      PULM: History of COPD. 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 Most recent Empyema was in August 2023. Acute hypoxic respiratory insufficiency this am, increased FiO2 to 5L HF NC. Weak cough noted, CXR with left chest white out.  - escalate bronchial hygiene -> Acapella, Airvo, scheduled NS/albuterol nebs  - Wean NC as tolerated.    - goal SpO2>94%  - repeat CXR in early afternoon  - Q1h incentive spirometry while awake  - NT suctioning ok, but patient refusing for now  - OOB  - Avoid positive pressure ventilation      GI: Hx Paraesophageal hernia s/p robotic Laparoscopic Heller myotomy with mary fundoplication on 3/1 with Dr. Hoang complicated by esophageal perforation leading to multiple intervention as outlined above  and eventually a right thoracotomy and esophagectomy with cervical esophagostomy and lap takedown of prior fundoplication on 4/5. Perc fran 4/27.  He is now sp laparotomy, lysis of adhesions, gastric pullup, jejunostomy placement. L partial clavicle removal 11/29  Elevated Alk phos in the 1000's range of unclear etiology. Trending down  - Maintain strict NPO  - PPI for GI prophylaxis  - Maintain HOB up at least 30 degrees at all times secondary to high risk of aspiration  - Send daily amylase levels from MIGUELINA drain starting today  - NG to LIWS  - Do not replace or reposition NG tube if it becomes dislodged, call thoracic surgery  - Follow daily LFTs  - start trickle feeds via J-tube today when respiratory status improves  - nutrition consulted     : Baseline  sCr ~ 0.9. Oliguric CORA March 2023, required CRRT, recovered and returted to baseline . Urinary retention, has a chronic aguilera . Changed on arrival to SICU 11/29. Hypophosphatemia  - Check renal function panel daily and prn  - KVO IVF  - Maintain U/O >0.5-1ml/kg/hr  - Replete electrolytes to goal K>4, Mg>2, Phos>2.5, ionized Ca>1.10     HEME: Acute blood loss anemia. Hemoglobin 7.7 11/30, transfused 2 RBC with increase of Hgb to 8.2-8.7 range. Bilateral UE DVT and LLE DVT diagnosed March 2023, IVC filter placed in 4/2023. On Eliquis. Repeat vasc US x4 extremities 11/30: BUE without DVTs, BLE + for nonocclusive L popliteal DVT.   - Check CBC and coags daily and PRN  - Ongoing monitoring for s/s bleeding  - continue scds and subcutaneous heparin  - discuss timing of resumption of anticoagulation with surgical team  -Home Meds: Eliquis      ENDO: IDDM2. 9/25/23 A1C 4.7. Adequate glycemic control   - Q4h BG   - SSI Lispro per ICU protocol     ID:  Hx mediastinal abscess and R empyema with polymicrobial including Acinetobacter baumanii (CRAB) and MRSA ( 04/2023)  Recurrent Empyema with cultures growing  cultures showing Pseudomonas, E.coli and Kleb oxytoca, and Saccharomyces cerevisae ( 08/2023)     - trend temp q4, wbc daily  - Finished perioperative Cefazolin  - ongoing monitoring for s/s infection  - Keep on contact isolation given Hx Acinetobacter baumanii (CRAB)     Lines:  -Picc line --> unclear when placed. Discuss with thoracic team removal.  - PIV  -11/29 A line     Dispo: Continue SICU care. Patient seen and discussed with ICU attending Dr. Angulo     Critical Care time : 60 min

## 2023-12-02 ENCOUNTER — APPOINTMENT (OUTPATIENT)
Dept: RADIOLOGY | Facility: HOSPITAL | Age: 68
DRG: 326 | End: 2023-12-02
Payer: MEDICARE

## 2023-12-02 LAB
ALBUMIN SERPL BCP-MCNC: 3.8 G/DL (ref 3.4–5)
AMYLASE FLD-CCNC: 19 U/L
ANION GAP SERPL CALC-SCNC: 14 MMOL/L (ref 10–20)
BUN SERPL-MCNC: 29 MG/DL (ref 6–23)
CA-I BLD-SCNC: 1.15 MMOL/L (ref 1.1–1.33)
CALCIUM SERPL-MCNC: 9.1 MG/DL (ref 8.6–10.6)
CHLORIDE SERPL-SCNC: 110 MMOL/L (ref 98–107)
CO2 SERPL-SCNC: 23 MMOL/L (ref 21–32)
CREAT SERPL-MCNC: 1.01 MG/DL (ref 0.5–1.3)
ERYTHROCYTE [DISTWIDTH] IN BLOOD BY AUTOMATED COUNT: 18.6 % (ref 11.5–14.5)
GFR SERPL CREATININE-BSD FRML MDRD: 82 ML/MIN/1.73M*2
GLUCOSE BLD MANUAL STRIP-MCNC: 112 MG/DL (ref 74–99)
GLUCOSE BLD MANUAL STRIP-MCNC: 112 MG/DL (ref 74–99)
GLUCOSE BLD MANUAL STRIP-MCNC: 117 MG/DL (ref 74–99)
GLUCOSE BLD MANUAL STRIP-MCNC: 118 MG/DL (ref 74–99)
GLUCOSE BLD MANUAL STRIP-MCNC: 120 MG/DL (ref 74–99)
GLUCOSE BLD MANUAL STRIP-MCNC: 92 MG/DL (ref 74–99)
GLUCOSE SERPL-MCNC: 114 MG/DL (ref 74–99)
HCT VFR BLD AUTO: 24.1 % (ref 41–52)
HGB BLD-MCNC: 7.8 G/DL (ref 13.5–17.5)
MAGNESIUM SERPL-MCNC: 2.53 MG/DL (ref 1.6–2.4)
MCH RBC QN AUTO: 32.5 PG (ref 26–34)
MCHC RBC AUTO-ENTMCNC: 32.4 G/DL (ref 32–36)
MCV RBC AUTO: 100 FL (ref 80–100)
NRBC BLD-RTO: 0 /100 WBCS (ref 0–0)
PHOSPHATE SERPL-MCNC: 2.5 MG/DL (ref 2.5–4.9)
PLATELET # BLD AUTO: 149 X10*3/UL (ref 150–450)
POTASSIUM SERPL-SCNC: 3.6 MMOL/L (ref 3.5–5.3)
RBC # BLD AUTO: 2.4 X10*6/UL (ref 4.5–5.9)
SODIUM SERPL-SCNC: 143 MMOL/L (ref 136–145)
WBC # BLD AUTO: 7.8 X10*3/UL (ref 4.4–11.3)

## 2023-12-02 PROCEDURE — 94660 CPAP INITIATION&MGMT: CPT

## 2023-12-02 PROCEDURE — 85027 COMPLETE CBC AUTOMATED: CPT | Performed by: PHYSICIAN ASSISTANT

## 2023-12-02 PROCEDURE — 83735 ASSAY OF MAGNESIUM: CPT | Performed by: PHYSICIAN ASSISTANT

## 2023-12-02 PROCEDURE — 2020000001 HC ICU ROOM DAILY

## 2023-12-02 PROCEDURE — 99291 CRITICAL CARE FIRST HOUR: CPT | Performed by: PHYSICIAN ASSISTANT

## 2023-12-02 PROCEDURE — 2500000005 HC RX 250 GENERAL PHARMACY W/O HCPCS: Performed by: NURSE PRACTITIONER

## 2023-12-02 PROCEDURE — 2500000005 HC RX 250 GENERAL PHARMACY W/O HCPCS: Performed by: PHYSICIAN ASSISTANT

## 2023-12-02 PROCEDURE — 2500000004 HC RX 250 GENERAL PHARMACY W/ HCPCS (ALT 636 FOR OP/ED): Performed by: PHYSICIAN ASSISTANT

## 2023-12-02 PROCEDURE — 99291 CRITICAL CARE FIRST HOUR: CPT | Performed by: STUDENT IN AN ORGANIZED HEALTH CARE EDUCATION/TRAINING PROGRAM

## 2023-12-02 PROCEDURE — 2500000001 HC RX 250 WO HCPCS SELF ADMINISTERED DRUGS (ALT 637 FOR MEDICARE OP)

## 2023-12-02 PROCEDURE — 2500000002 HC RX 250 W HCPCS SELF ADMINISTERED DRUGS (ALT 637 FOR MEDICARE OP, ALT 636 FOR OP/ED): Performed by: PHYSICIAN ASSISTANT

## 2023-12-02 PROCEDURE — 2500000004 HC RX 250 GENERAL PHARMACY W/ HCPCS (ALT 636 FOR OP/ED): Mod: JZ | Performed by: NURSE PRACTITIONER

## 2023-12-02 PROCEDURE — C9113 INJ PANTOPRAZOLE SODIUM, VIA: HCPCS | Performed by: PHYSICIAN ASSISTANT

## 2023-12-02 PROCEDURE — 2500000005 HC RX 250 GENERAL PHARMACY W/O HCPCS

## 2023-12-02 PROCEDURE — 99291 CRITICAL CARE FIRST HOUR: CPT | Performed by: ANESTHESIOLOGY

## 2023-12-02 PROCEDURE — 2500000001 HC RX 250 WO HCPCS SELF ADMINISTERED DRUGS (ALT 637 FOR MEDICARE OP): Performed by: ANESTHESIOLOGY

## 2023-12-02 PROCEDURE — 82330 ASSAY OF CALCIUM: CPT | Performed by: PHYSICIAN ASSISTANT

## 2023-12-02 PROCEDURE — 80069 RENAL FUNCTION PANEL: CPT | Performed by: PHYSICIAN ASSISTANT

## 2023-12-02 PROCEDURE — 71045 X-RAY EXAM CHEST 1 VIEW: CPT | Performed by: RADIOLOGY

## 2023-12-02 PROCEDURE — 96372 THER/PROPH/DIAG INJ SC/IM: CPT | Performed by: PHYSICIAN ASSISTANT

## 2023-12-02 PROCEDURE — 37799 UNLISTED PX VASCULAR SURGERY: CPT | Performed by: PHYSICIAN ASSISTANT

## 2023-12-02 PROCEDURE — 82947 ASSAY GLUCOSE BLOOD QUANT: CPT

## 2023-12-02 PROCEDURE — 2500000004 HC RX 250 GENERAL PHARMACY W/ HCPCS (ALT 636 FOR OP/ED)

## 2023-12-02 PROCEDURE — 2500000001 HC RX 250 WO HCPCS SELF ADMINISTERED DRUGS (ALT 637 FOR MEDICARE OP): Performed by: NURSE PRACTITIONER

## 2023-12-02 PROCEDURE — 82150 ASSAY OF AMYLASE: CPT | Performed by: STUDENT IN AN ORGANIZED HEALTH CARE EDUCATION/TRAINING PROGRAM

## 2023-12-02 PROCEDURE — 94668 MNPJ CHEST WALL SBSQ: CPT

## 2023-12-02 PROCEDURE — 94640 AIRWAY INHALATION TREATMENT: CPT

## 2023-12-02 PROCEDURE — 2500000002 HC RX 250 W HCPCS SELF ADMINISTERED DRUGS (ALT 637 FOR MEDICARE OP, ALT 636 FOR OP/ED): Performed by: NURSE PRACTITIONER

## 2023-12-02 PROCEDURE — 71045 X-RAY EXAM CHEST 1 VIEW: CPT

## 2023-12-02 PROCEDURE — P9047 ALBUMIN (HUMAN), 25%, 50ML: HCPCS | Mod: JZ | Performed by: NURSE PRACTITIONER

## 2023-12-02 RX ORDER — ALBUTEROL SULFATE 0.83 MG/ML
2.5 SOLUTION RESPIRATORY (INHALATION) 3 TIMES DAILY
Status: DISCONTINUED | OUTPATIENT
Start: 2023-12-02 | End: 2023-12-03

## 2023-12-02 RX ORDER — SODIUM CHLORIDE FOR INHALATION 3 %
3 VIAL, NEBULIZER (ML) INHALATION
Status: DISCONTINUED | OUTPATIENT
Start: 2023-12-02 | End: 2023-12-03

## 2023-12-02 RX ADMIN — SERTRALINE 75 MG: 50 TABLET, FILM COATED ORAL at 08:22

## 2023-12-02 RX ADMIN — METHOCARBAMOL 1000 MG: 100 INJECTION INTRAMUSCULAR; INTRAVENOUS at 00:02

## 2023-12-02 RX ADMIN — KETOROLAC TROMETHAMINE 15 MG: 15 INJECTION, SOLUTION INTRAMUSCULAR; INTRAVENOUS at 09:45

## 2023-12-02 RX ADMIN — SODIUM CHLORIDE 30 MG/ML INHALATION SOLUTION 3 ML: 30 SOLUTION INHALANT at 08:01

## 2023-12-02 RX ADMIN — MIDODRINE HYDROCHLORIDE 15 MG: 10 TABLET ORAL at 00:02

## 2023-12-02 RX ADMIN — ACETAMINOPHEN 650 MG: 650 SOLUTION ORAL at 15:03

## 2023-12-02 RX ADMIN — GABAPENTIN 300 MG: 250 SOLUTION ORAL at 05:12

## 2023-12-02 RX ADMIN — PANTOPRAZOLE SODIUM 40 MG: 40 INJECTION, POWDER, FOR SOLUTION INTRAVENOUS at 08:21

## 2023-12-02 RX ADMIN — HEPARIN SODIUM 5000 UNITS: 5000 INJECTION INTRAVENOUS; SUBCUTANEOUS at 21:19

## 2023-12-02 RX ADMIN — KETOROLAC TROMETHAMINE 15 MG: 15 INJECTION, SOLUTION INTRAMUSCULAR; INTRAVENOUS at 03:46

## 2023-12-02 RX ADMIN — ACETAMINOPHEN 650 MG: 650 SOLUTION ORAL at 03:46

## 2023-12-02 RX ADMIN — THIAMINE HYDROCHLORIDE 100 MG: 100 INJECTION, SOLUTION INTRAMUSCULAR; INTRAVENOUS at 08:21

## 2023-12-02 RX ADMIN — ACETAMINOPHEN 650 MG: 650 SOLUTION ORAL at 21:19

## 2023-12-02 RX ADMIN — HEPARIN SODIUM 5000 UNITS: 5000 INJECTION INTRAVENOUS; SUBCUTANEOUS at 05:12

## 2023-12-02 RX ADMIN — MIDODRINE HYDROCHLORIDE 15 MG: 10 TABLET ORAL at 15:03

## 2023-12-02 RX ADMIN — GABAPENTIN 300 MG: 250 SOLUTION ORAL at 14:00

## 2023-12-02 RX ADMIN — GUAIFENESIN 200 MG: 200 SOLUTION ORAL at 19:56

## 2023-12-02 RX ADMIN — HYDROMORPHONE HYDROCHLORIDE 0.2 MG: 1 INJECTION, SOLUTION INTRAMUSCULAR; INTRAVENOUS; SUBCUTANEOUS at 15:13

## 2023-12-02 RX ADMIN — OXYCODONE HYDROCHLORIDE 10 MG: 5 SOLUTION ORAL at 18:53

## 2023-12-02 RX ADMIN — ALBUTEROL SULFATE 2.5 MG: 2.5 SOLUTION RESPIRATORY (INHALATION) at 19:54

## 2023-12-02 RX ADMIN — HEPARIN SODIUM 5000 UNITS: 5000 INJECTION INTRAVENOUS; SUBCUTANEOUS at 13:38

## 2023-12-02 RX ADMIN — KETOROLAC TROMETHAMINE 15 MG: 15 INJECTION, SOLUTION INTRAMUSCULAR; INTRAVENOUS at 21:19

## 2023-12-02 RX ADMIN — GUAIFENESIN 200 MG: 200 SOLUTION ORAL at 15:03

## 2023-12-02 RX ADMIN — GABAPENTIN 300 MG: 250 SOLUTION ORAL at 23:31

## 2023-12-02 RX ADMIN — LIDOCAINE 1 PATCH: 4 PATCH TOPICAL at 08:22

## 2023-12-02 RX ADMIN — ALBUMIN HUMAN 25 G: 0.25 SOLUTION INTRAVENOUS at 08:21

## 2023-12-02 RX ADMIN — POTASSIUM CHLORIDE 40 MEQ: 29.8 INJECTION, SOLUTION INTRAVENOUS at 06:10

## 2023-12-02 RX ADMIN — ALBUMIN HUMAN 25 G: 0.25 SOLUTION INTRAVENOUS at 03:46

## 2023-12-02 RX ADMIN — ALBUTEROL SULFATE 2.5 MG: 2.5 SOLUTION RESPIRATORY (INHALATION) at 08:02

## 2023-12-02 RX ADMIN — KETOROLAC TROMETHAMINE 15 MG: 15 INJECTION, SOLUTION INTRAMUSCULAR; INTRAVENOUS at 15:03

## 2023-12-02 RX ADMIN — MIDODRINE HYDROCHLORIDE 15 MG: 10 TABLET ORAL at 08:20

## 2023-12-02 RX ADMIN — MIDODRINE HYDROCHLORIDE 15 MG: 10 TABLET ORAL at 23:31

## 2023-12-02 RX ADMIN — MAGNESIUM SULFATE HEPTAHYDRATE 1 G: 1 INJECTION, SOLUTION INTRAVENOUS at 01:42

## 2023-12-02 RX ADMIN — SODIUM CHLORIDE 30 MG/ML INHALATION SOLUTION 3 ML: 30 SOLUTION INHALANT at 15:22

## 2023-12-02 RX ADMIN — GUAIFENESIN 200 MG: 200 SOLUTION ORAL at 08:21

## 2023-12-02 RX ADMIN — ACETAMINOPHEN 650 MG: 650 SOLUTION ORAL at 09:02

## 2023-12-02 RX ADMIN — ALBUTEROL SULFATE 2.5 MG: 2.5 SOLUTION RESPIRATORY (INHALATION) at 15:23

## 2023-12-02 RX ADMIN — GUAIFENESIN 200 MG: 200 SOLUTION ORAL at 03:46

## 2023-12-02 RX ADMIN — HYDROMORPHONE HYDROCHLORIDE 0.2 MG: 1 INJECTION, SOLUTION INTRAMUSCULAR; INTRAVENOUS; SUBCUTANEOUS at 08:33

## 2023-12-02 RX ADMIN — SODIUM CHLORIDE 30 MG/ML INHALATION SOLUTION 3 ML: 30 SOLUTION INHALANT at 19:53

## 2023-12-02 ASSESSMENT — PAIN - FUNCTIONAL ASSESSMENT
PAIN_FUNCTIONAL_ASSESSMENT: 0-10

## 2023-12-02 ASSESSMENT — PAIN SCALES - GENERAL
PAINLEVEL_OUTOF10: 8
PAINLEVEL_OUTOF10: 0 - NO PAIN
PAINLEVEL_OUTOF10: 0 - NO PAIN
PAINLEVEL_OUTOF10: 8
PAINLEVEL_OUTOF10: 8
PAINLEVEL_OUTOF10: 0 - NO PAIN
PAINLEVEL_OUTOF10: 8
PAINLEVEL_OUTOF10: 0 - NO PAIN

## 2023-12-02 ASSESSMENT — PAIN DESCRIPTION - ORIENTATION: ORIENTATION: LEFT

## 2023-12-02 ASSESSMENT — PAIN DESCRIPTION - LOCATION: LOCATION: CHEST

## 2023-12-02 NOTE — PROGRESS NOTES
"Subjective   Levy Gregory is a 68 y.o. male on day 3 of admission presenting with Esophageal perforation.    Subjective   Patient endorses improvement in respiratory effort since yesterday - feels more comfortable coughing. Remains on HFNC. Did require phenylephrine for pressure support until around 2 am last night. Tachycardia resolved. Pain controlled.     Objective   General: NAD  HEENT: NGT in nares, to LIWS with bilious output in cannister. MIGUELINA with ss output  Resp: nonlabored breathing on HFNC  CV: NSR on monitor  Abd: soft, appropriately TTP. Incisions appear c/d/I  : aguilera draining clear yellow urine  MSK: moves all extremities  Ext: RUE edema, minimal LE edema  Psych: appropriate mood and behavior    Last Recorded Vitals  Blood pressure 106/65, pulse 81, temperature 36.7 °C (98.1 °F), temperature source Temporal, resp. rate 19, height 1.85 m (6' 0.84\"), weight 86 kg (189 lb 9.5 oz), SpO2 97 %.  Intake/Output last 3 Shifts:  I/O last 3 completed shifts:  In: 4490.2 (52.2 mL/kg) [I.V.:2335.2 (27.2 mL/kg); Blood:250; NG/GT:405; IV Piggyback:1500]  Out: 3920 (45.6 mL/kg) [Urine:2190 (0.7 mL/kg/hr); Emesis/NG output:1645; Drains:85]  Weight: 86 kg     Relevant Results  Results for orders placed or performed during the hospital encounter of 11/29/23 (from the past 24 hour(s))   POCT GLUCOSE   Result Value Ref Range    POCT Glucose 126 (H) 74 - 99 mg/dL   Calcium, Ionized   Result Value Ref Range    POCT Calcium, Ionized 1.21 1.1 - 1.33 mmol/L   CBC   Result Value Ref Range    WBC 10.9 4.4 - 11.3 x10*3/uL    nRBC 0.0 0.0 - 0.0 /100 WBCs    RBC 2.66 (L) 4.50 - 5.90 x10*6/uL    Hemoglobin 8.6 (L) 13.5 - 17.5 g/dL    Hematocrit 24.9 (L) 41.0 - 52.0 %    MCV 94 80 - 100 fL    MCH 32.3 26.0 - 34.0 pg    MCHC 34.5 32.0 - 36.0 g/dL    RDW 18.1 (H) 11.5 - 14.5 %    Platelets 163 150 - 450 x10*3/uL   Coagulation Screen   Result Value Ref Range    Protime 14.7 (H) 9.8 - 12.8 seconds    INR 1.3 (H) 0.9 - 1.1    aPTT 32 " 27 - 38 seconds   Magnesium   Result Value Ref Range    Magnesium 2.23 1.60 - 2.40 mg/dL   Renal Function Panel   Result Value Ref Range    Glucose 112 (H) 74 - 99 mg/dL    Sodium 142 136 - 145 mmol/L    Potassium 3.6 3.5 - 5.3 mmol/L    Chloride 105 98 - 107 mmol/L    Bicarbonate 26 21 - 32 mmol/L    Anion Gap 15 10 - 20 mmol/L    Urea Nitrogen 28 (H) 6 - 23 mg/dL    Creatinine 1.01 0.50 - 1.30 mg/dL    eGFR 82 >60 mL/min/1.73m*2    Calcium 8.9 8.6 - 10.6 mg/dL    Phosphorus 2.6 2.5 - 4.9 mg/dL    Albumin 3.5 3.4 - 5.0 g/dL   Blood Gas Arterial Full Panel   Result Value Ref Range    POCT pH, Arterial 7.44 (H) 7.38 - 7.42 pH    POCT pCO2, Arterial 39 38 - 42 mm Hg    POCT pO2, Arterial 86 85 - 95 mm Hg    POCT SO2, Arterial 99 94 - 100 %    POCT Oxy Hemoglobin, Arterial 96.3 94.0 - 98.0 %    POCT Hematocrit Calculated, Arterial 26.0 (L) 41.0 - 52.0 %    POCT Sodium, Arterial 138 136 - 145 mmol/L    POCT Potassium, Arterial 3.4 (L) 3.5 - 5.3 mmol/L    POCT Chloride, Arterial 108 (H) 98 - 107 mmol/L    POCT Ionized Calcium, Arterial 1.23 1.10 - 1.33 mmol/L    POCT Glucose, Arterial 120 (H) 74 - 99 mg/dL    POCT Lactate, Arterial 0.9 0.4 - 2.0 mmol/L    POCT Base Excess, Arterial 2.2 -2.0 - 3.0 mmol/L    POCT HCO3 Calculated, Arterial 26.5 (H) 22.0 - 26.0 mmol/L    POCT Hemoglobin, Arterial 8.8 (L) 13.5 - 17.5 g/dL    POCT Anion Gap, Arterial 7 (L) 10 - 25 mmo/L    Patient Temperature 37.0 degrees Celsius    FiO2 40 %   POCT GLUCOSE   Result Value Ref Range    POCT Glucose 125 (H) 74 - 99 mg/dL   POCT GLUCOSE   Result Value Ref Range    POCT Glucose 103 (H) 74 - 99 mg/dL   POCT GLUCOSE   Result Value Ref Range    POCT Glucose 106 (H) 74 - 99 mg/dL   POCT GLUCOSE   Result Value Ref Range    POCT Glucose 118 (H) 74 - 99 mg/dL   Amylase, Fluid   Result Value Ref Range    Amylase, Fluid 19 Not established. U/L   CBC   Result Value Ref Range    WBC 7.8 4.4 - 11.3 x10*3/uL    nRBC 0.0 0.0 - 0.0 /100 WBCs    RBC 2.40 (L) 4.50  - 5.90 x10*6/uL    Hemoglobin 7.8 (L) 13.5 - 17.5 g/dL    Hematocrit 24.1 (L) 41.0 - 52.0 %     80 - 100 fL    MCH 32.5 26.0 - 34.0 pg    MCHC 32.4 32.0 - 36.0 g/dL    RDW 18.6 (H) 11.5 - 14.5 %    Platelets 149 (L) 150 - 450 x10*3/uL   Magnesium   Result Value Ref Range    Magnesium 2.53 (H) 1.60 - 2.40 mg/dL   Renal Function Panel   Result Value Ref Range    Glucose 114 (H) 74 - 99 mg/dL    Sodium 143 136 - 145 mmol/L    Potassium 3.6 3.5 - 5.3 mmol/L    Chloride 110 (H) 98 - 107 mmol/L    Bicarbonate 23 21 - 32 mmol/L    Anion Gap 14 10 - 20 mmol/L    Urea Nitrogen 29 (H) 6 - 23 mg/dL    Creatinine 1.01 0.50 - 1.30 mg/dL    eGFR 82 >60 mL/min/1.73m*2    Calcium 9.1 8.6 - 10.6 mg/dL    Phosphorus 2.5 2.5 - 4.9 mg/dL    Albumin 3.8 3.4 - 5.0 g/dL   Calcium, Ionized   Result Value Ref Range    POCT Calcium, Ionized 1.15 1.1 - 1.33 mmol/L   POCT GLUCOSE   Result Value Ref Range    POCT Glucose 117 (H) 74 - 99 mg/dL     CXR 12/1:  1.  Interval improvement in left lung aeration with residual  loculated left-sided fluid and perihilar edema.    Assessment/Plan   Principal Problem:    Esophageal perforation  Active Problems:    Anticoagulant long-term use    Elevated liver function tests    Hypotension    This is a 69 y/o M with a history of esophageal perforation after Heller myotomy and fundoplication for achalasia who is s/p esophagectomy and cervical esophagostomy in 04/2023, and now s/p gastric pull up and cervical anastomosis on 11/29. Patient has been in ICU for monitoring post-operatively, required 1u pRBC on POD1. He was also on phenylephrine gtt until POD1 but that has since been weaned off.     Plan:  Neuro - pain control per ICU, appreciate acute pain recs, ok for home zoloft  Resp - CXR improved, recommend continued aggressive RT. encourage IS. wean O2 as tolerated, goal SpO2 >92%. CXR daily  CV - continue midodrine through J tube as needed, MAP goal of 60  GI - NPO, maintain NGT to kristen YEAGER to  increase TF through J tube, continue PPI, obtain daily amylase from MIGUELINA drain x 5 days (WNL on POD1, 2)   - maintain aguilera  Heme - H/H stable, continue SQH for DVT ppx  Endo - continue SSI  MSK - OOB as tolerated, PT/OT  DVT ppx - SQH, SCDs    Dispo: SICU    Discussed with attending, Dr. Corbin.    Carmen Madrigal MD  PGY-2 General Surgery  Thoracic Surgery [id number]

## 2023-12-02 NOTE — PROGRESS NOTES
"                       Surgical Intensive Care Unit Progress Note     Subjective   No major events overnight   Off phenylphrine infusion since midnight  On High flow 40%, 40 L    Objective     Physical Exam  Vitals and nursing note reviewed.   Constitutional:       Appearance: He is ill-appearing.      Comments: Chronically ill appearing     HENT:      Head: Normocephalic.      Nose: Nose normal.      Comments: NG in place to LIWS, bridled     Mouth/Throat:      Mouth: Mucous membranes are moist.   Eyes:      Pupils: Pupils are equal, round, and reactive to light.   Neck:      Comments: MIGUELINA drain with serosanguinous drainage. Limited neck mobility secondary to pain  Cardiovascular:      Rate and Rhythm: Normal rate and regular rhythm.   Pulmonary:      Effort: Pulmonary effort is normal.      Comments: Decreased BS bilaterally  Abdominal:      General: Bowel sounds are normal.      Palpations: Abdomen is soft.      Comments: J tubed capped. ML abdominal incision RAINA, no drainage or erythema   Genitourinary:     Comments: Maldonado catheter in place with clear yellow drainage  Musculoskeletal:      Cervical back: Neck supple.      Right lower leg: Edema present.      Left lower leg: Edema present.   Skin:     General: Skin is warm.      Capillary Refill: Capillary refill takes less than 2 seconds.      Coloration: Skin is pale.   Neurological:      General: No focal deficit present.      Mental Status: He is alert and oriented to person, place, and time.   Psychiatric:         Mood and Affect: Mood normal.         Behavior: Behavior normal.         Last Recorded Vitals  Blood pressure 106/65, pulse 81, temperature 36.4 °C (97.5 °F), temperature source Temporal, resp. rate 19, height 1.85 m (6' 0.84\"), weight 86 kg (189 lb 9.5 oz), SpO2 97 %.    Heart Rate:  []   Temp:  [36.4 °C (97.5 °F)-36.6 °C (97.9 °F)]   Resp:  [9-37]   BP: ()/(56-85)   SpO2:  [93 %-100 %]    Intake/Output last 3 Shifts:  I/O last 3 " completed shifts:  In: 4490.2 (52.2 mL/kg) [I.V.:2335.2 (27.2 mL/kg); Blood:250; NG/GT:405; IV Piggyback:1500]  Out: 3920 (45.6 mL/kg) [Urine:2190 (0.7 mL/kg/hr); Emesis/NG output:1645; Drains:85]  Weight: 86 kg       Intake/Output Summary (Last 24 hours) at 12/2/2023 0739  Last data filed at 12/2/2023 0610  Gross per 24 hour   Intake 2430.24 ml   Output 3140 ml   Net -709.76 ml          Relevant Results  Scheduled medications  acetaminophen, 650 mg, j-tube, q6h  albumin human, 25 g, intravenous, q6h  albuterol, 2.5 mg, nebulization, q6h  gabapentin, 300 mg, j-tube, q8h SYLVESTER  guaiFENesin, 200 mg, j-tube, q6h  heparin (porcine), 5,000 Units, subcutaneous, q8h  insulin lispro, 0-10 Units, subcutaneous, q4h  ketorolac, 15 mg, intravenous, q6h  lidocaine, 1 patch, transdermal, Daily  midodrine, 15 mg, j-tube, q8h  pantoprazole, 40 mg, intravenous, Daily  sertraline, 75 mg, j-tube, Daily  sodium chloride, 3 mL, nebulization, q6h SYLVESTER  thiamine, 100 mg, intravenous, Daily      Continuous medications  lactated Ringer's, 5 mL/hr, Last Rate: 5 mL/hr (12/01/23 0827)  phenylephrine, 0.1-2 mcg/kg/min, Last Rate: Stopped (12/02/23 0148)      PRN medications  PRN medications: calcium gluconate, calcium gluconate, dextrose 10 % in water (D10W), dextrose, glucagon, HYDROmorphone, magnesium sulfate, magnesium sulfate, [DISCONTINUED] ondansetron **OR** ondansetron, oxyCODONE, oxyCODONE, oxygen, potassium chloride       Results for orders placed or performed during the hospital encounter of 11/29/23 (from the past 24 hour(s))   Amylase, Fluid   Result Value Ref Range    Amylase, Fluid 24 Not established. U/L   POCT GLUCOSE   Result Value Ref Range    POCT Glucose 126 (H) 74 - 99 mg/dL   Calcium, Ionized   Result Value Ref Range    POCT Calcium, Ionized 1.21 1.1 - 1.33 mmol/L   CBC   Result Value Ref Range    WBC 10.9 4.4 - 11.3 x10*3/uL    nRBC 0.0 0.0 - 0.0 /100 WBCs    RBC 2.66 (L) 4.50 - 5.90 x10*6/uL    Hemoglobin 8.6 (L) 13.5 -  17.5 g/dL    Hematocrit 24.9 (L) 41.0 - 52.0 %    MCV 94 80 - 100 fL    MCH 32.3 26.0 - 34.0 pg    MCHC 34.5 32.0 - 36.0 g/dL    RDW 18.1 (H) 11.5 - 14.5 %    Platelets 163 150 - 450 x10*3/uL   Coagulation Screen   Result Value Ref Range    Protime 14.7 (H) 9.8 - 12.8 seconds    INR 1.3 (H) 0.9 - 1.1    aPTT 32 27 - 38 seconds   Magnesium   Result Value Ref Range    Magnesium 2.23 1.60 - 2.40 mg/dL   Renal Function Panel   Result Value Ref Range    Glucose 112 (H) 74 - 99 mg/dL    Sodium 142 136 - 145 mmol/L    Potassium 3.6 3.5 - 5.3 mmol/L    Chloride 105 98 - 107 mmol/L    Bicarbonate 26 21 - 32 mmol/L    Anion Gap 15 10 - 20 mmol/L    Urea Nitrogen 28 (H) 6 - 23 mg/dL    Creatinine 1.01 0.50 - 1.30 mg/dL    eGFR 82 >60 mL/min/1.73m*2    Calcium 8.9 8.6 - 10.6 mg/dL    Phosphorus 2.6 2.5 - 4.9 mg/dL    Albumin 3.5 3.4 - 5.0 g/dL   Blood Gas Arterial Full Panel   Result Value Ref Range    POCT pH, Arterial 7.44 (H) 7.38 - 7.42 pH    POCT pCO2, Arterial 39 38 - 42 mm Hg    POCT pO2, Arterial 86 85 - 95 mm Hg    POCT SO2, Arterial 99 94 - 100 %    POCT Oxy Hemoglobin, Arterial 96.3 94.0 - 98.0 %    POCT Hematocrit Calculated, Arterial 26.0 (L) 41.0 - 52.0 %    POCT Sodium, Arterial 138 136 - 145 mmol/L    POCT Potassium, Arterial 3.4 (L) 3.5 - 5.3 mmol/L    POCT Chloride, Arterial 108 (H) 98 - 107 mmol/L    POCT Ionized Calcium, Arterial 1.23 1.10 - 1.33 mmol/L    POCT Glucose, Arterial 120 (H) 74 - 99 mg/dL    POCT Lactate, Arterial 0.9 0.4 - 2.0 mmol/L    POCT Base Excess, Arterial 2.2 -2.0 - 3.0 mmol/L    POCT HCO3 Calculated, Arterial 26.5 (H) 22.0 - 26.0 mmol/L    POCT Hemoglobin, Arterial 8.8 (L) 13.5 - 17.5 g/dL    POCT Anion Gap, Arterial 7 (L) 10 - 25 mmo/L    Patient Temperature 37.0 degrees Celsius    FiO2 40 %   POCT GLUCOSE   Result Value Ref Range    POCT Glucose 125 (H) 74 - 99 mg/dL   POCT GLUCOSE   Result Value Ref Range    POCT Glucose 103 (H) 74 - 99 mg/dL   POCT GLUCOSE   Result Value Ref Range     POCT Glucose 106 (H) 74 - 99 mg/dL   POCT GLUCOSE   Result Value Ref Range    POCT Glucose 118 (H) 74 - 99 mg/dL   Amylase, Fluid   Result Value Ref Range    Amylase, Fluid 19 Not established. U/L   CBC   Result Value Ref Range    WBC 7.8 4.4 - 11.3 x10*3/uL    nRBC 0.0 0.0 - 0.0 /100 WBCs    RBC 2.40 (L) 4.50 - 5.90 x10*6/uL    Hemoglobin 7.8 (L) 13.5 - 17.5 g/dL    Hematocrit 24.1 (L) 41.0 - 52.0 %     80 - 100 fL    MCH 32.5 26.0 - 34.0 pg    MCHC 32.4 32.0 - 36.0 g/dL    RDW 18.6 (H) 11.5 - 14.5 %    Platelets 149 (L) 150 - 450 x10*3/uL   Magnesium   Result Value Ref Range    Magnesium 2.53 (H) 1.60 - 2.40 mg/dL   Renal Function Panel   Result Value Ref Range    Glucose 114 (H) 74 - 99 mg/dL    Sodium 143 136 - 145 mmol/L    Potassium 3.6 3.5 - 5.3 mmol/L    Chloride 110 (H) 98 - 107 mmol/L    Bicarbonate 23 21 - 32 mmol/L    Anion Gap 14 10 - 20 mmol/L    Urea Nitrogen 29 (H) 6 - 23 mg/dL    Creatinine 1.01 0.50 - 1.30 mg/dL    eGFR 82 >60 mL/min/1.73m*2    Calcium 9.1 8.6 - 10.6 mg/dL    Phosphorus 2.5 2.5 - 4.9 mg/dL    Albumin 3.8 3.4 - 5.0 g/dL   Calcium, Ionized   Result Value Ref Range    POCT Calcium, Ionized 1.15 1.1 - 1.33 mmol/L      Assessment/Plan     Levy Gregory is a 68 y.o. male presenting to SICU sp laparotomy, lysis of adhesions, gastric pullup, jejunostomy placement with Dr. Gongora 11/29/2023.     His PMHx is significant is significant for COPD, T2DM, GERD, type II achalasia, paraesophageal hernia s/p robotic Laparoscopic Heller myotomy with mary fundoplication on 3/1 with Dr. Hoang complicated by esophageal perforation Leading for a prolonged  hospitalization from 03/2023-5/2023. Course was complicated by a mediastinal abscess and R empyema with polymicrobial including Acinetobacter baumanii (CRAB) and MRSA . He required multiple surgical interventions:   -3/15 Right VATS with Gu   -3/24 IR guided pigtail placement  -3/27 EGD with esophageal stent replacement with Gu    -3/31  EGD, removal of esophageal stent, endovac placement, dobhoff tube placement, PEG tube placement ( Gu and Thoracic)  -4/5 right thoracotomy and esophagectomy with cervical esophagostomy and lap takedown of prior fundoplication, lysis of adhesions, and revision of gastrostomy tube for esophageal perforation ( Thoracic surgery)   - 4/7 PEG replacement and J tube placement ( Thoracic surgery)      His course was further complicated with Afib RVR, CORA required CRRT, Retroperitoneal/Psoas hematoma, upper and lower extremities DVTs sp IVC filter placement 4/20, and acalculous cholecystitis sp percutaneous cholecystostomy tube placement by IR 4/27      He was readmitted in August 2023 with worsening Empyema with cultures showing Pseudomonas, E.coli, Kleb oxytoca, and Saccharomyces cerevisae from right plural fluid, treated with Fluconazole, Levaquin, and Zosyn.  Most recently, he has been having issues with  feeding tube clogging and  then with jejunostomy tube malposition. Most recently had a G and separate J tube replacements (on 10/20/2023). Otherwise he has been doing well. He was brought back on 11/29/23 for Gastric pull up, j-tube placement, and partial L clavicle removal     Plan:  NEURO: History of diabetic neuropathy and depression .  Acute post-op pain mostly neck/chest pain and stiffness from clavicle removal site.   - ongoing neuro/pain assessments  - Continue scheduled acetaminophen, soxycodone liquid prn, keep iv hydromorphone for breakthrough pain  - scheduled robaxin, juan deigo,and toradol per pain team recs.  - Lidoderm patches surrounding L chest/clavicular incision  - Continue zoloft   - PT/OT following -> edge of bed or OOB daily  - Post ICU service consult  - Home meds: Zoloft 75 mg daily, Atarax 25 mg BID, Gabapentin 300 mg TID, thiamine 100 mg daily      CV: History of HLD , Hx of Afib. Baseline SBP 90's-100's, on midodrine 5mg q8h. Most recent Echo ( 08/2023) with suboptimal quality. Previous  TTE 4/2023: EF 70-75%, normal RV function.   Arrived to SICU on phenylphrine infusion 0.5 mcg/kg/min. Phenylphrine infusion increased to 1mcg/kg/min overnight 11/29-11/30 and received 500 ml LR x 2. Started on midodrine 15mg q8h 11/30. Transfused 2 rbc, weaned of phenyl infusion in afternoon 11/30. Placed back transiently on 12/1, now off   - continue midodrine 15 mg q 8  - continuous EKG/ABP monitoring  - goal map range 65-90  -volume resuscitate as clinically indicated  - start metoprolol 5mg IV q6h for arrhythmia prophylaxis when blood pressure allows -> possibly later today  - Home meds: Eliquis 2.5 mg BID, Lipitor 10 mg daily, Midodrine 5 mg TID, Aldactone 12.5 mg QOD      PULM: History of COPD. 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 Most recent Empyema was in August 2023. Acute hypoxic respiratory insufficiency on 12/1 with increased FiO2 to 5L HF NC. Weak cough noted, CXR with left chest white out, improved with pulmonary hygiene.  - Continue bronchial hygiene -> Acapella, Airvo, scheduled NS/albuterol nebs  - Wean HF as tolerated, currently on 40%, 40 L  - goal SpO2>94%  - Q1h incentive spirometry while awake  - NT suctioning ok   - OOB  - Avoid positive pressure ventilation      GI: Hx Paraesophageal hernia s/p robotic Laparoscopic Heller myotomy with mary fundoplication on 3/1 with Dr. Hoang complicated by esophageal perforation leading to multiple intervention as outlined above  and eventually a right thoracotomy and esophagectomy with cervical esophagostomy and lap takedown of prior fundoplication on 4/5. Perc fran 4/27.  He is now sp laparotomy, lysis of adhesions, gastric pullup, jejunostomy placement. L partial clavicle removal 11/29  Elevated Alk phos in the 1000's range of unclear etiology. Trending down  - Maintain strict NPO  - PPI for GI prophylaxis  - Maintain HOB up at least 30 degrees at all times secondary to high risk of aspiration  - Send daily amylase  levels from MIGUELINA drain ( level 19 today)  - NG to LIWS  - Do not replace or reposition NG tube if it becomes dislodged, call thoracic surgery  - Follow daily LFTs  - Continue trickle feeds via J-tube, discuss with thoracic advancing further        : Baseline sCr ~ 0.9. Oliguric CORA March 2023, required CRRT, recovered and returted to baseline . Urinary retention, has a chronic aguilera . [facility]anged on arrival to [facility]/29.   - Check renal function panel daily and prn  - KVO IVF  - Maintain U/O >0.5-1ml/kg/hr  - Replete electrolytes to goal K>4, Mg>2, Phos>2.5, ionized Ca>1.10     HEME: Acute blood loss anemia. Hemoglobin 7.7 11/30, transfused 2 RBC with increase of Hgb to 8.2-8.7 range. Bilateral UE DVT and LLE DVT diagnosed March 2023, IVC filter placed in 4/2023. On Eliquis. Repeat vasc US x4 extremities 11/30: BUE without DVTs, BLE + for nonocclusive L popliteal DVT.   - Check CBC and coags daily and PRN  - Ongoing monitoring for s/s bleeding  - continue scds and subcutaneous heparin  - discuss timing of resumption of anticoagulation with surgical team  -Home Meds: Eliquis      ENDO: IDDM2. 9/25/23 A1C 4.7. Adequate glycemic control   - Q4h BG   - SSI Lispro per ICU protocol     ID:  Hx mediastinal abscess and R empyema with polymicrobial including Acinetobacter baumanii (CRAB) and MRSA ( 04/2023)  Recurrent Empyema with cultures growing  cultures showing Pseudomonas, E.coli and Kleb oxytoca, and Saccharomyces cerevisae ( 08/2023)     - trend temp q4, wbc daily  - Finished perioperative Cefazolin  - ongoing monitoring for s/s infection  - Keep on contact isolation given Hx Acinetobacter baumanii (CRAB)     Lines:  -Picc line ( placed 8/11/23) --> keep per thoracic surgery   - PIV  -11/29 A line     Dispo: Continue SICU care. Patient seen and discussed with ICU attending Dr. Angulo     Critical Care time : 60 min

## 2023-12-02 NOTE — PROGRESS NOTES
Assessment/Plan     Patient seen on rounds with ICU team, pertinent labs and study results noted, patient examined, consultant input noted where applicable. Assessment and plan discussed with ICU team.        ID Statement: 69 yo s/p Heller myotomy in 3/2023 complicated by esophageal perforation with subsequent esophageal stenting, esophagectomy and VATS procedure. Other post-op complications include CORA, DVT with UVC filer placement. Most recently patient underwent gastric pull up, ISA and jejunostomy tube placement on 11/30/23 and admitted to SICU for complex medical and surgical management. PMHx includes COPD, DM type 2, diabetic neuropathy.      Assessment==>Plan:                 CNS: Mental status slightly improved, awake, oriented x3, no focal deficits. Post-op pain slowly improving. ==> Increase activity, OOBTC and ambulation with assistance. Cont prn pain management.                  CV: Post-op hypotension resolved, remains off phenylephrine infusion. PO Midodrine at 15mg Q8hrs. Tachycardia resolved, HR in 80s. ==> Cont Emily monitoring and continuous EKG. Cont Midodrine.                 PULM: Post-op respiratory function improved with pulmonary toilet. Oxygenation improved with Airvo 40 l/min, 40% and CXR shows significantly increased aeration of L lung field. Improved cough. ==> Cont pulmonary toilet per RT and IS. Wean supplemental O2 as tolerated. No need for bronchoscopy.                  : Renal function unchanged, Cr stable, UOP adequate. ==> Maintain negative fluid balance.                 HEME: Anemia asymptomatic, hgb decreased to 7.8 from 8.7, platelet count normal. L popliteal DVT.  ==> Cont subcutaneous heparin and SCDs, no need for anticoagulation at this time.                 GI: NPO, tolerating low rate Tfs per J-tube.. ==> Advance TF rate as tolerated. Cont PPI.                 ENDO: Very good glycemic control. ==> Cont insulin sliding scale.                 ID: Normal WBC  (7.8), afebrile. Periop antibiotics completed. ==> Cont to monitor for signs of infection.              Revision History          I spent 34 minutes in the professional and overall care of this patient.      Alexsander Angulo MD

## 2023-12-03 ENCOUNTER — APPOINTMENT (OUTPATIENT)
Dept: RADIOLOGY | Facility: HOSPITAL | Age: 68
DRG: 326 | End: 2023-12-03
Payer: MEDICARE

## 2023-12-03 LAB
ABO GROUP (TYPE) IN BLOOD: NORMAL
ALBUMIN SERPL BCP-MCNC: 3.8 G/DL (ref 3.4–5)
AMYLASE FLD-CCNC: 13 U/L
ANION GAP SERPL CALC-SCNC: 12 MMOL/L (ref 10–20)
ANTIBODY SCREEN: NORMAL
APTT PPP: 31 SECONDS (ref 27–38)
BUN SERPL-MCNC: 32 MG/DL (ref 6–23)
CA-I BLD-SCNC: 1.2 MMOL/L (ref 1.1–1.33)
CALCIUM SERPL-MCNC: 8.9 MG/DL (ref 8.6–10.6)
CHLORIDE SERPL-SCNC: 112 MMOL/L (ref 98–107)
CO2 SERPL-SCNC: 25 MMOL/L (ref 21–32)
CREAT SERPL-MCNC: 1.06 MG/DL (ref 0.5–1.3)
ERYTHROCYTE [DISTWIDTH] IN BLOOD BY AUTOMATED COUNT: 18.2 % (ref 11.5–14.5)
GFR SERPL CREATININE-BSD FRML MDRD: 77 ML/MIN/1.73M*2
GLUCOSE BLD MANUAL STRIP-MCNC: 108 MG/DL (ref 74–99)
GLUCOSE BLD MANUAL STRIP-MCNC: 112 MG/DL (ref 74–99)
GLUCOSE BLD MANUAL STRIP-MCNC: 114 MG/DL (ref 74–99)
GLUCOSE BLD MANUAL STRIP-MCNC: 118 MG/DL (ref 74–99)
GLUCOSE BLD MANUAL STRIP-MCNC: 122 MG/DL (ref 74–99)
GLUCOSE BLD MANUAL STRIP-MCNC: 132 MG/DL (ref 74–99)
GLUCOSE SERPL-MCNC: 117 MG/DL (ref 74–99)
HCT VFR BLD AUTO: 23 % (ref 41–52)
HGB BLD-MCNC: 7.2 G/DL (ref 13.5–17.5)
INR PPP: 1.1 (ref 0.9–1.1)
MAGNESIUM SERPL-MCNC: 2.2 MG/DL (ref 1.6–2.4)
MCH RBC QN AUTO: 31.9 PG (ref 26–34)
MCHC RBC AUTO-ENTMCNC: 31.3 G/DL (ref 32–36)
MCV RBC AUTO: 102 FL (ref 80–100)
NRBC BLD-RTO: 0 /100 WBCS (ref 0–0)
PHOSPHATE SERPL-MCNC: 2.9 MG/DL (ref 2.5–4.9)
PLATELET # BLD AUTO: 165 X10*3/UL (ref 150–450)
POTASSIUM SERPL-SCNC: 4 MMOL/L (ref 3.5–5.3)
PROTHROMBIN TIME: 12.8 SECONDS (ref 9.8–12.8)
RBC # BLD AUTO: 2.26 X10*6/UL (ref 4.5–5.9)
RH FACTOR (ANTIGEN D): NORMAL
SODIUM SERPL-SCNC: 145 MMOL/L (ref 136–145)
WBC # BLD AUTO: 5.5 X10*3/UL (ref 4.4–11.3)

## 2023-12-03 PROCEDURE — 37799 UNLISTED PX VASCULAR SURGERY: CPT | Performed by: PHYSICIAN ASSISTANT

## 2023-12-03 PROCEDURE — 94668 MNPJ CHEST WALL SBSQ: CPT

## 2023-12-03 PROCEDURE — 2500000002 HC RX 250 W HCPCS SELF ADMINISTERED DRUGS (ALT 637 FOR MEDICARE OP, ALT 636 FOR OP/ED): Performed by: STUDENT IN AN ORGANIZED HEALTH CARE EDUCATION/TRAINING PROGRAM

## 2023-12-03 PROCEDURE — 2500000005 HC RX 250 GENERAL PHARMACY W/O HCPCS: Performed by: PHYSICIAN ASSISTANT

## 2023-12-03 PROCEDURE — 99291 CRITICAL CARE FIRST HOUR: CPT | Performed by: STUDENT IN AN ORGANIZED HEALTH CARE EDUCATION/TRAINING PROGRAM

## 2023-12-03 PROCEDURE — 83735 ASSAY OF MAGNESIUM: CPT | Performed by: PHYSICIAN ASSISTANT

## 2023-12-03 PROCEDURE — 80069 RENAL FUNCTION PANEL: CPT | Performed by: PHYSICIAN ASSISTANT

## 2023-12-03 PROCEDURE — 86900 BLOOD TYPING SEROLOGIC ABO: CPT | Performed by: PHYSICIAN ASSISTANT

## 2023-12-03 PROCEDURE — 82947 ASSAY GLUCOSE BLOOD QUANT: CPT

## 2023-12-03 PROCEDURE — 96372 THER/PROPH/DIAG INJ SC/IM: CPT | Performed by: PHYSICIAN ASSISTANT

## 2023-12-03 PROCEDURE — 2500000004 HC RX 250 GENERAL PHARMACY W/ HCPCS (ALT 636 FOR OP/ED): Mod: JZ | Performed by: STUDENT IN AN ORGANIZED HEALTH CARE EDUCATION/TRAINING PROGRAM

## 2023-12-03 PROCEDURE — 2500000001 HC RX 250 WO HCPCS SELF ADMINISTERED DRUGS (ALT 637 FOR MEDICARE OP): Performed by: ANESTHESIOLOGY

## 2023-12-03 PROCEDURE — 99233 SBSQ HOSP IP/OBS HIGH 50: CPT | Performed by: PHYSICIAN ASSISTANT

## 2023-12-03 PROCEDURE — 1200000002 HC GENERAL ROOM WITH TELEMETRY DAILY

## 2023-12-03 PROCEDURE — C9113 INJ PANTOPRAZOLE SODIUM, VIA: HCPCS | Performed by: PHYSICIAN ASSISTANT

## 2023-12-03 PROCEDURE — 94640 AIRWAY INHALATION TREATMENT: CPT

## 2023-12-03 PROCEDURE — 99291 CRITICAL CARE FIRST HOUR: CPT | Performed by: ANESTHESIOLOGY

## 2023-12-03 PROCEDURE — 2500000001 HC RX 250 WO HCPCS SELF ADMINISTERED DRUGS (ALT 637 FOR MEDICARE OP): Performed by: NURSE PRACTITIONER

## 2023-12-03 PROCEDURE — 71045 X-RAY EXAM CHEST 1 VIEW: CPT

## 2023-12-03 PROCEDURE — 85027 COMPLETE CBC AUTOMATED: CPT | Performed by: PHYSICIAN ASSISTANT

## 2023-12-03 PROCEDURE — 82150 ASSAY OF AMYLASE: CPT | Performed by: STUDENT IN AN ORGANIZED HEALTH CARE EDUCATION/TRAINING PROGRAM

## 2023-12-03 PROCEDURE — 2500000005 HC RX 250 GENERAL PHARMACY W/O HCPCS

## 2023-12-03 PROCEDURE — P9045 ALBUMIN (HUMAN), 5%, 250 ML: HCPCS | Mod: JZ | Performed by: STUDENT IN AN ORGANIZED HEALTH CARE EDUCATION/TRAINING PROGRAM

## 2023-12-03 PROCEDURE — 2500000004 HC RX 250 GENERAL PHARMACY W/ HCPCS (ALT 636 FOR OP/ED): Performed by: STUDENT IN AN ORGANIZED HEALTH CARE EDUCATION/TRAINING PROGRAM

## 2023-12-03 PROCEDURE — 2500000005 HC RX 250 GENERAL PHARMACY W/O HCPCS: Performed by: STUDENT IN AN ORGANIZED HEALTH CARE EDUCATION/TRAINING PROGRAM

## 2023-12-03 PROCEDURE — 96372 THER/PROPH/DIAG INJ SC/IM: CPT | Performed by: STUDENT IN AN ORGANIZED HEALTH CARE EDUCATION/TRAINING PROGRAM

## 2023-12-03 PROCEDURE — 82330 ASSAY OF CALCIUM: CPT | Performed by: PHYSICIAN ASSISTANT

## 2023-12-03 PROCEDURE — 2500000004 HC RX 250 GENERAL PHARMACY W/ HCPCS (ALT 636 FOR OP/ED): Performed by: PHYSICIAN ASSISTANT

## 2023-12-03 PROCEDURE — 2500000001 HC RX 250 WO HCPCS SELF ADMINISTERED DRUGS (ALT 637 FOR MEDICARE OP)

## 2023-12-03 PROCEDURE — 2500000001 HC RX 250 WO HCPCS SELF ADMINISTERED DRUGS (ALT 637 FOR MEDICARE OP): Performed by: STUDENT IN AN ORGANIZED HEALTH CARE EDUCATION/TRAINING PROGRAM

## 2023-12-03 PROCEDURE — 2500000004 HC RX 250 GENERAL PHARMACY W/ HCPCS (ALT 636 FOR OP/ED): Performed by: NURSE PRACTITIONER

## 2023-12-03 PROCEDURE — 71045 X-RAY EXAM CHEST 1 VIEW: CPT | Performed by: RADIOLOGY

## 2023-12-03 PROCEDURE — 2500000002 HC RX 250 W HCPCS SELF ADMINISTERED DRUGS (ALT 637 FOR MEDICARE OP, ALT 636 FOR OP/ED): Performed by: PHYSICIAN ASSISTANT

## 2023-12-03 PROCEDURE — 85730 THROMBOPLASTIN TIME PARTIAL: CPT | Performed by: PHYSICIAN ASSISTANT

## 2023-12-03 RX ORDER — SODIUM CHLORIDE FOR INHALATION 3 %
3 VIAL, NEBULIZER (ML) INHALATION
Status: DISCONTINUED | OUTPATIENT
Start: 2023-12-03 | End: 2023-12-12 | Stop reason: HOSPADM

## 2023-12-03 RX ORDER — LIDOCAINE 560 MG/1
1 PATCH PERCUTANEOUS; TOPICAL; TRANSDERMAL DAILY
Status: DISCONTINUED | OUTPATIENT
Start: 2023-12-03 | End: 2023-12-12 | Stop reason: HOSPADM

## 2023-12-03 RX ORDER — ALBUMIN HUMAN 50 G/1000ML
12.5 SOLUTION INTRAVENOUS ONCE
Status: COMPLETED | OUTPATIENT
Start: 2023-12-03 | End: 2023-12-03

## 2023-12-03 RX ORDER — HYDROMORPHONE HYDROCHLORIDE 1 MG/ML
0.2 INJECTION, SOLUTION INTRAMUSCULAR; INTRAVENOUS; SUBCUTANEOUS EVERY 4 HOURS PRN
Status: DISCONTINUED | OUTPATIENT
Start: 2023-12-03 | End: 2023-12-07

## 2023-12-03 RX ORDER — PANTOPRAZOLE SODIUM 40 MG/10ML
40 INJECTION, POWDER, LYOPHILIZED, FOR SOLUTION INTRAVENOUS DAILY
Status: DISCONTINUED | OUTPATIENT
Start: 2023-12-03 | End: 2023-12-12 | Stop reason: HOSPADM

## 2023-12-03 RX ORDER — OXYCODONE HCL 5 MG/5 ML
5 SOLUTION, ORAL ORAL EVERY 4 HOURS PRN
Status: DISCONTINUED | OUTPATIENT
Start: 2023-12-03 | End: 2023-12-07

## 2023-12-03 RX ORDER — GUAIFENESIN 100 MG/5ML
200 SOLUTION ORAL EVERY 6 HOURS
Status: DISCONTINUED | OUTPATIENT
Start: 2023-12-03 | End: 2023-12-12 | Stop reason: HOSPADM

## 2023-12-03 RX ORDER — ALBUTEROL SULFATE 0.83 MG/ML
2.5 SOLUTION RESPIRATORY (INHALATION) 3 TIMES DAILY
Status: DISCONTINUED | OUTPATIENT
Start: 2023-12-03 | End: 2023-12-12 | Stop reason: HOSPADM

## 2023-12-03 RX ORDER — GABAPENTIN 250 MG/5ML
300 SOLUTION ORAL EVERY 8 HOURS SCHEDULED
Status: DISCONTINUED | OUTPATIENT
Start: 2023-12-03 | End: 2023-12-12 | Stop reason: HOSPADM

## 2023-12-03 RX ORDER — ACETAMINOPHEN 160 MG/5ML
650 SOLUTION ORAL EVERY 6 HOURS
Status: DISCONTINUED | OUTPATIENT
Start: 2023-12-03 | End: 2023-12-12 | Stop reason: HOSPADM

## 2023-12-03 RX ORDER — DEXTROSE 50 % IN WATER (D50W) INTRAVENOUS SYRINGE
25
Status: DISCONTINUED | OUTPATIENT
Start: 2023-12-03 | End: 2023-12-12 | Stop reason: HOSPADM

## 2023-12-03 RX ORDER — MIDODRINE HYDROCHLORIDE 10 MG/1
15 TABLET ORAL EVERY 8 HOURS
Status: DISCONTINUED | OUTPATIENT
Start: 2023-12-03 | End: 2023-12-12 | Stop reason: HOSPADM

## 2023-12-03 RX ORDER — OXYCODONE HCL 5 MG/5 ML
10 SOLUTION, ORAL ORAL EVERY 4 HOURS PRN
Status: DISCONTINUED | OUTPATIENT
Start: 2023-12-03 | End: 2023-12-07

## 2023-12-03 RX ORDER — ONDANSETRON HYDROCHLORIDE 2 MG/ML
4 INJECTION, SOLUTION INTRAVENOUS EVERY 8 HOURS PRN
Status: DISCONTINUED | OUTPATIENT
Start: 2023-12-03 | End: 2023-12-12 | Stop reason: HOSPADM

## 2023-12-03 RX ORDER — THIAMINE HYDROCHLORIDE 100 MG/ML
100 INJECTION, SOLUTION INTRAMUSCULAR; INTRAVENOUS DAILY
Status: DISCONTINUED | OUTPATIENT
Start: 2023-12-03 | End: 2023-12-09 | Stop reason: SDUPTHER

## 2023-12-03 RX ORDER — DEXTROSE MONOHYDRATE 100 MG/ML
0.3 INJECTION, SOLUTION INTRAVENOUS ONCE AS NEEDED
Status: DISCONTINUED | OUTPATIENT
Start: 2023-12-03 | End: 2023-12-12 | Stop reason: HOSPADM

## 2023-12-03 RX ORDER — INSULIN LISPRO 100 [IU]/ML
0-10 INJECTION, SOLUTION INTRAVENOUS; SUBCUTANEOUS EVERY 4 HOURS
Status: DISCONTINUED | OUTPATIENT
Start: 2023-12-03 | End: 2023-12-12 | Stop reason: HOSPADM

## 2023-12-03 RX ORDER — HEPARIN SODIUM 5000 [USP'U]/ML
5000 INJECTION, SOLUTION INTRAVENOUS; SUBCUTANEOUS EVERY 8 HOURS
Status: DISCONTINUED | OUTPATIENT
Start: 2023-12-03 | End: 2023-12-04

## 2023-12-03 RX ADMIN — ACETAMINOPHEN 650 MG: 650 SOLUTION ORAL at 02:47

## 2023-12-03 RX ADMIN — GUAIFENESIN 200 MG: 200 SOLUTION ORAL at 08:29

## 2023-12-03 RX ADMIN — SERTRALINE 75 MG: 50 TABLET, FILM COATED ORAL at 08:28

## 2023-12-03 RX ADMIN — ACETAMINOPHEN 650 MG: 650 SOLUTION ORAL at 21:21

## 2023-12-03 RX ADMIN — MIDODRINE HYDROCHLORIDE 15 MG: 10 TABLET ORAL at 15:44

## 2023-12-03 RX ADMIN — HEPARIN SODIUM 5000 UNITS: 5000 INJECTION INTRAVENOUS; SUBCUTANEOUS at 15:44

## 2023-12-03 RX ADMIN — GABAPENTIN 300 MG: 250 SOLUTION ORAL at 15:43

## 2023-12-03 RX ADMIN — ALBUTEROL SULFATE 2.5 MG: 2.5 SOLUTION RESPIRATORY (INHALATION) at 08:22

## 2023-12-03 RX ADMIN — MIDODRINE HYDROCHLORIDE 15 MG: 10 TABLET ORAL at 08:29

## 2023-12-03 RX ADMIN — SODIUM CHLORIDE 30 MG/ML INHALATION SOLUTION 3 ML: 30 SOLUTION INHALANT at 16:12

## 2023-12-03 RX ADMIN — HEPARIN SODIUM 5000 UNITS: 5000 INJECTION INTRAVENOUS; SUBCUTANEOUS at 21:19

## 2023-12-03 RX ADMIN — SODIUM CHLORIDE 30 MG/ML INHALATION SOLUTION 3 ML: 30 SOLUTION INHALANT at 08:22

## 2023-12-03 RX ADMIN — THIAMINE HYDROCHLORIDE 100 MG: 100 INJECTION, SOLUTION INTRAMUSCULAR; INTRAVENOUS at 11:14

## 2023-12-03 RX ADMIN — GUAIFENESIN 200 MG: 200 SOLUTION ORAL at 15:43

## 2023-12-03 RX ADMIN — ACETAMINOPHEN 650 MG: 650 SOLUTION ORAL at 15:43

## 2023-12-03 RX ADMIN — OXYCODONE HYDROCHLORIDE 10 MG: 5 SOLUTION ORAL at 19:50

## 2023-12-03 RX ADMIN — OXYCODONE HYDROCHLORIDE 10 MG: 5 SOLUTION ORAL at 05:25

## 2023-12-03 RX ADMIN — PANTOPRAZOLE SODIUM 40 MG: 40 INJECTION, POWDER, FOR SOLUTION INTRAVENOUS at 08:28

## 2023-12-03 RX ADMIN — ALBUTEROL SULFATE 2.5 MG: 2.5 SOLUTION RESPIRATORY (INHALATION) at 16:12

## 2023-12-03 RX ADMIN — LIDOCAINE 1 PATCH: 4 PATCH TOPICAL at 08:29

## 2023-12-03 RX ADMIN — GUAIFENESIN 200 MG: 200 SOLUTION ORAL at 21:19

## 2023-12-03 RX ADMIN — KETOROLAC TROMETHAMINE 15 MG: 15 INJECTION, SOLUTION INTRAMUSCULAR; INTRAVENOUS at 02:47

## 2023-12-03 RX ADMIN — GABAPENTIN 300 MG: 250 SOLUTION ORAL at 21:19

## 2023-12-03 RX ADMIN — GABAPENTIN 300 MG: 250 SOLUTION ORAL at 08:28

## 2023-12-03 RX ADMIN — ALBUMIN HUMAN 12.5 G: 0.05 INJECTION, SOLUTION INTRAVENOUS at 01:20

## 2023-12-03 RX ADMIN — MIDODRINE HYDROCHLORIDE 15 MG: 10 TABLET ORAL at 21:19

## 2023-12-03 RX ADMIN — HEPARIN SODIUM 5000 UNITS: 5000 INJECTION INTRAVENOUS; SUBCUTANEOUS at 05:26

## 2023-12-03 RX ADMIN — GUAIFENESIN 200 MG: 200 SOLUTION ORAL at 02:47

## 2023-12-03 ASSESSMENT — COGNITIVE AND FUNCTIONAL STATUS - GENERAL
DRESSING REGULAR UPPER BODY CLOTHING: A LOT
PERSONAL GROOMING: A LITTLE
MOVING TO AND FROM BED TO CHAIR: A LOT
DRESSING REGULAR LOWER BODY CLOTHING: A LOT
CLIMB 3 TO 5 STEPS WITH RAILING: TOTAL
TOILETING: A LOT
EATING MEALS: TOTAL
WALKING IN HOSPITAL ROOM: A LOT
MOBILITY SCORE: 11
DAILY ACTIVITIY SCORE: 12
MOVING FROM LYING ON BACK TO SITTING ON SIDE OF FLAT BED WITH BEDRAILS: A LOT
TURNING FROM BACK TO SIDE WHILE IN FLAT BAD: A LOT
STANDING UP FROM CHAIR USING ARMS: A LOT
HELP NEEDED FOR BATHING: A LOT

## 2023-12-03 ASSESSMENT — PAIN SCALES - GENERAL
PAINLEVEL_OUTOF10: 0 - NO PAIN
PAINLEVEL_OUTOF10: 10 - WORST POSSIBLE PAIN
PAINLEVEL_OUTOF10: 0 - NO PAIN
PAINLEVEL_OUTOF10: 10 - WORST POSSIBLE PAIN
PAINLEVEL_OUTOF10: 8

## 2023-12-03 ASSESSMENT — PAIN - FUNCTIONAL ASSESSMENT
PAIN_FUNCTIONAL_ASSESSMENT: 0-10

## 2023-12-03 NOTE — CARE PLAN
The patient's goals for the shift include      The clinical goals for the shift include hemodynamic stability    Over the shift, the patient did make progress toward the following goals.

## 2023-12-03 NOTE — PROGRESS NOTES
"                       Surgical Intensive Care Unit Progress Note     Subjective   No major events overnight   Received 250 ml Albumin for low urine output    Objective     Physical Exam  Constitutional:       Appearance: He is ill-appearing.      Comments: Chronically ill appearing     HENT:      Head: Normocephalic.      Nose: Nose normal.      Comments: NG in place to LIWS, bridled     Mouth/Throat:      Mouth: Mucous membranes are moist.   Eyes:      Pupils: Pupils are equal, round, and reactive to light.   Neck:      Comments: MIGUELINA drain with serosanguinous drainage. Limited neck mobility secondary to pain  Cardiovascular:      Rate and Rhythm: Normal rate and regular rhythm.   Pulmonary:      Effort: Pulmonary effort is normal.      Comments: Decreased BS bilaterally  Abdominal:      General: Bowel sounds are normal.      Palpations: Abdomen is soft.      Comments: J tubed capped. ML abdominal incision RAINA, no drainage or erythema   Genitourinary:     Comments: Maldonado catheter in place with clear yellow drainage  Musculoskeletal:      Cervical back: Neck supple.      Right lower leg: Edema present.      Left lower leg: Edema present.   Skin:     General: Skin is warm.      Capillary Refill: Capillary refill takes less than 2 seconds.   Neurological:      General: No focal deficit present.      Mental Status: He is alert and oriented to person, place, and time.   Psychiatric:         Mood and Affect: Mood normal.         Behavior: Behavior normal.         Last Recorded Vitals  Blood pressure 104/64, pulse 72, temperature 36.6 °C (97.9 °F), temperature source Temporal, resp. rate 20, height 1.85 m (6' 0.84\"), weight 91.9 kg (202 lb 9.6 oz), SpO2 99 %.    Heart Rate:  [67-86]   Temp:  [35.9 °C (96.6 °F)-36.7 °C (98.1 °F)]   Resp:  [10-28]   BP: ()/(58-76)   Weight:  [91.9 kg (202 lb 9.6 oz)]   SpO2:  [95 %-100 %]    Intake/Output last 3 Shifts:  I/O last 3 completed shifts:  In: 1753.5 (19.1 mL/kg) [I.V.:368.5 " (4 mL/kg); NG/GT:735; IV Piggyback:650]  Out: 2128 (23.2 mL/kg) [Urine:1188 (0.4 mL/kg/hr); Emesis/NG output:870; Drains:70]  Weight: 91.9 kg       Intake/Output Summary (Last 24 hours) at 12/3/2023 0931  Last data filed at 12/3/2023 0600  Gross per 24 hour   Intake 940 ml   Output 1188 ml   Net -248 ml        Relevant Results  Scheduled medications  acetaminophen, 650 mg, j-tube, q6h  albuterol, 2.5 mg, nebulization, TID  gabapentin, 300 mg, j-tube, q8h SYLVESTER  guaiFENesin, 200 mg, j-tube, q6h  heparin (porcine), 5,000 Units, subcutaneous, q8h  insulin lispro, 0-10 Units, subcutaneous, q4h  ketorolac, 15 mg, intravenous, q6h  lidocaine, 1 patch, transdermal, Daily  midodrine, 15 mg, j-tube, q8h  pantoprazole, 40 mg, intravenous, Daily  sertraline, 75 mg, j-tube, Daily  sodium chloride, 3 mL, nebulization, TID  thiamine, 100 mg, intravenous, Daily      Continuous medications       PRN medications  PRN medications: calcium gluconate, calcium gluconate, dextrose 10 % in water (D10W), dextrose, glucagon, HYDROmorphone, magnesium sulfate, magnesium sulfate, [DISCONTINUED] ondansetron **OR** ondansetron, oxyCODONE, oxyCODONE, oxygen, potassium chloride       Results for orders placed or performed during the hospital encounter of 11/29/23 (from the past 24 hour(s))   POCT GLUCOSE   Result Value Ref Range    POCT Glucose 112 (H) 74 - 99 mg/dL   POCT GLUCOSE   Result Value Ref Range    POCT Glucose 120 (H) 74 - 99 mg/dL   POCT GLUCOSE   Result Value Ref Range    POCT Glucose 92 74 - 99 mg/dL   POCT GLUCOSE   Result Value Ref Range    POCT Glucose 112 (H) 74 - 99 mg/dL   POCT GLUCOSE   Result Value Ref Range    POCT Glucose 112 (H) 74 - 99 mg/dL   Magnesium   Result Value Ref Range    Magnesium 2.20 1.60 - 2.40 mg/dL   CBC   Result Value Ref Range    WBC 5.5 4.4 - 11.3 x10*3/uL    nRBC 0.0 0.0 - 0.0 /100 WBCs    RBC 2.26 (L) 4.50 - 5.90 x10*6/uL    Hemoglobin 7.2 (L) 13.5 - 17.5 g/dL    Hematocrit 23.0 (L) 41.0 - 52.0 %    MCV  102 (H) 80 - 100 fL    MCH 31.9 26.0 - 34.0 pg    MCHC 31.3 (L) 32.0 - 36.0 g/dL    RDW 18.2 (H) 11.5 - 14.5 %    Platelets 165 150 - 450 x10*3/uL   Coagulation Screen   Result Value Ref Range    Protime 12.8 9.8 - 12.8 seconds    INR 1.1 0.9 - 1.1    aPTT 31 27 - 38 seconds   Renal Function Panel   Result Value Ref Range    Glucose 117 (H) 74 - 99 mg/dL    Sodium 145 136 - 145 mmol/L    Potassium 4.0 3.5 - 5.3 mmol/L    Chloride 112 (H) 98 - 107 mmol/L    Bicarbonate 25 21 - 32 mmol/L    Anion Gap 12 10 - 20 mmol/L    Urea Nitrogen 32 (H) 6 - 23 mg/dL    Creatinine 1.06 0.50 - 1.30 mg/dL    eGFR 77 >60 mL/min/1.73m*2    Calcium 8.9 8.6 - 10.6 mg/dL    Phosphorus 2.9 2.5 - 4.9 mg/dL    Albumin 3.8 3.4 - 5.0 g/dL   Calcium, Ionized   Result Value Ref Range    POCT Calcium, Ionized 1.20 1.1 - 1.33 mmol/L   Amylase, Fluid   Result Value Ref Range    Amylase, Fluid 13 Not established. U/L   POCT GLUCOSE   Result Value Ref Range    POCT Glucose 122 (H) 74 - 99 mg/dL      Assessment/Plan     Levy Gregory is a 68 y.o. male presenting to SICU sp laparotomy, lysis of adhesions, gastric pullup, jejunostomy placement with Dr. Gongora 11/29/2023.     His PMHx is significant is significant for COPD, T2DM, GERD, type II achalasia, paraesophageal hernia s/p robotic Laparoscopic Heller myotomy with mary fundoplication on 3/1 with Dr. Hoang complicated by esophageal perforation Leading for a prolonged  hospitalization from 03/2023-5/2023. Course was complicated by a mediastinal abscess and R empyema with polymicrobial including Acinetobacter baumanii (CRAB) and MRSA . He required multiple surgical interventions:   -3/15 Right VATS with Gu   -3/24 IR guided pigtail placement  -3/27 EGD with esophageal stent replacement with Gu   -3/31  EGD, removal of esophageal stent, endovac placement, dobhoff tube placement, PEG tube placement ( Gu and Thoracic)  -4/5 right thoracotomy and esophagectomy with cervical esophagostomy  and lap takedown of prior fundoplication, lysis of adhesions, and revision of gastrostomy tube for esophageal perforation ( Thoracic surgery)   - 4/7 PEG replacement and J tube placement ( Thoracic surgery)      His course was further complicated with Afib RVR, CORA required CRRT, Retroperitoneal/Psoas hematoma, upper and lower extremities DVTs sp IVC filter placement 4/20, and acalculous cholecystitis sp percutaneous cholecystostomy tube placement by IR 4/27      He was readmitted in August 2023 with worsening Empyema with cultures showing Pseudomonas, E.coli, Kleb oxytoca, and Saccharomyces cerevisae from right plural fluid, treated with Fluconazole, Levaquin, and Zosyn.  Most recently, he has been having issues with  feeding tube clogging and  then with jejunostomy tube malposition. Most recently had a G and separate J tube replacements (on 10/20/2023). Otherwise he has been doing well. He was brought back on 11/29/23 for Gastric pull up, j-tube placement, and partial L clavicle removal     Plan:  NEURO: History of diabetic neuropathy and depression .  Acute post-op pain mostly neck/chest pain and stiffness from clavicle removal site.   - ongoing neuro/pain assessments  - Continue scheduled acetaminophen, soxycodone liquid prn, keep iv hydromorphone for breakthrough pain  - scheduled robaxin, juan diego,and toradol per pain team recs.  - Lidoderm patches surrounding L chest/clavicular incision  - Continue zoloft   - PT/OT following -> edge of bed or OOB daily  - Post ICU service consult  - Home meds: Zoloft 75 mg daily, Atarax 25 mg BID, Gabapentin 300 mg TID, thiamine 100 mg daily      CV: History of HLD , Hx of Afib. Baseline SBP 90's-100's, on midodrine 5mg q8h. Most recent Echo ( 08/2023) with suboptimal quality. Previous TTE 4/2023: EF 70-75%, normal RV function.   Arrived to SICU on phenylphrine infusion 0.5 mcg/kg/min. Phenylphrine infusion increased to 1mcg/kg/min overnight 11/29-11/30 and received 500 ml LR x  2. Started on midodrine 15mg q8h 11/30. Transfused 2 rbc, weaned of phenyl infusion in afternoon 11/30. Placed back transiently on 12/1, now off   - continue midodrine 15 mg q 8  - continuous EKG/ABP monitoring  - goal map range 65-90  -volume resuscitate as clinically indicated  - Home meds: Eliquis 2.5 mg BID, Lipitor 10 mg daily, Midodrine 5 mg TID, Aldactone 12.5 mg QOD      PULM: History of COPD. 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 Most recent Empyema was in August 2023. Acute hypoxic respiratory insufficiency on 12/1 with increased FiO2 to 5L HF NC. Weak cough noted, CXR with left chest white out, improved with pulmonary hygiene. Now on 2 LNC   - Continue bronchial hygiene -> Acapella,scheduled albuterol nebs  - goal SpO2>92%  - Q1h incentive spirometry while awake  - NT suctioning ok   - OOB  - Avoid positive pressure ventilation      GI: Hx Paraesophageal hernia s/p robotic Laparoscopic Heller myotomy with mary fundoplication on 3/1 with Dr. Hoang complicated by esophageal perforation leading to multiple intervention as outlined above  and eventually a right thoracotomy and esophagectomy with cervical esophagostomy and lap takedown of prior fundoplication on 4/5. Perc fran 4/27.  He is now sp laparotomy, lysis of adhesions, gastric pullup, jejunostomy placement. L partial clavicle removal 11/29  Elevated Alk phos in the 1000's range of unclear etiology. Trended down  - Maintain strict NPO  - PPI for GI prophylaxis  - Maintain HOB up at least 30 degrees at all times secondary to high risk of aspiration  - Send daily amylase levels from MIGUELINA drain ( level today pending)  - NG to LIWS  - Do not replace or reposition NG tube if it becomes dislodged, call thoracic surgery  - Increase TF to 25 ml/hr  via J-tube ( Goal is 60 ml/hr)       : Baseline sCr ~ 0.9. Oliguric CORA March 2023, required CRRT, recovered and returted to baseline . Urinary retention, has a chronic aguilera .  Changed on arrival to SICU11/29.   - Check renal function panel daily and prn  - KVO IVF  - Maintain U/O >0.5-1ml/kg/hr  - Replete electrolytes to goal K>4, Mg>2, Phos>2.5, ionized Ca>1.10     HEME: Acute blood loss anemia. Hemoglobin 7.7 11/30, transfused 2 RBC with increase of Hgb to 8.2-8.7 range. Bilateral UE DVT and LLE DVT diagnosed March 2023, IVC filter placed in 4/2023. On Eliquis. Repeat vasc US x4 extremities 11/30: BUE without DVTs, BLE + for nonocclusive L popliteal DVT.   - Check CBC and coags daily and PRN  - Ongoing monitoring for s/s bleeding  - continue scds and subcutaneous heparin  - discuss timing of resumption of anticoagulation with surgical team  -Home Meds: Eliquis      ENDO: IDDM2. 9/25/23 A1C 4.7. Adequate glycemic control   - Q4h BG   - SSI Lispro per ICU protocol     ID:  Hx mediastinal abscess and R empyema with polymicrobial including Acinetobacter baumanii (CRAB) and MRSA ( 04/2023)  Recurrent Empyema with cultures growing  cultures showing Pseudomonas, E.coli and Kleb oxytoca, and Saccharomyces cerevisae ( 08/2023)     - trend temp q4, wbc daily  - Finished perioperative Cefazolin  - ongoing monitoring for s/s infection  - Keep on contact isolation given Hx Acinetobacter baumanii (CRAB)     Lines:  -Picc line ( placed 8/11/23) --> keep per thoracic surgery   - PIV  -11/29 A line --> remove today    Dispo: Continue SICU care. Patient seen and discussed with ICU attending Dr. Angulo. Transfer to the floor when bed is available.

## 2023-12-03 NOTE — CARE PLAN
Problem: Pain  Goal: My pain/discomfort is manageable  Outcome: Progressing     Problem: Safety  Goal: Patient will be injury free during hospitalization  Outcome: Progressing  Goal: I will remain free of falls  Outcome: Progressing     Problem: Daily Care  Goal: Daily care needs are met  Outcome: Progressing     Problem: Psychosocial Needs  Goal: Demonstrates ability to cope with hospitalization/illness  Outcome: Progressing  Goal: Collaborate with me, my family, and caregiver to identify my specific goals  Outcome: Progressing     Problem: Discharge Barriers  Goal: My discharge needs are met  Outcome: Progressing     Problem: Pain  Goal: STG - Patient verbalizes a reduction in pain level  Outcome: Progressing

## 2023-12-03 NOTE — PROGRESS NOTES
"Levy Gregory is a 68 y.o. male on day 4 of admission presenting with Esophageal perforation.    Subjective   ***       Objective     Physical Exam    Last Recorded Vitals  Blood pressure 116/75, pulse 86, temperature 36.6 °C (97.9 °F), temperature source Temporal, resp. rate 18, height 1.85 m (6' 0.84\"), weight 91.9 kg (202 lb 9.6 oz), SpO2 98 %.  Intake/Output last 3 Shifts:  I/O last 3 completed shifts:  In: 1753.5 (19.1 mL/kg) [I.V.:368.5 (4 mL/kg); NG/GT:735; IV Piggyback:650]  Out: 2128 (23.2 mL/kg) [Urine:1188 (0.4 mL/kg/hr); Emesis/NG output:870; Drains:70]  Weight: 91.9 kg     Relevant Results  {If you would like to pull in Medications, type .meds     If you would like to pull in Lab results for the last 24 hours, type .[id number]    If you would like to pull in Imaging results, type .[id number] :99}    {Link to Stroke Scoring tools - Link :99}        This patient has a central line   Reason for the central line remaining today? {Central Line Risk Screen:28159}    This patient has a urinary catheter   Reason for the urinary catheter remaining today? {Urine Catheter Risk Screen:93658}               Assessment/Plan   Principal Problem:    Esophageal perforation  Active Problems:    Anticoagulant long-term use    Elevated liver function tests    Hypotension    ***     {This patient does not have an ACP note on file for this encounter, please fill one out - Advance Care Planning Activity :99}      Elliot Hansen RN      "

## 2023-12-03 NOTE — PROGRESS NOTES
"Levy Gregory is a 68 y.o. male on day 4 of admission presenting with Esophageal perforation.    Subjective   Patient is doing well this morning.  He has not been on pressors for more than 24 hours.  He is tolerating his trickle tube feeds.  MIGUELINA drain output 40 cc, amylase 13  NG tube output 625  Urine output 718  On 2 L of nasal cannula during rounds down from high flow nasal cannula last night.       Objective   General: NAD  HEENT: NGT in nares, to LIWS with bilious output in cannister. MIGUELINA with ss output  Resp: nonlabored breathing on NC, sternal/cervical incision clean dry and intact  CV: NSR on monitor  Abd: soft, appropriately TTP. Incisions appear c/d/I, trickle tube feeds via J-tube  : aguilera draining clear yellow urine  MSK: moves all extremities  Ext: RUE edema, minimal LE edema  Psych: appropriate mood and behavior      Last Recorded Vitals  Blood pressure 117/68, pulse 88, temperature 36.6 °C (97.9 °F), temperature source Temporal, resp. rate 19, height 1.85 m (6' 0.84\"), weight 91.9 kg (202 lb 9.6 oz), SpO2 100 %.  Intake/Output last 3 Shifts:  I/O last 3 completed shifts:  In: 1753.5 (19.1 mL/kg) [I.V.:368.5 (4 mL/kg); NG/GT:735; IV Piggyback:650]  Out: 2128 (23.2 mL/kg) [Urine:1188 (0.4 mL/kg/hr); Emesis/NG output:870; Drains:70]  Weight: 91.9 kg     Relevant Results  Scheduled medications  acetaminophen, 650 mg, j-tube, q6h  albuterol, 2.5 mg, nebulization, TID  gabapentin, 300 mg, j-tube, q8h SYLVESTER  guaiFENesin, 200 mg, j-tube, q6h  heparin (porcine), 5,000 Units, subcutaneous, q8h  insulin lispro, 0-10 Units, subcutaneous, q4h  ketorolac, 15 mg, intravenous, q6h  lidocaine, 1 patch, transdermal, Daily  midodrine, 15 mg, j-tube, q8h  pantoprazole, 40 mg, intravenous, Daily  sertraline, 75 mg, j-tube, Daily  sodium chloride, 3 mL, nebulization, TID  thiamine, 100 mg, intravenous, Daily      Continuous medications     PRN medications  PRN medications: calcium gluconate, calcium gluconate, dextrose " 10 % in water (D10W), dextrose, glucagon, HYDROmorphone, magnesium sulfate, magnesium sulfate, [DISCONTINUED] ondansetron **OR** ondansetron, oxyCODONE, oxyCODONE, oxygen, potassium chloride  Results for orders placed or performed during the hospital encounter of 11/29/23 (from the past 24 hour(s))   POCT GLUCOSE   Result Value Ref Range    POCT Glucose 112 (H) 74 - 99 mg/dL   POCT GLUCOSE   Result Value Ref Range    POCT Glucose 120 (H) 74 - 99 mg/dL   POCT GLUCOSE   Result Value Ref Range    POCT Glucose 92 74 - 99 mg/dL   POCT GLUCOSE   Result Value Ref Range    POCT Glucose 112 (H) 74 - 99 mg/dL   POCT GLUCOSE   Result Value Ref Range    POCT Glucose 112 (H) 74 - 99 mg/dL   Magnesium   Result Value Ref Range    Magnesium 2.20 1.60 - 2.40 mg/dL   CBC   Result Value Ref Range    WBC 5.5 4.4 - 11.3 x10*3/uL    nRBC 0.0 0.0 - 0.0 /100 WBCs    RBC 2.26 (L) 4.50 - 5.90 x10*6/uL    Hemoglobin 7.2 (L) 13.5 - 17.5 g/dL    Hematocrit 23.0 (L) 41.0 - 52.0 %     (H) 80 - 100 fL    MCH 31.9 26.0 - 34.0 pg    MCHC 31.3 (L) 32.0 - 36.0 g/dL    RDW 18.2 (H) 11.5 - 14.5 %    Platelets 165 150 - 450 x10*3/uL   Coagulation Screen   Result Value Ref Range    Protime 12.8 9.8 - 12.8 seconds    INR 1.1 0.9 - 1.1    aPTT 31 27 - 38 seconds   Renal Function Panel   Result Value Ref Range    Glucose 117 (H) 74 - 99 mg/dL    Sodium 145 136 - 145 mmol/L    Potassium 4.0 3.5 - 5.3 mmol/L    Chloride 112 (H) 98 - 107 mmol/L    Bicarbonate 25 21 - 32 mmol/L    Anion Gap 12 10 - 20 mmol/L    Urea Nitrogen 32 (H) 6 - 23 mg/dL    Creatinine 1.06 0.50 - 1.30 mg/dL    eGFR 77 >60 mL/min/1.73m*2    Calcium 8.9 8.6 - 10.6 mg/dL    Phosphorus 2.9 2.5 - 4.9 mg/dL    Albumin 3.8 3.4 - 5.0 g/dL   Calcium, Ionized   Result Value Ref Range    POCT Calcium, Ionized 1.20 1.1 - 1.33 mmol/L   Amylase, Fluid   Result Value Ref Range    Amylase, Fluid 13 Not established. U/L   POCT GLUCOSE   Result Value Ref Range    POCT Glucose 122 (H) 74 - 99 mg/dL              Assessment/Plan   Principal Problem:    Esophageal perforation  Active Problems:    Anticoagulant long-term use    Elevated liver function tests    Hypotension    This is a 68-year-old man postop day 4 status post ex lap, adhesiolysis substernal gastric pull-up with cervical anastomosis, and J-tube placement.  He is improving, he is off pressors, his oxygen requirements have nearly ceased, he feels good and is wondering when he can move to the floor.  His labs are all consistent with improvement, he has no leukocytosis, his amylase is 13.  His hemoglobin this morning is 7.2 which should be monitored, likely due to postoperative state as well as frequent blood draws.    Plan  Okay for transfer to [place] [facility] with telemetry  Strict n.p.o., and NG to low intermittent wall suction  Daily MIGUELINA drain amylase  Plan for swallow study Wednesday  We will advance tube feeds to goal  Holding Eliquis, he is on subcu heparin DVT prophylaxis  Daily chest x-ray, nebulizers, RT, I-S, guaifenesin  Tylenol, oxycodone, breakthrough Dilaudid for pain, continue home gabapentin, sertraline, thiamine  Patient is not on any antihypertensives continue midodrine for now  PPI  Obtain daily renal and electrolyte panel, patient has chronic Maldonado that was exchanged for this hospitalization, will leave Maldonado in place for now  Insulin sliding scale  Monitor signs and symptoms of infection including daily CBC  Trend hemoglobin has been slowly downtrending which is not unexpected    Seen and discussed with rounding thoracic surgeon, and primary thoracic surgeon    Otilio Negrete MD  General surgery resident  Thoracic [id number]    I spent 30 minutes in the professional and overall care of this patient.      Otilio Negrete MD

## 2023-12-03 NOTE — PROGRESS NOTES
Patient seen on rounds with SICU team, pertinent labs and study results noted, patient examined, consultant input noted where applicable. Assessment and plan discussed with SICU team.     Critical care time: 32 min     ID Statement: 69 yo s/p Heller myotomy in 3/2023 complicated by esophageal perforation with subsequent esophageal stenting, esophagectomy and VATS procedure. Other post-op complications include CORA, DVT with UVC filer placement. Most recently patient underwent gastric pull up, ISA and jejunostomy tube placement on 11/30/23 and admitted to SICU for complex medical and surgical management. PMHx includes COPD, DM type 2, diabetic neuropathy.      Assessment==>Plan:                 CNS: Mental status continues to improve, more interactive. Pain controlled better, x3, no focal deficits.  ==> Increase activity as tolerated, OOBTC and ambulate with assistance. Cont multimodal pain management.                 CV: Post-op hypotension resolved, hemodynamically improved with midodrine PO. Sinus rhythm in 70s. ==> Discontinue [place]. Continuous EKG. Cont Midodrine.                 PULM: Post-op respiratory insufficiency improving steadily, now oxygenating well on O2 per NC at 2L. Cough improved. CXR shows L lung field with improved aeration.   ==>Cont incentive spirometry and pulmonary toilet per RT. Increase activity.                 : Renal function stable, Cr stable, UOP acceptable. ==> Daily RFP. Maintain negative fluid balance for pulmonary function.                 HEME: Asymptomatic anemia, hgb decreased to 7.2. Platelet count normal. No signs of acute bleed. L popliteal DVT clinically insignificant.  ==> Cont to trend hgb levels. Cont SQ heparin and SCDs, no need for anticoagulation at this time.                 GI: Strict NPO, NG tube in place to suction. Tolerating low rate tube feeds per J-tube. ==> Advance tube feeds per J-tube as tolerated.  Cont PPI.                 ENDO: Good glycemic control.  ==> Cont insulin sliding scale.                 ID: No leukocytosis,  afebrile. Periop antibiotics completed. ==> Cont to monitor for signs of infection.    Alexsander Angulo MD              Revision History

## 2023-12-04 ENCOUNTER — APPOINTMENT (OUTPATIENT)
Dept: RADIOLOGY | Facility: HOSPITAL | Age: 68
DRG: 326 | End: 2023-12-04
Payer: MEDICARE

## 2023-12-04 LAB
ALBUMIN SERPL BCP-MCNC: 3.7 G/DL (ref 3.4–5)
AMYLASE FLD-CCNC: 14 U/L
ANION GAP SERPL CALC-SCNC: 11 MMOL/L (ref 10–20)
BUN SERPL-MCNC: 31 MG/DL (ref 6–23)
CALCIUM SERPL-MCNC: 9.2 MG/DL (ref 8.6–10.6)
CHLORIDE SERPL-SCNC: 113 MMOL/L (ref 98–107)
CO2 SERPL-SCNC: 26 MMOL/L (ref 21–32)
CREAT SERPL-MCNC: 0.94 MG/DL (ref 0.5–1.3)
ERYTHROCYTE [DISTWIDTH] IN BLOOD BY AUTOMATED COUNT: 17.9 % (ref 11.5–14.5)
GFR SERPL CREATININE-BSD FRML MDRD: 89 ML/MIN/1.73M*2
GLUCOSE BLD MANUAL STRIP-MCNC: 108 MG/DL (ref 74–99)
GLUCOSE BLD MANUAL STRIP-MCNC: 110 MG/DL (ref 74–99)
GLUCOSE BLD MANUAL STRIP-MCNC: 124 MG/DL (ref 74–99)
GLUCOSE BLD MANUAL STRIP-MCNC: 126 MG/DL (ref 74–99)
GLUCOSE BLD MANUAL STRIP-MCNC: 135 MG/DL (ref 74–99)
GLUCOSE BLD MANUAL STRIP-MCNC: 166 MG/DL (ref 74–99)
GLUCOSE SERPL-MCNC: 141 MG/DL (ref 74–99)
HCT VFR BLD AUTO: 25.6 % (ref 41–52)
HGB BLD-MCNC: 8.1 G/DL (ref 13.5–17.5)
MAGNESIUM SERPL-MCNC: 1.96 MG/DL (ref 1.6–2.4)
MCH RBC QN AUTO: 32.4 PG (ref 26–34)
MCHC RBC AUTO-ENTMCNC: 31.6 G/DL (ref 32–36)
MCV RBC AUTO: 102 FL (ref 80–100)
NRBC BLD-RTO: 0 /100 WBCS (ref 0–0)
PHOSPHATE SERPL-MCNC: 2 MG/DL (ref 2.5–4.9)
PLATELET # BLD AUTO: 191 X10*3/UL (ref 150–450)
POTASSIUM SERPL-SCNC: 3.9 MMOL/L (ref 3.5–5.3)
RBC # BLD AUTO: 2.5 X10*6/UL (ref 4.5–5.9)
SODIUM SERPL-SCNC: 146 MMOL/L (ref 136–145)
UFH PPP CHRO-ACNC: 0.2 IU/ML
UFH PPP CHRO-ACNC: 0.3 IU/ML
WBC # BLD AUTO: 6.8 X10*3/UL (ref 4.4–11.3)

## 2023-12-04 PROCEDURE — 97116 GAIT TRAINING THERAPY: CPT | Mod: GP

## 2023-12-04 PROCEDURE — 71045 X-RAY EXAM CHEST 1 VIEW: CPT | Mod: FY

## 2023-12-04 PROCEDURE — 96372 THER/PROPH/DIAG INJ SC/IM: CPT | Performed by: STUDENT IN AN ORGANIZED HEALTH CARE EDUCATION/TRAINING PROGRAM

## 2023-12-04 PROCEDURE — 97110 THERAPEUTIC EXERCISES: CPT | Mod: GP

## 2023-12-04 PROCEDURE — 51702 INSERT TEMP BLADDER CATH: CPT

## 2023-12-04 PROCEDURE — 2500000004 HC RX 250 GENERAL PHARMACY W/ HCPCS (ALT 636 FOR OP/ED): Performed by: STUDENT IN AN ORGANIZED HEALTH CARE EDUCATION/TRAINING PROGRAM

## 2023-12-04 PROCEDURE — 99232 SBSQ HOSP IP/OBS MODERATE 35: CPT | Performed by: NURSE PRACTITIONER

## 2023-12-04 PROCEDURE — 2500000002 HC RX 250 W HCPCS SELF ADMINISTERED DRUGS (ALT 637 FOR MEDICARE OP, ALT 636 FOR OP/ED): Performed by: STUDENT IN AN ORGANIZED HEALTH CARE EDUCATION/TRAINING PROGRAM

## 2023-12-04 PROCEDURE — 71045 X-RAY EXAM CHEST 1 VIEW: CPT | Performed by: RADIOLOGY

## 2023-12-04 PROCEDURE — 2500000001 HC RX 250 WO HCPCS SELF ADMINISTERED DRUGS (ALT 637 FOR MEDICARE OP): Performed by: STUDENT IN AN ORGANIZED HEALTH CARE EDUCATION/TRAINING PROGRAM

## 2023-12-04 PROCEDURE — 82150 ASSAY OF AMYLASE: CPT | Performed by: STUDENT IN AN ORGANIZED HEALTH CARE EDUCATION/TRAINING PROGRAM

## 2023-12-04 PROCEDURE — 1200000002 HC GENERAL ROOM WITH TELEMETRY DAILY

## 2023-12-04 PROCEDURE — 94640 AIRWAY INHALATION TREATMENT: CPT

## 2023-12-04 PROCEDURE — 2500000005 HC RX 250 GENERAL PHARMACY W/O HCPCS: Performed by: STUDENT IN AN ORGANIZED HEALTH CARE EDUCATION/TRAINING PROGRAM

## 2023-12-04 PROCEDURE — 83735 ASSAY OF MAGNESIUM: CPT | Performed by: STUDENT IN AN ORGANIZED HEALTH CARE EDUCATION/TRAINING PROGRAM

## 2023-12-04 PROCEDURE — C9113 INJ PANTOPRAZOLE SODIUM, VIA: HCPCS | Performed by: STUDENT IN AN ORGANIZED HEALTH CARE EDUCATION/TRAINING PROGRAM

## 2023-12-04 PROCEDURE — 97530 THERAPEUTIC ACTIVITIES: CPT | Mod: GP

## 2023-12-04 PROCEDURE — 84100 ASSAY OF PHOSPHORUS: CPT | Performed by: STUDENT IN AN ORGANIZED HEALTH CARE EDUCATION/TRAINING PROGRAM

## 2023-12-04 PROCEDURE — 82947 ASSAY GLUCOSE BLOOD QUANT: CPT

## 2023-12-04 PROCEDURE — 85520 HEPARIN ASSAY: CPT | Performed by: STUDENT IN AN ORGANIZED HEALTH CARE EDUCATION/TRAINING PROGRAM

## 2023-12-04 PROCEDURE — 85027 COMPLETE CBC AUTOMATED: CPT | Performed by: STUDENT IN AN ORGANIZED HEALTH CARE EDUCATION/TRAINING PROGRAM

## 2023-12-04 RX ORDER — HEPARIN SODIUM 10000 [USP'U]/100ML
0-4000 INJECTION, SOLUTION INTRAVENOUS CONTINUOUS
Status: DISCONTINUED | OUTPATIENT
Start: 2023-12-04 | End: 2023-12-11

## 2023-12-04 RX ORDER — HEPARIN SODIUM 5000 [USP'U]/ML
2000-4000 INJECTION, SOLUTION INTRAVENOUS; SUBCUTANEOUS EVERY 4 HOURS PRN
Status: DISCONTINUED | OUTPATIENT
Start: 2023-12-04 | End: 2023-12-11

## 2023-12-04 RX ORDER — FUROSEMIDE 10 MG/ML
20 INJECTION INTRAMUSCULAR; INTRAVENOUS ONCE
Status: COMPLETED | OUTPATIENT
Start: 2023-12-04 | End: 2023-12-04

## 2023-12-04 RX ADMIN — HEPARIN SODIUM 1100 UNITS/HR: 10000 INJECTION, SOLUTION INTRAVENOUS at 11:23

## 2023-12-04 RX ADMIN — SERTRALINE HYDROCHLORIDE 75 MG: 50 TABLET ORAL at 08:24

## 2023-12-04 RX ADMIN — MIDODRINE HYDROCHLORIDE 15 MG: 10 TABLET ORAL at 05:17

## 2023-12-04 RX ADMIN — INSULIN LISPRO 2 UNITS: 100 INJECTION, SOLUTION INTRAVENOUS; SUBCUTANEOUS at 18:28

## 2023-12-04 RX ADMIN — SODIUM CHLORIDE 30 MG/ML INHALATION SOLUTION 3 ML: 30 SOLUTION INHALANT at 14:12

## 2023-12-04 RX ADMIN — GUAIFENESIN 200 MG: 200 SOLUTION ORAL at 21:23

## 2023-12-04 RX ADMIN — GUAIFENESIN 200 MG: 200 SOLUTION ORAL at 03:29

## 2023-12-04 RX ADMIN — PANTOPRAZOLE SODIUM 40 MG: 40 INJECTION, POWDER, FOR SOLUTION INTRAVENOUS at 08:24

## 2023-12-04 RX ADMIN — ALBUTEROL SULFATE 2.5 MG: 2.5 SOLUTION RESPIRATORY (INHALATION) at 14:12

## 2023-12-04 RX ADMIN — GABAPENTIN 300 MG: 250 SOLUTION ORAL at 05:17

## 2023-12-04 RX ADMIN — GABAPENTIN 300 MG: 250 SOLUTION ORAL at 13:55

## 2023-12-04 RX ADMIN — FUROSEMIDE 20 MG: 10 INJECTION, SOLUTION INTRAVENOUS at 11:23

## 2023-12-04 RX ADMIN — MIDODRINE HYDROCHLORIDE 15 MG: 10 TABLET ORAL at 21:23

## 2023-12-04 RX ADMIN — OXYCODONE HYDROCHLORIDE 5 MG: 5 SOLUTION ORAL at 13:55

## 2023-12-04 RX ADMIN — GUAIFENESIN 200 MG: 200 SOLUTION ORAL at 16:29

## 2023-12-04 RX ADMIN — GUAIFENESIN 200 MG: 200 SOLUTION ORAL at 08:46

## 2023-12-04 RX ADMIN — HEPARIN SODIUM 5000 UNITS: 5000 INJECTION INTRAVENOUS; SUBCUTANEOUS at 05:17

## 2023-12-04 RX ADMIN — MIDODRINE HYDROCHLORIDE 15 MG: 10 TABLET ORAL at 13:55

## 2023-12-04 RX ADMIN — ACETAMINOPHEN 650 MG: 650 SOLUTION ORAL at 03:29

## 2023-12-04 RX ADMIN — SODIUM CHLORIDE 30 MG/ML INHALATION SOLUTION 3 ML: 30 SOLUTION INHALANT at 09:25

## 2023-12-04 RX ADMIN — THIAMINE HYDROCHLORIDE 100 MG: 100 INJECTION, SOLUTION INTRAMUSCULAR; INTRAVENOUS at 08:23

## 2023-12-04 RX ADMIN — GABAPENTIN 300 MG: 250 SOLUTION ORAL at 21:39

## 2023-12-04 RX ADMIN — OXYCODONE HYDROCHLORIDE 10 MG: 5 SOLUTION ORAL at 21:00

## 2023-12-04 RX ADMIN — ACETAMINOPHEN 650 MG: 650 SOLUTION ORAL at 08:43

## 2023-12-04 RX ADMIN — ALBUTEROL SULFATE 2.5 MG: 2.5 SOLUTION RESPIRATORY (INHALATION) at 09:25

## 2023-12-04 RX ADMIN — ACETAMINOPHEN 650 MG: 650 SOLUTION ORAL at 21:23

## 2023-12-04 RX ADMIN — ACETAMINOPHEN 650 MG: 650 SOLUTION ORAL at 16:29

## 2023-12-04 ASSESSMENT — COGNITIVE AND FUNCTIONAL STATUS - GENERAL
TOILETING: A LOT
MOBILITY SCORE: 14
MOVING TO AND FROM BED TO CHAIR: A LITTLE
PERSONAL GROOMING: A LITTLE
DAILY ACTIVITIY SCORE: 15
MOVING FROM LYING ON BACK TO SITTING ON SIDE OF FLAT BED WITH BEDRAILS: A LITTLE
MOVING FROM LYING ON BACK TO SITTING ON SIDE OF FLAT BED WITH BEDRAILS: A LITTLE
MOVING FROM LYING ON BACK TO SITTING ON SIDE OF FLAT BED WITH BEDRAILS: A LOT
DRESSING REGULAR LOWER BODY CLOTHING: A LOT
WALKING IN HOSPITAL ROOM: A LOT
DRESSING REGULAR LOWER BODY CLOTHING: A LOT
STANDING UP FROM CHAIR USING ARMS: A LOT
CLIMB 3 TO 5 STEPS WITH RAILING: A LOT
TOILETING: A LOT
TURNING FROM BACK TO SIDE WHILE IN FLAT BAD: A LITTLE
CLIMB 3 TO 5 STEPS WITH RAILING: TOTAL
TURNING FROM BACK TO SIDE WHILE IN FLAT BAD: A LOT
MOVING TO AND FROM BED TO CHAIR: A LOT
MOBILITY SCORE: 11
WALKING IN HOSPITAL ROOM: A LITTLE
WALKING IN HOSPITAL ROOM: A LOT
HELP NEEDED FOR BATHING: A LOT
STANDING UP FROM CHAIR USING ARMS: A LITTLE
DRESSING REGULAR UPPER BODY CLOTHING: A LOT
TURNING FROM BACK TO SIDE WHILE IN FLAT BAD: A LOT
CLIMB 3 TO 5 STEPS WITH RAILING: A LOT
PERSONAL GROOMING: A LITTLE
DRESSING REGULAR UPPER BODY CLOTHING: A LOT
DAILY ACTIVITIY SCORE: 15
STANDING UP FROM CHAIR USING ARMS: A LITTLE
MOBILITY SCORE: 17
HELP NEEDED FOR BATHING: A LOT
MOVING TO AND FROM BED TO CHAIR: A LOT

## 2023-12-04 ASSESSMENT — PAIN SCALES - GENERAL
PAINLEVEL_OUTOF10: 2
PAINLEVEL_OUTOF10: 0 - NO PAIN
PAINLEVEL_OUTOF10: 7
PAINLEVEL_OUTOF10: 5 - MODERATE PAIN
PAINLEVEL_OUTOF10: 0 - NO PAIN
PAINLEVEL_OUTOF10: 10 - WORST POSSIBLE PAIN

## 2023-12-04 ASSESSMENT — PAIN - FUNCTIONAL ASSESSMENT
PAIN_FUNCTIONAL_ASSESSMENT: 0-10

## 2023-12-04 NOTE — PROGRESS NOTES
"Levy Gregory is a 69 y/o  with complex medical history who is  s/p laparotomy, lysis of adhesions, gastric pullup, jejunostomy placement with Dr. Gongora 11/29/2023.                           Subjective   No major events overnight   Oxygenating well on room air  Incisional pain improved     Objective     Physical Exam  Vitals and nursing note reviewed.   Constitutional:       Appearance: He is ill-appearing.      Comments: Chronically ill appearing , fatigued and pale    HENT:      Head: Normocephalic and atraumatic.      Nose:      Comments: NG in place to LIWS, bridled  Neck:      Comments: MIGUELINA drain with serosanguinous drainage.   Incision RAINA with no redness, well approximated   Cardiovascular:      Rate and Rhythm: Normal rate and regular rhythm.      Comments: Telemetry with NSR, rate in 80's   Pulmonary:      Effort: Pulmonary effort is normal.      Comments: Diminished at bases  bilaterally  Oxygenating well on room air   Abdominal:      General: Bowel sounds are normal.      Palpations: Abdomen is soft.      Comments: J tubed site dry and clean. Abdominal incision RAINA, no drainage or erythema.   TF Infusing via j-tube.   + flatus  -BM since surgery   Appropriately tender to touch     Genitourinary:     Comments: Maldonado catheter in place with clear yellow drainage  Musculoskeletal:      Cervical back: Neck supple.      Right lower leg: Edema present.      Left lower leg: Edema present.      Comments: No edema or calf tenderness    Skin:     General: Skin is warm.      Capillary Refill: Capillary refill takes less than 2 seconds.      Coloration: Skin is pale.   Neurological:      General: No focal deficit present.      Mental Status: He is alert and oriented to person, place, and time.   Psychiatric:         Mood and Affect: Mood normal.         Behavior: Behavior normal.         Last Recorded Vitals  Blood pressure 114/73, pulse 85, temperature 36.7 °C (98 °F), resp. rate 17, height 1.85 m (6' 0.84\"), " weight 90.6 kg (199 lb 11.2 oz), SpO2 94 %.    Heart Rate:  [70-90]   Temp:  [36.5 °C (97.7 °F)-37 °C (98.6 °F)]   Resp:  [17-27]   BP: (100-128)/(65-82)   Weight:  [90.6 kg (199 lb 11.2 oz)]   SpO2:  [92 %-100 %]    Intake/Output last 3 Shifts:  I/O last 3 completed shifts:  In: 1535 (16.9 mL/kg) [I.V.:70 (0.8 mL/kg); NG/GT:1215; IV Piggyback:250]  Out: 2308 (25.5 mL/kg) [Urine:1783 (0.5 mL/kg/hr); Emesis/NG output:455; Drains:70]  Weight: 90.6 kg       Intake/Output Summary (Last 24 hours) at 12/4/2023 1110  Last data filed at 12/4/2023 0508  Gross per 24 hour   Intake 660 ml   Output 1470 ml   Net -810 ml          Relevant Results  Scheduled medications  acetaminophen, 650 mg, j-tube, q6h  albuterol, 2.5 mg, nebulization, TID  furosemide, 20 mg, intravenous, Once  gabapentin, 300 mg, j-tube, q8h SYLVESTER  guaiFENesin, 200 mg, j-tube, q6h  insulin lispro, 0-10 Units, subcutaneous, q4h  lidocaine, 1 patch, transdermal, Daily  midodrine, 15 mg, j-tube, q8h  pantoprazole, 40 mg, intravenous, Daily  sertraline, 75 mg, j-tube, Daily  sodium chloride, 3 mL, nebulization, TID  thiamine, 100 mg, intravenous, Daily      Continuous medications  heparin, 0-4,000 Units/hr      PRN medications  PRN medications: dextrose 10 % in water (D10W), dextrose, glucagon, heparin, HYDROmorphone, ondansetron, oxyCODONE, oxyCODONE, oxygen    Results for orders placed or performed during the hospital encounter of 11/29/23 (from the past 24 hour(s))   POCT GLUCOSE   Result Value Ref Range    POCT Glucose 132 (H) 74 - 99 mg/dL   POCT GLUCOSE   Result Value Ref Range    POCT Glucose 118 (H) 74 - 99 mg/dL   POCT GLUCOSE   Result Value Ref Range    POCT Glucose 114 (H) 74 - 99 mg/dL   POCT GLUCOSE   Result Value Ref Range    POCT Glucose 108 (H) 74 - 99 mg/dL   POCT GLUCOSE   Result Value Ref Range    POCT Glucose 108 (H) 74 - 99 mg/dL   POCT GLUCOSE   Result Value Ref Range    POCT Glucose 135 (H) 74 - 99 mg/dL   Magnesium   Result Value Ref Range     Magnesium 1.96 1.60 - 2.40 mg/dL   Renal Function Panel   Result Value Ref Range    Glucose 141 (H) 74 - 99 mg/dL    Sodium 146 (H) 136 - 145 mmol/L    Potassium 3.9 3.5 - 5.3 mmol/L    Chloride 113 (H) 98 - 107 mmol/L    Bicarbonate 26 21 - 32 mmol/L    Anion Gap 11 10 - 20 mmol/L    Urea Nitrogen 31 (H) 6 - 23 mg/dL    Creatinine 0.94 0.50 - 1.30 mg/dL    eGFR 89 >60 mL/min/1.73m*2    Calcium 9.2 8.6 - 10.6 mg/dL    Phosphorus 2.0 (L) 2.5 - 4.9 mg/dL    Albumin 3.7 3.4 - 5.0 g/dL   CBC   Result Value Ref Range    WBC 6.8 4.4 - 11.3 x10*3/uL    nRBC 0.0 0.0 - 0.0 /100 WBCs    RBC 2.50 (L) 4.50 - 5.90 x10*6/uL    Hemoglobin 8.1 (L) 13.5 - 17.5 g/dL    Hematocrit 25.6 (L) 41.0 - 52.0 %     (H) 80 - 100 fL    MCH 32.4 26.0 - 34.0 pg    MCHC 31.6 (L) 32.0 - 36.0 g/dL    RDW 17.9 (H) 11.5 - 14.5 %    Platelets 191 150 - 450 x10*3/uL   Amylase, Fluid   Result Value Ref Range    Amylase, Fluid 14 Not established. U/L   POCT GLUCOSE   Result Value Ref Range    POCT Glucose 126 (H) 74 - 99 mg/dL     XR chest 1 view 12/03/2023    Narrative  Interpreted By:  Alfredo Lewis,  STUDY:  XR CHEST 1 VIEW;  12/3/2023 7:00 am    INDICATION:  Signs/Symptoms:monitoring pleural effusions.    COMPARISON:  12/02/2023.    ACCESSION NUMBER(S):  [id number]    ORDERING CLINICIAN:  REHAN JENSEN    Impression  Slight improvement of aeration of the airspace opacity involving the  both mid and lower lungs since last exam.    Cardiac silhouette is markedly enlarged.    Pulmonary vessels are congested with pulmonary edema/fluid overload.    Multifocal airspace opacity involving the both mid and lower lungs,  minimally improved. Atelectasis, infiltrates, versus aspiration.    Small bilateral pleural effusions. Slightly decreased since last exam.    No pneumothorax seen.    Enteric tube with the tip at the GE junction. Reposition recommended.      MACRO:  None    Signed by: Alfredo Lewis 12/3/2023 8:49 AM  Dictation workstation:    [id number]       Assessment/Plan     Levy Gregory is a 67 y/o  with complex medical history who is  s/p laparotomy, lysis of adhesions, gastric pullup, jejunostomy placement with Dr. Gongora 11/29/2023.  .     His PMHx is significant is significant for COPD, T2DM, GERD, type II achalasia, paraesophageal hernia s/p robotic Laparoscopic Heller myotomy with mary fundoplication on 3/1 with Dr. Hoang complicated by esophageal perforation Leading for a prolonged  hospitalization from 03/2023-5/2023. Course was complicated by a mediastinal abscess and R empyema with polymicrobial including Acinetobacter baumanii (CRAB) and MRSA . He required multiple surgical interventions:   -3/15 Right VATS with Gu   -3/24 IR guided pigtail placement  -3/27 EGD with esophageal stent replacement with Gu   -3/31  EGD, removal of esophageal stent, endovac placement, dobhoff tube placement, PEG tube placement ( Gu and Thoracic)  -4/5 right thoracotomy and esophagectomy with cervical esophagostomy and lap takedown of prior fundoplication, lysis of adhesions, and revision of gastrostomy tube for esophageal perforation ( Thoracic surgery)   - 4/7 PEG replacement and J tube placement ( Thoracic surgery)      His course was further complicated with Afib RVR, CORA required CRRT, Retroperitoneal/Psoas hematoma, upper and lower extremities DVTs sp IVC filter placement 4/20, and acalculous cholecystitis s/p percutaneous cholecystostomy tube placement by IR 4/27.     He was readmitted in August 2023 with worsening Empyema with cultures showing Pseudomonas, E.coli, Kleb oxytoca, and Saccharomyces cerevisae from right plural fluid, treated with Fluconazole, Levaquin, and Zosyn.  Most recently, he has been having issues with  feeding tube clogging and  then with jejunostomy tube malposition. Most recently had a G and separate J tube replacements (on 10/20/2023). Otherwise he has been doing well. He was brought back on 11/29/23 for  Gastric pull up, j-tube placement, and partial L clavicle removal.      Plan:  NEURO: History of diabetic neuropathy and depression. Acute post-op pain. On home Zoloft and Neurontin.   - Ongoing neuro/pain assessments  - Good pain control - continue scheduled acetaminophen, oxycodone liquid prn and iv hydromorphone for breakthrough pain  - Lidoderm patches surrounding L chest/clavicular incision  - Continue home dose of Zoloft and Neurontin   - PT/OT following   - Encourage OOB/ambulation   - Home meds: Zoloft 75 mg daily, Atarax 25 mg BID, Gabapentin 300 mg TID, thiamine 100 mg daily      CV: History of HLD , Hx of Afib. Baseline SBP 90's-100's, on midodrine 5mg q8h. Most recent Echo ( 08/2023) with suboptimal quality. Previous TTE 4/2023: EF 70-75%, normal RV function.   Pos top on phenylphrine infusion 0.5 mcg/kg/min. Phenylphrine infusion increased to 1mcg/kg/min overnight 11/29-11/30 and received 500 ml LR x 2. Started on midodrine 15mg q8h 11/30. Transfused 2 rbc, weaned of phenyl infusion in afternoon 11/30. Placed back transiently on 12/1, now weaned off.   -Normotensive, NSR on telemetry   -Continue Midodrine 15 mg q 8  -Continuous telemetry and VS every 4 hrs   -Replace electrolytes as needed   - Home meds: Eliquis 2.5 mg BID, Lipitor 10 mg daily, Midodrine 5 mg TID, Aldactone 12.5 mg QOD      PULM: History of COPD. 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 Most recent Empyema was in August 2023. Acute hypoxic respiratory insufficiency on 12/1 with increased FiO2 to 5L HF NC.   - Weaned of supplemental oxygen   - Continue bronchial hygiene  - Q1h incentive spirometry while awake  - Avoid positive pressure ventilation   - Repeat daily CXR's    GI: Hx Paraesophageal hernia s/p robotic Laparoscopic Heller myotomy with mary fundoplication on 3/1 with Dr. Hoang complicated by esophageal perforation leading to multiple intervention as outlined above  and eventually a right  thoracotomy and esophagectomy with cervical esophagostomy and lap takedown of prior fundoplication on 4/5. Perc fran 4/27.  He is now s/p laparotomy, lysis of adhesions, gastric pullup, jejunostomy placement. L partial clavicle removal 11/29  - Maintain strict NPO  - PPI for GI prophylaxis  - Maintain HOB up at least 30 degrees at all times secondary to high risk of aspiration  - Send daily amylase levels from MIGUELINA drain, 14 today   - Maintain NG to LIWS  - Do not replace or reposition NG tube   - Enteral feeds at goal via j-tube, continue, appreciate dietician recs   - Plan to obtain esophagram on Wednesday        : Baseline sCr ~ 0.9. Oliguric CORA March 2023, required CRRT, recovered and returted to baseline . Urinary retention, has a chronic aguilera . Changed on arrival to [facility]/29.   - Check renal function panel daily and prn  - Replete electrolytes to goal   - Maintain aguilera catheter in place, consulted/spoke to urology, no indication for voiding trial during this hospitalization due to high possibility of failure, urology will arrange for close follow up in outpatient setting      HEME: Acute blood loss anemia. Hemoglobin 7.7 11/30, transfused 2 RBC with increase of Hgb to 8.2-8.7 range. Bilateral UE DVT and LLE DVT diagnosed March 2023, IVC filter placed in 4/2023. On Eliquis. Repeat vasc US x4 extremities 11/30: BUE without DVTs, BLE + for nonocclusive L popliteal DVT.   - Check CBC and coags daily   - Ongoing monitoring for s/s bleeding  - Anticoagulation: Start Heparin gtt today if no bolus  - Home Meds: Eliquis      ENDO: IDDM2. 9/25/23 A1C 4.7. Adequate glycemic control   - Q4h BG monitoring with SSI Lispro per protocol     ID:  Hx mediastinal abscess and R empyema with polymicrobial including Acinetobacter baumanii (CRAB) and MRSA ( 04/2023)  Recurrent Empyema with cultures growing  cultures showing Pseudomonas, E.coli and Kleb oxytoca, and Saccharomyces cerevisae ( 08/2023)  -Afebrile, no leukocytosis    - Trend temp q4, wbc daily  - Finished perioperative Cefazolin  - Ongoing monitoring for s/s infection     Disposition:  -Plan to discharge patient home with home nursing care, patient and family refused SNF, already have TF supplies at home  -Continue to assess home going needs     Patient evaluated and assessed independently, discussed with attending physician Dr. Fransisca Leigh APRN-CNP  Thoracic Surgery [id number]

## 2023-12-04 NOTE — CARE PLAN
Problem: Pain  Goal: My pain/discomfort is manageable  12/3/2023 2153 by La Morocho RN  Outcome: Progressing  12/3/2023 2150 by La Morocho RN  Outcome: Progressing     Problem: Safety  Goal: Patient will be injury free during hospitalization  12/3/2023 2153 by La Morocho RN  Outcome: Progressing  12/3/2023 2150 by La Morocho RN  Outcome: Progressing  Goal: I will remain free of falls  12/3/2023 2153 by La Morocho RN  Outcome: Progressing  12/3/2023 2150 by La Morocho RN  Outcome: Progressing     Problem: Daily Care  Goal: Daily care needs are met  12/3/2023 2153 by La Morocho RN  Outcome: Progressing  12/3/2023 2150 by La Morocho RN  Outcome: Progressing     Problem: Psychosocial Needs  Goal: Demonstrates ability to cope with hospitalization/illness  12/3/2023 2153 by La Morocho RN  Outcome: Progressing  12/3/2023 2150 by La Morocho RN  Outcome: Progressing  Goal: Collaborate with me, my family, and caregiver to identify my specific goals  12/3/2023 2153 by La Morocho RN  Outcome: Progressing  12/3/2023 2150 by La Morocho RN  Outcome: Progressing     Problem: Discharge Barriers  Goal: My discharge needs are met  12/3/2023 2153 by La Morocho RN  Outcome: Progressing  12/3/2023 2150 by La Morocho RN  Outcome: Progressing     Problem: Pain - Adult  Goal: Verbalizes/displays adequate comfort level or baseline comfort level  12/3/2023 2153 by La Morocho RN  Outcome: Progressing  12/3/2023 2150 by La Morocho RN  Outcome: Progressing     Problem: Safety - Adult  Goal: Free from fall injury  12/3/2023 2153 by La Morocho RN  Outcome: Progressing  12/3/2023 2150 by La Morocho RN  Outcome: Progressing     Problem: Discharge Planning  Goal: Discharge to home or other facility with appropriate resources  12/3/2023 2153 by La Morocho RN  Outcome: Progressing  12/3/2023 2150 by La Morocho RN  Outcome: Progressing     Problem:  Chronic Conditions and Co-morbidities  Goal: Patient's chronic conditions and co-morbidity symptoms are monitored and maintained or improved  12/3/2023 2153 by La Morocho RN  Outcome: Progressing  12/3/2023 2150 by La Morocho RN  Outcome: Progressing     Problem: Skin  Goal: Decreased wound size/increased tissue granulation at next dressing change  12/3/2023 2153 by La Morocho RN  Outcome: Progressing  12/3/2023 2150 by La Morocho RN  Outcome: Progressing  Goal: Participates in plan/prevention/treatment measures  12/3/2023 2153 by La Morocho RN  Outcome: Progressing  12/3/2023 2150 by La Morocho RN  Outcome: Progressing  Goal: Prevent/manage excess moisture  12/3/2023 2153 by La Morocho RN  Outcome: Progressing  12/3/2023 2150 by La Morocho RN  Outcome: Progressing  Goal: Prevent/minimize sheer/friction injuries  12/3/2023 2153 by La Morocho RN  Outcome: Progressing  12/3/2023 2150 by La Morocho RN  Outcome: Progressing  Goal: Promote/optimize nutrition  12/3/2023 2153 by La Morocho RN  Outcome: Progressing  12/3/2023 2150 by La Morocho RN  Outcome: Progressing  Goal: Promote skin healing  12/3/2023 2153 by La Morocho RN  Outcome: Progressing  12/3/2023 2150 by La Morocho RN  Outcome: Progressing     Problem: Diabetes  Goal: Achieve decreasing blood glucose levels by end of shift  12/3/2023 2153 by La Morocho RN  Outcome: Progressing  12/3/2023 2150 by La Morocho RN  Outcome: Progressing  Goal: Increase stability of blood glucose readings by end of shift  12/3/2023 2153 by La Morocho RN  Outcome: Progressing  12/3/2023 2150 by La Morocho RN  Outcome: Progressing  Goal: Decrease in ketones present in urine by end of shift  12/3/2023 2153 by La Morocho RN  Outcome: Progressing  12/3/2023 2150 by La Morocho RN  Outcome: Progressing  Goal: Maintain electrolyte levels within acceptable range throughout shift  12/3/2023  2153 by Genice Rashawn, RN  Outcome: Progressing  12/3/2023 2150 by La Morocho RN  Outcome: Progressing  Goal: Maintain glucose levels >70mg/dl to <250mg/dl throughout shift  12/3/2023 2153 by La Morocho RN  Outcome: Progressing  12/3/2023 2150 by La Morocho RN  Outcome: Progressing  Goal: No changes in neurological exam by end of shift  12/3/2023 2153 by La Morocho RN  Outcome: Progressing  12/3/2023 2150 by La Morocho RN  Outcome: Progressing  Goal: Learn about and adhere to nutrition recommendations by end of shift  12/3/2023 2153 by La Morocho RN  Outcome: Progressing  12/3/2023 2150 by La Morocho RN  Outcome: Progressing  Goal: Vital signs within normal range for age by end of shift  12/3/2023 2153 by La Morocho RN  Outcome: Progressing  12/3/2023 2150 by La Morocho RN  Outcome: Progressing  Goal: Increase self care and/or family involovement by end of shift  12/3/2023 2153 by La Morocho RN  Outcome: Progressing  12/3/2023 2150 by La Morocho RN  Outcome: Progressing  Goal: Receive DSME education by end of shift  12/3/2023 2153 by La Morocho RN  Outcome: Progressing  12/3/2023 2150 by La Morocho RN  Outcome: Progressing     Problem: Pain  Goal: Takes deep breaths with improved pain control throughout the shift  12/3/2023 2153 by La Morocho RN  Outcome: Progressing  12/3/2023 2150 by La Morocho RN  Outcome: Progressing  Goal: Turns in bed with improved pain control throughout the shift  12/3/2023 2153 by La Morocho RN  Outcome: Progressing  12/3/2023 2150 by La Morocho RN  Outcome: Progressing  Goal: Walks with improved pain control throughout the shift  12/3/2023 2153 by La Morocho RN  Outcome: Progressing  12/3/2023 2150 by La Morocho RN  Outcome: Progressing  Goal: Performs ADL's with improved pain control throughout shift  12/3/2023 2153 by Genice Rashawn, RN  Outcome: Progressing  12/3/2023 2150 by La Morocho,  RN  Outcome: Progressing  Goal: Participates in PT with improved pain control throughout the shift  12/3/2023 2153 by La Morocho RN  Outcome: Progressing  12/3/2023 2150 by La Morocho RN  Outcome: Progressing  Goal: Free from opioid side effects throughout the shift  12/3/2023 2153 by La Morocho RN  Outcome: Progressing  12/3/2023 2150 by La Morocho RN  Outcome: Progressing  Goal: Free from acute confusion related to pain meds throughout the shift  12/3/2023 2153 by La Morocho RN  Outcome: Progressing  12/3/2023 2150 by La Morocho RN  Outcome: Progressing     Problem: Fall/Injury  Goal: Not fall by end of shift  12/3/2023 2153 by La Morocho RN  Outcome: Progressing  12/3/2023 2150 by La Morocho RN  Outcome: Progressing  Goal: Be free from injury by end of the shift  12/3/2023 2153 by La Morocho RN  Outcome: Progressing  12/3/2023 2150 by La Morocho RN  Outcome: Progressing  Goal: Verbalize understanding of personal risk factors for fall in the hospital  12/3/2023 2153 by La Morocho RN  Outcome: Progressing  12/3/2023 2150 by La Morocho RN  Outcome: Progressing  Goal: Verbalize understanding of risk factor reduction measures to prevent injury from fall in the home  12/3/2023 2153 by La Morocho RN  Outcome: Progressing  12/3/2023 2150 by La Morocho RN  Outcome: Progressing  Goal: Use assistive devices by end of the shift  12/3/2023 2153 by La Morocho, RN  Outcome: Progressing  12/3/2023 2150 by La Morocho RN  Outcome: Progressing  Goal: Pace activities to prevent fatigue by end of the shift  12/3/2023 2153 by La Morocho RN  Outcome: Progressing  12/3/2023 2150 by La Morocho RN  Outcome: Progressing    The clinical goals for the shift include pt pain will be controlled throughout the shift

## 2023-12-04 NOTE — PROGRESS NOTES
Physical Therapy    Physical Therapy Treatment    Patient Name: Levy Gregory  MRN: [id number]  Today's Date: 12/4/2023  Time Calculation  Start Time: 1252  Stop Time: 1342  Time Calculation (min): 50 min       Assessment/Plan   PT Assessment  PT Assessment Results: Decreased strength, Decreased range of motion, Decreased endurance, Impaired balance, Decreased mobility, Pain  Rehab Prognosis: Good  Barriers to Discharge: none  Evaluation/Treatment Tolerance: Patient limited by fatigue, Patient limited by pain  Medical Staff Made Aware: Yes  Strengths: Attitude of self, Coping skills  Barriers to Participation:  (none)  End of Session Communication: Bedside nurse  Assessment Comment: The pt demonstrated safe mobility using a wheeled walker. (Possible PT d/c rec revision to low intensity.)  End of Session Patient Position: Up in chair  PT Plan  Inpatient/Swing Bed or Outpatient: Inpatient  PT Plan  Treatment/Interventions: Bed mobility, Transfer training, Gait training, Stair training, Balance training, Strengthening, Endurance training, Range of motion, Therapeutic exercise, Therapeutic activity, Home exercise program  PT Plan: Skilled PT  PT Frequency: 5 times per week  PT Discharge Recommendations: Moderate intensity level of continued care  Equipment Recommended upon Discharge:  (none)  PT Recommended Transfer Status: Stand by assist  PT - OK to Discharge: Yes      General Visit Information:   PT  Visit  PT Received On: 12/04/23  Response to Previous Treatment: Patient reporting fatigue but able to participate.  General  Prior to Session Communication: Bedside nurse  Patient Position Received: Bed, 3 rail up, Alarm off, not on at start of session  Preferred Learning Style: verbal, visual, written  General Comment: The pt was pleasant, cooperative and willing to participate in therapy.    Subjective   Precautions:  Precautions  Medical Precautions: Fall precautions  Post-Surgical Precautions: Move in the  Tube  Precautions Comment: The pt in compliance with all precautions throughout PT session.  Vital Signs:  Vital Signs  Heart Rate: (!) 125 (after amb ~35ft)  SpO2: 96 % (on RA)  Patient Position: Sitting    Objective   Pain:  Pain Assessment  Pain Assessment: 0-10  Pain Score: 5 - Moderate pain  Pain Type: Acute pain, Surgical pain  Pain Location: Shoulder  Pain Orientation: Left  Cognition:  Cognition  Overall Cognitive Status: Within Functional Limits  Postural Control:  Postural Control  Postural Control: Within Functional Limits  Extremity/Trunk Assessments:        RUE   RUE : Within Functional Limits  LUE   LUE: Exceptions to WFL  LUE AROM (degrees)  LUE AROM Comment: decreased shoulder flexion  LUE Strength  L Shoulder Flexion: 3-/5  L Elbow Flexion: 4/5  L Elbow Extension: 4/5  L Wrist Flexion: 5/5  L Wrist Extension: 5/5  RLE   RLE : Within Functional Limits  LLE   LLE : Within Functional Limits  Activity Tolerance:  Activity Tolerance  Endurance: Decreased tolerance for upright activites  Treatments:  Therapeutic Exercise  Therapeutic Exercise Performed: Yes  Therapeutic Exercise Activity 1: ankle pumps x 15  Therapeutic Exercise Activity 2: heel slides x 15  Therapeutic Exercise Activity 3: SAQ x 15  Therapeutic Exercise Activity 4: Hip ABD x 15    Balance/Neuromuscular Re-Education  Balance/Neuromuscular Re-Education Activity Performed: Yes  Balance/Neuromuscular Re-Education Activity 1: SBA static standing balance using a wheeled walker.  Balance/Neuromuscular Re-Education Activity 2: SBA dynamic standing balance using a wheeled walker.    Bed Mobility  Bed Mobility: Yes  Bed Mobility 1  Bed Mobility 1: Supine to sitting  Level of Assistance 1: Minimum assistance  Bed Mobility Comments 1: lateral roll technique    Ambulation/Gait Training  Ambulation/Gait Training Performed: Yes  Ambulation/Gait Training 1  Surface 1: Level tile  Device 1: Rolling walker  Assistance 1: Close supervision  Quality of Gait  1: Decreased step length (slightly unsteady, step-to gait, decreased jewel, decreased endurance)  Comments/Distance (ft) 1: 70ft x 2 min rest break  Transfers  Transfer: Yes  Transfer 1  Transfer From 1: Sit to  Transfer to 1: Stand  Transfer Device 1: Walker  Transfer Level of Assistance 1: Close supervision  Transfers 2  Transfer From 2: Stand to  Transfer to 2: Sit  Transfer Device 2: Walker  Transfer Level of Assistance 2: Close supervision  Transfers 3  Transfer From 3: Bed to  Transfer to 3: Chair with arms  Transfer Device 3: Walker  Transfer Level of Assistance 3: Close supervision    Outcome Measures:  [facility] Basic Mobility  Turning from your back to your side while in a flat bed without using bedrails: A little  Moving from lying on your back to sitting on the side of a flat bed without using bedrails: A little  Moving to and from bed to chair (including a wheelchair): A little  Standing up from a chair using your arms (e.g. wheelchair or bedside chair): A little  To walk in hospital room: A little  Climbing 3-5 steps with railing: A lot  Basic Mobility - Total Score: 17    Education Documentation  Precautions, taught by Tino Duncan PT at 12/4/2023  2:02 PM.  Learner: Patient  Readiness: Acceptance  Method: Explanation, Demonstration  Response: Verbalizes Understanding, Demonstrated Understanding    Body Mechanics, taught by Tino Duncan PT at 12/4/2023  2:02 PM.  Learner: Patient  Readiness: Acceptance  Method: Explanation, Demonstration  Response: Verbalizes Understanding, Demonstrated Understanding    Mobility Training, taught by Tino Duncan PT at 12/4/2023  2:02 PM.  Learner: Patient  Readiness: Acceptance  Method: Explanation, Demonstration  Response: Verbalizes Understanding, Demonstrated Understanding    Education Comments  No comments found.        OP EDUCATION:  Outpatient Education  Individual(s) Educated: Patient  Education Provided: Body Mechanics, Fall Risk, Home Exercise  Program  Patient Response to Education: Patient/Caregiver Verbalized Understanding of Information, Patient/Caregiver Performed Return Demonstration of Exercises/Activities    Encounter Problems       Encounter Problems (Active)       Balance       STG - Maintains static standing balance with upper extremity support using FWW with Tk >15 minutes  (Progressing)       Start:  11/30/23    Expected End:  12/14/23               Mobility       STG - Patient will ambulate >300ft using FWW  (Progressing)       Start:  11/30/23    Expected End:  12/14/23               Pain          Pain - Adult          Transfers       STG - Patient to transfer to and from sit to supine CGA  (Progressing)       Start:  11/30/23    Expected End:  12/14/23            STG - Patient will transfer sit to and from stand supervision assistance. (Progressing)       Start:  11/30/23    Expected End:  12/14/23

## 2023-12-04 NOTE — CARE PLAN
Problem: Pain  Goal: My pain/discomfort is manageable  Outcome: Progressing     Problem: Safety  Goal: Patient will be injury free during hospitalization  Outcome: Progressing  Goal: I will remain free of falls  Outcome: Progressing     Problem: Daily Care  Goal: Daily care needs are met  Outcome: Progressing     Problem: Psychosocial Needs  Goal: Demonstrates ability to cope with hospitalization/illness  Outcome: Progressing  Goal: Collaborate with me, my family, and caregiver to identify my specific goals  Outcome: Progressing     Problem: Discharge Barriers  Goal: My discharge needs are met  Outcome: Progressing     Problem: Pain  Goal: STG - Patient verbalizes a reduction in pain level  Outcome: Progressing     Problem: Pain - Adult  Goal: Verbalizes/displays adequate comfort level or baseline comfort level  Outcome: Progressing     Problem: Safety - Adult  Goal: Free from fall injury  Outcome: Progressing     Problem: Discharge Planning  Goal: Discharge to home or other facility with appropriate resources  Outcome: Progressing     Problem: Chronic Conditions and Co-morbidities  Goal: Patient's chronic conditions and co-morbidity symptoms are monitored and maintained or improved  Outcome: Progressing     Problem: Skin  Goal: Decreased wound size/increased tissue granulation at next dressing change  Outcome: Progressing  Goal: Participates in plan/prevention/treatment measures  Outcome: Progressing  Goal: Prevent/manage excess moisture  Outcome: Progressing  Goal: Prevent/minimize sheer/friction injuries  Outcome: Progressing  Goal: Promote/optimize nutrition  Outcome: Progressing  Goal: Promote skin healing  Outcome: Progressing     Problem: Diabetes  Goal: Achieve decreasing blood glucose levels by end of shift  Outcome: Progressing  Goal: Increase stability of blood glucose readings by end of shift  Outcome: Progressing  Goal: Decrease in ketones present in urine by end of shift  Outcome: Progressing  Goal:  Maintain electrolyte levels within acceptable range throughout shift  Outcome: Progressing  Goal: Maintain glucose levels >70mg/dl to <250mg/dl throughout shift  Outcome: Progressing  Goal: No changes in neurological exam by end of shift  Outcome: Progressing  Goal: Learn about and adhere to nutrition recommendations by end of shift  Outcome: Progressing  Goal: Vital signs within normal range for age by end of shift  Outcome: Progressing  Goal: Increase self care and/or family involovement by end of shift  Outcome: Progressing  Goal: Receive DSME education by end of shift  Outcome: Progressing     Problem: Pain  Goal: Takes deep breaths with improved pain control throughout the shift  Outcome: Progressing  Goal: Turns in bed with improved pain control throughout the shift  Outcome: Progressing  Goal: Walks with improved pain control throughout the shift  Outcome: Progressing  Goal: Performs ADL's with improved pain control throughout shift  Outcome: Progressing  Goal: Participates in PT with improved pain control throughout the shift  Outcome: Progressing  Goal: Free from opioid side effects throughout the shift  Outcome: Progressing  Goal: Free from acute confusion related to pain meds throughout the shift  Outcome: Progressing     Problem: Fall/Injury  Goal: Not fall by end of shift  Outcome: Progressing  Goal: Be free from injury by end of the shift  Outcome: Progressing  Goal: Verbalize understanding of personal risk factors for fall in the hospital  Outcome: Progressing  Goal: Verbalize understanding of risk factor reduction measures to prevent injury from fall in the home  Outcome: Progressing  Goal: Use assistive devices by end of the shift  Outcome: Progressing  Goal: Pace activities to prevent fatigue by end of the shift  Outcome: Progressing

## 2023-12-05 ENCOUNTER — APPOINTMENT (OUTPATIENT)
Dept: RADIOLOGY | Facility: HOSPITAL | Age: 68
DRG: 326 | End: 2023-12-05
Payer: MEDICARE

## 2023-12-05 LAB
ALBUMIN SERPL BCP-MCNC: 3.7 G/DL (ref 3.4–5)
AMYLASE FLD-CCNC: 16 U/L
ANION GAP SERPL CALC-SCNC: 14 MMOL/L (ref 10–20)
APTT PPP: 57 SECONDS (ref 27–38)
BUN SERPL-MCNC: 31 MG/DL (ref 6–23)
CALCIUM SERPL-MCNC: 8.9 MG/DL (ref 8.6–10.6)
CHLORIDE SERPL-SCNC: 111 MMOL/L (ref 98–107)
CO2 SERPL-SCNC: 24 MMOL/L (ref 21–32)
CREAT SERPL-MCNC: 0.84 MG/DL (ref 0.5–1.3)
ERYTHROCYTE [DISTWIDTH] IN BLOOD BY AUTOMATED COUNT: 17.9 % (ref 11.5–14.5)
GFR SERPL CREATININE-BSD FRML MDRD: >90 ML/MIN/1.73M*2
GLUCOSE BLD MANUAL STRIP-MCNC: 121 MG/DL (ref 74–99)
GLUCOSE BLD MANUAL STRIP-MCNC: 123 MG/DL (ref 74–99)
GLUCOSE BLD MANUAL STRIP-MCNC: 125 MG/DL (ref 74–99)
GLUCOSE BLD MANUAL STRIP-MCNC: 136 MG/DL (ref 74–99)
GLUCOSE BLD MANUAL STRIP-MCNC: 140 MG/DL (ref 74–99)
GLUCOSE BLD MANUAL STRIP-MCNC: 143 MG/DL (ref 74–99)
GLUCOSE BLD MANUAL STRIP-MCNC: 96 MG/DL (ref 74–99)
GLUCOSE SERPL-MCNC: 145 MG/DL (ref 74–99)
HCT VFR BLD AUTO: 26.8 % (ref 41–52)
HGB BLD-MCNC: 8.7 G/DL (ref 13.5–17.5)
MAGNESIUM SERPL-MCNC: 1.72 MG/DL (ref 1.6–2.4)
MCH RBC QN AUTO: 32.8 PG (ref 26–34)
MCHC RBC AUTO-ENTMCNC: 32.5 G/DL (ref 32–36)
MCV RBC AUTO: 101 FL (ref 80–100)
NRBC BLD-RTO: 0 /100 WBCS (ref 0–0)
PHOSPHATE SERPL-MCNC: 1.6 MG/DL (ref 2.5–4.9)
PLATELET # BLD AUTO: 225 X10*3/UL (ref 150–450)
POTASSIUM SERPL-SCNC: 3.6 MMOL/L (ref 3.5–5.3)
RBC # BLD AUTO: 2.65 X10*6/UL (ref 4.5–5.9)
SODIUM SERPL-SCNC: 145 MMOL/L (ref 136–145)
UFH PPP CHRO-ACNC: 0.3 IU/ML
WBC # BLD AUTO: 8.5 X10*3/UL (ref 4.4–11.3)

## 2023-12-05 PROCEDURE — 2500000004 HC RX 250 GENERAL PHARMACY W/ HCPCS (ALT 636 FOR OP/ED): Performed by: NURSE PRACTITIONER

## 2023-12-05 PROCEDURE — 2500000005 HC RX 250 GENERAL PHARMACY W/O HCPCS: Performed by: STUDENT IN AN ORGANIZED HEALTH CARE EDUCATION/TRAINING PROGRAM

## 2023-12-05 PROCEDURE — 2500000001 HC RX 250 WO HCPCS SELF ADMINISTERED DRUGS (ALT 637 FOR MEDICARE OP): Performed by: NURSE PRACTITIONER

## 2023-12-05 PROCEDURE — 80069 RENAL FUNCTION PANEL: CPT | Performed by: STUDENT IN AN ORGANIZED HEALTH CARE EDUCATION/TRAINING PROGRAM

## 2023-12-05 PROCEDURE — 85730 THROMBOPLASTIN TIME PARTIAL: CPT | Performed by: STUDENT IN AN ORGANIZED HEALTH CARE EDUCATION/TRAINING PROGRAM

## 2023-12-05 PROCEDURE — 2500000001 HC RX 250 WO HCPCS SELF ADMINISTERED DRUGS (ALT 637 FOR MEDICARE OP): Performed by: STUDENT IN AN ORGANIZED HEALTH CARE EDUCATION/TRAINING PROGRAM

## 2023-12-05 PROCEDURE — 82150 ASSAY OF AMYLASE: CPT | Performed by: STUDENT IN AN ORGANIZED HEALTH CARE EDUCATION/TRAINING PROGRAM

## 2023-12-05 PROCEDURE — 83735 ASSAY OF MAGNESIUM: CPT | Performed by: STUDENT IN AN ORGANIZED HEALTH CARE EDUCATION/TRAINING PROGRAM

## 2023-12-05 PROCEDURE — 71045 X-RAY EXAM CHEST 1 VIEW: CPT | Performed by: RADIOLOGY

## 2023-12-05 PROCEDURE — 85027 COMPLETE CBC AUTOMATED: CPT | Performed by: STUDENT IN AN ORGANIZED HEALTH CARE EDUCATION/TRAINING PROGRAM

## 2023-12-05 PROCEDURE — 1200000002 HC GENERAL ROOM WITH TELEMETRY DAILY

## 2023-12-05 PROCEDURE — 51702 INSERT TEMP BLADDER CATH: CPT

## 2023-12-05 PROCEDURE — 82947 ASSAY GLUCOSE BLOOD QUANT: CPT

## 2023-12-05 PROCEDURE — C9113 INJ PANTOPRAZOLE SODIUM, VIA: HCPCS | Performed by: STUDENT IN AN ORGANIZED HEALTH CARE EDUCATION/TRAINING PROGRAM

## 2023-12-05 PROCEDURE — 71045 X-RAY EXAM CHEST 1 VIEW: CPT | Mod: FY

## 2023-12-05 PROCEDURE — 94640 AIRWAY INHALATION TREATMENT: CPT

## 2023-12-05 PROCEDURE — 2500000004 HC RX 250 GENERAL PHARMACY W/ HCPCS (ALT 636 FOR OP/ED): Performed by: STUDENT IN AN ORGANIZED HEALTH CARE EDUCATION/TRAINING PROGRAM

## 2023-12-05 PROCEDURE — 99232 SBSQ HOSP IP/OBS MODERATE 35: CPT | Performed by: NURSE PRACTITIONER

## 2023-12-05 PROCEDURE — 85520 HEPARIN ASSAY: CPT | Performed by: STUDENT IN AN ORGANIZED HEALTH CARE EDUCATION/TRAINING PROGRAM

## 2023-12-05 PROCEDURE — 2500000002 HC RX 250 W HCPCS SELF ADMINISTERED DRUGS (ALT 637 FOR MEDICARE OP, ALT 636 FOR OP/ED): Performed by: STUDENT IN AN ORGANIZED HEALTH CARE EDUCATION/TRAINING PROGRAM

## 2023-12-05 RX ORDER — POTASSIUM CHLORIDE 1.5 G/1.58G
20 POWDER, FOR SOLUTION ORAL ONCE
Status: COMPLETED | OUTPATIENT
Start: 2023-12-05 | End: 2023-12-05

## 2023-12-05 RX ORDER — MAGNESIUM SULFATE HEPTAHYDRATE 40 MG/ML
2 INJECTION, SOLUTION INTRAVENOUS ONCE
Status: COMPLETED | OUTPATIENT
Start: 2023-12-05 | End: 2023-12-05

## 2023-12-05 RX ADMIN — HEPARIN SODIUM 1300 UNITS/HR: 10000 INJECTION, SOLUTION INTRAVENOUS at 05:51

## 2023-12-05 RX ADMIN — SERTRALINE HYDROCHLORIDE 75 MG: 50 TABLET ORAL at 10:04

## 2023-12-05 RX ADMIN — ACETAMINOPHEN 650 MG: 650 SOLUTION ORAL at 04:10

## 2023-12-05 RX ADMIN — ACETAMINOPHEN 650 MG: 650 SOLUTION ORAL at 10:05

## 2023-12-05 RX ADMIN — ALBUTEROL SULFATE 2.5 MG: 2.5 SOLUTION RESPIRATORY (INHALATION) at 21:02

## 2023-12-05 RX ADMIN — MIDODRINE HYDROCHLORIDE 15 MG: 10 TABLET ORAL at 05:51

## 2023-12-05 RX ADMIN — PANTOPRAZOLE SODIUM 40 MG: 40 INJECTION, POWDER, FOR SOLUTION INTRAVENOUS at 10:05

## 2023-12-05 RX ADMIN — OXYCODONE HYDROCHLORIDE 10 MG: 5 SOLUTION ORAL at 10:06

## 2023-12-05 RX ADMIN — SODIUM CHLORIDE 30 MG/ML INHALATION SOLUTION 3 ML: 30 SOLUTION INHALANT at 21:02

## 2023-12-05 RX ADMIN — POTASSIUM CHLORIDE 20 MEQ: 1.5 POWDER, FOR SOLUTION ORAL at 13:42

## 2023-12-05 RX ADMIN — SODIUM CHLORIDE 30 MG/ML INHALATION SOLUTION 3 ML: 30 SOLUTION INHALANT at 14:02

## 2023-12-05 RX ADMIN — ACETAMINOPHEN 650 MG: 650 SOLUTION ORAL at 21:35

## 2023-12-05 RX ADMIN — MIDODRINE HYDROCHLORIDE 15 MG: 10 TABLET ORAL at 21:36

## 2023-12-05 RX ADMIN — OXYCODONE HYDROCHLORIDE 10 MG: 5 SOLUTION ORAL at 21:35

## 2023-12-05 RX ADMIN — GUAIFENESIN 200 MG: 200 SOLUTION ORAL at 21:35

## 2023-12-05 RX ADMIN — GABAPENTIN 300 MG: 250 SOLUTION ORAL at 21:35

## 2023-12-05 RX ADMIN — THIAMINE HYDROCHLORIDE 100 MG: 100 INJECTION, SOLUTION INTRAMUSCULAR; INTRAVENOUS at 10:05

## 2023-12-05 RX ADMIN — GABAPENTIN 300 MG: 250 SOLUTION ORAL at 05:51

## 2023-12-05 RX ADMIN — MAGNESIUM SULFATE HEPTAHYDRATE 2 G: 40 INJECTION, SOLUTION INTRAVENOUS at 13:38

## 2023-12-05 RX ADMIN — GUAIFENESIN 200 MG: 200 SOLUTION ORAL at 10:04

## 2023-12-05 RX ADMIN — HEPARIN SODIUM 1300 UNITS/HR: 10000 INJECTION, SOLUTION INTRAVENOUS at 23:50

## 2023-12-05 RX ADMIN — MIDODRINE HYDROCHLORIDE 15 MG: 10 TABLET ORAL at 13:42

## 2023-12-05 RX ADMIN — ALBUTEROL SULFATE 2.5 MG: 2.5 SOLUTION RESPIRATORY (INHALATION) at 14:04

## 2023-12-05 RX ADMIN — GABAPENTIN 300 MG: 250 SOLUTION ORAL at 13:42

## 2023-12-05 RX ADMIN — GUAIFENESIN 200 MG: 200 SOLUTION ORAL at 04:10

## 2023-12-05 ASSESSMENT — PAIN SCALES - GENERAL
PAINLEVEL_OUTOF10: 4
PAINLEVEL_OUTOF10: 0 - NO PAIN
PAINLEVEL_OUTOF10: 8
PAINLEVEL_OUTOF10: 0 - NO PAIN
PAINLEVEL_OUTOF10: 8
PAINLEVEL_OUTOF10: 10 - WORST POSSIBLE PAIN

## 2023-12-05 ASSESSMENT — COGNITIVE AND FUNCTIONAL STATUS - GENERAL
TOILETING: A LOT
HELP NEEDED FOR BATHING: A LOT
TURNING FROM BACK TO SIDE WHILE IN FLAT BAD: A LITTLE
PERSONAL GROOMING: A LOT
CLIMB 3 TO 5 STEPS WITH RAILING: A LOT
MOBILITY SCORE: 14
WALKING IN HOSPITAL ROOM: A LOT
MOVING FROM LYING ON BACK TO SITTING ON SIDE OF FLAT BED WITH BEDRAILS: A LITTLE
DAILY ACTIVITIY SCORE: 15
DRESSING REGULAR LOWER BODY CLOTHING: A LITTLE
STANDING UP FROM CHAIR USING ARMS: A LOT
MOVING TO AND FROM BED TO CHAIR: A LOT
EATING MEALS: A LITTLE
DRESSING REGULAR UPPER BODY CLOTHING: A LITTLE

## 2023-12-05 ASSESSMENT — PAIN - FUNCTIONAL ASSESSMENT
PAIN_FUNCTIONAL_ASSESSMENT: 0-10

## 2023-12-05 ASSESSMENT — PAIN DESCRIPTION - LOCATION: LOCATION: SHOULDER

## 2023-12-05 ASSESSMENT — PAIN DESCRIPTION - ORIENTATION: ORIENTATION: LEFT

## 2023-12-05 NOTE — PROGRESS NOTES
12/5/23  [facility]  Transitional Care Coordinator   Met with patient and introduced myself as Care Coordinator and member of the discharge planning team.  Pt is s/p laparotomy, lysis of adhesions gastric pullup, J tube placement. He plans to return home at time of discharge with his wife. Spoke to patients wife on the phone. She would like to receive home care from [facility]. Enteral feeding supplies were provided by [facility]  [phone]. Will continue to follow for home going needs.  Veronique Vargas RN

## 2023-12-05 NOTE — PROGRESS NOTES
Levy Gregory is a 68 y.o. male on day 6 of admission presenting with Esophageal perforation.    Subjective     Levy did well overnight with no complaints this morning. He has had one bowel movement yesterday without associated pain or blood. He is tolerating tube feeds well without any abdominal pain or discomfort, and is comfortable on room air without difficulty breathing or shortness of breath. Denies fever, chills, nausea, vomiting, increasing pain or discomfort.        Objective     Physical Exam  Vitals reviewed.   Constitutional:       General: He is awake. He is not in acute distress.  HENT:      Head: Normocephalic and atraumatic.      Nose:      Comments: NG tube in place without surrounding erythema or irritation. Line flushed at bedside and has drained 250 mL in 24 hours.   Neck:      Comments: MIGUELINA drain in place with 20 mL serosanguinous fluid spontaneously draining within 24 hours.   Cardiovascular:      Rate and Rhythm: Normal rate and regular rhythm.   Pulmonary:      Effort: Pulmonary effort is normal.      Breath sounds: Normal breath sounds and air entry.   Abdominal:      General: Abdomen is flat. Bowel sounds are normal. There is no distension.      Palpations: Abdomen is soft.      Tenderness: There is no abdominal tenderness.   Genitourinary:     Comments: Maldonado catheter to remain in place per urology.   Musculoskeletal:      Cervical back: Full passive range of motion without pain.      Right lower leg: No swelling. No edema.      Left lower leg: No swelling. No edema.   Skin:     General: Skin is warm and dry.             Comments: All surgical incisions appear well healing without dehiscence, drainage, erythema or warmth.    Neurological:      Mental Status: He is alert.   Psychiatric:         Attention and Perception: Attention normal.         Mood and Affect: Mood normal.         Speech: Speech normal.         Behavior: Behavior is cooperative.         Last Recorded Vitals  Blood  "pressure 114/75, pulse 95, temperature 36.5 °C (97.7 °F), temperature source Temporal, resp. rate 18, height 1.85 m (6' 0.84\"), weight 90.6 kg (199 lb 11.2 oz), SpO2 94 %.  Intake/Output last 3 Shifts:  I/O last 3 completed shifts:  In: 1936.1 (21.4 mL/kg) [I.V.:226.1 (2.5 mL/kg); NG/GT:1710]  Out: 2841 (31.4 mL/kg) [Urine:2430 (0.7 mL/kg/hr); Emesis/NG output:355; Drains:55; Stool:1]  Weight: 90.6 kg     Relevant Results  Results for orders placed or performed during the hospital encounter of 11/29/23 (from the past 24 hour(s))   POCT GLUCOSE   Result Value Ref Range    POCT Glucose 124 (H) 74 - 99 mg/dL   Heparin Assay, UFH   Result Value Ref Range    Heparin Unfractionated 0.2 See Comment Below for Therapeutic Ranges IU/mL   POCT GLUCOSE   Result Value Ref Range    POCT Glucose 166 (H) 74 - 99 mg/dL   POCT GLUCOSE   Result Value Ref Range    POCT Glucose 110 (H) 74 - 99 mg/dL   Heparin Assay, UFH   Result Value Ref Range    Heparin Unfractionated 0.3 See Comment Below for Therapeutic Ranges IU/mL   aPTT - baseline   Result Value Ref Range    aPTT 57 (H) 27 - 38 seconds   POCT GLUCOSE   Result Value Ref Range    POCT Glucose 121 (H) 74 - 99 mg/dL   Heparin Assay, UFH   Result Value Ref Range    Heparin Unfractionated 0.3 See Comment Below for Therapeutic Ranges IU/mL   POCT GLUCOSE   Result Value Ref Range    POCT Glucose 136 (H) 74 - 99 mg/dL   Amylase, Fluid   Result Value Ref Range    Amylase, Fluid 16 Not established. U/L   Magnesium   Result Value Ref Range    Magnesium 1.72 1.60 - 2.40 mg/dL   Renal Function Panel   Result Value Ref Range    Glucose 145 (H) 74 - 99 mg/dL    Sodium 145 136 - 145 mmol/L    Potassium 3.6 3.5 - 5.3 mmol/L    Chloride 111 (H) 98 - 107 mmol/L    Bicarbonate 24 21 - 32 mmol/L    Anion Gap 14 10 - 20 mmol/L    Urea Nitrogen 31 (H) 6 - 23 mg/dL    Creatinine 0.84 0.50 - 1.30 mg/dL    eGFR >90 >60 mL/min/1.73m*2    Calcium 8.9 8.6 - 10.6 mg/dL    Phosphorus 1.6 (L) 2.5 - 4.9 mg/dL    " Albumin 3.7 3.4 - 5.0 g/dL   CBC   Result Value Ref Range    WBC 8.5 4.4 - 11.3 x10*3/uL    nRBC 0.0 0.0 - 0.0 /100 WBCs    RBC 2.65 (L) 4.50 - 5.90 x10*6/uL    Hemoglobin 8.7 (L) 13.5 - 17.5 g/dL    Hematocrit 26.8 (L) 41.0 - 52.0 %     (H) 80 - 100 fL    MCH 32.8 26.0 - 34.0 pg    MCHC 32.5 32.0 - 36.0 g/dL    RDW 17.9 (H) 11.5 - 14.5 %    Platelets 225 150 - 450 x10*3/uL   POCT GLUCOSE   Result Value Ref Range    POCT Glucose 140 (H) 74 - 99 mg/dL          This patient has a central line   Reason for the central line remaining today? Parenteral medication - Currently has finished course of IV antibiotics, however, patient may need further medications this hospital stay so central line will remain until discharge is imminent.     This patient has a urinary catheter   Reason for the urinary catheter remaining today? urinary retention/bladder outlet obstruction, acute or chronic - per urology consult, catheter should remain in place for the duration of this hospital stay and will be managed outpatient by urology.                Assessment/Plan   Levy Gregory is a 69 y/o  with complex medical history who is  s/p laparotomy, lysis of adhesions, gastric pullup, jejunostomy placement with Dr. Gongora 11/29/2023.      His PMHx is significant is significant for COPD, T2DM, GERD, type II achalasia, paraesophageal hernia s/p robotic Laparoscopic Heller myotomy with mary fundoplication on 3/1 with Dr. Hoang complicated by esophageal perforation leading to a prolonged  hospitalization from 03/2023-5/2023. Course was complicated by a mediastinal abscess and R empyema with polymicrobial including Acinetobacter baumanii (CRAB) and MRSA . He required multiple surgical interventions:   -3/15 Right VATS with Gu   -3/24 IR guided pigtail placement  -3/27 EGD with esophageal stent replacement with Gu   -3/31  EGD, removal of esophageal stent, endovac placement, dobhoff tube placement, PEG tube placement ( Gu  and Thoracic)  -4/5 right thoracotomy and esophagectomy with cervical esophagostomy and lap takedown of prior fundoplication, lysis of adhesions, and revision of gastrostomy tube for esophageal perforation ( Thoracic surgery)   - 4/7 PEG replacement and J tube placement ( Thoracic surgery)      His course was further complicated with Afib RVR, CORA required CRRT, Retroperitoneal/Psoas hematoma, upper and lower extremities DVTs sp IVC filter placement 4/20, and acalculous cholecystitis s/p percutaneous cholecystostomy tube placement by IR 4/27.     He was readmitted in August 2023 with worsening Empyema with cultures showing Pseudomonas, E.coli, Kleb oxytoca, and Saccharomyces cerevisae from right plural fluid, treated with Fluconazole, Levaquin, and Zosyn.  Most recently, he has been having issues with  feeding tube clogging and  then with jejunostomy tube malposition. Most recently had a G and separate J tube replacements (on 10/20/2023). Otherwise he has been doing well. He was brought back on 11/29/23 for Gastric pull up, j-tube placement, and partial L clavicle removal.      Plan:  NEURO: History of diabetic neuropathy and depression. Acute post-op pain is well controlled. On home Zoloft and Neurontin.   - Ongoing neuro/pain assessments  - Good pain control - continue scheduled acetaminophen, oxycodone liquid prn and iv hydromorphone for breakthrough pain  - Lidoderm patches surrounding L chest/clavicular incision  - Continue home dose of Zoloft and Neurontin   - PT/OT following   - Encourage OOB/ambulation   - Home meds: Zoloft 75 mg daily, Atarax 25 mg BID, Gabapentin 300 mg TID, thiamine 100 mg daily      CV: History of HLD , Hx of Afib. Baseline SBP 90's-100's, on midodrine 15mg q8h. Most recent Echo ( 08/2023) with suboptimal quality. Previous TTE 4/2023: EF 70-75%, normal RV function.   Pos top on phenylphrine infusion 0.5 mcg/kg/min. Phenylphrine infusion increased to 1mcg/kg/min overnight 11/29-11/30 and  received 500 ml LR x 2. Started on midodrine 15mg q8h 11/30. Transfused 2 rbc, weaned of phenyl infusion in afternoon 11/30. Placed back transiently on 12/1, now weaned off.   -Normotensive, NSR on telemetry   -Continue Midodrine 15 mg q 8  -Continuous telemetry and VS every 4 hrs   - Suboptimal potassium and magnesium levels (3.6 and 1.72 respectively); electrolytes to be replaced with 20 mEq potassium via J-tube and 2g IV magnesium infused over 2 hours.    - Home meds: Eliquis 2.5 mg BID, Lipitor 10 mg daily, Midodrine 5 mg TID, Aldactone 12.5 mg QOD      PULM: History of COPD. 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 Most recent Empyema was in August 2023. Acute hypoxic respiratory insufficiency on 12/1 with increased FiO2 to 5L HF NC.   - Weaned of supplemental oxygen   - Continue bronchial hygiene  - Q1h incentive spirometry while awake  - Avoid positive pressure ventilation   - Repeat daily CXR's     GI: Hx Paraesophageal hernia s/p robotic Laparoscopic Heller myotomy with mary fundoplication on 3/1 with Dr. Hoang complicated by esophageal perforation leading to multiple intervention as outlined above  and eventually a right thoracotomy and esophagectomy with cervical esophagostomy and lap takedown of prior fundoplication on 4/5. Perc fran 4/27.  He is now s/p laparotomy, lysis of adhesions, gastric pullup, jejunostomy placement. L partial clavicle removal 11/29  - Maintain strict NPO  - PPI for GI prophylaxis  - Maintain HOB up at least 30 degrees at all times secondary to high risk of aspiration  - Send daily amylase levels from MIGUELINA drain, 16 today   - Maintain NG to LIWS, anticipate removal tomorrow if low MIGUELINA amylase   - Do not replace or reposition NG tube   - Tolerated continuous enteral feeding via J-tube; will change feeds to overnight schedule with rate at 90 mL for 16 hours  - Esophagram ordered for Wednesday with goal of removing NG tube if results are normal.          : Baseline sCr ~ 0.9. Oliguric CORA March 2023, required CRRT, recovered and returted to baseline . Urinary retention, has a chronic aguilera . [facility].   - Check renal function panel daily and prn  - Replete electrolytes to goal; see above for potassium and magnesium replacement.   - Maintain aguilera catheter in place, consulted/spoke to urology, no indication for voiding trial during this hospitalization due to high possibility of failure, urology will arrange for close follow up in outpatient setting      HEME: Acute blood loss anemia. Hemoglobin 7.7 11/30, transfused 2 RBC with increase of Hgb to 8.2-8.7 range. Bilateral UE DVT and LLE DVT diagnosed March 2023, IVC filter placed in 4/2023. On Eliquis. Repeat vasc US x4 extremities 11/30: BUE without DVTs, BLE + for nonocclusive L popliteal DVT.   - Check CBC and coags daily   - Ongoing monitoring for s/s bleeding  - Anticoagulation: Continue Heparin gtt , resume home Eliquis on discharge   - Home Meds: Eliquis      ENDO: IDDM2. 9/25/23 A1C 4.7. Adequate glycemic control   - Q4h BG monitoring with SSI Lispro per protocol  - Was not managed on home medications prior to hospitalization     ID:  Hx mediastinal abscess and R empyema with polymicrobial including Acinetobacter baumanii (CRAB) and MRSA ( 04/2023)  Recurrent Empyema with cultures growing  cultures showing Pseudomonas, E.coli and Kleb oxytoca, and Saccharomyces cerevisae ( 08/2023)  - Afebrile, no leukocytosis   - Trend temp q4, wbc daily  - Finished perioperative Cefazolin  - Ongoing monitoring for s/s infection     Disposition:  -Plan to discharge patient home with home nursing care, patient and family refused SNF, already have TF supplies at home  -Continue to assess home going needs     DONOVAN BuchananS    Jacqueline MINOR-CNP  Thoracic Surgery [id number]

## 2023-12-06 ENCOUNTER — APPOINTMENT (OUTPATIENT)
Dept: RADIOLOGY | Facility: HOSPITAL | Age: 68
DRG: 326 | End: 2023-12-06
Payer: MEDICARE

## 2023-12-06 ENCOUNTER — ANESTHESIA EVENT (OUTPATIENT)
Dept: OPERATING ROOM | Facility: HOSPITAL | Age: 68
DRG: 326 | End: 2023-12-06
Payer: MEDICARE

## 2023-12-06 PROBLEM — R13.19 OTHER DYSPHAGIA: Status: ACTIVE | Noted: 2023-10-31

## 2023-12-06 LAB
ABO GROUP (TYPE) IN BLOOD: NORMAL
ALBUMIN SERPL BCP-MCNC: 3.7 G/DL (ref 3.4–5)
ALBUMIN SERPL BCP-MCNC: 3.7 G/DL (ref 3.4–5)
AMYLASE FLD-CCNC: 12 U/L
ANION GAP SERPL CALC-SCNC: 13 MMOL/L (ref 10–20)
ANTIBODY SCREEN: NORMAL
BUN SERPL-MCNC: 33 MG/DL (ref 6–23)
CALCIUM SERPL-MCNC: 8.8 MG/DL (ref 8.6–10.6)
CHLORIDE SERPL-SCNC: 113 MMOL/L (ref 98–107)
CO2 SERPL-SCNC: 24 MMOL/L (ref 21–32)
CREAT SERPL-MCNC: 0.8 MG/DL (ref 0.5–1.3)
ERYTHROCYTE [DISTWIDTH] IN BLOOD BY AUTOMATED COUNT: 18 % (ref 11.5–14.5)
GFR SERPL CREATININE-BSD FRML MDRD: >90 ML/MIN/1.73M*2
GLUCOSE BLD MANUAL STRIP-MCNC: 128 MG/DL (ref 74–99)
GLUCOSE BLD MANUAL STRIP-MCNC: 130 MG/DL (ref 74–99)
GLUCOSE BLD MANUAL STRIP-MCNC: 134 MG/DL (ref 74–99)
GLUCOSE BLD MANUAL STRIP-MCNC: 140 MG/DL (ref 74–99)
GLUCOSE BLD MANUAL STRIP-MCNC: 142 MG/DL (ref 74–99)
GLUCOSE BLD MANUAL STRIP-MCNC: 144 MG/DL (ref 74–99)
GLUCOSE BLD MANUAL STRIP-MCNC: 172 MG/DL (ref 74–99)
GLUCOSE SERPL-MCNC: 144 MG/DL (ref 74–99)
HCT VFR BLD AUTO: 26.3 % (ref 41–52)
HGB BLD-MCNC: 8.4 G/DL (ref 13.5–17.5)
MAGNESIUM SERPL-MCNC: 2.09 MG/DL (ref 1.6–2.4)
MCH RBC QN AUTO: 32.6 PG (ref 26–34)
MCHC RBC AUTO-ENTMCNC: 31.9 G/DL (ref 32–36)
MCV RBC AUTO: 102 FL (ref 80–100)
NRBC BLD-RTO: 0.3 /100 WBCS (ref 0–0)
PHOSPHATE SERPL-MCNC: 1.5 MG/DL (ref 2.5–4.9)
PLATELET # BLD AUTO: 233 X10*3/UL (ref 150–450)
POTASSIUM SERPL-SCNC: 4.4 MMOL/L (ref 3.5–5.3)
PREALB SERPL-MCNC: 18.6 MG/DL (ref 18–40)
RBC # BLD AUTO: 2.58 X10*6/UL (ref 4.5–5.9)
RH FACTOR (ANTIGEN D): NORMAL
SODIUM SERPL-SCNC: 146 MMOL/L (ref 136–145)
TRANSFERRIN SERPL-MCNC: 148 MG/DL (ref 200–360)
UFH PPP CHRO-ACNC: 0.2 IU/ML
UFH PPP CHRO-ACNC: 0.3 IU/ML
UFH PPP CHRO-ACNC: 0.3 IU/ML
WBC # BLD AUTO: 7.9 X10*3/UL (ref 4.4–11.3)

## 2023-12-06 PROCEDURE — 83735 ASSAY OF MAGNESIUM: CPT | Performed by: STUDENT IN AN ORGANIZED HEALTH CARE EDUCATION/TRAINING PROGRAM

## 2023-12-06 PROCEDURE — 82040 ASSAY OF SERUM ALBUMIN: CPT | Performed by: PHYSICIAN ASSISTANT

## 2023-12-06 PROCEDURE — 71045 X-RAY EXAM CHEST 1 VIEW: CPT | Mod: FY

## 2023-12-06 PROCEDURE — 84100 ASSAY OF PHOSPHORUS: CPT | Performed by: STUDENT IN AN ORGANIZED HEALTH CARE EDUCATION/TRAINING PROGRAM

## 2023-12-06 PROCEDURE — 82947 ASSAY GLUCOSE BLOOD QUANT: CPT

## 2023-12-06 PROCEDURE — 2500000005 HC RX 250 GENERAL PHARMACY W/O HCPCS: Performed by: STUDENT IN AN ORGANIZED HEALTH CARE EDUCATION/TRAINING PROGRAM

## 2023-12-06 PROCEDURE — 74220 X-RAY XM ESOPHAGUS 1CNTRST: CPT | Performed by: STUDENT IN AN ORGANIZED HEALTH CARE EDUCATION/TRAINING PROGRAM

## 2023-12-06 PROCEDURE — 84466 ASSAY OF TRANSFERRIN: CPT | Performed by: PHYSICIAN ASSISTANT

## 2023-12-06 PROCEDURE — 1200000002 HC GENERAL ROOM WITH TELEMETRY DAILY

## 2023-12-06 PROCEDURE — 85520 HEPARIN ASSAY: CPT | Performed by: STUDENT IN AN ORGANIZED HEALTH CARE EDUCATION/TRAINING PROGRAM

## 2023-12-06 PROCEDURE — 97530 THERAPEUTIC ACTIVITIES: CPT | Mod: GP,CQ

## 2023-12-06 PROCEDURE — 2500000004 HC RX 250 GENERAL PHARMACY W/ HCPCS (ALT 636 FOR OP/ED): Performed by: STUDENT IN AN ORGANIZED HEALTH CARE EDUCATION/TRAINING PROGRAM

## 2023-12-06 PROCEDURE — 2500000001 HC RX 250 WO HCPCS SELF ADMINISTERED DRUGS (ALT 637 FOR MEDICARE OP): Performed by: STUDENT IN AN ORGANIZED HEALTH CARE EDUCATION/TRAINING PROGRAM

## 2023-12-06 PROCEDURE — 94640 AIRWAY INHALATION TREATMENT: CPT

## 2023-12-06 PROCEDURE — 71045 X-RAY EXAM CHEST 1 VIEW: CPT | Performed by: RADIOLOGY

## 2023-12-06 PROCEDURE — 71045 X-RAY EXAM CHEST 1 VIEW: CPT

## 2023-12-06 PROCEDURE — 74220 X-RAY XM ESOPHAGUS 1CNTRST: CPT

## 2023-12-06 PROCEDURE — 86901 BLOOD TYPING SEROLOGIC RH(D): CPT | Performed by: STUDENT IN AN ORGANIZED HEALTH CARE EDUCATION/TRAINING PROGRAM

## 2023-12-06 PROCEDURE — 84134 ASSAY OF PREALBUMIN: CPT | Performed by: PHYSICIAN ASSISTANT

## 2023-12-06 PROCEDURE — 99232 SBSQ HOSP IP/OBS MODERATE 35: CPT | Performed by: NURSE PRACTITIONER

## 2023-12-06 PROCEDURE — 97116 GAIT TRAINING THERAPY: CPT | Mod: GP,CQ

## 2023-12-06 PROCEDURE — 85027 COMPLETE CBC AUTOMATED: CPT | Performed by: STUDENT IN AN ORGANIZED HEALTH CARE EDUCATION/TRAINING PROGRAM

## 2023-12-06 PROCEDURE — 2500000002 HC RX 250 W HCPCS SELF ADMINISTERED DRUGS (ALT 637 FOR MEDICARE OP, ALT 636 FOR OP/ED): Performed by: STUDENT IN AN ORGANIZED HEALTH CARE EDUCATION/TRAINING PROGRAM

## 2023-12-06 PROCEDURE — 2550000001 HC RX 255 CONTRASTS: Performed by: STUDENT IN AN ORGANIZED HEALTH CARE EDUCATION/TRAINING PROGRAM

## 2023-12-06 PROCEDURE — C9113 INJ PANTOPRAZOLE SODIUM, VIA: HCPCS | Performed by: STUDENT IN AN ORGANIZED HEALTH CARE EDUCATION/TRAINING PROGRAM

## 2023-12-06 PROCEDURE — 82150 ASSAY OF AMYLASE: CPT | Performed by: STUDENT IN AN ORGANIZED HEALTH CARE EDUCATION/TRAINING PROGRAM

## 2023-12-06 PROCEDURE — 86922 COMPATIBILITY TEST ANTIGLOB: CPT

## 2023-12-06 RX ADMIN — MIDODRINE HYDROCHLORIDE 15 MG: 10 TABLET ORAL at 15:22

## 2023-12-06 RX ADMIN — ACETAMINOPHEN 650 MG: 650 SOLUTION ORAL at 15:22

## 2023-12-06 RX ADMIN — SERTRALINE HYDROCHLORIDE 75 MG: 50 TABLET ORAL at 10:22

## 2023-12-06 RX ADMIN — GUAIFENESIN 200 MG: 200 SOLUTION ORAL at 04:37

## 2023-12-06 RX ADMIN — THIAMINE HYDROCHLORIDE 100 MG: 100 INJECTION, SOLUTION INTRAMUSCULAR; INTRAVENOUS at 10:22

## 2023-12-06 RX ADMIN — GUAIFENESIN 200 MG: 200 SOLUTION ORAL at 10:22

## 2023-12-06 RX ADMIN — ALBUTEROL SULFATE 2.5 MG: 2.5 SOLUTION RESPIRATORY (INHALATION) at 13:35

## 2023-12-06 RX ADMIN — GUAIFENESIN 200 MG: 200 SOLUTION ORAL at 21:05

## 2023-12-06 RX ADMIN — GUAIFENESIN 200 MG: 200 SOLUTION ORAL at 15:22

## 2023-12-06 RX ADMIN — ACETAMINOPHEN 650 MG: 650 SOLUTION ORAL at 04:36

## 2023-12-06 RX ADMIN — OXYCODONE HYDROCHLORIDE 10 MG: 5 SOLUTION ORAL at 10:36

## 2023-12-06 RX ADMIN — HEPARIN SODIUM 2000 UNITS: 5000 INJECTION, SOLUTION INTRAVENOUS; SUBCUTANEOUS at 06:43

## 2023-12-06 RX ADMIN — MIDODRINE HYDROCHLORIDE 15 MG: 10 TABLET ORAL at 21:06

## 2023-12-06 RX ADMIN — ACETAMINOPHEN 650 MG: 650 SOLUTION ORAL at 10:22

## 2023-12-06 RX ADMIN — DIATRIZOATE MEGLUMINE AND DIATRIZOATE SODIUM 60 ML: 660; 100 LIQUID ORAL; RECTAL at 10:30

## 2023-12-06 RX ADMIN — OXYCODONE HYDROCHLORIDE 10 MG: 5 SOLUTION ORAL at 04:37

## 2023-12-06 RX ADMIN — ACETAMINOPHEN 650 MG: 650 SOLUTION ORAL at 21:05

## 2023-12-06 RX ADMIN — GABAPENTIN 300 MG: 250 SOLUTION ORAL at 15:22

## 2023-12-06 RX ADMIN — OXYCODONE HYDROCHLORIDE 10 MG: 5 SOLUTION ORAL at 21:05

## 2023-12-06 RX ADMIN — GABAPENTIN 300 MG: 250 SOLUTION ORAL at 22:12

## 2023-12-06 RX ADMIN — MIDODRINE HYDROCHLORIDE 15 MG: 10 TABLET ORAL at 05:08

## 2023-12-06 RX ADMIN — SODIUM CHLORIDE 30 MG/ML INHALATION SOLUTION 3 ML: 30 SOLUTION INHALANT at 13:35

## 2023-12-06 RX ADMIN — GABAPENTIN 300 MG: 250 SOLUTION ORAL at 05:08

## 2023-12-06 RX ADMIN — PANTOPRAZOLE SODIUM 40 MG: 40 INJECTION, POWDER, FOR SOLUTION INTRAVENOUS at 10:22

## 2023-12-06 ASSESSMENT — COGNITIVE AND FUNCTIONAL STATUS - GENERAL
MOBILITY SCORE: 21
MOVING TO AND FROM BED TO CHAIR: A LITTLE
WALKING IN HOSPITAL ROOM: A LITTLE
MOBILITY SCORE: 18
DAILY ACTIVITIY SCORE: 21
CLIMB 3 TO 5 STEPS WITH RAILING: A LITTLE
STANDING UP FROM CHAIR USING ARMS: A LITTLE
DRESSING REGULAR LOWER BODY CLOTHING: A LITTLE
STANDING UP FROM CHAIR USING ARMS: A LITTLE
TURNING FROM BACK TO SIDE WHILE IN FLAT BAD: A LITTLE
MOVING FROM LYING ON BACK TO SITTING ON SIDE OF FLAT BED WITH BEDRAILS: A LITTLE
WALKING IN HOSPITAL ROOM: A LITTLE
HELP NEEDED FOR BATHING: A LITTLE
CLIMB 3 TO 5 STEPS WITH RAILING: A LITTLE
DRESSING REGULAR UPPER BODY CLOTHING: A LITTLE

## 2023-12-06 ASSESSMENT — PAIN SCALES - GENERAL
PAINLEVEL_OUTOF10: 3
PAINLEVEL_OUTOF10: 10 - WORST POSSIBLE PAIN
PAINLEVEL_OUTOF10: 10 - WORST POSSIBLE PAIN
PAINLEVEL_OUTOF10: 9
PAINLEVEL_OUTOF10: 5 - MODERATE PAIN

## 2023-12-06 ASSESSMENT — PAIN - FUNCTIONAL ASSESSMENT
PAIN_FUNCTIONAL_ASSESSMENT: 0-10

## 2023-12-06 ASSESSMENT — PAIN DESCRIPTION - DESCRIPTORS: DESCRIPTORS: ACHING;SPASM;SQUEEZING

## 2023-12-06 NOTE — CARE PLAN
Problem: Pain  Goal: My pain/discomfort is manageable  Outcome: Progressing     Problem: Safety  Goal: Patient will be injury free during hospitalization  Outcome: Progressing  Goal: I will remain free of falls  Outcome: Progressing     Problem: Daily Care  Goal: Daily care needs are met  Outcome: Progressing     Problem: Psychosocial Needs  Goal: Demonstrates ability to cope with hospitalization/illness  Outcome: Progressing  Goal: Collaborate with me, my family, and caregiver to identify my specific goals  Outcome: Progressing     Problem: Discharge Barriers  Goal: My discharge needs are met  Outcome: Progressing     Problem: Pain  Goal: STG - Patient verbalizes a reduction in pain level  Outcome: Progressing     Problem: Pain - Adult  Goal: Verbalizes/displays adequate comfort level or baseline comfort level  Outcome: Progressing     Problem: Safety - Adult  Goal: Free from fall injury  Outcome: Progressing     Problem: Discharge Planning  Goal: Discharge to home or other facility with appropriate resources  Outcome: Progressing     Problem: Chronic Conditions and Co-morbidities  Goal: Patient's chronic conditions and co-morbidity symptoms are monitored and maintained or improved  Outcome: Progressing     Problem: Skin  Goal: Decreased wound size/increased tissue granulation at next dressing change  Outcome: Progressing  Goal: Participates in plan/prevention/treatment measures  Outcome: Progressing  Goal: Prevent/manage excess moisture  Outcome: Progressing  Goal: Prevent/minimize sheer/friction injuries  Outcome: Progressing  Goal: Promote/optimize nutrition  Outcome: Progressing  Goal: Promote skin healing  Outcome: Progressing     Problem: Diabetes  Goal: Achieve decreasing blood glucose levels by end of shift  Outcome: Progressing  Goal: Increase stability of blood glucose readings by end of shift  Outcome: Progressing  Goal: Decrease in ketones present in urine by end of shift  Outcome: Progressing  Goal:  Maintain electrolyte levels within acceptable range throughout shift  Outcome: Progressing  Goal: Maintain glucose levels >70mg/dl to <250mg/dl throughout shift  Outcome: Progressing  Goal: No changes in neurological exam by end of shift  Outcome: Progressing  Goal: Learn about and adhere to nutrition recommendations by end of shift  Outcome: Progressing  Goal: Vital signs within normal range for age by end of shift  Outcome: Progressing  Goal: Increase self care and/or family involovement by end of shift  Outcome: Progressing  Goal: Receive DSME education by end of shift  Outcome: Progressing     Problem: Pain  Goal: Takes deep breaths with improved pain control throughout the shift  Outcome: Progressing  Goal: Turns in bed with improved pain control throughout the shift  Outcome: Progressing  Goal: Walks with improved pain control throughout the shift  Outcome: Progressing  Goal: Performs ADL's with improved pain control throughout shift  Outcome: Progressing  Goal: Participates in PT with improved pain control throughout the shift  Outcome: Progressing  Goal: Free from opioid side effects throughout the shift  Outcome: Progressing  Goal: Free from acute confusion related to pain meds throughout the shift  Outcome: Progressing     Problem: Fall/Injury  Goal: Not fall by end of shift  Outcome: Progressing  Goal: Be free from injury by end of the shift  Outcome: Progressing  Goal: Verbalize understanding of personal risk factors for fall in the hospital  Outcome: Progressing  Goal: Verbalize understanding of risk factor reduction measures to prevent injury from fall in the home  Outcome: Progressing  Goal: Use assistive devices by end of the shift  Outcome: Progressing  Goal: Pace activities to prevent fatigue by end of the shift  Outcome: Progressing   The patient's goals for the shift include      The clinical goals for the shift include pain managed during shift

## 2023-12-06 NOTE — PROGRESS NOTES
Physical Therapy    Physical Therapy Treatment    Patient Name: Levy Gregory  MRN: [id number]  Today's Date: 12/6/2023  Time Calculation  Start Time: 0840  Stop Time: 0903  Time Calculation (min): 23 min       Assessment/Plan   PT Assessment  PT Assessment Results: Decreased endurance, Impaired balance  Rehab Prognosis: Good  Barriers to Discharge: none  Medical Staff Made Aware: Yes  Strengths: Ability to acquire knowledge, Attitude of self, Housing layout, Support of Caregivers  End of Session Communication: Bedside nurse  End of Session Patient Position: Up in chair  PT Plan  Inpatient/Swing Bed or Outpatient: Inpatient  PT Plan  Treatment/Interventions: Bed mobility, Transfer training, Gait training, Stair training, Balance training, Strengthening, Endurance training, Range of motion, Therapeutic exercise, Therapeutic activity, Home exercise program  PT Plan: Skilled PT  PT Frequency: 5 times per week  PT Discharge Recommendations: Moderate intensity level of continued care  Equipment Recommended upon Discharge:  (none)  PT Recommended Transfer Status: Stand by assist  PT - OK to Discharge: Yes      General Visit Information:   PT  Visit  PT Received On: 12/06/23  Response to Previous Treatment: Patient with no complaints from previous session.  Prior to Session Communication: Bedside nurse  Patient Position Received: Bed, 3 rail up, Alarm off, not on at start of session  Family/Caregiver Present: Yes  Caregiver Feedback: wife present at end of session  General Comment: The pt was pleasant, cooperative and willing to participate in therapy.     Subjective   Precautions:  Precautions  Medical Precautions: Fall precautions  Post-Surgical Precautions: Move in the Tube  Precautions Comment: The pt in compliance with all precautions throughout PT session.  Vital Signs:  Vital Signs  Heart Rate: 90 (HR increased to 105bpm-128bpm during amb)  Heart Rate Source: Monitor    Objective   Pain:  Pain Assessment  Pain  Assessment: 0-10  Pain Score: 5 - Moderate pain  Pain Type: Surgical pain  Pain Location: Shoulder  Pain Orientation: Left  Clinical Progression: Gradually improving  Pain Interventions: Medication (See MAR)  Cognition:  Cognition  Overall Cognitive Status: Within Functional Limits  Orientation Level: Oriented X4  Following Commands: Follows all commands and directions without difficulty  Lines/Tubes/Drains:  PICC - Adult 10/16/23 Double lumen Right Basilic vein (Active)   Number of days: 51       Urethral Catheter Straight-tip 16 Fr. (Active)   Number of days: 7       Closed/Suction Drain Left;Anterior Chest Bulb 7 Fr. (Active)   Number of days: 6       Gastrostomy/Enterostomy Jejunostomy 14 Fr. LLQ (Active)   Number of days: 6       PT Treatments:           Bed Mobility  Bed Mobility: Yes  Bed Mobility 1  Bed Mobility 1: Supine to sitting  Level of Assistance 1: Close supervision  Bed Mobility Comments 1: log roll technique  Ambulation/Gait Training  Ambulation/Gait Training Performed: Yes  Ambulation/Gait Training 1  Surface 1: Level tile  Device 1: Rolling walker  Assistance 1: Contact guard  Quality of Gait 1: Decreased step length  Comments/Distance (ft) 1: 85 ft x 2  Transfers  Transfer: Yes  Transfer 1  Transfer From 1: Sit to  Transfer to 1: Stand  Technique 1: Sit to stand  Transfer Device 1: Walker  Transfer Level of Assistance 1: Close supervision  Trials/Comments 1: x2 trials  Transfers 2  Transfer From 2: Stand to  Transfer to 2: Sit  Technique 2: Stand to sit  Transfer Device 2: Walker  Transfer Level of Assistance 2: Close supervision  Trials/Comments 2: x2 trials             Outcome Measures:  [facility] Basic Mobility  Turning from your back to your side while in a flat bed without using bedrails: A little  Moving from lying on your back to sitting on the side of a flat bed without using bedrails: A little  Moving to and from bed to chair (including a wheelchair): A little  Standing up from a chair  using your arms (e.g. wheelchair or bedside chair): A little  To walk in hospital room: A little  Climbing 3-5 steps with railing: A little  Basic Mobility - Total Score: 18                 Tinetti  Sitting Balance: Steady, safe  Arises: Able, uses arms to help  Attempts to Arise: Able to arise, one attempt  Immediate Standing Balance (First 5 Seconds): Steady without walker or other support  Standing Balance: Narrow stance without support  Nudged: Steady without walker or other support  Eyes Closed: Unsteady  Turned 360 Degrees: Steadiness: Steady  Turned 360 Degrees: Continuity of Steps: Continuous  Sitting Down: Uses arms or not a smooth motion  Balance Score: 13  Initiation of Gait: No hesitancy  Step Height: R Swing Foot: Right foot complete clears floor  Step Length: R Swing Foot: Passes left stance foot  Step Height: L Swing Foot: Left foot complete clears floor  Step Length: L Swing Foot: Passes right stance foot  Step Symmetry: Right and left step appear equal  Step Continuity: Steps appear continuous  Path: Mild/moderate deviation or uses walking aid  Trunk: Marked sway or uses walking aid  Walking Time: Heels almost touching while walking  Gait Score: 9  Total Score: 22          Education Documentation  Precautions, taught by Herlinda Kraus PTA at 12/6/2023 10:39 AM.  Learner: Significant Other, Patient  Readiness: Acceptance  Method: Explanation  Response: Verbalizes Understanding    Body Mechanics, taught by Herlinda Kraus PTA at 12/6/2023 10:39 AM.  Learner: Significant Other, Patient  Readiness: Acceptance  Method: Explanation  Response: Verbalizes Understanding    Mobility Training, taught by Herlinda Kraus PTA at 12/6/2023 10:39 AM.  Learner: Significant Other, Patient  Readiness: Acceptance  Method: Explanation  Response: Verbalizes Understanding    Education Comments  No comments found.          OP EDUCATION:       Encounter Problems       Encounter Problems (Active)       Balance       STG -  Maintains static standing balance with upper extremity support using FWW with Tk >15 minutes  (Progressing)       Start:  11/30/23    Expected End:  12/14/23               Mobility       STG - Patient will ambulate >300ft using FWW  (Progressing)       Start:  11/30/23    Expected End:  12/14/23               Pain          Pain - Adult          Transfers       STG - Patient to transfer to and from sit to supine CGA  (Progressing)       Start:  11/30/23    Expected End:  12/14/23            STG - Patient will transfer sit to and from stand supervision assistance. (Progressing)       Start:  11/30/23    Expected End:  12/14/23 12/06/23 at 10:40 AM   Herlinda Kraus PTA   Rehab Office: [phone]

## 2023-12-06 NOTE — PROGRESS NOTES
"Levy Gregory is a 68 y.o. male on day 7 of admission presenting with Esophageal perforation.    Subjective   Resting in the bed, improved pain control offers no complaints     Objective     Physical Exam  Vitals reviewed.   Constitutional:       General: He is awake. He is not in acute distress.  HENT:      Head: Normocephalic and atraumatic.      Nose:      Comments: NG tube in place without surrounding erythema or irritation. Line flushed at bedside and has drained 200 mL in 24 hours.   Neck:      Comments: MIGUELINA drain in place with 75 mL serosanguinous fluid spontaneously draining within 24 hours.   Cardiovascular:      Rate and Rhythm: Normal rate and regular rhythm.   Pulmonary:      Effort: Pulmonary effort is normal.      Breath sounds: Normal breath sounds and air entry.   Abdominal:      General: Abdomen is flat. Bowel sounds are normal. There is no distension.      Palpations: Abdomen is soft.      Tenderness: There is no abdominal tenderness.      Comments: Daily BM , tolerating enteral feeds at goal (cyclic)    Genitourinary:     Comments: Maldonado catheter to remain in place per urology.   Musculoskeletal:      Cervical back: Full passive range of motion without pain.      Right lower leg: No swelling. No edema.      Left lower leg: No swelling. No edema.   Skin:     General: Skin is warm and dry.             Comments: All surgical incisions appear well healing without dehiscence, drainage, erythema or warmth.    Neurological:      Mental Status: He is alert.   Psychiatric:         Attention and Perception: Attention normal.         Mood and Affect: Mood normal.         Speech: Speech normal.         Behavior: Behavior is cooperative.         Last Recorded Vitals  Blood pressure 111/74, pulse 91, temperature 36.5 °C (97.7 °F), temperature source Temporal, resp. rate 18, height 1.85 m (6' 0.84\"), weight 89.5 kg (197 lb 5 oz), SpO2 94 %.  Intake/Output last 3 Shifts:  I/O last 3 completed shifts:  In: " 1995.1 (22.3 mL/kg) [I.V.:440.1 (4.9 mL/kg); NG/GT:1555]  Out: 2695 (30.1 mL/kg) [Urine:1600 (0.5 mL/kg/hr); Emesis/NG output:1000; Drains:95]  Weight: 89.5 kg     Relevant Results    Scheduled medications  acetaminophen, 650 mg, j-tube, q6h  albuterol, 2.5 mg, nebulization, TID  gabapentin, 300 mg, j-tube, q8h SYLVESTER  guaiFENesin, 200 mg, j-tube, q6h  insulin lispro, 0-10 Units, subcutaneous, q4h  lidocaine, 1 patch, transdermal, Daily  midodrine, 15 mg, j-tube, q8h  pantoprazole, 40 mg, intravenous, Daily  sertraline, 75 mg, j-tube, Daily  sodium chloride, 3 mL, nebulization, TID  thiamine, 100 mg, intravenous, Daily      Continuous medications  heparin, 0-4,000 Units/hr, Last Rate: 1,500 Units/hr (12/06/23 0643)      PRN medications  PRN medications: dextrose 10 % in water (D10W), dextrose, glucagon, heparin, HYDROmorphone, ondansetron, oxyCODONE, oxyCODONE, oxygen      XR chest 1 view 12/05/2023    Narrative  Interpreted By:  Van Guerrero and Benza Andrew  STUDY:  XR CHEST 1 VIEW;  12/5/2023 4:48 am    INDICATION:  Signs/Symptoms:Thoracic Rounds.    COMPARISON:  Chest radiograph dated 12/04/2023, CT chest 10/25/2020    ACCESSION NUMBER(S):  [id number]    ORDERING CLINICIAN:  SIVA DONIS    FINDINGS:  AP radiograph of the chest was provided.    Enteric tube tip projects over the gastroesophageal junction. Right  upper extremity PICC tip projects over the superior cavoatrial  junction. A surgical drain projects over the left hemithorax in  unchanged position.    CARDIOMEDIASTINAL SILHOUETTE:  Cardiomediastinal silhouette is enlarged, stable in size and  configuration.    LUNGS:  Low lung volumes with consequent bronchovascular crowding. Interval  improvement in bilateral lung aeration compared to prior. Similar  appearance of blunting of the bilateral costophrenic angles and hazy  bibasilar opacities. Right perihilar and lower lung airspace  opacities are again seen.    ABDOMEN:  No remarkable upper  abdominal findings.    BONES:  No acute osseous changes. Left clavicular resection is again noted.    Impression  1.  Interval improvement in bilateral lung aeration with persistent  right perihilar and lower lung airspace opacities.  2. Similar appearance of bilateral pleural effusions with bibasilar  atelectasis.  3. Unchanged enlargement of the cardiomediastinal silhouette.    I personally reviewed the images/study and I agree with the findings  as stated above by resident physician, Gavin Mccrary MD. This study  was interpreted at [place], [place].      MACRO:  None.    Signed by: Van Guerrero 12/5/2023 3:21 PM  Dictation workstation:   [id number]    Results for orders placed or performed during the hospital encounter of 11/29/23 (from the past 24 hour(s))   POCT GLUCOSE   Result Value Ref Range    POCT Glucose 123 (H) 74 - 99 mg/dL   POCT GLUCOSE   Result Value Ref Range    POCT Glucose 96 74 - 99 mg/dL   POCT GLUCOSE   Result Value Ref Range    POCT Glucose 125 (H) 74 - 99 mg/dL   POCT GLUCOSE   Result Value Ref Range    POCT Glucose 143 (H) 74 - 99 mg/dL   POCT GLUCOSE   Result Value Ref Range    POCT Glucose 128 (H) 74 - 99 mg/dL   Magnesium   Result Value Ref Range    Magnesium 2.09 1.60 - 2.40 mg/dL   Renal Function Panel   Result Value Ref Range    Glucose 144 (H) 74 - 99 mg/dL    Sodium 146 (H) 136 - 145 mmol/L    Potassium 4.4 3.5 - 5.3 mmol/L    Chloride 113 (H) 98 - 107 mmol/L    Bicarbonate 24 21 - 32 mmol/L    Anion Gap 13 10 - 20 mmol/L    Urea Nitrogen 33 (H) 6 - 23 mg/dL    Creatinine 0.80 0.50 - 1.30 mg/dL    eGFR >90 >60 mL/min/1.73m*2    Calcium 8.8 8.6 - 10.6 mg/dL    Phosphorus 1.5 (L) 2.5 - 4.9 mg/dL    Albumin 3.7 3.4 - 5.0 g/dL   CBC   Result Value Ref Range    WBC 7.9 4.4 - 11.3 x10*3/uL    nRBC 0.3 (H) 0.0 - 0.0 /100 WBCs    RBC 2.58 (L) 4.50 - 5.90 x10*6/uL    Hemoglobin 8.4 (L) 13.5 - 17.5 g/dL    Hematocrit 26.3 (L) 41.0 - 52.0 %      (H) 80 - 100 fL    MCH 32.6 26.0 - 34.0 pg    MCHC 31.9 (L) 32.0 - 36.0 g/dL    RDW 18.0 (H) 11.5 - 14.5 %    Platelets 233 150 - 450 x10*3/uL   Heparin Assay, UFH   Result Value Ref Range    Heparin Unfractionated 0.2 See Comment Below for Therapeutic Ranges IU/mL   Amylase, Fluid   Result Value Ref Range    Amylase, Fluid 12 Not established. U/L   Albumin   Result Value Ref Range    Albumin 3.7 3.4 - 5.0 g/dL   POCT GLUCOSE   Result Value Ref Range    POCT Glucose 130 (H) 74 - 99 mg/dL          This patient has a central line   Reason for the central line remaining today? Parenteral medication - Currently has finished course of IV antibiotics, however, patient may need further medications this hospital stay so central line will remain until discharge is imminent. Plan to remove PICC Line on discharge.     This patient has a urinary catheter   Reason for the urinary catheter remaining today? urinary retention/bladder outlet obstruction, acute or chronic - per urology consult, catheter should remain in place for the duration of this hospital stay and will be managed outpatient by urology. Outpatient appointment was scheduled.         Assessment/Plan   Levy Gregory is a 67 y/o  with complex medical history who is  s/p laparotomy, lysis of adhesions, gastric pullup, jejunostomy placement with Dr. Gongora 11/29/2023.      His PMHx is significant is significant for COPD, T2DM, GERD, type II achalasia, paraesophageal hernia s/p robotic Laparoscopic Heller myotomy with mary fundoplication on 3/1 with Dr. Hoang complicated by esophageal perforation leading to a prolonged  hospitalization from 03/2023-5/2023. Course was complicated by a mediastinal abscess and R empyema with polymicrobial including Acinetobacter baumanii (CRAB) and MRSA . He required multiple surgical interventions:   -3/15 Right VATS with Gu   -3/24 IR guided pigtail placement  -3/27 EGD with esophageal stent replacement with Riccardo    -3/31  EGD, removal of esophageal stent, endovac placement, dobhoff tube placement, PEG tube placement ( Gu and Thoracic)  -4/5 right thoracotomy and esophagectomy with cervical esophagostomy and lap takedown of prior fundoplication, lysis of adhesions, and revision of gastrostomy tube for esophageal perforation ( Thoracic surgery)   - 4/7 PEG replacement and J tube placement ( Thoracic surgery)      His course was further complicated with Afib RVR, CORA required CRRT, Retroperitoneal/Psoas hematoma, upper and lower extremities DVTs sp IVC filter placement 4/20, and acalculous cholecystitis s/p percutaneous cholecystostomy tube placement by IR 4/27.     He was readmitted in August 2023 with worsening Empyema with cultures showing Pseudomonas, E.coli, Kleb oxytoca, and Saccharomyces cerevisae from right plural fluid, treated with Fluconazole, Levaquin, and Zosyn.  Most recently, he has been having issues with  feeding tube clogging and  then with jejunostomy tube malposition. Most recently had a G and separate J tube replacements (on 10/20/2023). Otherwise he has been doing well. He was brought back on 11/29/23 for Gastric pull up, j-tube placement, and partial L clavicle removal.      Plan:  NEURO: History of diabetic neuropathy and depression. Acute post-op pain is well controlled. On home Zoloft and Neurontin.   - Ongoing neuro/pain assessments  - Good pain control - continue scheduled acetaminophen, oxycodone liquid prn and iv hydromorphone for breakthrough pain, monitor effectiveness and adjust dose as needed   - Lidoderm patches surrounding L chest/clavicular incision  - Continue home dose of Zoloft and Neurontin   - PT/OT following   - Encourage OOB/ambulation   - Home meds: Zoloft 75 mg daily, Atarax 25 mg BID, Gabapentin 300 mg TID, thiamine 100 mg daily      CV: History of HLD , Hx of Afib. Baseline SBP 90's-100's, on midodrine 15mg q8h. Most recent Echo ( 08/2023) with suboptimal quality. Previous TTE  4/2023: EF 70-75%, normal RV function.   Pos top on phenylphrine infusion 0.5 mcg/kg/min. Phenylphrine infusion increased to 1mcg/kg/min overnight 11/29-11/30 and received 500 ml LR x 2. Started on midodrine 15mg q8h 11/30. Transfused 2 rbc, weaned of phenyl infusion in afternoon 11/30. Placed back transiently on 12/1, now weaned off.   -Normotensive, NSR on telemetry   -Continue Midodrine 15 mg q 8  -Continuous telemetry and VS every 4 hrs   -Replace electrolytes as needed   -Home meds: Eliquis 2.5 mg BID, Lipitor 10 mg daily, Midodrine 5 mg TID, Aldactone 12.5 mg QOD      PULM: History of COPD. 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 Most recent Empyema was in August 2023. Acute hypoxic respiratory insufficiency on 12/1 with increased FiO2 to 5L HF NC.   - Weaned of supplemental oxygen   - Continue bronchial hygiene  - Q1h incentive spirometry while awake  - Avoid positive pressure ventilation   - Repeat daily CXR's     GI: Hx Paraesophageal hernia s/p robotic Laparoscopic Heller myotomy with mary fundoplication on 3/1 with Dr. Hoang complicated by esophageal perforation leading to multiple intervention as outlined above  and eventually a right thoracotomy and esophagectomy with cervical esophagostomy and lap takedown of prior fundoplication on 4/5. Perc fran 4/27.  He is now s/p laparotomy, lysis of adhesions, gastric pullup, jejunostomy placement. L partial clavicle removal 11/29  - Maintain strict NPO  - PPI for GI prophylaxis  - Maintain HOB up at least 30 degrees at all times secondary to high risk of aspiration  - Send daily amylase levels from MIGUELINA drain, 12 today   - NG removed on morning rounds   - Tolerating cyclic enteral feeds via J-tube, patient has supplies at home   - Esophagram pending this am , if negative will start sips of clears         : Baseline sCr ~ 0.9. Oliguric CORA March 2023, required CRRT, recovered and returted to baseline . Urinary retention, has a  chronic aguilera . [facility].   - Check renal function panel daily and prn  - Replete electrolytes to goal  - Maintain aguilera catheter in place, consulted/spoke to urology, no indication for voiding trial during this hospitalization due to high possibility of failure, urology will arrange for close follow up in outpatient setting      HEME: Acute blood loss anemia. Hemoglobin 7.7 11/30, transfused 2 RBC with increase of Hgb to 8.2-8.7 range. Bilateral UE DVT and LLE DVT diagnosed March 2023, IVC filter placed in 4/2023. On Eliquis. Repeat vasc US x4 extremities 11/30: BUE without DVTs, BLE + for nonocclusive L popliteal DVT.   - Check CBC and coags daily   - Ongoing monitoring for s/s bleeding  - Anticoagulation: Continue Heparin gtt , resume home Eliquis on discharge ? Tomorrow   - Home Meds: Eliquis      ENDO: IDDM2. 9/25/23 A1C 4.7. Adequate glycemic control   - Q4h BG monitoring with SSI Lispro per protocol  - Was not managed on home medications prior to hospitalization     ID:  Hx mediastinal abscess and R empyema with polymicrobial including Acinetobacter baumanii (CRAB) and MRSA ( 04/2023)  Recurrent Empyema with cultures growing  cultures showing Pseudomonas, E.coli and Kleb oxytoca, and Saccharomyces cerevisae ( 08/2023)  - Afebrile, no leukocytosis   - Trend temp q4, wbc daily  - Finished perioperative Cefazolin  - Ongoing monitoring for s/s infection     Disposition:  -Plan to discharge patient home with home nursing care, patient and family refused SNF, already have TF supplies at home, anticipate tomorrow   -Continue to assess home going needs     Patient seen and evaluated independently, discussed with attending physician Dr. Gongora.     Jacqueline Leigh APRN-CNP  Thoracic Surgery [id number]

## 2023-12-07 ENCOUNTER — APPOINTMENT (OUTPATIENT)
Dept: RADIOLOGY | Facility: HOSPITAL | Age: 68
DRG: 326 | End: 2023-12-07
Payer: MEDICARE

## 2023-12-07 ENCOUNTER — ANESTHESIA (OUTPATIENT)
Dept: OPERATING ROOM | Facility: HOSPITAL | Age: 68
DRG: 326 | End: 2023-12-07
Payer: MEDICARE

## 2023-12-07 PROBLEM — B99.9 INFECTION REQUIRING CONTACT ISOLATION PRECAUTIONS: Status: ACTIVE | Noted: 2023-12-07

## 2023-12-07 LAB
ALBUMIN SERPL BCP-MCNC: 3.8 G/DL (ref 3.4–5)
ALBUMIN SERPL BCP-MCNC: 4 G/DL (ref 3.4–5)
AMYLASE FLD-CCNC: 20 U/L
ANION GAP SERPL CALC-SCNC: 16 MMOL/L (ref 10–20)
ANION GAP SERPL CALC-SCNC: 18 MMOL/L (ref 10–20)
BUN SERPL-MCNC: 36 MG/DL (ref 6–23)
BUN SERPL-MCNC: 37 MG/DL (ref 6–23)
CALCIUM SERPL-MCNC: 8.9 MG/DL (ref 8.6–10.6)
CALCIUM SERPL-MCNC: 9.2 MG/DL (ref 8.6–10.6)
CHLORIDE SERPL-SCNC: 111 MMOL/L (ref 98–107)
CHLORIDE SERPL-SCNC: 112 MMOL/L (ref 98–107)
CO2 SERPL-SCNC: 22 MMOL/L (ref 21–32)
CO2 SERPL-SCNC: 23 MMOL/L (ref 21–32)
CREAT SERPL-MCNC: 0.95 MG/DL (ref 0.5–1.3)
CREAT SERPL-MCNC: 1.1 MG/DL (ref 0.5–1.3)
ERYTHROCYTE [DISTWIDTH] IN BLOOD BY AUTOMATED COUNT: 18.7 % (ref 11.5–14.5)
ERYTHROCYTE [DISTWIDTH] IN BLOOD BY AUTOMATED COUNT: 19.2 % (ref 11.5–14.5)
GFR SERPL CREATININE-BSD FRML MDRD: 74 ML/MIN/1.73M*2
GFR SERPL CREATININE-BSD FRML MDRD: 88 ML/MIN/1.73M*2
GLUCOSE BLD MANUAL STRIP-MCNC: 110 MG/DL (ref 74–99)
GLUCOSE BLD MANUAL STRIP-MCNC: 111 MG/DL (ref 74–99)
GLUCOSE BLD MANUAL STRIP-MCNC: 121 MG/DL (ref 74–99)
GLUCOSE BLD MANUAL STRIP-MCNC: 124 MG/DL (ref 74–99)
GLUCOSE BLD MANUAL STRIP-MCNC: 130 MG/DL (ref 74–99)
GLUCOSE SERPL-MCNC: 127 MG/DL (ref 74–99)
GLUCOSE SERPL-MCNC: 136 MG/DL (ref 74–99)
HCT VFR BLD AUTO: 28.1 % (ref 41–52)
HCT VFR BLD AUTO: 30.4 % (ref 41–52)
HGB BLD-MCNC: 8.5 G/DL (ref 13.5–17.5)
HGB BLD-MCNC: 9.5 G/DL (ref 13.5–17.5)
MAGNESIUM SERPL-MCNC: 1.79 MG/DL (ref 1.6–2.4)
MAGNESIUM SERPL-MCNC: 2.09 MG/DL (ref 1.6–2.4)
MCH RBC QN AUTO: 31.6 PG (ref 26–34)
MCH RBC QN AUTO: 32.3 PG (ref 26–34)
MCHC RBC AUTO-ENTMCNC: 30.2 G/DL (ref 32–36)
MCHC RBC AUTO-ENTMCNC: 31.3 G/DL (ref 32–36)
MCV RBC AUTO: 103 FL (ref 80–100)
MCV RBC AUTO: 105 FL (ref 80–100)
NRBC BLD-RTO: 0.2 /100 WBCS (ref 0–0)
NRBC BLD-RTO: 0.2 /100 WBCS (ref 0–0)
PHOSPHATE SERPL-MCNC: 2.7 MG/DL (ref 2.5–4.9)
PHOSPHATE SERPL-MCNC: 2.8 MG/DL (ref 2.5–4.9)
PLATELET # BLD AUTO: 336 X10*3/UL (ref 150–450)
PLATELET # BLD AUTO: 341 X10*3/UL (ref 150–450)
POTASSIUM SERPL-SCNC: 5 MMOL/L (ref 3.5–5.3)
POTASSIUM SERPL-SCNC: 5 MMOL/L (ref 3.5–5.3)
RBC # BLD AUTO: 2.69 X10*6/UL (ref 4.5–5.9)
RBC # BLD AUTO: 2.94 X10*6/UL (ref 4.5–5.9)
SODIUM SERPL-SCNC: 145 MMOL/L (ref 136–145)
SODIUM SERPL-SCNC: 147 MMOL/L (ref 136–145)
WBC # BLD AUTO: 11.6 X10*3/UL (ref 4.4–11.3)
WBC # BLD AUTO: 15.6 X10*3/UL (ref 4.4–11.3)

## 2023-12-07 PROCEDURE — 80069 RENAL FUNCTION PANEL: CPT | Performed by: NURSE PRACTITIONER

## 2023-12-07 PROCEDURE — 36415 COLL VENOUS BLD VENIPUNCTURE: CPT | Performed by: STUDENT IN AN ORGANIZED HEALTH CARE EDUCATION/TRAINING PROGRAM

## 2023-12-07 PROCEDURE — A4217 STERILE WATER/SALINE, 500 ML: HCPCS | Performed by: STUDENT IN AN ORGANIZED HEALTH CARE EDUCATION/TRAINING PROGRAM

## 2023-12-07 PROCEDURE — 7100000001 HC RECOVERY ROOM TIME - INITIAL BASE CHARGE: Performed by: STUDENT IN AN ORGANIZED HEALTH CARE EDUCATION/TRAINING PROGRAM

## 2023-12-07 PROCEDURE — 71045 X-RAY EXAM CHEST 1 VIEW: CPT | Mod: FY

## 2023-12-07 PROCEDURE — 2500000004 HC RX 250 GENERAL PHARMACY W/ HCPCS (ALT 636 FOR OP/ED): Performed by: STUDENT IN AN ORGANIZED HEALTH CARE EDUCATION/TRAINING PROGRAM

## 2023-12-07 PROCEDURE — 81001 URINALYSIS AUTO W/SCOPE: CPT | Performed by: STUDENT IN AN ORGANIZED HEALTH CARE EDUCATION/TRAINING PROGRAM

## 2023-12-07 PROCEDURE — 2500000005 HC RX 250 GENERAL PHARMACY W/O HCPCS: Performed by: STUDENT IN AN ORGANIZED HEALTH CARE EDUCATION/TRAINING PROGRAM

## 2023-12-07 PROCEDURE — 71045 X-RAY EXAM CHEST 1 VIEW: CPT

## 2023-12-07 PROCEDURE — 71045 X-RAY EXAM CHEST 1 VIEW: CPT | Performed by: RADIOLOGY

## 2023-12-07 PROCEDURE — 3600000008 HC OR TIME - EACH INCREMENTAL 1 MINUTE - PROCEDURE LEVEL THREE: Performed by: STUDENT IN AN ORGANIZED HEALTH CARE EDUCATION/TRAINING PROGRAM

## 2023-12-07 PROCEDURE — 7100000002 HC RECOVERY ROOM TIME - EACH INCREMENTAL 1 MINUTE: Performed by: STUDENT IN AN ORGANIZED HEALTH CARE EDUCATION/TRAINING PROGRAM

## 2023-12-07 PROCEDURE — 87075 CULTR BACTERIA EXCEPT BLOOD: CPT | Performed by: STUDENT IN AN ORGANIZED HEALTH CARE EDUCATION/TRAINING PROGRAM

## 2023-12-07 PROCEDURE — 2500000004 HC RX 250 GENERAL PHARMACY W/ HCPCS (ALT 636 FOR OP/ED): Performed by: NURSE PRACTITIONER

## 2023-12-07 PROCEDURE — 49002 REOPENING OF ABDOMEN: CPT | Performed by: STUDENT IN AN ORGANIZED HEALTH CARE EDUCATION/TRAINING PROGRAM

## 2023-12-07 PROCEDURE — C9113 INJ PANTOPRAZOLE SODIUM, VIA: HCPCS | Performed by: STUDENT IN AN ORGANIZED HEALTH CARE EDUCATION/TRAINING PROGRAM

## 2023-12-07 PROCEDURE — 2500000002 HC RX 250 W HCPCS SELF ADMINISTERED DRUGS (ALT 637 FOR MEDICARE OP, ALT 636 FOR OP/ED): Performed by: STUDENT IN AN ORGANIZED HEALTH CARE EDUCATION/TRAINING PROGRAM

## 2023-12-07 PROCEDURE — 1200000002 HC GENERAL ROOM WITH TELEMETRY DAILY

## 2023-12-07 PROCEDURE — P9045 ALBUMIN (HUMAN), 5%, 250 ML: HCPCS | Mod: JZ | Performed by: STUDENT IN AN ORGANIZED HEALTH CARE EDUCATION/TRAINING PROGRAM

## 2023-12-07 PROCEDURE — 94760 N-INVAS EAR/PLS OXIMETRY 1: CPT

## 2023-12-07 PROCEDURE — 43239 EGD BIOPSY SINGLE/MULTIPLE: CPT | Performed by: STUDENT IN AN ORGANIZED HEALTH CARE EDUCATION/TRAINING PROGRAM

## 2023-12-07 PROCEDURE — 2720000007 HC OR 272 NO HCPCS: Performed by: STUDENT IN AN ORGANIZED HEALTH CARE EDUCATION/TRAINING PROGRAM

## 2023-12-07 PROCEDURE — 85027 COMPLETE CBC AUTOMATED: CPT | Performed by: NURSE PRACTITIONER

## 2023-12-07 PROCEDURE — 3700000001 HC GENERAL ANESTHESIA TIME - INITIAL BASE CHARGE: Performed by: STUDENT IN AN ORGANIZED HEALTH CARE EDUCATION/TRAINING PROGRAM

## 2023-12-07 PROCEDURE — 83735 ASSAY OF MAGNESIUM: CPT | Performed by: NURSE PRACTITIONER

## 2023-12-07 PROCEDURE — 2500000001 HC RX 250 WO HCPCS SELF ADMINISTERED DRUGS (ALT 637 FOR MEDICARE OP): Performed by: STUDENT IN AN ORGANIZED HEALTH CARE EDUCATION/TRAINING PROGRAM

## 2023-12-07 PROCEDURE — 85027 COMPLETE CBC AUTOMATED: CPT | Performed by: STUDENT IN AN ORGANIZED HEALTH CARE EDUCATION/TRAINING PROGRAM

## 2023-12-07 PROCEDURE — 97116 GAIT TRAINING THERAPY: CPT | Mod: GP | Performed by: PHYSICAL THERAPIST

## 2023-12-07 PROCEDURE — 0DW50YZ REVISION OF OTHER DEVICE IN ESOPHAGUS, OPEN APPROACH: ICD-10-PCS | Performed by: STUDENT IN AN ORGANIZED HEALTH CARE EDUCATION/TRAINING PROGRAM

## 2023-12-07 PROCEDURE — 96372 THER/PROPH/DIAG INJ SC/IM: CPT | Performed by: PHYSICIAN ASSISTANT

## 2023-12-07 PROCEDURE — 83605 ASSAY OF LACTIC ACID: CPT | Performed by: STUDENT IN AN ORGANIZED HEALTH CARE EDUCATION/TRAINING PROGRAM

## 2023-12-07 PROCEDURE — 93010 ELECTROCARDIOGRAM REPORT: CPT | Performed by: INTERNAL MEDICINE

## 2023-12-07 PROCEDURE — 2500000004 HC RX 250 GENERAL PHARMACY W/ HCPCS (ALT 636 FOR OP/ED): Performed by: PHYSICIAN ASSISTANT

## 2023-12-07 PROCEDURE — 94640 AIRWAY INHALATION TREATMENT: CPT

## 2023-12-07 PROCEDURE — A49000 PR EXPLORATORY OF ABDOMEN: Performed by: ANESTHESIOLOGY

## 2023-12-07 PROCEDURE — P9016 RBC LEUKOCYTES REDUCED: HCPCS

## 2023-12-07 PROCEDURE — 82150 ASSAY OF AMYLASE: CPT | Performed by: NURSE PRACTITIONER

## 2023-12-07 PROCEDURE — 3600000003 HC OR TIME - INITIAL BASE CHARGE - PROCEDURE LEVEL THREE: Performed by: STUDENT IN AN ORGANIZED HEALTH CARE EDUCATION/TRAINING PROGRAM

## 2023-12-07 PROCEDURE — 82947 ASSAY GLUCOSE BLOOD QUANT: CPT

## 2023-12-07 PROCEDURE — 3700000002 HC GENERAL ANESTHESIA TIME - EACH INCREMENTAL 1 MINUTE: Performed by: STUDENT IN AN ORGANIZED HEALTH CARE EDUCATION/TRAINING PROGRAM

## 2023-12-07 RX ORDER — HYDROMORPHONE HYDROCHLORIDE 1 MG/ML
0.2 INJECTION, SOLUTION INTRAMUSCULAR; INTRAVENOUS; SUBCUTANEOUS EVERY 5 MIN PRN
Status: DISCONTINUED | OUTPATIENT
Start: 2023-12-07 | End: 2023-12-07

## 2023-12-07 RX ORDER — FENTANYL CITRATE 50 UG/ML
INJECTION, SOLUTION INTRAMUSCULAR; INTRAVENOUS AS NEEDED
Status: DISCONTINUED | OUTPATIENT
Start: 2023-12-07 | End: 2023-12-07

## 2023-12-07 RX ORDER — NALOXONE HYDROCHLORIDE 0.4 MG/ML
0.2 INJECTION, SOLUTION INTRAMUSCULAR; INTRAVENOUS; SUBCUTANEOUS AS NEEDED
Status: CANCELLED | OUTPATIENT
Start: 2023-12-07

## 2023-12-07 RX ORDER — HEPARIN SODIUM 5000 [USP'U]/.5ML
5000 INJECTION, SOLUTION INTRAVENOUS; SUBCUTANEOUS EVERY 8 HOURS
Status: DISCONTINUED | OUTPATIENT
Start: 2023-12-07 | End: 2023-12-07

## 2023-12-07 RX ORDER — PHENYLEPHRINE HCL IN 0.9% NACL 0.4MG/10ML
SYRINGE (ML) INTRAVENOUS AS NEEDED
Status: DISCONTINUED | OUTPATIENT
Start: 2023-12-07 | End: 2023-12-07

## 2023-12-07 RX ORDER — CEFAZOLIN SODIUM 2 G/100ML
2 INJECTION, SOLUTION INTRAVENOUS EVERY 8 HOURS
Status: COMPLETED | OUTPATIENT
Start: 2023-12-07 | End: 2023-12-08

## 2023-12-07 RX ORDER — ROCURONIUM BROMIDE 10 MG/ML
INJECTION, SOLUTION INTRAVENOUS AS NEEDED
Status: DISCONTINUED | OUTPATIENT
Start: 2023-12-07 | End: 2023-12-07

## 2023-12-07 RX ORDER — FUROSEMIDE 10 MG/ML
10 INJECTION INTRAMUSCULAR; INTRAVENOUS ONCE
Status: COMPLETED | OUTPATIENT
Start: 2023-12-07 | End: 2023-12-07

## 2023-12-07 RX ORDER — HYDROMORPHONE HYDROCHLORIDE 1 MG/ML
0.5 INJECTION, SOLUTION INTRAMUSCULAR; INTRAVENOUS; SUBCUTANEOUS EVERY 5 MIN PRN
Status: DISCONTINUED | OUTPATIENT
Start: 2023-12-07 | End: 2023-12-07

## 2023-12-07 RX ORDER — ALBUMIN HUMAN 50 G/1000ML
SOLUTION INTRAVENOUS AS NEEDED
Status: DISCONTINUED | OUTPATIENT
Start: 2023-12-07 | End: 2023-12-07

## 2023-12-07 RX ORDER — ACETAMINOPHEN 160 MG/5ML
650 SOLUTION ORAL EVERY 6 HOURS
Status: CANCELLED | OUTPATIENT
Start: 2023-12-07

## 2023-12-07 RX ORDER — SODIUM CHLORIDE 0.9 G/100ML
IRRIGANT IRRIGATION AS NEEDED
Status: DISCONTINUED | OUTPATIENT
Start: 2023-12-07 | End: 2023-12-07 | Stop reason: HOSPADM

## 2023-12-07 RX ORDER — HYDROMORPHONE HYDROCHLORIDE 1 MG/ML
0.1 INJECTION, SOLUTION INTRAMUSCULAR; INTRAVENOUS; SUBCUTANEOUS EVERY 5 MIN PRN
Status: DISCONTINUED | OUTPATIENT
Start: 2023-12-07 | End: 2023-12-07

## 2023-12-07 RX ORDER — HEPARIN SODIUM 5000 [USP'U]/ML
5000 INJECTION, SOLUTION INTRAVENOUS; SUBCUTANEOUS EVERY 8 HOURS
Status: DISCONTINUED | OUTPATIENT
Start: 2023-12-07 | End: 2023-12-11

## 2023-12-07 RX ORDER — SODIUM CHLORIDE, SODIUM LACTATE, POTASSIUM CHLORIDE, CALCIUM CHLORIDE 600; 310; 30; 20 MG/100ML; MG/100ML; MG/100ML; MG/100ML
100 INJECTION, SOLUTION INTRAVENOUS CONTINUOUS
Status: DISCONTINUED | OUTPATIENT
Start: 2023-12-07 | End: 2023-12-07

## 2023-12-07 RX ORDER — ONDANSETRON HYDROCHLORIDE 2 MG/ML
4 INJECTION, SOLUTION INTRAVENOUS ONCE AS NEEDED
Status: COMPLETED | OUTPATIENT
Start: 2023-12-07 | End: 2023-12-07

## 2023-12-07 RX ORDER — HYDROMORPHONE HCL/0.9% NACL/PF 15 MG/30ML
PATIENT CONTROLLED ANALGESIA SYRINGE INTRAVENOUS CONTINUOUS
Status: DISCONTINUED | OUTPATIENT
Start: 2023-12-07 | End: 2023-12-11

## 2023-12-07 RX ORDER — SODIUM CHLORIDE, SODIUM LACTATE, POTASSIUM CHLORIDE, CALCIUM CHLORIDE 600; 310; 30; 20 MG/100ML; MG/100ML; MG/100ML; MG/100ML
10 INJECTION, SOLUTION INTRAVENOUS CONTINUOUS
Status: DISCONTINUED | OUTPATIENT
Start: 2023-12-07 | End: 2023-12-11

## 2023-12-07 RX ORDER — SUCCINYLCHOLINE CHLORIDE 100 MG/5ML
SYRINGE (ML) INTRAVENOUS AS NEEDED
Status: DISCONTINUED | OUTPATIENT
Start: 2023-12-07 | End: 2023-12-07

## 2023-12-07 RX ORDER — LIDOCAINE HYDROCHLORIDE 10 MG/ML
0.1 INJECTION INFILTRATION; PERINEURAL ONCE
Status: DISCONTINUED | OUTPATIENT
Start: 2023-12-07 | End: 2023-12-07 | Stop reason: HOSPADM

## 2023-12-07 RX ORDER — CEFAZOLIN 1 G/1
INJECTION, POWDER, FOR SOLUTION INTRAVENOUS AS NEEDED
Status: DISCONTINUED | OUTPATIENT
Start: 2023-12-07 | End: 2023-12-07

## 2023-12-07 RX ORDER — LIDOCAINE HYDROCHLORIDE 20 MG/ML
INJECTION, SOLUTION INFILTRATION; PERINEURAL AS NEEDED
Status: DISCONTINUED | OUTPATIENT
Start: 2023-12-07 | End: 2023-12-07

## 2023-12-07 RX ORDER — PROPOFOL 10 MG/ML
INJECTION, EMULSION INTRAVENOUS AS NEEDED
Status: DISCONTINUED | OUTPATIENT
Start: 2023-12-07 | End: 2023-12-07

## 2023-12-07 RX ORDER — BUPIVACAINE HCL/EPINEPHRINE 0.25-.0005
VIAL (ML) INJECTION AS NEEDED
Status: DISCONTINUED | OUTPATIENT
Start: 2023-12-07 | End: 2023-12-07 | Stop reason: HOSPADM

## 2023-12-07 RX ORDER — ETOMIDATE 2 MG/ML
INJECTION INTRAVENOUS AS NEEDED
Status: DISCONTINUED | OUTPATIENT
Start: 2023-12-07 | End: 2023-12-07

## 2023-12-07 RX ORDER — ESMOLOL HYDROCHLORIDE 10 MG/ML
INJECTION INTRAVENOUS AS NEEDED
Status: DISCONTINUED | OUTPATIENT
Start: 2023-12-07 | End: 2023-12-07

## 2023-12-07 RX ADMIN — HYDROMORPHONE HYDROCHLORIDE 0.2 MG: 1 INJECTION, SOLUTION INTRAMUSCULAR; INTRAVENOUS; SUBCUTANEOUS at 16:40

## 2023-12-07 RX ADMIN — GUAIFENESIN 200 MG: 200 SOLUTION ORAL at 04:48

## 2023-12-07 RX ADMIN — HYDROMORPHONE HYDROCHLORIDE 0.4 MG: 1 INJECTION, SOLUTION INTRAMUSCULAR; INTRAVENOUS; SUBCUTANEOUS at 16:16

## 2023-12-07 RX ADMIN — SODIUM CHLORIDE 30 MG/ML INHALATION SOLUTION 3 ML: 30 SOLUTION INHALANT at 10:40

## 2023-12-07 RX ADMIN — THIAMINE HYDROCHLORIDE 100 MG: 100 INJECTION, SOLUTION INTRAMUSCULAR; INTRAVENOUS at 10:04

## 2023-12-07 RX ADMIN — ALBUTEROL SULFATE 2.5 MG: 2.5 SOLUTION RESPIRATORY (INHALATION) at 18:51

## 2023-12-07 RX ADMIN — HYDROMORPHONE HYDROCHLORIDE 0.5 MG: 1 INJECTION, SOLUTION INTRAMUSCULAR; INTRAVENOUS; SUBCUTANEOUS at 16:19

## 2023-12-07 RX ADMIN — Medication 80 MCG: at 15:36

## 2023-12-07 RX ADMIN — OXYCODONE HYDROCHLORIDE 10 MG: 5 SOLUTION ORAL at 10:04

## 2023-12-07 RX ADMIN — CEFAZOLIN SODIUM 2 G: 2 INJECTION, SOLUTION INTRAVENOUS at 22:57

## 2023-12-07 RX ADMIN — ESMOLOL HYDROCHLORIDE 30 MG: 10 INJECTION, SOLUTION INTRAVENOUS at 13:17

## 2023-12-07 RX ADMIN — Medication 200 MCG: at 13:35

## 2023-12-07 RX ADMIN — ACETAMINOPHEN 650 MG: 650 SOLUTION ORAL at 20:49

## 2023-12-07 RX ADMIN — Medication 6 L/MIN: at 16:10

## 2023-12-07 RX ADMIN — ROCURONIUM BROMIDE 10 MG: 10 INJECTION INTRAVENOUS at 14:17

## 2023-12-07 RX ADMIN — ETOMIDATE 16 MG: 20 INJECTION, SOLUTION INTRAVENOUS at 13:17

## 2023-12-07 RX ADMIN — ROCURONIUM BROMIDE 30 MG: 10 INJECTION INTRAVENOUS at 13:36

## 2023-12-07 RX ADMIN — SUGAMMADEX 200 MG: 100 INJECTION, SOLUTION INTRAVENOUS at 15:59

## 2023-12-07 RX ADMIN — Medication 120 MCG: at 13:31

## 2023-12-07 RX ADMIN — SODIUM CHLORIDE, SODIUM LACTATE, POTASSIUM CHLORIDE, AND CALCIUM CHLORIDE: 600; 310; 30; 20 INJECTION, SOLUTION INTRAVENOUS at 13:26

## 2023-12-07 RX ADMIN — LIDOCAINE HYDROCHLORIDE 70 MG: 20 INJECTION, SOLUTION INFILTRATION; PERINEURAL at 13:17

## 2023-12-07 RX ADMIN — Medication 120 MCG: at 14:58

## 2023-12-07 RX ADMIN — HEPARIN SODIUM 5000 UNITS: 5000 INJECTION INTRAVENOUS; SUBCUTANEOUS at 20:47

## 2023-12-07 RX ADMIN — PANTOPRAZOLE SODIUM 40 MG: 40 INJECTION, POWDER, FOR SOLUTION INTRAVENOUS at 10:04

## 2023-12-07 RX ADMIN — HYDROMORPHONE HYDROCHLORIDE 0.4 MG: 1 INJECTION, SOLUTION INTRAMUSCULAR; INTRAVENOUS; SUBCUTANEOUS at 16:11

## 2023-12-07 RX ADMIN — Medication 3 L/MIN: at 18:51

## 2023-12-07 RX ADMIN — ONDANSETRON 4 MG: 2 INJECTION INTRAMUSCULAR; INTRAVENOUS at 16:37

## 2023-12-07 RX ADMIN — Medication 100 MG: at 13:17

## 2023-12-07 RX ADMIN — Medication 120 MCG: at 14:15

## 2023-12-07 RX ADMIN — Medication 120 MCG: at 15:13

## 2023-12-07 RX ADMIN — OXYCODONE HYDROCHLORIDE 10 MG: 5 SOLUTION ORAL at 04:49

## 2023-12-07 RX ADMIN — GUAIFENESIN 200 MG: 200 SOLUTION ORAL at 20:49

## 2023-12-07 RX ADMIN — FENTANYL CITRATE 100 MCG: 50 INJECTION, SOLUTION INTRAMUSCULAR; INTRAVENOUS at 13:17

## 2023-12-07 RX ADMIN — HYDROMORPHONE HYDROCHLORIDE 0.5 MG: 1 INJECTION, SOLUTION INTRAMUSCULAR; INTRAVENOUS; SUBCUTANEOUS at 16:26

## 2023-12-07 RX ADMIN — Medication 80 MCG: at 15:20

## 2023-12-07 RX ADMIN — ACETAMINOPHEN 650 MG: 650 SOLUTION ORAL at 04:48

## 2023-12-07 RX ADMIN — GABAPENTIN 300 MG: 250 SOLUTION ORAL at 06:47

## 2023-12-07 RX ADMIN — HYDROMORPHONE HYDROCHLORIDE 0.2 MG: 1 INJECTION, SOLUTION INTRAMUSCULAR; INTRAVENOUS; SUBCUTANEOUS at 14:44

## 2023-12-07 RX ADMIN — Medication 21 PERCENT: at 10:39

## 2023-12-07 RX ADMIN — MIDODRINE HYDROCHLORIDE 15 MG: 10 TABLET ORAL at 06:47

## 2023-12-07 RX ADMIN — SERTRALINE HYDROCHLORIDE 75 MG: 50 TABLET ORAL at 10:05

## 2023-12-07 RX ADMIN — ALBUTEROL SULFATE 2.5 MG: 2.5 SOLUTION RESPIRATORY (INHALATION) at 10:39

## 2023-12-07 RX ADMIN — MIDODRINE HYDROCHLORIDE 15 MG: 10 TABLET ORAL at 20:40

## 2023-12-07 RX ADMIN — SODIUM CHLORIDE, POTASSIUM CHLORIDE, SODIUM LACTATE AND CALCIUM CHLORIDE 10 ML/HR: 600; 310; 30; 20 INJECTION, SOLUTION INTRAVENOUS at 17:15

## 2023-12-07 RX ADMIN — SODIUM CHLORIDE 30 MG/ML INHALATION SOLUTION 3 ML: 30 SOLUTION INHALANT at 18:52

## 2023-12-07 RX ADMIN — FUROSEMIDE 10 MG: 10 INJECTION, SOLUTION INTRAVENOUS at 18:49

## 2023-12-07 RX ADMIN — Medication 120 MCG: at 15:26

## 2023-12-07 RX ADMIN — CEFAZOLIN 2 G: 330 INJECTION, POWDER, FOR SOLUTION INTRAMUSCULAR; INTRAVENOUS at 13:24

## 2023-12-07 RX ADMIN — SODIUM CHLORIDE, POTASSIUM CHLORIDE, SODIUM LACTATE AND CALCIUM CHLORIDE 1000 ML: 600; 310; 30; 20 INJECTION, SOLUTION INTRAVENOUS at 22:41

## 2023-12-07 RX ADMIN — Medication: at 17:16

## 2023-12-07 RX ADMIN — ALBUMIN HUMAN 250 ML: 0.05 INJECTION, SOLUTION INTRAVENOUS at 13:36

## 2023-12-07 RX ADMIN — GABAPENTIN 300 MG: 250 SOLUTION ORAL at 22:50

## 2023-12-07 RX ADMIN — ONDANSETRON 4 MG: 2 INJECTION INTRAMUSCULAR; INTRAVENOUS at 14:44

## 2023-12-07 RX ADMIN — HYDROMORPHONE HYDROCHLORIDE 0.5 MG: 1 INJECTION, SOLUTION INTRAMUSCULAR; INTRAVENOUS; SUBCUTANEOUS at 16:47

## 2023-12-07 RX ADMIN — PROPOFOL 20 MG: 10 INJECTION, EMULSION INTRAVENOUS at 14:05

## 2023-12-07 SDOH — HEALTH STABILITY: MENTAL HEALTH: CURRENT SMOKER: 0

## 2023-12-07 ASSESSMENT — COGNITIVE AND FUNCTIONAL STATUS - GENERAL
MOVING TO AND FROM BED TO CHAIR: A LITTLE
MOBILITY SCORE: 20
WALKING IN HOSPITAL ROOM: A LITTLE
STANDING UP FROM CHAIR USING ARMS: A LITTLE
CLIMB 3 TO 5 STEPS WITH RAILING: A LITTLE

## 2023-12-07 ASSESSMENT — PAIN - FUNCTIONAL ASSESSMENT
PAIN_FUNCTIONAL_ASSESSMENT: 0-10

## 2023-12-07 ASSESSMENT — PAIN SCALES - GENERAL
PAINLEVEL_OUTOF10: 0 - NO PAIN
PAINLEVEL_OUTOF10: 2
PAINLEVEL_OUTOF10: 10 - WORST POSSIBLE PAIN
PAINLEVEL_OUTOF10: 10 - WORST POSSIBLE PAIN
PAINLEVEL_OUTOF10: 8
PAIN_LEVEL: 8
PAINLEVEL_OUTOF10: 6
PAINLEVEL_OUTOF10: 8
PAINLEVEL_OUTOF10: 8
PAINLEVEL_OUTOF10: 10 - WORST POSSIBLE PAIN

## 2023-12-07 ASSESSMENT — PAIN DESCRIPTION - DESCRIPTORS: DESCRIPTORS: ACHING;SHARP;SHOOTING

## 2023-12-07 NOTE — PROGRESS NOTES
12/7/23  1230  Transitional Care Coordination Progress Note:  Plan per medical team: Patient is going to OR today  Payer: [employer] Medicare  Status: Inpatient  Discharge disposition: Home with [facility]. Clinical Specialties supplies tube feedings however will need a new referral due to hospitalization.   Potential barriers: None  ADOD: 2 days  Veronique Vargas RN

## 2023-12-07 NOTE — ANESTHESIA PROCEDURE NOTES
Airway  Date/Time: 12/7/2023 1:22 PM  Urgency: elective      Staffing  Performed: resident and attending   Authorized by: Verito Salinas MD    Performed by: Chante Strange MD  Patient location during procedure: OR    Indications and Patient Condition  Indications for airway management: anesthesia  Spontaneous ventilation: present  Sedation level: deep  Preoxygenated: yes  Patient position: sniffing  MILS maintained throughout  Mask difficulty assessment: 0 - not attempted    Final Airway Details  Final airway type: endotracheal airway      Successful airway: ETT  Cuffed: yes   Successful intubation technique: direct laryngoscopy  Facilitating devices/methods: intubating stylet  Blade: Abhi  Blade size: #4  ETT size (mm): 7.5  Cormack-Lehane Classification: grade I - full view of glottis  Placement verified by: chest auscultation   Inital cuff pressure (cm H2O): 8  Measured from: lips  ETT to lips (cm): 21  Number of attempts at approach: 1    Additional Comments  RSI

## 2023-12-07 NOTE — ANESTHESIA POSTPROCEDURE EVALUATION
Patient: Levy Gregory    Procedure Summary       Date: 12/07/23 Room / Location: [facility] OR 20 / [facility] [place] [facility]    Anesthesia Start: 1303 Anesthesia Stop: 1617    Procedures:       Exploration Laparotomy and conduit revision (Abdomen)      Esophagogastroduodenoscopy (Abdomen) Diagnosis:       Other dysphagia      (Other dysphagia [R13.19])    Surgeons: Brielle Gongora DO Responsible Provider: Verito Salinas MD    Anesthesia Type: general ASA Status: 3            Anesthesia Type: general    Vitals Value Taken Time   /73 12/07/23 1611   Temp 36.4 12/07/23 1618   Pulse 97 12/07/23 1616   Resp 27 12/07/23 1616   SpO2 99 % 12/07/23 1616   Vitals shown include unvalidated device data.    Anesthesia Post Evaluation    Patient location during evaluation: PACU  Patient participation: complete - patient participated  Level of consciousness: awake and alert  Pain score: 8  Pain management: adequate  Airway patency: patent  Two or more strategies used to mitigate risk of obstructive sleep apnea  Cardiovascular status: acceptable and hemodynamically stable  Respiratory status: acceptable and face mask  Hydration status: acceptable  Postoperative Nausea and Vomiting: none        There were no known notable events for this encounter.

## 2023-12-07 NOTE — PROGRESS NOTES
Physical Therapy    Physical Therapy Treatment    Patient Name: Levy Gregory  MRN: [id number]  Today's Date: 12/7/2023  Time Calculation  Start Time: 0923  Stop Time: 0951  Time Calculation (min): 28 min       Assessment/Plan   PT Assessment  PT Assessment Results: Decreased endurance, Impaired balance  Rehab Prognosis: Good  Barriers to Discharge: none  End of Session Communication: Bedside nurse  Assessment Comment: pt demonstrated decrease endurance and need for rest during ambulation incluidng a sitting break as well as several short standing breaks. Pt continues to be appropriate for skilled services  End of Session Patient Position: Bed, 2 rail up  PT Plan  Inpatient/Swing Bed or Outpatient: Inpatient  PT Plan  Treatment/Interventions: Bed mobility, Transfer training, Gait training, Stair training, Balance training, Strengthening, Endurance training, Range of motion, Therapeutic exercise, Therapeutic activity, Home exercise program  PT Plan: Skilled PT  PT Frequency: 5 times per week  PT Discharge Recommendations: Other (comment) (will continue to assess needs.)  Equipment Recommended upon Discharge:  (none)  PT Recommended Transfer Status: Assist x1  PT - OK to Discharge: Yes      General Visit Information:   PT  Visit  PT Received On: 12/07/23  General  Reason for Referral: s/p laparotomy, lysis of adhesions, gastric pullup, and jejunostomy placement on 11/29  Past Medical History Relevant to Rehab: PMH: COPD, T2DM, GERD, type II achalasia. Prolonged hospitalization 3/2023-5/2023 d/t complications from esophageal hernia surgery. Pt admitted to LTAC and readmitted to hospital 8/2023 for worsening Empyema.  Family/Caregiver Present: No  Prior to Session Communication: Bedside nurse  Patient Position Received: Bed, 3 rail up, Alarm off, not on at start of session  General Comment: The pt was pleasant, cooperative and willing to participate in therapy.    Subjective   Precautions:     Vital Signs:  Vital  Signs  Heart Rate: 102 (post ranged 102-126bpm)  Heart Rate Source: Monitor    Objective   Pain:  Pain Assessment  Pain Assessment: 0-10  Pain Score:  (complaining of L shoulder pain)  Cognition:  Cognition  Orientation Level: Oriented X4    Activity Tolerance:  Activity Tolerance  Endurance: Decreased tolerance for upright activites  Treatments:  Therapeutic Exercise  Therapeutic Exercise Performed: Yes  Therapeutic Exercise Activity 1:  (sitting: AP, SAQ 1 x 10 each B)    Therapeutic Activity  Therapeutic Activity Performed: Yes  Therapeutic Activity 1: standing balance : stood for up to 4 mins while extra gown donned         Bed Mobility  Bed Mobility: Yes  Bed Mobility 1  Bed Mobility 1: Supine to sitting  Level of Assistance 1: Close supervision  Bed Mobility 2  Bed Mobility  2: Sitting to supine  Level of Assistance 2: Close supervision    Ambulation/Gait Training  Ambulation/Gait Training Performed: Yes  Ambulation/Gait Training 1  Surface 1: Level tile  Device 1: Rolling walker  Assistance 1: Contact guard  Quality of Gait 1: Decreased step length  Comments/Distance (ft) 1: pt ambulated 85 ft x 2 with a 5 min rest break  Transfers  Transfer: Yes  Transfer 1  Transfer From 1: Sit to  Transfer to 1: Stand  Technique 1: Sit to stand  Transfer Device 1: Walker  Transfer Level of Assistance 1: Close supervision  Transfers 2  Transfer From 2: Stand to  Transfer to 2: Sit  Technique 2: Stand to sit  Transfer Device 2: Walker  Transfer Level of Assistance 2: Close supervision         Outcome Measures:  [facility] Basic Mobility  Turning from your back to your side while in a flat bed without using bedrails: None  Moving from lying on your back to sitting on the side of a flat bed without using bedrails: None  Moving to and from bed to chair (including a wheelchair): A little  Standing up from a chair using your arms (e.g. wheelchair or bedside chair): A little  To walk in hospital room: A little  Climbing 3-5 steps with  jennifer: A little  Basic Mobility - Total Score: 20    Education Documentation  Body Mechanics, taught by Lois Samuel PT at 12/7/2023 10:44 AM.  Learner: Patient  Readiness: Eager  Method: Explanation  Response: Verbalizes Understanding    Precautions, taught by Lois Samuel PT at 12/7/2023 10:44 AM.  Learner: Patient  Readiness: Eager  Method: Explanation  Response: Verbalizes Understanding    ADL Training, taught by Lois Samuel PT at 12/7/2023 10:44 AM.  Learner: Patient  Readiness: Eager  Method: Explanation  Response: Verbalizes Understanding    Precautions, taught by Lois Samuel PT at 12/7/2023 10:44 AM.  Learner: Patient  Readiness: Eager  Method: Explanation  Response: Verbalizes Understanding    Body Mechanics, taught by Lois Samuel PT at 12/7/2023 10:44 AM.  Learner: Patient  Readiness: Eager  Method: Explanation  Response: Verbalizes Understanding    Mobility Training, taught by Lois Samuel PT at 12/7/2023 10:44 AM.  Learner: Patient  Readiness: Eager  Method: Explanation  Response: Verbalizes Understanding    Education Comments  No comments found.        OP EDUCATION:       Encounter Problems       Encounter Problems (Active)       Balance       STG - Maintains static standing balance with upper extremity support using FWW with Tk >15 minutes  (Progressing)       Start:  11/30/23    Expected End:  12/13/23               Mobility       STG - Patient will ambulate >300ft using FWW  (Progressing)       Start:  11/30/23    Expected End:  12/13/23               Pain          Pain - Adult          Transfers       STG - Patient to transfer to and from sit to supine CGA  (Progressing)       Start:  11/30/23    Expected End:  12/13/23            STG - Patient will transfer sit to and from stand supervision assistance. (Progressing)       Start:  11/30/23    Expected End:  12/13/23

## 2023-12-07 NOTE — BRIEF OP NOTE
Brief Operative Report    Preoperative diagnosis: Conduit tortuosity  Post-operative diagnosis: Same  Procedure:   Exploratory laparotomy  Opening of neck incision  Revision of esophagogastric conduit  EGD    Date: 12/7/2023  Surgeon: Brielle Gongora DO   Fellow: Joan Saldana MD  Resident(s): None  Assistant: None    Findings: Tortuous conduit; pin hole perforation on left postero-lateral aspect  Specimen: None    IVF: 1.5 L crystalloid, 250 ml albumin  EBL: 100 cc  UOP: Per anesthesia records    Complications: None    Disposition: PACU > [place] 3 tele    Joan Saldana MD  Cardiothoracic Surgery Fellow  PGY-6  Pager: [phone]

## 2023-12-07 NOTE — CARE PLAN
Problem: Pain  Goal: Takes deep breaths with improved pain control throughout the shift  Outcome: Progressing  Goal: Turns in bed with improved pain control throughout the shift  Outcome: Progressing  Goal: Walks with improved pain control throughout the shift  Outcome: Progressing  Goal: Performs ADL's with improved pain control throughout shift  Outcome: Progressing  Goal: Participates in PT with improved pain control throughout the shift  Outcome: Progressing  Goal: Free from opioid side effects throughout the shift  Outcome: Progressing  Goal: Free from acute confusion related to pain meds throughout the shift  Outcome: Progressing   The patient's goals for the shift include      The clinical goals for the shift include Pt to have adequate pain control throughout shift.

## 2023-12-07 NOTE — PROGRESS NOTES
Occupational Therapy                 Therapy Communication Note    Patient Name: Levy Gregory  MRN: [id number]  Today's Date: 12/7/2023     Discipline: Occupational Therapy    Missed Visit Reason: Missed Visit Reason: Other (Comment) (Pt off floor)    Missed Time: Attempt 13:00 PM    Comment: Pt currently off floor to OR, will re-attempt at later time as appropriate.

## 2023-12-07 NOTE — ANESTHESIA PREPROCEDURE EVALUATION
Patient: Levy Gregory    Procedure Information       Date/Time: 11/29/23 0815    Procedures:       Esophagectomy Transhiatal - Substernal gastric pull up, cervical anastomosis; possible colon interposition with Dr. Padilla      Substernal gastric pull up, cervical anastomosis; possible colon interposition    Location: [facility] OR 20 / [facility] OR    Surgeons: Brielle Gongora DO; Norm Padilla MD            Relevant Problems   Anesthesia (within normal limits)   No history of complications History of anesthesia complications      Cardiovascular   (+) Chronic atrial fibrillation (CMS/HCC)   (+) Hyperlipidemia   (+) Rib pain on left side   (+) Solar purpura (CMS/HCC)      Endocrine   (+) Diabetic neuropathy associated with type 2 diabetes mellitus (CMS/HCC)   (+) Hyponatremia   (+) Type 2 diabetes mellitus with hyperglycemia (CMS/HCC)      GI  Esophagectomy      /Renal (within normal limits)      Neuro/Psych   (+) Depression      Pulmonary   (+) Obstructive sleep apnea, adult      GI/Hepatic   (+) Elevated liver function tests      Hematology  UE & LE DVT   (+) Anticoagulant long-term use   (+) Iron deficiency anemia      Infectious Disease   (+) 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)       Clinical information reviewed:   Tobacco  Allergies  Meds   Med Hx  Surg Hx   Fam Hx  Soc Hx        NPO Detail:  No data recorded       Physical Exam    Airway  Mallampati: III  TM distance: >3 FB  Neck ROM: full     Cardiovascular    Dental    Pulmonary    Abdominal          Vitals:    12/07/23 1003   BP: 106/71   Pulse: 97   Resp: 17   Temp: 36.4 °C (97.5 °F)   SpO2: 97%       Past Surgical History:   Procedure Laterality Date    ESOPHAGOGASTRECTOMY      ESOPHAGOGASTRODUODENOSCOPY      with stents x2    GASTROSTOMY TUBE PLACEMENT N/A     IR CVC PICC  08/16/2023    IR CVC PICC 8/16/2023 [facility] IN[facility]IENT LE[facility]    OTHER SURGICAL HISTORY  01/21/2019    Giovanna  filter placement    UMBILICAL HERNIA REPAIR N/A 2013     Past Medical History:   Diagnosis Date    Achalasia, esophageal     per patient of esophagus    Anemia     Contusion of abdominal wall, initial encounter 01/12/2021    Contusion, flank    COPD (chronic obstructive pulmonary disease) (CMS/HCC)     Diabetes mellitus (CMS/HCC)     F/W PCP    DVT (deep venous thrombosis) (CMS/HCC)     DVT (deep venous thrombosis) (CMS/HCC)     IVC filter placed on 04/20/2023 and on Eliquis    Dysphagia     GERD (gastroesophageal reflux disease)     Hemoptysis 05/12/2020    Cough with hemoptysis    Herpes labialis     Hiatal hernia     Hernia Repair    Hyperlipidemia     Hypotension due to drugs     Irregular heart beat     AFIB: patient denies    Pain in left knee     Peripheral neuropathy     Personal history of other diseases of the respiratory system 06/19/2019    History of bronchitis    Sleep apnea     Patient states does not have sleep apnea since martinez loss and does not use CPAP    Solar purpura (CMS/HCC)        Current Facility-Administered Medications:     acetaminophen (Tylenol) oral liquid 650 mg, 650 mg, j-tube, q6h, Otilio Negrete MD, 650 mg at 12/07/23 0448    albuterol 2.5 mg /3 mL (0.083 %) nebulizer solution 2.5 mg, 2.5 mg, nebulization, TID, Otilio Negrete MD, 2.5 mg at 12/07/23 1039    dextrose 10 % in water (D10W) infusion, 0.3 g/kg/hr, intravenous, Once PRN, Otilio Negrete MD    dextrose 50 % injection 25 g, 25 g, intravenous, q15 min PRN, Otilio Negrete MD    gabapentin (Neurontin) solution 300 mg, 300 mg, j-tube, q8h SYLVESTER, Otilio Negrete MD, 300 mg at 12/07/23 0647    glucagon (Glucagen) injection 1 mg, 1 mg, intramuscular, q15 min PRN, Otilio Negrete MD    guaiFENesin (Robitussin) 100 mg/5 mL syrup 200 mg, 200 mg, j-tube, q6h, Otilio Negrete MD, 200 mg at 12/07/23 0448    heparin (porcine) injection 2,000-4,000 Units, 2,000-4,000 Units, intravenous, q4h PRN, Brielle Gongora DO, 2,000  Units at 12/06/23 0643    [Held by provider] heparin 25,000 Units in dextrose 5% 250 mL (100 Units/mL) infusion (premix), 0-4,000 Units/hr, intravenous, Continuous, Brielle Gongora DO, Stopped at 12/07/23 0000    HYDROmorphone (Dilaudid) injection 0.2 mg, 0.2 mg, intravenous, q4h PRN, Otilio Negrete MD    insulin lispro (HumaLOG) injection 0-10 Units, 0-10 Units, subcutaneous, q4h, Otilio Negrete MD, 2 Units at 12/04/23 1828    lidocaine 4 % patch 1 patch, 1 patch, transdermal, Daily, Otilio Negrete MD    midodrine (Proamatine) tablet 15 mg, 15 mg, j-tube, q8h, Otilio Negrete MD, 15 mg at 12/07/23 0647    ondansetron (Zofran) injection 4 mg, 4 mg, intravenous, q8h PRN, Otilio Negrete MD    oxyCODONE (Roxicodone) solution 10 mg, 10 mg, j-tube, q4h PRN, Otilio Negrete MD, 10 mg at 12/07/23 1004    oxyCODONE (Roxicodone) solution 5 mg, 5 mg, j-tube, q4h PRN, Otilio Negrete MD, 5 mg at 12/04/23 1355    oxygen (O2) therapy, , inhalation, Continuous PRN - O2/gases, Otilio Negrete MD, 21 percent at 12/07/23 1039    pantoprazole (ProtoNix) injection 40 mg, 40 mg, intravenous, Daily, Otilio Negrete MD, 40 mg at 12/07/23 1004    sertraline (Zoloft) tablet 75 mg, 75 mg, j-tube, Daily, Otilio Negrete MD, 75 mg at 12/07/23 1005    sodium chloride 3 % nebulizer solution 3 mL, 3 mL, nebulization, TID, Otilio Negrete MD, 3 mL at 12/07/23 1040    thiamine (Vitamin B1) injection 100 mg, 100 mg, intravenous, Daily, Otilio Negrete MD, 100 mg at 12/07/23 1004  Prior to Admission medications    Medication Sig Start Date End Date Taking? Authorizing Provider   apixaban (Eliquis) 2.5 mg tablet Take 1 tablet (2.5 mg) by mouth 2 times a day. Do not start before October 21, 2023. 10/21/23   Lili Lowe MD   atorvastatin (Lipitor) 10 mg tablet Take 1 tablet (10 mg) by mouth once daily at bedtime. 2/6/19   Historical Provider, MD   esomeprazole (NexIUM) 40 mg packet Take 40 mg by mouth once  daily in the morning. Take before meals.    Historical Provider, MD   fluticasone (Flonase) 50 mcg/actuation nasal spray Administer 2 sprays into each nostril once daily. Shake gently. Before first use, prime pump. After use, clean tip and replace cap. Use this for two weeks after symptoms appear even though you should feel better within a few days of allergen exposure.  Patient not taking: Reported on 11/28/2023 11/21/23 12/21/23  Chris Huizar MD   gabapentin 250 mg/5 mL (5 mL) solution Take 6 mL by mouth 3 times a day. 11/15/23 2/13/24  Chris Huizar MD   hydrOXYzine HCL (Atarax) 25 mg tablet Take 1 tablet (25 mg) by mouth 2 times a day. 7/24/23 8/23/23  Chris Huizar MD   insulin detemir (Levemir U-100 Insulin) 100 unit/mL injection Inject 35 Units under the skin once daily at bedtime. Take as directed per insulin instructions.  Patient taking differently: Inject 35 Units under the skin once daily at bedtime. 11/28/23:  Wife states he only takes a dose if blood sugar >/= 150 7/24/23 10/22/23  Chris Huizar MD   midodrine (Proamatine) 5 mg tablet Take 1 tablet (5 mg) by mouth 3 times a day.    Historical Provider, MD   potassium chloride 20 mEq/15 mL liquid Take 7.5 mL (10 mEq) by mouth once daily.    Historical Provider, MD   sertraline (Zoloft) 50 mg tablet Take 1.5 tablets (75 mg) by mouth once daily. 9/13/16   Historical Provider, MD   spironolactone (Aldactone) 25 mg tablet Take 0.5 tablets (12.5 mg) by mouth every other day.  Patient taking differently: Take 6.25 mg by mouth once daily. 11/7/23 5/5/24  Chris Huizar MD   thiamine 100 mg tablet Take 1 tablet (100 mg) by mouth once daily.    Historical Provider, MD   blood sugar diagnostic (OneTouch Verio test strips) strip TEST TWICE DAILY OR AS DIRECTED 9/23/17 11/28/23  Historical Provider, MD   ferrous sulfate 325 (65 Fe) MG tablet Take 1 tablet (325 mg) by mouth 2 times a day. 8/8/16 11/28/23  Historical Provider, MD   hydrocortisone 1 % ointment  "Apply topically 2 times a day.  23  Historical Provider, MD   insulin syringe-needle U-100 0.5 mL 30 gauge x 5/16\" syringe USE AS DIRECTED. 17  Historical Provider, MD   insulin syringe-needle U-100 1/2 mL 27 gauge x 1/2\" syringe Use daily or as directed 17  Historical Provider, MD   lancets 33 gauge misc 2 times a day.  23  Historical Provider, MD   potassium chloride ER (Micro-K) 10 mEq ER capsule Take 2 capsules (20 mEq) by mouth once daily. Do not crush or chew.  23  Historical Provider, MD     No Known Allergies  Social History     Tobacco Use    Smoking status: Former     Types: Cigarettes, Cigars     Start date: 1989     Quit date: 2023     Years since quittin.7     Passive exposure: Past    Smokeless tobacco: Never   Substance Use Topics    Alcohol use: Not Currently         Chemistry    Lab Results   Component Value Date/Time     (H) 2023 0446    K 5.0 2023 0446     (H) 2023 0446    CO2 22 2023 0446    BUN 37 (H) 2023 0446    CREATININE 0.95 2023 0446    Lab Results   Component Value Date/Time    CALCIUM 9.2 2023 0446    ALKPHOS 207 (H) 2023 0306    AST 69 (H) 2023 0306    ALT 54 (H) 2023 0306    BILITOT 1.2 2023 0306          Lab Results   Component Value Date/Time    WBC 11.6 (H) 2023 0446    HGB 9.5 (L) 2023 0446    HCT 30.4 (L) 2023 0446     2023 0446     Lab Results   Component Value Date/Time    PROTIME 12.8 2023 0308    INR 1.1 2023 0308     Encounter Date: 23   Electrocardiogram, 12-lead PRN ACS symptoms   Result Value    Ventricular Rate 101    Atrial Rate 101    WY Interval 168    QRS Duration 144    QT Interval 444    QTC Calculation(Bazett) 575    P Axis 17    R Axis -70    T Axis 54    QRS Count 17    Q Onset 208    T Offset 430    QTC Fredericia 528    Narrative    Sinus tachycardia  Left axis deviation  Right " bundle branch block  Lateral infarct (cited on or before 06-AUG-2023)  Inferior infarct (cited on or before 06-AUG-2023)  Abnormal ECG  When compared with ECG of 05-SEP-2023 13:21,  Questionable change in initial forces of Lateral leads  Questionable change in initial forces of Inferior leads  QT has lengthened  Confirmed by Garland Be ([id number]) on 12/1/2023 12:14:20 PM       Anesthesia Plan    ASA 3     general     The patient is not a current smoker.    intravenous induction   Postoperative administration of opioids is intended.  Trial extubation is planned.  Anesthetic plan and risks discussed with patient.  Use of blood products discussed with patient who consented to blood products.    Plan discussed with resident and attending.

## 2023-12-07 NOTE — PERIOPERATIVE NURSING NOTE
Secure chat to Surgery team; PCA ordered; xray complete and reviewed   1740- report called to Jacinta POWERS on tower 3; patient meets criteria to transfer to floor.

## 2023-12-07 NOTE — SIGNIFICANT EVENT
RAPID RESPONSE TEAM/JULIAN    Responded to radar score of 7    Vitals:    12/07/23 1715 12/07/23 1730 12/07/23 1821 12/07/23 1832   BP: 116/58 123/69 108/66 125/75   BP Location:   Left arm    Patient Position:   Lying    Pulse: 93 100 (!) 111 (!) 124   Resp: 13 19 16    Temp: 36.3 °C (97.3 °F)  37 °C (98.6 °F)    TempSrc:   Temporal    SpO2: 95% 94% 93% 92%   Weight:       Height:          Spoke to Jacinta POWERS, regarding audible ronchi.  Pt able to cough thick yellow secretions.  Chest x ray complete at approximately 1600.  Chest x ray reviewed with Novic NP.  IVF changed to 10 ml /hr.  10 mg Lasix ivp given.  RT at bedside to assess.  Deep breathing and coughing encouraged.  Staff advised to call for any concerns or vital signs.     Magan Turk RN

## 2023-12-08 ENCOUNTER — APPOINTMENT (OUTPATIENT)
Dept: CARDIOLOGY | Facility: HOSPITAL | Age: 68
DRG: 326 | End: 2023-12-08
Payer: MEDICARE

## 2023-12-08 ENCOUNTER — APPOINTMENT (OUTPATIENT)
Dept: RADIOLOGY | Facility: HOSPITAL | Age: 68
DRG: 326 | End: 2023-12-08
Payer: MEDICARE

## 2023-12-08 LAB
AMYLASE FLD-CCNC: 29 U/L
ANION GAP SERPL CALC-SCNC: 12 MMOL/L (ref 10–20)
ANION GAP SERPL CALC-SCNC: 15 MMOL/L (ref 10–20)
APPEARANCE UR: ABNORMAL
BILIRUB UR STRIP.AUTO-MCNC: NEGATIVE MG/DL
BUN SERPL-MCNC: 38 MG/DL (ref 6–23)
BUN SERPL-MCNC: 44 MG/DL (ref 6–23)
CALCIUM SERPL-MCNC: 8.8 MG/DL (ref 8.6–10.6)
CALCIUM SERPL-MCNC: 9 MG/DL (ref 8.6–10.6)
CHLORIDE SERPL-SCNC: 112 MMOL/L (ref 98–107)
CHLORIDE SERPL-SCNC: 113 MMOL/L (ref 98–107)
CO2 SERPL-SCNC: 22 MMOL/L (ref 21–32)
CO2 SERPL-SCNC: 24 MMOL/L (ref 21–32)
COLOR UR: ABNORMAL
CREAT SERPL-MCNC: 1.26 MG/DL (ref 0.5–1.3)
CREAT SERPL-MCNC: 1.29 MG/DL (ref 0.5–1.3)
ERYTHROCYTE [DISTWIDTH] IN BLOOD BY AUTOMATED COUNT: 19.6 % (ref 11.5–14.5)
ERYTHROCYTE [DISTWIDTH] IN BLOOD BY AUTOMATED COUNT: 19.9 % (ref 11.5–14.5)
GFR SERPL CREATININE-BSD FRML MDRD: 61 ML/MIN/1.73M*2
GFR SERPL CREATININE-BSD FRML MDRD: 63 ML/MIN/1.73M*2
GLUCOSE BLD MANUAL STRIP-MCNC: 126 MG/DL (ref 74–99)
GLUCOSE BLD MANUAL STRIP-MCNC: 128 MG/DL (ref 74–99)
GLUCOSE BLD MANUAL STRIP-MCNC: 128 MG/DL (ref 74–99)
GLUCOSE BLD MANUAL STRIP-MCNC: 149 MG/DL (ref 74–99)
GLUCOSE BLD MANUAL STRIP-MCNC: 150 MG/DL (ref 74–99)
GLUCOSE BLD MANUAL STRIP-MCNC: 158 MG/DL (ref 74–99)
GLUCOSE SERPL-MCNC: 113 MG/DL (ref 74–99)
GLUCOSE SERPL-MCNC: 131 MG/DL (ref 74–99)
GLUCOSE UR STRIP.AUTO-MCNC: NEGATIVE MG/DL
HCT VFR BLD AUTO: 27.4 % (ref 41–52)
HCT VFR BLD AUTO: 27.6 % (ref 41–52)
HGB BLD-MCNC: 8.6 G/DL (ref 13.5–17.5)
HGB BLD-MCNC: 8.9 G/DL (ref 13.5–17.5)
HYALINE CASTS #/AREA URNS AUTO: ABNORMAL /LPF
KETONES UR STRIP.AUTO-MCNC: ABNORMAL MG/DL
LACTATE SERPL-SCNC: 0.8 MMOL/L (ref 0.4–2)
LACTATE SERPL-SCNC: 1.3 MMOL/L (ref 0.4–2)
LEUKOCYTE ESTERASE UR QL STRIP.AUTO: ABNORMAL
MAGNESIUM SERPL-MCNC: 1.93 MG/DL (ref 1.6–2.4)
MCH RBC QN AUTO: 32.8 PG (ref 26–34)
MCH RBC QN AUTO: 33.7 PG (ref 26–34)
MCHC RBC AUTO-ENTMCNC: 31.4 G/DL (ref 32–36)
MCHC RBC AUTO-ENTMCNC: 32.2 G/DL (ref 32–36)
MCV RBC AUTO: 105 FL (ref 80–100)
MCV RBC AUTO: 105 FL (ref 80–100)
MUCOUS THREADS #/AREA URNS AUTO: ABNORMAL /LPF
NITRITE UR QL STRIP.AUTO: NEGATIVE
NRBC BLD-RTO: 0 /100 WBCS (ref 0–0)
NRBC BLD-RTO: 0 /100 WBCS (ref 0–0)
PH UR STRIP.AUTO: 5 [PH]
PLATELET # BLD AUTO: 265 X10*3/UL (ref 150–450)
PLATELET # BLD AUTO: 272 X10*3/UL (ref 150–450)
POTASSIUM SERPL-SCNC: 4.8 MMOL/L (ref 3.5–5.3)
POTASSIUM SERPL-SCNC: 4.9 MMOL/L (ref 3.5–5.3)
PROT UR STRIP.AUTO-MCNC: ABNORMAL MG/DL
RBC # BLD AUTO: 2.62 X10*6/UL (ref 4.5–5.9)
RBC # BLD AUTO: 2.64 X10*6/UL (ref 4.5–5.9)
RBC # UR STRIP.AUTO: ABNORMAL /UL
RBC #/AREA URNS AUTO: ABNORMAL /HPF
SODIUM SERPL-SCNC: 144 MMOL/L (ref 136–145)
SODIUM SERPL-SCNC: 144 MMOL/L (ref 136–145)
SP GR UR STRIP.AUTO: 1.02
UROBILINOGEN UR STRIP.AUTO-MCNC: <2 MG/DL
WBC # BLD AUTO: 15.6 X10*3/UL (ref 4.4–11.3)
WBC # BLD AUTO: 16.3 X10*3/UL (ref 4.4–11.3)
WBC #/AREA URNS AUTO: ABNORMAL /HPF

## 2023-12-08 PROCEDURE — 96372 THER/PROPH/DIAG INJ SC/IM: CPT | Performed by: PHYSICIAN ASSISTANT

## 2023-12-08 PROCEDURE — 2500000004 HC RX 250 GENERAL PHARMACY W/ HCPCS (ALT 636 FOR OP/ED): Performed by: NURSE PRACTITIONER

## 2023-12-08 PROCEDURE — 1200000002 HC GENERAL ROOM WITH TELEMETRY DAILY

## 2023-12-08 PROCEDURE — 85027 COMPLETE CBC AUTOMATED: CPT | Performed by: NURSE PRACTITIONER

## 2023-12-08 PROCEDURE — 82150 ASSAY OF AMYLASE: CPT | Performed by: NURSE PRACTITIONER

## 2023-12-08 PROCEDURE — 94668 MNPJ CHEST WALL SBSQ: CPT

## 2023-12-08 PROCEDURE — 2500000001 HC RX 250 WO HCPCS SELF ADMINISTERED DRUGS (ALT 637 FOR MEDICARE OP): Performed by: NURSE PRACTITIONER

## 2023-12-08 PROCEDURE — 93005 ELECTROCARDIOGRAM TRACING: CPT

## 2023-12-08 PROCEDURE — 80048 BASIC METABOLIC PNL TOTAL CA: CPT | Performed by: NURSE PRACTITIONER

## 2023-12-08 PROCEDURE — 80048 BASIC METABOLIC PNL TOTAL CA: CPT | Performed by: STUDENT IN AN ORGANIZED HEALTH CARE EDUCATION/TRAINING PROGRAM

## 2023-12-08 PROCEDURE — 94640 AIRWAY INHALATION TREATMENT: CPT

## 2023-12-08 PROCEDURE — 2500000001 HC RX 250 WO HCPCS SELF ADMINISTERED DRUGS (ALT 637 FOR MEDICARE OP): Performed by: STUDENT IN AN ORGANIZED HEALTH CARE EDUCATION/TRAINING PROGRAM

## 2023-12-08 PROCEDURE — 99232 SBSQ HOSP IP/OBS MODERATE 35: CPT | Performed by: NURSE PRACTITIONER

## 2023-12-08 PROCEDURE — 71045 X-RAY EXAM CHEST 1 VIEW: CPT

## 2023-12-08 PROCEDURE — 2500000004 HC RX 250 GENERAL PHARMACY W/ HCPCS (ALT 636 FOR OP/ED): Performed by: PHYSICIAN ASSISTANT

## 2023-12-08 PROCEDURE — 71045 X-RAY EXAM CHEST 1 VIEW: CPT | Performed by: RADIOLOGY

## 2023-12-08 PROCEDURE — 2500000004 HC RX 250 GENERAL PHARMACY W/ HCPCS (ALT 636 FOR OP/ED): Performed by: STUDENT IN AN ORGANIZED HEALTH CARE EDUCATION/TRAINING PROGRAM

## 2023-12-08 PROCEDURE — 83605 ASSAY OF LACTIC ACID: CPT | Performed by: NURSE PRACTITIONER

## 2023-12-08 PROCEDURE — 85027 COMPLETE CBC AUTOMATED: CPT | Performed by: STUDENT IN AN ORGANIZED HEALTH CARE EDUCATION/TRAINING PROGRAM

## 2023-12-08 PROCEDURE — 2500000002 HC RX 250 W HCPCS SELF ADMINISTERED DRUGS (ALT 637 FOR MEDICARE OP, ALT 636 FOR OP/ED): Performed by: STUDENT IN AN ORGANIZED HEALTH CARE EDUCATION/TRAINING PROGRAM

## 2023-12-08 PROCEDURE — C9113 INJ PANTOPRAZOLE SODIUM, VIA: HCPCS | Performed by: STUDENT IN AN ORGANIZED HEALTH CARE EDUCATION/TRAINING PROGRAM

## 2023-12-08 PROCEDURE — 83735 ASSAY OF MAGNESIUM: CPT | Performed by: STUDENT IN AN ORGANIZED HEALTH CARE EDUCATION/TRAINING PROGRAM

## 2023-12-08 PROCEDURE — 2500000005 HC RX 250 GENERAL PHARMACY W/O HCPCS: Performed by: STUDENT IN AN ORGANIZED HEALTH CARE EDUCATION/TRAINING PROGRAM

## 2023-12-08 PROCEDURE — 82947 ASSAY GLUCOSE BLOOD QUANT: CPT

## 2023-12-08 RX ORDER — KETOROLAC TROMETHAMINE 15 MG/ML
15 INJECTION, SOLUTION INTRAMUSCULAR; INTRAVENOUS ONCE
Status: COMPLETED | OUTPATIENT
Start: 2023-12-08 | End: 2023-12-08

## 2023-12-08 RX ORDER — OXYCODONE HYDROCHLORIDE 5 MG/1
10 TABLET ORAL ONCE
Status: COMPLETED | OUTPATIENT
Start: 2023-12-08 | End: 2023-12-08

## 2023-12-08 RX ORDER — OXYCODONE HCL 5 MG/5 ML
5 SOLUTION, ORAL ORAL ONCE
Status: COMPLETED | OUTPATIENT
Start: 2023-12-08 | End: 2023-12-08

## 2023-12-08 RX ORDER — MAGNESIUM SULFATE HEPTAHYDRATE 40 MG/ML
2 INJECTION, SOLUTION INTRAVENOUS ONCE
Status: COMPLETED | OUTPATIENT
Start: 2023-12-08 | End: 2023-12-08

## 2023-12-08 RX ADMIN — GABAPENTIN 300 MG: 250 SOLUTION ORAL at 06:00

## 2023-12-08 RX ADMIN — ALBUTEROL SULFATE 2.5 MG: 2.5 SOLUTION RESPIRATORY (INHALATION) at 20:43

## 2023-12-08 RX ADMIN — OXYCODONE HYDROCHLORIDE 10 MG: 5 TABLET ORAL at 09:31

## 2023-12-08 RX ADMIN — THIAMINE HYDROCHLORIDE 100 MG: 100 INJECTION, SOLUTION INTRAMUSCULAR; INTRAVENOUS at 09:32

## 2023-12-08 RX ADMIN — MIDODRINE HYDROCHLORIDE 15 MG: 10 TABLET ORAL at 05:14

## 2023-12-08 RX ADMIN — HEPARIN SODIUM 5000 UNITS: 5000 INJECTION INTRAVENOUS; SUBCUTANEOUS at 05:14

## 2023-12-08 RX ADMIN — SERTRALINE HYDROCHLORIDE 75 MG: 50 TABLET ORAL at 09:30

## 2023-12-08 RX ADMIN — ALBUTEROL SULFATE 2.5 MG: 2.5 SOLUTION RESPIRATORY (INHALATION) at 14:50

## 2023-12-08 RX ADMIN — MIDODRINE HYDROCHLORIDE 15 MG: 10 TABLET ORAL at 16:39

## 2023-12-08 RX ADMIN — MIDODRINE HYDROCHLORIDE 15 MG: 10 TABLET ORAL at 21:24

## 2023-12-08 RX ADMIN — GUAIFENESIN 200 MG: 200 SOLUTION ORAL at 09:30

## 2023-12-08 RX ADMIN — GABAPENTIN 300 MG: 250 SOLUTION ORAL at 18:56

## 2023-12-08 RX ADMIN — CEFAZOLIN SODIUM 2 G: 2 INJECTION, SOLUTION INTRAVENOUS at 06:00

## 2023-12-08 RX ADMIN — SODIUM CHLORIDE 30 MG/ML INHALATION SOLUTION 3 ML: 30 SOLUTION INHALANT at 14:49

## 2023-12-08 RX ADMIN — ACETAMINOPHEN 650 MG: 650 SOLUTION ORAL at 16:39

## 2023-12-08 RX ADMIN — GUAIFENESIN 200 MG: 200 SOLUTION ORAL at 16:39

## 2023-12-08 RX ADMIN — SODIUM CHLORIDE 30 MG/ML INHALATION SOLUTION 15 ML: 30 SOLUTION INHALANT at 20:43

## 2023-12-08 RX ADMIN — ACETAMINOPHEN 650 MG: 650 SOLUTION ORAL at 09:30

## 2023-12-08 RX ADMIN — OXYCODONE HYDROCHLORIDE 5 MG: 5 SOLUTION ORAL at 18:39

## 2023-12-08 RX ADMIN — HEPARIN SODIUM 5000 UNITS: 5000 INJECTION INTRAVENOUS; SUBCUTANEOUS at 16:40

## 2023-12-08 RX ADMIN — SODIUM CHLORIDE 30 MG/ML INHALATION SOLUTION 3 ML: 30 SOLUTION INHALANT at 10:25

## 2023-12-08 RX ADMIN — ALBUTEROL SULFATE 2.5 MG: 2.5 SOLUTION RESPIRATORY (INHALATION) at 10:18

## 2023-12-08 RX ADMIN — INSULIN LISPRO 2 UNITS: 100 INJECTION, SOLUTION INTRAVENOUS; SUBCUTANEOUS at 03:18

## 2023-12-08 RX ADMIN — PANTOPRAZOLE SODIUM 40 MG: 40 INJECTION, POWDER, FOR SOLUTION INTRAVENOUS at 09:30

## 2023-12-08 RX ADMIN — KETOROLAC TROMETHAMINE 15 MG: 15 INJECTION, SOLUTION INTRAMUSCULAR; INTRAVENOUS at 09:30

## 2023-12-08 RX ADMIN — LIDOCAINE 1 PATCH: 4 PATCH TOPICAL at 09:30

## 2023-12-08 RX ADMIN — MAGNESIUM SULFATE HEPTAHYDRATE 2 G: 40 INJECTION, SOLUTION INTRAVENOUS at 12:23

## 2023-12-08 RX ADMIN — ACETAMINOPHEN 650 MG: 650 SOLUTION ORAL at 02:45

## 2023-12-08 RX ADMIN — GUAIFENESIN 200 MG: 200 SOLUTION ORAL at 21:23

## 2023-12-08 RX ADMIN — GUAIFENESIN 200 MG: 200 SOLUTION ORAL at 02:45

## 2023-12-08 RX ADMIN — ACETAMINOPHEN 650 MG: 650 SOLUTION ORAL at 21:22

## 2023-12-08 ASSESSMENT — COGNITIVE AND FUNCTIONAL STATUS - GENERAL
HELP NEEDED FOR BATHING: A LITTLE
DRESSING REGULAR LOWER BODY CLOTHING: A LITTLE
CLIMB 3 TO 5 STEPS WITH RAILING: A LOT
DRESSING REGULAR UPPER BODY CLOTHING: A LITTLE
WALKING IN HOSPITAL ROOM: A LOT
TURNING FROM BACK TO SIDE WHILE IN FLAT BAD: A LITTLE
MOVING TO AND FROM BED TO CHAIR: A LITTLE
MOVING FROM LYING ON BACK TO SITTING ON SIDE OF FLAT BED WITH BEDRAILS: A LITTLE
STANDING UP FROM CHAIR USING ARMS: A LOT
MOBILITY SCORE: 15

## 2023-12-08 ASSESSMENT — PAIN DESCRIPTION - DESCRIPTORS: DESCRIPTORS: ACHING

## 2023-12-08 ASSESSMENT — PAIN SCALES - GENERAL
PAINLEVEL_OUTOF10: 5 - MODERATE PAIN
PAINLEVEL_OUTOF10: 3

## 2023-12-08 ASSESSMENT — PAIN - FUNCTIONAL ASSESSMENT: PAIN_FUNCTIONAL_ASSESSMENT: 0-10

## 2023-12-08 NOTE — CARE PLAN
Problem: Skin  Goal: Decreased wound size/increased tissue granulation at next dressing change  12/8/2023 1232 by Jesusita Martin RN  Outcome: Progressing  12/8/2023 1232 by Jesusita Martin RN  Outcome: Progressing     Problem: Skin  Goal: Participates in plan/prevention/treatment measures  12/8/2023 1232 by Jesusita Martin RN  Outcome: Progressing  12/8/2023 1232 by Jesusita Martin RN  Outcome: Progressing     Problem: Skin  Goal: Prevent/minimize sheer/friction injuries  12/8/2023 1232 by Jesusita aMrtin RN  Outcome: Progressing  12/8/2023 1232 by Jesusita Martin RN  Outcome: Progressing   The patient's goals for the shift include  pain control    The clinical goals for the shift include PT to remian HD stable throughout shift.

## 2023-12-08 NOTE — SIGNIFICANT EVENT
12/08/23 1406   Onset Documentation   Rapid Response Initiated By Radar auto page   Location/Room [facility] [place] [place]   Pager Time [phone]   Arrival Time 1410   Event End Time 1501   Level II Called No   Primary Reason for Call Radar auto page     Rapid Response Note    Radar auto-page received for a Radar score of 6 with the following vital signs: 36.7, 95, 19, 81/59, 99%.  Vital signs were reviewed with primary team as primary RN was not available.  Patient has asymptomatic hypotension and is on midodrine.  Urine output continues to be acceptable and labs are unremarkable.  No interventions indicated at this time.

## 2023-12-08 NOTE — PROGRESS NOTES
"Nutrition Follow Up Assessment:   Nutrition Assessment    Reason for Assessment: Dietitian discretion (follow up eval)    Pt with complicated surgical history over the past ~9 months > this admission pt s/p gastric pullup, jejunostomy placement on 11/29. He is now POD #1 from ex lap, revision of tortuous esophagogastric conduit and correction of pin hole perforation. Has J-tube for feeds/hydration/meds.     TF's ordered but not yet resumed this morning.       Nutrition History:  Food and Nutrient History: Pt has been NPO on tube feeds d/t altered GI function for past several months. Wife reports regimen is Nepro @ 83mls/hr for 16 hours per day + 200mls free water via dual bag automatic flush. Provides 1328mls, 2390 kcals, 112gm protein and 1165mls free water/d (965+200). Pt now back in GI continuity. Spoke with thoracic NP who reports plan for tube feeds over weekend with repeat esophogram on Monday.  Food Allergies/Intolerances:  None  GI Symptoms: None  Oral Problems:  other - GI tract previously in discontinuity       Anthropometrics:  Height: 185 cm (6' 0.84\")   Weight: 89.3 kg (196 lb 13.9 oz)   BMI (Calculated): 26.09  IBW/kg (Dietitian Calculated): 83.6 kg  Percent of IBW: 107 %       Weight History:   Date/Time Weight   12/08/23 0600 89.3 kg (196 lb 13.9 oz)   12/07/23 0441 88.7 kg (195 lb 8 oz)   12/06/23 0500 --    Weight: Pt. refused at 12/06/23 0500   12/05/23 0918 89.5 kg (197 lb 5 oz)   12/05/23 0541 --    Weight: pt refused at 12/05/23 0541   12/04/23 0508 90.6 kg (199 lb 11.2 oz)   12/03/23 0600 91.9 kg (202 lb 9.6 oz)   11/29/23 1600 86 kg (189 lb 9.5 oz)   11/29/23 0635 86 kg (189 lb 9.5 oz)     Weight Change %:  Weight History / % Weight Change: up 3.3 kgs since admission wt 9 days ago, likely fluid shifts from recent surgical procedures    Nutrition Focused Physical Exam Findings:  Subcutaneous Fat Loss:   Orbital Fat Pads: Mild-Moderate (slight dark circles and slight hollowing)  Buccal Fat " Pads: Mild-Moderate (flat cheeks, minimal bounce)  Triceps: Mild-moderate (less than ample fat tissue)  Muscle Wasting:  Temporalis: Mild-Moderate (slight depression)  Pectoralis (Clavicular Region): Mild-Moderate (some protrusion of clavicle)  Deltoid/Trapezius: Mild-Moderate (slight protrusion of acromion process)  Interosseous: Mild-Moderate (slightly depressed area between thumb and forefinger)  Edema:  Edema Location: +2 upper extremities, +1 generalized  Physical Findings:  Skin: Positive (remains with stage 1 pressure injury to coccyx + surgical wounds (new mid abd incision from 12/07))    Nutrition Significant Labs:  CBC Trend:   Results from last 7 days   Lab Units 12/08/23  0537 12/07/23 2104 12/07/23 0446 12/06/23  0456   WBC AUTO x10*3/uL 16.3* 15.6* 11.6* 7.9   RBC AUTO x10*6/uL 2.62* 2.69* 2.94* 2.58*   HEMOGLOBIN g/dL 8.6* 8.5* 9.5* 8.4*   HEMATOCRIT % 27.4* 28.1* 30.4* 26.3*   MCV fL 105* 105* 103* 102*   PLATELETS AUTO x10*3/uL 272 341 336 233    , A1C:  Lab Results   Component Value Date    HGBA1C 5.6 11/04/2023   , Liver Function Trend:    , Renal Lab Trend:   Results from last 7 days   Lab Units 12/08/23  0537 12/07/23 2104 12/07/23 0446 12/06/23  0456   POTASSIUM mmol/L 4.9 5.0 5.0 4.4   PHOSPHORUS mg/dL  --  2.8 2.7 1.5*   SODIUM mmol/L 144 145 147* 146*   MAGNESIUM mg/dL 1.93 1.79 2.09 2.09   EGFR mL/min/1.73m*2 61 74 88 >90   BUN mg/dL 38* 36* 37* 33*   CREATININE mg/dL 1.29 1.10 0.95 0.80    , Lipid Panel:   Lab Results   Component Value Date    CHOL 145 03/05/2019    HDL 29.6 (A) 03/05/2019    CHHDL 4.9 03/05/2019    LDLF 72 03/05/2019    VLDL 44 (H) 03/05/2019    TRIG 190 (H) 03/31/2023    , Vit D:   Lab Results   Component Value Date    VITD25 17 (A) 05/25/2023    , Vit B12:   Lab Results   Component Value Date    OMLDPKTB48 894 05/25/2023        Nutrition Specific Medications:  pantoprazole, 40 mg, intravenous, Daily  thiamine, 100 mg, intravenous, Daily    I/O:   Last BM Date:  12/07/23; Stool Appearance: Loose, Soft (12/04/23 1700)        Dietary Orders (From admission, onward)       Start     Ordered    12/08/23 1000  Enteral feeding with NPO Nepro; JT (jejunostomy tube); 20 cc per hour; 20; Every 4 hours  Diet effective now        Comments: Please start trickle tube feeds tonight at 2000. No advancing for now   Question Answer Comment   Tube feeding formula: Nepro    Feeding route: JT (jejunostomy tube)    Tube feeding cyclic (start / stop time): 20 cc per hour    Tube feeding flush (mL): 20    Flush frequency: Every 4 hours        12/08/23 0959                     Estimated Needs:   Total Energy Estimated Needs (kCal): 8856-7434  Method for Estimating Needs: 27-30 kcals/kg of ideal BW  Total Protein Estimated Needs (g): 110 g  Method for Estimating Needs: 1.3g/kg of ideal BW  Total Fluid Estimated Needs (mL): 2090-4946  Method for Estimating Needs: 1ml/kcal        Nutrition Diagnosis   Malnutrition Diagnosis  Patient has Malnutrition Diagnosis: Yes  Diagnosis Status: New  Malnutrition Diagnosis: Moderate malnutrition related to chronic disease or condition  As Evidenced by: mild muscle wasting, mild subcutaneous fat loss    Nutrition Diagnosis  Patient has Nutrition Diagnosis: Yes  Diagnosis Status (1): New  Nutrition Diagnosis 1: Increased nutrient needs  Related to (1): increased metabolic need  As Evidenced by (1): s/p laparatomy, lysis of adhesions, gastric pullup, j tube placement, stage I pressure injury       Nutrition Interventions/Recommendations     1) Change formula to home going equivalent   Nepro goal @ 55mls/hr for 24 hours/d continuous   Defer titration to thoracic surgery     2) Adjust free water flushes per team discretion while inpatient (TF goal contains 965mls/d)    3) Reconsult for home going diet education pending results of esophogram next week.         Nutrition Monitoring and Evaluation   Food/Nutrient Related History Monitoring  Monitoring and Evaluation Plan:  Enteral and parenteral nutrition intake  Enteral and Parenteral Nutrition Intake: Enteral nutrition formula/solution, Enteral nutrition intake  Criteria: TF tolerence                      Time Spent/Follow-up Reminder:   Time Spent (min): 90 minutes  Last Date of Nutrition Visit: 12/08/23  Nutrition Follow-Up Needed?: Dietitian to reassess per policy  Follow up Comment: updated TF rec's c/w home formula > plan for repeat esophogram 12/11

## 2023-12-08 NOTE — PROGRESS NOTES
"Thoracic Surgery Progress Note  12/8/2023    Levy Gregory is a 68 y.o. male with a complex history following  robotic Laparoscopic Heller myotomy with mary fundoplication on 3/1 with Dr. Hoang.  After a complicated clinical course, he was brought back to [facility] and underwent laparotomy, lysis of adhesions, gastric pullup, jejunostomy placement on 11/29/23 with Pa Gongora and Jem. Esophagram on 12/6/23 was without evidence of leak but demonstrated pooling of contrast at the anastomosis. He is now 1 Day Post-Op status post Exploratory laparotomy, Opening of neck incision, Revision of esophagogastric conduit and EGD.     Overnight issues:   Increased work of breathing with crackles upon arrival to floor from PACU, received Lasix 10 mg IV x 1.  Minimal response to diuretic.  Hypotension and tachycardia, responded to fluid push. Received 1U PRBC for hgb 8.5. This am, still requiring 2L NC oxygen, productive cough of thick yellow secretions, soft blood pressures, borderline UOP.     Physical Exam:  General: He is a pleasant male currently in no distress.  BP 87/55 (BP Location: Left arm, Patient Position: Lying)   Pulse 89   Temp 37 °C (98.6 °F) (Temporal)   Resp 19   Ht 1.85 m (6' 0.84\")   Wt 89.3 kg (196 lb 13.9 oz)   SpO2 98%   BMI 26.09 kg/m²    Body mass index is 26.09 kg/m².   HEENT: Normocephalic and atraumatic.   NECK: Supple. Trach midline. No JVD. Primary dressing removed, cervical incisions well approximated, no drg, no erythema. MIGUELINA to bulb suction with sero-sang drg.   CHEST: Breathing comfortably on 2L with audible rhonchi in the upper airways.   HEART: Regular rate and rhythm. Currently, NSR 80s per tele review.   ABDOMEN: Soft, flat, nontender. Primary abd dressing with strikethrough.   : CHRONIC aguilera in place, matt UOP   NEUROLOGIC: Alert and oriented. Grossly intact.   EXTREMITIES: Moves all extremities equally.  Pedal pulses are palpable. + lower extremity edema. No calf tenderness. "     Diagnostics:   Component      Latest Ref [facility] 12/7/2023   GLUCOSE      74 - 99 mg/dL 136 (H)    SODIUM      136 - 145 mmol/L 145    POTASSIUM      3.5 - 5.3 mmol/L 5.0    CHLORIDE      98 - 107 mmol/L 111 (H)    Bicarbonate      21 - 32 mmol/L 23    Anion Gap      10 - 20 mmol/L 16    Blood Urea Nitrogen      6 - 23 mg/dL 36 (H)    Creatinine      0.50 - 1.30 mg/dL 1.10    EGFR      >60 mL/min/1.73m*2 74    Calcium      8.6 - 10.6 mg/dL 8.9    PHOSPHORUS      2.5 - 4.9 mg/dL 2.8    Albumin      3.4 - 5.0 g/dL 3.8    Color, Urine      Straw, Yellow  Bernarda ! (N)    Appearance, Urine      Clear  Hazy ! (N)    Specific Gravity, Urine      1.005 - 1.035  1.022    pH, Urine      5.0, 5.5, 6.0, 6.5, 7.0, 7.5, 8.0  5.0    Protein, Urine      NEGATIVE mg/dL >=500 (3+) ! (N)    Glucose, Urine      NEGATIVE mg/dL NEGATIVE    Blood, Urine      NEGATIVE  SMALL (1+) !    Ketones, Urine      NEGATIVE mg/dL 5 (TRACE) !    Bilirubin, Urine      NEGATIVE  NEGATIVE    Urobilinogen, Urine      <2.0 mg/dL <2.0    Nitrite, Urine      NEGATIVE  NEGATIVE    Leukocyte Esterase, Urine      NEGATIVE  LARGE (3+) !    WBC      4.4 - 11.3 x10*3/uL 15.6 (H)    nRBC      0.0 - 0.0 /100 WBCs 0.2 (H)    RBC      4.50 - 5.90 x10*6/uL 2.69 (L)    HEMOGLOBIN      13.5 - 17.5 g/dL 8.5 (L)    HEMATOCRIT      41.0 - 52.0 % 28.1 (L)    MCV      80 - 100 fL 105 (H)    MCH      26.0 - 34.0 pg 31.6    MCHC      32.0 - 36.0 g/dL 30.2 (L)    RED CELL DISTRIBUTION WIDTH      11.5 - 14.5 % 19.2 (H)    Platelets      150 - 450 x10*3/uL 341    MAGNESIUM      1.60 - 2.40 mg/dL 1.79      Component      Latest Ref [facility] 12/8/2023   GLUCOSE      74 - 99 mg/dL 113 (H)    SODIUM      136 - 145 mmol/L 144    POTASSIUM      3.5 - 5.3 mmol/L 4.9    CHLORIDE      98 - 107 mmol/L 112 (H)    Bicarbonate      21 - 32 mmol/L 22    Anion Gap      10 - 20 mmol/L 15    Blood Urea Nitrogen      6 - 23 mg/dL 38 (H)    Creatinine      0.50 - 1.30 mg/dL 1.29    EGFR      >60  mL/min/1.73m*2 61    Calcium      8.6 - 10.6 mg/dL 8.8    WBC      4.4 - 11.3 x10*3/uL 16.3 (H)    nRBC      0.0 - 0.0 /100 WBCs 0.0    RBC      4.50 - 5.90 x10*6/uL 2.62 (L)    HEMOGLOBIN      13.5 - 17.5 g/dL 8.6 (L)    HEMATOCRIT      41.0 - 52.0 % 27.4 (L)    MCV      80 - 100 fL 105 (H)    MCH      26.0 - 34.0 pg 32.8    MCHC      32.0 - 36.0 g/dL 31.4 (L)    RED CELL DISTRIBUTION WIDTH      11.5 - 14.5 % 19.6 (H)    Platelets      150 - 450 x10*3/uL 272    MAGNESIUM      1.60 - 2.40 mg/dL 1.93    Amylase, Fluid      Not established. U/L 29       Scheduled medications  acetaminophen, 650 mg, j-tube, q6h  albuterol, 2.5 mg, nebulization, TID  gabapentin, 300 mg, j-tube, q8h SYLVESTER  guaiFENesin, 200 mg, j-tube, q6h  heparin, 5,000 Units, subcutaneous, q8h  insulin lispro, 0-10 Units, subcutaneous, q4h  lidocaine, 1 patch, transdermal, Daily  magnesium sulfate, 2 g, intravenous, Once  midodrine, 15 mg, j-tube, q8h  pantoprazole, 40 mg, intravenous, Daily  sertraline, 75 mg, j-tube, Daily  sodium chloride, 3 mL, nebulization, TID  thiamine, 100 mg, intravenous, Daily      Continuous medications  [Held by provider] heparin, 0-4,000 Units/hr, Last Rate: Stopped (12/07/23 0000)  HYDROmorphone,   lactated Ringer's, 10 mL/hr, Last Rate: 100 mL/hr (12/08/23 1000)      PRN medications  PRN medications: dextrose 10 % in water (D10W), dextrose, glucagon, [Held by provider] heparin, ondansetron, oxygen    Imaging:   AM CXR reviewed. Expected post op changes and drain placement. No clinically significant pneumothorax. No formal report at this time.      Assessment:  Levy Gregory is a 68 y.o. male with a complex history following  robotic Laparoscopic Heller myotomy with mary fundoplication on 3/1 with Dr. Hoang.  After a complicated clinical course, he was brought back to [facility] and underwent laparotomy, lysis of adhesions, gastric pullup, jejunostomy placement on 11/29/23 with Pa Gongora and Jem. Esophagram on 12/6/23  was without evidence of leak but demonstrated pooling of contrast at the anastomosis. He is now status post Exploratory laparotomy, Opening of neck incision, Revision of esophagogastric conduit and EGD.     Plan:  NEURO: History of diabetic neuropathy and depression. Acute post-op pain. On home Zoloft and Neurontin.   - Ongoing neuro/pain assessments  - continue PCA. Oxycodone 10 mg via J tube x 1 and Toradol 15mg x 1 dose.  - Lidoderm patches surrounding L chest/clavicular incision  - Continue home dose of Zoloft and Neurontin   - PT/OT following   - Encourage OOB/ambulation   - Home meds: Zoloft 75 mg daily, Atarax 25 mg BID, Gabapentin 300 mg TID, thiamine 100 mg daily      CV: History of HLD , Hx of Afib. Baseline SBP 90's-100's, on midodrine 15mg q8h. Most recent Echo ( 08/2023) with suboptimal quality. Previous TTE 4/2023: EF 70-75%, normal RV function.   Post op on phenylphrine infusion 0.5 mcg/kg/min. Phenylphrine infusion increased to 1mcg/kg/min overnight 11/29-11/30 and received 500 ml LR x 2. Started on midodrine 15mg q8h 11/30. Transfused 2 rbc, weaned of phenyl infusion in afternoon 11/30. Placed back transiently on 12/1, now weaned off.  12/7 fluid bolus + 1U PRBC overnight for hypotension.  - IVF off   -Continue Midodrine 15 mg q 8  -Continuous telemetry and VS every 4 hrs   -Replace electrolytes as needed   -Home meds: Eliquis 2.5 mg BID, Lipitor 10 mg daily, Midodrine 5 mg TID, Aldactone 12.5 mg QOD      PULM: History of COPD. 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 Most recent Empyema was in August 2023. Acute hypoxic respiratory insufficiency on 12/1 with increased FiO2 to 5L HF NC.  12/8--currently on 2L NC   - Wean off supplemental oxygen   - Continue bronchial hygiene  - Q1h incentive spirometry while awake  - Avoid positive pressure ventilation   - Repeat daily CXR's     GI: Hx Paraesophageal hernia s/p robotic Laparoscopic Heller myotomy with mary  fundoplication on 3/1 with Dr. Hoang complicated by esophageal perforation leading to multiple intervention as outlined above  and eventually a right thoracotomy and esophagectomy with cervical esophagostomy and lap takedown of prior fundoplication on 4/5. Perc fran 4/27.  - s/p laparotomy, lysis of adhesions, gastric pullup, jejunostomy placement. L partial clavicle removal 11/29  - s/p Exploratory laparotomy, Opening of neck incision, Revision of esophagogastric conduit and EGD on 12/7/23    - Maintain strict NPO  - PPI for GI prophylaxis  - Maintain HOB up at least 30 degrees at all times secondary to high risk of aspiration  - Send daily amylase levels from  drain, 29 today   - seen by nutrition--TF switched back to Nephro, resume TF at goal of 55cc/hr today.         : Baseline sCr ~ 0.9. Oliguric CORA March 2023, required CRRT, recovered and returted to baseline . Urinary retention, has a chronic aguilera . Changed on arrival to [facility].   - Check renal function panel daily and prn  - Replete electrolytes to goal  - Maintain aguilera catheter in place, consulted/spoke to urology, no indication for voiding trial during this hospitalization due to high possibility of failure, urology will arrange for close follow up in outpatient setting      HEME: Acute blood loss anemia. Hemoglobin 7.7 11/30, transfused 2 RBC with increase of Hgb to 8.2-8.7 range. Bilateral UE DVT and LLE DVT diagnosed March 2023, IVC filter placed in 4/2023. On Eliquis. Repeat vasc US x4 extremities 11/30: BUE without DVTs, BLE + for nonocclusive L popliteal DVT.  12/7-received one unit PRBC overnight.   - Check CBC and coags daily   - Ongoing monitoring for s/s bleeding  - Anticoagulation: Continue Heparin gtt , resume home Eliquis upon discharge   - Home Meds: Eliquis      ENDO: IDDM2. 9/25/23 A1C 4.7. Adequate glycemic control   - Q4h BG monitoring with SSI Lispro per protocol  - Was not managed on home medications prior to  hospitalization     ID:  Hx mediastinal abscess and R empyema with polymicrobial including Acinetobacter baumanii (CRAB) and MRSA ( 04/2023)  Recurrent Empyema with cultures growing  cultures showing Pseudomonas, E.coli and Kleb oxytoca, and Saccharomyces cerevisae ( 08/2023)  - Afebrile, no leukocytosis   - Trend temp q4, wbc daily  - Finished perioperative Cefazolin  - Ongoing monitoring for s/s infection  - UA and Blood cultures sent overnight--follow up results     Patient seen and examined by this provider. Plan of care discussed with Dr. Gongora.     Roxanne Dotson, APRN-CNP  Thoracic Surgery Pager [phone]

## 2023-12-08 NOTE — CARE PLAN
Problem: Skin  Goal: Decreased wound size/increased tissue granulation at next dressing change  12/8/2023 1233 by Jesusita Martin RN  Flowsheets (Taken 12/8/2023 1233)  Decreased wound size/increased tissue granulation at next dressing change: Promote sleep for wound healing  12/8/2023 1232 by Jesusita Martin RN  Outcome: Progressing  12/8/2023 1232 by Jesusita Martin RN  Outcome: Progressing  Goal: Participates in plan/prevention/treatment measures  12/8/2023 1233 by Jesusita Martin RN  Flowsheets (Taken 12/8/2023 1233)  Participates in plan/prevention/treatment measures:   Discuss with provider PT/OT consult   Elevate heels  12/8/2023 1232 by Jesusita Martin RN  Outcome: Progressing  12/8/2023 1232 by Jesusita Martin RN  Outcome: Progressing  Goal: Prevent/manage excess moisture  12/8/2023 1233 by Jesusita Martin RN  Flowsheets (Taken 12/8/2023 1233)  Prevent/manage excess moisture:   Moisturize dry skin   Use wicking fabric (obtain order)  12/8/2023 1232 by Jesusita Martin RN  Outcome: Progressing  12/8/2023 1232 by Jesusita Martin RN  Outcome: Progressing  Goal: Prevent/minimize sheer/friction injuries  12/8/2023 1233 by Jesusita Martin RN  Flowsheets (Taken 12/8/2023 1233)  Prevent/minimize sheer/friction injuries:   Utilize specialty bed per algorithm   Turn/reposition every 2 hours/use positioning/transfer devices   Increase activity/out of bed for meals  12/8/2023 1232 by Jesusita Martin RN  Outcome: Progressing  12/8/2023 1232 by Jesusita Martin RN  Outcome: Progressing  Goal: Promote/optimize nutrition  12/8/2023 1233 by Jesusita Martin RN  Flowsheets (Taken 12/8/2023 1233)  Promote/optimize nutrition: Monitor/record intake including meals  12/8/2023 1232 by Jesusita Martin RN  Outcome: Progressing  12/8/2023 1232 by Jesusita Martin RN  Outcome: Progressing  Goal: Promote skin healing  12/8/2023 1233 by Jesusita Martin RN  Flowsheets (Taken 12/8/2023 1233)  Promote skin healing:   Assess skin/pad under  line(s)/device(s)   Rotate device position/do not position patient on device  12/8/2023 1232 by Jesusita Martin RN  Outcome: Progressing  12/8/2023 1232 by Jesusita Martin, RN  Outcome: Progressing   The patient's goals for the shift include      The clinical goals for the shift include PT to remian HD stable throughout shift.

## 2023-12-08 NOTE — SIGNIFICANT EVENT
Rapid Response RN Note    Rapid response RN at bedside for RADAR score 7 due to the following VS: T 35.9 °Celsius;  ; RR 19; BP 87/56; SPO2 98%.     Reviewed above VS with bedside RN.  VS within patient's current trends.  RN alerted MD and awaiting orders. No interventions by rapid response team indicated at this time.      Staff to page rapid response for any concerns or acute change in condition/VS. Magdy Andino RN.

## 2023-12-08 NOTE — SIGNIFICANT EVENT
Rapid response called for hypotension with BP unable to be manually dopplered; pt awake & in pain on PCA pump; pt with known hypotension on midodrine as part of past history; OC=526's & SBP's 70's-90's via dynamap machine; primary MD (thoracic) @ bedside & 1L LR bolus ordered/up; urine output low & MD aware; pt febrile & refused peripheral sticks for blood cultures=per MD we are to draw blood culture from the pt's R arm PICC line; urine culture/UA & serum lactate level also collected; CBC & renal panel sent prior to my arrival; WBC's increased & pt started on ancef Q6; 1 unit of RBC's also ordered/up; goal SBP>80 as long as pt asymptomatic; MD placed this as a communication order; pt to remain on T3 on continuous tele & POX; encouraged RN to call with any concerns/issues

## 2023-12-08 NOTE — HOSPITAL COURSE
Mr. Levy Gregory is a 68 year old male with a complicated medical history over the 9 months.    PMHx including h/o COPD, T2DM, type II achalasia, paraesophageal hernia s/p robotic Laparoscopic Heller myotomy with mary fundoplication on 3/1 with Dr. Hoang. He presented to ED on 3/7 after discharge with SOB, chills, RUQ pain. CT completed concerning for esophageal perforation and he underwent diagnostic lap with drain placement, EGD with stent placement x2, R chest tube on 3/7. He was transferred to  SICU on 3/9 for further management. Thoracic surgery was consulted. A second chest tube was placed on 3/13 by thoracic surgery. On 3/15 he went to OR for R VATs, decortication, bronchoscopy, and NJ placement . Large empyema was seen on CT chest and he underwent IR guided pigtail placement on 3/24.   On 3/27 he went to OR for EGD with esophageal stent replacement and nasal tube post pyloric placement with Dr. Hoang, previous esophageal stent found to have migrated below LES and MIGUELINA drain visible and traveling through esophageal perforation. A new esophageal stent was placed more proximally. On 3/30 all drains with bilious fluid with concern for persistent leak. He returned to OR on 3/31 with thoracic and fuentes surgery and underwent EGD, removal of esophageal stent, endovac placement, dobhoff tube placement, PEG tube placement. He returned to OR on 4/3 for endovac replacement with Dr. Gongora and was found to have perforation from 42 to 35 cm from the lips. CT   C/A/P obtained on 4/4 showed previous findings as well as increased pneumoperitoneum.   On 4/5, he returned to OR for right thoracotomy and esophagectomy with cervical esophagostomy and lap takedown of prior fundoplication, lysis of adhesions, and revision of gastrostomy tube for esophageal perforation with thoracic surgery. Transferred to SICU post-op. On 4/7 return to OR for PEG replacement and J tube placement with thoracic. While in SICU he was weaned off  of pressors on 4/7. Successfully extubated on 4/8. Reintubated on 4/10 d/t respiratory fatigue. Amio drip initiated on the same day for uncontrolled Afib with RVR. He was also started on CVVH on 4/10. He was extubated in ICU on 4/12. CVVH discontinued. CT CAP completed which demonstrated intraabdominal RP abscesses and intramuscular psoas hematoma. CT CAP repeated on 4/24, which demonstrated increase in size of   RP hematoma and decrease in size of psoas hematoma. He was transferred to [facility] on 4/25. While on [facility], he developed rising leukocytosis and tachycardia and repeat CT PE and CT C/A/P with PO and IV contrast completed which was negative for PE, showed decreasing size of hematomas, and likely acalculous cholecystitis visualized. On 4/28 he underwent percutaneous cholecystostomy tube placement by IR, tube connected to accordion drain and remains in place at time of discharge. Repeat CT CA/P on 5/7 with evidence of persistent anastomotic leak from gastric stump. MIGUELINA drain remain in place.     He was admitted 11/29/23 and underwent laparotomy, lysis of adhesions, gastric pullup, jejunostomy placement. On 12/6/2023 he underwent esophagram, see formal report in EPIC. On 12/7/23 he underwent Exploratory laparotomy  Opening of neck incision  Revision of esophagogastric conduit and EGD.

## 2023-12-09 LAB
ALBUMIN SERPL BCP-MCNC: 3.1 G/DL (ref 3.4–5)
AMYLASE FLD-CCNC: 38 U/L
ANION GAP SERPL CALC-SCNC: 16 MMOL/L (ref 10–20)
BLOOD EXPIRATION DATE: NORMAL
BLOOD EXPIRATION DATE: NORMAL
BUN SERPL-MCNC: 47 MG/DL (ref 6–23)
CALCIUM SERPL-MCNC: 8.8 MG/DL (ref 8.6–10.6)
CHLORIDE SERPL-SCNC: 111 MMOL/L (ref 98–107)
CO2 SERPL-SCNC: 22 MMOL/L (ref 21–32)
CREAT SERPL-MCNC: 1.3 MG/DL (ref 0.5–1.3)
DISPENSE STATUS: NORMAL
DISPENSE STATUS: NORMAL
ERYTHROCYTE [DISTWIDTH] IN BLOOD BY AUTOMATED COUNT: 19.6 % (ref 11.5–14.5)
GFR SERPL CREATININE-BSD FRML MDRD: 60 ML/MIN/1.73M*2
GLUCOSE BLD MANUAL STRIP-MCNC: 127 MG/DL (ref 74–99)
GLUCOSE BLD MANUAL STRIP-MCNC: 131 MG/DL (ref 74–99)
GLUCOSE BLD MANUAL STRIP-MCNC: 132 MG/DL (ref 74–99)
GLUCOSE BLD MANUAL STRIP-MCNC: 135 MG/DL (ref 74–99)
GLUCOSE BLD MANUAL STRIP-MCNC: 155 MG/DL (ref 74–99)
GLUCOSE BLD MANUAL STRIP-MCNC: 97 MG/DL (ref 74–99)
GLUCOSE SERPL-MCNC: 141 MG/DL (ref 74–99)
HCT VFR BLD AUTO: 24.5 % (ref 41–52)
HGB BLD-MCNC: 7.6 G/DL (ref 13.5–17.5)
MAGNESIUM SERPL-MCNC: 2.34 MG/DL (ref 1.6–2.4)
MCH RBC QN AUTO: 32.5 PG (ref 26–34)
MCHC RBC AUTO-ENTMCNC: 31 G/DL (ref 32–36)
MCV RBC AUTO: 105 FL (ref 80–100)
NRBC BLD-RTO: 0 /100 WBCS (ref 0–0)
PHOSPHATE SERPL-MCNC: 4.6 MG/DL (ref 2.5–4.9)
PLATELET # BLD AUTO: 270 X10*3/UL (ref 150–450)
POTASSIUM SERPL-SCNC: 4.5 MMOL/L (ref 3.5–5.3)
PRODUCT BLOOD TYPE: 5100
PRODUCT BLOOD TYPE: 9500
PRODUCT CODE: NORMAL
PRODUCT CODE: NORMAL
RBC # BLD AUTO: 2.34 X10*6/UL (ref 4.5–5.9)
SODIUM SERPL-SCNC: 144 MMOL/L (ref 136–145)
UNIT ABO: NORMAL
UNIT ABO: NORMAL
UNIT NUMBER: NORMAL
UNIT NUMBER: NORMAL
UNIT RH: NORMAL
UNIT RH: NORMAL
UNIT VOLUME: 350
UNIT VOLUME: 350
WBC # BLD AUTO: 13.1 X10*3/UL (ref 4.4–11.3)
XM INTEP: NORMAL
XM INTEP: NORMAL

## 2023-12-09 PROCEDURE — 83735 ASSAY OF MAGNESIUM: CPT | Performed by: NURSE PRACTITIONER

## 2023-12-09 PROCEDURE — 36430 TRANSFUSION BLD/BLD COMPNT: CPT

## 2023-12-09 PROCEDURE — 94762 N-INVAS EAR/PLS OXIMTRY CONT: CPT

## 2023-12-09 PROCEDURE — 2500000002 HC RX 250 W HCPCS SELF ADMINISTERED DRUGS (ALT 637 FOR MEDICARE OP, ALT 636 FOR OP/ED): Performed by: STUDENT IN AN ORGANIZED HEALTH CARE EDUCATION/TRAINING PROGRAM

## 2023-12-09 PROCEDURE — P9016 RBC LEUKOCYTES REDUCED: HCPCS

## 2023-12-09 PROCEDURE — C9113 INJ PANTOPRAZOLE SODIUM, VIA: HCPCS | Performed by: STUDENT IN AN ORGANIZED HEALTH CARE EDUCATION/TRAINING PROGRAM

## 2023-12-09 PROCEDURE — 51702 INSERT TEMP BLADDER CATH: CPT

## 2023-12-09 PROCEDURE — 85027 COMPLETE CBC AUTOMATED: CPT | Performed by: NURSE PRACTITIONER

## 2023-12-09 PROCEDURE — 2500000001 HC RX 250 WO HCPCS SELF ADMINISTERED DRUGS (ALT 637 FOR MEDICARE OP): Performed by: STUDENT IN AN ORGANIZED HEALTH CARE EDUCATION/TRAINING PROGRAM

## 2023-12-09 PROCEDURE — 2500000005 HC RX 250 GENERAL PHARMACY W/O HCPCS: Performed by: STUDENT IN AN ORGANIZED HEALTH CARE EDUCATION/TRAINING PROGRAM

## 2023-12-09 PROCEDURE — 94640 AIRWAY INHALATION TREATMENT: CPT

## 2023-12-09 PROCEDURE — 2500000004 HC RX 250 GENERAL PHARMACY W/ HCPCS (ALT 636 FOR OP/ED): Performed by: STUDENT IN AN ORGANIZED HEALTH CARE EDUCATION/TRAINING PROGRAM

## 2023-12-09 PROCEDURE — 96372 THER/PROPH/DIAG INJ SC/IM: CPT | Performed by: PHYSICIAN ASSISTANT

## 2023-12-09 PROCEDURE — 82150 ASSAY OF AMYLASE: CPT | Performed by: NURSE PRACTITIONER

## 2023-12-09 PROCEDURE — 1200000002 HC GENERAL ROOM WITH TELEMETRY DAILY

## 2023-12-09 PROCEDURE — 2500000004 HC RX 250 GENERAL PHARMACY W/ HCPCS (ALT 636 FOR OP/ED): Performed by: PHYSICIAN ASSISTANT

## 2023-12-09 PROCEDURE — 82947 ASSAY GLUCOSE BLOOD QUANT: CPT

## 2023-12-09 PROCEDURE — 80069 RENAL FUNCTION PANEL: CPT | Performed by: NURSE PRACTITIONER

## 2023-12-09 RX ORDER — THIAMINE HYDROCHLORIDE 100 MG/ML
100 INJECTION, SOLUTION INTRAMUSCULAR; INTRAVENOUS DAILY
Status: DISCONTINUED | OUTPATIENT
Start: 2023-12-09 | End: 2023-12-12 | Stop reason: HOSPADM

## 2023-12-09 RX ADMIN — PANTOPRAZOLE SODIUM 40 MG: 40 INJECTION, POWDER, FOR SOLUTION INTRAVENOUS at 10:06

## 2023-12-09 RX ADMIN — MIDODRINE HYDROCHLORIDE 15 MG: 10 TABLET ORAL at 21:12

## 2023-12-09 RX ADMIN — ACETAMINOPHEN 650 MG: 650 SOLUTION ORAL at 21:12

## 2023-12-09 RX ADMIN — MIDODRINE HYDROCHLORIDE 15 MG: 10 TABLET ORAL at 15:57

## 2023-12-09 RX ADMIN — LIDOCAINE 1 PATCH: 4 PATCH TOPICAL at 10:03

## 2023-12-09 RX ADMIN — MIDODRINE HYDROCHLORIDE 15 MG: 10 TABLET ORAL at 05:07

## 2023-12-09 RX ADMIN — GUAIFENESIN 200 MG: 200 SOLUTION ORAL at 15:57

## 2023-12-09 RX ADMIN — INSULIN LISPRO 2 UNITS: 100 INJECTION, SOLUTION INTRAVENOUS; SUBCUTANEOUS at 03:20

## 2023-12-09 RX ADMIN — SODIUM CHLORIDE 30 MG/ML INHALATION SOLUTION 3 ML: 30 SOLUTION INHALANT at 09:30

## 2023-12-09 RX ADMIN — ACETAMINOPHEN 650 MG: 650 SOLUTION ORAL at 03:09

## 2023-12-09 RX ADMIN — THIAMINE HYDROCHLORIDE 100 MG: 100 INJECTION, SOLUTION INTRAMUSCULAR; INTRAVENOUS at 10:04

## 2023-12-09 RX ADMIN — SERTRALINE HYDROCHLORIDE 75 MG: 50 TABLET ORAL at 10:04

## 2023-12-09 RX ADMIN — GABAPENTIN 300 MG: 250 SOLUTION ORAL at 03:09

## 2023-12-09 RX ADMIN — HEPARIN SODIUM 5000 UNITS: 5000 INJECTION INTRAVENOUS; SUBCUTANEOUS at 00:12

## 2023-12-09 RX ADMIN — ACETAMINOPHEN 650 MG: 650 SOLUTION ORAL at 15:57

## 2023-12-09 RX ADMIN — HEPARIN SODIUM 5000 UNITS: 5000 INJECTION INTRAVENOUS; SUBCUTANEOUS at 10:04

## 2023-12-09 RX ADMIN — ALBUTEROL SULFATE 2.5 MG: 2.5 SOLUTION RESPIRATORY (INHALATION) at 09:30

## 2023-12-09 RX ADMIN — GUAIFENESIN 200 MG: 200 SOLUTION ORAL at 03:09

## 2023-12-09 RX ADMIN — GUAIFENESIN 200 MG: 200 SOLUTION ORAL at 21:12

## 2023-12-09 RX ADMIN — ALBUTEROL SULFATE 2.5 MG: 2.5 SOLUTION RESPIRATORY (INHALATION) at 14:00

## 2023-12-09 RX ADMIN — GABAPENTIN 300 MG: 250 SOLUTION ORAL at 10:05

## 2023-12-09 RX ADMIN — SODIUM CHLORIDE 30 MG/ML INHALATION SOLUTION 3 ML: 30 SOLUTION INHALANT at 14:00

## 2023-12-09 RX ADMIN — GABAPENTIN 300 MG: 250 SOLUTION ORAL at 19:56

## 2023-12-09 RX ADMIN — ACETAMINOPHEN 650 MG: 650 SOLUTION ORAL at 10:03

## 2023-12-09 RX ADMIN — GUAIFENESIN 200 MG: 200 SOLUTION ORAL at 10:04

## 2023-12-09 RX ADMIN — HEPARIN SODIUM 5000 UNITS: 5000 INJECTION INTRAVENOUS; SUBCUTANEOUS at 17:59

## 2023-12-09 ASSESSMENT — COGNITIVE AND FUNCTIONAL STATUS - GENERAL
STANDING UP FROM CHAIR USING ARMS: A LITTLE
WALKING IN HOSPITAL ROOM: A LITTLE
CLIMB 3 TO 5 STEPS WITH RAILING: A LOT
CLIMB 3 TO 5 STEPS WITH RAILING: A LITTLE
DRESSING REGULAR LOWER BODY CLOTHING: A LITTLE
STANDING UP FROM CHAIR USING ARMS: A LOT
MOVING TO AND FROM BED TO CHAIR: A LITTLE
MOBILITY SCORE: 15
DAILY ACTIVITIY SCORE: 21
HELP NEEDED FOR BATHING: A LITTLE
MOBILITY SCORE: 21
DRESSING REGULAR UPPER BODY CLOTHING: A LITTLE
HELP NEEDED FOR BATHING: A LITTLE
DAILY ACTIVITIY SCORE: 21
TURNING FROM BACK TO SIDE WHILE IN FLAT BAD: A LITTLE
DRESSING REGULAR UPPER BODY CLOTHING: A LITTLE
WALKING IN HOSPITAL ROOM: A LOT
MOVING FROM LYING ON BACK TO SITTING ON SIDE OF FLAT BED WITH BEDRAILS: A LITTLE
DRESSING REGULAR LOWER BODY CLOTHING: A LITTLE

## 2023-12-09 ASSESSMENT — PAIN DESCRIPTION - DESCRIPTORS
DESCRIPTORS: ACHING;PRESSURE;SHARP;SHOOTING;SORE
DESCRIPTORS: ACHING

## 2023-12-09 ASSESSMENT — PAIN SCALES - GENERAL
PAINLEVEL_OUTOF10: 0 - NO PAIN
PAINLEVEL_OUTOF10: 3
PAINLEVEL_OUTOF10: 2

## 2023-12-09 ASSESSMENT — PAIN - FUNCTIONAL ASSESSMENT
PAIN_FUNCTIONAL_ASSESSMENT: 0-10

## 2023-12-09 NOTE — PROGRESS NOTES
"Thoracic Surgery Progress Note  12/9/2023    Levy Gregory is a 68 y.o. male with a complex history following  robotic Laparoscopic Heller myotomy with mary fundoplication on 3/1 with Dr. Hoang.  After a complicated clinical course, he was brought back to [facility] and underwent laparotomy, lysis of adhesions, gastric pullup, jejunostomy placement on 11/29/23 with Pa Gongora and Jem. Esophagram on 12/6/23 was without evidence of leak but demonstrated pooling of contrast at the anastomosis. He is now 2 Days Post-Op status post Exploratory laparotomy, Opening of neck incision, Revision of esophagogastric conduit and EGD.     Overnight issues:   Remains on 2LNC. No issues overnight. Ongoing soft BP but tachycardia now resolved. He feels subjectively well. Tolerating tube feeds without bloating, nausea, emesis. Passing flatus and having Bms. Drain output decreased, serosanguinous. Spending time out of bed. No abdominal pain, pain only over neck incision.    Physical Exam:  General: He is a pleasant male currently in no distress.  BP 90/54   Pulse 90   Temp 36.4 °C (97.5 °F) (Temporal)   Resp 16   Ht 1.85 m (6' 0.84\")   Wt 91.9 kg (202 lb 8 oz)   SpO2 99%   BMI 26.84 kg/m²    Body mass index is 26.84 kg/m².   HEENT: Normocephalic and atraumatic.   NECK: Supple. Trach midline. No JVD. Primary dressing removed, cervical incisions well approximated, no drg, no erythema. MIGUELINA to bulb suction with sero-sang drg.   CHEST: Breathing comfortably on 2L with audible rhonchi in the upper airways.   HEART: Regular rate and rhythm. Currently, NSR 80s per tele review.   ABDOMEN: Soft, flat, nontender. Primary abd dressing with strikethrough.   : CHRONIC aguilera in place, matt UOP   NEUROLOGIC: Alert and oriented. Grossly intact.   EXTREMITIES: Moves all extremities equally.  Minimal lower extremity edema. No calf tenderness.     Diagnostics:   Lab Results   Component Value Date    WBC 13.1 (H) 12/09/2023    HGB 7.6 (L) " 12/09/2023    HCT 24.5 (L) 12/09/2023     (H) 12/09/2023     12/09/2023     Lab Results   Component Value Date    GLUCOSE 141 (H) 12/09/2023    CALCIUM 8.8 12/09/2023     12/09/2023    K 4.5 12/09/2023    CO2 22 12/09/2023     (H) 12/09/2023    BUN 47 (H) 12/09/2023    CREATININE 1.30 12/09/2023     Drain amylase 38 from 29    Scheduled medications  acetaminophen, 650 mg, j-tube, q6h  albuterol, 2.5 mg, nebulization, TID  gabapentin, 300 mg, j-tube, q8h SYLVESTER  guaiFENesin, 200 mg, j-tube, q6h  heparin, 5,000 Units, subcutaneous, q8h  insulin lispro, 0-10 Units, subcutaneous, q4h  lidocaine, 1 patch, transdermal, Daily  midodrine, 15 mg, j-tube, q8h  pantoprazole, 40 mg, intravenous, Daily  sertraline, 75 mg, j-tube, Daily  sodium chloride, 3 mL, nebulization, TID  thiamine, 100 mg, intravenous, Daily  thiamine, 100 mg, intravenous, Daily      Continuous medications  [Held by provider] heparin, 0-4,000 Units/hr, Last Rate: Stopped (12/07/23 0000)  HYDROmorphone,   lactated Ringer's, 10 mL/hr, Last Rate: 10 mL/hr (12/08/23 1906)      PRN medications  PRN medications: alteplase, dextrose 10 % in water (D10W), dextrose, glucagon, [Held by provider] heparin, ondansetron, oxygen    Imaging:       Assessment:  Levy Gregory is a 68 y.o. male with a complex history following  robotic Laparoscopic Heller myotomy with mary fundoplication on 3/1 with Dr. Hoang.  After a complicated clinical course, he was brought back to [facility] and underwent laparotomy, lysis of adhesions, gastric pullup, jejunostomy placement on 11/29/23 with Pa Gongora and Jem. Esophagram on 12/6/23 was without evidence of leak but demonstrated pooling of contrast at the anastomosis. He is now status post Exploratory laparotomy, Opening of neck incision, Revision of esophagogastric conduit and EGD.     Plan:  NEURO: History of diabetic neuropathy and depression. Acute post-op pain. On home Zoloft and Neurontin.   - Ongoing  neuro/pain assessments  - continue PCA. Oxycodone 10 mg via J tube x 1 and Toradol 15mg x 1 dose.  - Lidoderm patches surrounding L chest/clavicular incision  - Continue home dose of Zoloft and Neurontin   - PT/OT following   - Encourage OOB/ambulation   - Home meds: Zoloft 75 mg daily, Atarax 25 mg BID, Gabapentin 300 mg TID, thiamine 100 mg daily      CV: History of HLD , Hx of Afib. Baseline SBP 90's-100's, on midodrine 15mg q8h. Most recent Echo ( 08/2023) with suboptimal quality. Previous TTE 4/2023: EF 70-75%, normal RV function.   Post op on phenylphrine infusion 0.5 mcg/kg/min. Phenylphrine infusion increased to 1mcg/kg/min overnight 11/29-11/30 and received 500 ml LR x 2. Started on midodrine 15mg q8h 11/30. Transfused 2 rbc, weaned of phenyl infusion in afternoon 11/30. Placed back transiently on 12/1, now weaned off.  12/7 fluid bolus + 1U PRBC overnight for hypotension.  - IVF off   -Continue Midodrine 15 mg q 8  -Continuous telemetry and VS every 4 hrs   -Replace electrolytes as needed   -Home meds: Eliquis 2.5 mg BID, Lipitor 10 mg daily, Midodrine 5 mg TID, Aldactone 12.5 mg QOD      PULM: History of COPD. 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 Most recent Empyema was in August 2023. Acute hypoxic respiratory insufficiency on 12/1 with increased FiO2 to 5L HF NC.  12/8--currently on 2L NC   - Wean off supplemental oxygen   - Continue bronchial hygiene  - Q1h incentive spirometry while awake  - Avoid positive pressure ventilation   - Repeat daily CXR's     GI: Hx Paraesophageal hernia s/p robotic Laparoscopic Heller myotomy with mary fundoplication on 3/1 with Dr. Hoang complicated by esophageal perforation leading to multiple intervention as outlined above  and eventually a right thoracotomy and esophagectomy with cervical esophagostomy and lap takedown of prior fundoplication on 4/5. Perc fran 4/27.  - s/p laparotomy, lysis of adhesions, gastric pullup,  jejunostomy placement. L partial clavicle removal 11/29  - s/p Exploratory laparotomy, Opening of neck incision, Revision of esophagogastric conduit and EGD on 12/7/23    - Maintain strict NPO  - PPI for GI prophylaxis  - Maintain HOB up at least 30 degrees at all times secondary to high risk of aspiration  - Send daily amylase levels from MIGUELINA drain, 38 today   - seen by nutrition--TF switched back to Nephro, currently on 55cc/hr        : Baseline sCr ~ 0.9. Oliguric CORA March 2023, required CRRT, recovered and returted to baseline . Urinary retention, has a chronic aguilera . Ch[facility] on arrival [facility].   - Check renal function panel daily and prn  - Replete electrolytes to goal  - Maintain aguilera catheter in place, consulted/spoke to urology, no indication for voiding trial during this hospitalization due to high possibility of failure, urology will arrange for close follow up in outpatient setting      HEME: Acute blood loss anemia. Hemoglobin 7.7 11/30, transfused 2 RBC with increase of Hgb to 8.2-8.7 range. Bilateral UE DVT and LLE DVT diagnosed March 2023, IVC filter placed in 4/2023. On Eliquis. Repeat vasc US x4 extremities 11/30: BUE without DVTs, BLE + for nonocclusive L popliteal DVT.  12/7-received one unit PRBC overnight.   - Check CBC and coags daily   - Ongoing monitoring for s/s bleeding  - Anticoagulation: heparin gtt currently held; on SQH for prophylaxis  - Home Meds: Eliquis   - 1u pRBC today for Hgb 7.6       ENDO: IDDM2. 9/25/23 A1C 4.7. Adequate glycemic control   - Q4h BG monitoring with SSI Lispro per protocol  - Was not managed on home medications prior to hospitalization     ID:  Hx mediastinal abscess and R empyema with polymicrobial including Acinetobacter baumanii (CRAB) and MRSA ( 04/2023)  Recurrent Empyema with cultures growing  cultures showing Pseudomonas, E.coli and Kleb oxytoca, and Saccharomyces cerevisae ( 08/2023)  - Afebrile, no leukocytosis   - Trend temp q4, wbc daily  -  Finished perioperative Cefazolin  - Ongoing monitoring for s/s infection  - UA and Blood cultures sent overnight--follow up results; blood cx currently negative    Patient seen and discussed with on-call attending Dr. Faith. Plan of care discussed with Dr. Gongora.     Kyra Barragan MD  Thoracic Surgery Pager [phone]

## 2023-12-09 NOTE — CARE PLAN
Problem: Skin  Goal: Decreased wound size/increased tissue granulation at next dressing change  12/9/2023 1336 by Jesusita Martin RN  Flowsheets (Taken 12/9/2023 1336)  Decreased wound size/increased tissue granulation at next dressing change:   Promote sleep for wound healing   Utilize specialty bed per algorithm  12/9/2023 1315 by Jesusita Martin RN  Outcome: Progressing     Problem: Skin  Goal: Participates in plan/prevention/treatment measures  12/9/2023 1336 by Jesusita Martin RN  Flowsheets (Taken 12/8/2023 1233)  Participates in plan/prevention/treatment measures:   Discuss with provider PT/OT consult   Elevate heels  12/9/2023 1315 by Jesusita Martin RN  Outcome: Progressing     Problem: Skin  Goal: Prevent/manage excess moisture  12/9/2023 1336 by Jesusita Martin RN  Flowsheets (Taken 12/8/2023 1233)  Prevent/manage excess moisture:   Moisturize dry skin   Use wicking fabric (obtain order)  12/9/2023 1315 by Jesusita Martin RN  Outcome: Progressing   The patient's goals for the shift include      The clinical goals for the shift include Pain control

## 2023-12-09 NOTE — CARE PLAN
The patient's goals for the shift include  pain control.     The clinical goals for the shift include PT to remian HD stable throughout shift.    Problem: Safety  Goal: Patient will be injury free during hospitalization  Outcome: Progressing     Problem: Pain  Goal: My pain/discomfort is manageable  Outcome: Progressing     Problem: Psychosocial Needs  Goal: Collaborate with me, my family, and caregiver to identify my specific goals  Outcome: Progressing

## 2023-12-10 ENCOUNTER — APPOINTMENT (OUTPATIENT)
Dept: RADIOLOGY | Facility: HOSPITAL | Age: 68
DRG: 326 | End: 2023-12-10
Payer: MEDICARE

## 2023-12-10 LAB
ALBUMIN SERPL BCP-MCNC: 3.2 G/DL (ref 3.4–5)
AMYLASE FLD-CCNC: 26 U/L
ANION GAP SERPL CALC-SCNC: 15 MMOL/L (ref 10–20)
BUN SERPL-MCNC: 45 MG/DL (ref 6–23)
CALCIUM SERPL-MCNC: 9.1 MG/DL (ref 8.6–10.6)
CHLORIDE SERPL-SCNC: 114 MMOL/L (ref 98–107)
CO2 SERPL-SCNC: 22 MMOL/L (ref 21–32)
CREAT SERPL-MCNC: 0.98 MG/DL (ref 0.5–1.3)
ERYTHROCYTE [DISTWIDTH] IN BLOOD BY AUTOMATED COUNT: 20.8 % (ref 11.5–14.5)
ERYTHROCYTE [DISTWIDTH] IN BLOOD BY AUTOMATED COUNT: 21 % (ref 11.5–14.5)
GFR SERPL CREATININE-BSD FRML MDRD: 85 ML/MIN/1.73M*2
GLUCOSE BLD MANUAL STRIP-MCNC: 103 MG/DL (ref 74–99)
GLUCOSE BLD MANUAL STRIP-MCNC: 111 MG/DL (ref 74–99)
GLUCOSE BLD MANUAL STRIP-MCNC: 120 MG/DL (ref 74–99)
GLUCOSE BLD MANUAL STRIP-MCNC: 120 MG/DL (ref 74–99)
GLUCOSE BLD MANUAL STRIP-MCNC: 123 MG/DL (ref 74–99)
GLUCOSE BLD MANUAL STRIP-MCNC: 146 MG/DL (ref 74–99)
GLUCOSE SERPL-MCNC: 159 MG/DL (ref 74–99)
HCT VFR BLD AUTO: 26.7 % (ref 41–52)
HCT VFR BLD AUTO: 27 % (ref 41–52)
HGB BLD-MCNC: 8.5 G/DL (ref 13.5–17.5)
HGB BLD-MCNC: 8.7 G/DL (ref 13.5–17.5)
MAGNESIUM SERPL-MCNC: 2.09 MG/DL (ref 1.6–2.4)
MCH RBC QN AUTO: 32.3 PG (ref 26–34)
MCH RBC QN AUTO: 32.3 PG (ref 26–34)
MCHC RBC AUTO-ENTMCNC: 31.8 G/DL (ref 32–36)
MCHC RBC AUTO-ENTMCNC: 32.2 G/DL (ref 32–36)
MCV RBC AUTO: 100 FL (ref 80–100)
MCV RBC AUTO: 102 FL (ref 80–100)
NRBC BLD-RTO: 0 /100 WBCS (ref 0–0)
NRBC BLD-RTO: 0 /100 WBCS (ref 0–0)
PHOSPHATE SERPL-MCNC: 3.6 MG/DL (ref 2.5–4.9)
PLATELET # BLD AUTO: 287 X10*3/UL (ref 150–450)
PLATELET # BLD AUTO: 294 X10*3/UL (ref 150–450)
POTASSIUM SERPL-SCNC: 4.3 MMOL/L (ref 3.5–5.3)
RBC # BLD AUTO: 2.63 X10*6/UL (ref 4.5–5.9)
RBC # BLD AUTO: 2.69 X10*6/UL (ref 4.5–5.9)
SODIUM SERPL-SCNC: 147 MMOL/L (ref 136–145)
WBC # BLD AUTO: 10.3 X10*3/UL (ref 4.4–11.3)
WBC # BLD AUTO: 10.9 X10*3/UL (ref 4.4–11.3)

## 2023-12-10 PROCEDURE — 2500000004 HC RX 250 GENERAL PHARMACY W/ HCPCS (ALT 636 FOR OP/ED): Performed by: STUDENT IN AN ORGANIZED HEALTH CARE EDUCATION/TRAINING PROGRAM

## 2023-12-10 PROCEDURE — 2500000002 HC RX 250 W HCPCS SELF ADMINISTERED DRUGS (ALT 637 FOR MEDICARE OP, ALT 636 FOR OP/ED): Performed by: STUDENT IN AN ORGANIZED HEALTH CARE EDUCATION/TRAINING PROGRAM

## 2023-12-10 PROCEDURE — 83735 ASSAY OF MAGNESIUM: CPT | Performed by: STUDENT IN AN ORGANIZED HEALTH CARE EDUCATION/TRAINING PROGRAM

## 2023-12-10 PROCEDURE — 85027 COMPLETE CBC AUTOMATED: CPT | Performed by: STUDENT IN AN ORGANIZED HEALTH CARE EDUCATION/TRAINING PROGRAM

## 2023-12-10 PROCEDURE — 2500000004 HC RX 250 GENERAL PHARMACY W/ HCPCS (ALT 636 FOR OP/ED): Performed by: NURSE PRACTITIONER

## 2023-12-10 PROCEDURE — 94668 MNPJ CHEST WALL SBSQ: CPT

## 2023-12-10 PROCEDURE — 71045 X-RAY EXAM CHEST 1 VIEW: CPT | Performed by: STUDENT IN AN ORGANIZED HEALTH CARE EDUCATION/TRAINING PROGRAM

## 2023-12-10 PROCEDURE — 1200000002 HC GENERAL ROOM WITH TELEMETRY DAILY

## 2023-12-10 PROCEDURE — 94640 AIRWAY INHALATION TREATMENT: CPT

## 2023-12-10 PROCEDURE — 71045 X-RAY EXAM CHEST 1 VIEW: CPT | Mod: FY

## 2023-12-10 PROCEDURE — 2500000004 HC RX 250 GENERAL PHARMACY W/ HCPCS (ALT 636 FOR OP/ED): Performed by: PHYSICIAN ASSISTANT

## 2023-12-10 PROCEDURE — 2500000001 HC RX 250 WO HCPCS SELF ADMINISTERED DRUGS (ALT 637 FOR MEDICARE OP): Performed by: STUDENT IN AN ORGANIZED HEALTH CARE EDUCATION/TRAINING PROGRAM

## 2023-12-10 PROCEDURE — 82947 ASSAY GLUCOSE BLOOD QUANT: CPT

## 2023-12-10 PROCEDURE — 84100 ASSAY OF PHOSPHORUS: CPT | Performed by: STUDENT IN AN ORGANIZED HEALTH CARE EDUCATION/TRAINING PROGRAM

## 2023-12-10 PROCEDURE — 2500000005 HC RX 250 GENERAL PHARMACY W/O HCPCS: Performed by: STUDENT IN AN ORGANIZED HEALTH CARE EDUCATION/TRAINING PROGRAM

## 2023-12-10 PROCEDURE — 96372 THER/PROPH/DIAG INJ SC/IM: CPT | Performed by: PHYSICIAN ASSISTANT

## 2023-12-10 PROCEDURE — C9113 INJ PANTOPRAZOLE SODIUM, VIA: HCPCS | Performed by: STUDENT IN AN ORGANIZED HEALTH CARE EDUCATION/TRAINING PROGRAM

## 2023-12-10 PROCEDURE — 82150 ASSAY OF AMYLASE: CPT | Performed by: STUDENT IN AN ORGANIZED HEALTH CARE EDUCATION/TRAINING PROGRAM

## 2023-12-10 RX ADMIN — HEPARIN SODIUM 5000 UNITS: 5000 INJECTION INTRAVENOUS; SUBCUTANEOUS at 09:33

## 2023-12-10 RX ADMIN — ACETAMINOPHEN 650 MG: 650 SOLUTION ORAL at 20:34

## 2023-12-10 RX ADMIN — MIDODRINE HYDROCHLORIDE 15 MG: 10 TABLET ORAL at 20:34

## 2023-12-10 RX ADMIN — ALBUTEROL SULFATE 2.5 MG: 2.5 SOLUTION RESPIRATORY (INHALATION) at 08:17

## 2023-12-10 RX ADMIN — THIAMINE HYDROCHLORIDE 100 MG: 100 INJECTION, SOLUTION INTRAMUSCULAR; INTRAVENOUS at 09:34

## 2023-12-10 RX ADMIN — LIDOCAINE 1 PATCH: 4 PATCH TOPICAL at 09:34

## 2023-12-10 RX ADMIN — SODIUM CHLORIDE, POTASSIUM CHLORIDE, SODIUM LACTATE AND CALCIUM CHLORIDE 10 ML/HR: 600; 310; 30; 20 INJECTION, SOLUTION INTRAVENOUS at 04:14

## 2023-12-10 RX ADMIN — GABAPENTIN 300 MG: 250 SOLUTION ORAL at 20:34

## 2023-12-10 RX ADMIN — SODIUM CHLORIDE 30 MG/ML INHALATION SOLUTION 3 ML: 30 SOLUTION INHALANT at 21:24

## 2023-12-10 RX ADMIN — SERTRALINE HYDROCHLORIDE 75 MG: 50 TABLET ORAL at 09:34

## 2023-12-10 RX ADMIN — ACETAMINOPHEN 650 MG: 650 SOLUTION ORAL at 09:34

## 2023-12-10 RX ADMIN — PANTOPRAZOLE SODIUM 40 MG: 40 INJECTION, POWDER, FOR SOLUTION INTRAVENOUS at 09:33

## 2023-12-10 RX ADMIN — HEPARIN SODIUM 5000 UNITS: 5000 INJECTION INTRAVENOUS; SUBCUTANEOUS at 00:27

## 2023-12-10 RX ADMIN — GABAPENTIN 300 MG: 250 SOLUTION ORAL at 04:14

## 2023-12-10 RX ADMIN — GABAPENTIN 300 MG: 250 SOLUTION ORAL at 12:31

## 2023-12-10 RX ADMIN — SODIUM CHLORIDE 30 MG/ML INHALATION SOLUTION 3 ML: 30 SOLUTION INHALANT at 08:20

## 2023-12-10 RX ADMIN — GUAIFENESIN 200 MG: 200 SOLUTION ORAL at 09:34

## 2023-12-10 RX ADMIN — ALBUTEROL SULFATE 2.5 MG: 2.5 SOLUTION RESPIRATORY (INHALATION) at 21:24

## 2023-12-10 RX ADMIN — GUAIFENESIN 200 MG: 200 SOLUTION ORAL at 04:14

## 2023-12-10 RX ADMIN — GUAIFENESIN 200 MG: 200 SOLUTION ORAL at 16:33

## 2023-12-10 RX ADMIN — ACETAMINOPHEN 650 MG: 650 SOLUTION ORAL at 16:33

## 2023-12-10 RX ADMIN — GUAIFENESIN 200 MG: 200 SOLUTION ORAL at 20:34

## 2023-12-10 RX ADMIN — ACETAMINOPHEN 650 MG: 650 SOLUTION ORAL at 04:14

## 2023-12-10 RX ADMIN — MIDODRINE HYDROCHLORIDE 15 MG: 10 TABLET ORAL at 04:14

## 2023-12-10 RX ADMIN — Medication: at 06:10

## 2023-12-10 RX ADMIN — HEPARIN SODIUM 5000 UNITS: 5000 INJECTION INTRAVENOUS; SUBCUTANEOUS at 16:33

## 2023-12-10 RX ADMIN — MIDODRINE HYDROCHLORIDE 15 MG: 10 TABLET ORAL at 12:31

## 2023-12-10 ASSESSMENT — COGNITIVE AND FUNCTIONAL STATUS - GENERAL
HELP NEEDED FOR BATHING: A LITTLE
WALKING IN HOSPITAL ROOM: A LITTLE
MOBILITY SCORE: 22
DAILY ACTIVITIY SCORE: 20
CLIMB 3 TO 5 STEPS WITH RAILING: A LITTLE
TOILETING: A LITTLE
DRESSING REGULAR UPPER BODY CLOTHING: A LITTLE
DRESSING REGULAR LOWER BODY CLOTHING: A LITTLE

## 2023-12-10 ASSESSMENT — PAIN - FUNCTIONAL ASSESSMENT
PAIN_FUNCTIONAL_ASSESSMENT: 0-10

## 2023-12-10 ASSESSMENT — PAIN SCALES - GENERAL
PAINLEVEL_OUTOF10: 0 - NO PAIN
PAINLEVEL_OUTOF10: 2
PAINLEVEL_OUTOF10: 3

## 2023-12-10 ASSESSMENT — PAIN DESCRIPTION - DESCRIPTORS
DESCRIPTORS: ACHING;SHOOTING;SORE
DESCRIPTORS: ACHING;SHARP;SHOOTING;SORE

## 2023-12-10 NOTE — PROGRESS NOTES
"Thoracic Surgery Progress Note  12/10/2023    Levy Gregory is a 68 y.o. male with a complex history following  robotic Laparoscopic Heller myotomy with mary fundoplication on 3/1 with Dr. Hoang.  After a complicated clinical course, he was brought back to [facility] and underwent laparotomy, lysis of adhesions, gastric pullup, jejunostomy placement on 11/29/23 with Pa Gongora and Jem. Esophagram on 12/6/23 was without evidence of leak but demonstrated pooling of contrast at the anastomosis. He is now 3 Days Post-Op status post Exploratory laparotomy, Opening of neck incision, Revision of esophagogastric conduit and EGD.     Overnight issues:   No issues overnight. Ongoing soft BP but tachycardia now resolved. He feels subjectively well. Tolerating tube feeds without bloating, nausea, emesis. Passing flatus and having Bms. Drain output decreased, serosanguinous. Spending time out of bed. No abdominal pain, pain only over neck incision.    Physical Exam:  General: He is a pleasant male currently in no distress.  /74   Pulse 96   Temp 36.3 °C (97.3 °F) (Temporal)   Resp 18   Ht 1.85 m (6' 0.84\")   Wt 91.8 kg (202 lb 4.8 oz)   SpO2 95%   BMI 26.81 kg/m²    Body mass index is 26.81 kg/m².   HEENT: Normocephalic and atraumatic.   NECK: Supple. Trach midline. No JVD. Primary dressing removed, cervical incisions well approximated, no drg, no erythema. MIGUELINA to bulb suction with sero-sang drg.   CHEST: Breathing comfortably on 2L with audible rhonchi in the upper airways.   HEART: Regular rate and rhythm. Currently, NSR 80s per tele review.   ABDOMEN: Soft, flat, nontender. Primary abd dressing with strikethrough.   : CHRONIC aguilera in place, matt UOP   NEUROLOGIC: Alert and oriented. Grossly intact.   EXTREMITIES: Moves all extremities equally.  Minimal lower extremity edema. No calf tenderness.     Diagnostics:   Lab Results   Component Value Date    WBC 10.3 12/10/2023    HGB 8.7 (L) 12/10/2023    HCT " 27.0 (L) 12/10/2023     12/10/2023     12/10/2023     Lab Results   Component Value Date    GLUCOSE 159 (H) 12/10/2023    CALCIUM 9.1 12/10/2023     (H) 12/10/2023    K 4.3 12/10/2023    CO2 22 12/10/2023     (H) 12/10/2023    BUN 45 (H) 12/10/2023    CREATININE 0.98 12/10/2023     Drain amylase 38 from 29    Scheduled medications  acetaminophen, 650 mg, j-tube, q6h  albuterol, 2.5 mg, nebulization, TID  gabapentin, 300 mg, j-tube, q8h SYLVESTER  guaiFENesin, 200 mg, j-tube, q6h  heparin, 5,000 Units, subcutaneous, q8h  insulin lispro, 0-10 Units, subcutaneous, q4h  lidocaine, 1 patch, transdermal, Daily  midodrine, 15 mg, j-tube, q8h  pantoprazole, 40 mg, intravenous, Daily  sertraline, 75 mg, j-tube, Daily  sodium chloride, 3 mL, nebulization, TID  thiamine, 100 mg, intravenous, Daily      Continuous medications  [Held by provider] heparin, 0-4,000 Units/hr, Last Rate: Stopped (12/07/23 0000)  HYDROmorphone,   lactated Ringer's, 10 mL/hr, Last Rate: 10 mL/hr (12/10/23 0414)      PRN medications  PRN medications: alteplase, dextrose 10 % in water (D10W), dextrose, glucagon, [Held by provider] heparin, ondansetron, oxygen    Imaging:       Assessment:  Levy Gregory is a 68 y.o. male with a complex history following  robotic Laparoscopic Heller myotomy with mary fundoplication on 3/1 with Dr. Hoang.  After a complicated clinical course, he was brought back to [facility] and underwent laparotomy, lysis of adhesions, gastric pullup, jejunostomy placement on 11/29/23 with Pa Gongora and Jem. Esophagram on 12/6/23 was without evidence of leak but demonstrated pooling of contrast at the anastomosis. He is now status post Exploratory laparotomy, Opening of neck incision, Revision of esophagogastric conduit and EGD.     Plan:  NEURO: History of diabetic neuropathy and depression. Acute post-op pain. On home Zoloft and Neurontin.   - Ongoing neuro/pain assessments  - continue PCA. Oxycodone 10 mg via  J tube x 1 and Toradol 15mg x 1 dose.  - Lidoderm patches surrounding L chest/clavicular incision  - Continue home dose of Zoloft and Neurontin   - PT/OT following   - Encourage OOB/ambulation   - Home meds: Zoloft 75 mg daily, Atarax 25 mg BID, Gabapentin 300 mg TID, thiamine 100 mg daily      CV: History of HLD , Hx of Afib. Baseline SBP 90's-100's, on midodrine 15mg q8h. Most recent Echo ( 08/2023) with suboptimal quality. Previous TTE 4/2023: EF 70-75%, normal RV function.   Post op on phenylphrine infusion 0.5 mcg/kg/min. Phenylphrine infusion increased to 1mcg/kg/min overnight 11/29-11/30 and received 500 ml LR x 2. Started on midodrine 15mg q8h 11/30. Transfused 2 rbc, weaned of phenyl infusion in afternoon 11/30. Placed back transiently on 12/1, now weaned off.  12/7 fluid bolus + 1U PRBC overnight for hypotension.  - IVF off   -Continue Midodrine 15 mg q 8  -Continuous telemetry and VS every 4 hrs   -Replace electrolytes as needed   -Home meds: Eliquis 2.5 mg BID, Lipitor 10 mg daily, Midodrine 5 mg TID, Aldactone 12.5 mg QOD      PULM: History of COPD. 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 Most recent Empyema was in August 2023. Acute hypoxic respiratory insufficiency on 12/1 with increased FiO2 to 5L HF NC.  12/8--currently on 2L NC   - Wean off supplemental oxygen   - Continue bronchial hygiene  - Q1h incentive spirometry while awake  - Avoid positive pressure ventilation   - Repeat daily CXR's     GI: Hx Paraesophageal hernia s/p robotic Laparoscopic Heller myotomy with mary fundoplication on 3/1 with Dr. Hoang complicated by esophageal perforation leading to multiple intervention as outlined above  and eventually a right thoracotomy and esophagectomy with cervical esophagostomy and lap takedown of prior fundoplication on 4/5. Perc fran 4/27.  - s/p laparotomy, lysis of adhesions, gastric pullup, jejunostomy placement. L partial clavicle removal 11/29  - s/p  Exploratory laparotomy, Opening of neck incision, Revision of esophagogastric conduit and EGD on 12/7/23    - Maintain strict NPO  - PPI for GI prophylaxis  - Maintain HOB up at least 30 degrees at all times secondary to high risk of aspiration  - Send daily amylase levels from MIGUELINA drain, 38 today   - seen by nutrition--TF switched back to Nephro, currently on 55cc/hr        : Baseline sCr ~ 0.9. Oliguric CORA March 2023, required CRRT, recovered and returted to baseline . Urinary retention, has a chronic augilera . [facility].   - Check renal function panel daily and prn  - Replete electrolytes to goal  - Maintain aguilera catheter in place, consulted/spoke to urology, no indication for voiding trial during this hospitalization due to high possibility of failure, urology will arrange for close follow up in outpatient setting      HEME: Acute blood loss anemia. Hemoglobin 7.7 11/30, transfused 2 RBC with increase of Hgb to 8.2-8.7 range. Bilateral UE DVT and LLE DVT diagnosed March 2023, IVC filter placed in 4/2023. On Eliquis. Repeat vasc US x4 extremities 11/30: BUE without DVTs, BLE + for nonocclusive L popliteal DVT.  12/7-received one unit PRBC overnight.   - Check CBC and coags daily   - Ongoing monitoring for s/s bleeding  - Anticoagulation: heparin gtt currently held; on SQH for prophylaxis  - Home Meds: Eliquis   - 1u pRBC ON 12/9 for Hgb 7.6    ENDO: IDDM2. 9/25/23 A1C 4.7. Adequate glycemic control   - Q4h BG monitoring with SSI Lispro per protocol  - Was not managed on home medications prior to hospitalization     ID:  Hx mediastinal abscess and R empyema with polymicrobial including Acinetobacter baumanii (CRAB) and MRSA ( 04/2023)  Recurrent Empyema with cultures growing  cultures showing Pseudomonas, E.coli and Kleb oxytoca, and Saccharomyces cerevisae ( 08/2023)  - Afebrile, no leukocytosis   - Trend temp q4, wbc daily  - Finished perioperative Cefazolin  - Ongoing monitoring for s/s  infection  - UA and Blood cultures sent overnight--follow up results; blood cx currently negative    Patient seen and discussed with on-call attending Dr. Faith. Plan of care discussed with Dr. Gongora.     Yobany Wallace MD  Thoracic Surgery Pager [phone]

## 2023-12-10 NOTE — CARE PLAN
Problem: Pain  Goal: My pain/discomfort is manageable  Outcome: Progressing     Problem: Safety  Goal: Patient will be injury free during hospitalization  Outcome: Progressing  Goal: I will remain free of falls  Outcome: Progressing     Problem: Daily Care  Goal: Daily care needs are met  Outcome: Progressing   The patient's goals for the shift include      The clinical goals for the shift include Pt to report adequate pain control throughout shift.

## 2023-12-11 ENCOUNTER — APPOINTMENT (OUTPATIENT)
Dept: RADIOLOGY | Facility: HOSPITAL | Age: 68
DRG: 326 | End: 2023-12-11
Payer: MEDICARE

## 2023-12-11 LAB
ALBUMIN SERPL BCP-MCNC: 3.2 G/DL (ref 3.4–5)
AMYLASE FLD-CCNC: 20 U/L
ANION GAP SERPL CALC-SCNC: 14 MMOL/L (ref 10–20)
ATRIAL RATE: 137 BPM
BASOPHILS # BLD MANUAL: 0 X10*3/UL (ref 0–0.1)
BASOPHILS NFR BLD MANUAL: 0 %
BUN SERPL-MCNC: 35 MG/DL (ref 6–23)
CALCIUM SERPL-MCNC: 9.1 MG/DL (ref 8.6–10.6)
CHLORIDE SERPL-SCNC: 111 MMOL/L (ref 98–107)
CO2 SERPL-SCNC: 22 MMOL/L (ref 21–32)
CREAT SERPL-MCNC: 0.77 MG/DL (ref 0.5–1.3)
EOSINOPHIL # BLD MANUAL: 0.41 X10*3/UL (ref 0–0.7)
EOSINOPHIL NFR BLD MANUAL: 5.2 %
ERYTHROCYTE [DISTWIDTH] IN BLOOD BY AUTOMATED COUNT: 20.2 % (ref 11.5–14.5)
GFR SERPL CREATININE-BSD FRML MDRD: >90 ML/MIN/1.73M*2
GLUCOSE BLD MANUAL STRIP-MCNC: 116 MG/DL (ref 74–99)
GLUCOSE BLD MANUAL STRIP-MCNC: 119 MG/DL (ref 74–99)
GLUCOSE BLD MANUAL STRIP-MCNC: 122 MG/DL (ref 74–99)
GLUCOSE BLD MANUAL STRIP-MCNC: 123 MG/DL (ref 74–99)
GLUCOSE BLD MANUAL STRIP-MCNC: 125 MG/DL (ref 74–99)
GLUCOSE BLD MANUAL STRIP-MCNC: 127 MG/DL (ref 74–99)
GLUCOSE SERPL-MCNC: 153 MG/DL (ref 74–99)
HCT VFR BLD AUTO: 27.4 % (ref 41–52)
HGB BLD-MCNC: 8.6 G/DL (ref 13.5–17.5)
IMM GRANULOCYTES # BLD AUTO: 0.2 X10*3/UL (ref 0–0.7)
IMM GRANULOCYTES NFR BLD AUTO: 2.5 % (ref 0–0.9)
LYMPHOCYTES # BLD MANUAL: 1.03 X10*3/UL (ref 1.2–4.8)
LYMPHOCYTES NFR BLD MANUAL: 13.1 %
MCH RBC QN AUTO: 31.6 PG (ref 26–34)
MCHC RBC AUTO-ENTMCNC: 31.4 G/DL (ref 32–36)
MCV RBC AUTO: 101 FL (ref 80–100)
MONOCYTES # BLD MANUAL: 0.21 X10*3/UL (ref 0.1–1)
MONOCYTES NFR BLD MANUAL: 2.6 %
MYELOCYTES # BLD MANUAL: 0.13 X10*3/UL
MYELOCYTES NFR BLD MANUAL: 1.7 %
NEUTS SEG # BLD MANUAL: 5.91 X10*3/UL (ref 1.2–7)
NEUTS SEG NFR BLD MANUAL: 74.8 %
NRBC BLD-RTO: 0 /100 WBCS (ref 0–0)
P AXIS: 15 DEGREES
PHOSPHATE SERPL-MCNC: 3.3 MG/DL (ref 2.5–4.9)
PLATELET # BLD AUTO: 329 X10*3/UL (ref 150–450)
POTASSIUM SERPL-SCNC: 4.2 MMOL/L (ref 3.5–5.3)
PR INTERVAL: 128 MS
Q ONSET: 212 MS
QRS COUNT: 23 BEATS
QRS DURATION: 116 MS
QT INTERVAL: 304 MS
QTC CALCULATION(BAZETT): 459 MS
QTC FREDERICIA: 400 MS
R AXIS: -70 DEGREES
RBC # BLD AUTO: 2.72 X10*6/UL (ref 4.5–5.9)
RBC MORPH BLD: ABNORMAL
SODIUM SERPL-SCNC: 143 MMOL/L (ref 136–145)
T AXIS: 40 DEGREES
T OFFSET: 364 MS
TOTAL CELLS COUNTED BLD: 115
VARIANT LYMPHS # BLD MANUAL: 0.21 X10*3/UL (ref 0–0.5)
VARIANT LYMPHS NFR BLD: 2.6 %
VENTRICULAR RATE: 137 BPM
WBC # BLD AUTO: 7.9 X10*3/UL (ref 4.4–11.3)

## 2023-12-11 PROCEDURE — 85027 COMPLETE CBC AUTOMATED: CPT | Performed by: SURGERY

## 2023-12-11 PROCEDURE — 94668 MNPJ CHEST WALL SBSQ: CPT

## 2023-12-11 PROCEDURE — 74220 X-RAY XM ESOPHAGUS 1CNTRST: CPT

## 2023-12-11 PROCEDURE — 94640 AIRWAY INHALATION TREATMENT: CPT

## 2023-12-11 PROCEDURE — 85007 BL SMEAR W/DIFF WBC COUNT: CPT | Performed by: SURGERY

## 2023-12-11 PROCEDURE — 2500000004 HC RX 250 GENERAL PHARMACY W/ HCPCS (ALT 636 FOR OP/ED): Performed by: PHYSICIAN ASSISTANT

## 2023-12-11 PROCEDURE — 97530 THERAPEUTIC ACTIVITIES: CPT | Mod: GP,CQ

## 2023-12-11 PROCEDURE — 1200000002 HC GENERAL ROOM WITH TELEMETRY DAILY

## 2023-12-11 PROCEDURE — 2500000001 HC RX 250 WO HCPCS SELF ADMINISTERED DRUGS (ALT 637 FOR MEDICARE OP): Performed by: STUDENT IN AN ORGANIZED HEALTH CARE EDUCATION/TRAINING PROGRAM

## 2023-12-11 PROCEDURE — 2500000005 HC RX 250 GENERAL PHARMACY W/O HCPCS: Performed by: STUDENT IN AN ORGANIZED HEALTH CARE EDUCATION/TRAINING PROGRAM

## 2023-12-11 PROCEDURE — 2500000004 HC RX 250 GENERAL PHARMACY W/ HCPCS (ALT 636 FOR OP/ED): Performed by: STUDENT IN AN ORGANIZED HEALTH CARE EDUCATION/TRAINING PROGRAM

## 2023-12-11 PROCEDURE — 82150 ASSAY OF AMYLASE: CPT | Performed by: NURSE PRACTITIONER

## 2023-12-11 PROCEDURE — 82947 ASSAY GLUCOSE BLOOD QUANT: CPT

## 2023-12-11 PROCEDURE — 2500000001 HC RX 250 WO HCPCS SELF ADMINISTERED DRUGS (ALT 637 FOR MEDICARE OP): Performed by: NURSE PRACTITIONER

## 2023-12-11 PROCEDURE — 80069 RENAL FUNCTION PANEL: CPT | Performed by: STUDENT IN AN ORGANIZED HEALTH CARE EDUCATION/TRAINING PROGRAM

## 2023-12-11 PROCEDURE — 74220 X-RAY XM ESOPHAGUS 1CNTRST: CPT | Performed by: RADIOLOGY

## 2023-12-11 PROCEDURE — C9113 INJ PANTOPRAZOLE SODIUM, VIA: HCPCS | Performed by: STUDENT IN AN ORGANIZED HEALTH CARE EDUCATION/TRAINING PROGRAM

## 2023-12-11 PROCEDURE — 2550000001 HC RX 255 CONTRASTS: Performed by: STUDENT IN AN ORGANIZED HEALTH CARE EDUCATION/TRAINING PROGRAM

## 2023-12-11 PROCEDURE — 99232 SBSQ HOSP IP/OBS MODERATE 35: CPT | Performed by: NURSE PRACTITIONER

## 2023-12-11 PROCEDURE — 96372 THER/PROPH/DIAG INJ SC/IM: CPT | Performed by: PHYSICIAN ASSISTANT

## 2023-12-11 PROCEDURE — 2500000002 HC RX 250 W HCPCS SELF ADMINISTERED DRUGS (ALT 637 FOR MEDICARE OP, ALT 636 FOR OP/ED): Performed by: STUDENT IN AN ORGANIZED HEALTH CARE EDUCATION/TRAINING PROGRAM

## 2023-12-11 RX ORDER — OXYCODONE HCL 5 MG/5 ML
10 SOLUTION, ORAL ORAL EVERY 6 HOURS PRN
Status: DISCONTINUED | OUTPATIENT
Start: 2023-12-11 | End: 2023-12-12 | Stop reason: HOSPADM

## 2023-12-11 RX ORDER — OXYCODONE HCL 5 MG/5 ML
5 SOLUTION, ORAL ORAL EVERY 4 HOURS PRN
Status: DISCONTINUED | OUTPATIENT
Start: 2023-12-11 | End: 2023-12-12 | Stop reason: HOSPADM

## 2023-12-11 RX ADMIN — HEPARIN SODIUM 5000 UNITS: 5000 INJECTION INTRAVENOUS; SUBCUTANEOUS at 00:45

## 2023-12-11 RX ADMIN — GUAIFENESIN 200 MG: 200 SOLUTION ORAL at 04:17

## 2023-12-11 RX ADMIN — APIXABAN 5 MG: 5 TABLET, FILM COATED ORAL at 21:37

## 2023-12-11 RX ADMIN — GABAPENTIN 300 MG: 250 SOLUTION ORAL at 21:33

## 2023-12-11 RX ADMIN — GUAIFENESIN 200 MG: 200 SOLUTION ORAL at 21:34

## 2023-12-11 RX ADMIN — ACETAMINOPHEN 650 MG: 650 SOLUTION ORAL at 14:45

## 2023-12-11 RX ADMIN — ALBUTEROL SULFATE 2.5 MG: 2.5 SOLUTION RESPIRATORY (INHALATION) at 09:08

## 2023-12-11 RX ADMIN — THIAMINE HYDROCHLORIDE 100 MG: 100 INJECTION, SOLUTION INTRAMUSCULAR; INTRAVENOUS at 09:14

## 2023-12-11 RX ADMIN — HEPARIN SODIUM 5000 UNITS: 5000 INJECTION INTRAVENOUS; SUBCUTANEOUS at 09:15

## 2023-12-11 RX ADMIN — ACETAMINOPHEN 650 MG: 650 SOLUTION ORAL at 04:17

## 2023-12-11 RX ADMIN — MIDODRINE HYDROCHLORIDE 15 MG: 10 TABLET ORAL at 04:17

## 2023-12-11 RX ADMIN — ALBUTEROL SULFATE 2.5 MG: 2.5 SOLUTION RESPIRATORY (INHALATION) at 14:53

## 2023-12-11 RX ADMIN — SODIUM CHLORIDE 30 MG/ML INHALATION SOLUTION 3 ML: 30 SOLUTION INHALANT at 14:53

## 2023-12-11 RX ADMIN — MIDODRINE HYDROCHLORIDE 15 MG: 10 TABLET ORAL at 12:22

## 2023-12-11 RX ADMIN — GUAIFENESIN 200 MG: 200 SOLUTION ORAL at 09:14

## 2023-12-11 RX ADMIN — OXYCODONE HYDROCHLORIDE 10 MG: 5 SOLUTION ORAL at 14:45

## 2023-12-11 RX ADMIN — GUAIFENESIN 200 MG: 200 SOLUTION ORAL at 14:45

## 2023-12-11 RX ADMIN — ACETAMINOPHEN 650 MG: 650 SOLUTION ORAL at 09:14

## 2023-12-11 RX ADMIN — ACETAMINOPHEN 650 MG: 650 SOLUTION ORAL at 21:33

## 2023-12-11 RX ADMIN — SERTRALINE HYDROCHLORIDE 75 MG: 50 TABLET ORAL at 09:13

## 2023-12-11 RX ADMIN — LIDOCAINE 1 PATCH: 4 PATCH TOPICAL at 09:14

## 2023-12-11 RX ADMIN — GABAPENTIN 300 MG: 250 SOLUTION ORAL at 12:22

## 2023-12-11 RX ADMIN — DIATRIZOATE MEGLUMINE AND DIATRIZOATE SODIUM 120 ML: 660; 100 LIQUID ORAL; RECTAL at 12:45

## 2023-12-11 RX ADMIN — MIDODRINE HYDROCHLORIDE 15 MG: 10 TABLET ORAL at 21:37

## 2023-12-11 RX ADMIN — PANTOPRAZOLE SODIUM 40 MG: 40 INJECTION, POWDER, FOR SOLUTION INTRAVENOUS at 09:14

## 2023-12-11 RX ADMIN — OXYCODONE HYDROCHLORIDE 10 MG: 5 SOLUTION ORAL at 21:34

## 2023-12-11 RX ADMIN — SODIUM CHLORIDE 30 MG/ML INHALATION SOLUTION 3 ML: 30 SOLUTION INHALANT at 09:13

## 2023-12-11 RX ADMIN — GABAPENTIN 300 MG: 250 SOLUTION ORAL at 04:17

## 2023-12-11 ASSESSMENT — PAIN DESCRIPTION - DESCRIPTORS
DESCRIPTORS: ACHING;PRESSURE;SORE
DESCRIPTORS: ACHING;THROBBING;SORE
DESCRIPTORS: ACHING;SORE

## 2023-12-11 ASSESSMENT — PAIN SCALES - GENERAL
PAINLEVEL_OUTOF10: 8
PAINLEVEL_OUTOF10: 2
PAINLEVEL_OUTOF10: 0 - NO PAIN
PAINLEVEL_OUTOF10: 3
PAINLEVEL_OUTOF10: 2
PAINLEVEL_OUTOF10: 7
PAINLEVEL_OUTOF10: 0 - NO PAIN

## 2023-12-11 ASSESSMENT — PAIN - FUNCTIONAL ASSESSMENT
PAIN_FUNCTIONAL_ASSESSMENT: 0-10

## 2023-12-11 ASSESSMENT — COGNITIVE AND FUNCTIONAL STATUS - GENERAL
MOVING TO AND FROM BED TO CHAIR: A LITTLE
WALKING IN HOSPITAL ROOM: A LITTLE
MOVING FROM LYING ON BACK TO SITTING ON SIDE OF FLAT BED WITH BEDRAILS: A LITTLE
TURNING FROM BACK TO SIDE WHILE IN FLAT BAD: A LITTLE
STANDING UP FROM CHAIR USING ARMS: A LITTLE
CLIMB 3 TO 5 STEPS WITH RAILING: A LOT
MOBILITY SCORE: 17

## 2023-12-11 NOTE — CARE PLAN
Problem: Pain  Goal: Takes deep breaths with improved pain control throughout the shift  Outcome: Progressing  Goal: Turns in bed with improved pain control throughout the shift  Outcome: Progressing  Goal: Walks with improved pain control throughout the shift  Outcome: Progressing  Goal: Performs ADL's with improved pain control throughout shift  Outcome: Progressing  Goal: Participates in PT with improved pain control throughout the shift  Outcome: Progressing  Goal: Free from opioid side effects throughout the shift  Outcome: Progressing  Goal: Free from acute confusion related to pain meds throughout the shift  Outcome: Progressing     Problem: Fall/Injury  Goal: Not fall by end of shift  Outcome: Progressing  Goal: Be free from injury by end of the shift  Outcome: Progressing  Goal: Verbalize understanding of personal risk factors for fall in the hospital  Outcome: Progressing  Goal: Verbalize understanding of risk factor reduction measures to prevent injury from fall in the home  Outcome: Progressing  Goal: Use assistive devices by end of the shift  Outcome: Progressing  Goal: Pace activities to prevent fatigue by end of the shift  Outcome: Progressing   The patient's goals for the shift include      The clinical goals for the shift include Pt to be HD stable throughout shift, free of falls, and pain to be at tolerable level.

## 2023-12-11 NOTE — PROGRESS NOTES
Physical Therapy    Physical Therapy Treatment    Patient Name: Levy Gregory  MRN: [id number]  Today's Date: 12/11/2023  Time Calculation  Start Time: 1040  Stop Time: 1103  Time Calculation (min): 23 min       Assessment/Plan   PT Assessment  PT Assessment Results: Decreased strength, Decreased endurance, Impaired balance  Rehab Prognosis: Good  Barriers to Discharge: none  Medical Staff Made Aware: Yes  End of Session Communication: Bedside nurse  Assessment Comment: pt demonstrated decrease endurance. Pt continues to be appropriate for skilled services  End of Session Patient Position:  (transport cart)  PT Plan  Inpatient/Swing Bed or Outpatient: Inpatient  PT Plan  Treatment/Interventions: Bed mobility, Transfer training, Gait training, Stair training, Balance training, Strengthening, Endurance training, Range of motion, Therapeutic exercise, Therapeutic activity, Home exercise program  PT Plan: Skilled PT  PT Frequency: 5 times per week  PT Discharge Recommendations: Other (comment) (will continue to assess needs.)  Equipment Recommended upon Discharge:  (none)  PT Recommended Transfer Status: Assist x1  PT - OK to Discharge: Yes      General Visit Information:   PT  Visit  PT Received On: 12/11/23  Response to Previous Treatment: Patient with no complaints from previous session.  Prior to Session Communication: Bedside nurse  Patient Position Received: Up in chair, Alarm off, not on at start of session  Family/Caregiver Present: No  General Comment: The pt was pleasant, cooperative and willing to participate in therapy.     Subjective   Precautions:  Precautions  Medical Precautions: Fall precautions  Post-Surgical Precautions: Move in the Tube  Precautions Comment: The pt in compliance with all precautions throughout PT session.  Vital Signs:       Objective   Pain:  Pain Assessment  Pain Assessment: 0-10  Pain Score: 0 - No pain  Cognition:  Cognition  Overall Cognitive Status: Within Functional  Limits  Orientation Level: Oriented X4  Lines/Tubes/Drains:  PICC - Adult 10/16/23 Double lumen Right Basilic vein (Active)   Number of days: 56       Urethral Catheter Straight-tip 16 Fr. (Active)   Number of days: 12       Closed/Suction Drain Left;Anterior Neck 19 Fr. (Active)   Number of days: 3       Gastrostomy/Enterostomy Jejunostomy 14 Fr. LLQ (Active)   Number of days: 11       PT Treatments:           Bed Mobility  Bed Mobility: Yes  Bed Mobility 1  Bed Mobility 1: Sitting to supine  Level of Assistance 1: Minimum assistance  Bed Mobility Comments 1:  (transport cart)  Ambulation/Gait Training  Ambulation/Gait Training Performed: Yes  Ambulation/Gait Training 1  Surface 1: Level tile  Device 1: Rolling walker  Assistance 1: Contact guard, Minimal verbal cues  Quality of Gait 1: Narrow base of support, Decreased step length  Comments/Distance (ft) 1: 10 ft, 25 ft  Transfers  Transfer: Yes  Transfer 1  Transfer From 1: Sit to  Transfer to 1: Stand  Technique 1: Sit to stand  Transfer Device 1: Walker  Transfer Level of Assistance 1: Minimum assistance, Minimal verbal cues  Trials/Comments 1: x2 trials with x 3 attempts to arise on first trial from chair surface heights  Transfers 2  Transfer From 2: Stand to  Transfer to 2: Sit  Technique 2: Stand to sit  Transfer Device 2: Walker  Transfer Level of Assistance 2: Contact guard  Trials/Comments 2: x2 trials             Outcome Measures:  [facility] Basic Mobility  Turning from your back to your side while in a flat bed without using bedrails: A little  Moving from lying on your back to sitting on the side of a flat bed without using bedrails: A little  Moving to and from bed to chair (including a wheelchair): A little  Standing up from a chair using your arms (e.g. wheelchair or bedside chair): A little  To walk in hospital room: A little  Climbing 3-5 steps with railing: A lot  Basic Mobility - Total Score: 17                            Education  Documentation  Precautions, taught by Herlinda Kraus PTA at 12/11/2023 11:39 AM.  Learner: Patient  Readiness: Acceptance  Method: Explanation  Response: Verbalizes Understanding    Body Mechanics, taught by Herlinda Kraus PTA at 12/11/2023 11:39 AM.  Learner: Patient  Readiness: Acceptance  Method: Explanation  Response: Verbalizes Understanding    Mobility Training, taught by Herlinda Kraus PTA at 12/11/2023 11:39 AM.  Learner: Patient  Readiness: Acceptance  Method: Explanation  Response: Verbalizes Understanding    Education Comments  No comments found.          OP EDUCATION:       Encounter Problems       Encounter Problems (Active)       Balance       STG - Maintains static standing balance with upper extremity support using FWW with Tk >15 minutes  (Progressing)       Start:  11/30/23    Expected End:  12/13/23               Mobility       STG - Patient will ambulate >300ft using FWW  (Progressing)       Start:  11/30/23    Expected End:  12/13/23               Pain          Pain - Adult          Transfers       STG - Patient to transfer to and from sit to supine CGA  (Progressing)       Start:  11/30/23    Expected End:  12/13/23            STG - Patient will transfer sit to and from stand supervision assistance. (Progressing)       Start:  11/30/23    Expected End:  12/13/23 12/11/23 at 11:40 AM   Herlinda Kraus PTA   Rehab Office: [phone]

## 2023-12-11 NOTE — PROGRESS NOTES
"Levy Gregory is a 68 y.o. male on day 12 of admission presenting with Esophageal perforation.    Subjective   No significant events overnight  Feels fatigued and discouraged     Objective   General: He is a pleasant male currently in no distress, resting in the chair   BP 87/55 (BP Location: Left arm, Patient Position: Lying)   Pulse 89   Temp 37 °C (98.6 °F) (Temporal)   Resp 19   Ht 1.85 m (6' 0.84\")   Wt 89.3 kg (196 lb 13.9 oz)   SpO2 98%   BMI 26.09 kg/m²    Body mass index is 26.09 kg/m².   HEENT: Normocephalic and atraumatic.   NECK: Supple. Trach midline. No JVD. Primary dressing removed, cervical incisions well approximated, no drg, no erythema. MIGUELINA to bulb suction with sero-sang drg, 25 ml in 24 hours   CHEST: Breathing comfortably on room air   HEART: Regular rate and rhythm. Currently, NSR 80s per telemetry  ABDOMEN: Soft, appropriately tender to touch. abd incision RAINA, well approximated, no drainage, + flatus, daily BM , tolerating enteral feeds at goal    : CHRONIC aguilera in place, matt UOP   NEUROLOGIC: Alert and oriented. Grossly intact.   EXTREMITIES: Moves all extremities equally.  Pedal pulses are palpable. + lower extremity edema. No calf tenderness.       Last Recorded Vitals  Blood pressure 125/82, pulse 90, temperature 36.8 °C (98.2 °F), temperature source Temporal, resp. rate 18, height 1.85 m (6' 0.84\"), weight 91.8 kg (202 lb 4.8 oz), SpO2 97 %.  Intake/Output last 3 Shifts:  I/O last 3 completed shifts:  In: 2830 (30.8 mL/kg) [NG/GT:2830]  Out: 2215 (24.1 mL/kg) [Urine:2125 (0.6 mL/kg/hr); Drains:90]  Weight: 91.8 kg     Relevant Results  Scheduled medications  acetaminophen, 650 mg, j-tube, q6h  albuterol, 2.5 mg, nebulization, TID  gabapentin, 300 mg, j-tube, q8h SYLVESTER  guaiFENesin, 200 mg, j-tube, q6h  heparin, 5,000 Units, subcutaneous, q8h  insulin lispro, 0-10 Units, subcutaneous, q4h  lidocaine, 1 patch, transdermal, Daily  midodrine, 15 mg, j-tube, q8h  pantoprazole, 40 " mg, intravenous, Daily  sertraline, 75 mg, j-tube, Daily  sodium chloride, 3 mL, nebulization, TID  thiamine, 100 mg, intravenous, Daily      Continuous medications  [Held by provider] heparin, 0-4,000 Units/hr, Last Rate: Stopped (12/07/23 0000)      PRN medications  PRN medications: alteplase, dextrose 10 % in water (D10W), dextrose, glucagon, [Held by provider] heparin, ondansetron, oxyCODONE, oxyCODONE, oxygen    Results for orders placed or performed during the hospital encounter of 11/29/23 (from the past 24 hour(s))   POCT GLUCOSE   Result Value Ref Range    POCT Glucose 103 (H) 74 - 99 mg/dL   POCT GLUCOSE   Result Value Ref Range    POCT Glucose 111 (H) 74 - 99 mg/dL   POCT GLUCOSE   Result Value Ref Range    POCT Glucose 119 (H) 74 - 99 mg/dL   POCT GLUCOSE   Result Value Ref Range    POCT Glucose 125 (H) 74 - 99 mg/dL   Renal Function Panel   Result Value Ref Range    Glucose 153 (H) 74 - 99 mg/dL    Sodium 143 136 - 145 mmol/L    Potassium 4.2 3.5 - 5.3 mmol/L    Chloride 111 (H) 98 - 107 mmol/L    Bicarbonate 22 21 - 32 mmol/L    Anion Gap 14 10 - 20 mmol/L    Urea Nitrogen 35 (H) 6 - 23 mg/dL    Creatinine 0.77 0.50 - 1.30 mg/dL    eGFR >90 >60 mL/min/1.73m*2    Calcium 9.1 8.6 - 10.6 mg/dL    Phosphorus 3.3 2.5 - 4.9 mg/dL    Albumin 3.2 (L) 3.4 - 5.0 g/dL   CBC and Auto Differential   Result Value Ref Range    WBC 7.9 4.4 - 11.3 x10*3/uL    nRBC 0.0 0.0 - 0.0 /100 WBCs    RBC 2.72 (L) 4.50 - 5.90 x10*6/uL    Hemoglobin 8.6 (L) 13.5 - 17.5 g/dL    Hematocrit 27.4 (L) 41.0 - 52.0 %     (H) 80 - 100 fL    MCH 31.6 26.0 - 34.0 pg    MCHC 31.4 (L) 32.0 - 36.0 g/dL    RDW 20.2 (H) 11.5 - 14.5 %    Platelets 329 150 - 450 x10*3/uL    Immature Granulocytes %, Automated 2.5 (H) 0.0 - 0.9 %    Immature Granulocytes Absolute, Automated 0.20 0.00 - 0.70 x10*3/uL   Amylase, Fluid   Result Value Ref Range    Amylase, Fluid 20 Not established. U/L   Manual Differential   Result Value Ref Range    Neutrophils  %, Manual 74.8 40.0 - 80.0 %    Lymphocytes %, Manual 13.1 13.0 - 44.0 %    Monocytes %, Manual 2.6 2.0 - 10.0 %    Eosinophils %, Manual 5.2 0.0 - 6.0 %    Basophils %, Manual 0.0 0.0 - 2.0 %    Atypical Lymphocytes %, Manual 2.6 0.0 - 2.0 %    Myelocytes %, Manual 1.7 0.0 - 0.0 %    Seg Neutrophils Absolute, Manual 5.91 1.20 - 7.00 x10*3/uL    Lymphocytes Absolute, Manual 1.03 (L) 1.20 - 4.80 x10*3/uL    Monocytes Absolute, Manual 0.21 0.10 - 1.00 x10*3/uL    Eosinophils Absolute, Manual 0.41 0.00 - 0.70 x10*3/uL    Basophils Absolute, Manual 0.00 0.00 - 0.10 x10*3/uL    Atypical Lymphs Absolute, Manual 0.21 0.00 - 0.50 x10*3/uL    Myelocytes Absolute, Manual 0.13 0.00 - 0.00 x10*3/uL    Total Cells Counted 115     RBC Morphology See Below    POCT GLUCOSE   Result Value Ref Range    POCT Glucose 127 (H) 74 - 99 mg/dL   POCT GLUCOSE   Result Value Ref Range    POCT Glucose 123 (H) 74 - 99 mg/dL       XR chest 1 view 12/10/2023    Narrative  Interpreted By:  Curtis Urbina,  STUDY:  XR CHEST 1 VIEW;  12/10/2023 8:40 am    INDICATION:  Signs/Symptoms:S/p gastric pull up.    COMPARISON:  Chest radiograph dated 12/10/2023, 3:41 a.m. and CT scan 10/25/2023    ACCESSION NUMBER(S):  [id number]    ORDERING CLINICIAN:  LYNETTE GLASS    FINDINGS:  AP radiograph of the chest  The patient is status post esophagectomy and gastric pull-through.  A mediastinal drainage tube is in stable position.  Right upper extremity PICC is in stable position.    CARDIOMEDIASTINAL SILHOUETTE:  The cardiomediastinal silhouette is stable in size and configuration.    LUNGS:  There is similar bibasilar presumed atelectasis with suggestion of  trace/small pleural effusions. No sizable pneumothorax.    ABDOMEN:  No remarkable upper abdominal findings.    BONES:  No acute osseous abnormality.    Impression  1. Postsurgical changes and medical devices as above. No sizable  pneumothorax.  2. Similar mild bibasilar presumed atelectasis suggestion  of  trace/small pleural effusions    Signed by: Curtis Urbina 12/10/2023 9:02 AM  Dictation workstation:   [id number]    12/11/23 esophagram       Assessment/Plan   Principal Problem:    Esophageal perforation  Active Problems:    Anticoagulant long-term use    Elevated liver function tests    Hypotension    Infection requiring contact isolation precautions    Other dysphagia      Levy Gregory is a 68 y.o. male with a complex history following  robotic Laparoscopic Heller myotomy with mary fundoplication on 3/1 with Dr. Hoang.  After a complicated clinical course, he was brought back to [facility] and underwent laparotomy, lysis of adhesions, gastric pullup, jejunostomy placement on 11/29/23 with Pa Gongora and Jem. Esophagram on 12/6/23 was without evidence of leak but demonstrated pooling of contrast at the anastomosis. He is now status post Exploratory laparotomy, Opening of neck incision, Revision of esophagogastric conduit and EGD.      Plan:  NEURO: History of diabetic neuropathy and depression. Acute post-op pain. On home Zoloft and Neurontin.   - Ongoing neuro/pain assessments  - good pain control, discontinue PCA and start Oxycodone 5-10 mg as needed for pain, monitor effectiveness and adjust dose as needed   - Lidoderm patches surrounding L chest/clavicular incision  - Continue home dose of Zoloft and Neurontin   - PT/OT following   - Encourage OOB/ambulation   - Home meds: Zoloft 75 mg daily, Atarax 25 mg BID, Gabapentin 300 mg TID, thiamine 100 mg daily      CV: History of HLD , Hx of Afib. Baseline SBP 90's-100's, on midodrine 15mg q8h. Most recent Echo ( 08/2023) with suboptimal quality. Previous TTE 4/2023: EF 70-75%, normal RV function.   Post op on phenylphrine infusion 0.5 mcg/kg/min. Phenylphrine infusion increased to 1mcg/kg/min overnight 11/29-11/30 and received 500 ml LR x 2. Started on midodrine 15mg q8h 11/30. Transfused 2 rbc, weaned of phenyl infusion in afternoon 11/30. Placed back  transiently on 12/1, now weaned off.  12/7 fluid bolus + 1U PRBC overnight for hypotension.  - Normotensive, NSR on telemetry   -Continue Midodrine 15 mg q 8  -Continuous telemetry and VS every 4 hrs   -Replace electrolytes as needed   -Home meds: Eliquis 2.5 mg BID, Lipitor 10 mg daily, Midodrine 5 mg TID, Aldactone 12.5 mg QOD   -Anticoagulation: resume home Eliquis today      PULM: History of COPD. 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 Most recent Empyema was in August 2023. Acute hypoxic respiratory insufficiency on 12/1 with increased FiO2 to 5L HF NC.  12/8--to  2L NC   - Weaned off supplemental oxygen, oxygenating well on room air   - Continue bronchial hygiene  - Q1h incentive spirometry while awake  - Avoid positive pressure ventilation   - Repeat daily CXR's     GI: Hx Paraesophageal hernia s/p robotic Laparoscopic Heller myotomy with mary fundoplication on 3/1 with Dr. Hoang complicated by esophageal perforation leading to multiple intervention as outlined above  and eventually a right thoracotomy and esophagectomy with cervical esophagostomy and lap takedown of prior fundoplication on 4/5. Perc fran 4/27.  - s/p laparotomy, lysis of adhesions, gastric pullup, jejunostomy placement. L partial clavicle removal 11/29  - s/p Exploratory laparotomy, Opening of neck incision, Revision of esophagogastric conduit and EGD on 12/7/23   Moderate protein calorie malnutrition     - Maintain strict NPO  - PPI for GI prophylaxis  - Maintain HOB up at least 30 degrees at all times secondary to high risk of aspiration  - Send daily amylase levels from MIGUELINA drain, 20 today   - seen by nutrition--TF switched back to Nephro, currently at goal of 55cc/hr - convert to cyclic for 16 hours  - Esophagram - if negative will start sips of clears         : Baseline sCr ~ 0.9. Oliguric CORA March 2023, required CRRT, recovered and returted to baseline . Urinary retention, has a chronic aguilera .  Changed on [facility].   - Check renal function panel daily and prn  - Replete electrolytes to goal  - Maintain aguilera catheter in place, consulted/spoke to urology, no indication for voiding trial during this hospitalization due to high possibility of failure, urology will arrange for close follow up in outpatient setting    -arrange for leg bag on discharge     HEME: Acute blood loss anemia. Hemoglobin 7.7 11/30, transfused 2 RBC with increase of Hgb to 8.2-8.7 range. Bilateral UE DVT and LLE DVT diagnosed March 2023, IVC filter placed in 4/2023. On Eliquis. Repeat vasc US x4 extremities 11/30: BUE without DVTs, BLE + for nonocclusive L popliteal DVT.  12/7-received one unit PRBC overnight.   - Check CBC and coags daily   - Ongoing monitoring for s/s bleeding  - Anticoagulation: Resume home Eliquis today   - Home Meds: Eliquis      ENDO: IDDM2. 9/25/23 A1C 4.7. Adequate glycemic control   - Q4h BG monitoring with SSI Lispro per protocol  - Was not managed on home medications prior to hospitalization     ID:  Hx mediastinal abscess and R empyema with polymicrobial including Acinetobacter baumanii (CRAB) and MRSA ( 04/2023)  Recurrent Empyema with cultures growing  cultures showing Pseudomonas, E.coli and Kleb oxytoca, and Saccharomyces cerevisae ( 08/2023)  - Afebrile, no leukocytosis   - Trend temp q4, wbc daily  - Finished perioperative Cefazolin  - Ongoing monitoring for s/s infection  - Cultures negative     DISPOSITION:  -Plan to discharge patient home tomorrow + home nursing care and home PT (patient and family refused SNF), continue to assess home going needs      Patient seen and examined by this provider. Plan of care discussed with Dr. Gongora.     I spent 25 minutes in the professional and overall care of this patient.      IVÁN Soriano-CNP  Thoracic Surgery [id number]

## 2023-12-12 ENCOUNTER — APPOINTMENT (OUTPATIENT)
Dept: RADIOLOGY | Facility: HOSPITAL | Age: 68
DRG: 326 | End: 2023-12-12
Payer: MEDICARE

## 2023-12-12 VITALS
HEIGHT: 73 IN | BODY MASS INDEX: 26.54 KG/M2 | DIASTOLIC BLOOD PRESSURE: 76 MMHG | RESPIRATION RATE: 17 BRPM | SYSTOLIC BLOOD PRESSURE: 126 MMHG | TEMPERATURE: 98.1 F | WEIGHT: 200.29 LBS | HEART RATE: 81 BPM | OXYGEN SATURATION: 93 %

## 2023-12-12 LAB
ALBUMIN SERPL BCP-MCNC: 3.1 G/DL (ref 3.4–5)
AMYLASE FLD-CCNC: 23 U/L
ANION GAP SERPL CALC-SCNC: 14 MMOL/L (ref 10–20)
BACTERIA BLD CULT: NORMAL
BUN SERPL-MCNC: 30 MG/DL (ref 6–23)
CALCIUM SERPL-MCNC: 8.8 MG/DL (ref 8.6–10.6)
CHLORIDE SERPL-SCNC: 111 MMOL/L (ref 98–107)
CO2 SERPL-SCNC: 23 MMOL/L (ref 21–32)
CREAT SERPL-MCNC: 0.72 MG/DL (ref 0.5–1.3)
ERYTHROCYTE [DISTWIDTH] IN BLOOD BY AUTOMATED COUNT: 19.7 % (ref 11.5–14.5)
GFR SERPL CREATININE-BSD FRML MDRD: >90 ML/MIN/1.73M*2
GLUCOSE BLD MANUAL STRIP-MCNC: 128 MG/DL (ref 74–99)
GLUCOSE BLD MANUAL STRIP-MCNC: 147 MG/DL (ref 74–99)
GLUCOSE BLD MANUAL STRIP-MCNC: 156 MG/DL (ref 74–99)
GLUCOSE SERPL-MCNC: 159 MG/DL (ref 74–99)
HCT VFR BLD AUTO: 27.3 % (ref 41–52)
HGB BLD-MCNC: 8.6 G/DL (ref 13.5–17.5)
MCH RBC QN AUTO: 31.4 PG (ref 26–34)
MCHC RBC AUTO-ENTMCNC: 31.5 G/DL (ref 32–36)
MCV RBC AUTO: 100 FL (ref 80–100)
NRBC BLD-RTO: 0 /100 WBCS (ref 0–0)
PHOSPHATE SERPL-MCNC: 3 MG/DL (ref 2.5–4.9)
PLATELET # BLD AUTO: 383 X10*3/UL (ref 150–450)
POTASSIUM SERPL-SCNC: 3.9 MMOL/L (ref 3.5–5.3)
RBC # BLD AUTO: 2.74 X10*6/UL (ref 4.5–5.9)
SODIUM SERPL-SCNC: 144 MMOL/L (ref 136–145)
WBC # BLD AUTO: 9.6 X10*3/UL (ref 4.4–11.3)

## 2023-12-12 PROCEDURE — 2500000002 HC RX 250 W HCPCS SELF ADMINISTERED DRUGS (ALT 637 FOR MEDICARE OP, ALT 636 FOR OP/ED): Performed by: STUDENT IN AN ORGANIZED HEALTH CARE EDUCATION/TRAINING PROGRAM

## 2023-12-12 PROCEDURE — 2500000005 HC RX 250 GENERAL PHARMACY W/O HCPCS: Performed by: STUDENT IN AN ORGANIZED HEALTH CARE EDUCATION/TRAINING PROGRAM

## 2023-12-12 PROCEDURE — 99232 SBSQ HOSP IP/OBS MODERATE 35: CPT | Performed by: NURSE PRACTITIONER

## 2023-12-12 PROCEDURE — 2500000001 HC RX 250 WO HCPCS SELF ADMINISTERED DRUGS (ALT 637 FOR MEDICARE OP): Performed by: STUDENT IN AN ORGANIZED HEALTH CARE EDUCATION/TRAINING PROGRAM

## 2023-12-12 PROCEDURE — 2500000004 HC RX 250 GENERAL PHARMACY W/ HCPCS (ALT 636 FOR OP/ED): Performed by: STUDENT IN AN ORGANIZED HEALTH CARE EDUCATION/TRAINING PROGRAM

## 2023-12-12 PROCEDURE — 82947 ASSAY GLUCOSE BLOOD QUANT: CPT

## 2023-12-12 PROCEDURE — 85027 COMPLETE CBC AUTOMATED: CPT | Performed by: NURSE PRACTITIONER

## 2023-12-12 PROCEDURE — 94640 AIRWAY INHALATION TREATMENT: CPT

## 2023-12-12 PROCEDURE — 80069 RENAL FUNCTION PANEL: CPT | Performed by: STUDENT IN AN ORGANIZED HEALTH CARE EDUCATION/TRAINING PROGRAM

## 2023-12-12 PROCEDURE — 71045 X-RAY EXAM CHEST 1 VIEW: CPT | Performed by: RADIOLOGY

## 2023-12-12 PROCEDURE — 82150 ASSAY OF AMYLASE: CPT | Performed by: NURSE PRACTITIONER

## 2023-12-12 PROCEDURE — 2500000001 HC RX 250 WO HCPCS SELF ADMINISTERED DRUGS (ALT 637 FOR MEDICARE OP): Performed by: NURSE PRACTITIONER

## 2023-12-12 PROCEDURE — 71045 X-RAY EXAM CHEST 1 VIEW: CPT

## 2023-12-12 PROCEDURE — C9113 INJ PANTOPRAZOLE SODIUM, VIA: HCPCS | Performed by: STUDENT IN AN ORGANIZED HEALTH CARE EDUCATION/TRAINING PROGRAM

## 2023-12-12 RX ORDER — ACETAMINOPHEN 160 MG/5ML
650 SOLUTION ORAL EVERY 6 HOURS PRN
Qty: 500 ML | Refills: 0 | Status: SHIPPED | OUTPATIENT
Start: 2023-12-12

## 2023-12-12 RX ORDER — OXYCODONE HCL 5 MG/5 ML
5 SOLUTION, ORAL ORAL EVERY 6 HOURS PRN
Qty: 140 ML | Refills: 0 | Status: SHIPPED | OUTPATIENT
Start: 2023-12-12 | End: 2023-12-19

## 2023-12-12 RX ADMIN — GABAPENTIN 300 MG: 250 SOLUTION ORAL at 09:02

## 2023-12-12 RX ADMIN — SODIUM CHLORIDE 30 MG/ML INHALATION SOLUTION 3 ML: 30 SOLUTION INHALANT at 08:59

## 2023-12-12 RX ADMIN — OXYCODONE HYDROCHLORIDE 10 MG: 5 SOLUTION ORAL at 06:39

## 2023-12-12 RX ADMIN — INSULIN LISPRO 2 UNITS: 100 INJECTION, SOLUTION INTRAVENOUS; SUBCUTANEOUS at 09:00

## 2023-12-12 RX ADMIN — LIDOCAINE 1 PATCH: 4 PATCH TOPICAL at 08:59

## 2023-12-12 RX ADMIN — MIDODRINE HYDROCHLORIDE 15 MG: 10 TABLET ORAL at 05:10

## 2023-12-12 RX ADMIN — SERTRALINE HYDROCHLORIDE 75 MG: 50 TABLET ORAL at 08:59

## 2023-12-12 RX ADMIN — ACETAMINOPHEN 650 MG: 650 SOLUTION ORAL at 09:00

## 2023-12-12 RX ADMIN — THIAMINE HYDROCHLORIDE 100 MG: 100 INJECTION, SOLUTION INTRAMUSCULAR; INTRAVENOUS at 08:59

## 2023-12-12 RX ADMIN — ALBUTEROL SULFATE 2.5 MG: 2.5 SOLUTION RESPIRATORY (INHALATION) at 08:59

## 2023-12-12 RX ADMIN — GUAIFENESIN 200 MG: 200 SOLUTION ORAL at 09:00

## 2023-12-12 RX ADMIN — PANTOPRAZOLE SODIUM 40 MG: 40 INJECTION, POWDER, FOR SOLUTION INTRAVENOUS at 08:59

## 2023-12-12 RX ADMIN — APIXABAN 5 MG: 5 TABLET, FILM COATED ORAL at 08:59

## 2023-12-12 ASSESSMENT — PAIN SCALES - GENERAL
PAINLEVEL_OUTOF10: 0 - NO PAIN
PAINLEVEL_OUTOF10: 8

## 2023-12-12 NOTE — DISCHARGE SUMMARY
Discharge Diagnosis  Esophageal perforation    Issues Requiring Follow-Up  Routine follow up     Test Results Pending At Discharge  Pending Labs       Order Current Status    Blood Culture Collected (12/07/23 4749)    Surgical Pathology Exam In process            Hospital Course  Mr. Levy Gregory is a 68 year old male with a complicated medical history over the 9 months.    PMHx including h/o COPD, T2DM, type II achalasia, paraesophageal hernia s/p robotic Laparoscopic Heller myotomy with mary fundoplication on 3/1 with Dr. Hoang. He presented to ED on 3/7 after discharge with SOB, chills, RUQ pain. CT completed concerning for esophageal perforation and he underwent diagnostic lap with drain placement, EGD with stent placement x2, R chest tube on 3/7. He was transferred to  SICU on 3/9 for further management. Thoracic surgery was consulted. A second chest tube was placed on 3/13 by thoracic surgery. On 3/15 he went to OR for R VATs, decortication, bronchoscopy, and NJ placement . Large empyema was seen on CT chest and he underwent IR guided pigtail placement on 3/24.   On 3/27 he went to OR for EGD with esophageal stent replacement and nasal tube post pyloric placement with Dr. Hoang, previous esophageal stent found to have migrated below LES and MIGUELINA drain visible and traveling through esophageal perforation. A new esophageal stent was placed more proximally. On 3/30 all drains with bilious fluid with concern for persistent leak. He returned to OR on 3/31 with thoracic and fuentes surgery and underwent EGD, removal of esophageal stent, endovac placement, dobhoff tube placement, PEG tube placement. He returned to OR on 4/3 for endovac replacement with Dr. Gongora and was found to have perforation from 42 to 35 cm from the lips. CT   C/A/P obtained on 4/4 showed previous findings as well as increased pneumoperitoneum.   On 4/5, he returned to OR for right thoracotomy and esophagectomy with cervical esophagostomy  and lap takedown of prior fundoplication, lysis of adhesions, and revision of gastrostomy tube for esophageal perforation with thoracic surgery. Transferred to SICU post-op. On 4/7 return to OR for PEG replacement and J tube placement with thoracic. While in SICU he was weaned off of pressors on 4/7. Successfully extubated on 4/8. Reintubated on 4/10 d/t respiratory fatigue. Amio drip initiated on the same day for uncontrolled Afib with RVR. He was also started on CVVH on 4/10. He was extubated in ICU on 4/12. CVVH discontinued. CT CAP completed which demonstrated intraabdominal RP abscesses and intramuscular psoas hematoma. CT CAP repeated on 4/24, which demonstrated increase in size of   RP hematoma and decrease in size of psoas hematoma. He was transferred to [facility] on 4/25. While on [facility], he developed rising leukocytosis and tachycardia and repeat CT PE and CT C/A/P with PO and IV contrast completed which was negative for PE, showed decreasing size of hematomas, and likely acalculous cholecystitis visualized. On 4/28 he underwent percutaneous cholecystostomy tube placement by IR, tube connected to accordion drain and remains in place at time of discharge. Repeat CT CA/P on 5/7 with evidence of persistent anastomotic leak from gastric stump. MIGUELINA drain remain in place.     He was admitted 11/29/23 and underwent laparotomy, lysis of adhesions, gastric pullup, jejunostomy placement. On 12/6/2023 he underwent esophagram, see formal report in EPIC. On 12/7/23 he underwent Exploratory laparotomy  Opening of neck incision  Revision of esophagogastric conduit and EGD. Post procedural esophagram on 12/11/23 demonstrated no leak or obstruction and improvement in conduit tortuosity. Patient tolerated sips of clears and cyclic nocturnal tube feeds. He was discharged home with home nursing services and home PT/OT on 12/12/23 with only sips of clears and nocturnal tube feeds and aguilera catheter. We arranged for follow up appt with  urology in January 2024.   Right PICC line was removed prior to discharge.   Patient will follow up with Dr. Gongora with CXR in 2 weeks.   Anticoagulation : he remained on Heparin gtt through the hospitalization and Apixaban was restarted on 12/11/23.     Pertinent Physical Exam At Time of Discharge  HEENT: Normocephalic and atraumatic.   NECK: Supple. Trach midline. No JVD. Primary dressing removed, cervical incisions well approximated, no drg, no erythema. MIGUELINA to bulb suction with sero-sang drg, scant output   CHEST: Breathing comfortably on room air   HEART: Regular rate and rhythm. Currently, NSR 80s per telemetry  ABDOMEN: Soft, appropriately tender to touch. abd incision RAINA, well approximated, no drainage, + flatus, daily loose BM , tolerating enteral feeds at goal  (cyclic)   : CHRONIC aguilera in place, matt UOP   NEUROLOGIC: Alert and oriented. Grossly intact.   EXTREMITIES: Moves all extremities equally.  Pedal pulses are palpable. + lower extremity edema. No calf tenderness.     Home Medications     Medication List      START taking these medications     acetaminophen 650 mg/20.3 mL solution oral liquid; Commonly known as:   Tylenol; Take 20.3 mL (650 mg) by mouth every 6 hours if needed for pain.   oxyCODONE 5 mg/5 mL solution; Commonly known as: Roxicodone; 5 mL (5 mg)   by j-tube route every 6 hours if needed for moderate pain (4 - 6) for up   to 7 days.     CHANGE how you take these medications     apixaban 2.5 mg tablet; Commonly known as: Eliquis; Take 2 tablets (5   mg) by mouth 2 times a day.; What changed: how much to take     CONTINUE taking these medications     atorvastatin 10 mg tablet; Commonly known as: Lipitor   esomeprazole 40 mg packet; Commonly known as: NexIUM   ferrous sulfate 300 mg (60 mg iron)/5 mL syrup   gabapentin 250 mg/5 mL (5 mL) solution; Take 6 mL by mouth 3 times a   day.   midodrine 5 mg tablet; Commonly known as: Proamatine   sertraline 50 mg tablet; Commonly known as:  Zoloft     STOP taking these medications     fluticasone 50 mcg/actuation nasal spray; Commonly known as: Flonase   hydrOXYzine HCL 25 mg tablet; Commonly known as: Atarax   Levemir U-100 Insulin 100 unit/mL injection; Generic drug: insulin   detemir   potassium chloride 20 mEq/15 mL liquid   spironolactone 25 mg tablet; Commonly known as: Aldactone   thiamine 100 mg tablet; Commonly known as: Vitamin B-1       Outpatient Follow-Up  Future Appointments   Date Time Provider Department Center   12/28/2023  1:15 PM Brielle Gongora DO [id number]   1/23/2024  8:40 AM Kayli Gotti APRN-CNP [id number] [place]   2/5/2024  8:40 AM Chris Huizar MD [id number] [place]       Jacqueline Leigh APRN-CNP  Thoracic Surgery [id number]

## 2023-12-12 NOTE — PROGRESS NOTES
"Levy Gregory is a 68 y.o. male on day 13 of admission presenting with Esophageal perforation.    Subjective   No significant events overnight  Improved spirits, anticipating  discharge     Objective   General: He is a pleasant male currently in no distress, resting in the chair   BP 87/55 (BP Location: Left arm, Patient Position: Lying)   Pulse 89   Temp 37 °C (98.6 °F) (Temporal)   Resp 19   Ht 1.85 m (6' 0.84\")   Wt 89.3 kg (196 lb 13.9 oz)   SpO2 98%   BMI 26.09 kg/m²    Body mass index is 26.09 kg/m².   HEENT: Normocephalic and atraumatic.   NECK: Supple. Trach midline. No JVD. Primary dressing removed, cervical incisions well approximated, no drg, no erythema. MIGUELINA to bulb suction with sero-sang drg, scant output   CHEST: Breathing comfortably on room air   HEART: Regular rate and rhythm. Currently, NSR 80s per telemetry  ABDOMEN: Soft, appropriately tender to touch. abd incision RAINA, well approximated, no drainage, + flatus, daily loose BM , tolerating enteral feeds at goal  (cyclic)   : CHRONIC aguilera in place, matt UOP   NEUROLOGIC: Alert and oriented. Grossly intact.   EXTREMITIES: Moves all extremities equally.  Pedal pulses are palpable. + lower extremity edema. No calf tenderness.       Last Recorded Vitals  Blood pressure 126/76, pulse 81, temperature 36.7 °C (98.1 °F), temperature source Temporal, resp. rate 17, height 1.85 m (6' 0.84\"), weight 90.9 kg (200 lb 4.6 oz), SpO2 93 %.  Intake/Output last 3 Shifts:  I/O last 3 completed shifts:  In: 1885 (20.7 mL/kg) [NG/GT:1885]  Out: 2120 (23.3 mL/kg) [Urine:2070 (0.6 mL/kg/hr); Drains:25; Stool:25]  Weight: 90.8 kg     Relevant Results  Scheduled medications  acetaminophen, 650 mg, j-tube, q6h  albuterol, 2.5 mg, nebulization, TID  apixaban, 5 mg, oral, BID  gabapentin, 300 mg, j-tube, q8h SYLVESTER  guaiFENesin, 200 mg, j-tube, q6h  insulin lispro, 0-10 Units, subcutaneous, q4h  lidocaine, 1 patch, transdermal, Daily  midodrine, 15 mg, j-tube, " q8h  pantoprazole, 40 mg, intravenous, Daily  sertraline, 75 mg, j-tube, Daily  sodium chloride, 3 mL, nebulization, TID  thiamine, 100 mg, intravenous, Daily      Continuous medications     PRN medications  PRN medications: alteplase, dextrose 10 % in water (D10W), dextrose, glucagon, ondansetron, oxyCODONE, oxyCODONE, oxygen    Results for orders placed or performed during the hospital encounter of 11/29/23 (from the past 24 hour(s))   POCT GLUCOSE   Result Value Ref Range    POCT Glucose 123 (H) 74 - 99 mg/dL   POCT GLUCOSE   Result Value Ref Range    POCT Glucose 116 (H) 74 - 99 mg/dL   POCT GLUCOSE   Result Value Ref Range    POCT Glucose 122 (H) 74 - 99 mg/dL   POCT GLUCOSE   Result Value Ref Range    POCT Glucose 128 (H) 74 - 99 mg/dL   POCT GLUCOSE   Result Value Ref Range    POCT Glucose 147 (H) 74 - 99 mg/dL   Amylase, Fluid   Result Value Ref Range    Amylase, Fluid 23 Not established. U/L   Renal Function Panel   Result Value Ref Range    Glucose 159 (H) 74 - 99 mg/dL    Sodium 144 136 - 145 mmol/L    Potassium 3.9 3.5 - 5.3 mmol/L    Chloride 111 (H) 98 - 107 mmol/L    Bicarbonate 23 21 - 32 mmol/L    Anion Gap 14 10 - 20 mmol/L    Urea Nitrogen 30 (H) 6 - 23 mg/dL    Creatinine 0.72 0.50 - 1.30 mg/dL    eGFR >90 >60 mL/min/1.73m*2    Calcium 8.8 8.6 - 10.6 mg/dL    Phosphorus 3.0 2.5 - 4.9 mg/dL    Albumin 3.1 (L) 3.4 - 5.0 g/dL   CBC   Result Value Ref Range    WBC 9.6 4.4 - 11.3 x10*3/uL    nRBC 0.0 0.0 - 0.0 /100 WBCs    RBC 2.74 (L) 4.50 - 5.90 x10*6/uL    Hemoglobin 8.6 (L) 13.5 - 17.5 g/dL    Hematocrit 27.3 (L) 41.0 - 52.0 %     80 - 100 fL    MCH 31.4 26.0 - 34.0 pg    MCHC 31.5 (L) 32.0 - 36.0 g/dL    RDW 19.7 (H) 11.5 - 14.5 %    Platelets 383 150 - 450 x10*3/uL   POCT GLUCOSE   Result Value Ref Range    POCT Glucose 156 (H) 74 - 99 mg/dL     XR chest 1 view 12/10/2023    Narrative  Interpreted By:  Curtis Urbina,  STUDY:  XR CHEST 1 VIEW;  12/10/2023 8:40  am    INDICATION:  Signs/Symptoms:S/p gastric pull up.    COMPARISON:  Chest radiograph dated 12/10/2023, 3:41 a.m. and CT scan 10/25/2023    ACCESSION NUMBER(S):  [id number]    ORDERING CLINICIAN:  LYNETTE GLASS    FINDINGS:  AP radiograph of the chest  The patient is status post esophagectomy and gastric pull-through.  A mediastinal drainage tube is in stable position.  Right upper extremity PICC is in stable position.    CARDIOMEDIASTINAL SILHOUETTE:  The cardiomediastinal silhouette is stable in size and configuration.    LUNGS:  There is similar bibasilar presumed atelectasis with suggestion of  trace/small pleural effusions. No sizable pneumothorax.    ABDOMEN:  No remarkable upper abdominal findings.    BONES:  No acute osseous abnormality.    Impression  1. Postsurgical changes and medical devices as above. No sizable  pneumothorax.  2. Similar mild bibasilar presumed atelectasis suggestion of  trace/small pleural effusions    Signed by: Curtis Urbina 12/10/2023 9:02 AM  Dictation workstation:   [id number]    FL GI esophagram 12/11/2023    Narrative  Interpreted By:  Morris Neri and Ogievich Taessa  STUDY:  FL GI ESOPHAGRAM;  12/11/2023 12:37 pm    INDICATION:  Signs/Symptoms:S/p gastric pull up.    Per EMR:  03/01/2023: Robotic Laparoscopic Heller myotomy with mary  fundoplication  11/29/2023: Laparotomy, lysis of adhesions, gastric pullup,  jejunostomy placement.  12/06/2023: Esophagram without evidence of leak but demonstrated  pooling of contrast at the anastomosis.  12/07/2023: Exploratory laparotomy, revision of esophagogastric  conduit and EGD.    COMPARISON:  Fluoroscopic guided esophagram 12/06/2023  Chest x-ray 12/10/2023    ACCESSION NUMBER(S):  [id number]    ORDERING CLINICIAN:  LYNETTE GLASS    TECHNIQUE:  Initial  radiograph of the esophagus was obtained.  Multiple  fluoroscopic spot images were obtained after the administration of  120 mL of  in the esophagogastric  conduit contrast. The patient  tolerated the procedure well. Fluoroscopic time was  1.8 minutes.    FINDINGS:  Initial  image demonstrates enlarged stable cardiomediastinal  silhouette. Partial visualization of the lungs with streaky bibasilar  opacities likely representing atelectasis, similar to most recent  prior radiograph. Drainage tube overlies the mediastinum, similar  position to prior. Right upper extremity PICC distal tip overlying  cavoatrial junction.    Fluoroscopic images demonstrate postsurgical changes of esophagectomy  and gastric pull up with revision of esophagogastric conduit. Oral  Gastrografin contrast is administered and passes the upper esophagus  and into the thoracic stomach (gastric pull-up) with no evidence of  an obstruction. There is redemonstration of moderate upstream pooling  of contrast material around expected location of the anastomosis.  When compared to prior esophagram from 12/06/2023, there is  improvement in the tortuosity at the region of the esophagogastric  conduit. There is no evidence of extraluminal contrast extravasation  to suggest a leak.    Post-contrast image demonstrates mild residual contrast in the  esophagogastric conduit.    Impression  1. Postsurgical changes of esophagectomy with gastric pull-up and  most recently revision of esophagogastric conduit. No evidence of  extraluminal contrast extravasation to suggest a postoperative leak.  2. Interval improvement of the conduit tortuosity compared to prior  fluoroscopic guided esophagram from 12/06/2023.  3. Redemonstration of pooling of contrast at the site of anastomosis  without evidence of obstruction.    I personally reviewed the images/study and I agree with the findings  as stated by Betty Adhikari DO, PGY-2. This study was interpreted  at [facility], [place], [place].    MACRO:  None    Signed by: Morris Simons 12/11/2023 8:43 PM  Dictation  workstation:   [id number]      Assessment/Plan   Principal Problem:    Esophageal perforation  Active Problems:    Anticoagulant long-term use    Elevated liver function tests    Hypotension    Infection requiring contact isolation precautions    Other dysphagia      Levy Gregory is a 68 y.o. male with a complex history following  robotic Laparoscopic Heller myotomy with mary fundoplication on 3/1 with Dr. Hoang.  After a complicated clinical course, he was brought back to [facility] and underwent laparotomy, lysis of adhesions, gastric pullup, jejunostomy placement on 11/29/23 with Pa Gongora and Jem. Esophagram on 12/6/23 was without evidence of leak but demonstrated pooling of contrast at the anastomosis. He is now status post Exploratory laparotomy, Opening of neck incision, Revision of esophagogastric conduit and EGD.   12/11 esophagram without evidence of leak or obstruction, improved tortuosity of conduit      Plan:  NEURO: History of diabetic neuropathy and depression. Acute post-op pain. On home Zoloft and Neurontin.   - Ongoing neuro/pain assessments  - Good pain control on Oxycodone 5-10 mg  + Acetaminophen as needed for pain, monitor effectiveness and adjust dose as needed   - Lidoderm patches surrounding L chest/clavicular incision  - Continue home dose of Zoloft and Neurontin   - PT/OT following - will arrange for outpatient PT/OT  - Encourage OOB/ambulation   - Home meds: Zoloft 75 mg daily, Atarax 25 mg BID, Gabapentin 300 mg TID, thiamine 100 mg daily      CV: History of HLD , Hx of Afib. Baseline SBP 90's-100's, on midodrine 15mg q8h. Most recent Echo ( 08/2023) with suboptimal quality. Previous TTE 4/2023: EF 70-75%, normal RV function.   Post op on phenylphrine infusion 0.5 mcg/kg/min. Phenylphrine infusion increased to 1mcg/kg/min overnight 11/29-11/30 and received 500 ml LR x 2. Started on midodrine 15mg q8h 11/30. Transfused 2 rbc, weaned of phenyl infusion in afternoon 11/30. Placed  back transiently on 12/1, now weaned off.  12/7 fluid bolus + 1U PRBC overnight for hypotension.  - Normotensive, NSR on telemetry   -Continue Midodrine 15 mg q 8  -Continuous telemetry and VS every 4 hrs   -Replace electrolytes as needed   -Home meds: Eliquis 2.5 mg BID, Lipitor 10 mg daily, Midodrine 5 mg TID, Aldactone 12.5 mg QOD   -Anticoagulation: Continue  home Eliquis      PULM: History of COPD. 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 Most recent Empyema was in August 2023. Acute hypoxic respiratory insufficiency on 12/1 with increased FiO2 to 5L HF NC.  12/8--to  2L NC   - Weaned off supplemental oxygen, oxygenating well on room air   - Continue bronchial hygiene  - Q1h incentive spirometry while awake  - Avoid positive pressure ventilation   - CXR stable      GI: Hx Paraesophageal hernia s/p robotic Laparoscopic Heller myotomy with mary fundoplication on 3/1 with Dr. Hoang complicated by esophageal perforation leading to multiple intervention as outlined above  and eventually a right thoracotomy and esophagectomy with cervical esophagostomy and lap takedown of prior fundoplication on 4/5. Perc fran 4/27.  - s/p laparotomy, lysis of adhesions, gastric pullup, jejunostomy placement. L partial clavicle removal 11/29  - s/p Exploratory laparotomy, Opening of neck incision, Revision of esophagogastric conduit and EGD on 12/7/23   Moderate protein calorie malnutrition   12/11 esophagram negative for leak or obstruction + improved conduit tortuosity   - Sips of clears only   - PPI for GI prophylaxis  - Maintain HOB up at least 30 degrees at all times secondary to high risk of aspiration  - MIGUELINA drain amylase 23 today - drain removed without difficulty on morning rounds   - seen by nutrition--TF switched back to Nephro, nocturnal cyclic for 16 hours        : Baseline sCr ~ 0.9. Oliguric CORA March 2023, required CRRT, recovered and returted to baseline . Urinary retention, has a  chronic aguilera . Changed on arrival to [facility].   - Check renal function panel daily and prn  - Replete electrolytes to goal  - Maintain aguilera catheter in place, consulted/spoke to urology, no indication for voiding trial during this hospitalization due to high possibility of failure, urology will arrange for close follow up in outpatient setting    -Patient refused leg bag, urology follow up scheduled for January      HEME: Acute blood loss anemia. Hemoglobin 7.7 11/30, transfused 2 RBC with increase of Hgb to 8.2-8.7 range. Bilateral UE DVT and LLE DVT diagnosed March 2023, IVC filter placed in 4/2023. On Eliquis. Repeat vasc US x4 extremities 11/30: BUE without DVTs, BLE + for nonocclusive L popliteal DVT.  12/7-received one unit PRBC overnight.   - Check CBC and coags daily   - Ongoing monitoring for s/s bleeding  - Anticoagulation: Continue home Eliquis   - Home Meds: Eliquis      ENDO: IDDM2. 9/25/23 A1C 4.7. Adequate glycemic control   - Q4h BG monitoring with SSI Lispro per protocol  - Was not managed on home medications prior to hospitalization     ID:  Hx mediastinal abscess and R empyema with polymicrobial including Acinetobacter baumanii (CRAB) and MRSA ( 04/2023)  Recurrent Empyema with cultures growing  cultures showing Pseudomonas, E.coli and Kleb oxytoca, and Saccharomyces cerevisae ( 08/2023)  - Afebrile, no leukocytosis   - Trend temp q4, wbc daily  - Finished perioperative Cefazolin  - Ongoing monitoring for s/s infection  - Cultures negative     DISPOSITION:  -Plan to discharge patient home today, will resume home nursing care and home PT/OT, patient has enteral feeds supplies at home, patient and family refused SNF at this time   -Patient will follow up with Dr. Gongora in 2 weeks with CXR       Patient seen and examined by this provider. Plan of care discussed with Dr. Gongora.     I spent 25 minutes in the professional and overall care of this patient.      Jacqueline Leigh,  APRN-CNP  Thoracic Surgery [phone]

## 2023-12-12 NOTE — NURSING NOTE
RN Discharge Note: Pt. Alert and oriented x4. VSS. Pt. Given discharge summary and verbalized understanding of instructions. Pt. To go home with home care. Pt. PICC and telemetry monitor removed. Pt. Wife at bedside and to transport pt. Home. Transport took pt. To main entrance. Janna Dean RN.

## 2023-12-13 ENCOUNTER — DOCUMENTATION (OUTPATIENT)
Dept: CARE COORDINATION | Facility: CLINIC | Age: 68
End: 2023-12-13
Payer: MEDICARE

## 2023-12-13 DIAGNOSIS — K22.3 ESOPHAGEAL PERFORATION: ICD-10-CM

## 2023-12-13 NOTE — PROGRESS NOTES
Discharge Facility: [facility]  Discharge Diagnosis:Esophageal perforation     He was admitted 11/29/23 and underwent laparotomy, lysis of adhesions, gastric pullup, jejunostomy placement. On 12/6/2023 he underwent esophagram, see formal report in EPIC. On 12/7/23 he underwent Exploratory laparotomy  Opening of neck incision  Revision of esophagogastric conduit and EGD. Post procedural esophagram on 12/11/23 demonstrated no leak or obstruction and improvement in conduit tortuosity.     Admission Date:11.29.23  Discharge Date: 12.12.23    PCP Appointment Date:not scheduled-declines  Specialist Appointment Date:   Hospital Encounter and Summary: Linked   See discharge assessment below for further details     Engagement  Call Start Time: 1039 (12/13/2023 10:38 AM)    Medications  Medications reviewed with patient/caregiver?: Yes (12/13/2023 10:38 AM)  Is the patient having any side effects they believe may be caused by any medication additions or changes?: No (12/13/2023 10:38 AM)  Does the patient have all medications ordered at discharge?: Yes (12/13/2023 10:38 AM)  Is the patient taking all medications as directed (includes completed medication regime)?: Yes (12/13/2023 10:38 AM)  Care Management Interventions: Provided patient education (12/13/2023 10:38 AM)    Appointments  Does the patient have a primary care provider?: Yes (12/13/2023 10:38 AM)  Care Management Interventions: Educated patient on importance of making appointment (12/13/2023 10:38 AM)  Has the patient kept scheduled appointments due by today?: Yes (12/13/2023 10:38 AM)    Self Management  What is the home health agency?: [facility] (12/13/2023 10:38 AM)  Has home health visited the patient within 72 hours of discharge?: -- ([facility] has called and advised they would be coming) (12/13/2023 10:38 AM)  What Durable Medical Equipment (DME) was ordered?: coord by hospital tube feeds/supplies (12/13/2023 10:38 AM)  Has all Durable Medical Equipment (DME) been  delivered?: Yes (12/13/2023 10:38 AM)    Patient Teaching  Does the patient have access to their discharge instructions?: Yes (12/13/2023 10:38 AM)  What is the patient's perception of their health status since discharge?: Improving (12/13/2023 10:38 AM)  Is the patient/caregiver able to teach back the hierarchy of who to call/visit for symptoms/problems? PCP, Specialist, Home Health nurse, Urgent Care, ED, 911: Yes (12/13/2023 10:38 AM)  Patient/Caregiver Education Comments: Spoke with patient and wife. States is taking pain meds. Is able to drink clear liquids without difficulty. Tolerating tube feeds. Per wife, he is weak and unable to ambulate. They were out of days to be able to return to snf for daily therapy. Advised wife to inform [facility] that therapy is needed at home if not already ordered and they can arrange for therapist. Declines follow up visit with PCP at this time. (12/13/2023 10:38 AM)

## 2023-12-18 DIAGNOSIS — K91.89 ESOPHAGEAL ANASTOMOTIC LEAK: Primary | ICD-10-CM

## 2023-12-18 RX ORDER — PANCRELIPASE 20880; 78300; 78300 [USP'U]/1; [USP'U]/1; [USP'U]/1
TABLET ORAL
Qty: 1 TABLET | Refills: 0 | Status: SHIPPED | OUTPATIENT
Start: 2023-12-18 | End: 2024-02-07 | Stop reason: WASHOUT

## 2023-12-18 RX ORDER — ESOMEPRAZOLE MAGNESIUM 20 MG/1
20 GRANULE, DELAYED RELEASE ORAL
Qty: 600 MG | Refills: 11 | Status: SHIPPED | OUTPATIENT
Start: 2023-12-18 | End: 2024-02-07 | Stop reason: ALTCHOICE

## 2023-12-18 RX ORDER — PANCRELIPASE 30000; 6000; 19000 [USP'U]/1; [USP'U]/1; [USP'U]/1
1 CAPSULE, DELAYED RELEASE PELLETS ORAL ONCE
Qty: 1 CAPSULE | Refills: 0 | Status: SHIPPED | OUTPATIENT
Start: 2023-12-18 | End: 2024-02-07 | Stop reason: WASHOUT

## 2023-12-18 NOTE — PROGRESS NOTES
I received a call from home nurse. Clogged J tube at home, likely 2/2 crushing esomeprazole.  I asked the nurse to try baking soda dissolved in water now.   Viokace and Esomeprazole called into patient' preferred pharmacy.

## 2023-12-18 NOTE — TELEPHONE ENCOUNTER
Viokase and esomeprazole packet not available at patient's pharmacy  Esopmeprazole packet will be ordered by pharmacy  Creon will be ordered in substiution for viokase      Roxanne Dotson APRN-CNP  Thoracic Surgery Pager [phone]

## 2023-12-19 ENCOUNTER — APPOINTMENT (OUTPATIENT)
Dept: GASTROENTEROLOGY | Facility: CLINIC | Age: 68
End: 2023-12-19
Payer: MEDICARE

## 2023-12-20 ENCOUNTER — TELEPHONE (OUTPATIENT)
Dept: PRIMARY CARE | Facility: CLINIC | Age: 68
End: 2023-12-20
Payer: MEDICARE

## 2023-12-20 DIAGNOSIS — Z79.4 TYPE 2 DIABETES MELLITUS WITH HYPERGLYCEMIA, WITH LONG-TERM CURRENT USE OF INSULIN (MULTI): ICD-10-CM

## 2023-12-20 DIAGNOSIS — E11.65 TYPE 2 DIABETES MELLITUS WITH HYPERGLYCEMIA, WITH LONG-TERM CURRENT USE OF INSULIN (MULTI): ICD-10-CM

## 2023-12-20 RX ORDER — INSULIN DETEMIR 100 [IU]/ML
35 INJECTION, SOLUTION SUBCUTANEOUS NIGHTLY
Qty: 30 ML | Refills: 0 | Status: SHIPPED | OUTPATIENT
Start: 2023-12-20 | End: 2023-12-22

## 2023-12-20 NOTE — TELEPHONE ENCOUNTER
Refill:    Levemir 100units inj 35 units daily    Pharm: DM # [phone]    LR: was prescribed at [facility]   LV: 11/21/23  NV: 02/05/24

## 2023-12-20 NOTE — OP NOTE
Exploration Laparotomy and conduit revision, Esophagogastroduodenoscopy Operative Note     Date: 2023  OR Location: [facility] OR    Name: Levy Gregory, : 1955, Age: 68 y.o., MRN: [id number], Sex: male    Diagnosis  Pre-op Diagnosis     * Other dysphagia [R13.19] Post-op Diagnosis     * Other dysphagia [R13.19]     Procedures  Exploration Laparotomy and conduit revision  58176 - [place] REOPENING RECENT LAPAROTOMY    Esophagogastroduodenoscopy  17686 - [place] EGD TRANSORAL BIOPSY SINGLE/MULTIPLE      Surgeons      * Brielle Gongora - Primary    Resident/Fellow/Other Assistant:  Surgeon(s) and Role:     * Flaco Lloyd MD - Assisting - present to assist as no qualified resident or fellow available    Procedure Summary  Anesthesia: General  ASA: III  Anesthesia Staff: Anesthesiologist: Verito Salinas MD  Anesthesia Resident: Chante Strange MD  Estimated Blood Loss: 150mL  Intra-op Medications:   Medication Name Total Dose   sodium chloride 0.9 % irrigation solution 200 mL   BUPivacaine-EPINEPHrine (Marcaine w/EPI) 0.25 %-1:200,000 injection 30 mL   HYDROmorphone (Dilaudid) injection 0.2 mg 0.2 mg   insulin lispro (HumaLOG) injection 0-10 Units Cannot be calculated   ondansetron (Zofran) injection 4 mg 4 mg         Intraprocedure I/O Totals       None           Specimen: No specimens collected     Staff:   Circulator: Chelsey Gaytan RN; Caridad Bolanos RN  Relief Scrub: Sylvia Ortiz RN; Anjana Arevalo  Scrub Person: Renetta Spence         Drains and/or Catheters:   Gastrostomy/Enterostomy Jejunostomy 14 Fr. LLQ (Active)   Surrounding Skin Dry;Intact 23 0510   Drain Status Clamped 23 1200   Drainage Appearance None 23 0800   Site Description Healing 23   Dressing Status Clean;Dry 23 0510   Dressing Intervention Dressing reinforced 23 1940   Dressing Type Open to air 23   Tube Feeding Frequency Continuous 23  2239   Tube Feeding Nepro 12/11/23 2239   Tube Feeding Strength Full strength 12/11/23 2239   Tube Feeding Method Continuous per pump 12/11/23 2239   Tube Feeding Bag Changed Yes 12/11/23 0442   Feeding Tube Flushed With Tap water 12/12/23 0800   Medication volume (mL) 240 mL 12/12/23 0800   Intake (mL) 85 mL 12/12/23 1000   Output (mL) 20 mL 12/01/23 2000       Urethral Catheter Straight-tip 16 Fr. (Active)   Site Assessment Clean;Skin intact 12/12/23 1000   Collection Container Standard drainage bag 12/10/23 2034   Securement Method Securing device (Describe) 12/10/23 2034   Reason for Continuing Urinary Catheterization chronic urinary retention 12/10/23 2034   Output (mL) 85 mL 12/12/23 0642   $ Urethral Catheter Charge Indwelling cath 12/09/23 1200       [REMOVED] Closed/Suction Drain Left;Anterior Neck 19 Fr. (Removed)   Site Description Healing 12/12/23 0645   Dressing Status Clean;Dry 12/12/23 0645   Drainage Appearance Serosanguineous 12/11/23 0800   Status To bulb suction 12/11/23 0800   Sutures Removed Intact Yes 12/11/23 0800   Output (mL) 10 mL 12/11/23 0442       Tourniquet Times:         Implants:     Findings: Redundancy of cervical esophagus above anastomosis. Pinpoint posterior esophageal defect above anastomosis - oversewn    Indications: Levy Gregory is an 68 y.o. male who is status post substernal gastric pull-up last week.  Fluoroscopic swallow evaluation yesterday revealed no leak but did show a redundancy in the cervical esophagus which I was concerned would have ongoing future emptying issues.  I discussed with the patient revision and he was agreeable.    The patient was seen in the preoperative area. The risks, benefits, complications, treatment options, non-operative alternatives, expected recovery and outcomes were discussed with the patient. The possibilities of reaction to medication, pulmonary aspiration, injury to surrounding structures, bleeding, recurrent infection, the need  for additional procedures, failure to diagnose a condition, and creating a complication requiring transfusion or operation were discussed with the patient. The patient concurred with the proposed plan, giving informed consent.  The site of surgery was properly noted/marked if necessary per policy. The patient has been actively warmed in preoperative area. Preoperative antibiotics have been ordered and given within 1 hours of incision. Venous thrombosis prophylaxis have been ordered including bilateral sequential compression devices and chemical prophylaxis    Procedure Details: Patient was brought into the operating suite procedural information was confirmed.  General anesthesia was induced and a single-lumen endotracheal tube was placed.  Patient's neck and abdomen were prepped and draped in the typical sterile fashion.  We started by reopening his abdominal incision and gently clearing adhesions away from the abdominal wall bluntly.  Using gentle hand dissection we also freed up the substernal gastric tissue with extreme care along the left chest where gastroepiploic was sitting.  We then opened the left neck incision and located our cervical esophagus and her prior anastomosis.  This was carefully freed up from the surrounding tissue, again bluntly, the anastomosis was identified.  We circumferentially dissected the esophagus above this as well.  On close inspection there was a pinpoint defect in the posterior wall just above our anastomosis.  This was oversewn with interrupted 3-0 silk.  An endoscopy was performed because at this point we could not see the area of significant redundancy.  On endoscopy the tortuosity was noted to be a couple centimeters above our anastomosis so we continued our neck dissection and found this sigmoid shaped area of the cervical esophagus.  Once this was freed from surrounding tissue gently pulled the inferiorly back into the abdomen in order to straighten out the esophagus.  On  repeat endoscopic view there was significant improvement in the sigmoid shape of the patient's esophagus.  We instilled some saline in her cervical incision and insufflated via endoscopy with no evidence of or leak.  The abdomen was irrigated.  We replaced a 19 [place] L[place] drain in the neck and secured this in place.  We then closed the cervical incision in layers.  The abdomen was closed again with multiple interrupted Prolene sutures through his prior mesh as well as a running PDS suture.  We closed skin on top of this.  Patient was then extubated and taken to PACU in stable condition.    Complications:  None; patient tolerated the procedure well.    Disposition: PACU - hemodynamically stable.  Condition: stable     Attending Attestation: I was present for the entire procedure.    Brielle Gongora  Phone Number: [phone]

## 2023-12-21 ENCOUNTER — TELEPHONE (OUTPATIENT)
Dept: PRIMARY CARE | Facility: CLINIC | Age: 68
End: 2023-12-21
Payer: MEDICARE

## 2023-12-21 DIAGNOSIS — Z79.4 TYPE 2 DIABETES MELLITUS WITH HYPERGLYCEMIA, WITH LONG-TERM CURRENT USE OF INSULIN (MULTI): ICD-10-CM

## 2023-12-21 DIAGNOSIS — E11.65 TYPE 2 DIABETES MELLITUS WITH HYPERGLYCEMIA, WITH LONG-TERM CURRENT USE OF INSULIN (MULTI): ICD-10-CM

## 2023-12-21 LAB
LABORATORY COMMENT REPORT: NORMAL
PATH REPORT.COMMENTS IMP SPEC: NORMAL
PATH REPORT.FINAL DX SPEC: NORMAL
PATH REPORT.GROSS SPEC: NORMAL
PATH REPORT.RELEVANT HX SPEC: NORMAL
PATH REPORT.TOTAL CANCER: NORMAL

## 2023-12-21 NOTE — TELEPHONE ENCOUNTER
Levy said that he doesn't know how to use it and has needles & syringes left for the vial. He would like a vial now and the next time they see you they will talk about the pen.

## 2023-12-21 NOTE — TELEPHONE ENCOUNTER
Pts spouse Symone states that she picked up pts insulin detemir (Levemir FlexPen) 100 unit/mL , and she staes that he usually gets a vial not the Flex pen. She would like to know if you can resend the vials to the his pharm DM?

## 2023-12-22 DIAGNOSIS — E11.49 OTHER DIABETIC NEUROLOGICAL COMPLICATION ASSOCIATED WITH TYPE 2 DIABETES MELLITUS (MULTI): Primary | ICD-10-CM

## 2023-12-22 RX ORDER — INSULIN DETEMIR 100 [IU]/ML
35 INJECTION, SOLUTION SUBCUTANEOUS NIGHTLY
Qty: 31.5 ML | Refills: 0 | Status: SHIPPED | OUTPATIENT
Start: 2023-12-22 | End: 2024-02-07 | Stop reason: ALTCHOICE

## 2023-12-28 ENCOUNTER — OFFICE VISIT (OUTPATIENT)
Dept: SURGERY | Facility: HOSPITAL | Age: 68
End: 2023-12-28
Payer: MEDICARE

## 2023-12-28 ENCOUNTER — PATIENT OUTREACH (OUTPATIENT)
Dept: CARE COORDINATION | Facility: CLINIC | Age: 68
End: 2023-12-28
Payer: MEDICARE

## 2023-12-28 ENCOUNTER — HOSPITAL ENCOUNTER (OUTPATIENT)
Dept: RADIOLOGY | Facility: HOSPITAL | Age: 68
Discharge: HOME | End: 2023-12-28
Payer: MEDICARE

## 2023-12-28 VITALS
RESPIRATION RATE: 18 BRPM | OXYGEN SATURATION: 100 % | DIASTOLIC BLOOD PRESSURE: 74 MMHG | SYSTOLIC BLOOD PRESSURE: 102 MMHG | BODY MASS INDEX: 25.27 KG/M2 | TEMPERATURE: 96.8 F | HEART RATE: 81 BPM | WEIGHT: 190.7 LBS

## 2023-12-28 DIAGNOSIS — K22.0 ACHALASIA, ESOPHAGEAL: ICD-10-CM

## 2023-12-28 DIAGNOSIS — K22.3 ESOPHAGEAL PERFORATION: ICD-10-CM

## 2023-12-28 PROCEDURE — 3008F BODY MASS INDEX DOCD: CPT | Performed by: STUDENT IN AN ORGANIZED HEALTH CARE EDUCATION/TRAINING PROGRAM

## 2023-12-28 PROCEDURE — 3066F NEPHROPATHY DOC TX: CPT | Performed by: STUDENT IN AN ORGANIZED HEALTH CARE EDUCATION/TRAINING PROGRAM

## 2023-12-28 PROCEDURE — 71046 X-RAY EXAM CHEST 2 VIEWS: CPT

## 2023-12-28 PROCEDURE — 3078F DIAST BP <80 MM HG: CPT | Performed by: STUDENT IN AN ORGANIZED HEALTH CARE EDUCATION/TRAINING PROGRAM

## 2023-12-28 PROCEDURE — 1159F MED LIST DOCD IN RCRD: CPT | Performed by: STUDENT IN AN ORGANIZED HEALTH CARE EDUCATION/TRAINING PROGRAM

## 2023-12-28 PROCEDURE — 1126F AMNT PAIN NOTED NONE PRSNT: CPT | Performed by: STUDENT IN AN ORGANIZED HEALTH CARE EDUCATION/TRAINING PROGRAM

## 2023-12-28 PROCEDURE — 1111F DSCHRG MED/CURRENT MED MERGE: CPT | Performed by: STUDENT IN AN ORGANIZED HEALTH CARE EDUCATION/TRAINING PROGRAM

## 2023-12-28 PROCEDURE — 71046 X-RAY EXAM CHEST 2 VIEWS: CPT | Performed by: RADIOLOGY

## 2023-12-28 PROCEDURE — 3074F SYST BP LT 130 MM HG: CPT | Performed by: STUDENT IN AN ORGANIZED HEALTH CARE EDUCATION/TRAINING PROGRAM

## 2023-12-28 PROCEDURE — 99024 POSTOP FOLLOW-UP VISIT: CPT | Performed by: STUDENT IN AN ORGANIZED HEALTH CARE EDUCATION/TRAINING PROGRAM

## 2023-12-28 ASSESSMENT — ENCOUNTER SYMPTOMS
DEPRESSION: 0
LOSS OF SENSATION IN FEET: 0
OCCASIONAL FEELINGS OF UNSTEADINESS: 1

## 2023-12-28 ASSESSMENT — PAIN SCALES - GENERAL: PAINLEVEL: 0-NO PAIN

## 2024-01-01 NOTE — PROGRESS NOTES
C/C:  Post-op visit    History Of Present Illness  Levy Gregory is a 68 y.o. male presenting for his first post-op appointment after undergoing a substernal gastric pull up with esophagostomy reversal on 11/29/23. He did very well post-operatively but UGI esophagram on POD #7 showed a redundancy that I was concerned would cause him ongoing swallowing issues in the future. He was taken back to OR on 12/7/23 for repeat laparotomy and cervical exploration with reduction of the redundancy. His repeat swallow eval 4 days later looked improved and showed no leak. He was discharged home on full tube feeds and sips of clears.     He is doing well today. Anxious to eat but working on his strength at home. Unfortunately PT has not been by yet. But he is trying to get up and take steps when he can. Denies any SOB. No dysphagia. Mild ongoing abdominal pain.     By way of review: patient has a notable PMHx including h/o COPD, T2DM, type II achalasia, paraesophageal hernia s/p robotic Laparoscopic Heller myotomy with mary fundoplication on 3/1 with Dr. Hoang. He presented to ED on 3/7 after discharge with SOB, chills, RUQ pain. CT completed concerning for esophageal perforation and he underwent diagnostic lap with drain placement, EGD with stent placement x2, R chest tube on 3/7. He was transferred to  SICU on 3/9 for further management. Thoracic surgery was consulted. A second chest tube was placed on 3/13 by thoracic surgery. On 3/15 he went to OR for R VATs, decortication, bronchoscopy, and NJ placement . Large empyema was seen on CT chest and he underwent IR guided pigtail placement on 3/24.   On 3/27 he went to OR for EGD with esophageal stent replacement and nasal tube post pyloric placement with Dr. Hoang, previous esophageal stent found to have migrated below LES and MIGUELINA drain visible and traveling through esophageal perforation. A new esophageal stent was placed more proximally. On 3/30 all drains with bilious fluid  with concern for persistent leak. He returned to OR on 3/31 with thoracic and fuentes surgery and underwent EGD, removal of esophageal stent, endovac placement, dobhoff tube placement, PEG tube placement. He returned to OR on 4/3 for endovac replacement with Dr. Gongora and was found to have perforation from 42 to 35 cm from the lips. CT     C/A/P obtained on 4/4 showed previous findings as well as increased pneumoperitoneum.   On 4/5, he returned to OR for right thoracotomy and esophagectomy with cervical esophagostomy and lap takedown of prior fundoplication, lysis of adhesions, and revision of gastrostomy tube for esophageal perforation with thoracic surgery. Transferred to SICU post-op. On 4/7 return to OR for PEG replacement and J tube placement with thoracic. While in SICU he was weaned off of pressors on 4/7. Successfully extubated on 4/8. Reintubated on 4/10 d/t respiratory fatigue. Amio drip initiated on the same day for uncontrolled Afib with RVR. He was also started on CVVH on 4/10. He was extubated in ICU on 4/12. CVVH discontinued. CT CAP completed which demonstrated intraabdominal RP abscesses and intramuscular psoas hematoma. CT CAP repeated on 4/24, which demonstrated increase in size of   RP hematoma and decrease in size of psoas hematoma. He was transferred to [facility] on 4/25. While on [facility], he developed rising leukocytosis and tachycardia and repeat CT PE and CT C/A/P with PO and IV contrast completed which was negative for PE, showed decreasing size of hematomas, and likely acalculous cholecystitis visualized. On 4/28 he underwent percutaneous cholecystostomy tube placement by IR, tube connected to accordion drain and remains in place at time of discharge. Repeat CT CA/P on 5/7 with evidence of persistent anastomotic leak from gastric stump. MIGUELINA drain remain in place.      Throughout hospitalization infectious disease was consulted for complicated abdominal abscesses as well resistant MRSA and  CR-acinetobacter for which he was placed on contact precautions. Antibiotic regimen completed on 5/18 and per ID should follow up with infectious disease/primary attending at LTAC facility. Vascular medicine also consulted during hospitalization for management of DVTs and RPB/psoas hematomas. IVC filter was placed on 4/20. Repeat Duplex ultrasound completed on 5/2 showed persistent LE and UE DVTs. He was started on a low intensity heparin drip x24 hours and transitioned to PO Eliquis which he will continue at discharge and follow up with vascular medicine as outpatient.      He was evaluated by nutrition throughout hospital course and was started on tube feeds via J-tube which he tolerated well and will continue on continuous feeding schedule at nursing facility. Nephrology was also consulted during hospitalization for elevated BUN/creatinine and hypernatremia. Hypernatremia resolved and BUN/Creatinine continues to wax and wane. He was evaluated by nephrology prior to discharge and per nephrology recommendations, he will discharge with weekly RFP labs with close monitoring with nephrology attending at LTAC facility.   He was discharged to nursing facility with R chest tube to waterseal and abdominal drains to bulbs on 5/22/23    Past Medical History  He has a past medical history of Achalasia, esophageal, Anemia, Contusion of abdominal wall, initial encounter (01/12/2021), COPD (chronic obstructive pulmonary disease) (CMS/[facility]), Diabetes mellitus (CMS/[facility]), DVT (deep venous thrombosis) (CMS/[facility]), DVT (deep venous thrombosis) (CMS/[facility]), Dysphagia, GERD (gastroesophageal reflux disease), Hemoptysis (05/12/2020), Herpes labialis, Hiatal hernia, Hyperlipidemia, Hypotension due to drugs, Irregular heart beat, Pain in left knee, Peripheral neuropathy, Personal history of other diseases of the respiratory system (06/19/2019), Sleep apnea, and Solar purpura (CMS/[facility]).    Social History  He reports that he quit smoking about  10 months ago. His smoking use included cigarettes and cigars. He started smoking about 34 years ago. He has been exposed to tobacco smoke. He has never used smokeless tobacco. He reports that he does not currently use alcohol. He reports that he does not use drugs.      Medications    Current Outpatient Medications:     acetaminophen (Tylenol) 650 mg/20.3 mL solution oral liquid, Take 20.3 mL (650 mg) by mouth every 6 hours if needed for pain., Disp: 500 mL, Rfl: 0    apixaban (Eliquis) 2.5 mg tablet, Take 2 tablets (5 mg) by mouth 2 times a day., Disp: , Rfl:     atorvastatin (Lipitor) 10 mg tablet, Take 1 tablet (10 mg) by mouth once daily at bedtime., Disp: , Rfl:     esomeprazole (NexIUM) 20 mg packet, Take 20 mg by mouth once daily in the morning. Take before meals. To be given via J tube., Disp: 600 mg, Rfl: 11    esomeprazole (NexIUM) 40 mg packet, Take 40 mg by mouth once daily in the morning. Take before meals., Disp: , Rfl:     ferrous sulfate 300 mg (60 mg iron)/5 mL syrup, Take 5 mL (60 mg of iron) by mouth once daily., Disp: , Rfl:     gabapentin 250 mg/5 mL (5 mL) solution, Take 6 mL by mouth 3 times a day., Disp: 540 mL, Rfl: 2    insulin detemir (Levemir) 100 unit/mL injection, Inject 35 Units under the skin once daily at bedtime., Disp: 30 mL, Rfl: 0    midodrine (Proamatine) 5 mg tablet, Take 1 tablet (5 mg) by mouth 3 times a day., Disp: , Rfl:     sertraline (Zoloft) 50 mg tablet, Take 1.5 tablets (75 mg) by mouth once daily., Disp: , Rfl:     insulin detemir (Levemir FlexPen) 100 unit/mL (3 mL) pen, Inject 35 Units under the skin once daily at bedtime. Take as directed per insulin instructions. (Patient not taking: Reported on 12/28/2023), Disp: 31.5 mL, Rfl: 0    pancrelipase, Lip-Prot-Amyl, (Creon) 6,000-19,000 -30,000 unit capsule, Take 1 capsule by mouth 1 time for 1 dose. To be given via feeding J tube, Disp: 1 capsule, Rfl: 0    pancrelipase, Lip-Prot-Amyl, (Viokace) 20,683-78,300- 78,300  unit tablet, 1 tabs dissolved in water and instilled in J tube for declogging (Patient not taking: Reported on 12/28/2023), Disp: 1 tablet, Rfl: 0    Allergies  Patient has no known allergies.    Review of Systems:  Review of Systems   Constitutional: No fevers, chills, unexpected weight change  HENT: No sore throat, congestion, or nasal drainage  Eyes: No visual changes or eye itching  Respiratory: see HPI. No cough, worsening dyspnea, wheezing  Cardiac: No chest pain, palpitations, or lower extremity edema  Gastrointestinal: No nausea, vomiting, diarrhea. No abdominal pain  Genitourinary: No dysuria or hematuria  Musculoskeletal: No back pain. No significant myalgias or arthralgias  Neurologic: No headaches, dizziness, or seizures.  Hematologic: No east bleeding or bruising.  Psychiatric: No anxiety or depression.    Physical Exam:  Physical Exam  /74 (BP Location: Left arm, Patient Position: Sitting, BP Cuff Size: Adult)   Pulse 81   Temp 36 °C (96.8 °F) (Skin)   Resp 18   Wt 86.5 kg (190 lb 11.2 oz)   SpO2 100%   BMI 25.27 kg/m²   Constitutional:       General: Patient is not in acute distress.     Appearance: Normal appearance; not ill-appearing.   HENT:      Head: Normocephalic.      Nose: No congestion or rhinorrhea.     Neck: left neck incision c/d/i  Cardiovascular:      Rate and Rhythm: Normal rate and regular rhythm.      Pulses: Normal pulses.   Chest: left chest prior esophagostomy site sutures removed   Pulmonary:      Effort: Pulmonary effort is normal. No respiratory distress.  No conversational dyspnea     Breath sounds: No stridor. No wheezing.   Abdominal:      General: There is no distension.      Palpations: Abdomen is soft.      Tenderness: There is no abdominal tenderness.     His laparotomy incision is well appearing, no drainage, overall healing well  Musculoskeletal:         General: No swelling, tenderness or deformity. Normal range of motion.      Cervical back: Normal range  "of motion. No rigidity. Utilizing wheel chair  Lymphadenopathy:      Cervical: No cervical adenopathy.   Skin:     General: Skin is warm and dry.   Neurological:      General: No focal deficit present.      Mental Status: Patient is alert and oriented to person, place, and time.   Psychiatric:         Mood and Affect: Mood normal.       Relevant Results:       Imaging:      CXR personally reviewed    Pulmonary Functions Testing Results:    No results found for: \"FEV1\", \"FVC\", \"[id number]\", \"TLC\", \"DLCO\"      Assessment/Plan   Diagnoses and all orders for this visit:  Esophageal perforation  -     XR chest 2 views; Future  Achalasia, esophageal  -     XR chest 2 views; Future      Levy Gregory is a 68 y.o. male presenting for his first post-op appointment after undergoing a substernal gastric pull up with esophagostomy reversal on 11/29/23. He did very well post-operatively but UGI esophagram on POD #7 showed a redundancy that I was concerned would cause him ongoing swallowing issues in the future. He was taken back to OR on 12/7/23 for repeat laparotomy and cervical exploration with reduction of the redundancy. Has done well since leaving the hospital.   Plan is: 1 week CLD with full tube feeds followed by FLD x1 week with 1/2 tube feeds, and 1 week of soft diet with tube feeds off. I will see him again in 3 weeks for recheck and make sure he is maintaining his weight. Possible J tube removal at next appointment.       Brielle Gongora, DO  Thoracic & Esophageal Surgery     "

## 2024-01-10 ENCOUNTER — PATIENT OUTREACH (OUTPATIENT)
Dept: CARE COORDINATION | Facility: CLINIC | Age: 69
End: 2024-01-10
Payer: MEDICARE

## 2024-01-10 NOTE — PROGRESS NOTES
Call placed regarding one month post discharge follow up call.             At time of outreach call the patient feels as if their condition has              improved since initial visit with PCP or specialist.             Questions or concerns regarding recovery period addressed at this time.              Reviewed any PCP or specialists progress notes/labs/radiology reports if applicable             and addressed any questions or concerns.

## 2024-01-11 ENCOUNTER — TELEPHONE (OUTPATIENT)
Dept: PRIMARY CARE | Facility: CLINIC | Age: 69
End: 2024-01-11
Payer: MEDICARE

## 2024-01-11 DIAGNOSIS — E11.49 OTHER DIABETIC NEUROLOGICAL COMPLICATION ASSOCIATED WITH TYPE 2 DIABETES MELLITUS (MULTI): ICD-10-CM

## 2024-01-11 NOTE — TELEPHONE ENCOUNTER
Refill:    Gabapentin 300mg take one tab twice a day    Pharm: [facility] # [phone]    LR: 01/31/23 qty 60 no refills  LV: 12/28/23  NV: 02/05/24

## 2024-01-12 RX ORDER — GABAPENTIN 300 MG/1
300 CAPSULE ORAL 2 TIMES DAILY
Qty: 180 CAPSULE | Refills: 0 | Status: SHIPPED | OUTPATIENT
Start: 2024-01-12 | End: 2024-04-09 | Stop reason: SDUPTHER

## 2024-01-18 ENCOUNTER — OFFICE VISIT (OUTPATIENT)
Dept: SURGERY | Facility: HOSPITAL | Age: 69
End: 2024-01-18
Payer: MEDICARE

## 2024-01-18 VITALS
SYSTOLIC BLOOD PRESSURE: 106 MMHG | TEMPERATURE: 97.7 F | OXYGEN SATURATION: 99 % | WEIGHT: 199.3 LBS | HEART RATE: 95 BPM | RESPIRATION RATE: 18 BRPM | BODY MASS INDEX: 26.41 KG/M2 | DIASTOLIC BLOOD PRESSURE: 62 MMHG

## 2024-01-18 DIAGNOSIS — K22.3 ESOPHAGEAL PERFORATION: ICD-10-CM

## 2024-01-18 PROCEDURE — 3078F DIAST BP <80 MM HG: CPT | Performed by: STUDENT IN AN ORGANIZED HEALTH CARE EDUCATION/TRAINING PROGRAM

## 2024-01-18 PROCEDURE — 1125F AMNT PAIN NOTED PAIN PRSNT: CPT | Performed by: STUDENT IN AN ORGANIZED HEALTH CARE EDUCATION/TRAINING PROGRAM

## 2024-01-18 PROCEDURE — 3066F NEPHROPATHY DOC TX: CPT | Performed by: STUDENT IN AN ORGANIZED HEALTH CARE EDUCATION/TRAINING PROGRAM

## 2024-01-18 PROCEDURE — 3008F BODY MASS INDEX DOCD: CPT | Performed by: STUDENT IN AN ORGANIZED HEALTH CARE EDUCATION/TRAINING PROGRAM

## 2024-01-18 PROCEDURE — 3074F SYST BP LT 130 MM HG: CPT | Performed by: STUDENT IN AN ORGANIZED HEALTH CARE EDUCATION/TRAINING PROGRAM

## 2024-01-18 PROCEDURE — 99214 OFFICE O/P EST MOD 30 MIN: CPT | Performed by: STUDENT IN AN ORGANIZED HEALTH CARE EDUCATION/TRAINING PROGRAM

## 2024-01-18 PROCEDURE — 1160F RVW MEDS BY RX/DR IN RCRD: CPT | Performed by: STUDENT IN AN ORGANIZED HEALTH CARE EDUCATION/TRAINING PROGRAM

## 2024-01-18 PROCEDURE — 1036F TOBACCO NON-USER: CPT | Performed by: STUDENT IN AN ORGANIZED HEALTH CARE EDUCATION/TRAINING PROGRAM

## 2024-01-18 PROCEDURE — 99024 POSTOP FOLLOW-UP VISIT: CPT | Performed by: STUDENT IN AN ORGANIZED HEALTH CARE EDUCATION/TRAINING PROGRAM

## 2024-01-18 ASSESSMENT — PAIN SCALES - GENERAL: PAINLEVEL: 8

## 2024-01-20 DIAGNOSIS — K31.84 GASTROPARESIS: Primary | ICD-10-CM

## 2024-01-20 DIAGNOSIS — K22.3 ESOPHAGEAL PERFORATION: ICD-10-CM

## 2024-01-20 RX ORDER — METOCLOPRAMIDE 10 MG/1
5 TABLET ORAL
Qty: 60 TABLET | Refills: 0 | Status: SHIPPED | OUTPATIENT
Start: 2024-01-20 | End: 2024-02-13 | Stop reason: WASHOUT

## 2024-01-22 ENCOUNTER — TELEPHONE (OUTPATIENT)
Dept: PRIMARY CARE | Facility: CLINIC | Age: 69
End: 2024-01-22
Payer: MEDICARE

## 2024-01-22 ENCOUNTER — TELEPHONE (OUTPATIENT)
Dept: UROLOGY | Facility: CLINIC | Age: 69
End: 2024-01-22
Payer: MEDICARE

## 2024-01-22 DIAGNOSIS — R41.0 CONFUSION: Primary | ICD-10-CM

## 2024-01-22 DIAGNOSIS — E78.5 HYPERLIPIDEMIA, UNSPECIFIED HYPERLIPIDEMIA TYPE: ICD-10-CM

## 2024-01-22 RX ORDER — ATORVASTATIN CALCIUM 10 MG/1
10 TABLET, FILM COATED ORAL NIGHTLY
Qty: 90 TABLET | Refills: 1 | Status: SHIPPED | OUTPATIENT
Start: 2024-01-22 | End: 2024-05-21 | Stop reason: DRUGHIGH

## 2024-01-22 NOTE — TELEPHONE ENCOUNTER
Sandy from [facility] called, pt is scheduled for appt on 1.23, pts caregiver would like a return call to report some add'l concerns she has re: the pt, please return her call

## 2024-01-22 NOTE — TELEPHONE ENCOUNTER
Refill:    Atorvastatin 10mg daily    Pharm: [facility] # [phone]    LR: 09/07/23 qty 90 w/ 1 refill  LV: 11/21/23  NV: 02/05/24

## 2024-01-22 NOTE — PROGRESS NOTES
C/C:  Established visit    History Of Present Illness  Levy Gregory is a 68 y.o. male presenting for follow up visit after undergoing a substernal gastric pull up with esophagostomy reversal on 11/29/23. He was taken back to OR on 12/7/23 for repeat laparotomy and cervical exploration with reduction of the redundancy. His repeat swallow eval 4 days later looked improved and showed no leak.     Since his last visit he has tolerated being off tube feeds and is eating a soft diet. He unfortunately reports a large amount of phlegm when he lays down to sleep at night, therefore having trouble sleeping. Denies any obvious dysphagia. Some mild nausea the last couple days.   Overall strength is improving. Denies any SOB. No dysphagia. Mild ongoing abdominal pain.     By way of review: patient has a notable PMHx including h/o COPD, T2DM, type II achalasia, paraesophageal hernia s/p robotic Laparoscopic Heller myotomy with mary fundoplication on 3/1 with Dr. Hoang. He presented to ED on 3/7 after discharge with SOB, chills, RUQ pain. CT completed concerning for esophageal perforation and he underwent diagnostic lap with drain placement, EGD with stent placement x2, R chest tube on 3/7. He was transferred to  SICU on 3/9 for further management. Thoracic surgery was consulted. A second chest tube was placed on 3/13 by thoracic surgery. On 3/15 he went to OR for R VATs, decortication, bronchoscopy, and NJ placement . Large empyema was seen on CT chest and he underwent IR guided pigtail placement on 3/24.   On 3/27 he went to OR for EGD with esophageal stent replacement and nasal tube post pyloric placement with Dr. Hoang, previous esophageal stent found to have migrated below LES and MIGUELINA drain visible and traveling through esophageal perforation. A new esophageal stent was placed more proximally. On 3/30 all drains with bilious fluid with concern for persistent leak. He returned to OR on 3/31 with thoracic and fuentes  surgery and underwent EGD, removal of esophageal stent, endovac placement, dobhoff tube placement, PEG tube placement. He returned to OR on 4/3 for endovac replacement with Dr. Gongora and was found to have perforation from 42 to 35 cm from the lips. CT     C/A/P obtained on 4/4 showed previous findings as well as increased pneumoperitoneum.   On 4/5, he returned to OR for right thoracotomy and esophagectomy with cervical esophagostomy and lap takedown of prior fundoplication, lysis of adhesions, and revision of gastrostomy tube for esophageal perforation with thoracic surgery. Transferred to SICU post-op. On 4/7 return to OR for PEG replacement and J tube placement with thoracic. While in SICU he was weaned off of pressors on 4/7. Successfully extubated on 4/8. Reintubated on 4/10 d/t respiratory fatigue. Amio drip initiated on the same day for uncontrolled Afib with RVR. He was also started on CVVH on 4/10. He was extubated in ICU on 4/12. CVVH discontinued. CT CAP completed which demonstrated intraabdominal RP abscesses and intramuscular psoas hematoma. CT CAP repeated on 4/24, which demonstrated increase in size of   RP hematoma and decrease in size of psoas hematoma. He was transferred to [facility] on 4/25. While on [facility], he developed rising leukocytosis and tachycardia and repeat CT PE and CT C/A/P with PO and IV contrast completed which was negative for PE, showed decreasing size of hematomas, and likely acalculous cholecystitis visualized. On 4/28 he underwent percutaneous cholecystostomy tube placement by IR, tube connected to accordion drain and remains in place at time of discharge. Repeat CT CA/P on 5/7 with evidence of persistent anastomotic leak from gastric stump. MIGUELINA drain remain in place.      Throughout hospitalization infectious disease was consulted for complicated abdominal abscesses as well resistant MRSA and CR-acinetobacter for which he was placed on contact precautions. Antibiotic regimen completed  on 5/18 and per ID should follow up with infectious disease/primary attending at LTAC facility. Vascular medicine also consulted during hospitalization for management of DVTs and RPB/psoas hematomas. IVC filter was placed on 4/20. Repeat Duplex ultrasound completed on 5/2 showed persistent LE and UE DVTs. He was started on a low intensity heparin drip x24 hours and transitioned to PO Eliquis which he will continue at discharge and follow up with vascular medicine as outpatient.      He was evaluated by nutrition throughout hospital course and was started on tube feeds via J-tube which he tolerated well and will continue on continuous feeding schedule at nursing facility. Nephrology was also consulted during hospitalization for elevated BUN/creatinine and hypernatremia. Hypernatremia resolved and BUN/Creatinine continues to wax and wane. He was evaluated by nephrology prior to discharge and per nephrology recommendations, he will discharge with weekly RFP labs with close monitoring with nephrology attending at LTAC facility.   He was discharged to nursing facility with R chest tube to waterseal and abdominal drains to bulbs on 5/22/23    Past Medical History  He has a past medical history of Achalasia, esophageal, Anemia, Contusion of abdominal wall, initial encounter (01/12/2021), COPD (chronic obstructive pulmonary disease) (CMS/[facility]), Diabetes mellitus (CMS/[facility]), DVT (deep venous thrombosis) (CMS/[facility]), DVT (deep venous thrombosis) (CMS/[facility]), Dysphagia, GERD (gastroesophageal reflux disease), Hemoptysis (05/12/2020), Herpes labialis, Hiatal hernia, Hyperlipidemia, Hypotension due to drugs, Irregular heart beat, Pain in left knee, Peripheral neuropathy, Personal history of other diseases of the respiratory system (06/19/2019), Sleep apnea, and Solar purpura (CMS/[facility]).    Social History  He reports that he quit smoking about 10 months ago. His smoking use included cigarettes and cigars. He started smoking about 34  years ago. He has been exposed to tobacco smoke. He has never used smokeless tobacco. He reports that he does not currently use alcohol. He reports that he does not use drugs.      Medications    Current Outpatient Medications:     acetaminophen (Tylenol) 650 mg/20.3 mL solution oral liquid, Take 20.3 mL (650 mg) by mouth every 6 hours if needed for pain., Disp: 500 mL, Rfl: 0    apixaban (Eliquis) 2.5 mg tablet, Take 2 tablets (5 mg) by mouth 2 times a day., Disp: , Rfl:     atorvastatin (Lipitor) 10 mg tablet, Take 1 tablet (10 mg) by mouth once daily at bedtime., Disp: , Rfl:     esomeprazole (NexIUM) 20 mg packet, Take 20 mg by mouth once daily in the morning. Take before meals. To be given via J tube., Disp: 600 mg, Rfl: 11    esomeprazole (NexIUM) 40 mg packet, Take 40 mg by mouth once daily in the morning. Take before meals., Disp: , Rfl:     ferrous sulfate 300 mg (60 mg iron)/5 mL syrup, Take 5 mL (60 mg of iron) by mouth once daily., Disp: , Rfl:     gabapentin (Neurontin) 300 mg capsule, Take 1 capsule (300 mg) by mouth 2 times a day., Disp: 180 capsule, Rfl: 0    gabapentin 250 mg/5 mL (5 mL) solution, Take 6 mL by mouth 3 times a day., Disp: 540 mL, Rfl: 2    insulin detemir (Levemir FlexPen) 100 unit/mL (3 mL) pen, Inject 35 Units under the skin once daily at bedtime. Take as directed per insulin instructions. (Patient not taking: Reported on 12/28/2023), Disp: 31.5 mL, Rfl: 0    insulin detemir (Levemir) 100 unit/mL injection, Inject 35 Units under the skin once daily at bedtime., Disp: 30 mL, Rfl: 0    metoclopramide (Reglan) 10 mg tablet, Take 0.5 tablets (5 mg) by mouth 4 times a day before meals., Disp: 60 tablet, Rfl: 0    midodrine (Proamatine) 5 mg tablet, Take 1 tablet (5 mg) by mouth 3 times a day., Disp: , Rfl:     pancrelipase, Lip-Prot-Amyl, (Creon) 6,000-19,000 -30,000 unit capsule, Take 1 capsule by mouth 1 time for 1 dose. To be given via feeding J tube, Disp: 1 capsule, Rfl: 0     pancrelipase, Lip-Prot-Amyl, (Viokace) 20,880-78,300- 78,300 unit tablet, 1 tabs dissolved in water and instilled in J tube for declogging (Patient not taking: Reported on 12/28/2023), Disp: 1 tablet, Rfl: 0    sertraline (Zoloft) 50 mg tablet, Take 1.5 tablets (75 mg) by mouth once daily., Disp: , Rfl:     Allergies  Patient has no known allergies.    Review of Systems:  Review of Systems   Constitutional: No fevers, chills, unexpected weight change  HENT: No sore throat, congestion, or nasal drainage  Eyes: No visual changes or eye itching  Respiratory: see HPI. No cough, worsening dyspnea, wheezing  Cardiac: No chest pain, palpitations, or lower extremity edema  Gastrointestinal: No nausea, vomiting, diarrhea. No abdominal pain  Genitourinary: No dysuria or hematuria  Musculoskeletal: No back pain. No significant myalgias or arthralgias  Neurologic: No headaches, dizziness, or seizures.  Hematologic: No east bleeding or bruising.  Psychiatric: No anxiety or depression.    Physical Exam:  Physical Exam  /62 (BP Location: Left arm, Patient Position: Sitting, BP Cuff Size: Adult)   Pulse 95   Temp 36.5 °C (97.7 °F) (Temporal)   Resp 18   Wt 90.4 kg (199 lb 4.7 oz)   SpO2 99%   BMI 26.41 kg/m²   Constitutional:       General: Patient is not in acute distress.     Appearance: Normal appearance; not ill-appearing.   HENT:      Head: Normocephalic.      Nose: No congestion or rhinorrhea.     Neck: left neck incision c/d/i  Cardiovascular:      Rate and Rhythm: Normal rate and regular rhythm.      Pulses: Normal pulses.   Chest: left chest prior esophagostomy site sutures removed   Pulmonary:      Effort: Pulmonary effort is normal. No respiratory distress.  No conversational dyspnea     Breath sounds: No stridor. No wheezing.   Abdominal:      General: There is no distension.      Palpations: Abdomen is soft.      Tenderness: There is no abdominal tenderness.     His laparotomy incision is well appearing, no  "drainage, overall healing well. *J-tube removed during today's visit  Musculoskeletal:         General: No swelling, tenderness or deformity. Normal range of motion.      Cervical back: Normal range of motion. No rigidity. Utilizing wheel chair  Lymphadenopathy:      Cervical: No cervical adenopathy.   Skin:     General: Skin is warm and dry.   Neurological:      General: No focal deficit present.      Mental Status: Patient is alert and oriented to person, place, and time.   Psychiatric:         Mood and Affect: Mood normal.       Relevant Results:       Imaging:      CXR personally reviewed    Pulmonary Functions Testing Results:    No results found for: \"FEV1\", \"FVC\", \"JCX9SQL\", \"TLC\", \"DLCO\"      Assessment/Plan   Diagnoses and all orders for this visit:  Esophageal perforation      Levy Gregory is a 68 y.o. male presenting for his first post-op appointment after undergoing a substernal gastric pull up with esophagostomy reversal on 11/29/23 and return to OR on 12/7/23 for repeat laparotomy and cervical exploration with reduction of the redundancy.   He is tolerating a soft diet, weight is stable, his Jtube was removed today. Increased phlegm at night in supine position. Discussed smaller meals/more frequent. He is eating last meal at 5pm. He will work on walking after dinner also. Should remain HOB 30 deg or higher. Will start Reglan if needed but I would like him to start behavioral modifications first.     Brielle Gongora, DO  Thoracic & Esophageal Surgery     "

## 2024-01-22 NOTE — TELEPHONE ENCOUNTER
Sandy with [facility] calling; family states that patient has been more lethargic and confused lately.

## 2024-01-23 ENCOUNTER — OFFICE VISIT (OUTPATIENT)
Dept: UROLOGY | Facility: CLINIC | Age: 69
End: 2024-01-23
Payer: MEDICARE

## 2024-01-23 VITALS
TEMPERATURE: 97.9 F | SYSTOLIC BLOOD PRESSURE: 87 MMHG | BODY MASS INDEX: 26.51 KG/M2 | WEIGHT: 200 LBS | HEART RATE: 89 BPM | DIASTOLIC BLOOD PRESSURE: 57 MMHG

## 2024-01-23 DIAGNOSIS — Z97.8 FOLEY CATHETER IN PLACE: ICD-10-CM

## 2024-01-23 DIAGNOSIS — R33.9 URINARY RETENTION: Primary | ICD-10-CM

## 2024-01-23 PROCEDURE — 3078F DIAST BP <80 MM HG: CPT | Performed by: NURSE PRACTITIONER

## 2024-01-23 PROCEDURE — 1036F TOBACCO NON-USER: CPT | Performed by: NURSE PRACTITIONER

## 2024-01-23 PROCEDURE — 99204 OFFICE O/P NEW MOD 45 MIN: CPT | Performed by: NURSE PRACTITIONER

## 2024-01-23 PROCEDURE — 3074F SYST BP LT 130 MM HG: CPT | Performed by: NURSE PRACTITIONER

## 2024-01-23 PROCEDURE — 1125F AMNT PAIN NOTED PAIN PRSNT: CPT | Performed by: NURSE PRACTITIONER

## 2024-01-23 PROCEDURE — 1159F MED LIST DOCD IN RCRD: CPT | Performed by: NURSE PRACTITIONER

## 2024-01-23 PROCEDURE — 1160F RVW MEDS BY RX/DR IN RCRD: CPT | Performed by: NURSE PRACTITIONER

## 2024-01-23 PROCEDURE — 3066F NEPHROPATHY DOC TX: CPT | Performed by: NURSE PRACTITIONER

## 2024-01-23 PROCEDURE — 3008F BODY MASS INDEX DOCD: CPT | Performed by: NURSE PRACTITIONER

## 2024-01-23 RX ORDER — SULFAMETHOXAZOLE AND TRIMETHOPRIM 800; 160 MG/1; MG/1
1 TABLET ORAL 2 TIMES DAILY
Qty: 6 TABLET | Refills: 0 | Status: SHIPPED | OUTPATIENT
Start: 2024-01-23 | End: 2024-01-26

## 2024-01-23 RX ORDER — TAMSULOSIN HYDROCHLORIDE 0.4 MG/1
0.4 CAPSULE ORAL NIGHTLY
Qty: 90 CAPSULE | Refills: 1 | Status: SHIPPED | OUTPATIENT
Start: 2024-01-23 | End: 2024-02-13 | Stop reason: SINTOL

## 2024-01-23 NOTE — PROGRESS NOTES
Subjective   Patient ID: Levy Gregory is a 68 y.o. male who presents for Urinary Retention.  Hx of paraesophageal hernia and achalasia in March 2023 complicated by sepsis. He had multiple complications afterwards requiring lengthy ICU stay. He has also had several feeding tubes, etc.  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     Unsure exactly when catheter was initially placed, however he has failed multiple TOVs per his recollection. Last cath change on 10/31/23. He has home care however he has declined changes. Not on any urinary medications. Denies urinary difficulties prior to surgeries in 2023.     Denies family history of prostate cancer.         Review of Systems   All other systems reviewed and are negative.      Objective   Physical Exam  Constitutional: Patient generally appears stated age. Good nutrition. No deformities. Good attention to grooming.  Neck: No neck masses. Good symmetry. No crepitus. Normal thyroid size and consistency with no masses.  Respiratory: Normal respiratory effort. No intercostal retractions. No use of accessory muscles.  Cardiovascular: Examination of the peripheral vascular system reveals no swelling or varicosities with good pulses. Normal extremity temperature, no edema or tenderness.  Abdomen: Examination of the abdomen reveals no masses or tenderness. No hernias detected. Normal liver and spleen size.   Lymphatic: No palpable lymph nodes in the neck, axilla, groin or other locations  Skin: Inspection of the skin reveals no rashes, lesions, ulcers.  Neurologic/Psychiatric: Oriented to time, place and person. Normal mood and affect with no depression, anxiety, or agitation.  Maldonado cath draining moderate amounts of clear, yellow urine  Utilizing wheelchair for mobility.     Lab Results   Component Value Date    PSA 0.26 09/21/2022    PSA 0.20 04/27/2021       Assessment/Plan   Diagnoses and all orders for this visit:  Urinary retention  -     Voiding Trial  -      tamsulosin (Flomax) 0.4 mg 24 hr capsule; Take 1 capsule (0.4 mg) by mouth once daily at bedtime.  -     sulfamethoxazole-trimethoprim (Bactrim DS) 800-160 mg tablet; Take 1 tablet by mouth 2 times a day for 3 days.  Maldonado catheter in place  -     tamsulosin (Flomax) 0.4 mg 24 hr capsule; Take 1 capsule (0.4 mg) by mouth once daily at bedtime.  -     sulfamethoxazole-trimethoprim (Bactrim DS) 800-160 mg tablet; Take 1 tablet by mouth 2 times a day for 3 days.    Patient presented today for trial of void.  Patient passed without difficulties.  Patient will be mindful of signs and symptoms of urinary retention and return to the office if necessary. Plan to start tamsulosin and given 3 days of antibiotic as a precaution given length of time since last cath change and manipulation today.   If these occur after hours patient will present to the emergency room.  Patient verbalized understanding of plan.           Kayli Gotti, IVÁN-CNP 01/23/24 9:36 AM

## 2024-02-04 PROBLEM — I95.9 HYPOTENSION: Status: RESOLVED | Noted: 2023-11-30 | Resolved: 2024-02-04

## 2024-02-04 PROBLEM — J44.9 CHRONIC OBSTRUCTIVE PULMONARY DISEASE, UNSPECIFIED COPD TYPE (MULTI): Status: ACTIVE | Noted: 2024-02-04

## 2024-02-04 PROBLEM — E44.0 MODERATE PROTEIN-CALORIE MALNUTRITION (MULTI): Status: RESOLVED | Noted: 2023-10-19 | Resolved: 2024-02-04

## 2024-02-04 PROBLEM — Z23 ENCOUNTER FOR IMMUNIZATION: Status: ACTIVE | Noted: 2024-02-04

## 2024-02-04 PROBLEM — E44.0 MALNUTRITION OF MODERATE DEGREE (MULTI): Status: RESOLVED | Noted: 2023-10-03 | Resolved: 2024-02-04

## 2024-02-04 PROBLEM — I11.0 HYPERTENSIVE HEART DISEASE WITH HEART FAILURE (MULTI): Status: ACTIVE | Noted: 2024-02-04

## 2024-02-04 PROBLEM — R41.0 CONFUSION: Status: RESOLVED | Noted: 2024-01-22 | Resolved: 2024-02-04

## 2024-02-07 ENCOUNTER — OFFICE VISIT (OUTPATIENT)
Dept: PRIMARY CARE | Facility: CLINIC | Age: 69
End: 2024-02-07
Payer: MEDICARE

## 2024-02-07 VITALS
WEIGHT: 205 LBS | TEMPERATURE: 98.1 F | DIASTOLIC BLOOD PRESSURE: 57 MMHG | BODY MASS INDEX: 27.17 KG/M2 | SYSTOLIC BLOOD PRESSURE: 86 MMHG

## 2024-02-07 DIAGNOSIS — I48.20 CHRONIC ATRIAL FIBRILLATION (MULTI): ICD-10-CM

## 2024-02-07 DIAGNOSIS — E11.65 TYPE 2 DIABETES MELLITUS WITH HYPERGLYCEMIA, WITH LONG-TERM CURRENT USE OF INSULIN (MULTI): ICD-10-CM

## 2024-02-07 DIAGNOSIS — F32.A DEPRESSION, UNSPECIFIED DEPRESSION TYPE: ICD-10-CM

## 2024-02-07 DIAGNOSIS — I95.9 HYPOTENSION, UNSPECIFIED HYPOTENSION TYPE: ICD-10-CM

## 2024-02-07 DIAGNOSIS — J44.9 CHRONIC OBSTRUCTIVE PULMONARY DISEASE, UNSPECIFIED COPD TYPE (MULTI): ICD-10-CM

## 2024-02-07 DIAGNOSIS — E11.49 OTHER DIABETIC NEUROLOGICAL COMPLICATION ASSOCIATED WITH TYPE 2 DIABETES MELLITUS (MULTI): ICD-10-CM

## 2024-02-07 DIAGNOSIS — I11.0 HYPERTENSIVE HEART DISEASE WITH HEART FAILURE (MULTI): ICD-10-CM

## 2024-02-07 DIAGNOSIS — Z86.718 HISTORY OF DVT (DEEP VEIN THROMBOSIS): Primary | ICD-10-CM

## 2024-02-07 DIAGNOSIS — Z23 ENCOUNTER FOR IMMUNIZATION: ICD-10-CM

## 2024-02-07 DIAGNOSIS — Z79.4 TYPE 2 DIABETES MELLITUS WITH HYPERGLYCEMIA, WITH LONG-TERM CURRENT USE OF INSULIN (MULTI): ICD-10-CM

## 2024-02-07 DIAGNOSIS — D69.2 SOLAR PURPURA (CMS-HCC): ICD-10-CM

## 2024-02-07 PROBLEM — Z93.1 GASTROSTOMY IN PLACE (MULTI): Status: RESOLVED | Noted: 2023-06-22 | Resolved: 2024-02-07

## 2024-02-07 PROBLEM — I95.2 HYPOTENSION DUE TO DRUGS: Status: RESOLVED | Noted: 2023-11-04 | Resolved: 2024-02-07

## 2024-02-07 PROBLEM — I82.432 DEEP VEIN THROMBOSIS (DVT) OF POPLITEAL VEIN OF LEFT LOWER EXTREMITY (MULTI): Status: RESOLVED | Noted: 2023-06-22 | Resolved: 2024-02-07

## 2024-02-07 LAB — POC HEMOGLOBIN A1C: 5.4 % (ref 4.2–6.5)

## 2024-02-07 PROCEDURE — G0444 DEPRESSION SCREEN ANNUAL: HCPCS | Performed by: FAMILY MEDICINE

## 2024-02-07 PROCEDURE — 3078F DIAST BP <80 MM HG: CPT | Performed by: FAMILY MEDICINE

## 2024-02-07 PROCEDURE — 1036F TOBACCO NON-USER: CPT | Performed by: FAMILY MEDICINE

## 2024-02-07 PROCEDURE — 1125F AMNT PAIN NOTED PAIN PRSNT: CPT | Performed by: FAMILY MEDICINE

## 2024-02-07 PROCEDURE — G0442 ANNUAL ALCOHOL SCREEN 15 MIN: HCPCS | Performed by: FAMILY MEDICINE

## 2024-02-07 PROCEDURE — 99214 OFFICE O/P EST MOD 30 MIN: CPT | Performed by: FAMILY MEDICINE

## 2024-02-07 PROCEDURE — G0439 PPPS, SUBSEQ VISIT: HCPCS | Performed by: FAMILY MEDICINE

## 2024-02-07 PROCEDURE — 1123F ACP DISCUSS/DSCN MKR DOCD: CPT | Performed by: FAMILY MEDICINE

## 2024-02-07 PROCEDURE — 3074F SYST BP LT 130 MM HG: CPT | Performed by: FAMILY MEDICINE

## 2024-02-07 PROCEDURE — 3008F BODY MASS INDEX DOCD: CPT | Performed by: FAMILY MEDICINE

## 2024-02-07 PROCEDURE — 1159F MED LIST DOCD IN RCRD: CPT | Performed by: FAMILY MEDICINE

## 2024-02-07 PROCEDURE — 1160F RVW MEDS BY RX/DR IN RCRD: CPT | Performed by: FAMILY MEDICINE

## 2024-02-07 PROCEDURE — 83036 HEMOGLOBIN GLYCOSYLATED A1C: CPT | Performed by: FAMILY MEDICINE

## 2024-02-07 RX ORDER — PANTOPRAZOLE SODIUM 40 MG/1
40 TABLET, DELAYED RELEASE ORAL NIGHTLY
COMMUNITY
End: 2024-03-18 | Stop reason: SDUPTHER

## 2024-02-07 RX ORDER — FLASH GLUCOSE SENSOR
KIT MISCELLANEOUS
Qty: 1 EACH | Refills: 0 | Status: SHIPPED | OUTPATIENT
Start: 2024-02-07 | End: 2024-02-26 | Stop reason: SDUPTHER

## 2024-02-07 RX ORDER — DEXTROSE 4 G
TABLET,CHEWABLE ORAL
Qty: 1 EACH | Refills: 0 | Status: SHIPPED | OUTPATIENT
Start: 2024-02-07

## 2024-02-07 RX ORDER — MIDODRINE HYDROCHLORIDE 5 MG/1
10 TABLET ORAL 3 TIMES DAILY
Qty: 270 TABLET | Refills: 0 | Status: SHIPPED | OUTPATIENT
Start: 2024-02-07 | End: 2024-04-08 | Stop reason: SDUPTHER

## 2024-02-07 RX ORDER — FERROUS SULFATE 325(65) MG
65 TABLET, DELAYED RELEASE (ENTERIC COATED) ORAL
COMMUNITY

## 2024-02-07 NOTE — PROGRESS NOTES
Subjective   Reason for Visit: Levy Gregory is an 68 y.o. male here for a Medicare Wellness visit.     Past Medical, Surgical, and Family History reviewed and updated in chart.    In the past 2 weeks this patient has not felt down, depressed, hopeless, lost interest or pleasure in doing things or thought of harming herself or others. The patient has not fallen in the last six months and has no loose rugs,  uneven floors or poor lighting at home.Advance Care Planning discussion was held.  The patient has no spiritual/Anabaptist/cultural values or needs that we need to know. The patient does not feel threatened or abused physically, emotionally or sexually.  The patient feels safe in the home.  In the past month, there was not a day when the patient of anyone in the patient's family went hungry because there was not enough food.  The patient denies that they or the person with them has problems with hearing, speaking, reading, moving around or learning.  The patient states they are comfortable filling out medical forms.      Reviewed all medications by prescribing practitioner or clinical pharmacist (such as prescriptions, OTCs, herbal therapies and supplements) and documented in the medical record.    HPI  Taking meds as directed without tolerability or affordability issues; no complaints today.  No diarrhea and not taking Creon    Patient Care Team:  Chris Huizar MD as PCP - General  Chris Huizar MD as PCP - [employer] PCP  Sheridan Howell RN as Care Manager (Case Management)     Review of Systems  ENDO-no voice, skin, hair or change in temperature tolerance  HEM-no unexplained bruising or bleeding  PSYCH-mood is good; waking up rested without gasping or restless legs    Objective   Vitals:  BP 86/57 (BP Location: Right arm, Patient Position: Sitting)   Temp 36.7 °C (98.1 °F) (Oral)   Wt 93 kg (205 lb)   BMI 27.17 kg/m²       Physical Exam  Neck-supple without lymphadenopathy or  thyromegaly; no carotid bruits  Heart- regular rate and rhythm, normal s1 and s2 without murmur or gallop;  no edema  Lungs-clear to auscultation  Abdomen-soft, positive bowel sounds, without masses, HSmegaly or pain     Assessment/Plan   Problem List Items Addressed This Visit       Depression    Diabetic neuropathy associated with type 2 diabetes mellitus (CMS/[facility])    History of DVT (deep vein thrombosis) - Primary    Relevant Medications    apixaban (Eliquis) 2.5 mg tablet    Type 2 diabetes mellitus with hyperglycemia (CMS/[facility])    Relevant Medications    blood-glucose meter misc    FreeStyle Kellie 2 Sensor kit    Other Relevant Orders    POCT glycosylated hemoglobin (Hb A1C) manually resulted    Solar purpura (CMS/[facility])    Relevant Medications    apixaban (Eliquis) 2.5 mg tablet    Chronic atrial fibrillation (CMS/[facility])    Relevant Medications    midodrine (Proamatine) 5 mg tablet    Encounter for immunization    Hypertensive heart disease with heart failure (CMS/[facility])    Relevant Medications    midodrine (Proamatine) 5 mg tablet    Chronic obstructive pulmonary disease, unspecified COPD type (CMS/[facility])    Hypotension    Relevant Medications    midodrine (Proamatine) 5 mg tablet     Other Visit Diagnoses       Gastrostomy status (CMS/[facility])              Please provide us a copy of your Living Will and/or the Durable Power of  for Healthcare for your file.     Please check with your insurance to verify whether you should get your shingles vaccination here or at the pharmacy, and whether it is covered.  The purpose of this shot is to prevent the permanent unremitting pain of Post Herpetic Neuralgia which becomes more common in elderly patients that get Shingles.  This shot can be very expensive.    Follow up in one week to recheck your blood pressure on the increased midodrine and then fasting (no alcohol for 48 hours and just water for 14 hours) in three months for your next routine appointment.  In general,  take any medications on schedule (except for types of Insulin).

## 2024-02-09 DIAGNOSIS — E11.65 TYPE 2 DIABETES MELLITUS WITH HYPERGLYCEMIA, WITH LONG-TERM CURRENT USE OF INSULIN (MULTI): ICD-10-CM

## 2024-02-09 DIAGNOSIS — Z79.4 TYPE 2 DIABETES MELLITUS WITH HYPERGLYCEMIA, WITH LONG-TERM CURRENT USE OF INSULIN (MULTI): ICD-10-CM

## 2024-02-09 NOTE — TELEPHONE ENCOUNTER
Sandy from [facility]. Pharmacy received Rx for FreeStyle Kellie Sensor but he also needs the monitor sent in.

## 2024-02-11 RX ORDER — FLASH GLUCOSE SCANNING READER
EACH MISCELLANEOUS
Qty: 1 EACH | Refills: 0 | Status: SHIPPED | OUTPATIENT
Start: 2024-02-11

## 2024-02-13 ENCOUNTER — OFFICE VISIT (OUTPATIENT)
Dept: PRIMARY CARE | Facility: CLINIC | Age: 69
End: 2024-02-13
Payer: MEDICARE

## 2024-02-13 VITALS
WEIGHT: 203 LBS | TEMPERATURE: 97.8 F | BODY MASS INDEX: 26.9 KG/M2 | DIASTOLIC BLOOD PRESSURE: 48 MMHG | SYSTOLIC BLOOD PRESSURE: 62 MMHG

## 2024-02-13 DIAGNOSIS — I95.9 HYPOTENSION, UNSPECIFIED HYPOTENSION TYPE: Primary | ICD-10-CM

## 2024-02-13 DIAGNOSIS — Z79.4 TYPE 2 DIABETES MELLITUS WITH HYPERGLYCEMIA, WITH LONG-TERM CURRENT USE OF INSULIN (MULTI): ICD-10-CM

## 2024-02-13 DIAGNOSIS — E78.5 HYPERLIPIDEMIA, UNSPECIFIED HYPERLIPIDEMIA TYPE: ICD-10-CM

## 2024-02-13 DIAGNOSIS — E11.65 TYPE 2 DIABETES MELLITUS WITH HYPERGLYCEMIA, WITH LONG-TERM CURRENT USE OF INSULIN (MULTI): ICD-10-CM

## 2024-02-13 DIAGNOSIS — F32.A DEPRESSION, UNSPECIFIED DEPRESSION TYPE: ICD-10-CM

## 2024-02-13 DIAGNOSIS — R60.0 PEDAL EDEMA: ICD-10-CM

## 2024-02-13 PROBLEM — R94.4 DECREASED GFR: Status: RESOLVED | Noted: 2023-02-17 | Resolved: 2024-02-13

## 2024-02-13 PROBLEM — K94.13 MALFUNCTION OF JEJUNOSTOMY TUBE (MULTI): Status: RESOLVED | Noted: 2023-07-10 | Resolved: 2024-02-13

## 2024-02-13 PROCEDURE — 99213 OFFICE O/P EST LOW 20 MIN: CPT | Performed by: FAMILY MEDICINE

## 2024-02-13 PROCEDURE — 1159F MED LIST DOCD IN RCRD: CPT | Performed by: FAMILY MEDICINE

## 2024-02-13 PROCEDURE — 1160F RVW MEDS BY RX/DR IN RCRD: CPT | Performed by: FAMILY MEDICINE

## 2024-02-13 PROCEDURE — 3074F SYST BP LT 130 MM HG: CPT | Performed by: FAMILY MEDICINE

## 2024-02-13 PROCEDURE — 1036F TOBACCO NON-USER: CPT | Performed by: FAMILY MEDICINE

## 2024-02-13 PROCEDURE — 3078F DIAST BP <80 MM HG: CPT | Performed by: FAMILY MEDICINE

## 2024-02-13 PROCEDURE — 1125F AMNT PAIN NOTED PAIN PRSNT: CPT | Performed by: FAMILY MEDICINE

## 2024-02-13 PROCEDURE — 3008F BODY MASS INDEX DOCD: CPT | Performed by: FAMILY MEDICINE

## 2024-02-13 RX ORDER — BLOOD SUGAR DIAGNOSTIC
STRIP MISCELLANEOUS
Qty: 200 STRIP | Refills: 1 | Status: SHIPPED | OUTPATIENT
Start: 2024-02-13

## 2024-02-13 RX ORDER — SERTRALINE HYDROCHLORIDE 50 MG/1
25 TABLET, FILM COATED ORAL DAILY
Qty: 15 TABLET | Refills: 2
Start: 2024-02-13 | End: 2025-05-21

## 2024-02-13 NOTE — PROGRESS NOTES
Subjective   Patient ID: [id number]     Levy Gregory is a 68 y.o. male who presents for Follow-up.    HPI  Levy returns to be reevaluated on the increase in midodrine for his low blood pressure. He continues to have intermittent low blood pressure    Review of Systems  CARDIO- No chest pain or pressure, nausea, diaphoresis, paresthesias, dizziness, or syncope with or without exertion  GI-No blood in stool, tarry stools, pain, vomiting, heartburn, constipation or diarrhea;  not taking reglan at all    Objective     BP (!) 62/48 (BP Location: Left arm, Patient Position: Standing)   Temp 36.6 °C (97.8 °F) (Oral)   Wt 92.1 kg (203 lb)   BMI 26.90 kg/m²      Physical Exam  Neck-supple without lymphadenopathy or thyromegaly; no carotid bruits  Heart- regular rate and rhythm, normal s1 and s2 without murmur or gallop;  nonpitting edema of feet  Lungs-clear to auscultation      Assessment/Plan     Problem List Items Addressed This Visit       Depression    Relevant Medications    sertraline (Zoloft) 50 mg tablet    Hyperlipidemia    Relevant Orders    Lipid panel    Hypotension - Primary    Pedal edema    Relevant Orders    Comprehensive metabolic panel     Stop the reglan and the tamsulosin.  Decrease your sertraline to 25 mg.  3 egg whites daily.    Follow up in one week fasting (no alcohol for 48 hours and just water for 14 hours) in three months for your next routine appointment.  In general, take any medications on schedule (except for types of Insulin).      Chris Huizar MD

## 2024-02-13 NOTE — PATIENT INSTRUCTIONS
Follow up fasting (no alcohol for 48 hours and just water for 14 hours) in three months for your next routine appointment.  In general, take any medications on schedule (except for types of Insulin).

## 2024-02-14 ENCOUNTER — LAB (OUTPATIENT)
Dept: LAB | Facility: LAB | Age: 69
End: 2024-02-14
Payer: MEDICARE

## 2024-02-14 DIAGNOSIS — R60.0 PEDAL EDEMA: ICD-10-CM

## 2024-02-14 DIAGNOSIS — E78.5 HYPERLIPIDEMIA, UNSPECIFIED HYPERLIPIDEMIA TYPE: ICD-10-CM

## 2024-02-14 LAB
ALBUMIN SERPL BCP-MCNC: 4 G/DL (ref 3.4–5)
ALP SERPL-CCNC: 578 U/L (ref 33–136)
ALT SERPL W P-5'-P-CCNC: 40 U/L (ref 10–52)
ANION GAP SERPL CALC-SCNC: 10 MMOL/L (ref 10–20)
AST SERPL W P-5'-P-CCNC: 38 U/L (ref 9–39)
BILIRUB SERPL-MCNC: 0.5 MG/DL (ref 0–1.2)
BUN SERPL-MCNC: 43 MG/DL (ref 6–23)
CALCIUM SERPL-MCNC: 9.3 MG/DL (ref 8.6–10.3)
CHLORIDE SERPL-SCNC: 107 MMOL/L (ref 98–107)
CHOLEST SERPL-MCNC: 146 MG/DL (ref 0–199)
CHOLESTEROL/HDL RATIO: 3.8
CO2 SERPL-SCNC: 24 MMOL/L (ref 21–32)
CREAT SERPL-MCNC: 1.17 MG/DL (ref 0.5–1.3)
EGFRCR SERPLBLD CKD-EPI 2021: 68 ML/MIN/1.73M*2
GLUCOSE SERPL-MCNC: 133 MG/DL (ref 74–99)
HDLC SERPL-MCNC: 38.3 MG/DL
LDLC SERPL CALC-MCNC: 78 MG/DL
NON HDL CHOLESTEROL: 108 MG/DL (ref 0–149)
POTASSIUM SERPL-SCNC: 4.8 MMOL/L (ref 3.5–5.3)
PROT SERPL-MCNC: 7.2 G/DL (ref 6.4–8.2)
SODIUM SERPL-SCNC: 136 MMOL/L (ref 136–145)
TRIGL SERPL-MCNC: 149 MG/DL (ref 0–149)
VLDL: 30 MG/DL (ref 0–40)

## 2024-02-14 PROCEDURE — 36415 COLL VENOUS BLD VENIPUNCTURE: CPT

## 2024-02-14 PROCEDURE — 80061 LIPID PANEL: CPT

## 2024-02-14 PROCEDURE — 80053 COMPREHEN METABOLIC PANEL: CPT

## 2024-02-15 DIAGNOSIS — R74.8 ALKALINE PHOSPHATASE ELEVATION: Primary | ICD-10-CM

## 2024-02-16 ENCOUNTER — TELEPHONE (OUTPATIENT)
Dept: PRIMARY CARE | Facility: CLINIC | Age: 69
End: 2024-02-16
Payer: MEDICARE

## 2024-02-16 NOTE — TELEPHONE ENCOUNTER
Sandy (home health care nurse) called to let you know Levy Gregory took a flomax capsule today because he had pain and burning when he went to the bathroom  His blood sugar was 303 today.  She wanted you to know this and to see if you wanted a urine test before he comes in on [facility].  She can be reached at   [phone]

## 2024-02-19 ENCOUNTER — LAB (OUTPATIENT)
Dept: LAB | Facility: LAB | Age: 69
End: 2024-02-19
Payer: MEDICARE

## 2024-02-19 DIAGNOSIS — R41.0 CONFUSION: ICD-10-CM

## 2024-02-19 DIAGNOSIS — R74.8 ALKALINE PHOSPHATASE ELEVATION: ICD-10-CM

## 2024-02-19 LAB
APPEARANCE UR: CLEAR
BILIRUB UR STRIP.AUTO-MCNC: NEGATIVE MG/DL
COLOR UR: YELLOW
GLUCOSE UR STRIP.AUTO-MCNC: NEGATIVE MG/DL
KETONES UR STRIP.AUTO-MCNC: NEGATIVE MG/DL
LEUKOCYTE ESTERASE UR QL STRIP.AUTO: NEGATIVE
NITRITE UR QL STRIP.AUTO: NEGATIVE
PH UR STRIP.AUTO: 5 [PH]
PROT UR STRIP.AUTO-MCNC: NEGATIVE MG/DL
RBC # UR STRIP.AUTO: NEGATIVE /UL
SP GR UR STRIP.AUTO: 1.02
UROBILINOGEN UR STRIP.AUTO-MCNC: <2 MG/DL

## 2024-02-19 PROCEDURE — 36415 COLL VENOUS BLD VENIPUNCTURE: CPT

## 2024-02-19 PROCEDURE — 81003 URINALYSIS AUTO W/O SCOPE: CPT

## 2024-02-19 PROCEDURE — 84078 ASSAY ALKALINE PHOSPHATASE: CPT

## 2024-02-20 ENCOUNTER — TELEPHONE (OUTPATIENT)
Dept: PRIMARY CARE | Facility: CLINIC | Age: 69
End: 2024-02-20

## 2024-02-20 ENCOUNTER — OFFICE VISIT (OUTPATIENT)
Dept: PRIMARY CARE | Facility: CLINIC | Age: 69
End: 2024-02-20
Payer: MEDICARE

## 2024-02-20 VITALS
BODY MASS INDEX: 27.43 KG/M2 | TEMPERATURE: 97.8 F | DIASTOLIC BLOOD PRESSURE: 58 MMHG | WEIGHT: 207 LBS | SYSTOLIC BLOOD PRESSURE: 88 MMHG

## 2024-02-20 DIAGNOSIS — E11.65 TYPE 2 DIABETES MELLITUS WITH HYPERGLYCEMIA, WITH LONG-TERM CURRENT USE OF INSULIN (MULTI): ICD-10-CM

## 2024-02-20 DIAGNOSIS — E11.49 OTHER DIABETIC NEUROLOGICAL COMPLICATION ASSOCIATED WITH TYPE 2 DIABETES MELLITUS (MULTI): ICD-10-CM

## 2024-02-20 DIAGNOSIS — R35.1 NOCTURIA: ICD-10-CM

## 2024-02-20 DIAGNOSIS — Z79.4 TYPE 2 DIABETES MELLITUS WITH HYPERGLYCEMIA, WITH LONG-TERM CURRENT USE OF INSULIN (MULTI): ICD-10-CM

## 2024-02-20 DIAGNOSIS — I95.9 HYPOTENSION, UNSPECIFIED HYPOTENSION TYPE: Primary | ICD-10-CM

## 2024-02-20 PROCEDURE — 1160F RVW MEDS BY RX/DR IN RCRD: CPT | Performed by: FAMILY MEDICINE

## 2024-02-20 PROCEDURE — 1125F AMNT PAIN NOTED PAIN PRSNT: CPT | Performed by: FAMILY MEDICINE

## 2024-02-20 PROCEDURE — 3078F DIAST BP <80 MM HG: CPT | Performed by: FAMILY MEDICINE

## 2024-02-20 PROCEDURE — 3074F SYST BP LT 130 MM HG: CPT | Performed by: FAMILY MEDICINE

## 2024-02-20 PROCEDURE — 3048F LDL-C <100 MG/DL: CPT | Performed by: FAMILY MEDICINE

## 2024-02-20 PROCEDURE — 1159F MED LIST DOCD IN RCRD: CPT | Performed by: FAMILY MEDICINE

## 2024-02-20 PROCEDURE — 3008F BODY MASS INDEX DOCD: CPT | Performed by: FAMILY MEDICINE

## 2024-02-20 PROCEDURE — 99213 OFFICE O/P EST LOW 20 MIN: CPT | Performed by: FAMILY MEDICINE

## 2024-02-20 PROCEDURE — 1036F TOBACCO NON-USER: CPT | Performed by: FAMILY MEDICINE

## 2024-02-20 RX ORDER — FINASTERIDE 1 MG/1
1 TABLET, FILM COATED ORAL DAILY
Qty: 30 TABLET | Refills: 11 | Status: SHIPPED | OUTPATIENT
Start: 2024-02-20 | End: 2025-02-19

## 2024-02-20 NOTE — PATIENT INSTRUCTIONS
It is important that you take the insulin daily, albeit at a lower dosage of 10 units.  Call for any sugars below 80 and keep a log of glucoses before breakfast, dinner and bedtime, and drop that off for me to review.    Follow up in one month to see if your Tamsulosin can be stopped, now that you are starting finasteride.

## 2024-02-20 NOTE — PROGRESS NOTES
Subjective   Patient ID: [id number]     Levy Gregory is a 68 y.o. male who presents for Follow-up.    CARLINE Pate returns to assess his hypotension after stopping his reglan and  tamsulosin, as well as decreasing his  sertraline to 25 mg. He was unable to urinate so he had to resume the tamsulosin.   He was advised to eat 3 egg whites daily.  He gained 4#    Review of Systems  CARDIO- No chest pain or pressure, nausea, diaphoresis, paresthesias, dizziness, or syncope with or without exertion  GI-No blood in stool, tarry stools, pain, vomiting, heartburn, constipation or diarrhea  PULM-No wheezing, coughing or shortness of breath  UROL-No frequency, urgency, blood in urine, or incontinence    Objective     BP 88/58 (BP Location: Left arm, Patient Position: Sitting)   Temp 36.6 °C (97.8 °F) (Oral)   Wt 93.9 kg (207 lb)   BMI 27.43 kg/m²      Physical Exam  Neck-supple without lymphadenopathy or thyromegaly; no carotid bruits  Throat- without erythema or exudate, uvula in midlineNeck-supple without lymphadenopathy or thyromegaly; no carotid bruits  Heart- regular rate and rhythm, normal s1 and s2 without murmur or gallop  Lungs-clear to auscultation  Abdomen-soft, positive bowel sounds, without masses, HSmegaly or pain     Assessment/Plan     Problem List Items Addressed This Visit       Diabetic neuropathy associated with type 2 diabetes mellitus (CMS/HCC)    Nocturia    Relevant Medications    finasteride (Propecia) 1 mg tablet    Type 2 diabetes mellitus with hyperglycemia (CMS/HCC)    Relevant Medications    insulin detemir (Levemir) 100 unit/mL injection    Hypotension - Primary     It is important that you take the insulin daily, albeit at a lower dosage of 10 units.  Call for any sugars below 80 and keep a log of glucoses before breakfast, dinner and bedtime, and drop that off for me to review.    Follow up in one month to see if your Tamsulosin can be stopped, now that you are starting finasteride.    Chris  JESSENIA Huizar MD

## 2024-02-20 NOTE — TELEPHONE ENCOUNTER
Symone left me a voicemail - she wanted to let you know that at 2:40pm his glucose was 68 and they were working on getting it back up.

## 2024-02-21 LAB — ALP BONE SERPL-MCNC: 59.6 UG/L (ref 6.5–20.1)

## 2024-02-22 DIAGNOSIS — R74.8 HIGH SERUM BONE-SPECIFIC ALKALINE PHOSPHATASE: Primary | ICD-10-CM

## 2024-02-26 ENCOUNTER — OFFICE VISIT (OUTPATIENT)
Dept: PRIMARY CARE | Facility: CLINIC | Age: 69
End: 2024-02-26
Payer: MEDICARE

## 2024-02-26 VITALS — TEMPERATURE: 97.1 F | SYSTOLIC BLOOD PRESSURE: 115 MMHG | DIASTOLIC BLOOD PRESSURE: 75 MMHG

## 2024-02-26 DIAGNOSIS — I95.9 HYPOTENSION, UNSPECIFIED HYPOTENSION TYPE: Primary | ICD-10-CM

## 2024-02-26 DIAGNOSIS — Z91.81 AT RISK FOR FALLS: ICD-10-CM

## 2024-02-26 DIAGNOSIS — E11.49 OTHER DIABETIC NEUROLOGICAL COMPLICATION ASSOCIATED WITH TYPE 2 DIABETES MELLITUS (MULTI): ICD-10-CM

## 2024-02-26 DIAGNOSIS — E11.65 TYPE 2 DIABETES MELLITUS WITH HYPERGLYCEMIA, WITH LONG-TERM CURRENT USE OF INSULIN (MULTI): ICD-10-CM

## 2024-02-26 DIAGNOSIS — R35.1 NOCTURIA: ICD-10-CM

## 2024-02-26 DIAGNOSIS — Z79.4 TYPE 2 DIABETES MELLITUS WITH HYPERGLYCEMIA, WITH LONG-TERM CURRENT USE OF INSULIN (MULTI): ICD-10-CM

## 2024-02-26 LAB — POC FINGERSTICK BLOOD GLUCOSE: 130 MG/DL (ref 70–100)

## 2024-02-26 PROCEDURE — 1036F TOBACCO NON-USER: CPT | Performed by: FAMILY MEDICINE

## 2024-02-26 PROCEDURE — 1159F MED LIST DOCD IN RCRD: CPT | Performed by: FAMILY MEDICINE

## 2024-02-26 PROCEDURE — 3008F BODY MASS INDEX DOCD: CPT | Performed by: FAMILY MEDICINE

## 2024-02-26 PROCEDURE — 99214 OFFICE O/P EST MOD 30 MIN: CPT | Performed by: FAMILY MEDICINE

## 2024-02-26 PROCEDURE — 3078F DIAST BP <80 MM HG: CPT | Performed by: FAMILY MEDICINE

## 2024-02-26 PROCEDURE — 3074F SYST BP LT 130 MM HG: CPT | Performed by: FAMILY MEDICINE

## 2024-02-26 PROCEDURE — 1160F RVW MEDS BY RX/DR IN RCRD: CPT | Performed by: FAMILY MEDICINE

## 2024-02-26 PROCEDURE — 3048F LDL-C <100 MG/DL: CPT | Performed by: FAMILY MEDICINE

## 2024-02-26 PROCEDURE — 82962 GLUCOSE BLOOD TEST: CPT | Performed by: FAMILY MEDICINE

## 2024-02-26 PROCEDURE — 1125F AMNT PAIN NOTED PAIN PRSNT: CPT | Performed by: FAMILY MEDICINE

## 2024-02-26 RX ORDER — FLASH GLUCOSE SENSOR
KIT MISCELLANEOUS
Qty: 6 EACH | Refills: 1 | Status: SHIPPED | OUTPATIENT
Start: 2024-02-26

## 2024-02-26 NOTE — PROGRESS NOTES
Subjective   Patient ID: [id number]     Levy Gregory is a 68 y.o. male who presents for Follow-up (Blood pressure and glucose).    HPI  Levy returns to reassess his hypotension, urinary retention, and blood sugars.  He has had 50% of the days as low as 54 glucose.  He is not as lightheaded as he has been before.  No falls. Levy wants to return to the gym.    Review of Systems  Back - has upper back pain that is improved with activity  CARDIO- No chest pain or pressure, nausea, diaphoresis, paresthesias, dizziness, or syncope with or without exertion  UROL-No frequency, urgency, blood in urine, or incontinence;  no urinary retention    Objective     /75   Temp 36.2 °C (97.1 °F)      Physical Exam  Neck-supple without lymphadenopathy or thyromegaly; no carotid bruits  Throat- without erythema or exudate, uvula in midlineNeck-supple without lymphadenopathy or thyromegaly; no carotid bruits  Heart- regular rate and rhythm, normal s1 and s2 without murmur or gallop  Lungs-clear to auscultation  Abdomen-soft, positive bowel sounds, without masses, HSmegaly or pain     Assessment/Plan     Problem List Items Addressed This Visit       Diabetic neuropathy associated with type 2 diabetes mellitus (CMS/HCC)    Relevant Orders    POCT fingerstick glucose manually resulted (Completed)    Nocturia    Type 2 diabetes mellitus with hyperglycemia (CMS/HCC)    Relevant Medications    insulin detemir (Levemir) 100 unit/mL injection    FreeStyle Kellie 2 Sensor kit    Other Relevant Orders    Referral to Physical Therapy    Hypotension - Primary    At risk for falls    Relevant Orders    Referral to Physical Therapy     Decrease your Levemir to 5 units per night.    Follow up fasting (no alcohol for 48 hours and just water for 14 hours) in four months for your next routine appointment.  In general, take any medications on schedule (except for types of Insulin).       Chris Huizar MD

## 2024-02-29 ENCOUNTER — HOSPITAL ENCOUNTER (OUTPATIENT)
Dept: RADIOLOGY | Facility: HOSPITAL | Age: 69
Discharge: HOME | End: 2024-02-29
Payer: MEDICARE

## 2024-02-29 ENCOUNTER — APPOINTMENT (OUTPATIENT)
Dept: RADIOLOGY | Facility: HOSPITAL | Age: 69
End: 2024-02-29
Payer: MEDICARE

## 2024-02-29 DIAGNOSIS — R74.8 HIGH SERUM BONE-SPECIFIC ALKALINE PHOSPHATASE: ICD-10-CM

## 2024-02-29 PROCEDURE — A9503 TC99M MEDRONATE: HCPCS | Performed by: FAMILY MEDICINE

## 2024-02-29 PROCEDURE — 78306 BONE IMAGING WHOLE BODY: CPT

## 2024-02-29 PROCEDURE — 3430000001 HC RX 343 DIAGNOSTIC RADIOPHARMACEUTICALS: Performed by: FAMILY MEDICINE

## 2024-02-29 PROCEDURE — 78306 BONE IMAGING WHOLE BODY: CPT | Performed by: RADIOLOGY

## 2024-02-29 RX ADMIN — TECHNETIUM TC 99M MEDRONATE 25.2 MILLICURIE: 25 INJECTION, POWDER, FOR SOLUTION INTRAVENOUS at 08:00

## 2024-03-05 ENCOUNTER — TELEPHONE (OUTPATIENT)
Dept: PRIMARY CARE | Facility: CLINIC | Age: 69
End: 2024-03-05
Payer: MEDICARE

## 2024-03-05 DIAGNOSIS — M25.569 KNEE PAIN, UNSPECIFIED CHRONICITY, UNSPECIFIED LATERALITY: Primary | ICD-10-CM

## 2024-03-05 NOTE — TELEPHONE ENCOUNTER
Pt is calling DR. ARRIOLA told pt to follow up with a ortho surgeon based on his bone scan. Can you please put in a referral so pt can sched. Thank you

## 2024-03-06 ENCOUNTER — OFFICE VISIT (OUTPATIENT)
Dept: PRIMARY CARE | Facility: CLINIC | Age: 69
End: 2024-03-06
Payer: MEDICARE

## 2024-03-06 ENCOUNTER — LAB (OUTPATIENT)
Dept: LAB | Facility: LAB | Age: 69
End: 2024-03-06
Payer: MEDICARE

## 2024-03-06 VITALS
OXYGEN SATURATION: 100 % | DIASTOLIC BLOOD PRESSURE: 68 MMHG | BODY MASS INDEX: 28.1 KG/M2 | HEART RATE: 87 BPM | SYSTOLIC BLOOD PRESSURE: 120 MMHG | WEIGHT: 212 LBS

## 2024-03-06 DIAGNOSIS — R10.84 GENERALIZED ABDOMINAL PAIN: ICD-10-CM

## 2024-03-06 DIAGNOSIS — R74.8 HIGH SERUM BONE-SPECIFIC ALKALINE PHOSPHATASE: ICD-10-CM

## 2024-03-06 DIAGNOSIS — R42 DIZZINESS: ICD-10-CM

## 2024-03-06 DIAGNOSIS — R10.84 GENERALIZED ABDOMINAL PAIN: Primary | ICD-10-CM

## 2024-03-06 DIAGNOSIS — Z79.4 TYPE 2 DIABETES MELLITUS WITH DIABETIC POLYNEUROPATHY, WITH LONG-TERM CURRENT USE OF INSULIN (MULTI): ICD-10-CM

## 2024-03-06 DIAGNOSIS — E11.42 TYPE 2 DIABETES MELLITUS WITH DIABETIC POLYNEUROPATHY, WITH LONG-TERM CURRENT USE OF INSULIN (MULTI): ICD-10-CM

## 2024-03-06 DIAGNOSIS — R55 SYNCOPE, UNSPECIFIED SYNCOPE TYPE: ICD-10-CM

## 2024-03-06 LAB
ALBUMIN SERPL BCP-MCNC: 3.9 G/DL (ref 3.4–5)
ALP SERPL-CCNC: 484 U/L (ref 33–136)
ALT SERPL W P-5'-P-CCNC: 37 U/L (ref 10–52)
ANION GAP SERPL CALC-SCNC: 12 MMOL/L (ref 10–20)
AST SERPL W P-5'-P-CCNC: 29 U/L (ref 9–39)
BILIRUB SERPL-MCNC: 0.4 MG/DL (ref 0–1.2)
BUN SERPL-MCNC: 35 MG/DL (ref 6–23)
CALCIUM SERPL-MCNC: 9 MG/DL (ref 8.6–10.3)
CHLORIDE SERPL-SCNC: 107 MMOL/L (ref 98–107)
CO2 SERPL-SCNC: 24 MMOL/L (ref 21–32)
CREAT SERPL-MCNC: 1.3 MG/DL (ref 0.5–1.3)
EGFRCR SERPLBLD CKD-EPI 2021: 60 ML/MIN/1.73M*2
GLUCOSE SERPL-MCNC: 103 MG/DL (ref 74–99)
LIPASE SERPL-CCNC: 33 U/L (ref 9–82)
POTASSIUM SERPL-SCNC: 4.7 MMOL/L (ref 3.5–5.3)
PROT SERPL-MCNC: 6.8 G/DL (ref 6.4–8.2)
SODIUM SERPL-SCNC: 138 MMOL/L (ref 136–145)
WBC # BLD AUTO: 7.2 X10*3/UL (ref 4.4–11.3)

## 2024-03-06 PROCEDURE — 1125F AMNT PAIN NOTED PAIN PRSNT: CPT | Performed by: NURSE PRACTITIONER

## 2024-03-06 PROCEDURE — 99214 OFFICE O/P EST MOD 30 MIN: CPT | Performed by: NURSE PRACTITIONER

## 2024-03-06 PROCEDURE — 80053 COMPREHEN METABOLIC PANEL: CPT

## 2024-03-06 PROCEDURE — 93000 ELECTROCARDIOGRAM COMPLETE: CPT | Performed by: NURSE PRACTITIONER

## 2024-03-06 PROCEDURE — 1159F MED LIST DOCD IN RCRD: CPT | Performed by: NURSE PRACTITIONER

## 2024-03-06 PROCEDURE — 85027 COMPLETE CBC AUTOMATED: CPT

## 2024-03-06 PROCEDURE — 83690 ASSAY OF LIPASE: CPT

## 2024-03-06 PROCEDURE — 36415 COLL VENOUS BLD VENIPUNCTURE: CPT

## 2024-03-06 PROCEDURE — 3078F DIAST BP <80 MM HG: CPT | Performed by: NURSE PRACTITIONER

## 2024-03-06 PROCEDURE — 1123F ACP DISCUSS/DSCN MKR DOCD: CPT | Performed by: NURSE PRACTITIONER

## 2024-03-06 PROCEDURE — 1036F TOBACCO NON-USER: CPT | Performed by: NURSE PRACTITIONER

## 2024-03-06 PROCEDURE — 3074F SYST BP LT 130 MM HG: CPT | Performed by: NURSE PRACTITIONER

## 2024-03-06 PROCEDURE — 1160F RVW MEDS BY RX/DR IN RCRD: CPT | Performed by: NURSE PRACTITIONER

## 2024-03-06 PROCEDURE — 3048F LDL-C <100 MG/DL: CPT | Performed by: NURSE PRACTITIONER

## 2024-03-06 PROCEDURE — 3008F BODY MASS INDEX DOCD: CPT | Performed by: NURSE PRACTITIONER

## 2024-03-06 NOTE — PROGRESS NOTES
Subjective   Patient ID: Levy Gregory is a 68 y.o. male who presents for Blood Sugar Problem.    Dr Chris Huizar pt    HPI     Presents with wife    11/29/23 Dr Brielle Gongora performed a substernal gastric pull up with esophagostomy reversal on 11/29/23. He was taken back to OR on 12/7/23 for repeat laparotomy and cervical exploration with reduction of the redundancy.    Wife states that since Feb he has slowly been declining  He has been c/o generalized abd discomfort  States his abdomen has chronic numbness since surgery  He has an appt with Dr Gongora 3/14/24    He is eating small meals t/o the day including a snack before bed  Wife states he is getting protein  Staying hydrated   Having daily BM -no blood  Urinating without issues    DM2:   2/26/24 evaluated by Dr Chris Huizar for hypotension and hypoglycemia  Levemir decreased to 5 units at HS    They presented with blood sugars since visit with Dr Huizar  Yesterday he had an episode of dizziness and he blacked out.   No reports of head injury  Today he had an emesis    He denies CP, palpitations  SOB with exertion     NM whole body scan completed.   Unfortunately there results were not consistent with the patients hx.   Scan reported prior knee surgery which pt has declined having  Radiology will be notified    Past Medical History:   Diagnosis Date    Achalasia, esophageal     per patient of esophagus    Anemia     Contusion of abdominal wall, initial encounter 01/12/2021    Contusion, flank    COPD (chronic obstructive pulmonary disease) (CMS/[facility])     Diabetes mellitus (CMS/[facility])     F/W PCP    DVT (deep venous thrombosis) (CMS/[facility])     DVT (deep venous thrombosis) (CMS/[facility])     IVC filter placed on 04/20/2023 and on Eliquis    Dysphagia     GERD (gastroesophageal reflux disease)     Hemoptysis 05/12/2020    Cough with hemoptysis    Herpes labialis     Hiatal hernia     Hernia Repair    Hyperlipidemia     Hypotension due to drugs     Irregular heart  beat     AFIB: patient denies    Pain in left knee     Peripheral neuropathy     Personal history of other diseases of the respiratory system 06/19/2019    History of bronchitis    Sleep apnea     Patient states does not have sleep apnea since martinez loss and does not use CPAP    Solar purpura (CMS/[facility])      Past Surgical History:   Procedure Laterality Date    CONDUIT REPLACEMENT  12/07/2023    Gastric conduit revision; mini laparotomy, General Ex-lap, ISA, Gastric pull up with anastmosis in neck, MIGUELINA to bulb sx. New J tube.    ESOPHAGOGASTRECTOMY      ESOPHAGOGASTRODUODENOSCOPY      with stents x2    GASTROSTOMY TUBE PLACEMENT N/A     IR CVC PICC  08/16/2023    IR CVC PICC 8[place]16/2023 [facility] [place]    OTHER SURGICAL HISTORY  01/21/2019    [place] filter placement    UMBILICAL HERNIA REPAIR N/A 2013         Objective   /68 (BP Location: Right arm, Patient Position: Sitting)   Pulse 87   Wt 96.2 kg (212 lb)   SpO2 100%   BMI 28.10 kg/m²     Reviewed prior office notes, labs and test result    EKG completed     Physical Exam    Alert and oriented x 3  Eyes: EOM grossly intact  Neck supple without lymph adenopathy or carotid bruit  Heart regular rate and rhythm without murmur.  Lungs clear to auscultation.  Gait slow and slightly unsteady  Speech clear.  Hearing adequate.  Psych: Normal affect. Good judgment and insight.       Assessment/Plan     1. Type 2 diabetes mellitus with diabetic polyneuropathy, with long-term current use of insulin (CMS/[facility])  Stop Levemir  Discussed that it is ok right now for blood sugars to trend higher  Discussed that he can continue to monitor is blood sugars 3 times a day but it is not necessary to monitor it every 2 hours,   He can record number if he is symptomatic.     2. High serum bone-specific alkaline phosphatase  Message sent to radiologist regarding possibility that it is not Mr Gregory.    3. Generalized abdominal pain  Follow up with scheduled appt with   [facility]   - Comprehensive metabolic panel; Future  - WBC; Future  - Lipase; Future    4. Dizziness  Encouraged hydration and continuing to eat protein through out day  - ECG 12 lead (Clinic Performed)    5. Syncope, unspecified syncope type  - ECG 12 lead (Clinic Performed)    Follow up: 3/12/24    Medications refills will be completed as discussed.     Any labs or testing that is ordered will be reviewed and the results will be in your chart .   You can review these via  [employer]ation.     Prescriptions will not be filled unless you are compliant with your follow-up appointments or have a follow-up appointment scheduled as per the instruction of your provider. Refills for medications should be requested at the time of your office visit.     Please allow one week for refill requests to be completed.     Contact office with any questions or concerns.   Preferred communication is via  [employer]ation  Please contact [email or web address] if having issues with  Windation    Beverly MINOR-[facility]  [address], [address]  [place] [address]  Phone: [phone]    **Charting was completed using voice recognition technology and may include unintended errors**

## 2024-03-07 ENCOUNTER — APPOINTMENT (OUTPATIENT)
Dept: PHYSICAL THERAPY | Facility: CLINIC | Age: 69
End: 2024-03-07
Payer: MEDICARE

## 2024-03-07 DIAGNOSIS — R10.84 GENERALIZED ABDOMINAL PAIN: ICD-10-CM

## 2024-03-07 DIAGNOSIS — R93.89 ABNORMAL FINDINGS ON DIAGNOSTIC IMAGING OF BODY STRUCTURES: Primary | ICD-10-CM

## 2024-03-07 DIAGNOSIS — R94.8 ABNORMAL RADIONUCLIDE BONE SCAN: ICD-10-CM

## 2024-03-07 LAB
ERYTHROCYTE [DISTWIDTH] IN BLOOD BY AUTOMATED COUNT: 13.7 % (ref 11.5–14.5)
HCT VFR BLD AUTO: 39.6 % (ref 41–52)
HGB BLD-MCNC: 12.8 G/DL (ref 13.5–17.5)
MCH RBC QN AUTO: 33.7 PG (ref 26–34)
MCHC RBC AUTO-ENTMCNC: 32.3 G/DL (ref 32–36)
MCV RBC AUTO: 104 FL (ref 80–100)
NRBC BLD-RTO: 0 /100 WBCS (ref 0–0)
PLATELET # BLD AUTO: 213 X10*3/UL (ref 150–450)
RBC # BLD AUTO: 3.8 X10*6/UL (ref 4.5–5.9)
WBC # BLD AUTO: 7.2 X10*3/UL (ref 4.4–11.3)

## 2024-03-08 PROBLEM — R10.84 GENERALIZED ABDOMINAL PAIN: Status: ACTIVE | Noted: 2024-03-08

## 2024-03-08 PROBLEM — R55 SYNCOPE: Status: ACTIVE | Noted: 2024-03-08

## 2024-03-08 PROBLEM — R42 DIZZINESS: Status: ACTIVE | Noted: 2024-03-08

## 2024-03-08 PROBLEM — E11.42 TYPE 2 DIABETES MELLITUS WITH DIABETIC POLYNEUROPATHY, WITH LONG-TERM CURRENT USE OF INSULIN (MULTI): Status: ACTIVE | Noted: 2024-03-08

## 2024-03-08 PROBLEM — Z79.4 TYPE 2 DIABETES MELLITUS WITH DIABETIC POLYNEUROPATHY, WITH LONG-TERM CURRENT USE OF INSULIN (MULTI): Status: ACTIVE | Noted: 2024-03-08

## 2024-03-12 ENCOUNTER — OFFICE VISIT (OUTPATIENT)
Dept: PRIMARY CARE | Facility: CLINIC | Age: 69
End: 2024-03-12
Payer: MEDICARE

## 2024-03-12 DIAGNOSIS — I48.20 CHRONIC ATRIAL FIBRILLATION (MULTI): ICD-10-CM

## 2024-03-12 DIAGNOSIS — Z86.718 HISTORY OF DVT (DEEP VEIN THROMBOSIS): Primary | ICD-10-CM

## 2024-03-12 DIAGNOSIS — E11.49 OTHER DIABETIC NEUROLOGICAL COMPLICATION ASSOCIATED WITH TYPE 2 DIABETES MELLITUS (MULTI): ICD-10-CM

## 2024-03-12 DIAGNOSIS — I95.1 ORTHOSTATIC HYPOTENSION: ICD-10-CM

## 2024-03-12 DIAGNOSIS — R42 DIZZINESS: ICD-10-CM

## 2024-03-12 PROCEDURE — 1160F RVW MEDS BY RX/DR IN RCRD: CPT | Performed by: NURSE PRACTITIONER

## 2024-03-12 PROCEDURE — 3074F SYST BP LT 130 MM HG: CPT | Performed by: NURSE PRACTITIONER

## 2024-03-12 PROCEDURE — 1036F TOBACCO NON-USER: CPT | Performed by: NURSE PRACTITIONER

## 2024-03-12 PROCEDURE — 99214 OFFICE O/P EST MOD 30 MIN: CPT | Performed by: NURSE PRACTITIONER

## 2024-03-12 PROCEDURE — 3008F BODY MASS INDEX DOCD: CPT | Performed by: NURSE PRACTITIONER

## 2024-03-12 PROCEDURE — 1123F ACP DISCUSS/DSCN MKR DOCD: CPT | Performed by: NURSE PRACTITIONER

## 2024-03-12 PROCEDURE — 3078F DIAST BP <80 MM HG: CPT | Performed by: NURSE PRACTITIONER

## 2024-03-12 PROCEDURE — 3048F LDL-C <100 MG/DL: CPT | Performed by: NURSE PRACTITIONER

## 2024-03-12 PROCEDURE — 1159F MED LIST DOCD IN RCRD: CPT | Performed by: NURSE PRACTITIONER

## 2024-03-13 ENCOUNTER — EVALUATION (OUTPATIENT)
Dept: PHYSICAL THERAPY | Facility: CLINIC | Age: 69
End: 2024-03-13
Payer: MEDICARE

## 2024-03-13 VITALS — SYSTOLIC BLOOD PRESSURE: 90 MMHG | HEART RATE: 88 BPM | OXYGEN SATURATION: 100 % | DIASTOLIC BLOOD PRESSURE: 60 MMHG

## 2024-03-13 VITALS
HEART RATE: 90 BPM | DIASTOLIC BLOOD PRESSURE: 64 MMHG | HEIGHT: 72 IN | SYSTOLIC BLOOD PRESSURE: 94 MMHG | BODY MASS INDEX: 28.75 KG/M2 | OXYGEN SATURATION: 100 %

## 2024-03-13 DIAGNOSIS — Z91.81 AT RISK FOR FALLS: ICD-10-CM

## 2024-03-13 PROCEDURE — 97110 THERAPEUTIC EXERCISES: CPT | Mod: GP

## 2024-03-13 PROCEDURE — 97161 PT EVAL LOW COMPLEX 20 MIN: CPT | Mod: GP

## 2024-03-13 RX ORDER — GABAPENTIN 100 MG/1
200 CAPSULE ORAL 3 TIMES DAILY
Qty: 180 CAPSULE | Refills: 0 | Status: SHIPPED | OUTPATIENT
Start: 2024-03-13 | End: 2024-04-18 | Stop reason: SDUPTHER

## 2024-03-13 ASSESSMENT — ENCOUNTER SYMPTOMS
OCCASIONAL FEELINGS OF UNSTEADINESS: 0
LOSS OF SENSATION IN FEET: 0
DEPRESSION: 0

## 2024-03-13 NOTE — PROGRESS NOTES
Subjective   Patient ID: Levy Gregory is a 68 y.o. male who presents for 1 wk follow up  Diabetes and dizziness    Dr Chris Huizar patient    HPI     Presents with wife    Since last visit he has completely stopped his Levemir.   Blood sugars reviewed. Variable but not less than 60s or greater than 300s.     States that he is still having issues with dizziness.   Most noticeable when he is sitting to standing   He is not taking any blood pressure meds  He states that it occurs around 1-2 pm and then at times in evenings 8pm jose enrique  He is working hard to drink lots of water and smaller meals t/o the day.     Hypotension:   Seems to have been prominent after substernal gastric pull up with esophagostomy reversal on 11/29/23   Med: Proamatine 10 mg tid   Not wearing compression stockings   Echo 8/16/23    Afib/LT LE DVT:   Eliquis 2.5 mg 2 tablets BID  Ins would like mg changed.     Peripheral neuropathy:   Med: Gabapentin 300 mg bid  Takes it around 8:30 AM and 6 PM jose enrique  Still with pain     Past Medical History:   Diagnosis Date    Achalasia, esophageal     per patient of esophagus    Anemia     Contusion of abdominal wall, initial encounter 01/12/2021    Contusion, flank    COPD (chronic obstructive pulmonary disease) (CMS/[facility])     Diabetes mellitus (CMS/[facility])     F/W PCP    DVT (deep venous thrombosis) (CMS/[facility])     DVT (deep venous thrombosis) (CMS/[facility])     IVC filter placed on 04/20/2023 and on Eliquis    Dysphagia     GERD (gastroesophageal reflux disease)     Hemoptysis 05/12/2020    Cough with hemoptysis    Herpes labialis     Hiatal hernia     Hernia Repair    Hyperlipidemia     Hypotension due to drugs     Irregular heart beat     AFIB: patient denies    Pain in left knee     Peripheral neuropathy     Personal history of other diseases of the respiratory system 06/19/2019    History of bronchitis    Sleep apnea     Patient states does not have sleep apnea since martinez loss and does not use CPAP    Solar  purpura (CMS/HCC)      Past Surgical History:   Procedure Laterality Date    CONDUIT REPLACEMENT  12/07/2023    Gastric conduit revision; mini laparotomy, General Ex-lap, ISA, Gastric pull up with anastmosis in neck, MIGUELINA to bulb sx. New J tube.    ESOPHAGOGASTRECTOMY      ESOPHAGOGASTRODUODENOSCOPY      with stents x2    GASTROSTOMY TUBE PLACEMENT N/A     IR CVC PICC  08/16/2023    [place] [facility]    OTHER SURGICAL HISTORY  01/21/2019    [place] filter placement    UMBILICAL HERNIA REPAIR N/A 2013       Review of Systems    Reviewed labs     Objective   /60   Pulse 75   Ht 1.829 m (6')   SpO2 100%   BMI 28.75 kg/m²     Physical Exam    Alert and oriented x 3  Neck supple without lymph adenopathy or carotid bruit  Heart regular rate and rhythm without murmur.  Lungs clear to auscultation.  Legs with mild edema.  Gait is non-antalgic  Speech clear.  Hearing adequate.  Psych: Normal affect. Good judgment and insight.     Assessment/Plan     1. Orthostatic hypotension  Wear compression stockings on in AM and off in PM  Change positions slowly  - Vascular US carotid artery duplex bilateral; Future  - Referral to Cardiology; Future  - Transthoracic Echo (TTE) Complete; Future    2. Chronic atrial fibrillation (CMS/HCC)  - apixaban (Eliquis) 5 mg tablet; Take 1 tablet (5 mg) by mouth 2 times a day.  Dispense: 60 tablet; Refill: 11  - Referral to Cardiology; Future  - Transthoracic Echo (TTE) Complete; Future    3. History of DVT (deep vein thrombosis)  - apixaban (Eliquis) 5 mg tablet; Take 1 tablet (5 mg) by mouth 2 times a day.  Dispense: 60 tablet; Refill: 11  - Transthoracic Echo (TTE) Complete; Future    4. Other diabetic neurological complication associated with type 2 diabetes mellitus (CMS/HCC)  Trial for 30 days changing Neurontin dose from 300 mg bid to 200mg 3 times a day.   Plan is to evaluate if it decreases dizziness  - gabapentin (Neurontin) 100 mg capsule; Take 2  capsules (200 mg) by mouth 3 times a day.  Dispense: 180 capsule; Refill: 0  Option of consulting [facility] pharmacy discussed    I spoke with the patient regarding plan but I have also sent a message via Patient Message.     Follow up: 4/1/24    Medications refills will be completed as discussed.     Any labs or testing that is ordered will be reviewed and the results will be in your chart .   You can review these via  [employer]ape[employer].     Prescriptions will not be filled unless you are compliant with your follow-up appointments or have a follow-up appointment scheduled as per the instruction of your provider. Refills for medications should be requested at the time of your office visit.     Please allow one week for refill requests to be completed.     Contact office with any questions or concerns.   Preferred communication is via  [employer]  Please contact [email or web address] if having issues with  [employer]    Beverly MINOR-[facility]  [address], [address]  [place] [address]  Phone: [phone]    **Charting was completed using voice recognition technology and may include unintended errors**

## 2024-03-13 NOTE — PROGRESS NOTES
Physical Therapy    Physical Therapy Evaluation and Treatment      Patient Name: Levy Gregory  MRN: [id number]  Today's Date: 3/13/2024  Time Calculation  Start Time: 0845  Stop Time: 0926  Time Calculation (min): 41 min    Visit #1/10    Assessment:    Patient presents with LE muscular deconditioning and weakness of hips, knees and ankles likely from hospitalization and recovery from surgery. Patient would benefit from PT services to address current impairments and facilitate improvement in current activity limits. Educated patient on current POC, initial HEP and current examination findings. Patient verbalized understanding of all education and instruction provided today.     Plan:  OP PT Plan  Treatment/Interventions: Education/ Instruction, Gait training, Manual therapy, Neuromuscular re-education, Self care/ home management, Taping techniques, Therapeutic activities, Therapeutic exercises, Ultrasound, Vasopneumatic device  PT Plan: Skilled PT  PT Frequency: 1 time per week  Duration: 10  Onset Date: 11/29/23  Certification Period Start Date: 03/13/24  Certification Period End Date: 06/11/24  Number of Treatments Authorized: $20 copay  Rehab Potential: Good  Plan of Care Agreement: Patient    Current Problem:   1. At risk for falls  Referral to Physical Therapy    Follow Up In Physical Therapy          Subjective    General:  Patient presents with c/o generalized weakness and deconditioning. Patient had surgical substernal gastric pull-up with esophagostomy reversal on 11/29/23 which required hospitalization and rehab stay due to postoperative complications. Patient was discharged home and has finished home PT and presents to begin outpatient PT. Patient feels his LE's are weak and reports feeling dizzy when standing up too soon. Patient denies any falls or injuries since being discharged home.     Precautions:  Lightheaded when standing up quickly, recommend close guard with transfers and standing exercises  for safety.   -take BP with reports of lightheadedness.     Vital Signs:  Vitals:    03/13/24 0900   BP: 90/60   Pulse: 88   SpO2: 100%    Vital Signs  Heart Rate: 88  SpO2: 100 %  BP: 90/60 (102/72 after evaluation)  Patient Position: Sitting    Pain:   0/10    Home Living:   Lives with wife, 1 story home with 2 steps to enter.       Objective   Hip Musculoskeletal Exam  Gait    Antalgic: right and left    Trendelenburg: right    Assistive device: cane    Strength    Right      Extension: 3/5.       Flexion: 3/5.       Internal rotation: 3/5.       External rotation: 3/5.       Adduction: 4-/5.       Abduction: 4-/5.     Left      Extension: 3/5.       Flexion: 3/5.       Internal rotation: 3/5.       External rotation: 3/5.       Adduction: 4-/5.       Abduction: 4-/5.     Knee Musculoskeletal Exam  Gait    Antalgic: right and left    Trendelenburg: right    Assistive device: cane    Strength    Right      Extension: 3/5.       Flexion: 3/5.     Left      Extension: 3/5.       Flexion: 3/5.        30 sec sit to stand  Chair Stand--Below Average Scores  Age   Men  Women   (number of times patient stands in 30 sec)  60-64   < 14     < 12  65-69   < 12  < 11  70-74   < 12  < 10  75-79   < 11  < 10  80-84   < 10     < 9  85-89   < 8       < 8  90-94   < 7       < 4      Patient Performance: 8    Outcome Measures:  Other Measures  Lower Extremity Funtional Score (LEFS): 30/80     Treatments:  Rows with GTB 3x10  Seated marches with RTB 2x20  LAQ with RTB 2x10 ea  STS x5  Heel Raises at counter x20    EDUCATION:  Outpatient Education  Individual(s) Educated: Patient  Education Provided: Anatomy, Body Mechanics, Home Exercise Program, Fall Risk, Home Safety, POC, Posture    Goals:  Patient will improve bilateral knee strength to >/=4+/5 for improved knee stability.    Patient will improve bilateral hip strength to >/=4+/5 for improved knee stability.    Patient will be independent with HEP.    Patient will demonstrate  reciprocal gait pattern without assistive device.    Patient will demonstrate reciprocal step stair negotiation pattern.    Patient will improve LEFS score to >/=60/80    Patient will improve 30 sec  sit to stand performance to >/=14x

## 2024-03-14 ENCOUNTER — HOSPITAL ENCOUNTER (OUTPATIENT)
Dept: RADIOLOGY | Facility: HOSPITAL | Age: 69
Discharge: HOME | End: 2024-03-14
Payer: MEDICARE

## 2024-03-14 ENCOUNTER — OFFICE VISIT (OUTPATIENT)
Dept: SURGERY | Facility: HOSPITAL | Age: 69
End: 2024-03-14
Payer: MEDICARE

## 2024-03-14 VITALS
RESPIRATION RATE: 18 BRPM | HEART RATE: 92 BPM | DIASTOLIC BLOOD PRESSURE: 69 MMHG | BODY MASS INDEX: 28.85 KG/M2 | SYSTOLIC BLOOD PRESSURE: 100 MMHG | WEIGHT: 212.74 LBS | OXYGEN SATURATION: 100 % | TEMPERATURE: 96.6 F

## 2024-03-14 DIAGNOSIS — K22.3 ESOPHAGEAL PERFORATION: ICD-10-CM

## 2024-03-14 DIAGNOSIS — R10.9 ABDOMINAL PAIN, UNSPECIFIED ABDOMINAL LOCATION: ICD-10-CM

## 2024-03-14 PROCEDURE — 3078F DIAST BP <80 MM HG: CPT | Performed by: STUDENT IN AN ORGANIZED HEALTH CARE EDUCATION/TRAINING PROGRAM

## 2024-03-14 PROCEDURE — 1126F AMNT PAIN NOTED NONE PRSNT: CPT | Performed by: STUDENT IN AN ORGANIZED HEALTH CARE EDUCATION/TRAINING PROGRAM

## 2024-03-14 PROCEDURE — 99214 OFFICE O/P EST MOD 30 MIN: CPT | Performed by: STUDENT IN AN ORGANIZED HEALTH CARE EDUCATION/TRAINING PROGRAM

## 2024-03-14 PROCEDURE — 3008F BODY MASS INDEX DOCD: CPT | Performed by: STUDENT IN AN ORGANIZED HEALTH CARE EDUCATION/TRAINING PROGRAM

## 2024-03-14 PROCEDURE — 99214 OFFICE O/P EST MOD 30 MIN: CPT | Mod: 25 | Performed by: STUDENT IN AN ORGANIZED HEALTH CARE EDUCATION/TRAINING PROGRAM

## 2024-03-14 PROCEDURE — 71046 X-RAY EXAM CHEST 2 VIEWS: CPT

## 2024-03-14 PROCEDURE — 1160F RVW MEDS BY RX/DR IN RCRD: CPT | Performed by: STUDENT IN AN ORGANIZED HEALTH CARE EDUCATION/TRAINING PROGRAM

## 2024-03-14 PROCEDURE — 3074F SYST BP LT 130 MM HG: CPT | Performed by: STUDENT IN AN ORGANIZED HEALTH CARE EDUCATION/TRAINING PROGRAM

## 2024-03-14 PROCEDURE — 1036F TOBACCO NON-USER: CPT | Performed by: STUDENT IN AN ORGANIZED HEALTH CARE EDUCATION/TRAINING PROGRAM

## 2024-03-14 PROCEDURE — 71046 X-RAY EXAM CHEST 2 VIEWS: CPT | Performed by: RADIOLOGY

## 2024-03-14 PROCEDURE — 1159F MED LIST DOCD IN RCRD: CPT | Performed by: STUDENT IN AN ORGANIZED HEALTH CARE EDUCATION/TRAINING PROGRAM

## 2024-03-14 PROCEDURE — 1123F ACP DISCUSS/DSCN MKR DOCD: CPT | Performed by: STUDENT IN AN ORGANIZED HEALTH CARE EDUCATION/TRAINING PROGRAM

## 2024-03-14 PROCEDURE — 3048F LDL-C <100 MG/DL: CPT | Performed by: STUDENT IN AN ORGANIZED HEALTH CARE EDUCATION/TRAINING PROGRAM

## 2024-03-14 ASSESSMENT — PAIN SCALES - GENERAL: PAINLEVEL: 0-NO PAIN

## 2024-03-14 NOTE — PROGRESS NOTES
C/C:  Established visit    History Of Present Illness  Levy Gregory is a 68 y.o. male presenting for follow up/symptom check. Most recently he is s/p a substernal gastric pull up with esophagostomy reversal on 11/29/23. He was taken back to OR on 12/7/23 for repeat laparotomy and cervical exploration with reduction of the redundancy. His repeat swallow eval 4 days later looked improved and showed no leak.     Since his last visit has been doing better - eating has improved, less bloating though does still feel occasional food 'sticking' in the area behind his breast bone - specifically fruit. Denies any obvious dysphagia. Less nausea. Still dealing with hypotension issues - Midodrine dosing was doubled by his physician. Getting cardiac/vascular work up.   Has noticed some new bloating and pain in his RUQ for the last 2 weeks.   Overall strength is improving and he is ambulating more.    By way of review: patient has a notable PMHx including h/o COPD, T2DM, type II achalasia, paraesophageal hernia s/p robotic Laparoscopic Heller myotomy with mary fundoplication on 3/1 with Dr. Hoang. He presented to ED on 3/7 after discharge with SOB, chills, RUQ pain. CT completed concerning for esophageal perforation and he underwent diagnostic lap with drain placement, EGD with stent placement x2, R chest tube on 3/7. He was transferred to  SICU on 3/9 for further management. Thoracic surgery was consulted. A second chest tube was placed on 3/13 by thoracic surgery. On 3/15 he went to OR for R VATs, decortication, bronchoscopy, and NJ placement . Large empyema was seen on CT chest and he underwent IR guided pigtail placement on 3/24.   On 3/27 he went to OR for EGD with esophageal stent replacement and nasal tube post pyloric placement with Dr. Hoang, previous esophageal stent found to have migrated below LES and MIGUELINA drain visible and traveling through esophageal perforation. A new esophageal stent was placed more proximally.  On 3/30 all drains with bilious fluid with concern for persistent leak. He returned to OR on 3/31 with thoracic and fuentes surgery and underwent EGD, removal of esophageal stent, endovac placement, dobhoff tube placement, PEG tube placement. He returned to OR on 4/3 for endovac replacement with Dr. Gongora and was found to have perforation from 42 to 35 cm from the lips. CT     C/A/P obtained on 4/4 showed previous findings as well as increased pneumoperitoneum.   On 4/5, he returned to OR for right thoracotomy and esophagectomy with cervical esophagostomy and lap takedown of prior fundoplication, lysis of adhesions, and revision of gastrostomy tube for esophageal perforation with thoracic surgery. Transferred to SICU post-op. On 4/7 return to OR for PEG replacement and J tube placement with thoracic. While in SICU he was weaned off of pressors on 4/7. Successfully extubated on 4/8. Reintubated on 4/10 d/t respiratory fatigue. Amio drip initiated on the same day for uncontrolled Afib with RVR. He was also started on CVVH on 4/10. He was extubated in ICU on 4/12. CVVH discontinued. CT CAP completed which demonstrated intraabdominal RP abscesses and intramuscular psoas hematoma. CT CAP repeated on 4/24, which demonstrated increase in size of   RP hematoma and decrease in size of psoas hematoma. He was transferred to [facility] on 4/25. While on [facility], he developed rising leukocytosis and tachycardia and repeat CT PE and CT C/A/P with PO and IV contrast completed which was negative for PE, showed decreasing size of hematomas, and likely acalculous cholecystitis visualized. On 4/28 he underwent percutaneous cholecystostomy tube placement by IR, tube connected to accordion drain and remains in place at time of discharge. Repeat CT CA/P on 5/7 with evidence of persistent anastomotic leak from gastric stump. MIGUELINA drain remain in place.      Throughout hospitalization infectious disease was consulted for complicated abdominal  abscesses as well resistant MRSA and CR-acinetobacter for which he was placed on contact precautions. Antibiotic regimen completed on 5/18 and per ID should follow up with infectious disease/primary attending at LTAC facility. Vascular medicine also consulted during hospitalization for management of DVTs and RPB/psoas hematomas. IVC filter was placed on 4/20. Repeat Duplex ultrasound completed on 5/2 showed persistent LE and UE DVTs. He was started on a low intensity heparin drip x24 hours and transitioned to PO Eliquis which he will continue at discharge and follow up with vascular medicine as outpatient.      He was evaluated by nutrition throughout hospital course and was started on tube feeds via J-tube which he tolerated well and will continue on continuous feeding schedule at nursing facility. Nephrology was also consulted during hospitalization for elevated BUN/creatinine and hypernatremia. Hypernatremia resolved and BUN/Creatinine continues to wax and wane. He was evaluated by nephrology prior to discharge and per nephrology recommendations, he will discharge with weekly RFP labs with close monitoring with nephrology attending at LTAC facility.   He was discharged to nursing facility with R chest tube to waterseal and abdominal drains to bulbs on 5/22/23    Past Medical History  He has a past medical history of Achalasia, esophageal, Anemia, Contusion of abdominal wall, initial encounter (01/12/2021), COPD (chronic obstructive pulmonary disease) (CMS/[facility]), Diabetes mellitus (CMS/[facility]), DVT (deep venous thrombosis) (CMS/[facility]), DVT (deep venous thrombosis) (CMS/[facility]), Dysphagia, GERD (gastroesophageal reflux disease), Hemoptysis (05/12/2020), Herpes labialis, Hiatal hernia, Hyperlipidemia, Hypotension due to drugs, Irregular heart beat, Pain in left knee, Peripheral neuropathy, Personal history of other diseases of the respiratory system (06/19/2019), Sleep apnea, and Solar purpura (CMS/[facility]).    Social  History  He reports that he quit smoking about a year ago. His smoking use included cigarettes and cigars. He started smoking about 34 years ago. He has been exposed to tobacco smoke. He has never used smokeless tobacco. He reports that he does not currently use alcohol. He reports that he does not use drugs.      Medications    Current Outpatient Medications:     acetaminophen (Tylenol) 650 mg/20.3 mL solution oral liquid, Take 20.3 mL (650 mg) by mouth every 6 hours if needed for pain., Disp: 500 mL, Rfl: 0    apixaban (Eliquis) 5 mg tablet, Take 1 tablet (5 mg) by mouth 2 times a day., Disp: 60 tablet, Rfl: 11    atorvastatin (Lipitor) 10 mg tablet, Take 1 tablet (10 mg) by mouth once daily at bedtime., Disp: 90 tablet, Rfl: 1    blood sugar diagnostic (OneTouch Ultra Test) strip, Test glucose twice daily or as directed, Disp: 200 strip, Rfl: 1    blood-glucose meter misc, Check glucose before meals and call if less than 80 or over 300., Disp: 1 each, Rfl: 0    ferrous sulfate 325 (65 Fe) MG EC tablet, Take 65 mg by mouth once daily with breakfast. Do not crush, chew, or split., Disp: , Rfl:     finasteride (Propecia) 1 mg tablet, Take 1 tablet (1 mg) by mouth once daily. Do not crush, chew, or split., Disp: 30 tablet, Rfl: 11    FreeStyle Kellie 2 Milton misc, Use as instructed, Disp: 1 each, Rfl: 0    FreeStyle Kellie 2 Sensor kit, Use as instructed, Disp: 6 each, Rfl: 1    gabapentin (Neurontin) 100 mg capsule, Take 2 capsules (200 mg) by mouth 3 times a day., Disp: 180 capsule, Rfl: 0    gabapentin (Neurontin) 300 mg capsule, Take 1 capsule (300 mg) by mouth 2 times a day., Disp: 180 capsule, Rfl: 0    midodrine (Proamatine) 5 mg tablet, Take 2 tablets (10 mg) by mouth 3 times a day., Disp: 270 tablet, Rfl: 0    pantoprazole (ProtoNix) 40 mg EC tablet, Take 1 tablet (40 mg) by mouth once daily at bedtime. Do not crush, chew, or split., Disp: , Rfl:     sertraline (Zoloft) 50 mg tablet, Take 0.5 tablets (25 mg)  by mouth once daily., Disp: 15 tablet, Rfl: 2    insulin detemir (Levemir) 100 unit/mL injection, Inject 5 Units under the skin once daily at bedtime. (Patient not taking: Reported on 3/12/2024), Disp: 1.5 mL, Rfl: 2    Allergies  Patient has no known allergies.    Review of Systems:  Review of Systems   Constitutional: No fevers, chills, unexpected weight change  HENT: No sore throat, congestion, or nasal drainage  Eyes: No visual changes or eye itching  Respiratory: see HPI. No cough, worsening dyspnea, wheezing  Cardiac: No chest pain, palpitations, or lower extremity edema  Gastrointestinal: No nausea, vomiting, diarrhea. No abdominal pain  Genitourinary: No dysuria or hematuria  Musculoskeletal: No back pain. No significant myalgias or arthralgias  Neurologic: No headaches, dizziness, or seizures.  Hematologic: No east bleeding or bruising.  Psychiatric: No anxiety or depression.    Physical Exam:  Physical Exam  /69 (BP Location: Left arm, Patient Position: Sitting)   Pulse 92   Temp 35.9 °C (96.6 °F)   Resp 18   Wt 96.5 kg (212 lb 11.9 oz)   SpO2 100%   BMI 28.85 kg/m²   Constitutional:       General: Patient is not in acute distress.     Appearance: Normal appearance; not ill-appearing.   HENT:      Head: Normocephalic.      Nose: No congestion or rhinorrhea.     Neck: left neck incision c/d/i  Cardiovascular:      Rate and Rhythm: Normal rate and regular rhythm.      Pulses: Normal pulses.   Chest: left chest prior esophagostomy site sutures removed   Pulmonary:      Effort: Pulmonary effort is normal. No respiratory distress.  No conversational dyspnea     Breath sounds: No stridor. No wheezing.   Abdominal:      General: There is no distension.      Palpations: Abdomen is soft.      Tenderness: Mild RUQ TTP with (+) Gunn's sign    His laparotomy incision is well appearing, no drainage, overall healing well.   Musculoskeletal:         General: No swelling, tenderness or deformity. Normal  "range of motion.      Cervical back: Normal range of motion. No rigidity. Utilizing wheel chair  Lymphadenopathy:      Cervical: No cervical adenopathy.   Skin:     General: Skin is warm and dry.   Neurological:      General: No focal deficit present.      Mental Status: Patient is alert and oriented to person, place, and time.   Psychiatric:         Mood and Affect: Mood normal.       Relevant Results:       Imaging:      CXR personally reviewed    Pulmonary Functions Testing Results:    No results found for: \"FEV1\", \"FVC\", \"KPT0XPC\", \"TLC\", \"DLCO\"      Assessment/Plan   Diagnoses and all orders for this visit:  Esophageal perforation  -     XR chest 2 views; Future  Abdominal pain, unspecified abdominal location  -     US gallbladder; Future      Levy Gregory is a 68 y.o. male presenting for symptom check/established visit. He is s/p a substernal gastric pull up with esophagostomy reversal on 11/29/23 and return to OR on 12/7/23 for repeat laparotomy and cervical exploration with reduction of the redundancy.   Improved sxs on Reglan and doing much better overall.     New RUQ pain for the last 2 weeks - will check a RUQ US as he still has his GB. Will also plan to see him back with new CXR in 6 months.     Still on Eliquis with IVCF in place - d/w patient and wife they should bring this up with Cardiology and discuss.     Brielle Gongora, DO  Thoracic & Esophageal Surgery     "

## 2024-03-14 NOTE — PATIENT INSTRUCTIONS
"Dr. Gongora would like you to get an Ultrasound of your gallbladder.  See appointment details below.  She will call you with results.    Also, Follow up in 6 months with  chest xray 30 min prior. See appointment below in \"What's Next\".  Call our office with any questions. [phone]    "

## 2024-03-15 ENCOUNTER — APPOINTMENT (OUTPATIENT)
Dept: PHYSICAL THERAPY | Facility: CLINIC | Age: 69
End: 2024-03-15
Payer: MEDICARE

## 2024-03-18 ENCOUNTER — PATIENT OUTREACH (OUTPATIENT)
Dept: CARE COORDINATION | Facility: CLINIC | Age: 69
End: 2024-03-18
Payer: MEDICARE

## 2024-03-18 DIAGNOSIS — K21.9 GASTROESOPHAGEAL REFLUX DISEASE WITHOUT ESOPHAGITIS: Primary | ICD-10-CM

## 2024-03-18 RX ORDER — PANTOPRAZOLE SODIUM 40 MG/1
40 TABLET, DELAYED RELEASE ORAL NIGHTLY
Qty: 90 TABLET | Refills: 1 | Status: SHIPPED | OUTPATIENT
Start: 2024-03-18

## 2024-03-18 NOTE — PROGRESS NOTES
Call placed regarding 90 days discharge follow up call.             At time of outreach call the patient feels as if their condition has              returned to baseline since initial visit with PCP or specialist.             Questions or concerns regarding recovery period addressed at this time.              Reviewed any PCP or specialists progress notes/labs/radiology reports if applicable             and addressed any questions or concerns.    
Yes

## 2024-03-19 ENCOUNTER — APPOINTMENT (OUTPATIENT)
Dept: PHYSICAL THERAPY | Facility: CLINIC | Age: 69
End: 2024-03-19
Payer: MEDICARE

## 2024-03-19 ENCOUNTER — APPOINTMENT (OUTPATIENT)
Dept: RADIOLOGY | Facility: CLINIC | Age: 69
End: 2024-03-19
Payer: MEDICARE

## 2024-03-20 ENCOUNTER — TELEPHONE (OUTPATIENT)
Dept: PRIMARY CARE | Facility: CLINIC | Age: 69
End: 2024-03-20
Payer: MEDICARE

## 2024-03-20 NOTE — TELEPHONE ENCOUNTER
----- Message from Ruthie Ann RN sent at 3/19/2024  9:11 AM EDT -----  Regarding: FW: Update  Contact: [phone]    ----- Message -----  From: Levy Gregory  Sent: 3/19/2024   8:37 AM EDT  To: #  Subject: Update                                           Hello again, I am sorry to keep bothering you. I sure do appreciate your quick responses and all of your efforts in trying to get Segundo back on his feet. Last night Segundo vomited all night. Sugar was at 63 late at night. Didnt sleep. Has chills. Stomach in knots. Laying on couch.  Had to cancell his PT appt this morning and also the Vascular scan. He cant get up, dizzy. Vascular scan rescheduled for Mon. March 25, 10am  And to make sure I am understanding, the Eliquis changed from two    2.5mg  tablets per day to two 5 mg tablets per day per the insurance. A double dose of blood thinner.   Thank you so much  Symone & Levy Gregory      Spoke with pt and wife 3/19/24.   Pt is feeling better. No longer vomiting.   Taking fluids and foods.   Discussed overall condition.   Aware that if these symptoms worsen, he should be seen in ER.    Beverly WE[facility]

## 2024-03-21 ENCOUNTER — APPOINTMENT (OUTPATIENT)
Dept: SURGERY | Facility: HOSPITAL | Age: 69
End: 2024-03-21
Payer: MEDICARE

## 2024-03-21 ENCOUNTER — APPOINTMENT (OUTPATIENT)
Dept: RADIOLOGY | Facility: CLINIC | Age: 69
End: 2024-03-21
Payer: MEDICARE

## 2024-03-21 PROBLEM — R60.0 EDEMA OF BOTH LOWER LEGS DUE TO PERIPHERAL VENOUS INSUFFICIENCY: Status: ACTIVE | Noted: 2024-02-10

## 2024-03-21 PROBLEM — M79.674 PAIN IN TOES OF BOTH FEET: Status: ACTIVE | Noted: 2023-11-20

## 2024-03-21 PROBLEM — E87.6 HYPOKALEMIA: Status: ACTIVE | Noted: 2024-03-21
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
## 2024-03-21 PROBLEM — N17.9 ACUTE RENAL FAILURE SUPERIMPOSED ON CHRONIC KIDNEY DISEASE (CMS-HCC): Status: ACTIVE | Noted: 2024-03-21

## 2024-03-21 PROBLEM — J90 PLEURAL EFFUSION EXUDATIVE: Status: ACTIVE | Noted: 2024-03-21

## 2024-03-21 PROBLEM — L60.3 ONYCHODYSTROPHY: Status: ACTIVE | Noted: 2023-11-20

## 2024-03-21 PROBLEM — G89.18 POST-OPERATIVE PAIN: Status: ACTIVE | Noted: 2024-03-21

## 2024-03-21 PROBLEM — S20.212S RIB CONTUSION, LEFT, SEQUELA: Status: ACTIVE | Noted: 2024-03-21

## 2024-03-21 PROBLEM — F43.21 ADJUSTMENT DISORDER WITH DEPRESSED MOOD: Status: ACTIVE | Noted: 2024-03-21

## 2024-03-21 PROBLEM — S09.90XA CLOSED HEAD INJURY: Status: ACTIVE | Noted: 2024-03-21

## 2024-03-21 PROBLEM — R06.03 RESPIRATORY DISTRESS: Status: ACTIVE | Noted: 2024-03-21

## 2024-03-21 PROBLEM — N28.9 LOW KIDNEY FUNCTION: Status: ACTIVE | Noted: 2023-02-17

## 2024-03-21 PROBLEM — D62 ACUTE POSTHEMORRHAGIC ANEMIA: Status: ACTIVE | Noted: 2023-08-15

## 2024-03-21 PROBLEM — E66.01 MORBID OBESITY (MULTI): Status: ACTIVE | Noted: 2024-03-21

## 2024-03-21 PROBLEM — Z86.19 HISTORY OF INFECTIOUS DISEASE: Status: ACTIVE | Noted: 2024-03-21

## 2024-03-21 PROBLEM — E87.0 HYPERNATREMIA: Status: ACTIVE | Noted: 2024-03-21

## 2024-03-21 PROBLEM — I26.99 OTHER PULMONARY EMBOLISM WITHOUT ACUTE COR PULMONALE (MULTI): Status: ACTIVE | Noted: 2024-02-10

## 2024-03-21 PROBLEM — K56.699 FOOD BOLUS OBSTRUCTION OF INTESTINE (MULTI): Status: ACTIVE | Noted: 2024-03-21

## 2024-03-21 PROBLEM — N17.9 AKI (ACUTE KIDNEY INJURY) (CMS-HCC): Status: ACTIVE | Noted: 2024-03-21

## 2024-03-21 PROBLEM — R04.0 EPISTAXIS: Status: ACTIVE | Noted: 2024-03-21

## 2024-03-21 PROBLEM — R91.8 LUNG FIELD ABNORMAL: Status: ACTIVE | Noted: 2023-05-24

## 2024-03-21 PROBLEM — R94.31 PROLONGED QT INTERVAL: Status: ACTIVE | Noted: 2024-03-21

## 2024-03-21 PROBLEM — R09.02 HYPOXIA: Status: ACTIVE | Noted: 2024-03-21

## 2024-03-21 PROBLEM — E87.20 LACTIC ACIDOSIS: Status: ACTIVE | Noted: 2024-03-21

## 2024-03-21 PROBLEM — Z66 DO NOT RESUSCITATE: Status: ACTIVE | Noted: 2023-08-15

## 2024-03-21 PROBLEM — I24.9 ACS (ACUTE CORONARY SYNDROME) (MULTI): Status: ACTIVE | Noted: 2023-08-16
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
## 2024-03-21 PROBLEM — Z98.890 STATUS POST ENDOSCOPY: Status: ACTIVE | Noted: 2024-03-21

## 2024-03-21 PROBLEM — E83.51 HYPOCALCEMIA: Status: ACTIVE | Noted: 2024-03-21

## 2024-03-21 PROBLEM — R94.31 ABNORMAL ECG: Status: ACTIVE | Noted: 2022-11-26

## 2024-03-21 PROBLEM — Z86.39 HISTORY OF DIABETES MELLITUS: Status: ACTIVE | Noted: 2024-03-21

## 2024-03-21 PROBLEM — J96.00 ACUTE RESPIRATORY FAILURE (MULTI): Status: ACTIVE | Noted: 2023-02-12

## 2024-03-21 PROBLEM — I87.2 EDEMA OF BOTH LOWER LEGS DUE TO PERIPHERAL VENOUS INSUFFICIENCY: Status: ACTIVE | Noted: 2024-02-10

## 2024-03-21 PROBLEM — D68.9 COAGULOPATHY (MULTI): Status: ACTIVE | Noted: 2024-03-21

## 2024-03-21 PROBLEM — Z86.2 HISTORY OF ANEMIA: Status: ACTIVE | Noted: 2024-03-21

## 2024-03-21 PROBLEM — E46 MALNUTRITION (MULTI): Status: ACTIVE | Noted: 2024-03-21

## 2024-03-21 PROBLEM — R78.81 BACTEREMIA: Status: ACTIVE | Noted: 2023-05-22

## 2024-03-21 PROBLEM — R53.83 FATIGUE: Status: ACTIVE | Noted: 2023-09-06

## 2024-03-21 PROBLEM — Z95.828 HISTORY OF INFERIOR VENA CAVAL FILTER PLACEMENT: Status: ACTIVE | Noted: 2024-03-21

## 2024-03-21 PROBLEM — R26.89 BALANCE PROBLEMS: Status: ACTIVE | Noted: 2023-11-20

## 2024-03-21 PROBLEM — S20.20XA BRUISE, TRUNK: Status: ACTIVE | Noted: 2024-03-21

## 2024-03-21 PROBLEM — D72.829 LEUKOCYTOSIS: Status: ACTIVE | Noted: 2024-03-21

## 2024-03-21 PROBLEM — M79.675 PAIN IN TOES OF BOTH FEET: Status: ACTIVE | Noted: 2023-11-20

## 2024-03-21 PROBLEM — B35.1 ONYCHOMYCOSIS: Status: ACTIVE | Noted: 2023-11-20

## 2024-03-21 PROBLEM — R26.2 DIFFICULTY WALKING: Status: ACTIVE | Noted: 2023-11-20

## 2024-03-21 PROBLEM — D64.9 ANEMIA: Status: ACTIVE | Noted: 2024-03-21

## 2024-03-21 PROBLEM — N18.9 ACUTE RENAL FAILURE SUPERIMPOSED ON CHRONIC KIDNEY DISEASE (CMS-HCC): Status: ACTIVE | Noted: 2024-03-21

## 2024-03-21 PROBLEM — R55: Status: ACTIVE | Noted: 2018-07-23

## 2024-03-21 PROBLEM — K22.2 SCHATZKI'S RING: Status: ACTIVE | Noted: 2024-03-21

## 2024-03-21 PROBLEM — T81.12XA: Status: ACTIVE | Noted: 2024-03-21

## 2024-03-21 PROBLEM — I26.99 PULMONARY EMBOLISM (MULTI): Status: ACTIVE | Noted: 2023-02-12

## 2024-03-21 PROBLEM — J18.9 CAP (COMMUNITY ACQUIRED PNEUMONIA): Status: ACTIVE | Noted: 2023-08-15

## 2024-03-22 ENCOUNTER — APPOINTMENT (OUTPATIENT)
Dept: RADIOLOGY | Facility: CLINIC | Age: 69
End: 2024-03-22
Payer: MEDICARE

## 2024-03-25 ENCOUNTER — HOSPITAL ENCOUNTER (OUTPATIENT)
Dept: RADIOLOGY | Facility: CLINIC | Age: 69
Discharge: HOME | End: 2024-03-25
Payer: MEDICARE

## 2024-03-25 DIAGNOSIS — I95.1 ORTHOSTATIC HYPOTENSION: ICD-10-CM

## 2024-03-25 DIAGNOSIS — R42 DIZZINESS: ICD-10-CM

## 2024-03-25 PROCEDURE — 93880 EXTRACRANIAL BILAT STUDY: CPT | Performed by: RADIOLOGY

## 2024-03-25 PROCEDURE — 93880 EXTRACRANIAL BILAT STUDY: CPT

## 2024-03-27 ENCOUNTER — HOSPITAL ENCOUNTER (OUTPATIENT)
Dept: RADIOLOGY | Facility: CLINIC | Age: 69
Discharge: HOME | End: 2024-03-27
Payer: MEDICARE

## 2024-03-27 DIAGNOSIS — R94.8 ABNORMAL RADIONUCLIDE BONE SCAN: ICD-10-CM

## 2024-03-27 DIAGNOSIS — R93.89 ABNORMAL FINDINGS ON DIAGNOSTIC IMAGING OF BODY STRUCTURES: ICD-10-CM

## 2024-03-27 PROCEDURE — 71250 CT THORAX DX C-: CPT | Performed by: RADIOLOGY

## 2024-03-27 PROCEDURE — 71250 CT THORAX DX C-: CPT

## 2024-04-01 ENCOUNTER — APPOINTMENT (OUTPATIENT)
Dept: PRIMARY CARE | Facility: CLINIC | Age: 69
End: 2024-04-01
Payer: MEDICARE

## 2024-04-04 ENCOUNTER — TREATMENT (OUTPATIENT)
Dept: PHYSICAL THERAPY | Facility: CLINIC | Age: 69
End: 2024-04-04
Payer: MEDICARE

## 2024-04-04 DIAGNOSIS — Z91.81 AT RISK FOR FALLS: ICD-10-CM

## 2024-04-04 PROCEDURE — 97110 THERAPEUTIC EXERCISES: CPT | Mod: GP

## 2024-04-04 NOTE — PROGRESS NOTES
Physical Therapy    Physical Therapy Treatment    Patient Name: Levy Gregory  MRN: [id number]  Today's Date: 4/4/2024  Time Calculation  Start Time: 1545  Stop Time: 1612  Time Calculation (min): 27 min    Visit #2    Assessment:  Patient limited in activity tolerance due to fatigue onset which required seated rest breaks and cueing for paced breathing. Pt vitals WNL but he reported 10/10 fatigue with ambulation trial. Shortened today's session as patient fatigue become more quickly insetting and he voiced desire for shortened session. Will continue to progress patient as tolerated and appropriate.      Plan:   Treatment/Interventions: Education/ Instruction, Gait training, Manual therapy, Neuromuscular re-education, Self care/ home management, Taping techniques, Therapeutic activities, Therapeutic exercises, Ultrasound, Vasopneumatic device  PT Plan: Skilled PT  PT Frequency: 1 time per week  Duration: 10  Onset Date: 11/29/23  Certification Period Start Date: 03/13/24  Certification Period End Date: 06/11/24  Number of Treatments Authorized: $20 copay  Rehab Potential: Good  Plan of Care Agreement: Patient    Current Problem  1. At risk for falls  Follow Up In Physical Therapy          General   Pt reports he had to cancel last 2 PT visits due to not feeling well from change in medication. Reports he is feeling better today.       Subjective       Precautions:  Lightheaded when standing up quickly, recommend close guard with transfers and standing exercises for safety.   -take BP with reports of lightheadedness.      Vital Signs   105/66, HR: 113 bp, SPO2: 98%  -vitals taken after (500 ft) ambulation and patient report of fatigue and need to sit    Pain   0/10    Objective     Treatments:  Nu-Step 8 min Lv2  Standing HR at counter x20  Standing slow marches at counter x20 ea  Standing alternating HSC at counter x20 ea  Mini-squats at counter x10  Ambulation on track without device 500 ft (shortened by pt report  on fatigue onset rated 10/10)   -improved with seated rest breaks, vitals WNL    OP EDUCATION:   Outpatient Education  Individual(s) Educated: Patient  Education Provided: Anatomy, Body Mechanics, Home Exercise Program, Fall Risk, Home Safety, POC, Posture    Goals:  Patient will improve bilateral knee strength to >/=4+/5 for improved knee stability.     Patient will improve bilateral hip strength to >/=4+/5 for improved knee stability.     Patient will be independent with HEP.     Patient will demonstrate reciprocal gait pattern without assistive device.     Patient will demonstrate reciprocal step stair negotiation pattern.     Patient will improve LEFS score to >/=60/80     Patient will improve 30 sec  sit to stand performance to >/=14x

## 2024-04-08 ENCOUNTER — TELEPHONE (OUTPATIENT)
Dept: PRIMARY CARE | Facility: CLINIC | Age: 69
End: 2024-04-08
Payer: MEDICARE

## 2024-04-08 DIAGNOSIS — I95.9 HYPOTENSION, UNSPECIFIED HYPOTENSION TYPE: ICD-10-CM

## 2024-04-08 RX ORDER — MIDODRINE HYDROCHLORIDE 5 MG/1
10 TABLET ORAL 3 TIMES DAILY
Qty: 270 TABLET | Refills: 0 | Status: SHIPPED | OUTPATIENT
Start: 2024-04-08 | End: 2024-06-04 | Stop reason: SDUPTHER

## 2024-04-08 NOTE — TELEPHONE ENCOUNTER
JT PATIENT - KR IS COVERING THIS MORNING    REFILL REQUEST    Med: Midodrine   Med Dose: 5 mg  Med Frequency: two tabs three times daily    Pharmacy: [facility]  Pharmacy Address: [address] [place] [address] in [facility]    LR: 02/07/2024  LV: 03/12/2024  NV: 04/11/2024

## 2024-04-09 ENCOUNTER — OFFICE VISIT (OUTPATIENT)
Dept: CARDIOLOGY | Facility: CLINIC | Age: 69
End: 2024-04-09
Payer: MEDICARE

## 2024-04-09 VITALS
BODY MASS INDEX: 29.12 KG/M2 | WEIGHT: 215 LBS | DIASTOLIC BLOOD PRESSURE: 68 MMHG | SYSTOLIC BLOOD PRESSURE: 110 MMHG | HEIGHT: 72 IN | HEART RATE: 73 BPM

## 2024-04-09 DIAGNOSIS — I95.1 ORTHOSTATIC HYPOTENSION: ICD-10-CM

## 2024-04-09 DIAGNOSIS — I82.402 DEEP VEIN THROMBOSIS (DVT) OF LEFT LOWER EXTREMITY, UNSPECIFIED CHRONICITY, UNSPECIFIED VEIN (MULTI): ICD-10-CM

## 2024-04-09 DIAGNOSIS — I48.20 CHRONIC ATRIAL FIBRILLATION (MULTI): ICD-10-CM

## 2024-04-09 DIAGNOSIS — I25.10 CORONARY ARTERY DISEASE INVOLVING NATIVE CORONARY ARTERY OF NATIVE HEART WITHOUT ANGINA PECTORIS: Primary | ICD-10-CM

## 2024-04-09 DIAGNOSIS — I51.89 MILD LEFT VENTRICULAR SYSTOLIC DYSFUNCTION: ICD-10-CM

## 2024-04-09 DIAGNOSIS — R42 DIZZINESS: ICD-10-CM

## 2024-04-09 PROCEDURE — 3008F BODY MASS INDEX DOCD: CPT | Performed by: INTERNAL MEDICINE

## 2024-04-09 PROCEDURE — 3078F DIAST BP <80 MM HG: CPT | Performed by: INTERNAL MEDICINE

## 2024-04-09 PROCEDURE — 1159F MED LIST DOCD IN RCRD: CPT | Performed by: INTERNAL MEDICINE

## 2024-04-09 PROCEDURE — 1160F RVW MEDS BY RX/DR IN RCRD: CPT | Performed by: INTERNAL MEDICINE

## 2024-04-09 PROCEDURE — 1036F TOBACCO NON-USER: CPT | Performed by: INTERNAL MEDICINE

## 2024-04-09 PROCEDURE — 3048F LDL-C <100 MG/DL: CPT | Performed by: INTERNAL MEDICINE

## 2024-04-09 PROCEDURE — 3074F SYST BP LT 130 MM HG: CPT | Performed by: INTERNAL MEDICINE

## 2024-04-09 PROCEDURE — 93000 ELECTROCARDIOGRAM COMPLETE: CPT | Performed by: INTERNAL MEDICINE

## 2024-04-09 PROCEDURE — 99215 OFFICE O/P EST HI 40 MIN: CPT | Performed by: INTERNAL MEDICINE

## 2024-04-09 PROCEDURE — 1123F ACP DISCUSS/DSCN MKR DOCD: CPT | Performed by: INTERNAL MEDICINE

## 2024-04-09 NOTE — PROGRESS NOTES
Referred by Dr. Perez for Please see below.     History Of Present Illness:    Levy Gregory is a 68 y.o. male presenting with left ventricular dysfunction, CAD.    This 68-year-old diabetic, hyperlipidemic former cigar smoker with de facto CAD has a complex medical history well detailed in the electronic medical record.  He has a history of a paraesophageal hernia for which he underwent surgery, but developed an esophageal perforation.  He was intubated on a ventilator with sepsis, empyema, and respiratory failure and renal failure.  He underwent a VATS procedure with decortication by thoracic surgery.  He spent much of last year in the hospital in the intensive care unit, or in a hospital bed.  He underwent multiple surgical procedures after his original esophageal surgery in March 2023.  His course was also complicated by the development of retroperitoneal hematoma.  He has a history of bilateral upper extremity DVT and left lower extremity DVT.  He was seen by Dr. Martin most recently in August 2023.  He was placed on anticoagulation and the patient tells me he has an IVC filter in place.  He was advised to see me by Beverly MINOR CNP to address his anticoagulation, according to the patient and his wife.    Of note an echocardiogram done in August 2023 disclosed mild left ventricular dysfunction and reduced right ventricular function.  The study was of technically limited quality.  Findings on echocardiography were new compared to the prior study dating back to April 2023.    Other imaging studies have commented on the presence of coronary artery calcification.      Past Medical History:  He has a past medical history of Achalasia, esophageal, Anemia, Contusion of abdominal wall, initial encounter (01/12/2021), COPD (chronic obstructive pulmonary disease) (CMS/[facility]), Diabetes mellitus (CMS/[facility]), DVT (deep venous thrombosis) (CMS/[facility]), DVT (deep venous thrombosis) (CMS/[facility]), Dysphagia, GERD  (gastroesophageal reflux disease), Hemoptysis (05/12/2020), Herpes labialis, Hiatal hernia, Hyperlipidemia, Hypotension due to drugs, Irregular heart beat, Pain in left knee, Peripheral neuropathy, Personal history of other diseases of the respiratory system (06/19/2019), Sleep apnea, and Solar purpura (CMS/[facility]).    Past Surgical History:  He has a past surgical history that includes Other surgical history (01/21/2019); IR CVC PICC (08/16/2023); Umbilical hernia repair (N/A, 2013); Gastrostomy tube placement (N/A); Esophagogastroduodenoscopy; Esophagogastrectomy; and Conduit replacement (12/07/2023).      Social History:  He reports that he quit smoking about 13 months ago. His smoking use included cigars. He started smoking about 34 years ago. He has been exposed to tobacco smoke. He has never used smokeless tobacco. He reports that he does not currently use alcohol. He reports that he does not use drugs.    Family History:  Family History   Problem Relation Name Age of Onset    Diabetes Mother      Hypertension Mother      Diabetes type I Father          Allergies:  Patient has no known allergies.    Outpatient Medications:  Current Outpatient Medications   Medication Instructions    acetaminophen (TYLENOL) 650 mg, oral, Every 6 hours PRN    apixaban (ELIQUIS) 5 mg, oral, 2 times daily    atorvastatin (LIPITOR) 10 mg, oral, Nightly    blood sugar diagnostic (OneTouch Ultra Test) strip Test glucose twice daily or as directed    blood-glucose meter misc Check glucose before meals and call if less than 80 or over 300.    ferrous sulfate 65 mg, oral, Daily with breakfast, Do not crush, chew, or split.    finasteride (PROPECIA) 1 mg, oral, Daily, Do not crush, chew, or split.    FreeStyle Kellie 2 [place] misc Use as instructed    FreeStyle Kellie 2 Sensor kit Use as instructed    gabapentin (NEURONTIN) 200 mg, oral, 3 times daily    midodrine (PROAMATINE) 10 mg, oral, 3 times daily    pantoprazole (PROTONIX) 40 mg, oral,  Nightly, Do not crush, chew, or split.    sertraline (ZOLOFT) 25 mg, oral, Daily        Last Recorded Vitals:  Vitals:    04/09/24 0900   BP: 110/68   BP Location: Left arm   Patient Position: Sitting   Pulse: 73   Weight: 97.5 kg (215 lb)   Height: 1.829 m (6')   Body mass index is 29.16 kg/m².      Physical Exam:  GENERAL: Chronically ill-appearing 68 year-old  HEENT: No xanthelasma  NECK: Supple, no palpable adenopathy or thyromegaly  CHEST: Clear to auscultation, respiratory effort unlabored  CARDIAC: RRR, normal S1 and S2, no audible murmur, rub, gallop, carotids are brisk, PMI is not displaced  ABD: Active bowel sounds, mild diffuse tenderness, no rebound, no evidence of ascites  EXT: No clubbing, cyanosis, edema, or tenderness  NEURO: Awake, alert, appropriate, speech is fluent         Last Labs:  CBC -  Lab Results   Component Value Date    WBC 7.2 03/06/2024    WBC 7.2 03/06/2024    HGB 12.8 (L) 03/06/2024    HCT 39.6 (L) 03/06/2024     (H) 03/06/2024     03/06/2024       CMP -  Lab Results   Component Value Date    CALCIUM 9.0 03/06/2024    PHOS 3.0 12/12/2023    PROT 6.8 03/06/2024    ALBUMIN 3.9 03/06/2024    AST 29 03/06/2024    ALT 37 03/06/2024    ALKPHOS 484 (H) 03/06/2024    BILITOT 0.4 03/06/2024       LIPID PANEL -   Lab Results   Component Value Date    CHOL 146 02/14/2024    TRIG 149 02/14/2024    HDL 38.3 02/14/2024    CHHDL 3.8 02/14/2024    LDLF 72 03/05/2019    VLDL 30 02/14/2024    NHDL 108 02/14/2024       RENAL FUNCTION PANEL -   Lab Results   Component Value Date    GLUCOSE 103 (H) 03/06/2024     03/06/2024    K 4.7 03/06/2024     03/06/2024    CO2 24 03/06/2024    ANIONGAP 12 03/06/2024    BUN 35 (H) 03/06/2024    CREATININE 1.30 03/06/2024    GFRMALE 43 (A) 09/05/2023    CALCIUM 9.0 03/06/2024    PHOS 3.0 12/12/2023    ALBUMIN 3.9 03/06/2024        Lab Results   Component Value Date    BNP 32 09/05/2023    HGBA1C 5.4 02/07/2024         Lab review: I have  Chemistry CMP:   Lab Results   Component Value Date    ALBUMIN 3.9 03/06/2024    CALCIUM 9.0 03/06/2024    CO2 24 03/06/2024    CREATININE 1.30 03/06/2024    GLUCOSE 103 (H) 03/06/2024    BILITOT 0.4 03/06/2024    PROT 6.8 03/06/2024    ALT 37 03/06/2024    AST 29 03/06/2024    ALKPHOS 484 (H) 03/06/2024   , Chemistry BMP   Lab Results   Component Value Date    GLUCOSE 103 (H) 03/06/2024    CALCIUM 9.0 03/06/2024    CO2 24 03/06/2024    CREATININE 1.30 03/06/2024   , and CBC:  Lab Results   Component Value Date    WBC 7.2 03/06/2024    WBC 7.2 03/06/2024    RBC 3.80 (L) 03/06/2024    HGB 12.8 (L) 03/06/2024    HCT 39.6 (L) 03/06/2024     (H) 03/06/2024    MCH 33.7 03/06/2024    MCHC 32.3 03/06/2024    RDW 13.7 03/06/2024    MPV 9.1 10/26/2023    NRBC 0.0 03/06/2024     Diagnostic review: I have personally reviewed the result(s) of the Echocardiogram .  Please see the report for complete details.    Assessment/Plan   Problem List Items Addressed This Visit          Cardiac and Vasculature    Orthostatic hypotension    Chronic atrial fibrillation (CMS/HCC)    Relevant Orders    ECG 12 lead (Clinic Performed) (Completed)    Coronary artery disease involving native coronary artery of native heart without angina pectoris - Primary    Relevant Orders    Nuclear Stress Test    Follow Up In Cardiology    Mild left ventricular systolic dysfunction    Relevant Orders    Nuclear Stress Test    Follow Up In Cardiology       Coag and Thromboembolic    Deep vein thrombosis (DVT) of left lower extremity (CMS/HCC)    Relevant Orders    Referral to Vascular Medicine       Symptoms and Signs    Dizziness     This 68-year-old diabetic, hyperlipidemic former cigar smoker with de facto CAD has mild left ventricular dysfunction by echocardiography from August 2023.  He has coronary artery calcification on thoracic CT scan.  He has a EKG with inferior infarct of indeterminate age.  He has mild left ventricular dysfunction with an  ejection fraction that had fallen from 70% in April 2023 to a follow-up echo in August 2023.  The latter study was of technically limited quality also commenting on the presence of right ventricular dysfunction.  He is on anticoagulation for history of bilateral lower EXTR upper extremity DVTs, and a history of left lower extremity DVT.  He has an IVC filter in place according to his history.  He has seen vascular medicine in the past.  His left ventricular dysfunction could be ischemic in etiology.  Our approach:    1.  Lexiscan SPECT  2.  Referral to Dr. Maldonado of vascular, to whom we will defer regarding vascular issues such as anticoagulation etc.        Papito Maldonado MD

## 2024-04-09 NOTE — PATIENT INSTRUCTIONS
"It was my pleasure to meet you.  I look forward to being your cardiologist.  I am a huge believer in communicating with my patients.  Please contact me at any time, if anything is not clear to you regarding anything we have discussed, or if new questions occur to you.     You should increase your intake of fresh fruits and vegetables.  Try to consume 9-12 servings per day of such foods.  You should increase your intake of deep sea fish such as salmon and tuna.  Try to get two servings per week of fish, but if you are a pregnant woman, talk to your obstetrician before increasing your fish intake.  You should increase your intake of unprocessed nuts such as walnuts or almonds.  Increase your intake of plant-based protein.  You should avoid fried foods.  Don't consume sugary or starchy foods and sugary drinks.  Avoid saturated fats.  Try not to dine at restaurants more than once per month, and don't dine at fast food places.  Try to get 7-9 hours of sleep every night.  Try to get 150 minutes per week of moderate intensity exercise (after I have cleared you to start an exercise program).  Try to maintain the appropriate weight for your height based on body mass index (BMI). Maintain your cholesterol, blood sugar, and blood pressure in the recommended respective normal ranges.  There is a wealth of information on the American Heart Association's website regarding this.  Just Google \"Life's Essential 8\" for more information.   Ask me about any of these details  if you have questions.    As your cardiologist, I will be available to you at any time to answer any question you have concerning your heart health.  My staff, Candace can also answer any questions you may have.  Best of luck.       It is important for us to have an accurate list of the medications, supplements, and their doses.  It is also important for us to have an accurate list of your allergies.  Please bring this information to every appointment.  " This is a vital part of the quality of care you receive through all of your providers.

## 2024-04-10 ENCOUNTER — TREATMENT (OUTPATIENT)
Dept: PHYSICAL THERAPY | Facility: CLINIC | Age: 69
End: 2024-04-10
Payer: MEDICARE

## 2024-04-10 DIAGNOSIS — Z91.81 AT RISK FOR FALLS: ICD-10-CM

## 2024-04-10 PROCEDURE — 97110 THERAPEUTIC EXERCISES: CPT | Mod: GP

## 2024-04-10 NOTE — PROGRESS NOTES
Physical Therapy    Physical Therapy Treatment    Patient Name: Levy Gregory  MRN: [id number]  Today's Date: 4/10/2024  Time Calculation  Start Time: 1427  Stop Time: 1506  Time Calculation (min): 39 min    Visit #3    Assessment:  Patient with improved activity tolerance this session. Focused on pacing of reps and taking standing breaks between sets to limit overexertion. Able to complete one lap on track without onset of SOB or lightheadedness. Will continue progressing patient as tolerated.    Plan:   Treatment/Interventions: Education/ Instruction, Gait training, Manual therapy, Neuromuscular re-education, Self care/ home management, Taping techniques, Therapeutic activities, Therapeutic exercises, Ultrasound, Vasopneumatic device  PT Plan: Skilled PT  PT Frequency: 1 time per week  Duration: 10  Onset Date: 11/29/23  Certification Period Start Date: 03/13/24  Certification Period End Date: 06/11/24  Number of Treatments Authorized: $20 copay  Rehab Potential: Good  Plan of Care Agreement: Patient    Current Problem  1. At risk for falls  Follow Up In Physical Therapy          General   Pt reports some improvement in his fatigue.       Subjective       Precautions:  Lightheaded when standing up quickly, recommend close guard with transfers and standing exercises for safety.   -take BP with reports of lightheadedness.      Vital Signs  HR: 100 bp, SPO2: 98%  -taken after 1/10 mile ambulation    Pain   0/10    Objective     Treatments:  Nu-Step 8 min Lv2  Standing HR at counter x20  Standing slow marches at counter x20 ea  Standing alternating HSC at counter x20 ea  Mini-squats at counter x10  Standing Hip abd with RTB x10 ea  Standing Hip ext with RTB x10 ea  LAQ with RTB 2x10 ea  Ambulation on track without device 1/10 mile, denies SOB or lightheadedness (RPE 7/10)  STS x10 with bilat UE support       OP EDUCATION:   Outpatient Education  Individual(s) Educated: Patient  Education Provided: Anatomy, Body  Mechanics, Home Exercise Program, Fall Risk, Home Safety, POC, Posture    Goals:  Patient will improve bilateral knee strength to >/=4+/5 for improved knee stability.     Patient will improve bilateral hip strength to >/=4+/5 for improved knee stability.     Patient will be independent with HEP.     Patient will demonstrate reciprocal gait pattern without assistive device.     Patient will demonstrate reciprocal step stair negotiation pattern.     Patient will improve LEFS score to >/=60/80     Patient will improve 30 sec  sit to stand performance to >/=14x

## 2024-04-11 ENCOUNTER — HOSPITAL ENCOUNTER (OUTPATIENT)
Dept: CARDIOLOGY | Facility: CLINIC | Age: 69
Discharge: HOME | End: 2024-04-11
Payer: MEDICARE

## 2024-04-11 ENCOUNTER — APPOINTMENT (OUTPATIENT)
Dept: PRIMARY CARE | Facility: CLINIC | Age: 69
End: 2024-04-11
Payer: MEDICARE

## 2024-04-11 ENCOUNTER — TELEPHONE (OUTPATIENT)
Dept: PRIMARY CARE | Facility: CLINIC | Age: 69
End: 2024-04-11

## 2024-04-11 VITALS
HEIGHT: 72 IN | BODY MASS INDEX: 28.71 KG/M2 | SYSTOLIC BLOOD PRESSURE: 95 MMHG | DIASTOLIC BLOOD PRESSURE: 60 MMHG | WEIGHT: 212 LBS

## 2024-04-11 DIAGNOSIS — Z86.718 HISTORY OF DVT (DEEP VEIN THROMBOSIS): ICD-10-CM

## 2024-04-11 DIAGNOSIS — I48.20 CHRONIC ATRIAL FIBRILLATION (MULTI): ICD-10-CM

## 2024-04-11 DIAGNOSIS — I95.1 ORTHOSTATIC HYPOTENSION: ICD-10-CM

## 2024-04-11 DIAGNOSIS — R42 DIZZINESS: ICD-10-CM

## 2024-04-11 LAB
AORTIC VALVE MEAN GRADIENT: 8 MMHG
AORTIC VALVE PEAK VELOCITY: 1.51 M/S
AV PEAK GRADIENT: 9.1 MMHG
AVA (PEAK VEL): 2.27 CM2
AVA (VTI): 2.4 CM2
EJECTION FRACTION APICAL 4 CHAMBER: 50.6
LEFT VENTRICLE INTERNAL DIMENSION DIASTOLE: 4.4 CM (ref 3.5–6)
LEFT VENTRICULAR OUTFLOW TRACT DIAMETER: 1.9 CM
MITRAL VALVE E/A RATIO: 0.98
MITRAL VALVE E/E' RATIO: 7.6
RIGHT VENTRICLE PEAK SYSTOLIC PRESSURE: 19.6 MMHG

## 2024-04-11 PROCEDURE — 93306 TTE W/DOPPLER COMPLETE: CPT

## 2024-04-11 PROCEDURE — 93306 TTE W/DOPPLER COMPLETE: CPT | Performed by: INTERNAL MEDICINE

## 2024-04-11 NOTE — TELEPHONE ENCOUNTER
Spoke with pt today and canceled his appt. He did his follow up with the cardiologist. Pt does state he is having some issues since starting Tamsulosin ( not on med list ? )   He states it keeps him awake using the bathroom all night.  Please advise. Thanks!

## 2024-04-17 ENCOUNTER — TREATMENT (OUTPATIENT)
Dept: PHYSICAL THERAPY | Facility: CLINIC | Age: 69
End: 2024-04-17
Payer: MEDICARE

## 2024-04-17 DIAGNOSIS — Z91.81 AT RISK FOR FALLS: ICD-10-CM

## 2024-04-17 PROCEDURE — 97110 THERAPEUTIC EXERCISES: CPT | Mod: GP

## 2024-04-17 NOTE — PROGRESS NOTES
Physical Therapy    Physical Therapy Treatment    Patient Name: Levy Gregory  MRN: [id number]  Today's Date: 4/17/2024  Time Calculation  Start Time: 1426    Visit #4    Assessment:  Patient able to ambulate 2/10 mile this session with increased fatigue but no need to stop for rest breaks during. Increased resistance for all exercises which he was able to perform without pain increase. Encouraged continued pacing with HEP and home ambulation to build tolerance and endurance.    Plan:   Treatment/Interventions: Education/ Instruction, Gait training, Manual therapy, Neuromuscular re-education, Self care/ home management, Taping techniques, Therapeutic activities, Therapeutic exercises, Ultrasound, Vasopneumatic device  PT Plan: Skilled PT  PT Frequency: 1 time per week  Duration: 10  Onset Date: 11/29/23  Certification Period Start Date: 03/13/24  Certification Period End Date: 06/11/24  Number of Treatments Authorized: $20 copay  Rehab Potential: Good  Plan of Care Agreement: Patient    Current Problem  1. At risk for falls  Follow Up In Physical Therapy          General   Pt reports feeling generalized fatigue and soreness today.   0/10 pain       Subjective       Precautions:  Lightheaded when standing up quickly, recommend close guard with transfers and standing exercises for safety.   -take BP with reports of lightheadedness.      Vital Signs  HR: 106 bp, SPO2: 98%  -taken after 2/10 mile ambulation    Pain   0/10    Objective     Treatments:  Nu-Step 8 min Lv2  Standing HR at counter x20  Standing slow marches at counter x20 ea  Standing alternating HSC at counter x20 ea  Mini-squats at counter x10  Fwd step-up onto 6 in step x10 ea  Ambulation on track without device 2/10 mile, denies SOB or lightheadedness (RPE 7/10)  STS x10 with bilat UE support on airex  Rows with Blue TB 2x15  Shoulder extensions with Blue TB 2x15  3-way bicep curls 3# (pronated, supinated, hammer) x10 ea    OP EDUCATION:   Outpatient  Education  Individual(s) Educated: Patient  Education Provided: Anatomy, Body Mechanics, Home Exercise Program, Fall Risk, Home Safety, POC, Posture    Goals:  Patient will improve bilateral knee strength to >/=4+/5 for improved knee stability.     Patient will improve bilateral hip strength to >/=4+/5 for improved knee stability.     Patient will be independent with HEP.     Patient will demonstrate reciprocal gait pattern without assistive device.     Patient will demonstrate reciprocal step stair negotiation pattern.     Patient will improve LEFS score to >/=60/80     Patient will improve 30 sec  sit to stand performance to >/=14x

## 2024-04-18 ENCOUNTER — TELEPHONE (OUTPATIENT)
Dept: PRIMARY CARE | Facility: CLINIC | Age: 69
End: 2024-04-18
Payer: MEDICARE

## 2024-04-18 DIAGNOSIS — E11.49 OTHER DIABETIC NEUROLOGICAL COMPLICATION ASSOCIATED WITH TYPE 2 DIABETES MELLITUS (MULTI): ICD-10-CM

## 2024-04-18 RX ORDER — GABAPENTIN 100 MG/1
200 CAPSULE ORAL 3 TIMES DAILY
Qty: 180 CAPSULE | Refills: 2 | Status: SHIPPED | OUTPATIENT
Start: 2024-04-18

## 2024-04-18 NOTE — TELEPHONE ENCOUNTER
Pt  is Dr. ARRIOLA's pt and LE is covering     Refill:    Gabapentin 100mg take 2 tabs three times a day     Pharm: [facility] # [phone]    LR: 03/13/24 qty 180 no refills  LV: 03/12/24  NV: 07/26/24

## 2024-04-24 ENCOUNTER — OFFICE VISIT (OUTPATIENT)
Dept: CARDIOLOGY | Facility: CLINIC | Age: 69
End: 2024-04-24
Payer: MEDICARE

## 2024-04-24 ENCOUNTER — TREATMENT (OUTPATIENT)
Dept: PHYSICAL THERAPY | Facility: CLINIC | Age: 69
End: 2024-04-24
Payer: MEDICARE

## 2024-04-24 VITALS
WEIGHT: 220.1 LBS | HEIGHT: 73 IN | RESPIRATION RATE: 18 BRPM | HEART RATE: 90 BPM | DIASTOLIC BLOOD PRESSURE: 68 MMHG | BODY MASS INDEX: 29.17 KG/M2 | SYSTOLIC BLOOD PRESSURE: 101 MMHG

## 2024-04-24 DIAGNOSIS — Z91.81 AT RISK FOR FALLS: ICD-10-CM

## 2024-04-24 DIAGNOSIS — Z86.718 HISTORY OF DVT (DEEP VEIN THROMBOSIS): Primary | ICD-10-CM

## 2024-04-24 PROCEDURE — 1159F MED LIST DOCD IN RCRD: CPT | Performed by: INTERNAL MEDICINE

## 2024-04-24 PROCEDURE — 97110 THERAPEUTIC EXERCISES: CPT | Mod: GP

## 2024-04-24 PROCEDURE — 99215 OFFICE O/P EST HI 40 MIN: CPT | Performed by: INTERNAL MEDICINE

## 2024-04-24 PROCEDURE — 3008F BODY MASS INDEX DOCD: CPT | Performed by: INTERNAL MEDICINE

## 2024-04-24 PROCEDURE — 3048F LDL-C <100 MG/DL: CPT | Performed by: INTERNAL MEDICINE

## 2024-04-24 PROCEDURE — 3078F DIAST BP <80 MM HG: CPT | Performed by: INTERNAL MEDICINE

## 2024-04-24 PROCEDURE — 1125F AMNT PAIN NOTED PAIN PRSNT: CPT | Performed by: INTERNAL MEDICINE

## 2024-04-24 PROCEDURE — 1160F RVW MEDS BY RX/DR IN RCRD: CPT | Performed by: INTERNAL MEDICINE

## 2024-04-24 PROCEDURE — 1123F ACP DISCUSS/DSCN MKR DOCD: CPT | Performed by: INTERNAL MEDICINE

## 2024-04-24 PROCEDURE — 3074F SYST BP LT 130 MM HG: CPT | Performed by: INTERNAL MEDICINE

## 2024-04-24 RX ORDER — TAMSULOSIN HYDROCHLORIDE 0.4 MG/1
0.4 CAPSULE ORAL DAILY
COMMUNITY
End: 2024-05-03 | Stop reason: SDUPTHER

## 2024-04-24 ASSESSMENT — PAIN SCALES - GENERAL: PAINLEVEL: 4

## 2024-04-24 ASSESSMENT — PATIENT HEALTH QUESTIONNAIRE - PHQ9
1. LITTLE INTEREST OR PLEASURE IN DOING THINGS: NOT AT ALL
2. FEELING DOWN, DEPRESSED OR HOPELESS: NOT AT ALL
SUM OF ALL RESPONSES TO PHQ9 QUESTIONS 1 AND 2: 0

## 2024-04-24 NOTE — PATIENT INSTRUCTIONS
ASSESSMENT/PLAN:  Here for evaluation of DVT - currently on eliquis and IVC filter - discussed continue AC until filter retrieval. Situational DVT. Leg swelling ?gabapentin.  Trial edema wear. Follow up 3 months.

## 2024-04-24 NOTE — PROGRESS NOTES
"OUTPATIENT CONSULTATION -  VASCULAR MEDICINE    DOS:4/24/2024    REQUESTING PHYSICIAN:  Dr. Papito Maldonado MD    REASON FOR CONSULT:  here for evaluation of DVT - currently on eliquis and IVC filter    HISTORY OF PRESENT ILLNESS:     67 yo man here for evaluation of DVT - currently on eliquis and IVC filter. Hx dates to 3/1/2023 underwent LS robotic assisted hiatal hernia repair, Heller myotomy, Shai fundoplication, EGD and everette TAP blocks. Represented approx 1 week post-op with esophageal perforation, pneumomediastinum and pleural effusion. Underwent VATS decortication and CT management/5/2023 underwent right thoracotomy, decortication, esophgectomy, takedown of the fundoplication, ISA and G-tube. Course c/b CORA = CVVHD, septic shock req pressors and ICU support, and prolonged resp failure = intubation.     During admission dx right pop DVT ?subacute or chronic and everette UE DVT - in the setting of RP/iliopsoas hematomas. SC heparin initially but noted to have progression to left CFV, FV DVT and IVC filter was place. Ultimately DC to LTAC on reduced dosing of eliquis 2.5 mg q 12 hours 2/2 renal disease and complicated admission as noted ( DC to LTAC 5/22/2023).    Over the summer 2023 had several other admission for feeding tube issues - unclear if AC was interrupted during this time.    August readmitted for empyema managed with pigtail drain. G-J tube revision at that time. Maintained on AC at DC to SNF - LMWH initially 2/2 fluconazole.     Dec had esophagus reconstruction with \"stomach pullup\" with esophagostomy reversal on 11/29/23 . This went well but has some issues eating.     Does not know when the eliquis was resumed - currently on 5 mg twice - states this was changed from 2.5 mg q 12 hours to the higher dose on March 1st by Beverly GIL with Dr. Huizar - unclear why this was increased.     He denies bleeding related to the AC. Reports 80% recovered. Navigating ADLs. Started driving.     PMH/PSH:  "   achlasia  paraesophageal hernia s/p LS heller myotomy and fundoplication  GERD  COPD  HTN  H/O DVT/PE 2016/2017 s/p IVC Filter at that time (retrieved)  DM  obesity  h/o GIB 2/2 ulcer req embolization 2016  mediastinitis  ABD abscesses  CKD  RP bleed  Recurrent situational DVT/PE now with IVC filter retained  ?silent Mi    FAMILY HISTORY:     No VTE    SOCIAL HISTORY:     Tobacco former smoker  Employment ret  also owned a landscaping business    REVIEW OF SYSTEMS:     weight is stable  No HA, SZ, syncope, stroke, TIA  +dizzyness - spinning  No CP, chest pressure  No cough,  SOB  +ABD pain  No BRBPR, melena, hematuria  No bleeding  + edema, no calf pain    PHYSICAL EXAMINATION:   Gen: Appears well, NAD  HEENT: WNL  No carotid bruits  Chest: CTA  CVS: regular without murmur or gallop  Abd: soft, NT/ND, no bruits, no AAA  Ext: no edema, nontender  Skin: good condition without wounds or lesions  Pulses: DP 2+; PT 2+  Neuro: grossly normal, CN intact, OMEGA x 4  Mood and affect appropriate    ADDITIONAL DATA:   No compression worn today. Right calf: 42.0cm Left Calf: 43.5cm    US 11/2023:  IMPRESSION:  Within the above limitations, no evidence of deep venous thrombosis  in the bilateral upper extremities from the axilla to the antecubital  fossa, in addition to the visualized internal jugular and subclavian  veins.    US 11/2023:  IMPRESSION:  1. Nonocclusive thrombus in the left popliteal vein.  2. No evidence of deep vein thrombosis within the remainder of the  evaluated veins of the bilateral lower extremities.    ASSESSMENT/PLAN:  Here for evaluation of DVT - currently on eliquis and IVC filter - discussed continue AC until filter retrieval. Situational DVT. Leg swelling ?gabapentin.  Trial edema wear. Follow up 3 months.

## 2024-04-24 NOTE — PROGRESS NOTES
"Physical Therapy    Physical Therapy Treatment    Patient Name: Levy Gregory  MRN: [id number]  Today's Date: 4/24/2024  Time Calculation  Start Time: 1430  Stop Time: 1455  Time Calculation (min): 25 min    Visit #4    Assessment:  *Shortened session per patient request due to fatigue from morning medical procedure*  Patient tolerated limited session well but was visibly fatigued with standing exercises. Cueing for slow pacing and breathing with reps required. Instructed to rest from HEP today and resume following day as tolerated.    Plan:   Treatment/Interventions: Education/ Instruction, Gait training, Manual therapy, Neuromuscular re-education, Self care/ home management, Taping techniques, Therapeutic activities, Therapeutic exercises, Ultrasound, Vasopneumatic device  PT Plan: Skilled PT  PT Frequency: 1 time per week  Duration: 10  Onset Date: 11/29/23  Certification Period Start Date: 03/13/24  Certification Period End Date: 06/11/24  Number of Treatments Authorized: $20 copay  Rehab Potential: Good  Plan of Care Agreement: Patient    Current Problem  1. At risk for falls  Follow Up In Physical Therapy          General   Pt requesting shortened session. \"I almost cancelled I'm so exhausted.\"  0/10 pain       Subjective       Precautions:  Lightheaded when standing up quickly, recommend close guard with transfers and standing exercises for safety.   -take BP with reports of lightheadedness.      Vital Signs  HR: 100 bp, SPO2: 98%      Pain   0/10    Objective     Treatments:  Nu-Step 8 min Lv2  Standing HR at counter x20  Standing slow marches at counter x20 ea  Standing alternating HSC at counter x20 ea  Mini-squats at counter x10  Rows with Blue TB 2x15  Shoulder extensions with Blue TB 2x15      OP EDUCATION:   Outpatient Education  Individual(s) Educated: Patient  Education Provided: Anatomy, Body Mechanics, Home Exercise Program, Fall Risk, Home Safety, POC, Posture    Goals:  Patient will improve " bilateral knee strength to >/=4+/5 for improved knee stability.     Patient will improve bilateral hip strength to >/=4+/5 for improved knee stability.     Patient will be independent with HEP.     Patient will demonstrate reciprocal gait pattern without assistive device.     Patient will demonstrate reciprocal step stair negotiation pattern.     Patient will improve LEFS score to >/=60/80     Patient will improve 30 sec  sit to stand performance to >/=14x

## 2024-04-25 ENCOUNTER — APPOINTMENT (OUTPATIENT)
Dept: UROLOGY | Facility: CLINIC | Age: 69
End: 2024-04-25
Payer: MEDICARE

## 2024-04-29 ENCOUNTER — APPOINTMENT (OUTPATIENT)
Dept: WOUND CARE | Facility: CLINIC | Age: 69
End: 2024-04-29
Payer: MEDICARE

## 2024-05-01 ENCOUNTER — APPOINTMENT (OUTPATIENT)
Dept: PHYSICAL THERAPY | Facility: CLINIC | Age: 69
End: 2024-05-01
Payer: MEDICARE

## 2024-05-03 DIAGNOSIS — R33.9 URINARY RETENTION: Primary | ICD-10-CM

## 2024-05-03 RX ORDER — TAMSULOSIN HYDROCHLORIDE 0.4 MG/1
0.4 CAPSULE ORAL DAILY
Qty: 90 CAPSULE | Refills: 1 | Status: SHIPPED | OUTPATIENT
Start: 2024-05-03 | End: 2024-10-30

## 2024-05-04 ENCOUNTER — HOSPITAL ENCOUNTER (OUTPATIENT)
Dept: RADIOLOGY | Facility: CLINIC | Age: 69
Discharge: HOME | End: 2024-05-04
Payer: MEDICARE

## 2024-05-04 DIAGNOSIS — R10.9 ABDOMINAL PAIN, UNSPECIFIED ABDOMINAL LOCATION: ICD-10-CM

## 2024-05-04 PROCEDURE — 76705 ECHO EXAM OF ABDOMEN: CPT

## 2024-05-04 PROCEDURE — 76705 ECHO EXAM OF ABDOMEN: CPT | Performed by: RADIOLOGY

## 2024-05-08 ENCOUNTER — APPOINTMENT (OUTPATIENT)
Dept: PRIMARY CARE | Facility: CLINIC | Age: 69
End: 2024-05-08
Payer: MEDICARE

## 2024-05-08 ENCOUNTER — APPOINTMENT (OUTPATIENT)
Dept: RADIOLOGY | Facility: CLINIC | Age: 69
End: 2024-05-08
Payer: MEDICARE

## 2024-05-08 ENCOUNTER — APPOINTMENT (OUTPATIENT)
Dept: PHYSICAL THERAPY | Facility: CLINIC | Age: 69
End: 2024-05-08
Payer: MEDICARE

## 2024-05-09 ENCOUNTER — TELEPHONE (OUTPATIENT)
Dept: CARDIOTHORACIC SURGERY | Facility: HOSPITAL | Age: 69
End: 2024-05-09
Payer: MEDICARE

## 2024-05-09 NOTE — TELEPHONE ENCOUNTER
Result Communication    Resulted Orders   US gallbladder    Narrative    Interpreted By:  Chris Kraus,   STUDY:  US GALLBLADDER;  5/4/2024 9:21 am      INDICATION:  Signs/Symptoms:RUQ pain.      COMPARISON:  None.      ACCESSION NUMBER(S):  [id number]      ORDERING CLINICIAN:  YUNG GONGORA      TECHNIQUE:  Multiple images of the right upper quadrant were obtained.      FINDINGS:  LIVER:  The liver measures 16.0 cm it is heterogeneous          GALLBLADDER:  Difficult to assess given heterogeneity. Possible gallbladder with  stones and reported sonographic Gunn sign.          BILE DUCTS:  No evidence of intra or extrahepatic biliary dilatation is  identified; the common bile duct measures 0.8 cm.      PANCREAS:  Not well seen      RIGHT KIDNEY:  The right kidney measures 11.0 cm in length. The renal cortical  echogenicity and thickness are within normal limit.  No  hydronephrosis or renal calculi are seen.        Impression    Hepatic heterogeneity. Gallbladder not well interrogated. There may  be gallstones and reported positive sonographic Gunn sign. Acute  gallbladder disease not excluded. Further evaluation is advised. It  may be reasonable to interrogate with cross-sectional imaging to  better evaluate the gallbladder given limitations of the study      MACRO:  None          Signed by: Chris Kraus 5/6/2024 6:16 PM  Dictation workstation:   [id number]       12:18 PM      Results were successfully communicated with the patient and they acknowledged their understanding.  Per patient the pain has actually been improving. Discussed HIDA scan +/- Gen Surg referral but given improvement of pain he would prefer to hold off for now. He will call me if sxs return.     Yung Gongora, DO  Thoracic & Esophageal Surgery

## 2024-05-13 ENCOUNTER — OFFICE VISIT (OUTPATIENT)
Dept: PRIMARY CARE | Facility: CLINIC | Age: 69
End: 2024-05-13
Payer: MEDICARE

## 2024-05-13 VITALS
BODY MASS INDEX: 29.64 KG/M2 | SYSTOLIC BLOOD PRESSURE: 118 MMHG | HEART RATE: 86 BPM | DIASTOLIC BLOOD PRESSURE: 60 MMHG | WEIGHT: 218.8 LBS | HEIGHT: 72 IN | OXYGEN SATURATION: 98 %

## 2024-05-13 DIAGNOSIS — J06.9 UPPER RESPIRATORY TRACT INFECTION, UNSPECIFIED TYPE: Primary | ICD-10-CM

## 2024-05-13 DIAGNOSIS — I82.402 DEEP VEIN THROMBOSIS (DVT) OF LEFT LOWER EXTREMITY, UNSPECIFIED CHRONICITY, UNSPECIFIED VEIN (MULTI): ICD-10-CM

## 2024-05-13 PROCEDURE — 3078F DIAST BP <80 MM HG: CPT | Performed by: NURSE PRACTITIONER

## 2024-05-13 PROCEDURE — 1159F MED LIST DOCD IN RCRD: CPT | Performed by: NURSE PRACTITIONER

## 2024-05-13 PROCEDURE — 1036F TOBACCO NON-USER: CPT | Performed by: NURSE PRACTITIONER

## 2024-05-13 PROCEDURE — 1123F ACP DISCUSS/DSCN MKR DOCD: CPT | Performed by: NURSE PRACTITIONER

## 2024-05-13 PROCEDURE — 99214 OFFICE O/P EST MOD 30 MIN: CPT | Performed by: NURSE PRACTITIONER

## 2024-05-13 PROCEDURE — 1160F RVW MEDS BY RX/DR IN RCRD: CPT | Performed by: NURSE PRACTITIONER

## 2024-05-13 PROCEDURE — 3008F BODY MASS INDEX DOCD: CPT | Performed by: NURSE PRACTITIONER

## 2024-05-13 PROCEDURE — 3048F LDL-C <100 MG/DL: CPT | Performed by: NURSE PRACTITIONER

## 2024-05-13 PROCEDURE — 3074F SYST BP LT 130 MM HG: CPT | Performed by: NURSE PRACTITIONER

## 2024-05-13 RX ORDER — AZITHROMYCIN 250 MG/1
TABLET, FILM COATED ORAL DAILY
Qty: 6 TABLET | Refills: 0 | Status: SHIPPED | OUTPATIENT
Start: 2024-05-13 | End: 2024-05-21 | Stop reason: ALTCHOICE

## 2024-05-13 ASSESSMENT — PATIENT HEALTH QUESTIONNAIRE - PHQ9
1. LITTLE INTEREST OR PLEASURE IN DOING THINGS: NOT AT ALL
2. FEELING DOWN, DEPRESSED OR HOPELESS: NOT AT ALL
SUM OF ALL RESPONSES TO PHQ9 QUESTIONS 1 & 2: 0

## 2024-05-13 NOTE — PROGRESS NOTES
Subjective   Patient ID: Levy Gregory is a 68 y.o. male who presents for congestion and mucus.    HPI     Presents today for acute care visit     Reports the following symptoms for  1 week     Headache/Sinus pressure: yes  Fever/chills: +chills  Nasal congestion/post nasal drip: yes  Cough: moist   Sputum: clear phlegm   Ear pain/pressure:  Sore throat: scratchy   Shortness of breath: baseline  Drinking to stay hydrated: yes    Nicotine use: denies    Patient has taken:   Nyquil     DVT: Dr Karla Martin  Continues on Eliquis     Review of Systems    Objective   /60   Pulse 86   Ht 1.829 m (6')   SpO2 98%   BMI 29.85 kg/m²     Physical Exam    Alert and oriented x 3, appears ill  Neck supple without lymph adenopathy   Heart regular rate and rhythm without murmur.  Lungs clear to auscultation.  Moist cough noted  Speech clear.  Hearing adequate.  Psych: Normal affect. Good judgment and insight.       Assessment/Plan     1. Upper respiratory tract infection, unspecified type    Complete prescribed antibiotics as directed. Eat yogurt or take a probiotic (not within the hour of taking the antibiotic) while taking antibiotics.   Increase fluid intake throughout the day.    Irrigate your nose with saline spray 2-4 times per day.   Antihistamine nasal sprays (like Azelastine) also help with nasal congestion and sinus infection. Side effects of Azelastine include an occasional bitter taste. You can reduce the bitter taste by leaning forward, directing the spray toward the outside of your nose, and not sniffing for a few minutes.    Mucinex  DM for cough  Contact the office if not improving after completing the antibiotics.     - azithromycin (Zithromax) 250 mg tablet; Take 2 tablets (500 mg) by mouth once daily for 1 day, THEN 1 tablet (250 mg) once daily for 4 days. Take 2 tabs (500 mg) by mouth today, than 1 daily for 4 days..  Dispense: 6 tablet; Refill: 0    2. DVT LE  Updated Dr Karla Martin regarding  Eliquis dose.   Pt was taking 2 of the 2.5 mg BID and ins would not cover it so I changed it to 5mg BID. No adjustment in medication occurred.     Follow up: Contact the office if not improving after completing the antibiotics.     Medications refills will be completed as discussed.     Any labs or testing that is ordered will be reviewed and the results will be in your chart .   You can review these via  [employer].     Prescriptions will not be filled unless you are compliant with your follow-up appointments or have a follow-up appointment scheduled as per the instruction of your provider. Refills for medications should be requested at the time of your office visit.     Please allow one week for refill requests to be completed.     Contact office with any questions or concerns.   Preferred communication is via  [employer]      Call [employer]: [phone] to assist with scheduling.      [facility]  [address], [address]  [place] [address]  Phone: [phone]    **Charting was completed using voice recognition technology and may include unintended errors**

## 2024-05-14 ENCOUNTER — APPOINTMENT (OUTPATIENT)
Dept: PHYSICAL THERAPY | Facility: CLINIC | Age: 69
End: 2024-05-14
Payer: MEDICARE

## 2024-05-15 ENCOUNTER — HOSPITAL ENCOUNTER (OUTPATIENT)
Dept: RADIOLOGY | Facility: HOSPITAL | Age: 69
Discharge: HOME | End: 2024-05-15
Payer: MEDICARE

## 2024-05-15 ENCOUNTER — HOSPITAL ENCOUNTER (OUTPATIENT)
Dept: CARDIOLOGY | Facility: HOSPITAL | Age: 69
Discharge: HOME | End: 2024-05-15
Payer: MEDICARE

## 2024-05-15 ENCOUNTER — APPOINTMENT (OUTPATIENT)
Dept: PHYSICAL THERAPY | Facility: CLINIC | Age: 69
End: 2024-05-15
Payer: MEDICARE

## 2024-05-15 DIAGNOSIS — I25.10 CORONARY ARTERY DISEASE INVOLVING NATIVE CORONARY ARTERY OF NATIVE HEART WITHOUT ANGINA PECTORIS: ICD-10-CM

## 2024-05-15 DIAGNOSIS — I51.89 MILD LEFT VENTRICULAR SYSTOLIC DYSFUNCTION: ICD-10-CM

## 2024-05-15 PROCEDURE — 3430000001 HC RX 343 DIAGNOSTIC RADIOPHARMACEUTICALS: Performed by: INTERNAL MEDICINE

## 2024-05-15 PROCEDURE — 93018 CV STRESS TEST I&R ONLY: CPT | Performed by: INTERNAL MEDICINE

## 2024-05-15 PROCEDURE — 93017 CV STRESS TEST TRACING ONLY: CPT

## 2024-05-15 PROCEDURE — 78452 HT MUSCLE IMAGE SPECT MULT: CPT | Performed by: INTERNAL MEDICINE

## 2024-05-15 PROCEDURE — 93016 CV STRESS TEST SUPVJ ONLY: CPT | Performed by: INTERNAL MEDICINE

## 2024-05-15 PROCEDURE — A9502 TC99M TETROFOSMIN: HCPCS | Performed by: INTERNAL MEDICINE

## 2024-05-15 PROCEDURE — 78452 HT MUSCLE IMAGE SPECT MULT: CPT

## 2024-05-15 RX ADMIN — TETROFOSMIN 31.7 MILLICURIE: 0.23 INJECTION, POWDER, LYOPHILIZED, FOR SOLUTION INTRAVENOUS at 11:55

## 2024-05-15 RX ADMIN — TETROFOSMIN 10.6 MILLICURIE: 0.23 INJECTION, POWDER, LYOPHILIZED, FOR SOLUTION INTRAVENOUS at 08:55

## 2024-05-20 ENCOUNTER — APPOINTMENT (OUTPATIENT)
Dept: PRIMARY CARE | Facility: CLINIC | Age: 69
End: 2024-05-20
Payer: MEDICARE

## 2024-05-20 ENCOUNTER — HOSPITAL ENCOUNTER (OUTPATIENT)
Dept: RADIOLOGY | Facility: CLINIC | Age: 69
Discharge: HOME | End: 2024-05-20
Payer: MEDICARE

## 2024-05-20 DIAGNOSIS — J06.9 UPPER RESPIRATORY TRACT INFECTION, UNSPECIFIED TYPE: Primary | ICD-10-CM

## 2024-05-20 DIAGNOSIS — J06.9 UPPER RESPIRATORY TRACT INFECTION, UNSPECIFIED TYPE: ICD-10-CM

## 2024-05-20 PROCEDURE — 71046 X-RAY EXAM CHEST 2 VIEWS: CPT

## 2024-05-20 PROCEDURE — 71046 X-RAY EXAM CHEST 2 VIEWS: CPT | Performed by: RADIOLOGY

## 2024-05-20 RX ORDER — ALBUTEROL SULFATE 90 UG/1
2 AEROSOL, METERED RESPIRATORY (INHALATION) EVERY 4 HOURS PRN
Qty: 8.5 G | Refills: 0 | Status: SHIPPED | OUTPATIENT
Start: 2024-05-20 | End: 2025-05-20

## 2024-05-20 RX ORDER — AMOXICILLIN AND CLAVULANATE POTASSIUM 875; 125 MG/1; MG/1
875 TABLET, FILM COATED ORAL 2 TIMES DAILY
Qty: 20 TABLET | Refills: 0 | Status: SHIPPED | OUTPATIENT
Start: 2024-05-20 | End: 2024-06-03 | Stop reason: ALTCHOICE

## 2024-05-21 ENCOUNTER — OFFICE VISIT (OUTPATIENT)
Dept: CARDIOLOGY | Facility: CLINIC | Age: 69
End: 2024-05-21
Payer: MEDICARE

## 2024-05-21 VITALS
SYSTOLIC BLOOD PRESSURE: 92 MMHG | HEIGHT: 72 IN | HEART RATE: 64 BPM | WEIGHT: 224 LBS | DIASTOLIC BLOOD PRESSURE: 52 MMHG | BODY MASS INDEX: 30.34 KG/M2

## 2024-05-21 DIAGNOSIS — I95.1 ORTHOSTATIC HYPOTENSION: ICD-10-CM

## 2024-05-21 DIAGNOSIS — R00.2 PALPITATIONS: Primary | ICD-10-CM

## 2024-05-21 DIAGNOSIS — I25.10 CORONARY ARTERY DISEASE INVOLVING NATIVE CORONARY ARTERY OF NATIVE HEART WITHOUT ANGINA PECTORIS: ICD-10-CM

## 2024-05-21 PROBLEM — I48.20 CHRONIC ATRIAL FIBRILLATION (MULTI): Status: RESOLVED | Noted: 2023-07-24 | Resolved: 2024-05-21

## 2024-05-21 PROCEDURE — 3048F LDL-C <100 MG/DL: CPT | Performed by: INTERNAL MEDICINE

## 2024-05-21 PROCEDURE — 99215 OFFICE O/P EST HI 40 MIN: CPT | Performed by: INTERNAL MEDICINE

## 2024-05-21 PROCEDURE — 1123F ACP DISCUSS/DSCN MKR DOCD: CPT | Performed by: INTERNAL MEDICINE

## 2024-05-21 PROCEDURE — 3074F SYST BP LT 130 MM HG: CPT | Performed by: INTERNAL MEDICINE

## 2024-05-21 PROCEDURE — 3008F BODY MASS INDEX DOCD: CPT | Performed by: INTERNAL MEDICINE

## 2024-05-21 PROCEDURE — 1036F TOBACCO NON-USER: CPT | Performed by: INTERNAL MEDICINE

## 2024-05-21 PROCEDURE — 1160F RVW MEDS BY RX/DR IN RCRD: CPT | Performed by: INTERNAL MEDICINE

## 2024-05-21 PROCEDURE — 3078F DIAST BP <80 MM HG: CPT | Performed by: INTERNAL MEDICINE

## 2024-05-21 PROCEDURE — 1159F MED LIST DOCD IN RCRD: CPT | Performed by: INTERNAL MEDICINE

## 2024-05-21 RX ORDER — ATORVASTATIN CALCIUM 40 MG/1
40 TABLET, FILM COATED ORAL DAILY
Qty: 30 TABLET | Refills: 11 | Status: SHIPPED | OUTPATIENT
Start: 2024-05-21 | End: 2025-05-21

## 2024-05-21 NOTE — PATIENT INSTRUCTIONS
"You should increase your intake of fresh fruits and vegetables.  Try to consume 9-12 servings per day of such foods.  You should increase your intake of deep sea fish such as salmon and tuna.  Try to get two servings per week of fish, but if you are a pregnant woman, talk to your obstetrician before increasing your fish intake.  You should increase your intake of unprocessed nuts such as walnuts or almonds.  Increase your intake of plant-based protein.  You should avoid fried foods.  Don't consume sugary or starchy foods and sugary drinks.  Avoid saturated fats.  Try not to dine at restaurants more than once per month, and don't dine at fast food places.  Try to get 7-9 hours of sleep every night.  Try to get 150 minutes per week of moderate intensity exercise (after I have cleared you to start an exercise program).  Try to maintain the appropriate weight for your height based on body mass index (BMI). Maintain your cholesterol, blood sugar, and blood pressure in the recommended respective normal ranges.  There is a wealth of information on the American Heart Association's website regarding this.  Just Google \"Life's Essential 8\" for more information.   Ask me about any of these details  if you have questions.    As your cardiologist, I will be available to you at any time to answer any question you have concerning your heart health.  My staff, Candace can also answer any questions you may have.  Best of luck.       It is important for us to have an accurate list of the medications, supplements, and their doses.  It is also important for us to have an accurate list of your allergies.  Please bring this information to every appointment.  This is a vital part of the quality of care you receive through all of your providers.   "

## 2024-05-21 NOTE — PROGRESS NOTES
Chief Complaint:   Please see below.     History Of Present Illness:    Levy Gregory is a 68 y.o. male presenting with CAD.    This 68-year-old diabetic, hyperlipidemic former cigar smoker with  CAD, and orthostatic hypotension returns to the office in routine follow-up.  Please see my prior note dated April 9, 2024 for complete details.  His SPECT on May 15, 2024 disclosed inferior wall infarction with moderate lateral wall ischemia, and an ejection fraction of 56% with inferior wall hypokinesis.    He is on anticoagulation for history of bilateral upper extremity DVTs, and a history of left lower extremity DVT. He has an IVC filter in place.  He saw Dr. Maldonado of vascular recently, and extraction of his IVC filter is scheduled for next week according to the patient.    A couple of times a week, he experiences a fast fluttering in his chest that lasts for a couple of minutes.  The symptoms are not exertional.  The patient denies chest discomfort,   orthopnea, PND, syncope.  He gets dizzy if he stands up quickly.      He is taking his tamsulosin and finasteride simultaneously.         Last Recorded Vitals:  Vitals:    05/21/24 0900   BP: 92/52   BP Location: Right arm   Patient Position: Sitting   Pulse: 64   Weight: 102 kg (224 lb)   Height: 1.829 m (6')       Past Medical History:  He has a past medical history of Achalasia, esophageal, Anemia, Contusion of abdominal wall, initial encounter (01/12/2021), COPD (chronic obstructive pulmonary disease) (Multi), Diabetes mellitus (Multi), DVT (deep venous thrombosis) (Multi), DVT (deep venous thrombosis) (Multi), Dysphagia, GERD (gastroesophageal reflux disease), Hemoptysis (05/12/2020), Herpes labialis, Hiatal hernia, Hyperlipidemia, Hypotension due to drugs, Irregular heart beat, Pain in left knee, Peripheral neuropathy, Personal history of other diseases of the respiratory system (06/19/2019), Sleep apnea, and Solar purpura (CMS-HCC).    Past Surgical  History:  He has a past surgical history that includes Other surgical history (01/21/2019); IR CVC PICC (08/16/2023); Umbilical hernia repair (N/A, 2013); Gastrostomy tube placement (N/A); Esophagogastroduodenoscopy; Esophagogastrectomy; and Conduit replacement (12/07/2023).      Social History:  He reports that he quit smoking about 14 months ago. His smoking use included cigars. He started smoking about 34 years ago. He has been exposed to tobacco smoke. He has never used smokeless tobacco. He reports that he does not currently use alcohol. He reports that he does not use drugs.    Family History:  Family History   Problem Relation Name Age of Onset    Diabetes Mother      Hypertension Mother      Diabetes type I Father          Allergies:  Patient has no known allergies.    Outpatient Medications:  Current Outpatient Medications   Medication Instructions    acetaminophen (TYLENOL) 650 mg, oral, Every 6 hours PRN    albuterol (ProAir HFA) 90 mcg/actuation inhaler 2 puffs, inhalation, Every 4 hours PRN    amoxicillin-pot clavulanate (Augmentin) 875-125 mg tablet 875 mg, oral, 2 times daily    apixaban (ELIQUIS) 5 mg, oral, 2 times daily    atorvastatin (LIPITOR) 40 mg, oral, Daily    blood sugar diagnostic (OneTouch Ultra Test) strip Test glucose twice daily or as directed    blood-glucose meter misc Check glucose before meals and call if less than 80 or over 300.    ferrous sulfate 65 mg, oral, Daily with breakfast, Do not crush, chew, or split.    finasteride (PROPECIA) 1 mg, oral, Daily, Do not crush, chew, or split.    FreeStyle Kellie 2 Burlison misc Use as instructed    FreeStyle Kellie 2 Sensor kit Use as instructed    gabapentin (NEURONTIN) 200 mg, oral, 3 times daily    midodrine (PROAMATINE) 10 mg, oral, 3 times daily    pantoprazole (PROTONIX) 40 mg, oral, Nightly, Do not crush, chew, or split.    sertraline (ZOLOFT) 25 mg, oral, Daily    tamsulosin (FLOMAX) 0.4 mg, oral, Daily       Physical Exam:  GENERAL:   pleasant 68 year-old  HEENT: No xanthelasma  NECK: Supple, no palpable adenopathy or thyromegaly  CHEST: Clear to auscultation, respiratory effort unlabored  CARDIAC: RRR, normal S1 and S2, no audible murmur, rub, gallop, carotids are brisk, PMI is not displaced  ABD: Active bowel sounds, nontender, no organomegaly, no evidence of ascites  EXT: No clubbing, cyanosis, edema, or tenderness  NEURO: Awake, alert, appropriate, speech is fluent       Last Labs:  CBC -  Lab Results   Component Value Date    WBC 7.2 03/06/2024    WBC 7.2 03/06/2024    HGB 12.8 (L) 03/06/2024    HCT 39.6 (L) 03/06/2024     (H) 03/06/2024     03/06/2024       CMP -  Lab Results   Component Value Date    CALCIUM 9.0 03/06/2024    PHOS 3.0 12/12/2023    PROT 6.8 03/06/2024    ALBUMIN 3.9 03/06/2024    AST 29 03/06/2024    ALT 37 03/06/2024    ALKPHOS 484 (H) 03/06/2024    BILITOT 0.4 03/06/2024       LIPID PANEL -   Lab Results   Component Value Date    CHOL 146 02/14/2024    TRIG 149 02/14/2024    HDL 38.3 02/14/2024    CHHDL 3.8 02/14/2024    LDLF 72 03/05/2019    VLDL 30 02/14/2024    NHDL 108 02/14/2024       RENAL FUNCTION PANEL -   Lab Results   Component Value Date    GLUCOSE 103 (H) 03/06/2024     03/06/2024    K 4.7 03/06/2024     03/06/2024    CO2 24 03/06/2024    ANIONGAP 12 03/06/2024    BUN 35 (H) 03/06/2024    CREATININE 1.30 03/06/2024    GFRMALE 43 (A) 09/05/2023    CALCIUM 9.0 03/06/2024    PHOS 3.0 12/12/2023    ALBUMIN 3.9 03/06/2024        Lab Results   Component Value Date    BNP 32 09/05/2023    HGBA1C 5.4 02/07/2024         Lab review: I have Chemistry CMP:   Lab Results   Component Value Date    ALBUMIN 3.9 03/06/2024    CALCIUM 9.0 03/06/2024    CO2 24 03/06/2024    CREATININE 1.30 03/06/2024    GLUCOSE 103 (H) 03/06/2024    BILITOT 0.4 03/06/2024    PROT 6.8 03/06/2024    ALT 37 03/06/2024    AST 29 03/06/2024    ALKPHOS 484 (H) 03/06/2024   , Chemistry BMP   Lab Results   Component Value  Date    GLUCOSE 103 (H) 03/06/2024    CALCIUM 9.0 03/06/2024    CO2 24 03/06/2024    CREATININE 1.30 03/06/2024   , CBC:  Lab Results   Component Value Date    WBC 7.2 03/06/2024    WBC 7.2 03/06/2024    RBC 3.80 (L) 03/06/2024    HGB 12.8 (L) 03/06/2024    HCT 39.6 (L) 03/06/2024     (H) 03/06/2024    MCH 33.7 03/06/2024    MCHC 32.3 03/06/2024    RDW 13.7 03/06/2024    MPV 9.1 10/26/2023    NRBC 0.0 03/06/2024   , and Lipids:   Lab Results   Component Value Date    CHOL 146 02/14/2024    HDL 38.3 02/14/2024    LDLCALC 78 02/14/2024    TRIG 149 02/14/2024     Diagnostic review: I have independently interpreted the Echocardiogram and SPECT .  My findings are as summarized in the respective reports.    Assessment/Plan   Problem List Items Addressed This Visit          Cardiac and Vasculature    Orthostatic hypotension    Coronary artery disease involving native coronary artery of native heart without angina pectoris    Relevant Medications    atorvastatin (Lipitor) 40 mg tablet    Other Relevant Orders    Follow Up In Cardiology    Mild left ventricular systolic dysfunction    Palpitations - Primary    Relevant Orders    Holter Or Event Cardiac Monitor    Follow Up In Cardiology   1.  Palpitations:  -Monitor study.    2.  Orthostatic hypotension: Aggravated by prostate medications which he takes simultaneously.  I advised him to separate his tamsulosin and finasteride by 12 hours.    3.  CAD: The patient has stable, functional class I CAD, and is doing well.  No additional testing is necessary at present. Important aspects of lifestyle modification were discussed in detail with the patient.  Recent labs and testing have been reviewed.  -His SPECT disclosed lateral ischemia and inferior infarction.  In light of his overall frail status and complex medical history, having spent the greater portion of last year in the hospital or in an extended care facility, will continue conservative medical management.  Should  he have breakthrough anginal symptoms, we could pursue cardiac cath.  -In light of his orthostasis, we really cannot start him on a beta-blocker.  -In light of his bleeding history, would not add aspirin to his Eliquis.    4.  History of DVTs, IVC filter in place: Has seen Dr. Martin, retrieval of IVC filter is scheduled for next week.      Papito Maldonado MD

## 2024-05-22 ENCOUNTER — APPOINTMENT (OUTPATIENT)
Dept: PHYSICAL THERAPY | Facility: CLINIC | Age: 69
End: 2024-05-22
Payer: MEDICARE

## 2024-05-22 ENCOUNTER — OFFICE VISIT (OUTPATIENT)
Dept: PRIMARY CARE | Facility: CLINIC | Age: 69
End: 2024-05-22
Payer: MEDICARE

## 2024-05-22 VITALS
SYSTOLIC BLOOD PRESSURE: 124 MMHG | BODY MASS INDEX: 30.34 KG/M2 | DIASTOLIC BLOOD PRESSURE: 62 MMHG | WEIGHT: 224 LBS | OXYGEN SATURATION: 100 % | HEIGHT: 72 IN | HEART RATE: 61 BPM

## 2024-05-22 DIAGNOSIS — J06.9 UPPER RESPIRATORY TRACT INFECTION, UNSPECIFIED TYPE: Primary | ICD-10-CM

## 2024-05-22 DIAGNOSIS — E78.49 OTHER HYPERLIPIDEMIA: ICD-10-CM

## 2024-05-22 DIAGNOSIS — I95.89 OTHER SPECIFIED HYPOTENSION: ICD-10-CM

## 2024-05-22 PROCEDURE — 3048F LDL-C <100 MG/DL: CPT | Performed by: NURSE PRACTITIONER

## 2024-05-22 PROCEDURE — 1160F RVW MEDS BY RX/DR IN RCRD: CPT | Performed by: NURSE PRACTITIONER

## 2024-05-22 PROCEDURE — 1123F ACP DISCUSS/DSCN MKR DOCD: CPT | Performed by: NURSE PRACTITIONER

## 2024-05-22 PROCEDURE — 1036F TOBACCO NON-USER: CPT | Performed by: NURSE PRACTITIONER

## 2024-05-22 PROCEDURE — 1159F MED LIST DOCD IN RCRD: CPT | Performed by: NURSE PRACTITIONER

## 2024-05-22 PROCEDURE — 3078F DIAST BP <80 MM HG: CPT | Performed by: NURSE PRACTITIONER

## 2024-05-22 PROCEDURE — 3008F BODY MASS INDEX DOCD: CPT | Performed by: NURSE PRACTITIONER

## 2024-05-22 PROCEDURE — 99213 OFFICE O/P EST LOW 20 MIN: CPT | Performed by: NURSE PRACTITIONER

## 2024-05-22 PROCEDURE — 3074F SYST BP LT 130 MM HG: CPT | Performed by: NURSE PRACTITIONER

## 2024-05-22 NOTE — PROGRESS NOTES
Subjective   Patient ID: Levy Gregory is a 68 y.o. male who presents for Continued congestion .    HPI     Pt tx in office 5/13/24 for URI  Started on Zpack    5/20/24 Received call that he thought the antibiotic helped but now feels like he is having increased cough and congestion.   Instructed to obtain chest xray, start Albuterol inhaler and begin Amoxicillin.     Chest neg for cardiopulmonary infiltrate    Today pt states that he is improving and feeling better  Feels like he may still be congested in his chest but can tell that it is breaking up. He is able to cough up clear phlegm.   He is tolerating the antibiotic.   Using the inhaler 2-3 times a day which he feels is helping.   Continues to drink a good amount of water    Met with cardio yesterday.   He was told to separate his Tamsulosin and Finasteride by 12 hours  Also Lipitor was increased to 40 mg.     Past Medical History:   Diagnosis Date    Achalasia, esophageal     per patient of esophagus    Anemia     Contusion of abdominal wall, initial encounter 01/12/2021    Contusion, flank    COPD (chronic obstructive pulmonary disease) (Multi)     Diabetes mellitus (Multi)     F/W PCP    DVT (deep venous thrombosis) (Multi)     DVT (deep venous thrombosis) (Multi)     IVC filter placed on 04/20/2023 and on Eliquis    Dysphagia     GERD (gastroesophageal reflux disease)     Hemoptysis 05/12/2020    Cough with hemoptysis    Herpes labialis     Hiatal hernia     Hernia Repair    Hyperlipidemia     Hypotension due to drugs     Irregular heart beat     AFIB: patient denies    Pain in left knee     Peripheral neuropathy     Personal history of other diseases of the respiratory system 06/19/2019    History of bronchitis    Sleep apnea     Patient states does not have sleep apnea since martinez loss and does not use CPAP    Solar purpura (CMS-HCC)        Review of Systems    Objective   /62   Pulse 61   Ht 1.829 m (6')   Wt 102 kg (224 lb)   SpO2 100%    BMI 30.38 kg/m²     Physical Exam    Alert and oriented x 3  Neck supple without  lymph adenopathy   Heart regular rate and rhythm without murmur  Lungs clear to auscultation.  Gait is non-antalgic  Speech clear.    Psych: Normal affect. Good judgment and insight.       Assessment/Plan     1. Upper respiratory tract infection, unspecified type  Improved.   Continue Albuterol inhaler 2-3 times per day until cough resolves.   Continue antibiotic  Continue to increase water intake    2. Other specified hypotension  Instructed by cardio to separate his Tamsulosin and Finasteride by 12 hours    3. Other hyperlipidemia  Cardio increased Lipitor to 40 mg       Contact office with any questions or concerns.   Preferred communication is via  [employer]      Call [employer]: [phone] to assist with scheduling.      [facility]  [address], [address]  [place] [address]  Phone: [phone]    **Charting was completed using voice recognition technology and may include unintended errors**

## 2024-05-29 ENCOUNTER — APPOINTMENT (OUTPATIENT)
Dept: PHYSICAL THERAPY | Facility: CLINIC | Age: 69
End: 2024-05-29
Payer: MEDICARE

## 2024-05-31 ENCOUNTER — HOSPITAL ENCOUNTER (OUTPATIENT)
Dept: RADIOLOGY | Facility: HOSPITAL | Age: 69
Discharge: HOME | End: 2024-05-31
Payer: MEDICARE

## 2024-05-31 VITALS
HEIGHT: 73 IN | DIASTOLIC BLOOD PRESSURE: 90 MMHG | SYSTOLIC BLOOD PRESSURE: 141 MMHG | TEMPERATURE: 97 F | WEIGHT: 220 LBS | HEART RATE: 73 BPM | OXYGEN SATURATION: 94 % | BODY MASS INDEX: 29.16 KG/M2 | RESPIRATION RATE: 14 BRPM

## 2024-05-31 DIAGNOSIS — Z86.718 HISTORY OF DVT (DEEP VEIN THROMBOSIS): ICD-10-CM

## 2024-05-31 LAB
ERYTHROCYTE [DISTWIDTH] IN BLOOD BY AUTOMATED COUNT: 13.4 % (ref 11.5–14.5)
HCT VFR BLD AUTO: 43.7 % (ref 41–52)
HGB BLD-MCNC: 13.4 G/DL (ref 13.5–17.5)
INR PPP: 1.4 (ref 0.9–1.1)
MCH RBC QN AUTO: 32.1 PG (ref 26–34)
MCHC RBC AUTO-ENTMCNC: 30.7 G/DL (ref 32–36)
MCV RBC AUTO: 105 FL (ref 80–100)
NRBC BLD-RTO: 0 /100 WBCS (ref 0–0)
PLATELET # BLD AUTO: 195 X10*3/UL (ref 150–450)
PROTHROMBIN TIME: 15.7 SECONDS (ref 9.8–12.8)
RBC # BLD AUTO: 4.17 X10*6/UL (ref 4.5–5.9)
WBC # BLD AUTO: 8.2 X10*3/UL (ref 4.4–11.3)

## 2024-05-31 PROCEDURE — C1769 GUIDE WIRE: HCPCS

## 2024-05-31 PROCEDURE — 99153 MOD SED SAME PHYS/QHP EA: CPT

## 2024-05-31 PROCEDURE — 77001 FLUOROGUIDE FOR VEIN DEVICE: CPT | Mod: RT | Performed by: RADIOLOGY

## 2024-05-31 PROCEDURE — 85027 COMPLETE CBC AUTOMATED: CPT | Performed by: RADIOLOGY

## 2024-05-31 PROCEDURE — 37193 REM ENDOVAS VENA CAVA FILTER: CPT | Performed by: RADIOLOGY

## 2024-05-31 PROCEDURE — 7100000002 HC RECOVERY ROOM TIME - EACH INCREMENTAL 1 MINUTE

## 2024-05-31 PROCEDURE — 2720000007 HC OR 272 NO HCPCS

## 2024-05-31 PROCEDURE — 7100000001 HC RECOVERY ROOM TIME - INITIAL BASE CHARGE

## 2024-05-31 PROCEDURE — 2550000001 HC RX 255 CONTRASTS: Performed by: RADIOLOGY

## 2024-05-31 PROCEDURE — 99153 MOD SED SAME PHYS/QHP EA: CPT | Performed by: RADIOLOGY

## 2024-05-31 PROCEDURE — 2500000004 HC RX 250 GENERAL PHARMACY W/ HCPCS (ALT 636 FOR OP/ED): Performed by: RADIOLOGY

## 2024-05-31 PROCEDURE — 7100000009 HC PHASE TWO TIME - INITIAL BASE CHARGE

## 2024-05-31 PROCEDURE — 75827 VEIN X-RAY CHEST: CPT | Performed by: RADIOLOGY

## 2024-05-31 PROCEDURE — C1773 RET DEV, INSERTABLE: HCPCS

## 2024-05-31 PROCEDURE — C1894 INTRO/SHEATH, NON-LASER: HCPCS

## 2024-05-31 PROCEDURE — 36010 PLACE CATHETER IN VEIN: CPT | Performed by: RADIOLOGY

## 2024-05-31 PROCEDURE — 85610 PROTHROMBIN TIME: CPT | Performed by: RADIOLOGY

## 2024-05-31 PROCEDURE — 36415 COLL VENOUS BLD VENIPUNCTURE: CPT | Performed by: RADIOLOGY

## 2024-05-31 PROCEDURE — 7100000010 HC PHASE TWO TIME - EACH INCREMENTAL 1 MINUTE

## 2024-05-31 PROCEDURE — 99152 MOD SED SAME PHYS/QHP 5/>YRS: CPT

## 2024-05-31 PROCEDURE — 37193 REM ENDOVAS VENA CAVA FILTER: CPT | Mod: RT | Performed by: RADIOLOGY

## 2024-05-31 PROCEDURE — 99152 MOD SED SAME PHYS/QHP 5/>YRS: CPT | Performed by: RADIOLOGY

## 2024-05-31 PROCEDURE — 2500000005 HC RX 250 GENERAL PHARMACY W/O HCPCS: Performed by: RADIOLOGY

## 2024-05-31 RX ORDER — SODIUM CHLORIDE 9 MG/ML
50 INJECTION, SOLUTION INTRAVENOUS CONTINUOUS
Status: DISCONTINUED | OUTPATIENT
Start: 2024-05-31 | End: 2024-06-01 | Stop reason: HOSPADM

## 2024-05-31 RX ORDER — LIDOCAINE HYDROCHLORIDE 10 MG/ML
INJECTION, SOLUTION EPIDURAL; INFILTRATION; INTRACAUDAL; PERINEURAL
Status: COMPLETED | OUTPATIENT
Start: 2024-05-31 | End: 2024-05-31

## 2024-05-31 RX ORDER — FENTANYL CITRATE 50 UG/ML
INJECTION, SOLUTION INTRAMUSCULAR; INTRAVENOUS
Status: COMPLETED | OUTPATIENT
Start: 2024-05-31 | End: 2024-05-31

## 2024-05-31 RX ORDER — MIDAZOLAM HYDROCHLORIDE 1 MG/ML
INJECTION, SOLUTION INTRAMUSCULAR; INTRAVENOUS
Status: COMPLETED | OUTPATIENT
Start: 2024-05-31 | End: 2024-05-31

## 2024-05-31 RX ADMIN — Medication 3 L/MIN: at 08:16

## 2024-05-31 RX ADMIN — SODIUM CHLORIDE 50 ML/HR: 9 INJECTION, SOLUTION INTRAVENOUS at 08:10

## 2024-05-31 RX ADMIN — MIDAZOLAM 2 MG: 1 INJECTION INTRAMUSCULAR; INTRAVENOUS at 08:30

## 2024-05-31 RX ADMIN — FENTANYL CITRATE 50 MCG: 50 INJECTION, SOLUTION INTRAMUSCULAR; INTRAVENOUS at 08:31

## 2024-05-31 RX ADMIN — LIDOCAINE HYDROCHLORIDE 5 ML: 10 INJECTION, SOLUTION EPIDURAL; INFILTRATION; INTRACAUDAL; PERINEURAL at 08:38

## 2024-05-31 RX ADMIN — IOHEXOL 35 ML: 350 INJECTION, SOLUTION INTRAVENOUS at 09:04

## 2024-05-31 ASSESSMENT — PAIN SCALES - GENERAL

## 2024-05-31 ASSESSMENT — PAIN - FUNCTIONAL ASSESSMENT: PAIN_FUNCTIONAL_ASSESSMENT: 0-10

## 2024-05-31 NOTE — PRE-PROCEDURE NOTE
Interventional Radiology Preprocedure Note    Indication for procedure: The encounter diagnosis was History of DVT (deep vein thrombosis). IVC filter. Patent has been on AC and no longer needs this filter.    Relevant review of systems: NA    Relevant Labs:   Lab Results   Component Value Date    CREATININE 1.30 03/06/2024    EGFR 60 (L) 03/06/2024    INR 1.4 (H) 05/31/2024    PROTIME 15.7 (H) 05/31/2024       Planned Sedation/Anesthesia: Moderate    Airway assessment: normal    Directed physical examination:    Awake and alert, oriented  No acute distress  Regular rate, rhythm  Breathing non-labored    Mallampati: II (hard and soft palate, upper portion of tonsils and uvula visible)    ASA Score: ASA 2 - Patient with mild systemic disease with no functional limitations    Benefits, risks and alternatives of procedure and planned sedation have been discussed with the patient and/or their representative. All questions answered and they agree to proceed.

## 2024-05-31 NOTE — POST-PROCEDURE NOTE
Interventional Radiology Brief Postprocedure Note    Attending: Benito Wood MD      Assistant: none    Diagnosis: IVC filter - filtration no longer indicated    Description of procedure: Cavagram shows no filter associated, or other caval thrombus. Hook of filter embedded in the wall. Using advanced techniques this was disengaged and the filter was removed without incident. Final cavagram without abnormality.     Anesthesia:  Moderate    Complications: None    Estimated Blood Loss:  10 cc's    Medications (Filter: Administrations occurring from 0809 to 0859 on 05/31/24) As of 05/31/24 0859      sodium chloride 0.9% infusion (mL/hr) Total volume:  Not documented* Dosing weight:  102   *Total volume has not been documented. View each administration to see the amount administered.     Date/Time Rate/Dose/Volume Action       05/31/24  0810 50 mL/hr New Bag               oxygen (O2) therapy (L/min) Total volume:  Not documented*   *Total volume has not been documented. View each administration to see the amount administered.     Date/Time Rate/Dose/Volume Action       05/31/24  0816 3 L/min - 180,000 mL/hr New Bag               midazolam (Versed) injection (mg) Total dose:  2 mg      Date/Time Rate/Dose/Volume Action       05/31/24  0830 2 mg Given               fentaNYL PF (Sublimaze) injection (mcg) Total dose:  50 mcg      Date/Time Rate/Dose/Volume Action       05/31/24  0831 50 mcg Given               lidocaine PF (Xylocaine) 10 mg/mL (1 %) injection (mL) Total volume:  5 mL      Date/Time Rate/Dose/Volume Action       05/31/24  0838 5 mL Given                   No specimens collected      See detailed result report with images in PACS.    The patient tolerated the procedure well without incident or complication and is in stable condition.

## 2024-06-03 ENCOUNTER — OFFICE VISIT (OUTPATIENT)
Dept: UROLOGY | Facility: CLINIC | Age: 69
End: 2024-06-03
Payer: MEDICARE

## 2024-06-03 VITALS — SYSTOLIC BLOOD PRESSURE: 109 MMHG | DIASTOLIC BLOOD PRESSURE: 69 MMHG | HEART RATE: 89 BPM | TEMPERATURE: 97.2 F

## 2024-06-03 DIAGNOSIS — N40.1 BPH WITH OBSTRUCTION/LOWER URINARY TRACT SYMPTOMS: ICD-10-CM

## 2024-06-03 DIAGNOSIS — N13.8 BPH WITH OBSTRUCTION/LOWER URINARY TRACT SYMPTOMS: ICD-10-CM

## 2024-06-03 DIAGNOSIS — R33.9 URINARY RETENTION: Primary | ICD-10-CM

## 2024-06-03 PROCEDURE — 1123F ACP DISCUSS/DSCN MKR DOCD: CPT | Performed by: NURSE PRACTITIONER

## 2024-06-03 PROCEDURE — 1159F MED LIST DOCD IN RCRD: CPT | Performed by: NURSE PRACTITIONER

## 2024-06-03 PROCEDURE — 99214 OFFICE O/P EST MOD 30 MIN: CPT | Performed by: NURSE PRACTITIONER

## 2024-06-03 PROCEDURE — 3078F DIAST BP <80 MM HG: CPT | Performed by: NURSE PRACTITIONER

## 2024-06-03 PROCEDURE — 3074F SYST BP LT 130 MM HG: CPT | Performed by: NURSE PRACTITIONER

## 2024-06-03 PROCEDURE — 3048F LDL-C <100 MG/DL: CPT | Performed by: NURSE PRACTITIONER

## 2024-06-03 PROCEDURE — 1036F TOBACCO NON-USER: CPT | Performed by: NURSE PRACTITIONER

## 2024-06-03 PROCEDURE — 3008F BODY MASS INDEX DOCD: CPT | Performed by: NURSE PRACTITIONER

## 2024-06-03 RX ORDER — TADALAFIL 5 MG/1
5 TABLET ORAL DAILY
Qty: 90 TABLET | Refills: 1 | Status: SHIPPED | OUTPATIENT
Start: 2024-06-03 | End: 2024-11-30

## 2024-06-03 NOTE — PROGRESS NOTES
Subjective   Patient ID: Levy Gregory is a 68 y.o. male who presents for PVR Check .  Hx of paraesophageal hernia and achalasia in March 2023 complicated by sepsis. He had multiple complications afterwards requiring lengthy ICU stay. He has also had several feeding tubes, etc.  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      Unsure exactly when catheter was initially placed, however he has failed multiple TOVs per his recollection. Denies urinary difficulties prior to surgeries in 2023. Passed in office TOV in Jan. NTF up to 4 since cath removal, very bothersome to him. Wakes him up, some dysuria if he delays urination. Moderate volume voids      Denies family history of prostate cancer.     Drinks an energy drink a few times a week, 32 oz of water daily, coffee in the AM, and apple juice. Stops drinking at least 2 hours before bed.              Review of Systems   All other systems reviewed and are negative.      Objective   Physical Exam  Vitals reviewed.     Alert and oriented x3  Moist mucous membranes  Breathes easily on room air  Abdomen soft, nondistended  No edema  No scleral icterus  No focal neurological deficits  Appears stated age, no acute distress  Declining STEVEN       Assessment/Plan   Diagnoses and all orders for this visit:  Urinary retention  -     Post-Void Residual  -     tadalafil (Cialis) 5 mg tablet; Take 1 tablet (5 mg) by mouth once daily.  BPH with obstruction/lower urinary tract symptoms  -     tadalafil (Cialis) 5 mg tablet; Take 1 tablet (5 mg) by mouth once daily.    Will add tadalafil to tamsulosin at bedtime. Encouraged to elevate legs in the evening. Minimize fluid intake. Plan for 6-8 week follow up.        Kayli Gotti, IVÁN-CNP 06/03/24 4:08 PM

## 2024-06-04 ENCOUNTER — TELEPHONE (OUTPATIENT)
Dept: PRIMARY CARE | Facility: CLINIC | Age: 69
End: 2024-06-04
Payer: MEDICARE

## 2024-06-04 ENCOUNTER — HOSPITAL ENCOUNTER (OUTPATIENT)
Dept: CARDIOLOGY | Facility: HOSPITAL | Age: 69
Discharge: HOME | End: 2024-06-04
Payer: MEDICARE

## 2024-06-04 DIAGNOSIS — I95.9 HYPOTENSION, UNSPECIFIED HYPOTENSION TYPE: ICD-10-CM

## 2024-06-04 DIAGNOSIS — R00.2 PALPITATIONS: ICD-10-CM

## 2024-06-04 PROCEDURE — 93246 EXT ECG>7D<15D RECORDING: CPT

## 2024-06-04 RX ORDER — MIDODRINE HYDROCHLORIDE 5 MG/1
10 TABLET ORAL 3 TIMES DAILY
Qty: 270 TABLET | Refills: 0 | OUTPATIENT
Start: 2024-06-04

## 2024-06-04 RX ORDER — MIDODRINE HYDROCHLORIDE 5 MG/1
10 TABLET ORAL 3 TIMES DAILY
Qty: 270 TABLET | Refills: 0 | Status: SHIPPED | OUTPATIENT
Start: 2024-06-04

## 2024-06-04 NOTE — TELEPHONE ENCOUNTER
Dr. ARRIOLA pt and Dr. LAWRENCE is covering    Refill:    Midodrine 5mg take one tab 3 times a day    Pharm: DM # [phone]    LR: 04/08/24 qty 270 no refills   LV: 05/22/24  NV: 07/26/24

## 2024-07-06 ENCOUNTER — APPOINTMENT (OUTPATIENT)
Dept: RADIOLOGY | Facility: HOSPITAL | Age: 69
End: 2024-07-06
Payer: MEDICARE

## 2024-07-06 ENCOUNTER — HOSPITAL ENCOUNTER (EMERGENCY)
Facility: HOSPITAL | Age: 69
Discharge: HOME | End: 2024-07-06
Attending: STUDENT IN AN ORGANIZED HEALTH CARE EDUCATION/TRAINING PROGRAM
Payer: MEDICARE

## 2024-07-06 VITALS
SYSTOLIC BLOOD PRESSURE: 122 MMHG | TEMPERATURE: 98 F | WEIGHT: 220 LBS | BODY MASS INDEX: 33.34 KG/M2 | DIASTOLIC BLOOD PRESSURE: 78 MMHG | RESPIRATION RATE: 15 BRPM | HEART RATE: 70 BPM | OXYGEN SATURATION: 97 % | HEIGHT: 68 IN
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
## 2024-07-06 DIAGNOSIS — M25.512 ACUTE PAIN OF LEFT SHOULDER: Primary | ICD-10-CM

## 2024-07-06 PROCEDURE — 73030 X-RAY EXAM OF SHOULDER: CPT | Mod: LT

## 2024-07-06 PROCEDURE — 72128 CT CHEST SPINE W/O DYE: CPT | Performed by: RADIOLOGY

## 2024-07-06 PROCEDURE — 73030 X-RAY EXAM OF SHOULDER: CPT | Mod: LEFT SIDE | Performed by: RADIOLOGY

## 2024-07-06 PROCEDURE — 70450 CT HEAD/BRAIN W/O DYE: CPT | Performed by: RADIOLOGY

## 2024-07-06 PROCEDURE — 99284 EMERGENCY DEPT VISIT MOD MDM: CPT | Performed by: STUDENT IN AN ORGANIZED HEALTH CARE EDUCATION/TRAINING PROGRAM

## 2024-07-06 PROCEDURE — 72128 CT CHEST SPINE W/O DYE: CPT

## 2024-07-06 PROCEDURE — 71045 X-RAY EXAM CHEST 1 VIEW: CPT

## 2024-07-06 PROCEDURE — 99285 EMERGENCY DEPT VISIT HI MDM: CPT

## 2024-07-06 PROCEDURE — 72131 CT LUMBAR SPINE W/O DYE: CPT

## 2024-07-06 PROCEDURE — 71045 X-RAY EXAM CHEST 1 VIEW: CPT | Performed by: RADIOLOGY

## 2024-07-06 PROCEDURE — 72125 CT NECK SPINE W/O DYE: CPT

## 2024-07-06 PROCEDURE — 72125 CT NECK SPINE W/O DYE: CPT | Performed by: RADIOLOGY

## 2024-07-06 PROCEDURE — 72131 CT LUMBAR SPINE W/O DYE: CPT | Performed by: RADIOLOGY

## 2024-07-06 PROCEDURE — 70450 CT HEAD/BRAIN W/O DYE: CPT

## 2024-07-06 PROCEDURE — 99285 EMERGENCY DEPT VISIT HI MDM: CPT | Mod: 25

## 2024-07-06 RX ORDER — ACETAMINOPHEN 325 MG/1
975 TABLET ORAL ONCE
Status: COMPLETED | OUTPATIENT
Start: 2024-07-06 | End: 2024-07-06

## 2024-07-06 RX ADMIN — ACETAMINOPHEN 975 MG: 325 TABLET ORAL at 07:28

## 2024-07-06 ASSESSMENT — PAIN - FUNCTIONAL ASSESSMENT
PAIN_FUNCTIONAL_ASSESSMENT: 0-10

## 2024-07-06 ASSESSMENT — PAIN SCALES - GENERAL
PAINLEVEL_OUTOF10: 9
PAINLEVEL_OUTOF10: 3
PAINLEVEL_OUTOF10: 0 - NO PAIN
PAINLEVEL_OUTOF10: 0 - NO PAIN
PAINLEVEL_OUTOF10: 3
PAINLEVEL_OUTOF10: 0 - NO PAIN

## 2024-07-06 ASSESSMENT — LIFESTYLE VARIABLES
TOTAL SCORE: 0
EVER FELT BAD OR GUILTY ABOUT YOUR DRINKING: NO
HAVE YOU EVER FELT YOU SHOULD CUT DOWN ON YOUR DRINKING: NO
EVER HAD A DRINK FIRST THING IN THE MORNING TO STEADY YOUR NERVES TO GET RID OF A HANGOVER: NO
HAVE PEOPLE ANNOYED YOU BY CRITICIZING YOUR DRINKING: NO

## 2024-07-06 ASSESSMENT — COLUMBIA-SUICIDE SEVERITY RATING SCALE - C-SSRS
2. HAVE YOU ACTUALLY HAD ANY THOUGHTS OF KILLING YOURSELF?: NO
1. IN THE PAST MONTH, HAVE YOU WISHED YOU WERE DEAD OR WISHED YOU COULD GO TO SLEEP AND NOT WAKE UP?: NO
6. HAVE YOU EVER DONE ANYTHING, STARTED TO DO ANYTHING, OR PREPARED TO DO ANYTHING TO END YOUR LIFE?: NO

## 2024-07-06 NOTE — PROGRESS NOTES
Emergency Medicine Transition of Care Note.    I received Levy Gregory in signout from Dr. Arevalo.  Please see the previous ED provider note for all HPI, PE and MDM up to the time of signout at 0730. This is in addition to the primary record.    In brief Levy Gregory is an 68 y.o. male presenting for   Chief Complaint   Patient presents with    Fall   Patient previously declined labs.  At the time of signout we were awaiting: CT head, C-spine, T-spine, L-spine, x-ray chest and left shoulder for dispo    ED Course as of 07/06/24 0845   Sat Jul 06, 2024   0753 CT head W O contrast trauma protocol  Stable age related changes without acute intracranial process. [CB]   0753 CT cervical spine wo IV contrast  Marked cervical spondylosis without acute fracture or facet  subluxation. Previous esophagectomy with gastric pull-through.   [CB]   0837 XR shoulder left 2+ views  Medial portion of the left clavicle not visualized; the deformity is  most likely related to gastric pull-through surgery. Marked  glenohumeral and acromioclavicular osteoarthritis. No acute fracture.   [CB]   0837 XR chest 1 view  Limited study. Mild cardiomegaly. Mediastinal prominence related to  gastric pull-through. Right basilar atelectatic changes.   [CB]   0838 CT thoracic spine wo IV contrast  1. Degenerative changes and multiple level disc disease of the  thoracic and lumbar spine as detailed above.  2. Loss of height of the T12 and L1 vertebral bodies with irregular  superior endplate, however no acute fracture or subluxation seen.  3. Chronic findings as above including nonobstructing bilateral renal  calculi, remote granulomatous disease, bibasilar atelectatic changes  and previous esophagectomy with gastric pull-through.   [CB]   0845 Ortho referral provided for left shoulder pain [CB]      ED Course User Index  [CB] Lucho Maradiaga DO         Diagnoses as of 07/06/24 0845   Fall, initial encounter   Acute pain of left shoulder        Medical Decision Making  Shared decision making for disposition  Patient and/or patient´s representative was counseled regarding labs, imaging, likely diagnosis. All questions were answered. Recommendation was made   for discharge home. The patient agreed and was discharged home in stable condition with appropriate relevant educational materials. Return precautions were provided which included worsening headache, nausea, vomiting, fever of 38C (100.4) or higher, confusion, difficulty concentrating change in personality, difficulty waking from sleep, neck pain, fainting, seizure, or any new or worsening symptoms..       Final diagnoses:   [W19.XXXA] Fall, initial encounter   [M25.512] Acute pain of left shoulder           Procedure  Procedures    Lucho Maradiaga DO     Reviewed and approved by LUCHO MARADIAGA on 7/6/24 at 8:45 AM.

## 2024-07-06 NOTE — ED TRIAGE NOTES
Pt fell down about 4 steps, slipped while carrying food, hit back of head and slid down the four steps, hitting back. Pt on eliquis. Positive LOC, unwitnessed fall.

## 2024-07-06 NOTE — ED PROVIDER NOTES
EMERGENCY DEPARTMENT ENCOUNTER      Pt Name: Levy Gregory  MRN: [id number]  Birthdate 1955  Date of evaluation: 7/6/2024  Provider: Karan Arevalo DO    CHIEF COMPLAINT       Chief Complaint   Patient presents with    Fall         HISTORY OF PRESENT ILLNESS    HPI    68-year-old male presenting to the emergency department as a a fall with head injury on Eliquis from triage.  Patient states he was walking up 4 steps when his legs gave out on him and he fell backwards hitting his left shoulder and head on the ground.  Fall was unwitnessed however wife reports she heard the fall and when she came running evaluated patient he was conscious but dazed.  Patient states he is unsure if he lost consciousness.  Currently complaining of pain in the shoulder but otherwise denying any complaints.    Nursing Notes were reviewed.    PAST MEDICAL HISTORY     Past Medical History:   Diagnosis Date    Achalasia, esophageal     per patient of esophagus    Anemia     Contusion of abdominal wall, initial encounter 01/12/2021    Contusion, flank    COPD (chronic obstructive pulmonary disease) (Multi)     Diabetes mellitus (Multi)     F/W PCP    DVT (deep venous thrombosis) (Multi)     DVT (deep venous thrombosis) (Multi)     IVC filter placed on 04/20/2023 and on Eliquis    Dysphagia     GERD (gastroesophageal reflux disease)     Hemoptysis 05/12/2020    Cough with hemoptysis    Herpes labialis     Hiatal hernia     Hernia Repair    Hyperlipidemia     Hypotension due to drugs     Irregular heart beat     AFIB: patient denies    Pain in left knee     Peripheral neuropathy     Personal history of other diseases of the respiratory system 06/19/2019    History of bronchitis    Sleep apnea     Patient states does not have sleep apnea since martinez loss and does not use CPAP    Solar purpura (CMS-HCC)          SURGICAL HISTORY       Past Surgical History:   Procedure Laterality Date    CONDUIT REPLACEMENT  12/07/2023    Gastric  conduit revision; mini laparotomy, General Ex-lap, ISA, Gastric pull up with anastmosis in neck, MIGUELINA to bulb sx. New J tube.    ESOPHAGOGASTRECTOMY      ESOPHAGOGASTRODUODENOSCOPY      with stents x2    GASTROSTOMY TUBE PLACEMENT N/A     IR CVC PICC  08/16/2023    IR CVC PICC 8/16/2023 [facility]    OTHER SURGICAL HISTORY  01/21/2019    Giovanna filter placement    UMBILICAL HERNIA REPAIR N/A 2013         CURRENT MEDICATIONS       Previous Medications    ACETAMINOPHEN (TYLENOL) 650 MG/20.3 ML SOLUTION ORAL LIQUID    Take 20.3 mL (650 mg) by mouth every 6 hours if needed for pain.    ALBUTEROL (PROAIR HFA) 90 MCG/ACTUATION INHALER    Inhale 2 puffs every 4 hours if needed for wheezing or shortness of breath.    APIXABAN (ELIQUIS) 5 MG TABLET    Take 1 tablet (5 mg) by mouth 2 times a day.    ATORVASTATIN (LIPITOR) 40 MG TABLET    Take 1 tablet (40 mg) by mouth once daily.    BLOOD SUGAR DIAGNOSTIC (ONETOUCH ULTRA TEST) STRIP    Test glucose twice daily or as directed    BLOOD-GLUCOSE METER MISC    Check glucose before meals and call if less than 80 or over 300.    FERROUS SULFATE 325 (65 FE) MG EC TABLET    Take 65 mg by mouth once daily with breakfast. Do not crush, chew, or split.    FINASTERIDE (PROPECIA) 1 MG TABLET    Take 1 tablet (1 mg) by mouth once daily. Do not crush, chew, or split.    FREESTYLE VIPUL 2 READER MISC    Use as instructed    FREESTYLE VIPUL 2 SENSOR KIT    Use as instructed    GABAPENTIN (NEURONTIN) 100 MG CAPSULE    Take 2 capsules (200 mg) by mouth 3 times a day.    MIDODRINE (PROAMATINE) 5 MG TABLET    Take 2 tablets (10 mg) by mouth 3 times a day.    PANTOPRAZOLE (PROTONIX) 40 MG EC TABLET    Take 1 tablet (40 mg) by mouth once daily at bedtime. Do not crush, chew, or split.    SERTRALINE (ZOLOFT) 50 MG TABLET    Take 0.5 tablets (25 mg) by mouth once daily.    TADALAFIL (CIALIS) 5 MG TABLET    Take 1 tablet (5 mg) by mouth once daily.    TAMSULOSIN (FLOMAX) 0.4 MG 24 HR  CAPSULE    Take 1 capsule (0.4 mg) by mouth once daily.       ALLERGIES     Patient has no known allergies.    FAMILY HISTORY       Family History   Problem Relation Name Age of Onset    Diabetes Mother      Hypertension Mother      Diabetes type I Father            SOCIAL HISTORY       Social History     Socioeconomic History    Marital status:      Spouse name: None    Number of children: None    Years of education: None    Highest education level: None   Occupational History    None   Tobacco Use    Smoking status: Former     Types: Cigars     Start date: 1989     Quit date: 2023     Years since quittin.3     Passive exposure: Past    Smokeless tobacco: Never   Vaping Use    Vaping status: Never Used   Substance and Sexual Activity    Alcohol use: Not Currently    Drug use: Never    Sexual activity: Defer   Other Topics Concern    None   Social History Narrative    None     Social Determinants of Health     Financial Resource Strain: Low Risk  (2023)    Overall Financial Resource Strain (CARDIA)     Difficulty of Paying Living Expenses: Not hard at all   Food Insecurity: No Food Insecurity (10/20/2023)    Hunger Vital Sign     Worried About Running Out of Food in the Last Year: Never true     Ran Out of Food in the Last Year: Never true   Transportation Needs: No Transportation Needs (2023)    PRAPARE - Transportation     Lack of Transportation (Medical): No     Lack of Transportation (Non-Medical): No   Physical Activity: Unknown (10/20/2023)    Exercise Vital Sign     Days of Exercise per Week: Not on file     Minutes of Exercise per Session: 30 min   Stress: Stress Concern Present (10/20/2023)    [employer] - Occupational Stress Questionnaire     Feeling of Stress : To some extent   Social Connections: Socially Isolated (10/20/2023)    Social Connection and Isolation Panel [NHANES]     Frequency of Communication with Friends and Family: More than  three times a week     Frequency of Social Gatherings with Friends and Family: Not on file     Attends Mandaeism Services: Never     Active Member of Clubs or Organizations: No     Attends Club or Organization Meetings: Never     Marital Status:    Intimate Partner Violence: Not At Risk (10/20/2023)    Humiliation, Afraid, Rape, and Kick questionnaire     Fear of Current or Ex-Partner: No     Emotionally Abused: No     Physically Abused: No     Sexually Abused: No   Housing Stability: Low Risk  (11/30/2023)    Housing Stability Vital Sign     Unable to Pay for Housing in the Last Year: No     Number of Places Lived in the Last Year: 1     Unstable Housing in the Last Year: No       SCREENINGS                        PHYSICAL EXAM    (up to 7 for level 4, 8 or more for level 5)     ED Triage Vitals   Temperature Heart Rate Respirations BP   07/06/24 0604 07/06/24 0603 07/06/24 0604 07/06/24 0603   36.7 °C (98 °F) 93 20 116/73      Pulse Ox Temp src Heart Rate Source Patient Position   07/06/24 0603 -- -- --   97 %         BP Location FiO2 (%)     -- --             Physical Exam     Airway intact, speaking full sentences  Breath sounds equal and clear bilaterally with good aeration and normal chest rise  2+ radial, femoral, DP, PT pulses bilaterally  GCS 15, No focal neurological deficits, moving all four extremities to command  No gross traumatic injuries appreciated    GENERAL:   Alert & oriented.  No acute distress.  Non toxic appearing, comfortable and cooperative.    HEAD:  Normocephalic. No cephalhematomas, depressible fractures.     NECK:  Full ROM, no stiffness. No midline C-spine tenderness or step-offs. No tracheal deviation or JVD.    NEUROLOGY:     Normal speech and mentation. No focal findings identified.     EYES:  Conjunctiva pink. Sclera normal. PERRLA, 2-3mm bilaterally and reactive.    ENT:  Mucous membranes moist. Hearing grossly normal. Symmetrical facial movements. No nasal septal hematoma,  CSF rhinorrhea, blood in the oropharynx, hemotympanum. midface is stable.    CARDIAC:  Regular rate and rhythm. No murmurs. No jugular venous distention. No peripheral cyanosis or pallor. No chest wall tenderness or bruising or crepitance    PULMONARY:     No respiratory distress. No accessory muscles use.  CTAB. No wheezes. No rales. No stridor.    ABDOMINAL:      Soft and nontender in all quadrants. No rebound or guarding. No abdominal distention. No gross abdominal masses.  No pulsatile mass.  No bruising on the abdomen or flank.    SKIN:  No lesions, rash, petechiae, or purpura.     MUSCULOSKELETAL:  Moves all extremities. No edema.  Midline spinal tenderness at approximately T11 and T12 without palpable step-offs, deformities, or crepitus.  Also has pain on palpation of the proximal third of the humerus without palpable crepitus, deformity.  Palpable step-off of the proximal left clavicle which patient states is old from prior esophagectomy.  Otherwise no midline C, T, L-spine tenderness or step-offs, stable pelvis, no tenderness palpation throughout clavicles, upper extremities, lower extremities.     PSYCHIATRIC:   Appropriate mood and affect.      DIAGNOSTIC RESULTS     LABS:  Labs Reviewed   TYPE AND SCREEN       All other labs were within normal range or not returned as of this dictation.    Imaging  XR chest 1 view   Final Result   Limited study. Mild cardiomegaly. Mediastinal prominence related to   gastric pull-through. Right basilar atelectatic changes.        Signed by: Brian Munguia 7/6/2024 8:13 AM   Dictation workstation:   [id number]      XR shoulder left 2+ views   Final Result   Medial portion of the left clavicle not visualized; the deformity is   most likely related to gastric pull-through surgery. Marked   glenohumeral and acromioclavicular osteoarthritis. No acute fracture.        Signed by: Brian Munguia 7/6/2024 8:18 AM   Dictation workstation:   [id number]      CT cervical spine wo  IV contrast   Final Result   Marked cervical spondylosis without acute fracture or facet   subluxation. Previous esophagectomy with gastric pull-through.        Signed by: Brian Munguia 7/6/2024 7:52 AM   Dictation workstation:   [id number]      CT thoracic spine wo IV contrast   Final Result   1. Degenerative changes and multiple level disc disease of the   thoracic and lumbar spine as detailed above.   2. Loss of height of the T12 and L1 vertebral bodies with irregular   superior endplate, however no acute fracture or subluxation seen.   3. Chronic findings as above including nonobstructing bilateral renal   calculi, remote granulomatous disease, bibasilar atelectatic changes   and previous esophagectomy with gastric pull-through.        Signed by: Brian Munguia 7/6/2024 8:04 AM   Dictation workstation:   [id number]      CT lumbar spine wo IV contrast   Final Result   1. Degenerative changes and multiple level disc disease of the   thoracic and lumbar spine as detailed above.   2. Loss of height of the T12 and L1 vertebral bodies with irregular   superior endplate, however no acute fracture or subluxation seen.   3. Chronic findings as above including nonobstructing bilateral renal   calculi, remote granulomatous disease, bibasilar atelectatic changes   and previous esophagectomy with gastric pull-through.        Signed by: Brian Munguia 7/6/2024 8:04 AM   Dictation workstation:   [id number]      CT head W O contrast trauma protocol   Final Result   Stable age related changes without acute intracranial process.        Signed by: Brian Munguia 7/6/2024 7:48 AM   Dictation workstation:   [id number]           Procedures  Procedures     EMERGENCY DEPARTMENT COURSE/MDM:     ED Course as of 07/06/24 0853   Sat Jul 06, 2024   0753 CT head W O contrast trauma protocol  Stable age related changes without acute intracranial process. [CB]   0753 CT cervical spine wo IV contrast  Marked cervical spondylosis  without acute fracture or facet  subluxation. Previous esophagectomy with gastric pull-through.   [CB]   0837 XR shoulder left 2+ views  Medial portion of the left clavicle not visualized; the deformity is  most likely related to gastric pull-through surgery. Marked  glenohumeral and acromioclavicular osteoarthritis. No acute fracture.   [CB]   0837 XR chest 1 view  Limited study. Mild cardiomegaly. Mediastinal prominence related to  gastric pull-through. Right basilar atelectatic changes.   [CB]   0838 CT thoracic spine wo IV contrast  1. Degenerative changes and multiple level disc disease of the  thoracic and lumbar spine as detailed above.  2. Loss of height of the T12 and L1 vertebral bodies with irregular  superior endplate, however no acute fracture or subluxation seen.  3. Chronic findings as above including nonobstructing bilateral renal  calculi, remote granulomatous disease, bibasilar atelectatic changes  and previous esophagectomy with gastric pull-through.   [CB]   0845 Ortho referral provided for left shoulder pain [CB]      ED Course User Index  [CB] Lucho Maradiaga DO         Diagnoses as of 07/06/24 0853   Fall, initial encounter   Acute pain of left shoulder        Medical Decision Making    68-year-old male presenting to the emergency department as a a fall with head injury on Eliquis from triage.  Hemodynamically stable, no acute distress, nontoxic-appearing and afebrile.  Patient refused c-collar.  Given patient's midline thoracic spinal tenderness and reported fall with possible LOC CT head, CT C/T/L-spine, chest x-ray, x-ray of the left shoulder ordered.  Patient refused labs and CT chest, abdomen, pelvis with IV contrast after patient was determined to have capacity and voiced understanding that doing so would risk missing a potential serious injury that could lead to death or permanent injury/disability.  975 mg of Tylenol ordered for pain.    Patient signed out to co-resident Dr. Maradiaga  pending reads by radiology with plan for discharge for outpatient follow-up with orthopedics.    Patient and or family in agreement and understanding of treatment plan.  All questions answered.      I reviewed the case with the attending ED physician. The attending ED physician agrees with the plan. Patient and/or patient´s representative was counseled regarding labs, imaging, likely diagnosis, and plan. All questions were answered.    ED Medications administered this visit:    Medications   acetaminophen (Tylenol) tablet 975 mg (975 mg oral Given 7/6/24 0728)       New Prescriptions from this visit:    New Prescriptions    No medications on file       Follow-up:  Chris Huizar MD  [id number] [facility]  [address]  [facility] [address]  [phone]          Luis Rick MD  [address] [facility] [id number] [phone]              Final Impression:   1. Acute pain of left shoulder    2. Fall, initial encounter          (Please note that portions of this note were completed with a voice recognition program.  Efforts were made to edit the dictations but occasionally words are mis-transcribed.)     Karan Arevalo, DO  Resident  07/06/24 3030

## 2024-07-06 NOTE — DISCHARGE INSTRUCTIONS
Please return to the ER or seek immediate medical attention if you experience new or worsening chest pain, shortness of breath, fever of 38C (100.4) or higher, persistent vomiting, weakness, numbness, tingling, excessive sweating,  loss of motion in your arms or legs, fainting, vision changes, or any new or worsening symptoms.    You are welcome back any time. Thank you for entrusting your care to us, I hope we made your visit as pleasant as possible. Wishing you well!    Dr. Maradiaga

## 2024-07-09 ENCOUNTER — APPOINTMENT (OUTPATIENT)
Dept: CARDIOLOGY | Facility: CLINIC | Age: 69
End: 2024-07-09
Payer: MEDICARE

## 2024-07-09 VITALS
DIASTOLIC BLOOD PRESSURE: 60 MMHG | HEIGHT: 73 IN | SYSTOLIC BLOOD PRESSURE: 90 MMHG | HEART RATE: 68 BPM | WEIGHT: 226 LBS | BODY MASS INDEX: 29.95 KG/M2

## 2024-07-09 DIAGNOSIS — R00.2 PALPITATIONS: ICD-10-CM

## 2024-07-09 DIAGNOSIS — I25.10 CORONARY ARTERY DISEASE INVOLVING NATIVE CORONARY ARTERY OF NATIVE HEART WITHOUT ANGINA PECTORIS: ICD-10-CM

## 2024-07-09 DIAGNOSIS — R55 SYNCOPE AND COLLAPSE: ICD-10-CM

## 2024-07-09 DIAGNOSIS — I95.1 ORTHOSTATIC HYPOTENSION: Primary | ICD-10-CM

## 2024-07-09 PROCEDURE — 1123F ACP DISCUSS/DSCN MKR DOCD: CPT | Performed by: INTERNAL MEDICINE

## 2024-07-09 PROCEDURE — 1159F MED LIST DOCD IN RCRD: CPT | Performed by: INTERNAL MEDICINE

## 2024-07-09 PROCEDURE — 3048F LDL-C <100 MG/DL: CPT | Performed by: INTERNAL MEDICINE

## 2024-07-09 PROCEDURE — 1160F RVW MEDS BY RX/DR IN RCRD: CPT | Performed by: INTERNAL MEDICINE

## 2024-07-09 PROCEDURE — 3074F SYST BP LT 130 MM HG: CPT | Performed by: INTERNAL MEDICINE

## 2024-07-09 PROCEDURE — 3078F DIAST BP <80 MM HG: CPT | Performed by: INTERNAL MEDICINE

## 2024-07-09 PROCEDURE — 1036F TOBACCO NON-USER: CPT | Performed by: INTERNAL MEDICINE

## 2024-07-09 PROCEDURE — 99215 OFFICE O/P EST HI 40 MIN: CPT | Performed by: INTERNAL MEDICINE

## 2024-07-09 PROCEDURE — 3008F BODY MASS INDEX DOCD: CPT | Performed by: INTERNAL MEDICINE

## 2024-07-09 NOTE — PROGRESS NOTES
"Chief Complaint:   Please see below.     History Of Present Illness:    Levy Gregory is a 68 y.o. male presenting with CAD, palpitations.    This 68-year-old diabetic, hyperlipidemic former cigar smoker with  CAD, and orthostatic hypotension returns to the office in routine follow-up after an episode of syncope on July 6, 2024..  Please see my prior note dated April 9, 2024 for complete details.  His SPECT on May 15, 2024 disclosed inferior wall infarction with moderate lateral wall ischemia, and an ejection fraction of 56% with inferior wall hypokinesis.     He is on anticoagulation for history of bilateral upper extremity DVTs, and a history of left lower extremity DVT. He had an extraction of his IVC filter in May 2024.      He had an episode of syncope on July 6, 2024.  This occurred when he was walking to the door.  He felt dizzy.  His wife heard him yelling, and found him lying on the floor.  This is the first such episode of syncope, but has a chronic history of orthostatic hypotension.  He is taking his Tadalafil and Tamsulosin simultaneously.  He only drinks about five glasses of water daily.  He was taken to the emergency room where a variety of tests were done.  He was released from the emergency room.    At the previous visit, I had ordered a monitor study.  Although he completed the study, there is no data available due to technical problems.    PMH: peripheral neuropathy; paraesophageal hernia status post esophageal perforation following surgery, sepsis, respiratory failure, VATS procedure (see prior note from 4/9/2024 and other notes in the EMR for complete details.     Last Recorded Vitals:  Vitals:    07/09/24 1100   BP: 90/60   BP Location: Left arm   Patient Position: Sitting   Pulse: 68   Weight: 103 kg (226 lb)   Height: 1.854 m (6' 1\")       Past Medical History:  He has a past medical history of Achalasia, esophageal, Anemia, Contusion of abdominal wall, initial encounter (01/12/2021), " COPD (chronic obstructive pulmonary disease) (Multi), Diabetes mellitus (Multi), DVT (deep venous thrombosis) (Multi), DVT (deep venous thrombosis) (Multi), Dysphagia, GERD (gastroesophageal reflux disease), Hemoptysis (05/12/2020), Herpes labialis, Hiatal hernia, Hyperlipidemia, Hypotension due to drugs, Irregular heart beat, Pain in left knee, Peripheral neuropathy, Personal history of other diseases of the respiratory system (06/19/2019), Sleep apnea, and Solar purpura (CMS-HCC).    Past Surgical History:  He has a past surgical history that includes Other surgical history (01/21/2019); IR CVC PICC (08/16/2023); Umbilical hernia repair (N/A, 2013); Gastrostomy tube placement (N/A); Esophagogastroduodenoscopy; Esophagogastrectomy; and Conduit replacement (12/07/2023).      Social History:  He reports that he quit smoking about 16 months ago. His smoking use included cigars. He started smoking about 34 years ago. He has been exposed to tobacco smoke. He has never used smokeless tobacco. He reports that he does not currently use alcohol. He reports that he does not use drugs.    Family History:  Family History   Problem Relation Name Age of Onset    Diabetes Mother      Hypertension Mother      Diabetes type I Father          Allergies:  Patient has no known allergies.    Outpatient Medications:  Current Outpatient Medications   Medication Instructions    acetaminophen (TYLENOL) 650 mg, oral, Every 6 hours PRN    apixaban (ELIQUIS) 5 mg, oral, 2 times daily    atorvastatin (LIPITOR) 40 mg, oral, Daily    blood sugar diagnostic (OneTouch Ultra Test) strip Test glucose twice daily or as directed    blood-glucose meter misc Check glucose before meals and call if less than 80 or over 300.    ferrous sulfate 65 mg, oral, Daily with breakfast, Do not crush, chew, or split.    finasteride (PROPECIA) 1 mg, oral, Daily, Do not crush, chew, or split.    FreeStyle Kellie 2 [place] misc Use as instructed    FreeStyle Kellie 2  Sensor kit Use as instructed    gabapentin (NEURONTIN) 200 mg, oral, 3 times daily    midodrine (PROAMATINE) 10 mg, oral, 3 times daily    pantoprazole (PROTONIX) 40 mg, oral, Nightly, Do not crush, chew, or split.    sertraline (ZOLOFT) 25 mg, oral, Daily    tadalafil (CIALIS) 5 mg, oral, Daily    tamsulosin (FLOMAX) 0.4 mg, oral, Daily       Physical Exam:  GENERAL:  pleasant 68 year-old  HEENT: No xanthelasma  NECK: Supple, no palpable adenopathy or thyromegaly  CHEST: Clear to auscultation, respiratory effort unlabored  CARDIAC: RRR, normal S1 and S2, no audible murmur, rub, gallop, carotids are brisk, PMI is not displaced  ABD: Active bowel sounds, nontender, no organomegaly, no evidence of ascites  EXT: No clubbing, cyanosis, edema, or tenderness  NEURO: Awake, alert, appropriate, speech is fluent       Last Labs:  CBC -  Lab Results   Component Value Date    WBC 8.2 05/31/2024    HGB 13.4 (L) 05/31/2024    HCT 43.7 05/31/2024     (H) 05/31/2024     05/31/2024       CMP -  Lab Results   Component Value Date    CALCIUM 9.0 03/06/2024    PHOS 3.0 12/12/2023    PROT 6.8 03/06/2024    ALBUMIN 3.9 03/06/2024    AST 29 03/06/2024    ALT 37 03/06/2024    ALKPHOS 484 (H) 03/06/2024    BILITOT 0.4 03/06/2024       LIPID PANEL -   Lab Results   Component Value Date    CHOL 146 02/14/2024    TRIG 149 02/14/2024    HDL 38.3 02/14/2024    CHHDL 3.8 02/14/2024    LDLF 72 03/05/2019    VLDL 30 02/14/2024    NHDL 108 02/14/2024       RENAL FUNCTION PANEL -   Lab Results   Component Value Date    GLUCOSE 103 (H) 03/06/2024     03/06/2024    K 4.7 03/06/2024     03/06/2024    CO2 24 03/06/2024    ANIONGAP 12 03/06/2024    BUN 35 (H) 03/06/2024    CREATININE 1.30 03/06/2024    GFRMALE 43 (A) 09/05/2023    CALCIUM 9.0 03/06/2024    PHOS 3.0 12/12/2023    ALBUMIN 3.9 03/06/2024        Lab Results   Component Value Date    BNP 32 09/05/2023    HGBA1C 5.4 02/07/2024         Lab review: I have Chemistry CMP:    Lab Results   Component Value Date    ALBUMIN 3.9 03/06/2024    CALCIUM 9.0 03/06/2024    CO2 24 03/06/2024    CREATININE 1.30 03/06/2024    GLUCOSE 103 (H) 03/06/2024    BILITOT 0.4 03/06/2024    PROT 6.8 03/06/2024    ALT 37 03/06/2024    AST 29 03/06/2024    ALKPHOS 484 (H) 03/06/2024   , Chemistry BMP   Lab Results   Component Value Date    GLUCOSE 103 (H) 03/06/2024    CALCIUM 9.0 03/06/2024    CO2 24 03/06/2024    CREATININE 1.30 03/06/2024   , and CBC:  Lab Results   Component Value Date    WBC 8.2 05/31/2024    RBC 4.17 (L) 05/31/2024    HGB 13.4 (L) 05/31/2024    HCT 43.7 05/31/2024     (H) 05/31/2024    MCH 32.1 05/31/2024    MCHC 30.7 (L) 05/31/2024    RDW 13.4 05/31/2024    MPV 9.1 10/26/2023    NRBC 0.0 05/31/2024       Assessment/Plan   Problem List Items Addressed This Visit          Cardiac and Vasculature    Orthostatic hypotension - Primary    Relevant Orders    Referral to Neurology    Follow Up In Cardiology    Coronary artery disease involving native coronary artery of native heart without angina pectoris    Relevant Orders    Follow Up In Cardiology    Palpitations       Symptoms and Signs    Syncope and collapse    Relevant Orders    Referral to Neurology    Holter Or Event Cardiac Monitor     1.  Syncope, severe orthostatic hypotension, suspected autonomic dysfunction: Referral to neurology.  -Advised the patient he should not be driving, climbing ladders, or otherwise putting himself in situations where if he were to pass out, he would be a danger to himself and others around him.    2.  CAD, with abnormal nuclear stress test earlier this year: Will continue to manage conservatively because of the pressing nature of his orthostatic hypotension, and potential adverse effects associated with medications we would use to treat his CAD.    3.  Palpitations, please see prior note for details:  -Will need to repeat his monitor study because the one just completed was technically  problematic with no data retrievable.    4.  History of DVTs: Defer management to vascular.    Papito Maldonado MD

## 2024-07-09 NOTE — PATIENT INSTRUCTIONS

## 2024-07-18 ENCOUNTER — HOSPITAL ENCOUNTER (OUTPATIENT)
Dept: CARDIOLOGY | Facility: HOSPITAL | Age: 69
Discharge: HOME | End: 2024-07-18
Payer: MEDICARE

## 2024-07-18 DIAGNOSIS — R55 SYNCOPE AND COLLAPSE: ICD-10-CM

## 2024-07-18 PROCEDURE — 93242 EXT ECG>48HR<7D RECORDING: CPT

## 2024-07-22 ENCOUNTER — APPOINTMENT (OUTPATIENT)
Dept: UROLOGY | Facility: CLINIC | Age: 69
End: 2024-07-22
Payer: MEDICARE

## 2024-07-22 VITALS — DIASTOLIC BLOOD PRESSURE: 80 MMHG | SYSTOLIC BLOOD PRESSURE: 121 MMHG | HEART RATE: 80 BPM | TEMPERATURE: 97.5 F

## 2024-07-22 DIAGNOSIS — N40.1 BPH WITH OBSTRUCTION/LOWER URINARY TRACT SYMPTOMS: ICD-10-CM

## 2024-07-22 DIAGNOSIS — R35.1 NOCTURIA: ICD-10-CM

## 2024-07-22 DIAGNOSIS — N13.8 BPH WITH OBSTRUCTION/LOWER URINARY TRACT SYMPTOMS: ICD-10-CM

## 2024-07-22 DIAGNOSIS — R06.83 SNORING: ICD-10-CM

## 2024-07-22 DIAGNOSIS — N32.81 OAB (OVERACTIVE BLADDER): Primary | ICD-10-CM

## 2024-07-22 PROCEDURE — 3079F DIAST BP 80-89 MM HG: CPT | Performed by: NURSE PRACTITIONER

## 2024-07-22 PROCEDURE — 3074F SYST BP LT 130 MM HG: CPT | Performed by: NURSE PRACTITIONER

## 2024-07-22 PROCEDURE — 99214 OFFICE O/P EST MOD 30 MIN: CPT | Performed by: NURSE PRACTITIONER

## 2024-07-22 PROCEDURE — 1123F ACP DISCUSS/DSCN MKR DOCD: CPT | Performed by: NURSE PRACTITIONER

## 2024-07-22 PROCEDURE — 3048F LDL-C <100 MG/DL: CPT | Performed by: NURSE PRACTITIONER

## 2024-07-22 PROCEDURE — G2211 COMPLEX E/M VISIT ADD ON: HCPCS | Performed by: NURSE PRACTITIONER

## 2024-07-22 PROCEDURE — 1159F MED LIST DOCD IN RCRD: CPT | Performed by: NURSE PRACTITIONER

## 2024-07-22 PROCEDURE — 1036F TOBACCO NON-USER: CPT | Performed by: NURSE PRACTITIONER

## 2024-07-22 RX ORDER — MIRABEGRON 50 MG/1
50 TABLET, EXTENDED RELEASE ORAL DAILY
Qty: 30 TABLET | Refills: 2 | Status: SHIPPED | OUTPATIENT
Start: 2024-07-22 | End: 2024-10-20

## 2024-07-22 NOTE — PROGRESS NOTES
Subjective   Patient ID: Levy Gregory is a 68 y.o. male who presents for Benign Prostatic Hypertrophy.  Hx of paraesophageal hernia and achalasia in March 2023 complicated by sepsis. He had multiple complications afterwards requiring lengthy ICU stay. He has also had several feeding tubes, etc.  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      Unsure exactly when catheter was initially placed, however he has failed multiple TOVs per his recollection. Denies urinary difficulties prior to surgeries in 2023. Passed in office TOV in Jan. NTF up to 4 since cath removal, very bothersome to him. Wakes him up, some dysuria if he delays urination. Moderate volume voids. Still on tamsulosin, tried tadalafil for a few months without benefit.      Denies family history of prostate cancer.      Drinks an energy drink a few times a week, 32 oz of water daily, coffee in the AM, and apple juice. Stops drinking at least 2 hours before bed.           Review of Systems   All other systems reviewed and are negative.      Objective   Physical Exam  Vitals reviewed.     Alert and oriented x3  Moist mucous membranes  Breathes easily on room air  Abdomen soft, nondistended  No edema  No scleral icterus  No focal neurological deficits  Appears stated age, no acute distress      Assessment/Plan   Diagnoses and all orders for this visit:  OAB (overactive bladder)  -     mirabegron (Myrbetriq) 50 mg tablet extended release 24 hr 24 hr tablet; Take 1 tablet (50 mg) by mouth once daily.  BPH with obstruction/lower urinary tract symptoms  -     Post-Void Residual  Nocturia  -     In-Center Sleep Study (Non-Sleep Provider Only); Future  Snoring  -     In-Center Sleep Study (Non-Sleep Provider Only); Future    Continue on tamsulosin and finasteride.   Add myrbetriq at bedtime. If no improvement within 3-4 weeks, STOP tamsulosin, continue finasteride. Proceed with sleep study.     Follow up in a few months.       Kayli Gotti, APRN-CNP  07/22/24 5:02 PM

## 2024-07-22 NOTE — PATIENT INSTRUCTIONS
Continue on tamsulosin, switch to AM, and finasteride  Add myrbetriq at bedtime. If no improvement within 3-4 weeks, STOP tamsulosin, continue finasteride.

## 2024-07-24 ENCOUNTER — APPOINTMENT (OUTPATIENT)
Dept: CARDIOLOGY | Facility: CLINIC | Age: 69
End: 2024-07-24
Payer: MEDICARE

## 2024-07-25 DIAGNOSIS — E11.49 OTHER DIABETIC NEUROLOGICAL COMPLICATION ASSOCIATED WITH TYPE 2 DIABETES MELLITUS (MULTI): ICD-10-CM

## 2024-07-25 PROBLEM — E66.01 MORBID OBESITY (MULTI): Status: RESOLVED | Noted: 2024-03-21 | Resolved: 2024-07-25

## 2024-07-25 PROBLEM — R63.4 WEIGHT LOSS: Status: ACTIVE | Noted: 2024-07-25

## 2024-07-25 PROBLEM — R10.84 GENERALIZED ABDOMINAL PAIN: Status: RESOLVED | Noted: 2024-03-08 | Resolved: 2024-07-25
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
## 2024-07-25 PROBLEM — I26.99 OTHER PULMONARY EMBOLISM WITHOUT ACUTE COR PULMONALE (MULTI): Status: RESOLVED | Noted: 2024-02-10 | Resolved: 2024-07-25

## 2024-07-25 PROBLEM — K56.699 FOOD BOLUS OBSTRUCTION OF INTESTINE (MULTI): Status: RESOLVED | Noted: 2024-03-21 | Resolved: 2024-07-25

## 2024-07-25 PROBLEM — Z86.19 HISTORY OF INFECTIOUS DISEASE: Status: RESOLVED | Noted: 2024-03-21 | Resolved: 2024-07-25

## 2024-07-25 PROBLEM — I26.99 PULMONARY EMBOLISM (MULTI): Status: RESOLVED | Noted: 2023-02-12 | Resolved: 2024-07-25

## 2024-07-25 PROBLEM — K22.3 ESOPHAGEAL PERFORATION: Status: RESOLVED | Noted: 2023-07-20 | Resolved: 2024-07-25

## 2024-07-25 PROBLEM — R53.83 FATIGUE: Status: RESOLVED | Noted: 2023-09-06 | Resolved: 2024-07-25

## 2024-07-25 PROBLEM — T81.12XA: Status: RESOLVED | Noted: 2024-03-21 | Resolved: 2024-07-25

## 2024-07-25 PROBLEM — E87.1 HYPONATREMIA: Status: RESOLVED | Noted: 2023-07-28 | Resolved: 2024-07-25

## 2024-07-25 PROBLEM — E11.42 TYPE 2 DIABETES MELLITUS WITH DIABETIC POLYNEUROPATHY, WITH LONG-TERM CURRENT USE OF INSULIN (MULTI): Status: RESOLVED | Noted: 2024-03-08 | Resolved: 2024-07-25

## 2024-07-25 PROBLEM — E46 MALNUTRITION (MULTI): Status: RESOLVED | Noted: 2024-03-21 | Resolved: 2024-07-25

## 2024-07-25 PROBLEM — J18.9 CAP (COMMUNITY ACQUIRED PNEUMONIA): Status: RESOLVED | Noted: 2023-08-15 | Resolved: 2024-07-25

## 2024-07-25 PROBLEM — S09.90XA CLOSED HEAD INJURY: Status: RESOLVED | Noted: 2024-03-21 | Resolved: 2024-07-25

## 2024-07-25 PROBLEM — J96.00 ACUTE RESPIRATORY FAILURE (MULTI): Status: RESOLVED | Noted: 2023-02-12 | Resolved: 2024-07-25

## 2024-07-25 PROBLEM — D75.89 MACROCYTOSIS: Status: ACTIVE | Noted: 2024-07-25

## 2024-07-25 PROBLEM — Z86.2 HISTORY OF ANEMIA: Status: RESOLVED | Noted: 2024-03-21 | Resolved: 2024-07-25

## 2024-07-25 PROBLEM — Z79.4 TYPE 2 DIABETES MELLITUS WITH DIABETIC POLYNEUROPATHY, WITH LONG-TERM CURRENT USE OF INSULIN (MULTI): Status: RESOLVED | Noted: 2024-03-08 | Resolved: 2024-07-25

## 2024-07-25 RX ORDER — GABAPENTIN 100 MG/1
200 CAPSULE ORAL 3 TIMES DAILY
Qty: 180 CAPSULE | Refills: 2 | OUTPATIENT
Start: 2024-07-25

## 2024-07-26 ENCOUNTER — APPOINTMENT (OUTPATIENT)
Dept: PRIMARY CARE | Facility: CLINIC | Age: 69
End: 2024-07-26
Payer: MEDICARE

## 2024-07-26 ENCOUNTER — TELEPHONE (OUTPATIENT)
Dept: PRIMARY CARE | Facility: CLINIC | Age: 69
End: 2024-07-26

## 2024-07-26 DIAGNOSIS — D75.89 MACROCYTOSIS: ICD-10-CM

## 2024-07-26 DIAGNOSIS — I95.9 HYPOTENSION, UNSPECIFIED HYPOTENSION TYPE: ICD-10-CM

## 2024-07-26 DIAGNOSIS — J44.9 CHRONIC OBSTRUCTIVE PULMONARY DISEASE, UNSPECIFIED COPD TYPE (MULTI): ICD-10-CM

## 2024-07-26 DIAGNOSIS — R63.4 WEIGHT LOSS: ICD-10-CM

## 2024-07-26 DIAGNOSIS — R74.8 HIGH SERUM BONE-SPECIFIC ALKALINE PHOSPHATASE: ICD-10-CM

## 2024-07-26 DIAGNOSIS — R35.1 NOCTURIA: ICD-10-CM

## 2024-07-26 DIAGNOSIS — N52.9 ERECTILE DYSFUNCTION, UNSPECIFIED ERECTILE DYSFUNCTION TYPE: ICD-10-CM

## 2024-07-26 DIAGNOSIS — D62 ACUTE POSTHEMORRHAGIC ANEMIA: ICD-10-CM

## 2024-07-26 DIAGNOSIS — I25.10 CORONARY ARTERY DISEASE INVOLVING NATIVE CORONARY ARTERY OF NATIVE HEART WITHOUT ANGINA PECTORIS: ICD-10-CM

## 2024-07-26 DIAGNOSIS — M10.9 GOUT, UNSPECIFIED CAUSE, UNSPECIFIED CHRONICITY, UNSPECIFIED SITE: Primary | ICD-10-CM

## 2024-07-26 DIAGNOSIS — K22.0 ACHALASIA, ESOPHAGEAL: ICD-10-CM

## 2024-07-26 PROCEDURE — 3048F LDL-C <100 MG/DL: CPT | Performed by: FAMILY MEDICINE

## 2024-07-26 PROCEDURE — 1160F RVW MEDS BY RX/DR IN RCRD: CPT | Performed by: FAMILY MEDICINE

## 2024-07-26 PROCEDURE — 99214 OFFICE O/P EST MOD 30 MIN: CPT | Performed by: FAMILY MEDICINE

## 2024-07-26 PROCEDURE — 1123F ACP DISCUSS/DSCN MKR DOCD: CPT | Performed by: FAMILY MEDICINE

## 2024-07-26 PROCEDURE — 1159F MED LIST DOCD IN RCRD: CPT | Performed by: FAMILY MEDICINE

## 2024-07-26 PROCEDURE — 2028F FOOT EXAM PERFORMED: CPT | Performed by: FAMILY MEDICINE

## 2024-07-26 RX ORDER — MIDODRINE HYDROCHLORIDE 5 MG/1
10 TABLET ORAL 3 TIMES DAILY
Qty: 270 TABLET | Refills: 0 | Status: SHIPPED | OUTPATIENT
Start: 2024-07-26

## 2024-07-26 NOTE — PROGRESS NOTES
Subjective   Patient ID: [id number]     Levy Gregory is a 68 y.o. male who presents for No chief complaint on file..    HPI  Taking meds as directed without tolerability or affordability issues; no complaints today.    Review of Systems  CARDIO- No chest pain or pressure, nausea, diaphoresis, paresthesias, dizziness, or syncope with or without exertion  GI-No blood in stool, tarry stools, pain, vomiting, heartburn, constipation or diarrhea  PULM-No wheezing, coughing or shortness of breath  UROL-No frequency, urgency, blood in urine, or incontinence; nocturia not present    Objective     There were no vitals taken for this visit.     Physical Exam  Neck-supple without lymphadenopathy or thyromegaly; no carotid bruits  Heart- regular rate and rhythm, normal s1 and s2 without murmur or gallop; no edema  Lungs-clear to auscultation  Abdomen-soft, positive bowel sounds, without masses, HSmegaly or pain     Assessment/Plan     Problem List Items Addressed This Visit       ED (erectile dysfunction)    High serum bone-specific alkaline phosphatase    Relevant Orders    Comprehensive metabolic panel    Gout - Primary    Relevant Orders    Uric Acid    Nocturia    Relevant Orders    Prostate Specific Antigen    Achalasia, esophageal    Chronic obstructive pulmonary disease, unspecified COPD type (Multi)     Needs spirometry in 2025         Acute posthemorrhagic anemia    Coronary artery disease involving native coronary artery of native heart without angina pectoris    Macrocytosis    Weight loss    Relevant Orders    Thyroid Stimulating Hormone     Follow up fasting (no alcohol for 48 hours and just water for 14 hours) in three months for your next routine appointment.  In general, take any medications on schedule (except for types of Insulin).      Chris Huizar MD

## 2024-07-26 NOTE — TELEPHONE ENCOUNTER
REFILL REQUEST    Med: Midodrine   Med Dose: 5 mg  Med Frequency: two tabs three times daily    Pharmacy: [facility]  Pharmacy Address: [address] [place] [address] in [facility]     LR: 06/04/2024  LV: 05/22/2024  NV: 07/29/2024

## 2024-07-28 PROBLEM — N17.9 AKI (ACUTE KIDNEY INJURY) (CMS-HCC): Status: RESOLVED | Noted: 2024-03-21 | Resolved: 2024-07-28

## 2024-07-28 PROBLEM — G89.18 POST-OPERATIVE PAIN: Status: RESOLVED | Noted: 2024-03-21 | Resolved: 2024-07-28

## 2024-07-28 PROBLEM — B99.9 INFECTION REQUIRING CONTACT ISOLATION PRECAUTIONS: Status: RESOLVED | Noted: 2023-12-07 | Resolved: 2024-07-28

## 2024-07-28 PROBLEM — R04.0 EPISTAXIS: Status: RESOLVED | Noted: 2024-03-21 | Resolved: 2024-07-28

## 2024-07-28 PROBLEM — R42 DIZZINESS: Status: RESOLVED | Noted: 2024-03-08 | Resolved: 2024-07-28

## 2024-07-28 PROBLEM — D64.9 ANEMIA: Status: RESOLVED | Noted: 2024-03-21 | Resolved: 2024-07-28

## 2024-07-28 PROBLEM — R55 SYNCOPE AND COLLAPSE: Status: RESOLVED | Noted: 2024-03-08 | Resolved: 2024-07-28

## 2024-07-28 PROBLEM — E87.20 LACTIC ACIDOSIS: Status: RESOLVED | Noted: 2024-03-21 | Resolved: 2024-07-28

## 2024-07-28 PROBLEM — S20.212S RIB CONTUSION, LEFT, SEQUELA: Status: RESOLVED | Noted: 2024-03-21 | Resolved: 2024-07-28

## 2024-07-28 PROBLEM — N28.9 LOW KIDNEY FUNCTION: Status: RESOLVED | Noted: 2023-02-17 | Resolved: 2024-07-28

## 2024-07-28 PROBLEM — R26.2 DIFFICULTY WALKING: Status: RESOLVED | Noted: 2023-11-20 | Resolved: 2024-07-28

## 2024-07-28 PROBLEM — E83.51 HYPOCALCEMIA: Status: RESOLVED | Noted: 2024-03-21 | Resolved: 2024-07-28

## 2024-07-28 PROBLEM — E87.6 HYPOKALEMIA: Status: RESOLVED | Noted: 2024-03-21 | Resolved: 2024-07-28

## 2024-07-28 PROBLEM — J90 PLEURAL EFFUSION EXUDATIVE: Status: RESOLVED | Noted: 2024-03-21 | Resolved: 2024-07-28

## 2024-07-28 PROBLEM — J98.51 MEDIASTINITIS: Status: RESOLVED | Noted: 2023-06-22 | Resolved: 2024-07-28

## 2024-07-28 PROBLEM — R78.81 BACTEREMIA: Status: RESOLVED | Noted: 2023-05-22 | Resolved: 2024-07-28

## 2024-07-28 PROBLEM — J06.9 UPPER RESPIRATORY TRACT INFECTION: Status: RESOLVED | Noted: 2024-05-13 | Resolved: 2024-07-28

## 2024-07-28 PROBLEM — I24.9 ACS (ACUTE CORONARY SYNDROME) (MULTI): Status: RESOLVED | Noted: 2023-08-16 | Resolved: 2024-07-28

## 2024-07-28 PROBLEM — R79.89 ELEVATED LIVER FUNCTION TESTS: Status: RESOLVED | Noted: 2023-11-29 | Resolved: 2024-07-28

## 2024-07-28 PROBLEM — D50.9 IRON DEFICIENCY ANEMIA: Status: RESOLVED | Noted: 2023-02-17 | Resolved: 2024-07-28

## 2024-07-28 PROBLEM — D72.829 LEUKOCYTOSIS: Status: RESOLVED | Noted: 2024-03-21 | Resolved: 2024-07-28

## 2024-07-28 PROBLEM — R91.8 LUNG FIELD ABNORMAL: Status: RESOLVED | Noted: 2023-05-24 | Resolved: 2024-07-28

## 2024-07-28 PROBLEM — I95.9 HYPOTENSION: Status: RESOLVED | Noted: 2024-02-07 | Resolved: 2024-07-28

## 2024-07-28 PROBLEM — S20.20XA BRUISE, TRUNK: Status: RESOLVED | Noted: 2024-03-21 | Resolved: 2024-07-28

## 2024-07-28 PROBLEM — D68.9 COAGULOPATHY (MULTI): Status: RESOLVED | Noted: 2024-03-21 | Resolved: 2024-07-28

## 2024-07-28 PROBLEM — R63.4 WEIGHT LOSS: Status: RESOLVED | Noted: 2024-07-25 | Resolved: 2024-07-28

## 2024-07-28 PROBLEM — R06.03 RESPIRATORY DISTRESS: Status: RESOLVED | Noted: 2024-03-21 | Resolved: 2024-07-28

## 2024-07-28 NOTE — PROGRESS NOTES
Subjective   Patient ID: [id number]     Levy Gregory is a 68 y.o. male who presents for Med Management.    HPI  Taking meds as directed without tolerability or affordability issues; no complaints today.  Which pneumococcal conjugate vaccine did he get in 2021?    Review of Systems  CARDIO- No chest pain or pressure, nausea, diaphoresis, paresthesias, dizziness, or syncope with or without exertion;  feels his legs are not as strong as they used to be  GI-No blood in stool, tarry stools, pain, vomiting, heartburn, constipation or diarrhea;  sleeps at 30 degree incline due to esophageal issues  PULM-No wheezing, coughing or shortness of breath  UROL-No frequency, urgency, blood in urine, or incontinence; nocturia times three  ENDO- No change in hair, voice, skin, weight or temperature tolerance   MSK-No locking, giving way/swelling of joints  NEURO- No daily headaches, hx of concussion, or seizure in the last year;  feels the fog is lifting;  fall on steps recently slipping on the carpet  DERM-No rashes, blanching or change in any moles    Objective     BP (!) 116/92 (BP Location: Left arm, Patient Position: Sitting)   Temp 36.4 °C (97.6 °F) (Oral)   Wt 102 kg (225 lb 15.5 oz)   BMI 29.81 kg/m²      Physical Exam  Neck-supple without lymphadenopathy or thyromegaly; no carotid bruits  Heart- regular rate and rhythm, normal s1 and s2 without murmur or gallop; no peripheral edema  Lungs-clear to auscultation  Abdomen-soft, positive bowel sounds, without masses, HSmegaly or pain   Rectal- normal ampulla and anus; hemetest negative; prostate smooth, normal size, and symmetrical  Foot Exam-normal propioceptive, vibration;  normal pulses, temperature and reflexes; normal hair distribution, skin integrity and color; monofilament decreased to MTP joint    Assessment/Plan     Problem List Items Addressed This Visit       Colon polyp    Diabetic neuropathy associated with type 2 diabetes mellitus (Multi)    Relevant  Medications    gabapentin (Neurontin) 100 mg capsule    High serum bone-specific alkaline phosphatase    Gout    Microalbuminuria    Relevant Orders    POCT Albumin random urine manually resulted    Nocturia    Relevant Orders    Prostate Specific Antigen    Osteopenia    Relevant Orders    XR DEXA bone density    Rib pain on left side    Vitamin D deficiency    Relevant Orders    Vitamin D 25-Hydroxy,Total (for eval of Vitamin D levels)    Type 2 diabetes mellitus with hyperglycemia (Multi)    Relevant Orders    Hemoglobin A1C    Orthostatic hypotension    Chronic obstructive pulmonary disease, unspecified COPD type (Multi)     Needs spirmometry  in 2025         Relevant Orders    Hemoglobin A1C    At risk for falls    RESOLVED: ACS (acute coronary syndrome) (Multi) - Primary    Acute posthemorrhagic anemia    Acute renal failure superimposed on chronic kidney disease (CMS-HCC)    Relevant Orders    CBC    Balance problems    Edema of both lower legs due to peripheral venous insufficiency    Coronary artery disease involving native coronary artery of native heart without angina pectoris    Macrocytosis    Relevant Orders    Folate    Vitamin B12    Methylmalonic Acid     Other Visit Diagnoses       Encounter for monitoring proton pump inhibitor therapy        Relevant Orders    Magnesium    Age-related osteoporosis without current pathological fracture        Relevant Orders    XR DEXA bone density          Check your feet daily for sores or cuts since you can't feel them as well as you used to.    I recommend that you eat less than 2,000 mg of sodium per day. (A single teaspoon of salt has about 2,300 mg of sodium.) Your body really only needs around 500 mg of sodium (about ¼ teaspoon of salt), but most people get 4,000 to 6,000 mg per day.      Considering your health conditions, I recommend that you get the RSV vaccine at your local pharmacy.    Follow up in three months for your next routine  appointment.      Chris Huizar MD

## 2024-07-28 NOTE — PATIENT INSTRUCTIONS
Check your feet daily for sores or cuts since you can't feel them as well as you used to.    I recommend that you eat less than 2,000 mg of sodium per day. (A single teaspoon of salt has about 2,300 mg of sodium.) Your body really only needs around 500 mg of sodium (about ¼ teaspoon of salt), but most people get 4,000 to 6,000 mg per day.      Considering your health conditions, I recommend that you get the RSV vaccine at your local pharmacy.    Follow up in three months for your next routine appointment.

## 2024-07-29 ENCOUNTER — OFFICE VISIT (OUTPATIENT)
Dept: PRIMARY CARE | Facility: CLINIC | Age: 69
End: 2024-07-29
Payer: MEDICARE

## 2024-07-29 VITALS
BODY MASS INDEX: 29.81 KG/M2 | DIASTOLIC BLOOD PRESSURE: 92 MMHG | WEIGHT: 225.97 LBS | SYSTOLIC BLOOD PRESSURE: 116 MMHG | TEMPERATURE: 97.6 F

## 2024-07-29 DIAGNOSIS — I95.1 ORTHOSTATIC HYPOTENSION: ICD-10-CM

## 2024-07-29 DIAGNOSIS — J44.9 CHRONIC OBSTRUCTIVE PULMONARY DISEASE, UNSPECIFIED COPD TYPE (MULTI): ICD-10-CM

## 2024-07-29 DIAGNOSIS — R07.81 RIB PAIN ON LEFT SIDE: ICD-10-CM

## 2024-07-29 DIAGNOSIS — R80.9 MICROALBUMINURIA: ICD-10-CM

## 2024-07-29 DIAGNOSIS — I87.2 EDEMA OF BOTH LOWER LEGS DUE TO PERIPHERAL VENOUS INSUFFICIENCY: ICD-10-CM

## 2024-07-29 DIAGNOSIS — R35.1 NOCTURIA: ICD-10-CM

## 2024-07-29 DIAGNOSIS — Z79.899 ENCOUNTER FOR MONITORING PROTON PUMP INHIBITOR THERAPY: ICD-10-CM

## 2024-07-29 DIAGNOSIS — N17.9 ACUTE RENAL FAILURE SUPERIMPOSED ON CHRONIC KIDNEY DISEASE, UNSPECIFIED ACUTE RENAL FAILURE TYPE, UNSPECIFIED CKD STAGE (CMS-HCC): ICD-10-CM

## 2024-07-29 DIAGNOSIS — Z79.4 TYPE 2 DIABETES MELLITUS WITH HYPERGLYCEMIA, WITH LONG-TERM CURRENT USE OF INSULIN (MULTI): ICD-10-CM

## 2024-07-29 DIAGNOSIS — M85.80 OSTEOPENIA, UNSPECIFIED LOCATION: ICD-10-CM

## 2024-07-29 DIAGNOSIS — Z91.81 AT RISK FOR FALLS: ICD-10-CM

## 2024-07-29 DIAGNOSIS — R60.0 EDEMA OF BOTH LOWER LEGS DUE TO PERIPHERAL VENOUS INSUFFICIENCY: ICD-10-CM

## 2024-07-29 DIAGNOSIS — N18.9 ACUTE RENAL FAILURE SUPERIMPOSED ON CHRONIC KIDNEY DISEASE, UNSPECIFIED ACUTE RENAL FAILURE TYPE, UNSPECIFIED CKD STAGE (CMS-HCC): ICD-10-CM

## 2024-07-29 DIAGNOSIS — D75.89 MACROCYTOSIS: ICD-10-CM

## 2024-07-29 DIAGNOSIS — E55.9 VITAMIN D DEFICIENCY: ICD-10-CM

## 2024-07-29 DIAGNOSIS — M10.9 GOUT, UNSPECIFIED CAUSE, UNSPECIFIED CHRONICITY, UNSPECIFIED SITE: ICD-10-CM

## 2024-07-29 DIAGNOSIS — K63.5 POLYP OF COLON, UNSPECIFIED PART OF COLON, UNSPECIFIED TYPE: ICD-10-CM

## 2024-07-29 DIAGNOSIS — E11.65 TYPE 2 DIABETES MELLITUS WITH HYPERGLYCEMIA, WITH LONG-TERM CURRENT USE OF INSULIN (MULTI): ICD-10-CM

## 2024-07-29 DIAGNOSIS — E11.49 OTHER DIABETIC NEUROLOGICAL COMPLICATION ASSOCIATED WITH TYPE 2 DIABETES MELLITUS (MULTI): ICD-10-CM

## 2024-07-29 DIAGNOSIS — I24.9 ACS (ACUTE CORONARY SYNDROME) (MULTI): Primary | ICD-10-CM

## 2024-07-29 DIAGNOSIS — R74.8 HIGH SERUM BONE-SPECIFIC ALKALINE PHOSPHATASE: ICD-10-CM

## 2024-07-29 DIAGNOSIS — R26.89 BALANCE PROBLEMS: ICD-10-CM

## 2024-07-29 DIAGNOSIS — D62 ACUTE POSTHEMORRHAGIC ANEMIA: ICD-10-CM

## 2024-07-29 DIAGNOSIS — I25.10 CORONARY ARTERY DISEASE INVOLVING NATIVE CORONARY ARTERY OF NATIVE HEART WITHOUT ANGINA PECTORIS: ICD-10-CM

## 2024-07-29 DIAGNOSIS — Z51.81 ENCOUNTER FOR MONITORING PROTON PUMP INHIBITOR THERAPY: ICD-10-CM

## 2024-07-29 DIAGNOSIS — M81.0 AGE-RELATED OSTEOPOROSIS WITHOUT CURRENT PATHOLOGICAL FRACTURE: ICD-10-CM
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
## 2024-07-29 LAB
25(OH)D3 SERPL-MCNC: 50 NG/ML (ref 30–100)
ERYTHROCYTE [DISTWIDTH] IN BLOOD BY AUTOMATED COUNT: 12.9 % (ref 11.5–14.5)
FOLATE SERPL-MCNC: 7.8 NG/ML
HCT VFR BLD AUTO: 40 % (ref 41–52)
HGB BLD-MCNC: 12.9 G/DL (ref 13.5–17.5)
MAGNESIUM SERPL-MCNC: 1.55 MG/DL (ref 1.6–2.4)
MCH RBC QN AUTO: 32.9 PG (ref 26–34)
MCHC RBC AUTO-ENTMCNC: 32.3 G/DL (ref 32–36)
MCV RBC AUTO: 102 FL (ref 80–100)
NRBC BLD-RTO: 0 /100 WBCS (ref 0–0)
PLATELET # BLD AUTO: 189 X10*3/UL (ref 150–450)
PSA SERPL-MCNC: 0.09 NG/ML
RBC # BLD AUTO: 3.92 X10*6/UL (ref 4.5–5.9)
VIT B12 SERPL-MCNC: 479 PG/ML (ref 211–911)
WBC # BLD AUTO: 6.9 X10*3/UL (ref 4.4–11.3)

## 2024-07-29 PROCEDURE — 99214 OFFICE O/P EST MOD 30 MIN: CPT | Performed by: FAMILY MEDICINE

## 2024-07-29 PROCEDURE — 85027 COMPLETE CBC AUTOMATED: CPT

## 2024-07-29 PROCEDURE — 83735 ASSAY OF MAGNESIUM: CPT

## 2024-07-29 PROCEDURE — 3080F DIAST BP >= 90 MM HG: CPT | Performed by: FAMILY MEDICINE

## 2024-07-29 PROCEDURE — 84153 ASSAY OF PSA TOTAL: CPT

## 2024-07-29 PROCEDURE — 3048F LDL-C <100 MG/DL: CPT | Performed by: FAMILY MEDICINE

## 2024-07-29 PROCEDURE — 36415 COLL VENOUS BLD VENIPUNCTURE: CPT

## 2024-07-29 PROCEDURE — 83036 HEMOGLOBIN GLYCOSYLATED A1C: CPT

## 2024-07-29 PROCEDURE — 1160F RVW MEDS BY RX/DR IN RCRD: CPT | Performed by: FAMILY MEDICINE

## 2024-07-29 PROCEDURE — 82746 ASSAY OF FOLIC ACID SERUM: CPT

## 2024-07-29 PROCEDURE — 82607 VITAMIN B-12: CPT

## 2024-07-29 PROCEDURE — 3074F SYST BP LT 130 MM HG: CPT | Performed by: FAMILY MEDICINE

## 2024-07-29 PROCEDURE — 2028F FOOT EXAM PERFORMED: CPT | Performed by: FAMILY MEDICINE

## 2024-07-29 PROCEDURE — 82306 VITAMIN D 25 HYDROXY: CPT

## 2024-07-29 PROCEDURE — 1123F ACP DISCUSS/DSCN MKR DOCD: CPT | Performed by: FAMILY MEDICINE

## 2024-07-29 PROCEDURE — 1159F MED LIST DOCD IN RCRD: CPT | Performed by: FAMILY MEDICINE

## 2024-07-29 PROCEDURE — 3044F HG A1C LEVEL LT 7.0%: CPT | Performed by: FAMILY MEDICINE

## 2024-07-29 PROCEDURE — 82044 UR ALBUMIN SEMIQUANTITATIVE: CPT | Performed by: FAMILY MEDICINE

## 2024-07-29 PROCEDURE — 83921 ORGANIC ACID SINGLE QUANT: CPT

## 2024-07-29 RX ORDER — GABAPENTIN 100 MG/1
100 CAPSULE ORAL 3 TIMES DAILY
Qty: 90 CAPSULE | Refills: 11 | Status: SHIPPED | OUTPATIENT
Start: 2024-07-29 | End: 2025-07-29

## 2024-07-30 LAB
EST. AVERAGE GLUCOSE BLD GHB EST-MCNC: 148 MG/DL
HBA1C MFR BLD: 6.8 %

## 2024-08-01 LAB — METHYLMALONATE SERPL-SCNC: 0.27 UMOL/L (ref 0–0.4)

## 2024-08-02 LAB
POC ALBUMIN /CREATININE RATIO MANUALLY ENTERED: ABNORMAL UG/MG CREAT
POC URINE ALBUMIN: 30 MG/L
POC URINE CREATININE: 100 MG/DL

## 2024-08-05 DIAGNOSIS — R80.9 MICROALBUMINURIA: Primary | ICD-10-CM

## 2024-08-05 RX ORDER — LISINOPRIL 5 MG/1
5 TABLET ORAL DAILY
Qty: 90 TABLET | Refills: 1 | Status: SHIPPED | OUTPATIENT
Start: 2024-08-05 | End: 2025-02-01

## 2024-08-07 ENCOUNTER — APPOINTMENT (OUTPATIENT)
Dept: SLEEP MEDICINE | Facility: CLINIC | Age: 69
End: 2024-08-07
Payer: MEDICARE

## 2024-08-12 ENCOUNTER — TELEPHONE (OUTPATIENT)
Dept: PRIMARY CARE | Facility: CLINIC | Age: 69
End: 2024-08-12
Payer: MEDICARE

## 2024-08-12 DIAGNOSIS — Z79.4 TYPE 2 DIABETES MELLITUS WITH HYPERGLYCEMIA, WITH LONG-TERM CURRENT USE OF INSULIN (MULTI): ICD-10-CM

## 2024-08-12 DIAGNOSIS — E11.65 TYPE 2 DIABETES MELLITUS WITH HYPERGLYCEMIA, WITH LONG-TERM CURRENT USE OF INSULIN (MULTI): ICD-10-CM

## 2024-08-12 RX ORDER — FLASH GLUCOSE SENSOR
KIT MISCELLANEOUS
Qty: 6 EACH | Refills: 1 | Status: ON HOLD | OUTPATIENT
Start: 2024-08-12

## 2024-08-12 NOTE — TELEPHONE ENCOUNTER
REFILL REQUEST    Med: FreeStyle Kellie 2 Sensor Kit  Med Dose: N/A  Med Frequency: as directed     Pharmacy: [facility]  Pharmacy Address: [address] [place] [address] in [facility]    LR: 02/26/2024  LV: 07/29/2024  NV: 10/29/2024

## 2024-08-13 ENCOUNTER — APPOINTMENT (OUTPATIENT)
Dept: CARDIOLOGY | Facility: CLINIC | Age: 69
End: 2024-08-13
Payer: MEDICARE

## 2024-08-13 VITALS
BODY MASS INDEX: 29.55 KG/M2 | DIASTOLIC BLOOD PRESSURE: 54 MMHG | SYSTOLIC BLOOD PRESSURE: 82 MMHG | HEIGHT: 73 IN | HEART RATE: 68 BPM | WEIGHT: 223 LBS

## 2024-08-13 DIAGNOSIS — I25.10 CORONARY ARTERY DISEASE INVOLVING NATIVE CORONARY ARTERY OF NATIVE HEART WITHOUT ANGINA PECTORIS: ICD-10-CM

## 2024-08-13 DIAGNOSIS — I95.1 ORTHOSTATIC HYPOTENSION: ICD-10-CM

## 2024-08-13 DIAGNOSIS — I47.29 NONSUSTAINED VENTRICULAR TACHYCARDIA (MULTI): Primary | ICD-10-CM

## 2024-08-13 PROCEDURE — 2028F FOOT EXAM PERFORMED: CPT | Performed by: INTERNAL MEDICINE

## 2024-08-13 PROCEDURE — 4010F ACE/ARB THERAPY RXD/TAKEN: CPT | Performed by: INTERNAL MEDICINE

## 2024-08-13 PROCEDURE — 3008F BODY MASS INDEX DOCD: CPT | Performed by: INTERNAL MEDICINE

## 2024-08-13 PROCEDURE — 1159F MED LIST DOCD IN RCRD: CPT | Performed by: INTERNAL MEDICINE

## 2024-08-13 PROCEDURE — 3048F LDL-C <100 MG/DL: CPT | Performed by: INTERNAL MEDICINE

## 2024-08-13 PROCEDURE — 3074F SYST BP LT 130 MM HG: CPT | Performed by: INTERNAL MEDICINE

## 2024-08-13 PROCEDURE — 1123F ACP DISCUSS/DSCN MKR DOCD: CPT | Performed by: INTERNAL MEDICINE

## 2024-08-13 PROCEDURE — 3044F HG A1C LEVEL LT 7.0%: CPT | Performed by: INTERNAL MEDICINE

## 2024-08-13 PROCEDURE — 1036F TOBACCO NON-USER: CPT | Performed by: INTERNAL MEDICINE

## 2024-08-13 PROCEDURE — 99215 OFFICE O/P EST HI 40 MIN: CPT | Performed by: INTERNAL MEDICINE

## 2024-08-13 PROCEDURE — 3078F DIAST BP <80 MM HG: CPT | Performed by: INTERNAL MEDICINE

## 2024-08-13 RX ORDER — ATORVASTATIN CALCIUM 80 MG/1
80 TABLET, FILM COATED ORAL DAILY
Qty: 90 TABLET | Refills: 3 | Status: ON HOLD | OUTPATIENT
Start: 2024-08-13 | End: 2025-08-13

## 2024-08-13 NOTE — ASSESSMENT & PLAN NOTE
We will continue conservative medical therapy.  We cannot add a beta-blocker, oral nitrate, or other such medications for his coronary disease because of the potential risk of aggravating orthostatic hypotension is greater than potential benefit.    Will add low-dose aspirin when Eliquis is eventually discontinued (deferring to vascular medicine on the latter)  Orders:    Follow Up In Cardiology    atorvastatin (Lipitor) 80 mg tablet; Take 1 tablet (80 mg) by mouth once daily.    Follow Up In Cardiology; Future

## 2024-08-13 NOTE — PROGRESS NOTES
"Chief Complaint:   Please see below.     History Of Present Illness:    Levy Gregory is a 68 y.o. male presenting with CAD, syncope, severe orthostatic hypotension.    This 68-year-old diabetic, hyperlipidemic former cigar smoker with  CAD, and orthostatic hypotension returns to the office in routine follow-up after an episode of syncope on July 6, 2024..  Please see my prior note dated April 9, 2024 for complete details regarding his long and complex medical history from 2023.  His SPECT on May 15, 2024 disclosed inferior wall infarction with moderate lateral wall ischemia, and an ejection fraction of 56% with inferior wall hypokinesis.  Our options for medical therapy for his coronary disease are severely limited because of his profound orthostatic hypotension, for which he will be seeing neurology in the next couple of months.     He is on anticoagulation for history of bilateral upper extremity DVTs, and a history of left lower extremity DVT. He had an extraction of his IVC filter in May 2024.  He follows with Dr. Maldonado of vascular for this.     He had an episode of syncope on July 6, 2024.  This occurred when he was walking to the door.  He felt dizzy.  His wife heard him yelling, and found him lying on the floor.  This is the first such episode of syncope, but has a chronic history of severe orthostatic hypotension.     Since his last visit, he has increased to 5 bottles a day.  He gets dizzy if he stands up quickly, but has not had any further episodes of syncope.  He takes precautions to support himself if he gets up from a lying or sitting position.  The patient denies chest discomfort, dyspnea, palpitations, orthopnea, PND, syncope.  His PCP ordered Lisinopril, which he has not started, for concern of passing out.         Last Recorded Vitals:  Vitals:    08/13/24 1100   BP: 82/54   BP Location: Left arm   Patient Position: Sitting   Pulse: 68   Weight: 101 kg (223 lb)   Height: 1.854 m (6' 1\") "       Past Medical History:  He has a past medical history of Achalasia, esophageal, Anemia, Contusion of abdominal wall, initial encounter (01/12/2021), COPD (chronic obstructive pulmonary disease) (Multi), Diabetes mellitus (Multi), DVT (deep venous thrombosis) (Multi), DVT (deep venous thrombosis) (Multi), Dysphagia, GERD (gastroesophageal reflux disease), Hemoptysis (05/12/2020), Herpes labialis, Hiatal hernia, Hyperlipidemia, Hypotension due to drugs, Irregular heart beat, Pain in left knee, Peripheral neuropathy, Personal history of other diseases of the respiratory system (06/19/2019), Sleep apnea, and Solar purpura (CMS-HCC).    Past Surgical History:  He has a past surgical history that includes Other surgical history (01/21/2019); IR CVC PICC (08/16/2023); Umbilical hernia repair (N/A, 2013); Gastrostomy tube placement (N/A); Esophagogastroduodenoscopy; Esophagogastrectomy; and Conduit replacement (12/07/2023).      Social History:  He reports that he quit smoking about 17 months ago. His smoking use included cigars. He started smoking about 35 years ago. He has been exposed to tobacco smoke. He has never used smokeless tobacco. He reports that he does not drink alcohol and does not use drugs.    Family History:  Family History   Problem Relation Name Age of Onset    Diabetes Mother      Hypertension Mother      Diabetes type I Father          Allergies:  Patient has no known allergies.    Outpatient Medications:  Current Outpatient Medications   Medication Instructions    acetaminophen (TYLENOL) 650 mg, oral, Every 6 hours PRN    apixaban (ELIQUIS) 5 mg, oral, 2 times daily    atorvastatin (LIPITOR) 40 mg, oral, Daily    blood sugar diagnostic (OneTouch Ultra Test) strip Test glucose twice daily or as directed    blood-glucose meter misc Check glucose before meals and call if less than 80 or over 300.    ferrous sulfate 65 mg, oral, Daily with breakfast, Do not crush, chew, or split.    flash glucose  sensor kit (FreeStyle Kellie 2 Sensor) kit Use as instructed    FreeStyle Kellie 2 [place] misc Use as instructed    gabapentin (NEURONTIN) 100 mg, oral, 3 times daily    lisinopril 5 mg, oral, Daily    midodrine (PROAMATINE) 10 mg, oral, 3 times daily    mirabegron (MYRBETRIQ) 50 mg, oral, Daily    pantoprazole (PROTONIX) 40 mg, oral, Nightly, Do not crush, chew, or split.    sertraline (ZOLOFT) 25 mg, oral, Daily    tamsulosin (FLOMAX) 0.4 mg, oral, Daily       Physical Exam:  GENERAL:  pleasant 68 year-old  HEENT: No xanthelasma  NECK: Supple, no palpable adenopathy or thyromegaly  CHEST: Clear to auscultation, respiratory effort unlabored  CARDIAC: RRR, normal S1 and S2, no audible murmur, rub, gallop, carotids are brisk, PMI is not displaced  ABD: Active bowel sounds, nontender, no organomegaly, no evidence of ascites  EXT: No clubbing, cyanosis, edema, or tenderness  NEURO: Awake, alert, appropriate, speech is fluent       Last Labs:  CBC -  Lab Results   Component Value Date    WBC 6.9 07/29/2024    HGB 12.9 (L) 07/29/2024    HCT 40.0 (L) 07/29/2024     (H) 07/29/2024     07/29/2024       CMP -  Lab Results   Component Value Date    CALCIUM 9.0 03/06/2024    PHOS 3.0 12/12/2023    PROT 6.8 03/06/2024    ALBUMIN 3.9 03/06/2024    AST 29 03/06/2024    ALT 37 03/06/2024    ALKPHOS 484 (H) 03/06/2024    BILITOT 0.4 03/06/2024       LIPID PANEL -   Lab Results   Component Value Date    CHOL 146 02/14/2024    TRIG 149 02/14/2024    HDL 38.3 02/14/2024    CHHDL 3.8 02/14/2024    LDLF 72 03/05/2019    VLDL 30 02/14/2024    NHDL 108 02/14/2024       RENAL FUNCTION PANEL -   Lab Results   Component Value Date    GLUCOSE 103 (H) 03/06/2024     03/06/2024    K 4.7 03/06/2024     03/06/2024    CO2 24 03/06/2024    ANIONGAP 12 03/06/2024    BUN 35 (H) 03/06/2024    CREATININE 1.30 03/06/2024    GFRMALE 43 (A) 09/05/2023    CALCIUM 9.0 03/06/2024    PHOS 3.0 12/12/2023    ALBUMIN 3.9 03/06/2024         Lab Results   Component Value Date    BNP 32 09/05/2023    HGBA1C 6.8 (H) 07/29/2024    HGBA1C 5.4 02/07/2024         Diagnostic review: I have independently interpreted the Holter Monitor .  My findings are as summarized in the report.    Assessment/Plan   Assessment & Plan  Coronary artery disease involving native coronary artery of native heart without angina pectoris  We will continue conservative medical therapy.  We cannot add a beta-blocker, oral nitrate, or other such medications for his coronary disease because of the potential risk of aggravating orthostatic hypotension is greater than potential benefit.    Will add low-dose aspirin when Eliquis is eventually discontinued (deferring to vascular medicine on the latter)  Orders:    Follow Up In Cardiology    atorvastatin (Lipitor) 80 mg tablet; Take 1 tablet (80 mg) by mouth once daily.    Follow Up In Cardiology; Future    Orthostatic hypotension  As above.  Adding midodrine or other such medications is potentially problematic in light of his coronary artery disease, with prior inferior infarction and lateral wall ischemia.  Orders:    Follow Up In Cardiology    atorvastatin (Lipitor) 80 mg tablet; Take 1 tablet (80 mg) by mouth once daily.    Follow Up In Cardiology; Future    Nonsustained ventricular tachycardia (Multi)  Low volume on recent monitor study.  This is not likely the cause of his syncope.  Will continue to follow.  Again, we cannot start beta-blockers or other medications because of risking worsening orthostatic hypotension.             Papito Mladonado MD

## 2024-08-13 NOTE — ASSESSMENT & PLAN NOTE
As above.  Adding midodrine or other such medications is potentially problematic in light of his coronary artery disease, with prior inferior infarction and lateral wall ischemia.  Orders:    Follow Up In Cardiology    atorvastatin (Lipitor) 80 mg tablet; Take 1 tablet (80 mg) by mouth once daily.    Follow Up In Cardiology; Future

## 2024-08-14 ENCOUNTER — OFFICE VISIT (OUTPATIENT)
Dept: CARDIOLOGY | Facility: CLINIC | Age: 69
End: 2024-08-14
Payer: MEDICARE

## 2024-08-14 VITALS
HEART RATE: 78 BPM | RESPIRATION RATE: 18 BRPM | SYSTOLIC BLOOD PRESSURE: 109 MMHG | BODY MASS INDEX: 29.55 KG/M2 | HEIGHT: 73 IN | DIASTOLIC BLOOD PRESSURE: 77 MMHG | WEIGHT: 223 LBS

## 2024-08-14 DIAGNOSIS — I87.009 POST-THROMBOTIC SYNDROME: ICD-10-CM

## 2024-08-14 DIAGNOSIS — I25.10 CORONARY ARTERY DISEASE INVOLVING NATIVE CORONARY ARTERY OF NATIVE HEART WITHOUT ANGINA PECTORIS: Primary | ICD-10-CM

## 2024-08-14 DIAGNOSIS — Z86.718 HISTORY OF DVT (DEEP VEIN THROMBOSIS): Primary | ICD-10-CM

## 2024-08-14 PROCEDURE — 1036F TOBACCO NON-USER: CPT | Performed by: INTERNAL MEDICINE

## 2024-08-14 PROCEDURE — 4010F ACE/ARB THERAPY RXD/TAKEN: CPT | Performed by: INTERNAL MEDICINE

## 2024-08-14 PROCEDURE — 3008F BODY MASS INDEX DOCD: CPT | Performed by: INTERNAL MEDICINE

## 2024-08-14 PROCEDURE — 2028F FOOT EXAM PERFORMED: CPT | Performed by: INTERNAL MEDICINE

## 2024-08-14 PROCEDURE — 3048F LDL-C <100 MG/DL: CPT | Performed by: INTERNAL MEDICINE

## 2024-08-14 PROCEDURE — 1159F MED LIST DOCD IN RCRD: CPT | Performed by: INTERNAL MEDICINE

## 2024-08-14 PROCEDURE — 1160F RVW MEDS BY RX/DR IN RCRD: CPT | Performed by: INTERNAL MEDICINE

## 2024-08-14 PROCEDURE — 99214 OFFICE O/P EST MOD 30 MIN: CPT | Performed by: INTERNAL MEDICINE

## 2024-08-14 PROCEDURE — 1123F ACP DISCUSS/DSCN MKR DOCD: CPT | Performed by: INTERNAL MEDICINE

## 2024-08-14 PROCEDURE — 3074F SYST BP LT 130 MM HG: CPT | Performed by: INTERNAL MEDICINE

## 2024-08-14 PROCEDURE — 3044F HG A1C LEVEL LT 7.0%: CPT | Performed by: INTERNAL MEDICINE

## 2024-08-14 PROCEDURE — 3078F DIAST BP <80 MM HG: CPT | Performed by: INTERNAL MEDICINE

## 2024-08-14 PROCEDURE — 1126F AMNT PAIN NOTED NONE PRSNT: CPT | Performed by: INTERNAL MEDICINE

## 2024-08-14 RX ORDER — NAPROXEN SODIUM 220 MG/1
81 TABLET, FILM COATED ORAL DAILY
Qty: 30 TABLET | Refills: 11 | Status: ON HOLD | OUTPATIENT
Start: 2024-08-14 | End: 2025-08-14

## 2024-08-14 ASSESSMENT — PAIN SCALES - GENERAL: PAINLEVEL: 0-NO PAIN

## 2024-08-14 NOTE — PATIENT INSTRUCTIONS
ASSESSMENT/PLAN:    here for follow up DVT - currently on eliquis - this DVT > 1 year ago as noted. Filter has been retrieved. Has situational DVT x 2 as noted. Discussed - OK to be off the eliquis. Discussed increased risk for VTE going forward given age, male gender and situational DVT x 2. Discussed the need for prophylaxis in high risk settings. SCDs at all times and pharmacologic prophylaxis arina with admissions.     Discussed the need to follow up for any new signs or symptoms of concern. Including but not limited to: changes in swelling, pain, cough, SOB or CP etc.     Discussed likely PTS for the swelling. Is also on gabapentin but at low doses. Recommended stocking -20-30 mmHg knee high for the swelling. Reports has prev used compression from his podiatrist but did not like these.  Discussed using either a cotton material or a material that is moisture wicking for comfort.     Follow up 4 months.

## 2024-08-14 NOTE — PROGRESS NOTES
"OUTPATIENT FOLLOW-UP -  VASCULAR MEDICINE    DOS: 8/14/2024  Last seen:  4/24/2024      REQUESTING PHYSICIAN:  Dr. Papito Maldonado MD    REASON FOR FOLLOW-UP:  here for follow up DVT - currently on eliquis    HISTORY OF PRESENT ILLNESS:     69 yo man here for follow up DVT - currently on eliquis 5 mg q 12 hours. Since last seen -0 denies bleeding related to the AC. IVC filter has been retrieved. Reports continues to have issues eating. Some weight loss. Pain and a fullness at the prev surgery site.     +prev h/o DVT 2016/2017 - in the setting of GIB - IVC filter at that time. No FH VTE.     From prev notes:  here for evaluation of DVT - currently on eliquis and IVC filter. Hx dates to 3/1/2023 underwent LS robotic assisted hiatal hernia repair, Heller myotomy, Shai fundoplication, EGD and everette TAP blocks. Represented approx 1 week post-op with esophageal perforation, pneumomediastinum and pleural effusion. Underwent VATS decortication and CT management/5/2023 underwent right thoracotomy, decortication, esophgectomy, takedown of the fundoplication, ISA and G-tube. Course c/b CORA = CVVHD, septic shock req pressors and ICU support, and prolonged resp failure = intubation.      During admission dx right pop DVT ?subacute or chronic and everette UE DVT - in the setting of RP/iliopsoas hematomas. SC heparin initially but noted to have progression to left CFV, FV DVT and IVC filter was place. Ultimately DC to LTAC on reduced dosing of eliquis 2.5 mg q 12 hours 2/2 renal disease and complicated admission as noted ( DC to LTAC 5/22/2023).     Over the summer 2023 had several other admission for feeding tube issues - unclear if AC was interrupted during this time.     August readmitted for empyema managed with pigtail drain. G-J tube revision at that time. Maintained on AC at DC to SNF - LMWH initially 2/2 fluconazole.      Dec had esophagus reconstruction with \"stomach pullup\" with esophagostomy reversal on 11/29/23 . This went " "well but has some issues eating.      Does not know when the eliquis was resumed - currently on 5 mg twice - states this was changed from 2.5 mg q 12 hours to the higher dose on March 1st by [facility] GIL with Dr. Huizar - unclear why this was increased.      He denies bleeding related to the AC. Reports 80% recovered. Navigating ADLs. Started driving.      REVIEW OF SYSTEMS:     No fevers, chills, weight is stable  No BRBPR, melena, hematuria  No bleeding  Occ edema, no calf pain    PHYSICAL EXAMINATION:   /77 (BP Location: Left arm, Patient Position: Sitting)   Pulse 78   Resp 18   Ht 1.854 m (6' 1\")   Wt 101 kg (223 lb)   BMI 29.42 kg/m²     Gen: Appears well, NAD  Chest: CTA  CVS: regular without murmur or gallop  Ext: 1-2+ edema everette L>R foot and ankle, nontender  Skin: good condition without wounds or lesions  Pulses: DP 2+;   Mood and affect appropriate    ADDITIONAL DATA:   No compression worn today. Right calf: 43.0cm   Left calf:  44.5cm    ASSESSMENT/PLAN:    here for follow up DVT - currently on eliquis - this DVT > 1 year ago as noted. Filter has been retrieved. Has situational DVT x 2 as noted. Discussed - OK to be off the eliquis. Discussed increased risk for VTE going forward given age, male gender and situational DVT x 2. Discussed the need for prophylaxis in high risk settings. SCDs at all times and pharmacologic prophylaxis arina with admissions.     Discussed the need to follow up for any new signs or symptoms of concern. Including but not limited to: changes in swelling, pain, cough, SOB or CP etc.     Discussed likely PTS for the swelling. Is also on gabapentin but at low doses. Recommended stocking -20-30 mmHg knee high for the swelling. Reports has prev used compression from his podiatrist but did not like these.  Discussed using either a cotton material or a material that is moisture wicking for comfort.     Follow up 4 months.   "

## 2024-08-14 NOTE — PROGRESS NOTES
The patient contacted me through [employer] today.  Dr. Martin has discontinued the patient's Eliquis.  I have started the patient on aspirin 81 mg daily, replying back to their question by way of [employer].

## 2024-08-15 ENCOUNTER — APPOINTMENT (OUTPATIENT)
Dept: RADIOLOGY | Facility: HOSPITAL | Age: 69
End: 2024-08-15
Payer: MEDICARE

## 2024-08-15 ENCOUNTER — APPOINTMENT (OUTPATIENT)
Dept: CARDIOLOGY | Facility: HOSPITAL | Age: 69
End: 2024-08-15
Payer: MEDICARE

## 2024-08-15 ENCOUNTER — ANESTHESIA EVENT (OUTPATIENT)
Dept: OPERATING ROOM | Facility: HOSPITAL | Age: 69
End: 2024-08-15
Payer: MEDICARE

## 2024-08-15 ENCOUNTER — HOSPITAL ENCOUNTER (INPATIENT)
Facility: HOSPITAL | Age: 69
Discharge: NURSING, REHAB OR LONG-TERM CARE | DRG: 522 | End: 2024-08-15
Attending: STUDENT IN AN ORGANIZED HEALTH CARE EDUCATION/TRAINING PROGRAM | Admitting: EMERGENCY MEDICINE
Payer: MEDICARE

## 2024-08-15 DIAGNOSIS — S72.001D CLOSED FRACTURE OF RIGHT HIP WITH ROUTINE HEALING, SUBSEQUENT ENCOUNTER: ICD-10-CM

## 2024-08-15 DIAGNOSIS — R93.89 ABNORMAL CHEST X-RAY: ICD-10-CM

## 2024-08-15 DIAGNOSIS — E11.49 OTHER DIABETIC NEUROLOGICAL COMPLICATION ASSOCIATED WITH TYPE 2 DIABETES MELLITUS (MULTI): ICD-10-CM

## 2024-08-15 DIAGNOSIS — S72.001A CLOSED FRACTURE OF RIGHT HIP, INITIAL ENCOUNTER (MULTI): ICD-10-CM
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
## 2024-08-15 DIAGNOSIS — Z79.01 ANTICOAGULATED: ICD-10-CM

## 2024-08-15 DIAGNOSIS — S72.001A CLOSED RIGHT HIP FRACTURE, INITIAL ENCOUNTER (MULTI): Primary | ICD-10-CM

## 2024-08-15 LAB
ABO GROUP (TYPE) IN BLOOD: NORMAL
ALBUMIN SERPL BCP-MCNC: 3.7 G/DL (ref 3.4–5)
ALP SERPL-CCNC: 241 U/L (ref 33–136)
ALT SERPL W P-5'-P-CCNC: 52 U/L (ref 10–52)
ANION GAP SERPL CALC-SCNC: 15 MMOL/L (ref 10–20)
ANTIBODY SCREEN: NORMAL
APTT PPP: 34 SECONDS (ref 27–38)
AST SERPL W P-5'-P-CCNC: 46 U/L (ref 9–39)
ATRIAL RATE: 105 BPM
ATRIAL RATE: 106 BPM
BASOPHILS # BLD AUTO: 0.03 X10*3/UL (ref 0–0.1)
BASOPHILS NFR BLD AUTO: 0.3 %
BILIRUB SERPL-MCNC: 0.6 MG/DL (ref 0–1.2)
BLOOD EXPIRATION DATE: NORMAL
BLOOD EXPIRATION DATE: NORMAL
BUN SERPL-MCNC: 27 MG/DL (ref 6–23)
CALCIUM SERPL-MCNC: 9.3 MG/DL (ref 8.6–10.3)
CHLORIDE SERPL-SCNC: 104 MMOL/L (ref 98–107)
CO2 SERPL-SCNC: 25 MMOL/L (ref 21–32)
CREAT SERPL-MCNC: 1.44 MG/DL (ref 0.5–1.3)
DISPENSE STATUS: NORMAL
DISPENSE STATUS: NORMAL
EGFRCR SERPLBLD CKD-EPI 2021: 53 ML/MIN/1.73M*2
EOSINOPHIL # BLD AUTO: 0.03 X10*3/UL (ref 0–0.7)
EOSINOPHIL NFR BLD AUTO: 0.3 %
ERYTHROCYTE [DISTWIDTH] IN BLOOD BY AUTOMATED COUNT: 12.9 % (ref 11.5–14.5)
GLUCOSE BLD MANUAL STRIP-MCNC: 188 MG/DL (ref 74–99)
GLUCOSE BLD MANUAL STRIP-MCNC: 205 MG/DL (ref 74–99)
GLUCOSE BLD MANUAL STRIP-MCNC: 295 MG/DL (ref 74–99)
GLUCOSE SERPL-MCNC: 153 MG/DL (ref 74–99)
HCT VFR BLD AUTO: 43 % (ref 41–52)
HGB BLD-MCNC: 13.8 G/DL (ref 13.5–17.5)
IMM GRANULOCYTES # BLD AUTO: 0.07 X10*3/UL (ref 0–0.7)
IMM GRANULOCYTES NFR BLD AUTO: 0.6 % (ref 0–0.9)
INR PPP: 1.3 (ref 0.9–1.1)
LYMPHOCYTES # BLD AUTO: 1.06 X10*3/UL (ref 1.2–4.8)
LYMPHOCYTES NFR BLD AUTO: 9.4 %
MCH RBC QN AUTO: 32.2 PG (ref 26–34)
MCHC RBC AUTO-ENTMCNC: 32.1 G/DL (ref 32–36)
MCV RBC AUTO: 100 FL (ref 80–100)
MONOCYTES # BLD AUTO: 0.65 X10*3/UL (ref 0.1–1)
MONOCYTES NFR BLD AUTO: 5.8 %
NEUTROPHILS # BLD AUTO: 9.42 X10*3/UL (ref 1.2–7.7)
NEUTROPHILS NFR BLD AUTO: 83.6 %
NRBC BLD-RTO: 0 /100 WBCS (ref 0–0)
P AXIS: 62 DEGREES
P OFFSET: 182 MS
P OFFSET: 207 MS
P ONSET: 125 MS
P ONSET: 136 MS
PLATELET # BLD AUTO: 164 X10*3/UL (ref 150–450)
POTASSIUM SERPL-SCNC: 5 MMOL/L (ref 3.5–5.3)
PR INTERVAL: 150 MS
PR INTERVAL: 172 MS
PRODUCT BLOOD TYPE: 5100
PRODUCT BLOOD TYPE: 5100
PRODUCT CODE: NORMAL
PRODUCT CODE: NORMAL
PROT SERPL-MCNC: 7.3 G/DL (ref 6.4–8.2)
PROTHROMBIN TIME: 14.8 SECONDS (ref 9.8–12.8)
Q ONSET: 211 MS
Q ONSET: 211 MS
QRS COUNT: 17 BEATS
QRS COUNT: 17 BEATS
QRS DURATION: 132 MS
QRS DURATION: 134 MS
QT INTERVAL: 380 MS
QT INTERVAL: 380 MS
QTC CALCULATION(BAZETT): 502 MS
QTC CALCULATION(BAZETT): 504 MS
QTC FREDERICIA: 457 MS
QTC FREDERICIA: 459 MS
R AXIS: -69 DEGREES
R AXIS: 269 DEGREES
RBC # BLD AUTO: 4.29 X10*6/UL (ref 4.5–5.9)
RH FACTOR (ANTIGEN D): NORMAL
SODIUM SERPL-SCNC: 139 MMOL/L (ref 136–145)
T AXIS: 64 DEGREES
T AXIS: 77 DEGREES
T OFFSET: 401 MS
T OFFSET: 401 MS
UNIT ABO: NORMAL
UNIT ABO: NORMAL
UNIT NUMBER: NORMAL
UNIT NUMBER: NORMAL
UNIT RH: NORMAL
UNIT RH: NORMAL
UNIT VOLUME: 325
UNIT VOLUME: 400
VENTRICULAR RATE: 105 BPM
VENTRICULAR RATE: 106 BPM
WBC # BLD AUTO: 11.3 X10*3/UL (ref 4.4–11.3)
XM INTEP: NORMAL
XM INTEP: NORMAL

## 2024-08-15 PROCEDURE — 85730 THROMBOPLASTIN TIME PARTIAL: CPT | Performed by: EMERGENCY MEDICINE

## 2024-08-15 PROCEDURE — 73502 X-RAY EXAM HIP UNI 2-3 VIEWS: CPT | Mod: RIGHT SIDE | Performed by: RADIOLOGY

## 2024-08-15 PROCEDURE — 2500000004 HC RX 250 GENERAL PHARMACY W/ HCPCS (ALT 636 FOR OP/ED): Performed by: STUDENT IN AN ORGANIZED HEALTH CARE EDUCATION/TRAINING PROGRAM

## 2024-08-15 PROCEDURE — 2500000004 HC RX 250 GENERAL PHARMACY W/ HCPCS (ALT 636 FOR OP/ED): Performed by: EMERGENCY MEDICINE

## 2024-08-15 PROCEDURE — 70450 CT HEAD/BRAIN W/O DYE: CPT | Performed by: RADIOLOGY

## 2024-08-15 PROCEDURE — 82947 ASSAY GLUCOSE BLOOD QUANT: CPT

## 2024-08-15 PROCEDURE — 96374 THER/PROPH/DIAG INJ IV PUSH: CPT

## 2024-08-15 PROCEDURE — 2500000001 HC RX 250 WO HCPCS SELF ADMINISTERED DRUGS (ALT 637 FOR MEDICARE OP): Performed by: NURSE PRACTITIONER

## 2024-08-15 PROCEDURE — 73502 X-RAY EXAM HIP UNI 2-3 VIEWS: CPT | Mod: RT

## 2024-08-15 PROCEDURE — 99285 EMERGENCY DEPT VISIT HI MDM: CPT | Performed by: STUDENT IN AN ORGANIZED HEALTH CARE EDUCATION/TRAINING PROGRAM

## 2024-08-15 PROCEDURE — 2500000002 HC RX 250 W HCPCS SELF ADMINISTERED DRUGS (ALT 637 FOR MEDICARE OP, ALT 636 FOR OP/ED): Performed by: NURSE PRACTITIONER

## 2024-08-15 PROCEDURE — 86901 BLOOD TYPING SEROLOGIC RH(D): CPT | Performed by: NURSE PRACTITIONER

## 2024-08-15 PROCEDURE — 2500000004 HC RX 250 GENERAL PHARMACY W/ HCPCS (ALT 636 FOR OP/ED): Performed by: NURSE PRACTITIONER

## 2024-08-15 PROCEDURE — 36415 COLL VENOUS BLD VENIPUNCTURE: CPT | Performed by: EMERGENCY MEDICINE

## 2024-08-15 PROCEDURE — 71045 X-RAY EXAM CHEST 1 VIEW: CPT

## 2024-08-15 PROCEDURE — 96376 TX/PRO/DX INJ SAME DRUG ADON: CPT

## 2024-08-15 PROCEDURE — 80053 COMPREHEN METABOLIC PANEL: CPT | Performed by: EMERGENCY MEDICINE

## 2024-08-15 PROCEDURE — 85610 PROTHROMBIN TIME: CPT | Performed by: EMERGENCY MEDICINE

## 2024-08-15 PROCEDURE — 1200000002 HC GENERAL ROOM WITH TELEMETRY DAILY

## 2024-08-15 PROCEDURE — 93010 ELECTROCARDIOGRAM REPORT: CPT | Performed by: STUDENT IN AN ORGANIZED HEALTH CARE EDUCATION/TRAINING PROGRAM

## 2024-08-15 PROCEDURE — 99285 EMERGENCY DEPT VISIT HI MDM: CPT

## 2024-08-15 PROCEDURE — 70450 CT HEAD/BRAIN W/O DYE: CPT

## 2024-08-15 PROCEDURE — 2500000005 HC RX 250 GENERAL PHARMACY W/O HCPCS: Performed by: EMERGENCY MEDICINE

## 2024-08-15 PROCEDURE — 93005 ELECTROCARDIOGRAM TRACING: CPT

## 2024-08-15 PROCEDURE — 85025 COMPLETE CBC W/AUTO DIFF WBC: CPT | Performed by: EMERGENCY MEDICINE

## 2024-08-15 PROCEDURE — 71045 X-RAY EXAM CHEST 1 VIEW: CPT | Performed by: RADIOLOGY

## 2024-08-15 PROCEDURE — 72125 CT NECK SPINE W/O DYE: CPT | Performed by: RADIOLOGY

## 2024-08-15 PROCEDURE — 86922 COMPATIBILITY TEST ANTIGLOB: CPT

## 2024-08-15 PROCEDURE — 99223 1ST HOSP IP/OBS HIGH 75: CPT | Performed by: INTERNAL MEDICINE

## 2024-08-15 PROCEDURE — 96361 HYDRATE IV INFUSION ADD-ON: CPT

## 2024-08-15 PROCEDURE — 72125 CT NECK SPINE W/O DYE: CPT

## 2024-08-15 PROCEDURE — 99223 1ST HOSP IP/OBS HIGH 75: CPT | Performed by: NURSE PRACTITIONER

## 2024-08-15 RX ORDER — MIDODRINE HYDROCHLORIDE 10 MG/1
10 TABLET ORAL 3 TIMES DAILY
Status: DISCONTINUED | OUTPATIENT
Start: 2024-08-15 | End: 2024-08-24 | Stop reason: HOSPADM

## 2024-08-15 RX ORDER — ACETAMINOPHEN 160 MG/5ML
650 SOLUTION ORAL EVERY 4 HOURS PRN
Status: DISCONTINUED | OUTPATIENT
Start: 2024-08-15 | End: 2024-08-18

## 2024-08-15 RX ORDER — ONDANSETRON 4 MG/1
4 TABLET, FILM COATED ORAL EVERY 8 HOURS PRN
Status: DISCONTINUED | OUTPATIENT
Start: 2024-08-15 | End: 2024-08-18

## 2024-08-15 RX ORDER — MORPHINE SULFATE 2 MG/ML
2 INJECTION, SOLUTION INTRAMUSCULAR; INTRAVENOUS
Status: DISCONTINUED | OUTPATIENT
Start: 2024-08-15 | End: 2024-08-18

## 2024-08-15 RX ORDER — OXYBUTYNIN CHLORIDE 5 MG/1
5 TABLET ORAL 3 TIMES DAILY
Status: DISCONTINUED | OUTPATIENT
Start: 2024-08-15 | End: 2024-08-24 | Stop reason: HOSPADM

## 2024-08-15 RX ORDER — FERROUS SULFATE 325(65) MG
65 TABLET ORAL DAILY
Status: DISCONTINUED | OUTPATIENT
Start: 2024-08-15 | End: 2024-08-24 | Stop reason: HOSPADM

## 2024-08-15 RX ORDER — ACETAMINOPHEN 650 MG/1
650 SUPPOSITORY RECTAL EVERY 4 HOURS PRN
Status: DISCONTINUED | OUTPATIENT
Start: 2024-08-15 | End: 2024-08-18

## 2024-08-15 RX ORDER — ACETAMINOPHEN 325 MG/1
975 TABLET ORAL ONCE
Status: COMPLETED | OUTPATIENT
Start: 2024-08-15 | End: 2024-08-15

## 2024-08-15 RX ORDER — ONDANSETRON HYDROCHLORIDE 2 MG/ML
4 INJECTION, SOLUTION INTRAVENOUS EVERY 8 HOURS PRN
Status: DISCONTINUED | OUTPATIENT
Start: 2024-08-15 | End: 2024-08-24 | Stop reason: HOSPADM

## 2024-08-15 RX ORDER — ACETAMINOPHEN 325 MG/1
650 TABLET ORAL EVERY 4 HOURS PRN
Status: DISCONTINUED | OUTPATIENT
Start: 2024-08-15 | End: 2024-08-18

## 2024-08-15 RX ORDER — INSULIN LISPRO 100 [IU]/ML
0-10 INJECTION, SOLUTION INTRAVENOUS; SUBCUTANEOUS
Status: DISCONTINUED | OUTPATIENT
Start: 2024-08-15 | End: 2024-08-24 | Stop reason: HOSPADM

## 2024-08-15 RX ORDER — ATORVASTATIN CALCIUM 80 MG/1
80 TABLET, FILM COATED ORAL NIGHTLY
Status: DISCONTINUED | OUTPATIENT
Start: 2024-08-15 | End: 2024-08-24 | Stop reason: HOSPADM

## 2024-08-15 RX ORDER — PANTOPRAZOLE SODIUM 40 MG/1
40 TABLET, DELAYED RELEASE ORAL NIGHTLY
Status: DISCONTINUED | OUTPATIENT
Start: 2024-08-15 | End: 2024-08-24 | Stop reason: HOSPADM

## 2024-08-15 RX ORDER — GABAPENTIN 100 MG/1
100 CAPSULE ORAL 3 TIMES DAILY
Status: DISCONTINUED | OUTPATIENT
Start: 2024-08-15 | End: 2024-08-24 | Stop reason: HOSPADM

## 2024-08-15 RX ORDER — POLYETHYLENE GLYCOL 3350 17 G/17G
17 POWDER, FOR SOLUTION ORAL DAILY
Status: DISCONTINUED | OUTPATIENT
Start: 2024-08-15 | End: 2024-08-24 | Stop reason: HOSPADM

## 2024-08-15 RX ORDER — SERTRALINE HYDROCHLORIDE 25 MG/1
25 TABLET, FILM COATED ORAL NIGHTLY
Status: DISCONTINUED | OUTPATIENT
Start: 2024-08-15 | End: 2024-08-24 | Stop reason: HOSPADM

## 2024-08-15 RX ORDER — TAMSULOSIN HYDROCHLORIDE 0.4 MG/1
0.4 CAPSULE ORAL NIGHTLY
Status: DISCONTINUED | OUTPATIENT
Start: 2024-08-15 | End: 2024-08-24 | Stop reason: HOSPADM

## 2024-08-15 RX ORDER — HYDROMORPHONE HYDROCHLORIDE 1 MG/ML
1 INJECTION, SOLUTION INTRAMUSCULAR; INTRAVENOUS; SUBCUTANEOUS ONCE
Status: COMPLETED | OUTPATIENT
Start: 2024-08-15 | End: 2024-08-15

## 2024-08-15 RX ORDER — MORPHINE SULFATE 2 MG/ML
1 INJECTION, SOLUTION INTRAMUSCULAR; INTRAVENOUS
Status: DISCONTINUED | OUTPATIENT
Start: 2024-08-15 | End: 2024-08-18

## 2024-08-15 SDOH — SOCIAL STABILITY: SOCIAL INSECURITY: HAVE YOU HAD ANY THOUGHTS OF HARMING ANYONE ELSE?: NO

## 2024-08-15 SDOH — SOCIAL STABILITY: SOCIAL INSECURITY: DO YOU FEEL UNSAFE GOING BACK TO THE PLACE WHERE YOU ARE LIVING?: NO

## 2024-08-15 SDOH — SOCIAL STABILITY: SOCIAL INSECURITY: HAS ANYONE EVER THREATENED TO HURT YOUR FAMILY OR YOUR PETS?: NO

## 2024-08-15 SDOH — SOCIAL STABILITY: SOCIAL INSECURITY: DO YOU FEEL ANYONE HAS EXPLOITED OR TAKEN ADVANTAGE OF YOU FINANCIALLY OR OF YOUR PERSONAL PROPERTY?: NO

## 2024-08-15 SDOH — SOCIAL STABILITY: SOCIAL INSECURITY: HAVE YOU HAD THOUGHTS OF HARMING ANYONE ELSE?: NO

## 2024-08-15 SDOH — SOCIAL STABILITY: SOCIAL INSECURITY: DOES ANYONE TRY TO KEEP YOU FROM HAVING/CONTACTING OTHER FRIENDS OR DOING THINGS OUTSIDE YOUR HOME?: NO

## 2024-08-15 SDOH — SOCIAL STABILITY: SOCIAL INSECURITY: ABUSE: ADULT

## 2024-08-15 SDOH — SOCIAL STABILITY: SOCIAL INSECURITY: ARE THERE ANY APPARENT SIGNS OF INJURIES/BEHAVIORS THAT COULD BE RELATED TO ABUSE/NEGLECT?: NO

## 2024-08-15 SDOH — SOCIAL STABILITY: SOCIAL INSECURITY: ARE YOU OR HAVE YOU BEEN THREATENED OR ABUSED PHYSICALLY, EMOTIONALLY, OR SEXUALLY BY ANYONE?: NO

## 2024-08-15 ASSESSMENT — ACTIVITIES OF DAILY LIVING (ADL)
ADEQUATE_TO_COMPLETE_ADL: YES
HEARING - LEFT EAR: FUNCTIONAL
PATIENT'S MEMORY ADEQUATE TO SAFELY COMPLETE DAILY ACTIVITIES?: YES
WALKS IN HOME: NEEDS ASSISTANCE
LACK_OF_TRANSPORTATION: NO
TOILETING: NEEDS ASSISTANCE
HEARING - RIGHT EAR: FUNCTIONAL
BATHING: NEEDS ASSISTANCE
GROOMING: NEEDS ASSISTANCE
FEEDING YOURSELF: NEEDS ASSISTANCE
JUDGMENT_ADEQUATE_SAFELY_COMPLETE_DAILY_ACTIVITIES: YES
DRESSING YOURSELF: NEEDS ASSISTANCE

## 2024-08-15 ASSESSMENT — ENCOUNTER SYMPTOMS
CONSTITUTIONAL NEGATIVE: 1
DIZZINESS: 1
MUSCULOSKELETAL NEGATIVE: 1
GASTROINTESTINAL NEGATIVE: 1
RESPIRATORY NEGATIVE: 1
CARDIOVASCULAR NEGATIVE: 1
PSYCHIATRIC NEGATIVE: 1

## 2024-08-15 ASSESSMENT — PAIN SCALES - GENERAL
PAINLEVEL_OUTOF10: 5 - MODERATE PAIN
PAINLEVEL_OUTOF10: 10 - WORST POSSIBLE PAIN
PAINLEVEL_OUTOF10: 10 - WORST POSSIBLE PAIN
PAINLEVEL_OUTOF10: 5 - MODERATE PAIN
PAINLEVEL_OUTOF10: 10 - WORST POSSIBLE PAIN
PAINLEVEL_OUTOF10: 8
PAINLEVEL_OUTOF10: 10 - WORST POSSIBLE PAIN

## 2024-08-15 ASSESSMENT — PATIENT HEALTH QUESTIONNAIRE - PHQ9
SUM OF ALL RESPONSES TO PHQ9 QUESTIONS 1 & 2: 0
1. LITTLE INTEREST OR PLEASURE IN DOING THINGS: NOT AT ALL
2. FEELING DOWN, DEPRESSED OR HOPELESS: NOT AT ALL

## 2024-08-15 ASSESSMENT — LIFESTYLE VARIABLES
HOW MANY STANDARD DRINKS CONTAINING ALCOHOL DO YOU HAVE ON A TYPICAL DAY: PATIENT DOES NOT DRINK
HOW OFTEN DO YOU HAVE A DRINK CONTAINING ALCOHOL: NEVER
HOW OFTEN DO YOU HAVE 6 OR MORE DRINKS ON ONE OCCASION: NEVER
AUDIT-C TOTAL SCORE: 0
AUDIT-C TOTAL SCORE: 0
SKIP TO QUESTIONS 9-10: 1
HAVE PEOPLE ANNOYED YOU BY CRITICIZING YOUR DRINKING: NO
HAVE YOU EVER FELT YOU SHOULD CUT DOWN ON YOUR DRINKING: NO
EVER FELT BAD OR GUILTY ABOUT YOUR DRINKING: NO
TOTAL SCORE: 0
EVER HAD A DRINK FIRST THING IN THE MORNING TO STEADY YOUR NERVES TO GET RID OF A HANGOVER: NO

## 2024-08-15 ASSESSMENT — PAIN DESCRIPTION - PAIN TYPE: TYPE: ACUTE PAIN

## 2024-08-15 ASSESSMENT — COGNITIVE AND FUNCTIONAL STATUS - GENERAL
HELP NEEDED FOR BATHING: A LITTLE
STANDING UP FROM CHAIR USING ARMS: TOTAL
STANDING UP FROM CHAIR USING ARMS: TOTAL
CLIMB 3 TO 5 STEPS WITH RAILING: TOTAL
TOILETING: A LITTLE
MOBILITY SCORE: 9
MOVING FROM LYING ON BACK TO SITTING ON SIDE OF FLAT BED WITH BEDRAILS: A LITTLE
WALKING IN HOSPITAL ROOM: TOTAL
DRESSING REGULAR LOWER BODY CLOTHING: A LITTLE
PERSONAL GROOMING: A LITTLE
DRESSING REGULAR LOWER BODY CLOTHING: A LITTLE
TURNING FROM BACK TO SIDE WHILE IN FLAT BAD: A LOT
PATIENT BASELINE BEDBOUND: NO
PERSONAL GROOMING: A LITTLE
MOVING TO AND FROM BED TO CHAIR: A LOT
DRESSING REGULAR UPPER BODY CLOTHING: A LITTLE
DAILY ACTIVITIY SCORE: 19
MOVING TO AND FROM BED TO CHAIR: A LOT
CLIMB 3 TO 5 STEPS WITH RAILING: TOTAL
HELP NEEDED FOR BATHING: A LITTLE
DRESSING REGULAR UPPER BODY CLOTHING: A LITTLE
TOILETING: A LITTLE
DAILY ACTIVITIY SCORE: 19
WALKING IN HOSPITAL ROOM: TOTAL
MOVING FROM LYING ON BACK TO SITTING ON SIDE OF FLAT BED WITH BEDRAILS: A LOT
TURNING FROM BACK TO SIDE WHILE IN FLAT BAD: A LOT
MOBILITY SCORE: 10

## 2024-08-15 ASSESSMENT — PAIN DESCRIPTION - LOCATION
LOCATION: LEG
LOCATION: HIP

## 2024-08-15 ASSESSMENT — PAIN - FUNCTIONAL ASSESSMENT
PAIN_FUNCTIONAL_ASSESSMENT: 0-10

## 2024-08-15 ASSESSMENT — PAIN DESCRIPTION - ORIENTATION: ORIENTATION: RIGHT

## 2024-08-15 NOTE — ED PROVIDER NOTES
HPI   Chief Complaint   Patient presents with    Hip Pain     Patient arrives from home via ems after a mechanical fall at home. Did not hit head. No loc. Stopped blood thinners yesterday. Complains of right hip/leg pain    Fall       HPI    Patient is a 68-year-old male with a past medical history of COPD, type 2 diabetes, GERD, A-fib on Eliquis, DVTs s/p IVC filter, acalculous cholecystitis, type II achalasia, paraesophageal hernia s/p robotic laparoscopic myotomy with fundoplication C/B esophageal perforation C/B mediastinal abscess and empyema C/B right sided VATS procedure, esophageal stent placement and subsequent removal, s/p esophagectomy, gastric pull-up gastric conduit with anastomosis and neck presenting to the emergency department following a standing level fall.  Patient states that he woke up around 4:30 AM, was walking to the bathroom and misplaced his feet and suffered a prelim chemical fall onto his right hip.  Is now complaining of acute right hip pain.  Patient states that he was recently taken off of his Eliquis after a visit with his doctor yesterday, but his last dose of Eliquis was at 6 PM last night.  Does not think he hit his head or lost consciousness.  Denies any chest pain, abdominal pain, back pain.  Is describing acute pain worst over his right hip.    Patient History   Past Medical History:   Diagnosis Date    Achalasia, esophageal     per patient of esophagus    Anemia     Contusion of abdominal wall, initial encounter 01/12/2021    Contusion, flank    COPD (chronic obstructive pulmonary disease) (Multi)     Diabetes mellitus (Multi)     F/W PCP    DVT (deep venous thrombosis) (Multi)     DVT (deep venous thrombosis) (Multi)     IVC filter placed on 04/20/2023 and on Eliquis    Dysphagia     GERD (gastroesophageal reflux disease)     Hemoptysis 05/12/2020    Cough with hemoptysis    Herpes labialis     Hiatal hernia     Hernia Repair    Hyperlipidemia     Hypotension due to drugs      Irregular heart beat     AFIB: patient denies    Pain in left knee     Peripheral neuropathy     Personal history of other diseases of the respiratory system 2019    History of bronchitis    Sleep apnea     Patient states does not have sleep apnea since martinez loss and does not use CPAP    Solar purpura (CMS-HCC)      Past Surgical History:   Procedure Laterality Date    CONDUIT REPLACEMENT  2023    Gastric conduit revision; mini laparotomy, General Ex-lap, ISA, Gastric pull up with anastmosis in neck, MIGUELINA to bulb sx. New J tube.    ESOPHAGOGASTRECTOMY      ESOPHAGOGASTRODUODENOSCOPY      with stents x2    GASTROSTOMY TUBE PLACEMENT N/A     IR CVC PICC  2023    IR CVC PICC 2023 [facility]    OTHER SURGICAL HISTORY  2019    [place] filter placement    UMBILICAL HERNIA REPAIR N/A      Family History   Problem Relation Name Age of Onset    Diabetes Mother      Hypertension Mother      Diabetes type I Father       Social History     Tobacco Use    Smoking status: Former     Types: Cigars     Start date: 1989     Quit date: 2023     Years since quittin.4     Passive exposure: Past    Smokeless tobacco: Never   Vaping Use    Vaping status: Never Used   Substance Use Topics    Alcohol use: Never    Drug use: Never       Physical Exam   ED Triage Vitals [08/15/24 0620]   Temperature Heart Rate Respirations BP   37.7 °C (99.9 °F) (!) 105 20 124/76      Pulse Ox Temp Source Heart Rate Source Patient Position   98 % Temporal Monitor Lying      BP Location FiO2 (%)     Right arm --       Physical Exam  Constitutional:       Appearance: He is not toxic-appearing or diaphoretic.   HENT:      Head: Normocephalic and atraumatic.      Nose: Nose normal.   Eyes:      General: No scleral icterus.        Right eye: No discharge.         Left eye: No discharge.      Conjunctiva/sclera: Conjunctivae normal.   Cardiovascular:      Rate and Rhythm: Normal rate.      Heart sounds: No  murmur heard.     No friction rub. No gallop.   Pulmonary:      Effort: No respiratory distress.      Breath sounds: Normal breath sounds.   Abdominal:      General: There is no distension.      Palpations: Abdomen is soft.   Musculoskeletal:         General: Tenderness and signs of injury present.      Cervical back: Neck supple. No rigidity.      Comments: 2+ bilateral radial and DP pulses palpated.  Pitting edema up to the knees bilaterally at the patient stated baseline.  Acutely tender to palpation over the right hip and proximal femur.  Nontender to palpation over the remainder of the joints, long bones, feet, and hands of the bilateral upper and lower extremities.  Anterior chest nontender to palpation without overlying crepitus.  Abdomen soft and nontender in all 4 quadrants without rebound or guarding.  No C/T/L-spine tenderness.  Midface stable, trachea midline without tenderness to palpation.  No depressed calvarial fractures, cranial hematomas or lacerations.   Skin:     General: Skin is warm and dry.   Neurological:      Mental Status: He is alert.   Psychiatric:         Mood and Affect: Mood normal.         Behavior: Behavior normal.         ED Course & MDM   ED Course as of 08/15/24 2205   u Aug 15, 2024   0638 EKG taken at 625 on 15 August 2024 showing normal sinus tachycardia with a rate of 106, right bundle branch block pattern with QRS prolongation, QTc 504, otherwise normal intervals, no signs of acute ST elevation or depression [DS]   0805 Femoral neck fracture of right hip noted on XR [JW]   0819 Paged orthopaedics Dr. Washington and primary care Dr. Her [JW]   0819 Comprehensive metabolic panel(!) [JW]   0824 CT C-spine and head reviewed - no acute intracranial hemorrhage, no CT evidence of high risk C-spine fracture [JW]   0839 CXR read: Patchy right basilar opacities. Grossly clear left lung. No apparent  pleural effusion. Cardiomegaly. Widening of the mediastinum relating  to gastric  pull-through. Mild pulmonary vascular congestion. Partial  resection left clavicle.    No symptoms of pneumonia, no fever or leukocytosis. Likely basilar atelectasis. [JW]   0840 Cr - very slightly worse than baseline   [JW]   0857 SPO2 90-93% on RA - pt was in severe pain upon arrival, and given history of previous surgeries was relatively resistant to initial opioid doses. Currently pain free, no respiratory distress. Will order O2 for comfort and will maintain on cardiac monitor and continuous SPO2. [JW]   0956 Seen in ED by Hospital Medicine MARZENA. Care plan confirmed. [JW]      ED Course User Index  [DS] Óscar Love MD  [JW] Zandra Shukla MD         Diagnoses as of 08/15/24 2205   Fall, initial encounter   Anticoagulated   Other diabetic neurological complication associated with type 2 diabetes mellitus (Multi)   Closed fracture of right hip, initial encounter (Multi)   Abnormal chest x-ray   Closed right hip fracture, initial encounter (Multi)                 No data recorded     Jay Coma Scale Score: 15 (08/15/24 0622 : Luzmaria Jimenez RN)                           Medical Decision Making  Patient is a 68-year-old male presenting to the emergency department following a mechanical standing level fall onto his right hip.  Given his acute pain at that site, ordered x-rays for further evaluation of suspected underlying fracture versus dislocation.  Compartments soft without evidence of compartment syndrome at this time.  Distal pulses intact without evidence of acute limb ischemia.  Remainder of secondary exam negative for points of bony tenderness or obvious deformities or step-offs.  Given the patient is on Eliquis, sent for CT imaging of the head and C-spine.  Ordered pain medicines as well as IV fluids given the patient's initial tachycardia and description of pain.  Will be handed off pending the results of his imaging with plan for disposition pending based on the results of that  imaging.    Procedure  Procedures     Óscar Love MD  08/15/24 3210       Óscar Love MD  08/15/24 5953

## 2024-08-15 NOTE — CONSULTS
"Consults  History Of Present Illness:    Levy Gregory is a 68 y.o. male presenting with status post fall with hip fracture, preoperative cardiac risk assessment requested prior to orthopedic surgery.    This unfortunate 68-year-old diabetic, hyperlipidemic former cigar smoker has coronary artery disease and debilitating orthostatic hypotension.   He spent the greater portion of 2023 either in the hospital or lying in bed at a rehab facility.  Please see my prior note dated April 9, 2024 for complete details regarding his long and complex medical history from 2023.  His SPECT on May 15, 2024 disclosed inferior wall infarction with moderate lateral wall ischemia, and an ejection fraction of 56% with inferior wall hypokinesis.  He has had syncope as a consequence of debilitating orthostatic hypotension as outlined in my outpatient notes.  An outpatient monitor study between July 18 and July 25, 2024 disclosed 4 episodes of SVT the longest being 6 beats in duration, and 2 runs of nonsustained ventricular tachycardia the longest being 10 beats in duration.  There is no evidence of conduction system disease or sinus pauses.  He recently saw Dr. Maldonado of vascular medicine who advised him that he no longer needed to take Eliquis which was previously prescribed for DVTs.    On the early morning of admission, the patient got up in the middle of the night to go to the bathroom.  He tripped and fell, fracturing his hip.  He is unsure whether he actually lost consciousness or not, stating \"it all happened so fast\".  His wife came to check on him and called 911.  The patient was brought to the hospital for further evaluation and care.  The patient denies chest discomfort, dyspnea, palpitations, orthopnea, PND, or other cardiac related complaints     Last Recorded Vitals:  Vitals:    08/15/24 0830 08/15/24 0900 08/15/24 0930 08/15/24 1030   BP: 139/75 131/79  127/86   BP Location:    Right arm   Patient Position:    Lying " "  Pulse: (!) 106 100 (!) 104 81   Resp: 16 15 (!) 27 21   Temp:    36.5 °C (97.7 °F)   TempSrc:    Temporal   SpO2: (!) 92% 96% 94% 97%   Weight:    99.8 kg (220 lb)   Height:    1.854 m (6' 1\")       Last Labs:    Results from last 7 days   Lab Units 08/15/24  0719   SODIUM mmol/L 139   POTASSIUM mmol/L 5.0   CHLORIDE mmol/L 104   CO2 mmol/L 25   BUN mg/dL 27*   CREATININE mg/dL 1.44*   CALCIUM mg/dL 9.3   PROTEIN TOTAL g/dL 7.3   BILIRUBIN TOTAL mg/dL 0.6   ALK PHOS U/L 241*   ALT U/L 52   AST U/L 46*   GLUCOSE mg/dL 153*        Results for orders placed or performed during the hospital encounter of 08/15/24 (from the past 24 hour(s))   CBC and Auto Differential   Result Value Ref Range    WBC 11.3 4.4 - 11.3 x10*3/uL    nRBC 0.0 0.0 - 0.0 /100 WBCs    RBC 4.29 (L) 4.50 - 5.90 x10*6/uL    Hemoglobin 13.8 13.5 - 17.5 g/dL    Hematocrit 43.0 41.0 - 52.0 %     80 - 100 fL    MCH 32.2 26.0 - 34.0 pg    MCHC 32.1 32.0 - 36.0 g/dL    RDW 12.9 11.5 - 14.5 %    Platelets 164 150 - 450 x10*3/uL    Neutrophils % 83.6 40.0 - 80.0 %    Immature Granulocytes %, Automated 0.6 0.0 - 0.9 %    Lymphocytes % 9.4 13.0 - 44.0 %    Monocytes % 5.8 2.0 - 10.0 %    Eosinophils % 0.3 0.0 - 6.0 %    Basophils % 0.3 0.0 - 2.0 %    Neutrophils Absolute 9.42 (H) 1.20 - 7.70 x10*3/uL    Immature Granulocytes Absolute, Automated 0.07 0.00 - 0.70 x10*3/uL    Lymphocytes Absolute 1.06 (L) 1.20 - 4.80 x10*3/uL    Monocytes Absolute 0.65 0.10 - 1.00 x10*3/uL    Eosinophils Absolute 0.03 0.00 - 0.70 x10*3/uL    Basophils Absolute 0.03 0.00 - 0.10 x10*3/uL   Comprehensive metabolic panel   Result Value Ref Range    Glucose 153 (H) 74 - 99 mg/dL    Sodium 139 136 - 145 mmol/L    Potassium 5.0 3.5 - 5.3 mmol/L    Chloride 104 98 - 107 mmol/L    Bicarbonate 25 21 - 32 mmol/L    Anion Gap 15 10 - 20 mmol/L    Urea Nitrogen 27 (H) 6 - 23 mg/dL    Creatinine 1.44 (H) 0.50 - 1.30 mg/dL    eGFR 53 (L) >60 mL/min/1.73m*2    Calcium 9.3 8.6 - 10.3 " mg/dL    Albumin 3.7 3.4 - 5.0 g/dL    Alkaline Phosphatase 241 (H) 33 - 136 U/L    Total Protein 7.3 6.4 - 8.2 g/dL    AST 46 (H) 9 - 39 U/L    Bilirubin, Total 0.6 0.0 - 1.2 mg/dL    ALT 52 10 - 52 U/L   aPTT   Result Value Ref Range    aPTT 34 27 - 38 seconds   Protime-INR   Result Value Ref Range    Protime 14.8 (H) 9.8 - 12.8 seconds    INR 1.3 (H) 0.9 - 1.1   POCT GLUCOSE   Result Value Ref Range    POCT Glucose 205 (H) 74 - 99 mg/dL         PTT - 8/15/2024:  8:29 AM  1.3   14.8 34     Troponin I, High Sensitivity   Date/Time Value Ref Range Status   11/29/2023 03:25 PM 19 0 - 53 ng/L Final     Troponin I   Date/Time Value Ref Range Status   09/05/2023 04:52 PM 26 (H) 0 - 20 ng/L Final     Comment:     .  Less than 99th percentile of normal range cutoff-  Female and children under 18 years old <14 ng/L; Male <21 ng/L: Negative  Repeat testing should be performed if clinically indicated.   .  Female and children under 18 years old 14-50 ng/L; Male 21-50 ng/L:  Consistent with possible cardiac damage and possible increased clinical   risk. Serial measurements may help to assess extent of myocardial damage.   .  >50 ng/L: Consistent with cardiac damage, increased clinical risk and  myocardial infarction. Serial measurements may help assess extent of   myocardial damage.   .   NOTE: Children less than 1 year old may have higher baseline troponin   levels and results should be interpreted in conjunction with the overall   clinical context.   .  NOTE: Troponin I testing is performed using a different   testing methodology at [facility] than at other   [facility]. Direct result comparisons should only   be made within the same method.     09/05/2023 03:11 PM 22 (H) 0 - 20 ng/L Final     Comment:     .  Less than 99th percentile of normal range cutoff-  Female and children under 18 years old <14 ng/L; Male <21 ng/L: Negative  Repeat testing should be performed if clinically indicated.   .  Female  and children under 18 years old 14-50 ng/L; Male 21-50 ng/L:  Consistent with possible cardiac damage and possible increased clinical   risk. Serial measurements may help to assess extent of myocardial damage.   .  >50 ng/L: Consistent with cardiac damage, increased clinical risk and  myocardial infarction. Serial measurements may help assess extent of   myocardial damage.   .   NOTE: Children less than 1 year old may have higher baseline troponin   levels and results should be interpreted in conjunction with the overall   clinical context.   .  NOTE: Troponin I testing is performed using a different   testing methodology at [facility] than at other   [facility]. Direct result comparisons should only   be made within the same method.     09/05/2023 01:18 PM 24 (H) 0 - 20 ng/L Final     Comment:     .  Less than 99th percentile of normal range cutoff-  Female and children under 18 years old <14 ng/L; Male <21 ng/L: Negative  Repeat testing should be performed if clinically indicated.   .  Female and children under 18 years old 14-50 ng/L; Male 21-50 ng/L:  Consistent with possible cardiac damage and possible increased clinical   risk. Serial measurements may help to assess extent of myocardial damage.   .  >50 ng/L: Consistent with cardiac damage, increased clinical risk and  myocardial infarction. Serial measurements may help assess extent of   myocardial damage.   .   NOTE: Children less than 1 year old may have higher baseline troponin   levels and results should be interpreted in conjunction with the overall   clinical context.   .  NOTE: Troponin I testing is performed using a different   testing methodology at [facility] than at other   [facility]. Direct result comparisons should only   be made within the same method.       BNP   Date/Time Value Ref Range Status   09/05/2023 01:18 PM 32 0 - 99 pg/mL Final     Comment:     .  <100 pg/mL - Heart failure unlikely  100-299  pg/mL - Intermediate probability of acute heart  .               failure exacerbation. Correlate with clinical  .               context and patient history.    >=300 pg/mL - Heart Failure likely. Correlate with clinical  .               context and patient history.  BNP testing is performed using different testing   methodology at [facility] than at other   system hospitals. Direct result comparisons should   only be made within the same method.     05/29/2023 05:00  (H) 0 - 99 pg/mL Final     Comment:     .  <100 pg/mL - Heart failure unlikely  100-299 pg/mL - Intermediate probability of acute heart  .               failure exacerbation. Correlate with clinical  .               context and patient history.    >=300 pg/mL - Heart Failure likely. Correlate with clinical  .               context and patient history.  BNP testing is performed using different testing   methodology at [facility] than at other   system hospitals. Direct result comparisons should   only be made within the same method.       POC HEMOGLOBIN A1c   Date/Time Value Ref Range Status   02/07/2024 10:01 AM 5.4 4.2 - 6.5 % Final   11/04/2023 10:17 AM 5.6 4.2 - 6.5 % Final     Hemoglobin A1C   Date/Time Value Ref Range Status   07/29/2024 10:36 AM 6.8 (H) see below % Final     LDL Calculated   Date/Time Value Ref Range Status   02/14/2024 02:07 PM 78 <=99 mg/dL Final     Comment:                                 Near   Borderline      AGE      Desirable  Optimal    High     High     Very High     0-19 Y     0 - 109     ---    110-129   >/= 130     ----    20-24 Y     0 - 119     ---    120-159   >/= 160     ----      >24 Y     0 -  99   100-129  130-159   160-189     >/=190       VLDL   Date/Time Value Ref Range Status   02/14/2024 02:07 PM 30 0 - 40 mg/dL Final   03/05/2019 03:24 PM 44 (H) 0 - 40 mg/dL Final      Last I/O:  No intake/output data recorded.    Past Medical History:  He has a past medical history of  Achalasia, esophageal, Anemia, Contusion of abdominal wall, initial encounter (01/12/2021), COPD (chronic obstructive pulmonary disease) (Multi), Diabetes mellitus (Multi), DVT (deep venous thrombosis) (Multi), DVT (deep venous thrombosis) (Multi), Dysphagia, GERD (gastroesophageal reflux disease), Hemoptysis (05/12/2020), Herpes labialis, Hiatal hernia, Hyperlipidemia, Hypotension due to drugs, Irregular heart beat, Pain in left knee, Peripheral neuropathy, Personal history of other diseases of the respiratory system (06/19/2019), Sleep apnea, and Solar purpura (CMS-HCC).    Past Surgical History:  He has a past surgical history that includes Other surgical history (01/21/2019); IR CVC PICC (08/16/2023); Umbilical hernia repair (N/A, 2013); Gastrostomy tube placement (N/A); Esophagogastroduodenoscopy; Esophagogastrectomy; and Conduit replacement (12/07/2023).      Social History:  He reports that he quit smoking about 17 months ago. His smoking use included cigars. He started smoking about 35 years ago. He has been exposed to tobacco smoke. He has never used smokeless tobacco. He reports that he does not drink alcohol and does not use drugs.    Family History:  Family History   Problem Relation Name Age of Onset    Diabetes Mother      Hypertension Mother      Diabetes type I Father          Review of Systems:  ROS is positive for episodic abdominal pain, and cough.  Patient denies fevers, chills, nausea, vomiting, diarrhea, constipation, hematuria, dysuria,  red blood per rectum; all other review of systems is negative.    Allergies:  Patient has no known allergies.    Inpatient Medications:  Scheduled medications   Medication Dose Route Frequency    atorvastatin  80 mg oral Nightly    ferrous sulfate (325 mg ferrous sulfate)  65 mg of iron oral Daily    gabapentin  100 mg oral TID    insulin lispro  0-10 Units subcutaneous TID    midodrine  10 mg oral TID    oxybutynin  5 mg oral TID    pantoprazole  40 mg oral  Nightly    polyethylene glycol  17 g oral Daily    sertraline  25 mg oral Nightly    tamsulosin  0.4 mg oral Nightly     PRN medications   Medication    acetaminophen    Or    acetaminophen    Or    acetaminophen    acetaminophen    Or    acetaminophen    Or    acetaminophen    morphine    morphine    ondansetron    Or    ondansetron    oxygen     Continuous Medications   Medication Dose Last Rate     Outpatient Medications:  Current Outpatient Medications   Medication Instructions    acetaminophen (TYLENOL) 650 mg, oral, Every 6 hours PRN    apixaban (ELIQUIS) 5 mg, oral, 2 times daily    aspirin 81 mg, oral, Daily    atorvastatin (LIPITOR) 80 mg, oral, Daily    blood sugar diagnostic (OneTouch Ultra Test) strip Test glucose twice daily or as directed    blood-glucose meter misc Check glucose before meals and call if less than 80 or over 300.    ferrous sulfate 65 mg, oral, Daily with breakfast, Do not crush, chew, or split.    flash glucose sensor kit (FreeStyle Kellie 2 Sensor) kit Use as instructed    FreeStyle Kellie 2 Howe misc Use as instructed    gabapentin (NEURONTIN) 100 mg, oral, 3 times daily    lisinopril 5 mg, oral, Daily    midodrine (PROAMATINE) 10 mg, oral, 3 times daily    mirabegron (MYRBETRIQ) 50 mg, oral, Daily    pantoprazole (PROTONIX) 40 mg, oral, Nightly, Do not crush, chew, or split.    sertraline (ZOLOFT) 25 mg, oral, Daily    tamsulosin (FLOMAX) 0.4 mg, oral, Daily       Current Imaging  XR hip right with pelvis when performed 2 or 3 views    Result Date: 8/15/2024  Interpreted By:  Sebastián Chatterjee, STUDY: XR HIP RIGHT WITH PELVIS WHEN PERFORMED 2 OR 3 VIEWS;  8/15/2024 8:05 am   INDICATION: Signs/Symptoms:fall on thinners, R hip pain.   COMPARISON: None.   ACCESSION NUMBER(S): [id number]   ORDERING CLINICIAN: JORGE DUEÑAS   FINDINGS: There is an impacted subcapital fracture of the right femur. Mild degenerative changes right hip. No pelvic fracture identified.       Right femoral  subcapital fracture.   MACRO: None   Signed by: Sebastián Chatterjee 8/15/2024 8:27 AM Dictation workstation:   [id number]    XR chest 1 view    Result Date: 8/15/2024  Interpreted By:  Sebastián Chatterjee, STUDY: XR CHEST 1 VIEW;  8/15/2024 8:05 am   INDICATION: Signs/Symptoms:fall on eliquis.   COMPARISON: 07/06/2024   ACCESSION NUMBER(S): [id number]   ORDERING CLINICIAN: JORGE DUEÑAS   FINDINGS: Patchy right basilar opacities. Grossly clear left lung. No apparent pleural effusion. Cardiomegaly. Widening of the mediastinum relating to gastric pull-through. Mild pulmonary vascular congestion. Partial resection left clavicle.       Mild right basilar atelectasis or infiltrate. Mild pulmonary vascular congestion.   MACRO: None   Signed by: Sebastián Chatterjee 8/15/2024 8:26 AM Dictation workstation:   [id number]    CT head wo IV contrast    Result Date: 8/15/2024  Interpreted By:  Sebastián Chatterjee, STUDY: CT HEAD WO IV CONTRAST  8/15/2024 7:43 am   INDICATION: Signs/Symptoms:fall on eliquis   COMPARISON: 07/06/2024   ACCESSION NUMBER(S): [id number]   ORDERING CLINICIAN: JORGE DUEÑAS   TECHNIQUE: Contiguous axial CT images of the brain were obtained without IV contrast.   FINDINGS: The ventricles, cisterns and sulci are prominent, consistent with severe diffuse volume loss. Areas of white matter low attenuation are nonspecific but likely related to chronic microvascular disease. There is intracranial atherosclerosis.   Gray-white differentiation is preserved. No acute intracranial hemorrhage or mass effect. No midline shift. Patent basal cisterns. No extraaxial fluid collections.   The calvaria is intact. The visualized paranasal sinuses and mastoid air cells are clear.       No acute intracranial pathology.     Signed by: Sebastián Chatterjee 8/15/2024 8:25 AM Dictation workstation:   [id number]    CT cervical spine wo IV contrast    Result Date: 8/15/2024  Interpreted By:  Sebastián Chatterjee, STUDY: CT CERVICAL SPINE WO IV CONTRAST;  8/15/2024 7:43  am   INDICATION: Signs/Symptoms:fall on eliquis.   COMPARISON: None.   ACCESSION NUMBER(S): [id number]   ORDERING CLINICIAN: JORGE DUEÑAS   TECHNIQUE: Axial CT images of the cervical spine are obtained. Axial, coronal and sagittal reconstructions are provided for review.   FINDINGS: No acute fracture or subluxation of the cervical spine. Severe multilevel degenerative disc disease and facet arthropathy. Trace anterolisthesis C7-T1. No evidence of critical canal stenosis. Multilevel foraminal stenosis most advanced at C5-6 bilaterally.   No prevertebral soft tissue swelling.   Atherosclerosis.         No evidence for an acute fracture or subluxation of the cervical spine.   MACRO: None   Signed by: Sebastián Chatterjee 8/15/2024 8:22 AM Dictation workstation:   [id number]    Holter Or Event Cardiac Monitor    Result Date: 8/1/2024    1.  Medium-term Holter monitoring was performed between July 18 and July 25, 2024.  Heart rate ranged from , with an average heart rate of 79. 2.  Rare PACs were recorded.  Four episodes of SVT were recorded, the longest being 6 beats in duration. 3.  Rare PVCs were recorded.  Two runs of nonsustained ventricular tachycardia were recorded, the longest being 10 beats in duration. 4.  There is no evidence of high-grade AV block or sinus pauses. 5.  A single patient trigger was recorded, correlating with artifact.    I have reviewed the patient's EKGs and monitor.       Physical Exam:  GENERAL: Chronically ill-appearing 68 year-old man  HEENT: No xanthelasma  NECK: Supple, no palpable adenopathy or thyromegaly  CHEST: Clear to auscultation, respiratory effort unlabored  CARDIAC: RRR, normal S1 and S2, no audible murmur, rub, gallop, carotids are brisk, PMI is not displaced  ABD: Active bowel sounds, nontender, no organomegaly, no evidence of ascites  EXT: No clubbing, cyanosis, there is 1-2+ bilateral lower extremity edema.  NEURO: Awake, alert, appropriate, speech is fluent        Assessment/Plan   1.  Status post fall with probable loss of consciousness due to problem #3  2.  Hip fracture, preop prior to immersion orthopedic surgery.  The patient is at high risk for perioperative cardiac morbidity and/or mortality, but is medically optimized for such surgery.  3.  Debilitating orthostatic hypotension likely multifactorial, related in part due to prolonged bedrest in 2023, in part due to diabetes mellitus, and possible other medical conditions contributing.  4.  Coronary artery disease with prior inferior MI, lateral wall ischemia by SPECT, May 2024.  We have been unable to give him optimal medical therapy for his CAD because of debilitating orthostatic hypotension.  We are left with only high-dose statin and aspirin.  The risk of adding other medications such as oral nitrates or beta-blockers is a far greater potential risk than benefit, as he already has had syncope and debilitating orthostatic hypotension not being on such medications.  5.  Diabetes mellitus  6.  Remote cigar smoker  7.  Hyperlipidemia  8.  History of DVT  9.  Status post surgery for reflux with multiple postoperative complications as detailed in prior notes.  10.  Chronic kidney disease stage III    Plan:  1.  As above, he is high risk for perioperative cardiac morbidity and/or mortality, but is medically optimized for emergent orthopedic surgery.  I will be available to follow along should he have any acute issues related to his heart during this hospitalization.  I have discussed these issues with both the patient and his wife this morning.  2.  DVT prophylaxis throughout the perioperative period  3.  Recommend neurology evaluation for debilitating orthostatic hypotension and further management thereof.    Code Status:  Full Code      Papito Maldonado MD

## 2024-08-15 NOTE — CARE PLAN
The patient's goals for the shift include      The clinical goals for the shift include Pt will have cardiac clearance reviewed by end of shift 8/15/24    Pt met goals

## 2024-08-15 NOTE — CONSULTS
Reason For Consult  Right femoral neck fracture    History Of Present Illness  Levy Gregory is a 68 y.o. male presenting to [facility] emergency department following a mechanical fall which occurred early this morning.  While at home the patient fell while going to the bathroom early this morning and reports that he fell into a wall.  There was immediate pain and the patient was unable to ambulate.  Patient denies any syncopal event but had reportedly been feeling dizzy prior to this. Patient was recently on Eliquis but this was discontinued as of yesterday by  Dr. Maldonado.  He denies any loss of consciousness, shortness of breath or chest pain.  In the emergency department he was found to have a right femoral neck fracture for which orthopedics was consulted.  Patient was seen and evaluated on the orthopedic floor with family at bedside.  He endorses ongoing pain in the right hip but denies pain elsewhere.  He does not ambulate with the use of any assistive devices at baseline.  Patient has an extensive past medical history as documented below.  He denies any numbness or tingling in the right lower extremity.  He denies any additional injuries.     Past Medical History  He has a past medical history of Achalasia, esophageal, Anemia, Contusion of abdominal wall, initial encounter (01/12/2021), COPD (chronic obstructive pulmonary disease) (Multi), Diabetes mellitus (Multi), DVT (deep venous thrombosis) (Multi), DVT (deep venous thrombosis) (Multi), Dysphagia, GERD (gastroesophageal reflux disease), Hemoptysis (05/12/2020), Herpes labialis, Hiatal hernia, Hyperlipidemia, Hypotension due to drugs, Irregular heart beat, Pain in left knee, Peripheral neuropathy, Personal history of other diseases of the respiratory system (06/19/2019), Sleep apnea, and Solar purpura (CMS-HCC).    Surgical History  He has a past surgical history that includes Other surgical history (01/21/2019); IR CVC PICC (08/16/2023); Umbilical  "hernia repair (N/A, 2013); Gastrostomy tube placement (N/A); Esophagogastroduodenoscopy; Esophagogastrectomy; and Conduit replacement (12/07/2023).     Social History  He reports that he quit smoking about 17 months ago. His smoking use included cigars. He started smoking about 35 years ago. He has been exposed to tobacco smoke. He has never used smokeless tobacco. He reports that he does not drink alcohol and does not use drugs.    Family History  Family History   Problem Relation Name Age of Onset    Diabetes Mother      Hypertension Mother      Diabetes type I Father          Allergies  Patient has no known allergies.       Physical Exam  Focused examination of the right lower extremity reveals a shortened externally rotated leg.  There is tenderness palpation about the greater trochanter.  There is pain with logroll and any passive range of motion of the right hip.  There is no tenderness to palpation more distally in the femur, knee tibia ankle or foot.  The patient has intact plantarflexion and dorsiflexion as well as EHL function.  Sensation is intact to light touch in the right lower extremity.  DP pulse 2+.  There is no ecchymosis or bruising noted around the hip.    There is no tenderness to palpation noted in the bilateral upper extremities or left lower extremity and the remainder of the secondary exam is negative     Last Recorded Vitals  Blood pressure 127/86, pulse 81, temperature 36.5 °C (97.7 °F), temperature source Temporal, resp. rate 21, height 1.854 m (6' 1\"), weight 99.8 kg (220 lb), SpO2 97%.    Relevant Results  Electrocardiogram, 12-lead PRN ACS symptoms    Result Date: 8/15/2024  Sinus tachycardia Left axis deviation Right bundle branch block Possible Lateral infarct (cited on or before 06-AUG-2023) Inferior infarct (cited on or before 06-AUG-2023) Abnormal ECG When compared with ECG of 15-AUG-2024 06:25, (unconfirmed) Serial changes of Lateral infarct Present    ECG 12 lead    Result Date: " 8/15/2024  Sinus tachycardia Right bundle branch block Possible Lateral infarct (cited on or before 06-AUG-2023) Inferior infarct (cited on or before 06-AUG-2023) Abnormal ECG When compared with ECG of 07-DEC-2023 20:40, Right bundle branch block is now Present Questionable change in initial forces of Lateral leads    XR hip right with pelvis when performed 2 or 3 views    Result Date: 8/15/2024  Interpreted By:  Sebastián Chatterjee, STUDY: XR HIP RIGHT WITH PELVIS WHEN PERFORMED 2 OR 3 VIEWS;  8/15/2024 8:05 am   INDICATION: Signs/Symptoms:fall on thinners, R hip pain.   COMPARISON: None.   ACCESSION NUMBER(S): [id number]   ORDERING CLINICIAN: JORGE DUEÑAS   FINDINGS: There is an impacted subcapital fracture of the right femur. Mild degenerative changes right hip. No pelvic fracture identified.       Right femoral subcapital fracture.   MACRO: None   Signed by: Sebastián Chatterjee 8/15/2024 8:27 AM Dictation workstation:   [id number]    XR chest 1 view    Result Date: 8/15/2024  Interpreted By:  Sebastián Chatterjee, STUDY: XR CHEST 1 VIEW;  8/15/2024 8:05 am   INDICATION: Signs/Symptoms:fall on eliquis.   COMPARISON: 07/06/2024   ACCESSION NUMBER(S): [id number]   ORDERING CLINICIAN: JORGE DUEÑAS   FINDINGS: Patchy right basilar opacities. Grossly clear left lung. No apparent pleural effusion. Cardiomegaly. Widening of the mediastinum relating to gastric pull-through. Mild pulmonary vascular congestion. Partial resection left clavicle.       Mild right basilar atelectasis or infiltrate. Mild pulmonary vascular congestion.   MACRO: None   Signed by: Sebastián Chatterjee 8/15/2024 8:26 AM Dictation workstation:   [id number]    CT head wo IV contrast    Result Date: 8/15/2024  Interpreted By:  Sebastián Chatterjee, STUDY: CT HEAD WO IV CONTRAST  8/15/2024 7:43 am   INDICATION: Signs/Symptoms:fall on eliquis   COMPARISON: 07/06/2024   ACCESSION NUMBER(S): [id number]   ORDERING CLINICIAN: JORGE DUEÑAS   TECHNIQUE: Contiguous axial CT images of the  brain were obtained without IV contrast.   FINDINGS: The ventricles, cisterns and sulci are prominent, consistent with severe diffuse volume loss. Areas of white matter low attenuation are nonspecific but likely related to chronic microvascular disease. There is intracranial atherosclerosis.   Gray-white differentiation is preserved. No acute intracranial hemorrhage or mass effect. No midline shift. Patent basal cisterns. No extraaxial fluid collections.   The calvaria is intact. The visualized paranasal sinuses and mastoid air cells are clear.       No acute intracranial pathology.     Signed by: Sebastián Chatterjee 8/15/2024 8:25 AM Dictation workstation:   [id number]    CT cervical spine wo IV contrast    Result Date: 8/15/2024  Interpreted By:  Sebastián Chatterjee, STUDY: CT CERVICAL SPINE WO IV CONTRAST;  8/15/2024 7:43 am   INDICATION: Signs/Symptoms:fall on eliquis.   COMPARISON: None.   ACCESSION NUMBER(S): [id number]   ORDERING CLINICIAN: JORGE DUEÑAS   TECHNIQUE: Axial CT images of the cervical spine are obtained. Axial, coronal and sagittal reconstructions are provided for review.   FINDINGS: No acute fracture or subluxation of the cervical spine. Severe multilevel degenerative disc disease and facet arthropathy. Trace anterolisthesis C7-T1. No evidence of critical canal stenosis. Multilevel foraminal stenosis most advanced at C5-6 bilaterally.   No prevertebral soft tissue swelling.   Atherosclerosis.         No evidence for an acute fracture or subluxation of the cervical spine.   MACRO: None   Signed by: Sebastián Chatterjee 8/15/2024 8:22 AM Dictation workstation:   [id number]    Holter Or Event Cardiac Monitor    Result Date: 8/1/2024    1.  Medium-term Holter monitoring was performed between July 18 and July 25, 2024.  Heart rate ranged from , with an average heart rate of 79. 2.  Rare PACs were recorded.  Four episodes of SVT were recorded, the longest being 6 beats in duration. 3.  Rare PVCs were recorded.   Two runs of nonsustained ventricular tachycardia were recorded, the longest being 10 beats in duration. 4.  There is no evidence of high-grade AV block or sinus pauses. 5.  A single patient trigger was recorded, correlating with artifact.        Scheduled medications  atorvastatin, 80 mg, oral, Nightly  ferrous sulfate (325 mg ferrous sulfate), 65 mg of iron, oral, Daily  gabapentin, 100 mg, oral, TID  insulin lispro, 0-10 Units, subcutaneous, TID  midodrine, 10 mg, oral, TID  oxybutynin, 5 mg, oral, TID  pantoprazole, 40 mg, oral, Nightly  polyethylene glycol, 17 g, oral, Daily  sertraline, 25 mg, oral, Nightly  tamsulosin, 0.4 mg, oral, Nightly      Continuous medications     PRN medications  PRN medications: acetaminophen **OR** acetaminophen **OR** acetaminophen, acetaminophen **OR** acetaminophen **OR** acetaminophen, morphine, morphine, ondansetron **OR** ondansetron, oxygen  Results for orders placed or performed during the hospital encounter of 08/15/24 (from the past 24 hour(s))   CBC and Auto Differential   Result Value Ref Range    WBC 11.3 4.4 - 11.3 x10*3/uL    nRBC 0.0 0.0 - 0.0 /100 WBCs    RBC 4.29 (L) 4.50 - 5.90 x10*6/uL    Hemoglobin 13.8 13.5 - 17.5 g/dL    Hematocrit 43.0 41.0 - 52.0 %     80 - 100 fL    MCH 32.2 26.0 - 34.0 pg    MCHC 32.1 32.0 - 36.0 g/dL    RDW 12.9 11.5 - 14.5 %    Platelets 164 150 - 450 x10*3/uL    Neutrophils % 83.6 40.0 - 80.0 %    Immature Granulocytes %, Automated 0.6 0.0 - 0.9 %    Lymphocytes % 9.4 13.0 - 44.0 %    Monocytes % 5.8 2.0 - 10.0 %    Eosinophils % 0.3 0.0 - 6.0 %    Basophils % 0.3 0.0 - 2.0 %    Neutrophils Absolute 9.42 (H) 1.20 - 7.70 x10*3/uL    Immature Granulocytes Absolute, Automated 0.07 0.00 - 0.70 x10*3/uL    Lymphocytes Absolute 1.06 (L) 1.20 - 4.80 x10*3/uL    Monocytes Absolute 0.65 0.10 - 1.00 x10*3/uL    Eosinophils Absolute 0.03 0.00 - 0.70 x10*3/uL    Basophils Absolute 0.03 0.00 - 0.10 x10*3/uL   Comprehensive metabolic panel    Result Value Ref Range    Glucose 153 (H) 74 - 99 mg/dL    Sodium 139 136 - 145 mmol/L    Potassium 5.0 3.5 - 5.3 mmol/L    Chloride 104 98 - 107 mmol/L    Bicarbonate 25 21 - 32 mmol/L    Anion Gap 15 10 - 20 mmol/L    Urea Nitrogen 27 (H) 6 - 23 mg/dL    Creatinine 1.44 (H) 0.50 - 1.30 mg/dL    eGFR 53 (L) >60 mL/min/1.73m*2    Calcium 9.3 8.6 - 10.3 mg/dL    Albumin 3.7 3.4 - 5.0 g/dL    Alkaline Phosphatase 241 (H) 33 - 136 U/L    Total Protein 7.3 6.4 - 8.2 g/dL    AST 46 (H) 9 - 39 U/L    Bilirubin, Total 0.6 0.0 - 1.2 mg/dL    ALT 52 10 - 52 U/L   Electrocardiogram, 12-lead PRN ACS symptoms   Result Value Ref Range    Ventricular Rate 105 BPM    Atrial Rate 105 BPM    OK Interval 150 ms    QRS Duration 132 ms    QT Interval 380 ms    QTC Calculation(Bazett) 502 ms    R Axis -69 degrees    T Axis 64 degrees    QRS Count 17 beats    Q Onset 211 ms    P Onset 136 ms    P Offset 207 ms    T Offset 401 ms    QTC Fredericia 457 ms   ECG 12 lead   Result Value Ref Range    Ventricular Rate 106 BPM    Atrial Rate 106 BPM    OK Interval 172 ms    QRS Duration 134 ms    QT Interval 380 ms    QTC Calculation(Bazett) 504 ms    P Axis 62 degrees    R Axis 269 degrees    T Axis 77 degrees    QRS Count 17 beats    Q Onset 211 ms    P Onset 125 ms    P Offset 182 ms    T Offset 401 ms    QTC Fredericia 459 ms   aPTT   Result Value Ref Range    aPTT 34 27 - 38 seconds   Protime-INR   Result Value Ref Range    Protime 14.8 (H) 9.8 - 12.8 seconds    INR 1.3 (H) 0.9 - 1.1   Type and screen   Result Value Ref Range    ABO TYPE O     Rh TYPE POS     ANTIBODY SCREEN NEG    POCT GLUCOSE   Result Value Ref Range    POCT Glucose 205 (H) 74 - 99 mg/dL   Prepare RBC   Result Value Ref Range    PRODUCT CODE U7594I74     Unit Number [id number]     Unit ABO O     Unit RH POS     XM INTEP COMP     Dispense Status XM     Blood Expiration Date 9/10/2024 11:59:00 PM EDT     PRODUCT BLOOD TYPE 5100     UNIT VOLUME 325    POCT GLUCOSE    Result Value Ref Range    POCT Glucose 295 (H) 74 - 99 mg/dL        Assessment/Plan     Right femoral neck fracture    I had a long discussion with the patient and his family about the nature of his injury.  We discussed that the patient does have significant medical co-morbidities however I feel that surgical intervention is indicated to restore the patient back to ambulatory status with the use of an assistive device and minimize the risk of prolonged immobilization and bedrest associated with non-operative treatment.  We discussed that non-operative treatment greatly increases the risks of pneumonia, blood clots and the development of skin breakdown/ulcerations amongst other risks.  Furthermore, we discussed the nature of this injury and the morbidity and mortality associated with this injury in general.  We discussed the risk benefits and alternatives to surgery including risk of dislocation, risk of implant failure, risk of need for additional procedures, risk of damage to surrounding nerves or blood vessels, risk of ongoing pain following the procedure, risk of infection or wound healing complications, risk of general anesthesia and risks of blood clots. Patient and his family have expressed understanding of these risks and wish to proceed forward with surgical intervention.  I have recommended a hemiarthroplasty performed through a posterior approach.       -Admission to medicine for preoperative optimization/medical mgmt  -Cardiology and anesthesia consulted, appreciate recommendations  -NPO at midnight in preparation for surgery tomorrow  -Hold AC in preparation for surgery  -Multimodal pain control  -Ancef pre-op  -NWB RLE  -PT/OT eval and treat post-op  -Anticipate discharge to rehab facility  -Please contact me with questions     Randy Washington MD

## 2024-08-15 NOTE — H&P
History Of Present Illness  Levy Gregory is a 68 y.o. male presents to [facility] ER with chief complaint of mechanical fall. Patient states while at home, he awakened early this morning in the dark and tried to get to the bathroom, tripped and fell.  Wife states he has been complaining of dizziness for the past two weeks. Patient states he has been getting dizzy when he stands up too fast but sits down and it goes away. He states not sure if dizzy this morning. He states he did not strike his head on fall. He denies any loc, chest pain, shortness of breath, nausea, vomiting, headache.  He was on Eliquis and his last dose was yesterday. He had IVC filter removed in April 2023. Imaging obtained in ED showing femoral neck fracture of right hip, Orthopedics was consulted Dr. Jones while in the ED.     Risk Details: ER discussed with Dr. Washington - will need anesthesia to consult early to determine appropriateness for [facility] OR due to extensive GI history, had gastric pull through November 2023 at main campus. Wife states patient cannot lie flat, risk of aspiration.      Past Medical History  Acne  Colon polyp  Depression  Diabetic neuropathy associated with type 2 diabetes mellitus (Multi)  ED (erectile dysfunction)  High serum bone-specific alkaline phosphatase  Gout  History of DVT (deep vein thrombosis)  History of pulmonary embolism  Hyperlipidemia  Microalbuminuria  Nocturia  Obstructive sleep apnea, adult  Osteopenia  Rib pain on left side  Sialoadenitis  Vitamin D deficiency  Achalasia, esophageal  Type 2 diabetes mellitus with hyperglycemia (Multi)  Orthostatic hypotension  Solar purpura (CMS-HCC)  Impaired oral gastric feeding tube, initial encounter  Dysfunction of both eustachian tubes  Anticoagulant long-term use  Other dysphagia  Hypertensive heart disease with heart failure (Multi)  Chronic obstructive pulmonary disease, unspecified COPD type (Multi)  Pedal edema  At risk for  falls  Abnormal ECG  Prolonged QT interval  Acute posthemorrhagic anemia  Acute renal failure superimposed on chronic kidney disease (CMS-HCC)  Adjustment disorder with depressed mood  Balance problems  Edema of both lower legs due to peripheral venous insufficiency  History of diabetes mellitus  Hypernatremia  Hypoxia  Onychodystrophy  Onychomycosis  Pain in toes of both feet  Schatzki's ring  History of inferior vena caval filter placement  Status post endoscopy  Vagal arrhythmia  Coronary artery disease involving native coronary artery of native heart without angina pectoris  Mild left ventricular systolic dysfunction  Deep vein thrombosis (DVT) of left lower extremity (Multi)  Palpitations  Macrocytosis    Surgical History  He has a past surgical history that includes Other surgical history (01/21/2019); IR CVC PICC (08/16/2023); Umbilical hernia repair (N/A, 2013); Gastrostomy tube placement (N/A); Esophagogastroduodenoscopy; Esophagogastrectomy; and Conduit replacement (12/07/2023).     Social History  He reports that he quit smoking about 17 months ago. His smoking use included cigars. He started smoking about 35 years ago. He has been exposed to tobacco smoke. He has never used smokeless tobacco. He reports that he does not drink alcohol and does not use drugs.    Family History  Family History   Problem Relation Name Age of Onset    Diabetes Mother      Hypertension Mother      Diabetes type I Father          Allergies  Patient has no known allergies.    Review of Systems   Constitutional: Negative.    Respiratory: Negative.     Cardiovascular: Negative.    Gastrointestinal: Negative.    Genitourinary: Negative.    Musculoskeletal: Negative.    Skin: Negative.    Neurological:  Positive for dizziness.   Psychiatric/Behavioral: Negative.        ROS: 12 systems reviewed and negative except per HPI above     Physical Exam by System:     Constitutional: Well developed, awake/alert/oriented x3, no distress,  "alert and cooperative   ENMT: mucous membranes moist   Head/Neck: Neck supple   Respiratory/Thorax: Patent airways, CTAB, normal breath sounds with good chest expansion, thorax symmetric, scar on left chest    Cardiovascular: Regular, rate and rhythm, no murmurs, 2+ equal pulses of the extremities, normal S 1and S 2   Gastrointestinal: Nondistended, soft, non-tender, no rebound tenderness or guarding, no masses palpable, no organomegaly, +BS, no bruits   Musculoskeletal: ROM intact, no joint swelling, normal strength   Extremities: right leg rotated and on pillow, motor sensory and pulses intact   Neurological: alert and oriented x3, intact senses, motor, response and reflexes, normal strength       Last Recorded Vitals  Blood pressure 134/86, pulse (!) 108, temperature 37.7 °C (99.9 °F), temperature source Temporal, resp. rate 14, height 1.854 m (6' 1\"), weight 111 kg (245 lb), SpO2 94%.    Relevant Results  Results for orders placed or performed during the hospital encounter of 08/15/24 (from the past 24 hour(s))   CBC and Auto Differential   Result Value Ref Range    WBC 11.3 4.4 - 11.3 x10*3/uL    nRBC 0.0 0.0 - 0.0 /100 WBCs    RBC 4.29 (L) 4.50 - 5.90 x10*6/uL    Hemoglobin 13.8 13.5 - 17.5 g/dL    Hematocrit 43.0 41.0 - 52.0 %     80 - 100 fL    MCH 32.2 26.0 - 34.0 pg    MCHC 32.1 32.0 - 36.0 g/dL    RDW 12.9 11.5 - 14.5 %    Platelets 164 150 - 450 x10*3/uL    Neutrophils % 83.6 40.0 - 80.0 %    Immature Granulocytes %, Automated 0.6 0.0 - 0.9 %    Lymphocytes % 9.4 13.0 - 44.0 %    Monocytes % 5.8 2.0 - 10.0 %    Eosinophils % 0.3 0.0 - 6.0 %    Basophils % 0.3 0.0 - 2.0 %    Neutrophils Absolute 9.42 (H) 1.20 - 7.70 x10*3/uL    Immature Granulocytes Absolute, Automated 0.07 0.00 - 0.70 x10*3/uL    Lymphocytes Absolute 1.06 (L) 1.20 - 4.80 x10*3/uL    Monocytes Absolute 0.65 0.10 - 1.00 x10*3/uL    Eosinophils Absolute 0.03 0.00 - 0.70 x10*3/uL    Basophils Absolute 0.03 0.00 - 0.10 x10*3/uL "   Comprehensive metabolic panel   Result Value Ref Range    Glucose 153 (H) 74 - 99 mg/dL    Sodium 139 136 - 145 mmol/L    Potassium 5.0 3.5 - 5.3 mmol/L    Chloride 104 98 - 107 mmol/L    Bicarbonate 25 21 - 32 mmol/L    Anion Gap 15 10 - 20 mmol/L    Urea Nitrogen 27 (H) 6 - 23 mg/dL    Creatinine 1.44 (H) 0.50 - 1.30 mg/dL    eGFR 53 (L) >60 mL/min/1.73m*2    Calcium 9.3 8.6 - 10.3 mg/dL    Albumin 3.7 3.4 - 5.0 g/dL    Alkaline Phosphatase 241 (H) 33 - 136 U/L    Total Protein 7.3 6.4 - 8.2 g/dL    AST 46 (H) 9 - 39 U/L    Bilirubin, Total 0.6 0.0 - 1.2 mg/dL    ALT 52 10 - 52 U/L   aPTT   Result Value Ref Range    aPTT 34 27 - 38 seconds   Protime-INR   Result Value Ref Range    Protime 14.8 (H) 9.8 - 12.8 seconds    INR 1.3 (H) 0.9 - 1.1      Scheduled medications  HYDROmorphone, 0.5 mg, intravenous, Once      Continuous medications     PRN medications       XR hip right with pelvis when performed 2 or 3 views    Result Date: 8/15/2024  Interpreted By:  Sebastián Chatterjee, STUDY: XR HIP RIGHT WITH PELVIS WHEN PERFORMED 2 OR 3 VIEWS;  8/15/2024 8:05 am   INDICATION: Signs/Symptoms:fall on thinners, R hip pain.   COMPARISON: None.   ACCESSION NUMBER(S): [id number]   ORDERING CLINICIAN: JORGE DUEÑAS   FINDINGS: There is an impacted subcapital fracture of the right femur. Mild degenerative changes right hip. No pelvic fracture identified.       Right femoral subcapital fracture.   MACRO: None   Signed by: Sebastián Chatterjee 8/15/2024 8:27 AM Dictation workstation:   [id number]    XR chest 1 view    Result Date: 8/15/2024  Interpreted By:  Sebastián Chatterjee, STUDY: XR CHEST 1 VIEW;  8/15/2024 8:05 am   INDICATION: Signs/Symptoms:fall on eliquis.   COMPARISON: 07/06/2024   ACCESSION NUMBER(S): [id number]   ORDERING CLINICIAN: JORGE DUEÑAS   FINDINGS: Patchy right basilar opacities. Grossly clear left lung. No apparent pleural effusion. Cardiomegaly. Widening of the mediastinum relating to gastric pull-through. Mild pulmonary  vascular congestion. Partial resection left clavicle.       Mild right basilar atelectasis or infiltrate. Mild pulmonary vascular congestion.   MACRO: None   Signed by: Sebastián Chatterjee 8/15/2024 8:26 AM Dictation workstation:   [id number]    CT head wo IV contrast    Result Date: 8/15/2024  Interpreted By:  Sebastián Chatterjee, STUDY: CT HEAD WO IV CONTRAST  8/15/2024 7:43 am   INDICATION: Signs/Symptoms:fall on eliquis   COMPARISON: 07/06/2024   ACCESSION NUMBER(S): [id number]   ORDERING CLINICIAN: JORGE DUEÑAS   TECHNIQUE: Contiguous axial CT images of the brain were obtained without IV contrast.   FINDINGS: The ventricles, cisterns and sulci are prominent, consistent with severe diffuse volume loss. Areas of white matter low attenuation are nonspecific but likely related to chronic microvascular disease. There is intracranial atherosclerosis.   Gray-white differentiation is preserved. No acute intracranial hemorrhage or mass effect. No midline shift. Patent basal cisterns. No extraaxial fluid collections.   The calvaria is intact. The visualized paranasal sinuses and mastoid air cells are clear.       No acute intracranial pathology.     Signed by: Sebastián Chatterjee 8/15/2024 8:25 AM Dictation workstation:   [id number]    CT cervical spine wo IV contrast    Result Date: 8/15/2024  Interpreted By:  Sebastián Chatterjee, STUDY: CT CERVICAL SPINE WO IV CONTRAST;  8/15/2024 7:43 am   INDICATION: Signs/Symptoms:fall on eliquis.   COMPARISON: None.   ACCESSION NUMBER(S): [id number]   ORDERING CLINICIAN: JORGE DUEÑAS   TECHNIQUE: Axial CT images of the cervical spine are obtained. Axial, coronal and sagittal reconstructions are provided for review.   FINDINGS: No acute fracture or subluxation of the cervical spine. Severe multilevel degenerative disc disease and facet arthropathy. Trace anterolisthesis C7-T1. No evidence of critical canal stenosis. Multilevel foraminal stenosis most advanced at C5-6 bilaterally.   No prevertebral soft  tissue swelling.   Atherosclerosis.         No evidence for an acute fracture or subluxation of the cervical spine.   MACRO: None   Signed by: Sebastián Shena 8/15/2024 8:22 AM Dictation workstation:   [id number]         Assessment/Plan   Closed right hip fracture  Diabetic neuropathy associated with T2DM  Hx pulmonary embolism, DVT  Achalasia, esophageal  Hx COPD  Hx CAD  Fall risk, balance issues  Hx MARTIN    Plan:    -Admit to inpatient with tele  -currently on 2 lpm nasal cannula, room air is baseline, wean as tolerated  -Fall precautions  -Bedrest until cleared by Ortho  -Orthopedic Consult Dr. Jones   -Right hip imaging completed in ED: There is an impacted subcapital fracture of the right femur.   -Consult placed to anesthesia to determine if appropriate for [facility] or possible transfer to [facility], message sent to Anesthesth[facility]my Sutton  -Obtain type and screen  -CXR completed in ED: Patchy right basilar opacities. Grossly clear left lung. No apparent  pleural effusion. Cardiomegaly. Widening of the mediastinum relating  to gastric pull-through. Mild pulmonary vascular congestion.  -EKG obtained in ED:  sinus tachycardia with a rate of 106, right bundle branch block pattern with QRS prolongation, QTc 504.  -CT C spine and head obtained in ED: no acute intracranial hemorrhage, no CT evidence of high risk C-spine fracture   -Diabetic diet  -Insulin sliding scale with POC glucose monitoring AC HS  -Patient reported stopped Eliquis yesterday, hx CAD, consult cardiology for cardiac clearance, follows Dr. Maldonado  -Am labs including cbc, bmp, mag  -Resume patient medications when med rec is reconciled   -POC discussed with patient and attending  -Dvtp: stopped Eliquis yesterday, SCD's pending surgical intervention  -Dispo: surgical intervention in process of being planned, however, not likely today, will need PT OT post surgery and likely need acute rehab/SNF on DC    Code status: Discussed with patient and  wife at bedside, patient is DNR NO CPR he is okay will intubation    I spent >50 minutes in the professional and overall care of this patient.    SIGNATURE: IVÁN Faith-JENNA PATIENT NAME: Levy Gregory   DATE: August 15, 2024 MRN: [id number]   TIME: 9:01 AM

## 2024-08-15 NOTE — NURSING NOTE
Shift note -   Pt stable this shift. He has been able to voice his needs. Pt in pain and medicated per orders. He can sometimes need directions on plan of care and purpose. Pt able to turn with help and pain tolerance. Potential surgery to repair his R hip tomorrow around 11 per OR case time. Family updated and aware.

## 2024-08-15 NOTE — ANESTHESIA PREPROCEDURE EVALUATION
Patient: Levy Gregory    Procedure Information       Date: 08/16/24    Procedure: Hip Hemiarthroplasty (Right)    Location: [facility]    Surgeons: Randy Washington MD            Relevant Problems   Anesthesia (within normal limits)      Cardiac   (+) Abnormal ECG   (+) Coronary artery disease involving native coronary artery of native heart without angina pectoris   (+) Hyperlipidemia   (+) Prolonged QT interval   (+) Rib pain on left side   (+) Solar purpura (CMS-HCC)   (+) Vagal arrhythmia      Pulmonary   (+) Chronic obstructive pulmonary disease, unspecified COPD type (Multi)   (+) Obstructive sleep apnea, adult      Neuro   (+) Depression      GI   (+) Other dysphagia      Endocrine   (+) Diabetic neuropathy associated with type 2 diabetes mellitus (Multi)   (+) Type 2 diabetes mellitus with hyperglycemia (Multi)      Hematology   (+) Acute posthemorrhagic anemia   (+) Anticoagulant long-term use   (+) Deep vein thrombosis (DVT) of left lower extremity (Multi)      ID   (+) Onychomycosis      Respiratory   (+) Hypoxia      Circulatory   (+) Edema of both lower legs due to peripheral venous insufficiency   (+) Hypertensive heart disease with heart failure (Multi)   (+) Mild left ventricular systolic dysfunction   (+) Orthostatic hypotension       Clinical information reviewed:   Tobacco  Allergies  Meds   Med Hx  Surg Hx   Fam Hx  Soc Hx        NPO Detail:  No data recorded     Physical Exam    Airway  Mallampati: III  TM distance: >3 FB  Neck ROM: full     Cardiovascular   Rhythm: regular  Rate: normal     Dental    Pulmonary   Breath sounds clear to auscultation     Abdominal   Abdomen: soft             Anesthesia Plan    History of general anesthesia?: yes  History of complications of general anesthesia?: no    ASA 4     general     intravenous induction   Postoperative administration of opioids is intended.  Anesthetic plan and risks discussed with patient.  Use of blood products discussed  with patient who consented to blood products.      Per Cardiology note, given ongoing comorbidities, patient does present as a high risk case for OR. However, there is significant and well-established risk of morbidity/mortality when delaying surgical care for these types of fractures. For anesthesia care at [facility] and the [facility] OR, patient is OK to proceed with any neessary  pre-operative optimization to be performed by his floor and surgical teams prior.      T/S, coags sent and resulted. Please have morning labs drawn and available prior to coming to OR.  NPO at midnight.  DNR status and explaining risk/benefit of transferring/proceeding at [facility] and the [facility] will need to be addressed by Dr Washington or member of his surgical team prior to OR.

## 2024-08-15 NOTE — ED PROVIDER NOTES
HPI   Chief Complaint   Patient presents with    Hip Pain     Patient arrives from home via ems after a mechanical fall at home. Did not hit head. No loc. Stopped blood thinners yesterday. Complains of right hip/leg pain    Fall       68-year-old gentleman with a fall at home states that he awakened early this morning in the dark and tried to get to the bathroom.  He was on Eliquis and his last dose was yesterday but his PCP told him to discontinue use so he did not take a dose this morning.  He states he did not strike his head.  Denies any headache or shortness of breath.  States he was mildly dizzy when his fall occurred.  Denies any fevers or chills.  Has had an extensive surgical history in the past.  Received pain medications by EMS that were ineffective.  He is not currently dizzy or lightheaded.    Plan is to check imaging results, obtain blood work.  Patient has high pretest probability of hip fracture and is a likely admission with consideration of surgical intervention.  I met with him and his wife, explained the course of treatment that we have planned for him and he requested additional pain medications to be ordered.      History provided by:  Caregiver and spouse   used: No            Patient History   Past Medical History:   Diagnosis Date    Achalasia, esophageal     per patient of esophagus    Anemia     Contusion of abdominal wall, initial encounter 01/12/2021    Contusion, flank    COPD (chronic obstructive pulmonary disease) (Multi)     Diabetes mellitus (Multi)     F/W PCP    DVT (deep venous thrombosis) (Multi)     DVT (deep venous thrombosis) (Multi)     IVC filter placed on 04/20/2023 and on Eliquis    Dysphagia     GERD (gastroesophageal reflux disease)     Hemoptysis 05/12/2020    Cough with hemoptysis    Herpes labialis     Hiatal hernia     Hernia Repair    Hyperlipidemia     Hypotension due to drugs     Irregular heart beat     AFIB: patient denies    Pain in left  knee     Peripheral neuropathy     Personal history of other diseases of the respiratory system 06/19/2019    History of bronchitis    Sleep apnea     Patient states does not have sleep apnea since martinez loss and does not use CPAP    Solar purpura (CMS-HCC)      Past Surgical History:   Procedure Laterality Date    CONDUIT REPLACEMENT  12/07/2023    Gastric conduit revision; mini laparotomy, General Ex-lap, ISA, Gastric pull up with anastmosis in neck, MIGUELINA to bulb sx. New J tube.    ESOPHAGOGASTRECTOMY      ESOPHAGOGASTRODUODENOSCOPY      with stents x2    GASTROSTOMY TUBE PLACEMENT N/A     IR CVC PICC  08/16/2023    IR CVC PICC 8/16/2023 [facility] IN[facility]IENT LEGAC[facility]    OTHER SURGICAL HISTORY  01/21/2019    [place] filter placement    UMBILICAL HERNIA REPAIR N/A 2013         Physical Exam   ED Triage Vitals [08/15/24 0620]   Temperature Heart Rate Respirations BP   37.7 °C (99.9 °F) (!) 105 20 124/76      Pulse Ox Temp Source Heart Rate Source Patient Position   98 % Temporal Monitor Lying      BP Location FiO2 (%)     Right arm --       Physical Exam  Constitutional:       Appearance: Normal appearance. He is obese.   Musculoskeletal:         General: Deformity present.      Comments: Right leg externally rotated and shortened propped on pillows   Skin:     General: Skin is warm and dry.      Capillary Refill: Capillary refill takes less than 2 seconds.   Neurological:      Mental Status: He is alert and oriented to person, place, and time.   Psychiatric:         Mood and Affect: Mood normal.           ED Course & MDM   ED Course as of 08/15/24 0849   Thu Aug 15, 2024   0638 EKG taken at 625 on 15 August 2024 showing normal sinus tachycardia with a rate of 106, right bundle branch block pattern with QRS prolongation, QTc 504, otherwise normal intervals, no signs of acute ST elevation or depression [DS]   0805 Femoral neck fracture of right hip noted on XR [JW]   0819 Paged orthopaedics Dr. Washington and primary care   [place] [JW]   0819 Comprehensive metabolic panel(!) [JW]   0824 CT C-spine and head reviewed - no acute intracranial hemorrhage, no CT evidence of high risk C-spine fracture [JW]   0839 CXR read: Patchy right basilar opacities. Grossly clear left lung. No apparent  pleural effusion. Cardiomegaly. Widening of the mediastinum relating  to gastric pull-through. Mild pulmonary vascular congestion. Partial  resection left clavicle.    No symptoms of pneumonia, no fever or leukocytosis. Likely basilar atelectasis. [JW]   0840 Cr - very slightly worse than baseline   [JW]      ED Course User Index  [DS] Óscar Love MD  [JW] Zandra Shukla MD         Diagnoses as of 08/15/24 0849   Fall, initial encounter   Anticoagulated   Other diabetic neurological complication associated with type 2 diabetes mellitus (Multi)   Closed fracture of right hip, initial encounter (Multi)   Abnormal chest x-ray                 No data recorded     [place] Coma Scale Score: 15 (08/15/24 0622 : Luzmaria Jimenez RN)                           Medical Decision Making  Amount and/or Complexity of Data Reviewed  Independent Historian: spouse  External Data Reviewed: notes.     Details: Cardiology - yesterday - stopping eliquis  Labs: ordered. Decision-making details documented in ED Course.  Radiology: ordered and independent interpretation performed. Decision-making details documented in ED Course.  ECG/medicine tests: ordered and independent interpretation performed. Decision-making details documented in ED Course.    Risk  Parenteral controlled substances.  Decision regarding hospitalization.  Emergency major surgery.  Risk Details: Discussed with Dr. Washington - will need anesthesia to consult early to determine appropriateness for [facility] OR         Procedure  Procedures     Zandra Shukla MD  08/15/24 0877

## 2024-08-15 NOTE — PROGRESS NOTES
08/15/24 1136   Discharge Planning   Living Arrangements Spouse/significant other   Support Systems Spouse/significant other   Type of Residence Private residence   Home or Post Acute Services Post acute facilities (Rehab/SNF/etc)   Type of Post Acute Facility Services Rehab   Expected Discharge Disposition Other Fac   Patient Choice   Patient / Family choosing to utilize agency / facility established prior to hospitalization Yes     Met with patient at bedside. Admitted for femur fracture. Pt lives with spouse and was independent PTA with no HHC or DME. PCP is Chris Huizar. Pt is able to manage his health and understands his medications. Cardiac clearance, surgery and therapy evals pending. Surgery planned for 8/16. Pt would like referral sent to [facility]. Referral built and sent in [facility].

## 2024-08-16 ENCOUNTER — APPOINTMENT (OUTPATIENT)
Dept: RADIOLOGY | Facility: HOSPITAL | Age: 69
End: 2024-08-16
Payer: MEDICARE

## 2024-08-16 ENCOUNTER — ANESTHESIA (OUTPATIENT)
Dept: OPERATING ROOM | Facility: HOSPITAL | Age: 69
End: 2024-08-16
Payer: MEDICARE

## 2024-08-16 LAB
ANION GAP BLDA CALCULATED.4IONS-SCNC: 15 MMO/L (ref 10–25)
ANION GAP SERPL CALC-SCNC: 13 MMOL/L (ref 10–20)
ARTERIAL PATENCY WRIST A: ABNORMAL
BASE EXCESS BLDA CALC-SCNC: -3.7 MMOL/L (ref -2–3)
BODY TEMPERATURE: ABNORMAL
BUN SERPL-MCNC: 24 MG/DL (ref 6–23)
CA-I BLDA-SCNC: 1.14 MMOL/L (ref 1.1–1.33)
CALCIUM SERPL-MCNC: 8.6 MG/DL (ref 8.6–10.3)
CHLORIDE BLDA-SCNC: 101 MMOL/L (ref 98–107)
CHLORIDE SERPL-SCNC: 105 MMOL/L (ref 98–107)
CO2 SERPL-SCNC: 24 MMOL/L (ref 21–32)
CREAT SERPL-MCNC: 1.29 MG/DL (ref 0.5–1.3)
EGFRCR SERPLBLD CKD-EPI 2021: 61 ML/MIN/1.73M*2
ERYTHROCYTE [DISTWIDTH] IN BLOOD BY AUTOMATED COUNT: 12.9 % (ref 11.5–14.5)
GLUCOSE BLD MANUAL STRIP-MCNC: 170 MG/DL (ref 74–99)
GLUCOSE BLD MANUAL STRIP-MCNC: 196 MG/DL (ref 74–99)
GLUCOSE BLDA-MCNC: 209 MG/DL (ref 74–99)
GLUCOSE SERPL-MCNC: 157 MG/DL (ref 74–99)
HCO3 BLDA-SCNC: 22.7 MMOL/L (ref 22–26)
HCT VFR BLD AUTO: 37.7 % (ref 41–52)
HCT VFR BLD EST: 45 % (ref 41–52)
HGB BLD-MCNC: 12.1 G/DL (ref 13.5–17.5)
HGB BLDA-MCNC: 15.1 G/DL (ref 13.5–17.5)
INHALED O2 CONCENTRATION: 40 %
INR PPP: 1.5 (ref 0.9–1.1)
LACTATE BLDA-SCNC: 1.1 MMOL/L (ref 0.4–2)
MAGNESIUM SERPL-MCNC: 1.47 MG/DL (ref 1.6–2.4)
MCH RBC QN AUTO: 32.3 PG (ref 26–34)
MCHC RBC AUTO-ENTMCNC: 32.1 G/DL (ref 32–36)
MCV RBC AUTO: 101 FL (ref 80–100)
NRBC BLD-RTO: 0 /100 WBCS (ref 0–0)
OXYHGB MFR BLDA: 92.8 % (ref 94–98)
PCO2 BLDA: 45 MM HG (ref 38–42)
PH BLDA: 7.31 PH (ref 7.38–7.42)
PLATELET # BLD AUTO: 146 X10*3/UL (ref 150–450)
PO2 BLDA: 77 MM HG (ref 85–95)
POTASSIUM BLDA-SCNC: 4.2 MMOL/L (ref 3.5–5.3)
POTASSIUM SERPL-SCNC: 4.2 MMOL/L (ref 3.5–5.3)
PROTHROMBIN TIME: 16.5 SECONDS (ref 9.8–12.8)
RBC # BLD AUTO: 3.75 X10*6/UL (ref 4.5–5.9)
SAO2 % BLDA: 95 % (ref 94–100)
SODIUM BLDA-SCNC: 134 MMOL/L (ref 136–145)
SODIUM SERPL-SCNC: 138 MMOL/L (ref 136–145)
WBC # BLD AUTO: 9.4 X10*3/UL (ref 4.4–11.3)

## 2024-08-16 PROCEDURE — 72170 X-RAY EXAM OF PELVIS: CPT

## 2024-08-16 PROCEDURE — 2500000001 HC RX 250 WO HCPCS SELF ADMINISTERED DRUGS (ALT 637 FOR MEDICARE OP): Performed by: ANESTHESIOLOGIST ASSISTANT

## 2024-08-16 PROCEDURE — 2500000001 HC RX 250 WO HCPCS SELF ADMINISTERED DRUGS (ALT 637 FOR MEDICARE OP): Performed by: INTERNAL MEDICINE

## 2024-08-16 PROCEDURE — P9045 ALBUMIN (HUMAN), 5%, 250 ML: HCPCS | Mod: JZ | Performed by: ANESTHESIOLOGY

## 2024-08-16 PROCEDURE — 7100000001 HC RECOVERY ROOM TIME - INITIAL BASE CHARGE: Performed by: ORTHOPAEDIC SURGERY

## 2024-08-16 PROCEDURE — 83735 ASSAY OF MAGNESIUM: CPT | Performed by: NURSE PRACTITIONER

## 2024-08-16 PROCEDURE — 3700000001 HC GENERAL ANESTHESIA TIME - INITIAL BASE CHARGE: Performed by: ORTHOPAEDIC SURGERY

## 2024-08-16 PROCEDURE — 2500000005 HC RX 250 GENERAL PHARMACY W/O HCPCS: Performed by: ANESTHESIOLOGY

## 2024-08-16 PROCEDURE — 2500000005 HC RX 250 GENERAL PHARMACY W/O HCPCS: Performed by: ANESTHESIOLOGIST ASSISTANT

## 2024-08-16 PROCEDURE — 2500000004 HC RX 250 GENERAL PHARMACY W/ HCPCS (ALT 636 FOR OP/ED): Mod: JZ | Performed by: ANESTHESIOLOGIST ASSISTANT

## 2024-08-16 PROCEDURE — 2720000007 HC OR 272 NO HCPCS: Performed by: ORTHOPAEDIC SURGERY

## 2024-08-16 PROCEDURE — 0SRR0J9 REPLACEMENT OF RIGHT HIP JOINT, FEMORAL SURFACE WITH SYNTHETIC SUBSTITUTE, CEMENTED, OPEN APPROACH: ICD-10-PCS | Performed by: ORTHOPAEDIC SURGERY

## 2024-08-16 PROCEDURE — 2500000004 HC RX 250 GENERAL PHARMACY W/ HCPCS (ALT 636 FOR OP/ED): Performed by: INTERNAL MEDICINE

## 2024-08-16 PROCEDURE — 2500000004 HC RX 250 GENERAL PHARMACY W/ HCPCS (ALT 636 FOR OP/ED): Mod: JZ | Performed by: ANESTHESIOLOGY

## 2024-08-16 PROCEDURE — 2500000001 HC RX 250 WO HCPCS SELF ADMINISTERED DRUGS (ALT 637 FOR MEDICARE OP): Performed by: ORTHOPAEDIC SURGERY

## 2024-08-16 PROCEDURE — P9045 ALBUMIN (HUMAN), 5%, 250 ML: HCPCS | Mod: JZ | Performed by: ANESTHESIOLOGIST ASSISTANT

## 2024-08-16 PROCEDURE — 85610 PROTHROMBIN TIME: CPT | Performed by: NURSE PRACTITIONER

## 2024-08-16 PROCEDURE — 2500000004 HC RX 250 GENERAL PHARMACY W/ HCPCS (ALT 636 FOR OP/ED): Performed by: NURSE PRACTITIONER

## 2024-08-16 PROCEDURE — 2500000002 HC RX 250 W HCPCS SELF ADMINISTERED DRUGS (ALT 637 FOR MEDICARE OP, ALT 636 FOR OP/ED): Performed by: ORTHOPAEDIC SURGERY

## 2024-08-16 PROCEDURE — 3700000002 HC GENERAL ANESTHESIA TIME - EACH INCREMENTAL 1 MINUTE: Performed by: ORTHOPAEDIC SURGERY

## 2024-08-16 PROCEDURE — 82947 ASSAY GLUCOSE BLOOD QUANT: CPT

## 2024-08-16 PROCEDURE — 1200000002 HC GENERAL ROOM WITH TELEMETRY DAILY

## 2024-08-16 PROCEDURE — 82435 ASSAY OF BLOOD CHLORIDE: CPT

## 2024-08-16 PROCEDURE — 3600000010 HC OR TIME - EACH INCREMENTAL 1 MINUTE - PROCEDURE LEVEL FIVE: Performed by: ORTHOPAEDIC SURGERY

## 2024-08-16 PROCEDURE — 2500000004 HC RX 250 GENERAL PHARMACY W/ HCPCS (ALT 636 FOR OP/ED): Performed by: ORTHOPAEDIC SURGERY

## 2024-08-16 PROCEDURE — 85027 COMPLETE CBC AUTOMATED: CPT | Performed by: NURSE PRACTITIONER

## 2024-08-16 PROCEDURE — 72170 X-RAY EXAM OF PELVIS: CPT | Performed by: RADIOLOGY

## 2024-08-16 PROCEDURE — C1776 JOINT DEVICE (IMPLANTABLE): HCPCS | Performed by: ORTHOPAEDIC SURGERY

## 2024-08-16 PROCEDURE — 7100000002 HC RECOVERY ROOM TIME - EACH INCREMENTAL 1 MINUTE: Performed by: ORTHOPAEDIC SURGERY

## 2024-08-16 PROCEDURE — 99233 SBSQ HOSP IP/OBS HIGH 50: CPT | Performed by: INTERNAL MEDICINE

## 2024-08-16 PROCEDURE — 80048 BASIC METABOLIC PNL TOTAL CA: CPT | Performed by: NURSE PRACTITIONER

## 2024-08-16 PROCEDURE — 3600000005 HC OR TIME - INITIAL BASE CHARGE - PROCEDURE LEVEL FIVE: Performed by: ORTHOPAEDIC SURGERY

## 2024-08-16 PROCEDURE — 36415 COLL VENOUS BLD VENIPUNCTURE: CPT | Performed by: NURSE PRACTITIONER

## 2024-08-16 PROCEDURE — 2780000003 HC OR 278 NO HCPCS: Performed by: ORTHOPAEDIC SURGERY

## 2024-08-16 DEVICE — DISTAL SPACER
Type: IMPLANTABLE DEVICE | Site: HIP | Status: FUNCTIONAL
Brand: ACCOLADE

## 2024-08-16 DEVICE — SIMPLEX® HV IS A FAST-SETTING ACRYLIC RESIN FOR USE IN BONE SURGERY. MIXING THE TWO SEPARATE STERILE COMPONENTS PRODUCES A DUCTILE BONE CEMENT WHICH, AFTER HARDENING, FIXES THE IMPLANT AND TRANSFERS STRESSES PRODUCED DURING MOVEMENT EVENLY TO THE BONE. SIMPLEX® HV CEMENT POWDER ALSO CONTAINS INSOLUBLE ZIRCONIUM DIOXIDE AS AN X-RAY CONTRAST MEDIUM. SIMPLEX® HV DOES NOT EMIT A SIGNAL AND DOES NOT POSE A SAFETY RISK IN A MAGNETIC RESONANCE ENVIRONMENT.
Type: IMPLANTABLE DEVICE | Site: HIP | Status: FUNCTIONAL
Brand: SIMPLEX HV

## 2024-08-16 DEVICE — V40 FEMORAL HEAD
Type: IMPLANTABLE DEVICE | Site: HIP | Status: FUNCTIONAL
Brand: V40 HEAD

## 2024-08-16 DEVICE — IMPLANTABLE DEVICE
Type: IMPLANTABLE DEVICE | Site: HIP | Status: FUNCTIONAL
Brand: QUIK-USE®

## 2024-08-16 DEVICE — BIPOLAR COMPONENT
Type: IMPLANTABLE DEVICE | Site: HIP | Status: FUNCTIONAL
Brand: UHR

## 2024-08-16 DEVICE — 132 DEGREE CEMENTED HIP STEM
Type: IMPLANTABLE DEVICE | Site: HIP | Status: FUNCTIONAL
Brand: ACCOLADE

## 2024-08-16 RX ORDER — TRANEXAMIC ACID 100 MG/ML
INJECTION, SOLUTION INTRAVENOUS AS NEEDED
Status: DISCONTINUED | OUTPATIENT
Start: 2024-08-16 | End: 2024-08-16

## 2024-08-16 RX ORDER — SUCCINYLCHOLINE/SOD CL,ISO/PF 100 MG/5ML
SYRINGE (ML) INTRAVENOUS AS NEEDED
Status: DISCONTINUED | OUTPATIENT
Start: 2024-08-16 | End: 2024-08-16

## 2024-08-16 RX ORDER — ALBUTEROL SULFATE 0.83 MG/ML
2.5 SOLUTION RESPIRATORY (INHALATION) ONCE AS NEEDED
Status: DISCONTINUED | OUTPATIENT
Start: 2024-08-16 | End: 2024-08-16 | Stop reason: HOSPADM

## 2024-08-16 RX ORDER — ONDANSETRON HYDROCHLORIDE 2 MG/ML
INJECTION, SOLUTION INTRAVENOUS AS NEEDED
Status: DISCONTINUED | OUTPATIENT
Start: 2024-08-16 | End: 2024-08-16

## 2024-08-16 RX ORDER — HYDRALAZINE HYDROCHLORIDE 20 MG/ML
5 INJECTION INTRAMUSCULAR; INTRAVENOUS EVERY 30 MIN PRN
Status: DISCONTINUED | OUTPATIENT
Start: 2024-08-16 | End: 2024-08-16 | Stop reason: HOSPADM

## 2024-08-16 RX ORDER — ONDANSETRON HYDROCHLORIDE 2 MG/ML
4 INJECTION, SOLUTION INTRAVENOUS ONCE AS NEEDED
Status: DISCONTINUED | OUTPATIENT
Start: 2024-08-16 | End: 2024-08-16 | Stop reason: HOSPADM

## 2024-08-16 RX ORDER — OXYCODONE HYDROCHLORIDE 5 MG/1
5 TABLET ORAL EVERY 4 HOURS PRN
Status: DISCONTINUED | OUTPATIENT
Start: 2024-08-16 | End: 2024-08-16 | Stop reason: HOSPADM

## 2024-08-16 RX ORDER — FENTANYL CITRATE 50 UG/ML
INJECTION, SOLUTION INTRAMUSCULAR; INTRAVENOUS AS NEEDED
Status: DISCONTINUED | OUTPATIENT
Start: 2024-08-16 | End: 2024-08-16

## 2024-08-16 RX ORDER — SODIUM CHLORIDE, SODIUM LACTATE, POTASSIUM CHLORIDE, CALCIUM CHLORIDE 600; 310; 30; 20 MG/100ML; MG/100ML; MG/100ML; MG/100ML
INJECTION, SOLUTION INTRAVENOUS CONTINUOUS PRN
Status: DISCONTINUED | OUTPATIENT
Start: 2024-08-16 | End: 2024-08-16

## 2024-08-16 RX ORDER — LIDOCAINE HYDROCHLORIDE 20 MG/ML
INJECTION, SOLUTION EPIDURAL; INFILTRATION; INTRACAUDAL; PERINEURAL AS NEEDED
Status: DISCONTINUED | OUTPATIENT
Start: 2024-08-16 | End: 2024-08-16

## 2024-08-16 RX ORDER — PHENYLEPHRINE 10 MG/250 ML(40 MCG/ML)IN 0.9 % SOD.CHLORIDE INTRAVENOUS
CONTINUOUS PRN
Status: DISCONTINUED | OUTPATIENT
Start: 2024-08-16 | End: 2024-08-16

## 2024-08-16 RX ORDER — LABETALOL HYDROCHLORIDE 5 MG/ML
5 INJECTION, SOLUTION INTRAVENOUS ONCE AS NEEDED
Status: DISCONTINUED | OUTPATIENT
Start: 2024-08-16 | End: 2024-08-16 | Stop reason: HOSPADM

## 2024-08-16 RX ORDER — PROPOFOL 10 MG/ML
INJECTION, EMULSION INTRAVENOUS AS NEEDED
Status: DISCONTINUED | OUTPATIENT
Start: 2024-08-16 | End: 2024-08-16

## 2024-08-16 RX ORDER — MAGNESIUM SULFATE HEPTAHYDRATE 40 MG/ML
2 INJECTION, SOLUTION INTRAVENOUS ONCE
Status: COMPLETED | OUTPATIENT
Start: 2024-08-16 | End: 2024-08-16

## 2024-08-16 RX ORDER — ALBUTEROL SULFATE 90 UG/1
INHALANT RESPIRATORY (INHALATION) AS NEEDED
Status: DISCONTINUED | OUTPATIENT
Start: 2024-08-16 | End: 2024-08-16

## 2024-08-16 RX ORDER — ROPIVACAINE/EPI/CLONIDINE/KET 2.46-0.005
SYRINGE (ML) INJECTION AS NEEDED
Status: DISCONTINUED | OUTPATIENT
Start: 2024-08-16 | End: 2024-08-16 | Stop reason: HOSPADM

## 2024-08-16 RX ORDER — OXYCODONE AND ACETAMINOPHEN 5; 325 MG/1; MG/1
1 TABLET ORAL EVERY 4 HOURS PRN
Status: DISCONTINUED | OUTPATIENT
Start: 2024-08-16 | End: 2024-08-16 | Stop reason: HOSPADM

## 2024-08-16 RX ORDER — CEFAZOLIN SODIUM 2 G/100ML
2 INJECTION, SOLUTION INTRAVENOUS EVERY 8 HOURS
Status: COMPLETED | OUTPATIENT
Start: 2024-08-16 | End: 2024-08-17

## 2024-08-16 RX ORDER — ALBUMIN HUMAN 50 G/1000ML
12.5 SOLUTION INTRAVENOUS ONCE
Status: COMPLETED | OUTPATIENT
Start: 2024-08-16 | End: 2024-08-16

## 2024-08-16 RX ORDER — HYDROMORPHONE HYDROCHLORIDE 1 MG/ML
INJECTION, SOLUTION INTRAMUSCULAR; INTRAVENOUS; SUBCUTANEOUS AS NEEDED
Status: DISCONTINUED | OUTPATIENT
Start: 2024-08-16 | End: 2024-08-16

## 2024-08-16 RX ORDER — DIPHENHYDRAMINE HYDROCHLORIDE 50 MG/ML
12.5 INJECTION INTRAMUSCULAR; INTRAVENOUS ONCE AS NEEDED
Status: DISCONTINUED | OUTPATIENT
Start: 2024-08-16 | End: 2024-08-16 | Stop reason: HOSPADM

## 2024-08-16 RX ORDER — PHENYLEPHRINE HCL IN 0.9% NACL 1 MG/10 ML
SYRINGE (ML) INTRAVENOUS AS NEEDED
Status: DISCONTINUED | OUTPATIENT
Start: 2024-08-16 | End: 2024-08-16

## 2024-08-16 RX ORDER — ALBUMIN HUMAN 50 G/1000ML
SOLUTION INTRAVENOUS AS NEEDED
Status: DISCONTINUED | OUTPATIENT
Start: 2024-08-16 | End: 2024-08-16

## 2024-08-16 RX ORDER — HYDROMORPHONE HYDROCHLORIDE 0.2 MG/ML
0.2 INJECTION INTRAMUSCULAR; INTRAVENOUS; SUBCUTANEOUS EVERY 5 MIN PRN
Status: DISCONTINUED | OUTPATIENT
Start: 2024-08-16 | End: 2024-08-16 | Stop reason: HOSPADM

## 2024-08-16 RX ORDER — ROCURONIUM BROMIDE 50 MG/5 ML
SYRINGE (ML) INTRAVENOUS AS NEEDED
Status: DISCONTINUED | OUTPATIENT
Start: 2024-08-16 | End: 2024-08-16

## 2024-08-16 RX ORDER — ACETAMINOPHEN 325 MG/1
975 TABLET ORAL ONCE
Status: DISCONTINUED | OUTPATIENT
Start: 2024-08-16 | End: 2024-08-16 | Stop reason: HOSPADM

## 2024-08-16 RX ORDER — CEFAZOLIN SODIUM 2 G/100ML
INJECTION, SOLUTION INTRAVENOUS AS NEEDED
Status: DISCONTINUED | OUTPATIENT
Start: 2024-08-16 | End: 2024-08-16

## 2024-08-16 RX ORDER — SODIUM CHLORIDE, SODIUM LACTATE, POTASSIUM CHLORIDE, CALCIUM CHLORIDE 600; 310; 30; 20 MG/100ML; MG/100ML; MG/100ML; MG/100ML
100 INJECTION, SOLUTION INTRAVENOUS CONTINUOUS
Status: DISCONTINUED | OUTPATIENT
Start: 2024-08-16 | End: 2024-08-16 | Stop reason: HOSPADM

## 2024-08-16 RX ORDER — OXYCODONE HYDROCHLORIDE 5 MG/1
5 TABLET ORAL ONCE
Status: COMPLETED | OUTPATIENT
Start: 2024-08-16 | End: 2024-08-16

## 2024-08-16 RX ORDER — LIDOCAINE HYDROCHLORIDE 10 MG/ML
0.1 INJECTION INFILTRATION; PERINEURAL ONCE
Status: DISCONTINUED | OUTPATIENT
Start: 2024-08-16 | End: 2024-08-16 | Stop reason: HOSPADM

## 2024-08-16 RX ORDER — ORPHENADRINE CITRATE 30 MG/ML
60 INJECTION INTRAMUSCULAR; INTRAVENOUS EVERY 12 HOURS PRN
Status: DISCONTINUED | OUTPATIENT
Start: 2024-08-16 | End: 2024-08-16

## 2024-08-16 ASSESSMENT — PAIN SCALES - GENERAL
PAINLEVEL_OUTOF10: 10 - WORST POSSIBLE PAIN
PAINLEVEL_OUTOF10: 10 - WORST POSSIBLE PAIN
PAINLEVEL_OUTOF10: 5 - MODERATE PAIN
PAINLEVEL_OUTOF10: 10 - WORST POSSIBLE PAIN
PAINLEVEL_OUTOF10: 3
PAINLEVEL_OUTOF10: 0 - NO PAIN
PAINLEVEL_OUTOF10: 5 - MODERATE PAIN
PAINLEVEL_OUTOF10: 4
PAINLEVEL_OUTOF10: 3
PAINLEVEL_OUTOF10: 8
PAINLEVEL_OUTOF10: 5 - MODERATE PAIN
PAINLEVEL_OUTOF10: 10 - WORST POSSIBLE PAIN

## 2024-08-16 ASSESSMENT — PAIN DESCRIPTION - LOCATION: LOCATION: HIP

## 2024-08-16 ASSESSMENT — PAIN - FUNCTIONAL ASSESSMENT
PAIN_FUNCTIONAL_ASSESSMENT: 0-10

## 2024-08-16 ASSESSMENT — PAIN DESCRIPTION - ORIENTATION
ORIENTATION: RIGHT
ORIENTATION: RIGHT

## 2024-08-16 NOTE — PROGRESS NOTES
Levy Gregory is a 68 y.o. male on day 1 of admission presenting with Closed right hip fracture, initial encounter (Multi).      Subjective   Patient in a good bit of pain this morning. Has no new complaints at this time though. Understands plan is for OR today, PT/OT eval likely tomorrow and eventual DC to IRF.    Objective     Last Recorded Vitals  /72 (BP Location: Right arm, Patient Position: Lying)   Pulse 67   Temp 35.2 °C (95.4 °F) (Temporal)   Resp 16   Wt 99.8 kg (220 lb)   SpO2 95%   Intake/Output last 3 Shifts:    Intake/Output Summary (Last 24 hours) at 8/16/2024 1258  Last data filed at 8/16/2024 1243  Gross per 24 hour   Intake --   Output 1400 ml   Net -1400 ml       Admission Weight  Weight: 111 kg (245 lb) (08/15/24 0620)    Daily Weight  08/15/24 : 99.8 kg (220 lb)    Image Results  Electrocardiogram, 12-lead PRN ACS symptoms  Sinus tachycardia  Left axis deviation  Right bundle branch block  Possible Lateral infarct (cited on or before 06-AUG-2023)  Inferior infarct (cited on or before 06-AUG-2023)  Abnormal ECG  When compared with ECG of 15-AUG-2024 06:25, (unconfirmed)  Serial changes of Lateral infarct Present  ECG 12 lead  Sinus tachycardia  Right bundle branch block  Possible Lateral infarct (cited on or before 06-AUG-2023)  Inferior infarct (cited on or before 06-AUG-2023)  Abnormal ECG  When compared with ECG of 07-DEC-2023 20:40,  Right bundle branch block is now Present  Questionable change in initial forces of Lateral leads  XR hip right with pelvis when performed 2 or 3 views  Narrative: Interpreted By:  Sebastián Chatterjee,   STUDY:  XR HIP RIGHT WITH PELVIS WHEN PERFORMED 2 OR 3 VIEWS;  8/15/2024 8:05  am      INDICATION:  Signs/Symptoms:fall on thinners, R hip pain.      COMPARISON:  None.      ACCESSION NUMBER(S):  [id number]      ORDERING CLINICIAN:  JORGE DUEÑAS      FINDINGS:  There is an impacted subcapital fracture of the right femur. Mild  degenerative changes right  hip. No pelvic fracture identified.      Impression: Right femoral subcapital fracture.      MACRO:  None      Signed by: Sebastián Chatterjee 8/15/2024 8:27 AM  Dictation workstation:   [id number]  XR chest 1 view  Narrative: Interpreted By:  Sebastián Chatterjee,   STUDY:  XR CHEST 1 VIEW;  8/15/2024 8:05 am      INDICATION:  Signs/Symptoms:fall on eliquis.      COMPARISON:  07/06/2024      ACCESSION NUMBER(S):  [id number]      ORDERING CLINICIAN:  JORGE DUEÑAS      FINDINGS:  Patchy right basilar opacities. Grossly clear left lung. No apparent  pleural effusion. Cardiomegaly. Widening of the mediastinum relating  to gastric pull-through. Mild pulmonary vascular congestion. Partial  resection left clavicle.      Impression: Mild right basilar atelectasis or infiltrate.  Mild pulmonary vascular congestion.      MACRO:  None      Signed by: Sebastián Chatterjee 8/15/2024 8:26 AM  Dictation workstation:   [id number]  CT head wo IV contrast  Narrative: Interpreted By:  Sebastián Chatterjee,   STUDY:  CT HEAD WO IV CONTRAST  8/15/2024 7:43 am      INDICATION:  Signs/Symptoms:fall on eliquis      COMPARISON:  07/06/2024      ACCESSION NUMBER(S):  [id number]      ORDERING CLINICIAN:  JORGE DUEÑSA      TECHNIQUE:  Contiguous axial CT images of the brain were obtained without IV  contrast.      FINDINGS:  The ventricles, cisterns and sulci are prominent, consistent with  severe diffuse volume loss. Areas of white matter low attenuation are  nonspecific but likely related to chronic microvascular disease.  There is intracranial atherosclerosis.      Gray-white differentiation is preserved.  No acute intracranial hemorrhage or mass effect.  No midline shift. Patent basal cisterns.  No extraaxial fluid collections.      The calvaria is intact.  The visualized paranasal sinuses and mastoid air cells are clear.      Impression: No acute intracranial pathology.          Signed by: Sebastián Chatterjee 8/15/2024 8:25 AM  Dictation workstation:   [id number]  CT  cervical spine wo IV contrast  Narrative: Interpreted By:  Sebastián Chatterjee,   STUDY:  CT CERVICAL SPINE WO IV CONTRAST;  8/15/2024 7:43 am      INDICATION:  Signs/Symptoms:fall on eliquis.      COMPARISON:  None.      ACCESSION NUMBER(S):  [id number]      ORDERING CLINICIAN:  JORGE DUEÑAS      TECHNIQUE:  Axial CT images of the cervical spine are obtained. Axial, coronal  and sagittal reconstructions are provided for review.      FINDINGS:  No acute fracture or subluxation of the cervical spine. Severe  multilevel degenerative disc disease and facet arthropathy. Trace  anterolisthesis C7-T1. No evidence of critical canal stenosis.  Multilevel foraminal stenosis most advanced at C5-6 bilaterally.      No prevertebral soft tissue swelling.      Atherosclerosis.          Impression: No evidence for an acute fracture or subluxation of the cervical  spine.      MACRO:  None      Signed by: Sebastián Chatterjee 8/15/2024 8:22 AM  Dictation workstation:   [id number]      Physical Exam  Constitutional:       General: He is not in acute distress.     Appearance: Normal appearance.   HENT:      Head: Normocephalic and atraumatic.      Mouth/Throat:      Mouth: Mucous membranes are moist.   Eyes:      Extraocular Movements: Extraocular movements intact.      Conjunctiva/sclera: Conjunctivae normal.   Cardiovascular:      Rate and Rhythm: Normal rate and regular rhythm.      Heart sounds: Normal heart sounds.   Pulmonary:      Effort: Pulmonary effort is normal.      Breath sounds: Normal breath sounds. No wheezing, rhonchi or rales.      Comments: Multiple scars (old and well-healed) on chest  Abdominal:      General: Abdomen is flat. Bowel sounds are normal.      Palpations: Abdomen is soft.   Musculoskeletal:         General: No swelling or tenderness.      Cervical back: Normal range of motion and neck supple.      Comments: RLE shorter and externally rotated; neurovascularly intact distally   Skin:     General: Skin is warm and  dry.      Findings: No rash.   Neurological:      General: No focal deficit present.      Mental Status: He is alert and oriented to person, place, and time.      Cranial Nerves: No cranial nerve deficit.      Sensory: No sensory deficit.   Psychiatric:         Mood and Affect: Mood normal.         Behavior: Behavior normal.         Relevant Results  Scheduled medications  [Transfer Hold] atorvastatin, 80 mg, oral, Nightly  [Transfer Hold] ferrous sulfate (325 mg ferrous sulfate), 65 mg of iron, oral, Daily  [Transfer Hold] gabapentin, 100 mg, oral, TID  [Transfer Hold] insulin lispro, 0-10 Units, subcutaneous, TID  [Transfer Hold] midodrine, 10 mg, oral, TID  [Transfer Hold] oxybutynin, 5 mg, oral, TID  [Transfer Hold] pantoprazole, 40 mg, oral, Nightly  [Transfer Hold] polyethylene glycol, 17 g, oral, Daily  [Transfer Hold] sertraline, 25 mg, oral, Nightly  [Transfer Hold] tamsulosin, 0.4 mg, oral, Nightly      Continuous medications     PRN medications  PRN medications: [Transfer Hold] acetaminophen **OR** [Transfer Hold] acetaminophen **OR** [Transfer Hold] acetaminophen, [Transfer Hold] acetaminophen **OR** [Transfer Hold] acetaminophen **OR** [Transfer Hold] acetaminophen, [Transfer Hold] morphine, [Transfer Hold] morphine, [Transfer Hold] ondansetron **OR** [Transfer Hold] ondansetron, oxygen    Results for orders placed or performed during the hospital encounter of 08/15/24 (from the past 24 hour(s))   Prepare RBC   Result Value Ref Range    PRODUCT CODE S7424C27     Unit Number [id number]     Unit ABO O     Unit RH POS     XM INTEP COMP     Dispense Status XM     Blood Expiration Date 9/10/2024 11:59:00 PM EDT     PRODUCT BLOOD TYPE 5100     UNIT VOLUME 325     PRODUCT CODE C4795X22     Unit Number [id number]     Unit ABO O     Unit RH POS     XM INTEP COMP     Dispense Status XM     Blood Expiration Date 9/10/2024 11:59:00 PM EDT     PRODUCT BLOOD TYPE 5100     UNIT VOLUME 400    POCT GLUCOSE    Result Value Ref Range    POCT Glucose 295 (H) 74 - 99 mg/dL   POCT GLUCOSE   Result Value Ref Range    POCT Glucose 188 (H) 74 - 99 mg/dL   CBC   Result Value Ref Range    WBC 9.4 4.4 - 11.3 x10*3/uL    nRBC 0.0 0.0 - 0.0 /100 WBCs    RBC 3.75 (L) 4.50 - 5.90 x10*6/uL    Hemoglobin 12.1 (L) 13.5 - 17.5 g/dL    Hematocrit 37.7 (L) 41.0 - 52.0 %     (H) 80 - 100 fL    MCH 32.3 26.0 - 34.0 pg    MCHC 32.1 32.0 - 36.0 g/dL    RDW 12.9 11.5 - 14.5 %    Platelets 146 (L) 150 - 450 x10*3/uL   Basic metabolic panel   Result Value Ref Range    Glucose 157 (H) 74 - 99 mg/dL    Sodium 138 136 - 145 mmol/L    Potassium 4.2 3.5 - 5.3 mmol/L    Chloride 105 98 - 107 mmol/L    Bicarbonate 24 21 - 32 mmol/L    Anion Gap 13 10 - 20 mmol/L    Urea Nitrogen 24 (H) 6 - 23 mg/dL    Creatinine 1.29 0.50 - 1.30 mg/dL    eGFR 61 >60 mL/min/1.73m*2    Calcium 8.6 8.6 - 10.3 mg/dL   Magnesium   Result Value Ref Range    Magnesium 1.47 (L) 1.60 - 2.40 mg/dL   Protime-INR   Result Value Ref Range    Protime 16.5 (H) 9.8 - 12.8 seconds    INR 1.5 (H) 0.9 - 1.1   POCT GLUCOSE   Result Value Ref Range    POCT Glucose 170 (H) 74 - 99 mg/dL       Electrocardiogram, 12-lead PRN ACS symptoms    Result Date: 8/15/2024  Sinus tachycardia Left axis deviation Right bundle branch block Possible Lateral infarct (cited on or before 06-AUG-2023) Inferior infarct (cited on or before 06-AUG-2023) Abnormal ECG When compared with ECG of 15-AUG-2024 06:25, (unconfirmed) Serial changes of Lateral infarct Present    ECG 12 lead    Result Date: 8/15/2024  Sinus tachycardia Right bundle branch block Possible Lateral infarct (cited on or before 06-AUG-2023) Inferior infarct (cited on or before 06-AUG-2023) Abnormal ECG When compared with ECG of 07-DEC-2023 20:40, Right bundle branch block is now Present Questionable change in initial forces of Lateral leads    XR hip right with pelvis when performed 2 or 3 views    Result Date: 8/15/2024  Interpreted By:   Sebastián Chatterjee, STUDY: XR HIP RIGHT WITH PELVIS WHEN PERFORMED 2 OR 3 VIEWS;  8/15/2024 8:05 am   INDICATION: Signs/Symptoms:fall on thinners, R hip pain.   COMPARISON: None.   ACCESSION NUMBER(S): [id number]   ORDERING CLINICIAN: JORGE DUEÑAS   FINDINGS: There is an impacted subcapital fracture of the right femur. Mild degenerative changes right hip. No pelvic fracture identified.       Right femoral subcapital fracture.   MACRO: None   Signed by: Sebastián Chatterjee 8/15/2024 8:27 AM Dictation workstation:   [id number]    XR chest 1 view    Result Date: 8/15/2024  Interpreted By:  Sebastián Chatterjee, STUDY: XR CHEST 1 VIEW;  8/15/2024 8:05 am   INDICATION: Signs/Symptoms:fall on eliquis.   COMPARISON: 07/06/2024   ACCESSION NUMBER(S): [id number]   ORDERING CLINICIAN: JORGE DUEÑAS   FINDINGS: Patchy right basilar opacities. Grossly clear left lung. No apparent pleural effusion. Cardiomegaly. Widening of the mediastinum relating to gastric pull-through. Mild pulmonary vascular congestion. Partial resection left clavicle.       Mild right basilar atelectasis or infiltrate. Mild pulmonary vascular congestion.   MACRO: None   Signed by: Sebastián Chatterjee 8/15/2024 8:26 AM Dictation workstation:   [id number]    CT head wo IV contrast    Result Date: 8/15/2024  Interpreted By:  Sebastián Chatterjee, STUDY: CT HEAD WO IV CONTRAST  8/15/2024 7:43 am   INDICATION: Signs/Symptoms:fall on eliquis   COMPARISON: 07/06/2024   ACCESSION NUMBER(S): [id number]   ORDERING CLINICIAN: JORGE DUEÑAS   TECHNIQUE: Contiguous axial CT images of the brain were obtained without IV contrast.   FINDINGS: The ventricles, cisterns and sulci are prominent, consistent with severe diffuse volume loss. Areas of white matter low attenuation are nonspecific but likely related to chronic microvascular disease. There is intracranial atherosclerosis.   Gray-white differentiation is preserved. No acute intracranial hemorrhage or mass effect. No midline shift. Patent basal  cisterns. No extraaxial fluid collections.   The calvaria is intact. The visualized paranasal sinuses and mastoid air cells are clear.       No acute intracranial pathology.     Signed by: Sebastián Chatterjee 8/15/2024 8:25 AM Dictation workstation:   [id number]    CT cervical spine wo IV contrast    Result Date: 8/15/2024  Interpreted By:  Sebastián Chatterjee, STUDY: CT CERVICAL SPINE WO IV CONTRAST;  8/15/2024 7:43 am   INDICATION: Signs/Symptoms:fall on eliquis.   COMPARISON: None.   ACCESSION NUMBER(S): [id number]   ORDERING CLINICIAN: JORGE DUEÑAS   TECHNIQUE: Axial CT images of the cervical spine are obtained. Axial, coronal and sagittal reconstructions are provided for review.   FINDINGS: No acute fracture or subluxation of the cervical spine. Severe multilevel degenerative disc disease and facet arthropathy. Trace anterolisthesis C7-T1. No evidence of critical canal stenosis. Multilevel foraminal stenosis most advanced at C5-6 bilaterally.   No prevertebral soft tissue swelling.   Atherosclerosis.         No evidence for an acute fracture or subluxation of the cervical spine.   MACRO: None   Signed by: Sebastián Chatterjee 8/15/2024 8:22 AM Dictation workstation:   [id number]    Holter Or Event Cardiac Monitor    Result Date: 8/1/2024    1.  Medium-term Holter monitoring was performed between July 18 and July 25, 2024.  Heart rate ranged from , with an average heart rate of 79. 2.  Rare PACs were recorded.  Four episodes of SVT were recorded, the longest being 6 beats in duration. 3.  Rare PVCs were recorded.  Two runs of nonsustained ventricular tachycardia were recorded, the longest being 10 beats in duration. 4.  There is no evidence of high-grade AV block or sinus pauses. 5.  A single patient trigger was recorded, correlating with artifact.        Assessment/Plan     Assessment & Plan  Closed right hip fracture, initial encounter (Multi)    Diabetic neuropathy associated with type 2 diabetes mellitus  (Multi)    History of pulmonary embolism    Obstructive sleep apnea, adult    Achalasia, esophageal    Chronic obstructive pulmonary disease, unspecified COPD type (Multi)    At risk for falls    Balance problems    Coronary artery disease involving native coronary artery of native heart without angina pectoris    Closed fracture of right hip (Multi)      Plan:  -Right hip imaging completed in ED: There is an impacted subcapital fracture of the right femur.   -Orthopedic Consult Dr. Jones   -CT C spine and head obtained in ED: no acute intracranial hemorrhage, no CT evidence of high risk C-spine fracture   -Insulin sliding scale with POC glucose monitoring AC HS  -Patient reported stopped Eliquis yesterday per his vascular medicine physician (Dr. Maldonado); cardiology also saw and cleared to go to OR  -Patient to OR 8/16 with Dr. Leavitt; cleared for procedure by anesthesiology  -PT/OT/CM for likely IRF need    Code: DNAR  DVT PPX: stop eliquis at DC; will need DVT Ppx post op  GI PPX: not indicated  Diet: diabetic cardiac after surgery    Dispo: likely discharge over weekend to IRF when cleared by orthopedic surgery.      Cj Kahn MD

## 2024-08-16 NOTE — ANESTHESIA PROCEDURE NOTES
Airway  Date/Time: 8/16/2024 11:08 AM  Urgency: elective    Airway not difficult    Staffing  Performed: DELFINO and attending   Authorized by: Mor Adan MD    Performed by: AGATHA Baker  Patient location during procedure: OR    Indications and Patient Condition  Indications for airway management: anesthesia and airway protection  Spontaneous Ventilation: absent  Sedation level: deep  Preoxygenated: yes  Patient position: sniffing  MILS not maintained throughout  Mask difficulty assessment: 0 - not attempted  Planned trial extubation    Final Airway Details  Final airway type: endotracheal airway      Successful airway: ETT  Cuffed: yes   Successful intubation technique: video laryngoscopy  Facilitating devices/methods: intubating stylet  Endotracheal tube insertion site: oral  Blade: Abhi  Blade size: #4  ETT size (mm): 7.5  Cormack-Lehane Classification: grade I - full view of glottis  Placement verified by: chest auscultation and capnometry   Cuff volume (mL): 8  Measured from: lips  ETT to lips (cm): 23  Number of attempts at approach: 1  Number of other approaches attempted: 0    Additional Comments  Atraumatic x1 attempt by attending after RSI  Gastric contents present in oropharynx and around gottis upon laryngoscopy; oropharynx suctioned and trachea intubated by attending; ETT suctioned and some gastric contents removed

## 2024-08-16 NOTE — CARE PLAN
Problem: Pain - Adult  Goal: Verbalizes/displays adequate comfort level or baseline comfort level  Outcome: Progressing     Problem: Safety - Adult  Goal: Free from fall injury  Outcome: Progressing     Problem: Discharge Planning  Goal: Discharge to home or other facility with appropriate resources  Outcome: Progressing     Problem: Chronic Conditions and Co-morbidities  Goal: Patient's chronic conditions and co-morbidity symptoms are monitored and maintained or improved  Outcome: Progressing     Problem: Diabetes  Goal: Achieve decreasing blood glucose levels by end of shift  Outcome: Progressing  Goal: Increase stability of blood glucose readings by end of shift  Outcome: Progressing  Goal: Decrease in ketones present in urine by end of shift  Outcome: Progressing  Goal: Maintain electrolyte levels within acceptable range throughout shift  Outcome: Progressing  Goal: Maintain glucose levels >70mg/dl to <250mg/dl throughout shift  Outcome: Progressing  Goal: No changes in neurological exam by end of shift  Outcome: Progressing  Goal: Learn about and adhere to nutrition recommendations by end of shift  Outcome: Progressing  Goal: Vital signs within normal range for age by end of shift  Outcome: Progressing  Goal: Increase self care and/or family involovement by end of shift  Outcome: Progressing  Goal: Receive DSME education by end of shift  Outcome: Progressing   The patient's goals for the shift include      The clinical goals for the shift include Pt will have minimal pain this shift and receive adequate rest.    Over the shift, the patient did receive rest.

## 2024-08-16 NOTE — ANESTHESIA PROCEDURE NOTES
Arterial Line:    Date/Time: 8/16/2024 11:35 AM    Staffing  Performed: CAA   Authorized by: Mor Adan MD    Performed by: AGATHA Baker    An arterial line was placed. Procedure performed using ultrasound guidance and surface landmarks.in the OR for the following indication(s): continuous blood pressure monitoring.    A 20 gauge (size), 1 and 3/4 inch (length), Arrow (type) catheter was placed into the Left radial artery, secured by tape,   Seldinger technique used.  Events:  greater than 3 attempts.      Additional notes:  Sterile technique and dressing; 2 attempts by CAA on left, 2 attempts by attending on right with US

## 2024-08-16 NOTE — NURSING NOTE
Pt taken to surgery via bed. Given morphine and mag hung per orders. Purewick in use. Rt. Leg externally rotated. C/o pain 10/10.

## 2024-08-16 NOTE — ANESTHESIA POSTPROCEDURE EVALUATION
Patient: Levy Gregory    Procedure Summary       Date: 08/16/24 Room / Location: [facility] OR 07 / Virtual [facility] OR    Anesthesia Start: 1055 Anesthesia Stop: 1432    Procedure: Hip Hemiarthroplasty    TO FOLLOW SELF (Right: Hip) Diagnosis:       Closed fracture of right hip, initial encounter (Multi)      (Closed fracture of right hip, initial encounter (Multi) [S72.001A])    Surgeons: Randy Washington MD Responsible Provider: Mor Adan MD    Anesthesia Type: general ASA Status: 4            Anesthesia Type: general    Vitals Value Taken Time   /65 08/16/24 1645   Temp 36.3 °C (97.3 °F) 08/16/24 1435   Pulse 74 08/16/24 1648   Resp 10 08/16/24 1648   SpO2 98 % 08/16/24 1648   Vitals shown include unfiled device data.    Anesthesia Post Evaluation    Patient location during evaluation: PACU  Patient participation: complete - patient participated  Level of consciousness: awake and alert  Pain management: satisfactory to patient  Airway patency: patent  Cardiovascular status: acceptable  Respiratory status: acceptable, spontaneous ventilation, nasal cannula, unassisted and nonlabored ventilation  Hydration status: acceptable  Postoperative Nausea and Vomiting: none        No notable events documented.

## 2024-08-16 NOTE — OP NOTE
Hip Hemiarthroplasty (R) Operative Note     Date: 8/15/2024 - 2024  OR Location: [facility] OR    Name: Levy Gregory, : 1955, Age: 68 y.o., MRN: [id number], Sex: male    Diagnosis  Pre-op Diagnosis      * Closed fracture of right hip, initial encounter (Multi) [S72.001A] Post-op Diagnosis     * Closed fracture of right hip, initial encounter (Multi) [S72.001A]     Procedures  Hip Hemiarthroplasty    [address] - [place] HEMIARTHROPLASTY HIP PARTIAL      Surgeons      * Randy Washington - Primary    Resident/Fellow/Other Assistant:  Surgeons and Role:  * No surgeons found with a matching role *    Procedure Summary  Anesthesia: General  ASA: IV  Anesthesia Staff: Anesthesiologist: Mor Adan MD  C-AA: AGATHA Baker  SRNA: Chary Tolbert  Estimated Blood Loss: 300 mL  Intra-op Medications:   Administrations occurring from 1100 to 1330 on 24:   Medication Name Total Dose   insulin lispro (HumaLOG) injection 0-10 Units Cannot be calculated   magnesium sulfate 2 g in sterile water for injection 50 mL Cannot be calculated              Anesthesia Record               Intraprocedure I/O Totals          Intake    Tranexamic Acid 0.00 mL    The total shown is the total volume documented since Anesthesia Start was filed.    Phenylephrine Drip 0.00 mL    The total shown is the total volume documented since Anesthesia Start was filed.    LR infusion 1000.00 mL    Total Intake 1000 mL       Output    Est. Blood Loss 300 mL    Total Output 300 mL       Net    Net Volume 700 mL          Specimen: No specimens collected     Staff:   Circulator: Lora  Circulator: Michael Simsub Person: Arleen  Scrub Person: Jimmy Zamorano Scrub: Bety         Drains and/or Catheters:   Gastrostomy/Enterostomy Jejunostomy 14 Fr. LLQ (Active)       External Urinary Catheter Male (Active)   External Catheter Status In place 08/15/24 1900   Output (mL) 300 mL 24 0400       Tourniquet Times:          Implants:  Implants       Type Name Action Serial No.      Joint Hip BONE CEMENT, PREP KIT, FEMORAL - [id number] Implanted      Implant CEMENT, BONE, SIMPLEX HV FULL DOSE  40 GM - [id number] Implanted      Joint BIPOLAR, UHR, 26 X 55MM - [id number] Implanted      Joint SPACER, DISTAL 12MM MED ACCOLADE - [id number] Implanted      Joint STEM, FEMUR 132D SZ 4 137MM ACCOLADE C - [id number] Implanted      Joint HEAD, FEMUR V40 26/+4 COCR - [id number] Implanted               Findings: Right femoral neck fracture    Indications: Levy Gregory is an 68 y.o. male who is having surgery for Closed fracture of right hip, initial encounter (Multi) [S72.001A]. Yesterday the patient fell into a wall and sustained a mechanical fall.  Patient ambulates without the use of an assistive device at baseline.  [place] [facility] obtained in the emergency department which demonstrated a right displaced femoral neck fracture.  The orthopedic service was consulted.  Based on his injuries I recommended a right hip hemiarthroplasty for treatment of his femoral neck fracture.  After discussing the risks benefits and alternatives to surgery the patient expressed that he wished to proceed forward with a right hip hemiarthroplasty.  We discussed the risks of dislocation, need for additional surgeries, infection, wound healing complications, pain, stiffness, damage to surrounding nerves or blood vessels, risk of intraoperative fracture, risk of bleeding, risk of blood clots and risk of anesthetic.  Patient and his family expressed understanding of this and the rationale for the surgical indications to help the patient mobilize and prevent the complications associated with long-term immobility with conservative treatment of his fracture.  They wished to proceed forward with surgery.  The patient was admitted to a medicine service and was medically optimized for surgery.       The patient was seen in  the preoperative area. The risks, benefits, complications, treatment options, non-operative alternatives, expected recovery and outcomes were discussed with the patient. The possibilities of reaction to medication, pulmonary aspiration, injury to surrounding structures, bleeding, recurrent infection, the need for additional procedures, failure to diagnose a condition, and creating a complication requiring transfusion or operation were discussed with the patient. The patient concurred with the proposed plan, giving informed consent.  The site of surgery was properly marked per policy. The patient has been actively warmed in preoperative area. Preoperative antibiotics have been ordered and given within 1 hours of incision. Venous thrombosis prophylaxis have been ordered including unilateral sequential compression device.    Procedure Details: After informed consent was obtained and reviewed and the operative extremity was marked the patient was brought to the operating room and general anesthesia was induced.  Patient was placed in a supine position on the OR table and then placed subsequently in the lateral decubitus position utilizing a pegboard.  All bony prominences were well-padded.  Patient received preoperative antibiotics and 2 grams of TXA prior to incision.  The right lower extremity was then prepped and draped in the usual sterile fashion.  A timeout was called confirming the correct patient, procedure and site of surgery to be performed.  We confirmed antibiotics had been given and that implants were available in the room.  The surgical team was in agreement.       A standard posterolateral approach to the hip was carried down through skin and subcutaneous tissues to the level of the IT band.  The IT band was then exposed and incised with Bovie electrocautery.  At this point a limited bursectomy was performed of the bursa overlying the greater trochanter.  The short external rotators and capsule were then  detached from the greater trochanter en stevie.  Tag stitch was then placed in the piriformis and in the short external rotators.  An interval was then developed between the piriformis and the gluteus minimus to achieve our capsulotomy.  This exposed the femoral neck fracture.  Retractors were then placed around the femoral neck and the femoral neck cut was freshened utilizing a sagittal saw keeping the neck cut at 1 cm above the lesser trochanter.  A corkscrew was then used to remove the femoral head.  The head was then sized and a 55 head was found to be well-fitting in the acetabulum.  Additional loose fracture fragments were removed.     Attention was then turned to the femoral side.  A femoral neck elevator as well as Homans were then used to expose the femoral neck.  A box osteotome was then used to remove a portion of the remaining lateral neck.  This was subsequently followed by the canal finder.  We then began broaching sequentially with broaches up to a #4 broach.  We were pleased with the size of this broach and ultimately selected a # 4 cemented stem.  This was trialed with a standard bipolar head trial and subsequently trialed with a +4 head which we accepted.  We were pleased with the stability of the trial and the leg lengths.  Trials were then removed.  At this point the femoral canal was then prepped and irrigated.  A cement restrictor was then placed in the femoral canal.  The cement was mixed and once the correct consistency was achieved the cement was subsequently injected into the femoral canal.  The stem was then placed and held in position with appropriate version.  We removed excess cement with scrapers and waited for the cement to dry.  Once the cement was dried we again irrigated the acetabulum and then placed the final size 55 bipolar head implant.  It was impacted into place on our stem.  The hip was then reduced.  The hip was stable through range of motion without evidence of subluxation  through a normal range of motion.  The hip was then irrigated with Irrisept. Pericapsular injections were then placed at that time.  The short external rotator and piriformis tendon were then repaired to the greater trochanter with #2 Ethibond passed through drill holes.  We were pleased with the repair.  The IT band was then subsequently closed utilizing #1 Vicryl and Stratafix suture.  Additional Vicryl was used for the deep and subcutaneous closure.  The skin was then closed with additional Stratafix as well as a Prineo dressing.  The surgical incision was then covered with Mepilex.  After the procedure the patient was transferred back to his hospital bed and a hip abduction pillow was placed.  He was then taken to the recovery room for postanesthesia care in stable condition.  There were no complications throughout the entirety of the case.  Counts were correct at the end of the case.     Postoperatively the patient will be weightbearing as tolerated.  DVT prophylaxis can start immediately per the primary medicine team.  He will work with PT/OT postoperatively and will most likely be discharged to a SNF.  He will follow-up in my office in 2 weeks for wound check.       Complications:  None; patient tolerated the procedure well.    Disposition: PACU - hemodynamically stable.  Condition: stable         Additional Details: None    Attending Attestation: I was present and scrubbed for the entire procedure.    Randy Washington  Phone Number: [phone]

## 2024-08-17 LAB
ANION GAP SERPL CALC-SCNC: 14 MMOL/L (ref 10–20)
BUN SERPL-MCNC: 31 MG/DL (ref 6–23)
CALCIUM SERPL-MCNC: 7.9 MG/DL (ref 8.6–10.3)
CHLORIDE SERPL-SCNC: 106 MMOL/L (ref 98–107)
CO2 SERPL-SCNC: 22 MMOL/L (ref 21–32)
CREAT SERPL-MCNC: 1.51 MG/DL (ref 0.5–1.3)
EGFRCR SERPLBLD CKD-EPI 2021: 50 ML/MIN/1.73M*2
ERYTHROCYTE [DISTWIDTH] IN BLOOD BY AUTOMATED COUNT: 12.9 % (ref 11.5–14.5)
GLUCOSE BLD MANUAL STRIP-MCNC: 222 MG/DL (ref 74–99)
GLUCOSE SERPL-MCNC: 182 MG/DL (ref 74–99)
HCT VFR BLD AUTO: 31.2 % (ref 41–52)
HGB BLD-MCNC: 9.8 G/DL (ref 13.5–17.5)
MAGNESIUM SERPL-MCNC: 2 MG/DL (ref 1.6–2.4)
MCH RBC QN AUTO: 32.5 PG (ref 26–34)
MCHC RBC AUTO-ENTMCNC: 31.4 G/DL (ref 32–36)
MCV RBC AUTO: 103 FL (ref 80–100)
NRBC BLD-RTO: 0 /100 WBCS (ref 0–0)
PHOSPHATE SERPL-MCNC: 4.3 MG/DL (ref 2.5–4.9)
PLATELET # BLD AUTO: 129 X10*3/UL (ref 150–450)
POTASSIUM SERPL-SCNC: 4.7 MMOL/L (ref 3.5–5.3)
RBC # BLD AUTO: 3.02 X10*6/UL (ref 4.5–5.9)
SODIUM SERPL-SCNC: 137 MMOL/L (ref 136–145)
WBC # BLD AUTO: 8.9 X10*3/UL (ref 4.4–11.3)

## 2024-08-17 PROCEDURE — 97165 OT EVAL LOW COMPLEX 30 MIN: CPT | Mod: GO | Performed by: OCCUPATIONAL THERAPIST

## 2024-08-17 PROCEDURE — 2500000001 HC RX 250 WO HCPCS SELF ADMINISTERED DRUGS (ALT 637 FOR MEDICARE OP): Performed by: INTERNAL MEDICINE

## 2024-08-17 PROCEDURE — 82947 ASSAY GLUCOSE BLOOD QUANT: CPT

## 2024-08-17 PROCEDURE — 2500000004 HC RX 250 GENERAL PHARMACY W/ HCPCS (ALT 636 FOR OP/ED): Mod: JZ | Performed by: ORTHOPAEDIC SURGERY

## 2024-08-17 PROCEDURE — 2500000004 HC RX 250 GENERAL PHARMACY W/ HCPCS (ALT 636 FOR OP/ED): Performed by: INTERNAL MEDICINE

## 2024-08-17 PROCEDURE — 84100 ASSAY OF PHOSPHORUS: CPT | Performed by: INTERNAL MEDICINE

## 2024-08-17 PROCEDURE — 97161 PT EVAL LOW COMPLEX 20 MIN: CPT | Mod: GP

## 2024-08-17 PROCEDURE — 1200000002 HC GENERAL ROOM WITH TELEMETRY DAILY

## 2024-08-17 PROCEDURE — 85027 COMPLETE CBC AUTOMATED: CPT | Performed by: INTERNAL MEDICINE

## 2024-08-17 PROCEDURE — 36415 COLL VENOUS BLD VENIPUNCTURE: CPT | Performed by: INTERNAL MEDICINE

## 2024-08-17 PROCEDURE — 80048 BASIC METABOLIC PNL TOTAL CA: CPT | Performed by: INTERNAL MEDICINE

## 2024-08-17 PROCEDURE — 2500000001 HC RX 250 WO HCPCS SELF ADMINISTERED DRUGS (ALT 637 FOR MEDICARE OP): Performed by: ORTHOPAEDIC SURGERY

## 2024-08-17 PROCEDURE — 2500000002 HC RX 250 W HCPCS SELF ADMINISTERED DRUGS (ALT 637 FOR MEDICARE OP, ALT 636 FOR OP/ED): Performed by: ORTHOPAEDIC SURGERY

## 2024-08-17 PROCEDURE — 83735 ASSAY OF MAGNESIUM: CPT | Performed by: INTERNAL MEDICINE

## 2024-08-17 PROCEDURE — 99232 SBSQ HOSP IP/OBS MODERATE 35: CPT | Performed by: INTERNAL MEDICINE

## 2024-08-17 RX ORDER — OXYCODONE HYDROCHLORIDE 5 MG/1
5 TABLET ORAL EVERY 4 HOURS PRN
Status: DISCONTINUED | OUTPATIENT
Start: 2024-08-17 | End: 2024-08-24 | Stop reason: HOSPADM

## 2024-08-17 RX ORDER — SODIUM CHLORIDE, SODIUM LACTATE, POTASSIUM CHLORIDE, CALCIUM CHLORIDE 600; 310; 30; 20 MG/100ML; MG/100ML; MG/100ML; MG/100ML
100 INJECTION, SOLUTION INTRAVENOUS CONTINUOUS
Status: ACTIVE | OUTPATIENT
Start: 2024-08-17 | End: 2024-08-17

## 2024-08-17 ASSESSMENT — COGNITIVE AND FUNCTIONAL STATUS - GENERAL
TOILETING: A LOT
HELP NEEDED FOR BATHING: A LOT
TURNING FROM BACK TO SIDE WHILE IN FLAT BAD: A LOT
MOVING FROM LYING ON BACK TO SITTING ON SIDE OF FLAT BED WITH BEDRAILS: A LOT
PERSONAL GROOMING: A LITTLE
DAILY ACTIVITIY SCORE: 14
CLIMB 3 TO 5 STEPS WITH RAILING: TOTAL
STANDING UP FROM CHAIR USING ARMS: A LOT
DRESSING REGULAR LOWER BODY CLOTHING: TOTAL
MOVING TO AND FROM BED TO CHAIR: A LOT
MOBILITY SCORE: 10
WALKING IN HOSPITAL ROOM: TOTAL
DRESSING REGULAR UPPER BODY CLOTHING: A LOT

## 2024-08-17 ASSESSMENT — PAIN SCALES - GENERAL
PAINLEVEL_OUTOF10: 8
PAINLEVEL_OUTOF10: 5 - MODERATE PAIN
PAINLEVEL_OUTOF10: 8
PAINLEVEL_OUTOF10: 7
PAINLEVEL_OUTOF10: 0 - NO PAIN
PAINLEVEL_OUTOF10: 8

## 2024-08-17 ASSESSMENT — PAIN - FUNCTIONAL ASSESSMENT
PAIN_FUNCTIONAL_ASSESSMENT: 0-10
PAIN_FUNCTIONAL_ASSESSMENT: 0-10

## 2024-08-17 ASSESSMENT — PAIN DESCRIPTION - LOCATION: LOCATION: HIP

## 2024-08-17 ASSESSMENT — PAIN DESCRIPTION - ORIENTATION: ORIENTATION: RIGHT

## 2024-08-17 NOTE — PROGRESS NOTES
"Levy Gregory is a 68 y.o. male on day 2 of admission presenting with Closed right hip fracture, initial encounter (Multi).  Patient is now postop day 1 from right hip hemiarthroplasty.    Subjective   Patient resting in bed this morning.  He reports pain in his hip but overall feels that his pain is better after surgery.  He is hopeful to work with therapy today.  He is tolerating p.o.  He denies any numbness or tingling.       Objective     Physical Exam  -Dressings clean dry and intact on right hip  -PF/DF/EHL function intact  -Sensation is intact to light touch in the right lower extremity but is diminished distally secondary to known neuropathy  -DP pulse 2+  -Hip abduction pillow in place    Last Recorded Vitals  Blood pressure 103/63, pulse 81, temperature 36 °C (96.8 °F), temperature source Temporal, resp. rate 16, height 1.854 m (6' 1\"), weight 99.8 kg (220 lb), SpO2 96%.  Intake/Output last 3 Shifts:  I/O last 3 completed shifts:  In: 2050 (20.5 mL/kg) [P.O.:150; I.V.:1300 (13 mL/kg); IV Piggyback:600]  Out: 2750 (27.6 mL/kg) [Urine:2450 (0.7 mL/kg/hr); Blood:300]  Weight: 99.8 kg     Relevant Results  Electrocardiogram, 12-lead PRN ACS symptoms    Result Date: 8/15/2024  Sinus tachycardia Left axis deviation Right bundle branch block Possible Lateral infarct (cited on or before 06-AUG-2023) Inferior infarct (cited on or before 06-AUG-2023) Abnormal ECG When compared with ECG of 15-AUG-2024 06:25, (unconfirmed) Serial changes of Lateral infarct Present    ECG 12 lead    Result Date: 8/15/2024  Sinus tachycardia Right bundle branch block Possible Lateral infarct (cited on or before 06-AUG-2023) Inferior infarct (cited on or before 06-AUG-2023) Abnormal ECG When compared with ECG of 07-DEC-2023 20:40, Right bundle branch block is now Present Questionable change in initial forces of Lateral leads    XR hip right with pelvis when performed 2 or 3 views    Result Date: 8/15/2024  Interpreted By:  Shena " Sebastián, STUDY: XR HIP RIGHT WITH PELVIS WHEN PERFORMED 2 OR 3 VIEWS;  8/15/2024 8:05 am   INDICATION: Signs/Symptoms:fall on thinners, R hip pain.   COMPARISON: None.   ACCESSION NUMBER(S): [id number]   ORDERING CLINICIAN: JORGE DUEÑAS   FINDINGS: There is an impacted subcapital fracture of the right femur. Mild degenerative changes right hip. No pelvic fracture identified.       Right femoral subcapital fracture.   MACRO: None   Signed by: Sebastián Chatterjee 8/15/2024 8:27 AM Dictation workstation:   [id number]    XR chest 1 view    Result Date: 8/15/2024  Interpreted By:  Sebastián Chatterjee, STUDY: XR CHEST 1 VIEW;  8/15/2024 8:05 am   INDICATION: Signs/Symptoms:fall on eliquis.   COMPARISON: 07/06/2024   ACCESSION NUMBER(S): [id number]   ORDERING CLINICIAN: JORGE DUEÑAS   FINDINGS: Patchy right basilar opacities. Grossly clear left lung. No apparent pleural effusion. Cardiomegaly. Widening of the mediastinum relating to gastric pull-through. Mild pulmonary vascular congestion. Partial resection left clavicle.       Mild right basilar atelectasis or infiltrate. Mild pulmonary vascular congestion.   MACRO: None   Signed by: Sebastián Chatterjee 8/15/2024 8:26 AM Dictation workstation:   [id number]    CT head wo IV contrast    Result Date: 8/15/2024  Interpreted By:  Sebastián Chatterjee, STUDY: CT HEAD WO IV CONTRAST  8/15/2024 7:43 am   INDICATION: Signs/Symptoms:fall on eliquis   COMPARISON: 07/06/2024   ACCESSION NUMBER(S): [id number]   ORDERING CLINICIAN: JORGE DUEÑAS   TECHNIQUE: Contiguous axial CT images of the brain were obtained without IV contrast.   FINDINGS: The ventricles, cisterns and sulci are prominent, consistent with severe diffuse volume loss. Areas of white matter low attenuation are nonspecific but likely related to chronic microvascular disease. There is intracranial atherosclerosis.   Gray-white differentiation is preserved. No acute intracranial hemorrhage or mass effect. No midline shift. Patent basal cisterns.  No extraaxial fluid collections.   The calvaria is intact. The visualized paranasal sinuses and mastoid air cells are clear.       No acute intracranial pathology.     Signed by: Sebastián Chatterjee 8/15/2024 8:25 AM Dictation workstation:   [id number]    CT cervical spine wo IV contrast    Result Date: 8/15/2024  Interpreted By:  Sebastián Chatterjee, STUDY: CT CERVICAL SPINE WO IV CONTRAST;  8/15/2024 7:43 am   INDICATION: Signs/Symptoms:fall on eliquis.   COMPARISON: None.   ACCESSION NUMBER(S): [id number]   ORDERING CLINICIAN: JORGE DUEÑAS   TECHNIQUE: Axial CT images of the cervical spine are obtained. Axial, coronal and sagittal reconstructions are provided for review.   FINDINGS: No acute fracture or subluxation of the cervical spine. Severe multilevel degenerative disc disease and facet arthropathy. Trace anterolisthesis C7-T1. No evidence of critical canal stenosis. Multilevel foraminal stenosis most advanced at C5-6 bilaterally.   No prevertebral soft tissue swelling.   Atherosclerosis.         No evidence for an acute fracture or subluxation of the cervical spine.   MACRO: None   Signed by: Sebastián Chatterjee 8/15/2024 8:22 AM Dictation workstation:   [id number]    Holter Or Event Cardiac Monitor    Result Date: 8/1/2024    1.  Medium-term Holter monitoring was performed between July 18 and July 25, 2024.  Heart rate ranged from , with an average heart rate of 79. 2.  Rare PACs were recorded.  Four episodes of SVT were recorded, the longest being 6 beats in duration. 3.  Rare PVCs were recorded.  Two runs of nonsustained ventricular tachycardia were recorded, the longest being 10 beats in duration. 4.  There is no evidence of high-grade AV block or sinus pauses. 5.  A single patient trigger was recorded, correlating with artifact.        Scheduled medications  atorvastatin, 80 mg, oral, Nightly  ferrous sulfate (325 mg ferrous sulfate), 65 mg of iron, oral, Daily  gabapentin, 100 mg, oral, TID  insulin lispro, 0-10  Units, subcutaneous, TID  midodrine, 10 mg, oral, TID  oxybutynin, 5 mg, oral, TID  pantoprazole, 40 mg, oral, Nightly  polyethylene glycol, 17 g, oral, Daily  sertraline, 25 mg, oral, Nightly  tamsulosin, 0.4 mg, oral, Nightly      Continuous medications  lactated Ringer's, 100 mL/hr      PRN medications  PRN medications: acetaminophen **OR** acetaminophen **OR** acetaminophen, acetaminophen **OR** acetaminophen **OR** acetaminophen, [Held by provider] morphine, [Held by provider] morphine, ondansetron **OR** ondansetron, oxyCODONE, oxygen, oxygen  Results for orders placed or performed during the hospital encounter of 08/15/24 (from the past 24 hour(s))   POCT GLUCOSE   Result Value Ref Range    POCT Glucose 196 (H) 74 - 99 mg/dL   Blood Gas Arterial Full Panel Unsolicited   Result Value Ref Range    POCT pH, Arterial 7.31 (L) 7.38 - 7.42 pH    POCT pCO2, Arterial 45 (H) 38 - 42 mm Hg    POCT pO2, Arterial 77 (L) 85 - 95 mm Hg    POCT SO2, Arterial 95 94 - 100 %    POCT Oxy Hemoglobin, Arterial 92.8 (L) 94.0 - 98.0 %    POCT Hematocrit Calculated, Arterial 45.0 41.0 - 52.0 %    POCT Sodium, Arterial 134 (L) 136 - 145 mmol/L    POCT Potassium, Arterial 4.2 3.5 - 5.3 mmol/L    POCT Chloride, Arterial 101 98 - 107 mmol/L    POCT Ionized Calcium, Arterial 1.14 1.10 - 1.33 mmol/L    POCT Glucose, Arterial 209 (H) 74 - 99 mg/dL    POCT Lactate, Arterial 1.1 0.4 - 2.0 mmol/L    POCT Base Excess, Arterial -3.7 (L) -2.0 - 3.0 mmol/L    POCT HCO3 Calculated, Arterial 22.7 22.0 - 26.0 mmol/L    POCT Hemoglobin, Arterial 15.1 13.5 - 17.5 g/dL    POCT Anion Gap, Arterial 15 10 - 25 mmo/L    Patient Temperature      FiO2 40 %    Mauricio's Test A-LINE    Basic Metabolic Panel   Result Value Ref Range    Glucose 182 (H) 74 - 99 mg/dL    Sodium 137 136 - 145 mmol/L    Potassium 4.7 3.5 - 5.3 mmol/L    Chloride 106 98 - 107 mmol/L    Bicarbonate 22 21 - 32 mmol/L    Anion Gap 14 10 - 20 mmol/L    Urea Nitrogen 31 (H) 6 - 23 mg/dL     Creatinine 1.51 (H) 0.50 - 1.30 mg/dL    eGFR 50 (L) >60 mL/min/1.73m*2    Calcium 7.9 (L) 8.6 - 10.3 mg/dL   CBC   Result Value Ref Range    WBC 8.9 4.4 - 11.3 x10*3/uL    nRBC 0.0 0.0 - 0.0 /100 WBCs    RBC 3.02 (L) 4.50 - 5.90 x10*6/uL    Hemoglobin 9.8 (L) 13.5 - 17.5 g/dL    Hematocrit 31.2 (L) 41.0 - 52.0 %     (H) 80 - 100 fL    MCH 32.5 26.0 - 34.0 pg    MCHC 31.4 (L) 32.0 - 36.0 g/dL    RDW 12.9 11.5 - 14.5 %    Platelets 129 (L) 150 - 450 x10*3/uL   Magnesium   Result Value Ref Range    Magnesium 2.00 1.60 - 2.40 mg/dL   Phosphorus   Result Value Ref Range    Phosphorus 4.3 2.5 - 4.9 mg/dL                            Assessment/Plan   Assessment & Plan  Closed right hip fracture, initial encounter (Multi)    Diabetic neuropathy associated with type 2 diabetes mellitus (Multi)    History of pulmonary embolism    Obstructive sleep apnea, adult    Achalasia, esophageal    Chronic obstructive pulmonary disease, unspecified COPD type (Multi)    At risk for falls    Balance problems    Coronary artery disease involving native coronary artery of native heart without angina pectoris    Closed fracture of right hip (Multi)  Patient is postop day 1 from right hip hemiarthroplasty, doing well.    -A.m. labs reviewed  -Regular diet  -Weight-bear as tolerated right lower extremity  -Multimodal pain control  -Ancef postop x 2 doses  -PT/OT eval and treat  -Dressing to remain in place for the first 7 days and then can be removed  -Patient will require outpatient follow-up with me in 2 weeks  -Please contact me with any questions      I spent 30 minutes in the professional and overall care of this patient.      Randy Washington MD

## 2024-08-17 NOTE — CARE PLAN
The patient's goals for the shift include      The clinical goals for the shift include pain management      Problem: Skin  Goal: Participates in plan/prevention/treatment measures  Flowsheets (Taken 8/17/2024 8135)  Participates in plan/prevention/treatment measures:   Discuss with provider PT/OT consult   Elevate heels

## 2024-08-17 NOTE — PROGRESS NOTES
Occupational Therapy    Evaluation    Patient Name: Levy Gregory  MRN: [id number]  Today's Date: 8/17/2024  Time Calculation  Start Time: 1435  Stop Time: 1455  Time Calculation (min): 20 min  4106/4106-A  Eval only     Assessment  IP OT Assessment  Prognosis: Good  Medical Staff Made Aware: Yes  End of Session Communication: Bedside nurse  End of Session Patient Position: Up in chair, Alarm on  Patient presents with decline in ADLs, functional transfers, and functional mobility tasks and would benefit from OT during acute stay to improve functional independence and safety.  Patient currently requires 24 hour hands on assistance. Recommend high intensity OT to maximize functional independence and safety.      Plan:  Treatment Interventions: ADL retraining, Functional transfer training, Patient/family training, Equipment evaluation/education, Compensatory technique education  OT Frequency: Daily  OT Discharge Recommendations: High intensity level of continued care  OT - OK to Discharge: Yes from acute care OT services to the next level of care when cleared by medical team      Subjective     Current Problem:  1. Closed right hip fracture, initial encounter (Multi)        2. Fall, initial encounter        3. Anticoagulated        4. Other diabetic neurological complication associated with type 2 diabetes mellitus (Multi)        5. Closed fracture of right hip, initial encounter (Multi)  Case Request Operating Room: Hip Hemiarthroplasty    Case Request Operating Room: Hip Hemiarthroplasty      6. Abnormal chest x-ray            General:  General  Reason for Referral: ADLs  Referred By: Cj Kahn MD  Past Medical History Relevant to Rehab: Per EMR: 68-year-old gentleman with a fall at home states that he awakened early this morning in the dark and tried to get to the bathroom.  He was on Eliquis and his last dose was yesterday but his PCP told him to discontinue use so he did not take a dose this morning.   He states he did not strike his head.  Denies any headache or shortness of breath.  States he was mildly dizzy when his fall occurred.  Denies any fevers or chills.  Has had an extensive surgical history in the past.  Received pain medications by EMS that were ineffective.  He is not currently dizzy or lightheaded.     (+) right hip fracture.  Patient had the following completed 8/16/2023: right hip hemiarthroplasty completed by Dr Washington.  Missed Visit: Yes  Co-Treatment: PT  Co-Treatment Reason: for safety  Prior to Session Communication: Bedside nurse  Patient Position Received: Bed, 3 rail up, Alarm on  Preferred Learning Style: verbal  General Comment: Patient in long legged sitting in bed and agreeable to participate in OT evaluation.    Precautions:  LE Weight Bearing Status: Weight Bearing as Tolerated (right LE)  Medical Precautions: Fall precautions (contact precautions)  Post-Surgical Precautions: Right hip precautions    Pain:  Pain Assessment  Pain Assessment: 0-10  0-10 (Numeric) Pain Score: 8  Pain Type: Surgical pain  Pain Location: Hip  Pain Orientation: Right  Pain Interventions: Medication (See MAR)    Objective   Cognition:  Overall Cognitive Status: Within Functional Limits    Home Living:  Home Living Comments: Patient lives in 1 story home with spouse with 2 SWATI.   Has WIS with GB and shower chair.     Prior Function:  Prior Function Comments: Was independent without assistive device functional mobility tasks. Was independent with all ADLs. Spouse manages IADLs.    ADL:  LE Dressing Assistance: Total (don socks)    Activity Tolerance:  Endurance: Decreased tolerance for upright activites (limited by pain)    Bed Mobility/Transfers:   Bed Mobility  Bed Mobility:  (max assist of 2 supine to sit at edge of bed)  Transfers  Transfer:  (mod assist of 2 sit to stand. Min assist of 2 with WW to take turning steps from EOB to bedside chair.)      Extremities: RUE   RUE : Within Functional Limits and  LUE   LUE: Within Functional Limits    Outcome Measures: [facility] Daily Activity  Putting on and taking off regular lower body clothing: Total  Bathing (including washing, rinsing, drying): A lot  Putting on and taking off regular upper body clothing: A lot  Toileting, which includes using toilet, bedpan or urinal: A lot  Taking care of personal grooming such as brushing teeth: A little  Eating Meals: None  Daily Activity - Total Score: 14       EDUCATION:  Education  Individual(s) Educated: Patient  Education Provided: Fall precautons (hip precautions)  Patient Response to Education: Patient/Caregiver Verbalized Understanding of Information      Goals:   Encounter Problems       Encounter Problems (Active)       Dressings Lower Extremities       STG - Patient will complete lower body dressing with mod assist with comp strategies and AE (Progressing)       Start:  08/17/24    Expected End:  08/31/24               Functional Balance       STG-Patient will be min assist with assistive device dynamic stand task >5 minutes for ADL completion   (Progressing)       Start:  08/17/24    Expected End:  08/31/24               Functional Mobility       STG-Patient will be min assist with assistive device functional mobility tasks   (Progressing)       Start:  08/17/24    Expected End:  08/31/24               OT Transfers       STG-Patient will be min assist with functional transfers demonstrating good safety   (Progressing)       Start:  08/17/24    Expected End:  08/31/24               Safety       STG-Patient will independently verbalize hip precautions with all functional tasks   (Progressing)       Start:  08/17/24    Expected End:  08/31/24

## 2024-08-17 NOTE — PROGRESS NOTES
Physical Therapy    Physical Therapy Evaluation    Patient Name: Levy Gregory  MRN: [id number]  Today's Date: 8/17/2024   Time Calculation  Start Time: 1434  Stop Time: 1455  Time Calculation (min): 21 min    Assessment/Plan   PT Assessment  Rehab Prognosis: Good  End of Session Communication: Bedside nurse  Assessment Comment: Pt requires assist x 2 and FWW to complete OOB mobility. Pt is not safe to return home at this time and would benefit from high intensity PT upon DC. PT will follow to address aformentioned deficits.  End of Session Patient Position: Alarm on, Up in chair  IP OR SWING BED PT PLAN  Inpatient or Swing Bed: Inpatient  PT Plan  PT Plan: Ongoing PT  PT Frequency: Daily  PT Discharge Recommendations: High intensity level of continued care  PT Recommended Transfer Status: Assist x2  PT - OK to Discharge: Yes (when medically cleared)      Subjective   General Visit Information:  General  Reason for Referral: impaired mobility  Referred By: Femi  Co-Treatment: OT  Co-Treatment Reason: safety  Patient Position Received: Bed, 3 rail up, Alarm on  Preferred Learning Style: verbal  General Comment: pt agreeable to PT  Home Living:  Home Living  Home Living Comments: Pt lives at home with his wife. 2 SWATI.  Prior Level of Function:  Prior Function Per Pt/Caregiver Report  Prior Function Comments: Prior to admission pt was I with ambulation and ADLs. Pt's wife covered IADLs.  Precautions:  Precautions  LE Weight Bearing Status: Weight Bearing as Tolerated  Medical Precautions: Fall precautions  Post-Surgical Precautions: Right hip precautions  Vital Signs:       Objective   Pain:  Pain Assessment  0-10 (Numeric) Pain Score: 5 - Moderate pain  Cognition:  Cognition  Attention: Within Functional Limits    General Assessments:       Sensation  Light Touch: No apparent deficits       Functional Assessments:  Bed Mobility  Bed Mobility:  (supine>sit maxA x 2)    Transfers  Transfer:  (sit<>stand modA x  2 and FWW; bed>chair Tk x 2 FWW)       Extremity/Trunk Assessments:  RLE   RLE :  (grossly 3-/5)  LLE   LLE :  (grossly 4/5)  Outcome Measures:  [facility] Basic Mobility  Turning from your back to your side while in a flat bed without using bedrails: A lot  Moving from lying on your back to sitting on the side of a flat bed without using bedrails: A lot  Moving to and from bed to chair (including a wheelchair): A lot  Standing up from a chair using your arms (e.g. wheelchair or bedside chair): A lot  To walk in hospital room: Total  Climbing 3-5 steps with railing: Total  Basic Mobility - Total Score: 10    Encounter Problems       Encounter Problems (Active)       PT Problem       Pt will demonstrate ability to complete bed mobility with Tk x 1        Start:  08/17/24    Expected End:  08/31/24            Pt will demonstrate ability to complete all transfers with kT x 1 and use of FWW        Start:  08/17/24    Expected End:  08/31/24            Pt will demonstrate ability to ambulate 100' with Tk x 1 and use of FWW        Start:  08/17/24    Expected End:  08/31/24            Pt will demonstrate ability to verbalize and independently follow hip precautions throughout therapy sessions.        Start:  08/17/24               Pain - Adult              Education Documentation  Home Exercise Program, taught by Juanita Le PT at 8/17/2024  3:15 PM.  Learner: Patient  Readiness: Acceptance  Method: Explanation  Response: Needs Reinforcement    Precautions, taught by Juanita Le PT at 8/17/2024  3:15 PM.  Learner: Patient  Readiness: Acceptance  Method: Explanation  Response: Needs Reinforcement    Body Mechanics, taught by Juanita Le PT at 8/17/2024  3:15 PM.  Learner: Patient  Readiness: Acceptance  Method: Explanation  Response: Needs Reinforcement    Mobility Training, taught by Juanita Le PT at 8/17/2024  3:15 PM.  Learner: Patient  Readiness: Acceptance  Method:  Explanation  Response: Needs Reinforcement    Education Comments  No comments found.

## 2024-08-17 NOTE — PROGRESS NOTES
Levy Gregory is a 68 y.o. male on day 2 of admission presenting with Closed right hip fracture, initial encounter (Multi).      Subjective   Patient doing better today. Has no complaints other than mild R hip pain. Has not worked with PT/OT yet.    Objective     Last Recorded Vitals  /65 (BP Location: Right arm, Patient Position: Lying)   Pulse 67   Temp 36.3 °C (97.3 °F) (Temporal)   Resp 16   Wt 99.8 kg (220 lb)   SpO2 97%   Intake/Output last 3 Shifts:    Intake/Output Summary (Last 24 hours) at 8/17/2024 1018  Last data filed at 8/17/2024 0937  Gross per 24 hour   Intake 2410 ml   Output 1500 ml   Net 910 ml       Admission Weight  Weight: 111 kg (245 lb) (08/15/24 0620)    Daily Weight  08/15/24 : 99.8 kg (220 lb)    Image Results  Electrocardiogram, 12-lead PRN ACS symptoms  Sinus tachycardia  Left axis deviation  Right bundle branch block  Possible Lateral infarct (cited on or before 06-AUG-2023)  Inferior infarct (cited on or before 06-AUG-2023)  Abnormal ECG  When compared with ECG of 15-AUG-2024 06:25, (unconfirmed)  Serial changes of Lateral infarct Present  ECG 12 lead  Sinus tachycardia  Right bundle branch block  Possible Lateral infarct (cited on or before 06-AUG-2023)  Inferior infarct (cited on or before 06-AUG-2023)  Abnormal ECG  When compared with ECG of 07-DEC-2023 20:40,  Right bundle branch block is now Present  Questionable change in initial forces of Lateral leads  XR hip right with pelvis when performed 2 or 3 views  Narrative: Interpreted By:  Sebastián Chatterjee,   STUDY:  XR HIP RIGHT WITH PELVIS WHEN PERFORMED 2 OR 3 VIEWS;  8/15/2024 8:05  am      INDICATION:  Signs/Symptoms:fall on thinners, R hip pain.      COMPARISON:  None.      ACCESSION NUMBER(S):  [id number]      ORDERING CLINICIAN:  JORGE DUEÑAS      FINDINGS:  There is an impacted subcapital fracture of the right femur. Mild  degenerative changes right hip. No pelvic fracture identified.      Impression: Right  femoral subcapital fracture.      MACRO:  None      Signed by: Sebastián Chatterjee 8/15/2024 8:27 AM  Dictation workstation:   [id number]  XR chest 1 view  Narrative: Interpreted By:  Sebastián Chatterjee,   STUDY:  XR CHEST 1 VIEW;  8/15/2024 8:05 am      INDICATION:  Signs/Symptoms:fall on eliquis.      COMPARISON:  07/06/2024      ACCESSION NUMBER(S):  [id number]      ORDERING CLINICIAN:  JORGE DUEÑAS      FINDINGS:  Patchy right basilar opacities. Grossly clear left lung. No apparent  pleural effusion. Cardiomegaly. Widening of the mediastinum relating  to gastric pull-through. Mild pulmonary vascular congestion. Partial  resection left clavicle.      Impression: Mild right basilar atelectasis or infiltrate.  Mild pulmonary vascular congestion.      MACRO:  None      Signed by: Sebastián Chatterjee 8/15/2024 8:26 AM  Dictation workstation:   [id number]  CT head wo IV contrast  Narrative: Interpreted By:  Sebastián Chatterjee,   STUDY:  CT HEAD WO IV CONTRAST  8/15/2024 7:43 am      INDICATION:  Signs/Symptoms:fall on eliquis      COMPARISON:  07/06/2024      ACCESSION NUMBER(S):  [id number]      ORDERING CLINICIAN:  JORGE DUEÑAS      TECHNIQUE:  Contiguous axial CT images of the brain were obtained without IV  contrast.      FINDINGS:  The ventricles, cisterns and sulci are prominent, consistent with  severe diffuse volume loss. Areas of white matter low attenuation are  nonspecific but likely related to chronic microvascular disease.  There is intracranial atherosclerosis.      Gray-white differentiation is preserved.  No acute intracranial hemorrhage or mass effect.  No midline shift. Patent basal cisterns.  No extraaxial fluid collections.      The calvaria is intact.  The visualized paranasal sinuses and mastoid air cells are clear.      Impression: No acute intracranial pathology.          Signed by: Sebastián Chatterjee 8/15/2024 8:25 AM  Dictation workstation:   [id number]  CT cervical spine wo IV contrast  Narrative: Interpreted By:   Sebastián Chatterjee,   STUDY:  CT CERVICAL SPINE WO IV CONTRAST;  8/15/2024 7:43 am      INDICATION:  Signs/Symptoms:fall on eliquis.      COMPARISON:  None.      ACCESSION NUMBER(S):  [id number]      ORDERING CLINICIAN:  JORGE DUEÑAS      TECHNIQUE:  Axial CT images of the cervical spine are obtained. Axial, coronal  and sagittal reconstructions are provided for review.      FINDINGS:  No acute fracture or subluxation of the cervical spine. Severe  multilevel degenerative disc disease and facet arthropathy. Trace  anterolisthesis C7-T1. No evidence of critical canal stenosis.  Multilevel foraminal stenosis most advanced at C5-6 bilaterally.      No prevertebral soft tissue swelling.      Atherosclerosis.          Impression: No evidence for an acute fracture or subluxation of the cervical  spine.      MACRO:  None      Signed by: Sebastián Chatterjee 8/15/2024 8:22 AM  Dictation workstation:   [id number]      Physical Exam  Constitutional:       General: He is not in acute distress.     Appearance: Normal appearance.   HENT:      Head: Normocephalic and atraumatic.      Mouth/Throat:      Mouth: Mucous membranes are moist.   Eyes:      Extraocular Movements: Extraocular movements intact.      Conjunctiva/sclera: Conjunctivae normal.   Cardiovascular:      Rate and Rhythm: Normal rate and regular rhythm.      Heart sounds: Normal heart sounds.   Pulmonary:      Effort: Pulmonary effort is normal.      Breath sounds: Normal breath sounds. No wheezing, rhonchi or rales.      Comments: Multiple scars (old and well-healed) on chest  Abdominal:      General: Abdomen is flat. Bowel sounds are normal.      Palpations: Abdomen is soft.   Musculoskeletal:         General: No swelling or tenderness.      Cervical back: Normal range of motion and neck supple.      Comments: Surgical site c/d/I; no evidence of hematoma; neurovascularly intact distal to surgical site.   Skin:     General: Skin is warm and dry.      Findings: No rash.    Neurological:      General: No focal deficit present.      Mental Status: He is alert and oriented to person, place, and time.      Cranial Nerves: No cranial nerve deficit.      Sensory: No sensory deficit.   Psychiatric:         Mood and Affect: Mood normal.         Behavior: Behavior normal.         Relevant Results  Scheduled medications  atorvastatin, 80 mg, oral, Nightly  ferrous sulfate (325 mg ferrous sulfate), 65 mg of iron, oral, Daily  gabapentin, 100 mg, oral, TID  insulin lispro, 0-10 Units, subcutaneous, TID  midodrine, 10 mg, oral, TID  oxybutynin, 5 mg, oral, TID  pantoprazole, 40 mg, oral, Nightly  polyethylene glycol, 17 g, oral, Daily  sertraline, 25 mg, oral, Nightly  tamsulosin, 0.4 mg, oral, Nightly      Continuous medications  lactated Ringer's, 100 mL/hr, Last Rate: 100 mL/hr (08/17/24 0948)      PRN medications  PRN medications: acetaminophen **OR** acetaminophen **OR** acetaminophen, acetaminophen **OR** acetaminophen **OR** acetaminophen, [Held by provider] morphine, [Held by provider] morphine, ondansetron **OR** ondansetron, oxyCODONE, oxygen, oxygen    Results for orders placed or performed during the hospital encounter of 08/15/24 (from the past 24 hour(s))   POCT GLUCOSE   Result Value Ref Range    POCT Glucose 196 (H) 74 - 99 mg/dL   Blood Gas Arterial Full Panel Unsolicited   Result Value Ref Range    POCT pH, Arterial 7.31 (L) 7.38 - 7.42 pH    POCT pCO2, Arterial 45 (H) 38 - 42 mm Hg    POCT pO2, Arterial 77 (L) 85 - 95 mm Hg    POCT SO2, Arterial 95 94 - 100 %    POCT Oxy Hemoglobin, Arterial 92.8 (L) 94.0 - 98.0 %    POCT Hematocrit Calculated, Arterial 45.0 41.0 - 52.0 %    POCT Sodium, Arterial 134 (L) 136 - 145 mmol/L    POCT Potassium, Arterial 4.2 3.5 - 5.3 mmol/L    POCT Chloride, Arterial 101 98 - 107 mmol/L    POCT Ionized Calcium, Arterial 1.14 1.10 - 1.33 mmol/L    POCT Glucose, Arterial 209 (H) 74 - 99 mg/dL    POCT Lactate, Arterial 1.1 0.4 - 2.0 mmol/L    POCT  Base Excess, Arterial -3.7 (L) -2.0 - 3.0 mmol/L    POCT HCO3 Calculated, Arterial 22.7 22.0 - 26.0 mmol/L    POCT Hemoglobin, Arterial 15.1 13.5 - 17.5 g/dL    POCT Anion Gap, Arterial 15 10 - 25 mmo/L    Patient Temperature      FiO2 40 %    Mauricio's Test A-LINE    Basic Metabolic Panel   Result Value Ref Range    Glucose 182 (H) 74 - 99 mg/dL    Sodium 137 136 - 145 mmol/L    Potassium 4.7 3.5 - 5.3 mmol/L    Chloride 106 98 - 107 mmol/L    Bicarbonate 22 21 - 32 mmol/L    Anion Gap 14 10 - 20 mmol/L    Urea Nitrogen 31 (H) 6 - 23 mg/dL    Creatinine 1.51 (H) 0.50 - 1.30 mg/dL    eGFR 50 (L) >60 mL/min/1.73m*2    Calcium 7.9 (L) 8.6 - 10.3 mg/dL   CBC   Result Value Ref Range    WBC 8.9 4.4 - 11.3 x10*3/uL    nRBC 0.0 0.0 - 0.0 /100 WBCs    RBC 3.02 (L) 4.50 - 5.90 x10*6/uL    Hemoglobin 9.8 (L) 13.5 - 17.5 g/dL    Hematocrit 31.2 (L) 41.0 - 52.0 %     (H) 80 - 100 fL    MCH 32.5 26.0 - 34.0 pg    MCHC 31.4 (L) 32.0 - 36.0 g/dL    RDW 12.9 11.5 - 14.5 %    Platelets 129 (L) 150 - 450 x10*3/uL   Magnesium   Result Value Ref Range    Magnesium 2.00 1.60 - 2.40 mg/dL   Phosphorus   Result Value Ref Range    Phosphorus 4.3 2.5 - 4.9 mg/dL       Electrocardiogram, 12-lead PRN ACS symptoms    Result Date: 8/15/2024  Sinus tachycardia Left axis deviation Right bundle branch block Possible Lateral infarct (cited on or before 06-AUG-2023) Inferior infarct (cited on or before 06-AUG-2023) Abnormal ECG When compared with ECG of 15-AUG-2024 06:25, (unconfirmed) Serial changes of Lateral infarct Present    ECG 12 lead    Result Date: 8/15/2024  Sinus tachycardia Right bundle branch block Possible Lateral infarct (cited on or before 06-AUG-2023) Inferior infarct (cited on or before 06-AUG-2023) Abnormal ECG When compared with ECG of 07-DEC-2023 20:40, Right bundle branch block is now Present Questionable change in initial forces of Lateral leads    XR hip right with pelvis when performed 2 or 3 views    Result Date:  8/15/2024  Interpreted By:  Sebastián Chatterjee, STUDY: XR HIP RIGHT WITH PELVIS WHEN PERFORMED 2 OR 3 VIEWS;  8/15/2024 8:05 am   INDICATION: Signs/Symptoms:fall on thinners, R hip pain.   COMPARISON: None.   ACCESSION NUMBER(S): [id number]   ORDERING CLINICIAN: JORGE DUEÑAS   FINDINGS: There is an impacted subcapital fracture of the right femur. Mild degenerative changes right hip. No pelvic fracture identified.       Right femoral subcapital fracture.   MACRO: None   Signed by: Sebastián Chatterjee 8/15/2024 8:27 AM Dictation workstation:   [id number]    XR chest 1 view    Result Date: 8/15/2024  Interpreted By:  Sebastián Chatterjee, STUDY: XR CHEST 1 VIEW;  8/15/2024 8:05 am   INDICATION: Signs/Symptoms:fall on eliquis.   COMPARISON: 07/06/2024   ACCESSION NUMBER(S): [id number]   ORDERING CLINICIAN: JORGE DUEÑAS   FINDINGS: Patchy right basilar opacities. Grossly clear left lung. No apparent pleural effusion. Cardiomegaly. Widening of the mediastinum relating to gastric pull-through. Mild pulmonary vascular congestion. Partial resection left clavicle.       Mild right basilar atelectasis or infiltrate. Mild pulmonary vascular congestion.   MACRO: None   Signed by: Sebastián Chatterjee 8/15/2024 8:26 AM Dictation workstation:   [id number]    CT head wo IV contrast    Result Date: 8/15/2024  Interpreted By:  Sebastián Chatterjee, STUDY: CT HEAD WO IV CONTRAST  8/15/2024 7:43 am   INDICATION: Signs/Symptoms:fall on eliquis   COMPARISON: 07/06/2024   ACCESSION NUMBER(S): [id number]   ORDERING CLINICIAN: JORGE DUEÑAS   TECHNIQUE: Contiguous axial CT images of the brain were obtained without IV contrast.   FINDINGS: The ventricles, cisterns and sulci are prominent, consistent with severe diffuse volume loss. Areas of white matter low attenuation are nonspecific but likely related to chronic microvascular disease. There is intracranial atherosclerosis.   Gray-white differentiation is preserved. No acute intracranial hemorrhage or mass effect. No  midline shift. Patent basal cisterns. No extraaxial fluid collections.   The calvaria is intact. The visualized paranasal sinuses and mastoid air cells are clear.       No acute intracranial pathology.     Signed by: Sebastián Chatterjee 8/15/2024 8:25 AM Dictation workstation:   [id number]    CT cervical spine wo IV contrast    Result Date: 8/15/2024  Interpreted By:  Sebastián Chatterjee, STUDY: CT CERVICAL SPINE WO IV CONTRAST;  8/15/2024 7:43 am   INDICATION: Signs/Symptoms:fall on eliquis.   COMPARISON: None.   ACCESSION NUMBER(S): [id number]   ORDERING CLINICIAN: JORGE DUEÑAS   TECHNIQUE: Axial CT images of the cervical spine are obtained. Axial, coronal and sagittal reconstructions are provided for review.   FINDINGS: No acute fracture or subluxation of the cervical spine. Severe multilevel degenerative disc disease and facet arthropathy. Trace anterolisthesis C7-T1. No evidence of critical canal stenosis. Multilevel foraminal stenosis most advanced at C5-6 bilaterally.   No prevertebral soft tissue swelling.   Atherosclerosis.         No evidence for an acute fracture or subluxation of the cervical spine.   MACRO: None   Signed by: Sebastián Chatterjee 8/15/2024 8:22 AM Dictation workstation:   [id number]    Holter Or Event Cardiac Monitor    Result Date: 8/1/2024    1.  Medium-term Holter monitoring was performed between July 18 and July 25, 2024.  Heart rate ranged from , with an average heart rate of 79. 2.  Rare PACs were recorded.  Four episodes of SVT were recorded, the longest being 6 beats in duration. 3.  Rare PVCs were recorded.  Two runs of nonsustained ventricular tachycardia were recorded, the longest being 10 beats in duration. 4.  There is no evidence of high-grade AV block or sinus pauses. 5.  A single patient trigger was recorded, correlating with artifact.        Assessment/Plan     Assessment & Plan  Closed right hip fracture, initial encounter (Multi)    Diabetic neuropathy associated with type 2  diabetes mellitus (Multi)    History of pulmonary embolism    Obstructive sleep apnea, adult    Achalasia, esophageal    Chronic obstructive pulmonary disease, unspecified COPD type (Multi)    At risk for falls    Balance problems    Coronary artery disease involving native coronary artery of native heart without angina pectoris    Closed fracture of right hip (Multi)    Plan:  -Right hip imaging completed in ED: There is an impacted subcapital fracture of the right femur.   -Orthopedic Consult Dr. Jones   -CT C-spine and head obtained in ED: no acute intracranial hemorrhage, no CT evidence of high risk C-spine fracture   -Insulin sliding scale with POC glucose monitoring AC HS  -Patient reported stopped Eliquis yesterday per his vascular medicine physician (Dr. Maldonado); cardiology also saw and cleared to go to OR  -Patient to OR 8/16 with Dr. Leavitt; cleared for procedure by anesthesiology and cardiology  -slight bump in Cr 8/17 so giving gentle IVF today  -PT/OT/CM for likely IRF need    Code: DNAR  DVT PPX: stop eliquis at DC; will need DVT Ppx post op  GI PPX: not indicated  Diet: diabetic cardiac after surgery    Dispo: will discharge to IRF when cleared by orthopedic surgery and accepted as patient medically cleared for discharged.      Cj Kahn MD

## 2024-08-17 NOTE — PROGRESS NOTES
Occupational Therapy                 Therapy Communication Note    Patient Name: Levy Gregory  MRN: [id number]  Today's Date: 8/17/2024     Discipline: Occupational Therapy    Missed Visit Reason:  Chart review completed. Attempted OT evaluation at 0931. Patient received pain medication from RN; however, reporting too much pain at this time.  Patient to be transferred to regular medical floor per RN. Will complete OT evaluation as appropriate.      Missed Time: Attempt    PLOF:  Patient lives in 1 story home with spouse with 2 SWATI.   Has WIS with GB and shower chair. Was independent without assistive device functional mobility tasks.  Was independent with all ADLs. Spouse manages IADLs.

## 2024-08-18 VITALS
DIASTOLIC BLOOD PRESSURE: 72 MMHG | OXYGEN SATURATION: 93 % | TEMPERATURE: 99.1 F | WEIGHT: 220 LBS | SYSTOLIC BLOOD PRESSURE: 123 MMHG | HEIGHT: 73 IN | RESPIRATION RATE: 16 BRPM | HEART RATE: 87 BPM | BODY MASS INDEX: 29.16 KG/M2

## 2024-08-18 PROBLEM — S72.001A CLOSED RIGHT HIP FRACTURE, INITIAL ENCOUNTER (MULTI): Status: RESOLVED | Noted: 2024-08-15 | Resolved: 2024-08-18

## 2024-08-18 PROBLEM — S72.001A CLOSED FRACTURE OF RIGHT HIP (MULTI): Status: RESOLVED | Noted: 2024-08-15 | Resolved: 2024-08-18

## 2024-08-18 LAB
GLUCOSE BLD MANUAL STRIP-MCNC: 144 MG/DL (ref 74–99)
GLUCOSE BLD MANUAL STRIP-MCNC: 148 MG/DL (ref 74–99)
GLUCOSE BLD MANUAL STRIP-MCNC: 150 MG/DL (ref 74–99)
GLUCOSE BLD MANUAL STRIP-MCNC: 216 MG/DL (ref 74–99)

## 2024-08-18 PROCEDURE — 99239 HOSP IP/OBS DSCHRG MGMT >30: CPT | Performed by: INTERNAL MEDICINE

## 2024-08-18 PROCEDURE — 97530 THERAPEUTIC ACTIVITIES: CPT | Mod: GP

## 2024-08-18 PROCEDURE — 2500000002 HC RX 250 W HCPCS SELF ADMINISTERED DRUGS (ALT 637 FOR MEDICARE OP, ALT 636 FOR OP/ED): Performed by: ORTHOPAEDIC SURGERY

## 2024-08-18 PROCEDURE — 2500000001 HC RX 250 WO HCPCS SELF ADMINISTERED DRUGS (ALT 637 FOR MEDICARE OP): Performed by: ORTHOPAEDIC SURGERY

## 2024-08-18 PROCEDURE — 2500000001 HC RX 250 WO HCPCS SELF ADMINISTERED DRUGS (ALT 637 FOR MEDICARE OP): Performed by: INTERNAL MEDICINE

## 2024-08-18 PROCEDURE — 1100000001 HC PRIVATE ROOM DAILY

## 2024-08-18 PROCEDURE — 82947 ASSAY GLUCOSE BLOOD QUANT: CPT

## 2024-08-18 RX ORDER — OXYCODONE HYDROCHLORIDE 5 MG/1
5 TABLET ORAL EVERY 6 HOURS PRN
Qty: 15 TABLET | Refills: 0 | Status: SHIPPED | OUTPATIENT
Start: 2024-08-18 | End: 2024-08-29 | Stop reason: SDUPTHER

## 2024-08-18 RX ORDER — ACETAMINOPHEN 325 MG/1
975 TABLET ORAL EVERY 8 HOURS PRN
Status: DISCONTINUED | OUTPATIENT
Start: 2024-08-18 | End: 2024-08-24 | Stop reason: HOSPADM

## 2024-08-18 RX ORDER — ENOXAPARIN SODIUM 100 MG/ML
40 INJECTION SUBCUTANEOUS
Status: DISCONTINUED | OUTPATIENT
Start: 2024-08-19 | End: 2024-08-24 | Stop reason: HOSPADM

## 2024-08-18 RX ORDER — ACETAMINOPHEN 325 MG/1
975 TABLET ORAL EVERY 8 HOURS PRN
Qty: 30 TABLET | Refills: 0 | Status: SHIPPED | OUTPATIENT
Start: 2024-08-18 | End: 2024-09-17

## 2024-08-18 ASSESSMENT — PAIN - FUNCTIONAL ASSESSMENT
PAIN_FUNCTIONAL_ASSESSMENT: 0-10

## 2024-08-18 ASSESSMENT — PAIN SCALES - GENERAL
PAINLEVEL_OUTOF10: 8
PAINLEVEL_OUTOF10: 9
PAINLEVEL_OUTOF10: 10 - WORST POSSIBLE PAIN
PAINLEVEL_OUTOF10: 7
PAINLEVEL_OUTOF10: 5 - MODERATE PAIN
PAINLEVEL_OUTOF10: 0 - NO PAIN
PAINLEVEL_OUTOF10: 8
PAINLEVEL_OUTOF10: 9
PAINLEVEL_OUTOF10: 0 - NO PAIN

## 2024-08-18 ASSESSMENT — COGNITIVE AND FUNCTIONAL STATUS - GENERAL
MOVING TO AND FROM BED TO CHAIR: A LOT
MOVING FROM LYING ON BACK TO SITTING ON SIDE OF FLAT BED WITH BEDRAILS: A LOT
TURNING FROM BACK TO SIDE WHILE IN FLAT BAD: A LOT
CLIMB 3 TO 5 STEPS WITH RAILING: TOTAL
PERSONAL GROOMING: A LITTLE
DAILY ACTIVITIY SCORE: 15
CLIMB 3 TO 5 STEPS WITH RAILING: TOTAL
TURNING FROM BACK TO SIDE WHILE IN FLAT BAD: A LOT
STANDING UP FROM CHAIR USING ARMS: A LOT
DRESSING REGULAR UPPER BODY CLOTHING: A LITTLE
WALKING IN HOSPITAL ROOM: TOTAL
HELP NEEDED FOR BATHING: A LOT
MOVING TO AND FROM BED TO CHAIR: A LOT
STANDING UP FROM CHAIR USING ARMS: A LOT
MOBILITY SCORE: 10
DRESSING REGULAR LOWER BODY CLOTHING: TOTAL
TOILETING: A LOT
WALKING IN HOSPITAL ROOM: TOTAL
MOVING FROM LYING ON BACK TO SITTING ON SIDE OF FLAT BED WITH BEDRAILS: A LOT
MOBILITY SCORE: 10

## 2024-08-18 ASSESSMENT — PAIN DESCRIPTION - LOCATION
LOCATION: HIP

## 2024-08-18 ASSESSMENT — PAIN DESCRIPTION - ORIENTATION
ORIENTATION: RIGHT

## 2024-08-18 NOTE — DISCHARGE SUMMARY
New discharge date: 8/24/2024  Patient remains in hospital awaiting precertification from insurance company which was started on 8/18/2024.  No other changes to discharge summary below with the exception of patient will be discharged on 30 days of once daily Lovenox for DVT prophylaxis since he was recently taken off of his Eliquis.  He will also continue his aspirin once daily as recommended by his vascular medicine specialist.  Patient denied inpatient rehab.  [id number]-[id number] completed 8/20/2024 which was unsuccessful.  Appeal upheld. Patient ultimately discharge to SNF.    Discharge Diagnosis  Closed right hip fracture, initial encounter (Multi)    Issues Requiring Follow-Up    Discharge Meds     Your medication list        START taking these medications        Instructions Last Dose Given Next Dose Due   acetaminophen 325 mg tablet  Commonly known as: Tylenol  Replaces: acetaminophen 650 mg/20.3 mL solution oral liquid      Take 3 tablets (975 mg) by mouth every 8 hours if needed for mild pain (1 - 3).       oxyCODONE 5 mg immediate release tablet  Commonly known as: Roxicodone      Take 1 tablet (5 mg) by mouth every 6 hours if needed for moderate pain (4 - 6) or severe pain (7 - 10) for up to 5 days.              CHANGE how you take these medications        Instructions Last Dose Given Next Dose Due   atorvastatin 80 mg tablet  Commonly known as: Lipitor  What changed: when to take this      Take 1 tablet (80 mg) by mouth once daily.       sertraline 50 mg tablet  Commonly known as: Zoloft  What changed: when to take this      Take 0.5 tablets (25 mg) by mouth once daily.       tamsulosin 0.4 mg 24 hr capsule  Commonly known as: Flomax  What changed: when to take this      Take 1 capsule (0.4 mg) by mouth once daily.              CONTINUE taking these medications        Instructions Last Dose Given Next Dose Due   aspirin 81 mg chewable tablet      Chew 1 tablet (81 mg) once daily.       blood-glucose meter  misc      Check glucose before meals and call if less than 80 or over 300.       ferrous sulfate 325 (65 Fe) MG EC tablet           FreeStyle Kellie 2 [facility]  Generic drug: flash glucose scanning reader      Use as instructed       FreeStyle Kellie 2 Sensor kit  Generic drug: flash glucose sensor kit      Use as instructed       gabapentin 100 mg capsule  Commonly known as: Neurontin      Take 1 capsule (100 mg) by mouth 3 times a day.       midodrine 5 mg tablet  Commonly known as: Proamatine      Take 2 tablets (10 mg) by mouth 3 times a day.       mirabegron 50 mg tablet extended release 24 hr 24 hr tablet  Commonly known as: Myrbetriq      Take 1 tablet (50 mg) by mouth once daily.       OneTouch Ultra Test strip  Generic drug: blood sugar diagnostic      Test glucose twice daily or as directed       pantoprazole 40 mg EC tablet  Commonly known as: ProtoNix      Take 1 tablet (40 mg) by mouth once daily at bedtime. Do not crush, chew, or split.              STOP taking these medications      acetaminophen 650 mg/20.3 mL solution oral liquid  Commonly known as: Tylenol  Replaced by: acetaminophen 325 mg tablet        apixaban 5 mg tablet  Commonly known as: Eliquis        lisinopril 5 mg tablet                  Where to Get Your Medications        These medications were sent to [id number] [address]  [address] [address]      Phone: [phone]   acetaminophen 325 mg tablet  oxyCODONE 5 mg immediate release tablet         Test Results Pending At Discharge  Pending Labs       No current pending labs.            Hospital Course   History Of Present Illness  Levy Gregory is a 68 y.o. male presents to [facility] ER with chief complaint of mechanical fall. Patient states while at home, he awakened early this morning in the dark and tried to get to the bathroom, tripped and fell.  Wife states he has been complaining of  dizziness for the past two weeks. Patient states he has been getting dizzy when he stands up too fast but sits down and it goes away. He states not sure if dizzy this morning. He states he did not strike his head on fall. He denies any loc, chest pain, shortness of breath, nausea, vomiting, headache.  He was on Eliquis and his last dose was yesterday. He had IVC filter removed in April 2023. Imaging obtained in ED showing femoral neck fracture of right hip, Orthopedics was consulted Dr. Jones while in the ED.      Risk Details: ER discussed with Dr. Washington - will need anesthesia to consult early to determine appropriateness for [facility] OR due to extensive GI history, had gastric pull through November 2023 at main campus. Wife states patient cannot lie flat, risk of aspiration.     Hospital course: Patient was admitted to the hospital after having a fall and suffering a right hip fracture.  Patient was taken to operating room after anesthesiology and cardiology clearance.  Patient had an uneventful surgical intervention and postoperative course.  He worked with physical therapy and Occupational Therapy and plan is for him to go to inpatient rehab facility at discharge.  He is being discharged with Tylenol for mild pain and oxycodone for severe pain.  Patient will be on aspirin going forward and has been taken off of Eliquis recently by his vascular medicine physician.  Patient to follow-up with orthopedic surgery in 2 weeks.    Discharge time greater than 35 minutes. Greater than 50% of time spent on counseling patient, coordination of care, medication reconciliation, transmitting medications to pharmacy and clarifying plan of care with nursing staff and case management.    Pertinent Physical Exam At Time of Discharge  Physical Exam  Constitutional:       General: He is not in acute distress.     Appearance: Normal appearance.   HENT:      Head: Normocephalic and atraumatic.      Mouth/Throat:      Mouth: Mucous  membranes are moist.   Eyes:      Extraocular Movements: Extraocular movements intact.      Conjunctiva/sclera: Conjunctivae normal.   Cardiovascular:      Rate and Rhythm: Normal rate and regular rhythm.      Heart sounds: Normal heart sounds.   Pulmonary:      Effort: Pulmonary effort is normal.      Breath sounds: Normal breath sounds. No wheezing, rhonchi or rales.   Abdominal:      General: Abdomen is flat. Bowel sounds are normal.      Palpations: Abdomen is soft.   Musculoskeletal:         General: No swelling or tenderness.      Cervical back: Normal range of motion and neck supple.      Comments: ROM RLE limited 2/2 pain but improving; neurovascularly intact distal to surgical site   Skin:     General: Skin is warm and dry.      Findings: No rash.   Neurological:      General: No focal deficit present.      Mental Status: He is alert and oriented to person, place, and time.      Cranial Nerves: No cranial nerve deficit.      Sensory: No sensory deficit.   Psychiatric:         Mood and Affect: Mood normal.         Behavior: Behavior normal.         Outpatient Follow-Up  Future Appointments   Date Time Provider Department Center   8/29/2024  2:30 PM ELY [id number] 1 [id number] [facility]    9/19/2024  1:00 PM Brielle Gongora DO [id number] [facility]   9/23/2024  3:40 PM Kayli Gotti APRN-CNP [id number] [place]   10/25/2024  2:00 PM Yuly Morton APRN-CNP [id number] [place]   10/29/2024  3:20 PM Chris Huizar MD [id number] [place]   1/8/2025 10:00 AM Karla Martin MD [id number] [place]   2/18/2025 11:00 AM Papito Maldonado MD [id number] [place]         Cj Kahn MD

## 2024-08-18 NOTE — PROGRESS NOTES
08/18/24 0842   Discharge Planning   Home or Post Acute Services Post acute facilities (Rehab/SNF/etc)   Type of Post Acute Facility Services Rehab   Expected Discharge Disposition IRF     Message sent to  [facility] to notify that therapy evals are available to review for acceptance. Will need to start precert once they confirm they can accept.     1005-  [facility] liaison confirmed they can accept patient. Request sent to the direct precert team to start precert.

## 2024-08-18 NOTE — PROGRESS NOTES
Physical Therapy  Physical Therapy Treatment    Patient Name: Levy Gregory  MRN: [id number]  Today's Date: 8/18/2024  Time Calculation  Start Time: 1144  Stop Time: 1204  Time Calculation (min): 20 min     3118/3118-A    Assessment/Plan   PT Assessment  Rehab Prognosis: Good  Medical Staff Made Aware: Yes  End of Session Communication: Bedside nurse  Assessment Comment: Pt continues to require 2 person assist for transfers and bed mobility at this time. Pt educated on hip precautions prior to and during mobility. Pt would benefit from continued acute care PT during hospital LOS and upon discharge at a high level intensity.  End of Session Patient Position: Up in chair, Alarm on  PT Plan  Inpatient/Swing Bed or Outpatient: Inpatient  PT Plan  PT Plan: Ongoing PT  PT Frequency: Daily  PT Discharge Recommendations: High intensity level of continued care  PT Recommended Transfer Status: Assist x2, Assistive device  PT - OK to Discharge: Yes to next level of care once medically cleared by hospital team.     Current Problem:  Patient Active Problem List   Diagnosis    Acne    Colon polyp    Depression    Diabetic neuropathy associated with type 2 diabetes mellitus (Multi)    ED (erectile dysfunction)    High serum bone-specific alkaline phosphatase    Gout    History of DVT (deep vein thrombosis)    History of pulmonary embolism    Hyperlipidemia    Microalbuminuria    Nocturia    Obstructive sleep apnea, adult    Osteopenia    Rib pain on left side    Sialoadenitis    Vitamin D deficiency    Achalasia, esophageal    Type 2 diabetes mellitus with hyperglycemia (Multi)    Orthostatic hypotension    Solar purpura (CMS-HCC)    Impaired oral gastric feeding tube, initial encounter    Dysfunction of both eustachian tubes    Anticoagulant long-term use    Other dysphagia    Encounter for immunization    Hypertensive heart disease with heart failure (Multi)    Chronic obstructive pulmonary disease, unspecified COPD type  (Multi)    Pedal edema    At risk for falls    Abnormal ECG    Prolonged QT interval    Acute posthemorrhagic anemia    Acute renal failure superimposed on chronic kidney disease (CMS-HCC)    Adjustment disorder with depressed mood    Balance problems    Do not resuscitate    Edema of both lower legs due to peripheral venous insufficiency    History of diabetes mellitus    Hypernatremia    Hypoxia    Onychodystrophy    Onychomycosis    Pain in toes of both feet    Schatzki's ring    History of inferior vena caval filter placement    Status post endoscopy    Vagal arrhythmia    Coronary artery disease involving native coronary artery of native heart without angina pectoris    Mild left ventricular systolic dysfunction    Deep vein thrombosis (DVT) of left lower extremity (Multi)    Palpitations    Macrocytosis       General Visit Information:   PT  Visit  PT Received On: 08/18/24  General  Reason for Referral: Pt is s/p R hip yonas 8/16/2024.  Referred By: Cj Kahn MD  Past Medical History Relevant to Rehab: Per EMR: 68-year-old gentleman with a fall at home states that he awakened early this morning in the dark and tried to get to the bathroom.  He was on Eliquis and his last dose was yesterday but his PCP told him to discontinue use so he did not take a dose this morning.  He states he did not strike his head.  Denies any headache or shortness of breath.  States he was mildly dizzy when his fall occurred.  Denies any fevers or chills.  Has had an extensive surgical history in the past.  Received pain medications by EMS that were ineffective.  He is not currently dizzy or lightheaded.     (+) right hip fracture.  Patient had the following completed 8/16/2023: right hip hemiarthroplasty completed by Dr Washington.  Prior to Session Communication: Bedside nurse  Patient Position Received: Bed, 3 rail up, Alarm on  Preferred Learning Style: verbal  General Comment: Pt is agreeable to PT treatment. Pt reports  high level of pain but has recently had pain medication and is agreeable to trying mobility.  Subjective     Precautions:  Precautions  LE Weight Bearing Status: Weight Bearing as Tolerated (RLE WBAT)  Medical Precautions: Fall precautions  Post-Surgical Precautions: Right hip precautions    Vital Signs:     Objective     Pain:  Pain Assessment  Pain Assessment: 0-10  0-10 (Numeric) Pain Score: 9  Pain Type: Surgical pain  Pain Location: Hip  Pain Orientation: Right  Pain Interventions: Ambulation/increased activity, Repositioned    Cognition:  Cognition  Overall Cognitive Status: Within Functional Limits    Treatments:  Therapeutic Exercise  Therapeutic Exercise Performed: No (Pt politely refused seated the-tim after mobility training 2/2 pain.)  Therapeutic Activity  Therapeutic Activity Performed: Yes     Bed Mobility  Bed Mobility: Yes  Bed Mobility 1  Bed Mobility 1: Supine to sitting  Level of Assistance 1: Maximum assistance  Bed Mobility Comments 1: HOB raised. Cues for sequencing and hand placement. Pt educated on hip precautions prior to mobility. Pt assisted in advancing RLE towards EOB, at trunk and with koko pad. Pt cued for partial bridge with use of LLE to scoot to EOB.  Ambulation/Gait Training  Ambulation/Gait Training Performed: Yes  Ambulation/Gait Training 1  Surface 1: Level tile  Device 1: Rolling walker  Gait Support Devices: Gait belt  Comments/Distance (ft) 1: Pt amb 1x2' bed to chair with FWW, MinAx1 with lateral steps/step-to pattern. Cues to weight shift laterally and off weight RLE as needed with each step.  Transfers  Transfer: Yes  Transfer 1  Technique 1: Sit to stand, Stand to sit  Transfer Device 1: Walker, Gait belt  Transfer Level of Assistance 1: Moderate assistance, +2  Trials/Comments 1: Sit>stand from EOB with bed height raised, ModAx2. Cues for RLE management, hand placement and anterior weight shift. Pt denies dizziness in standing. Cues for RLE management to transition to  sitting. Pt unable to extend R knee prior to sitting, ModAx2 for controlled descent.          Outcome Measures:     [facility] Basic Mobility  Turning from your back to your side while in a flat bed without using bedrails: A lot  Moving from lying on your back to sitting on the side of a flat bed without using bedrails: A lot  Moving to and from bed to chair (including a wheelchair): A lot  Standing up from a chair using your arms (e.g. wheelchair or bedside chair): A lot  To walk in hospital room: Total  Climbing 3-5 steps with railing: Total  Basic Mobility - Total Score: 10    Education Documentation  Home Exercise Program, taught by Chelsey Underwood PT at 8/18/2024 12:30 PM.  Learner: Patient  Readiness: Acceptance  Method: Explanation, Demonstration  Response: Verbalizes Understanding, Demonstrated Understanding    Precautions, taught by Chelsey Underwood PT at 8/18/2024 12:30 PM.  Learner: Patient  Readiness: Acceptance  Method: Explanation, Demonstration  Response: Verbalizes Understanding, Demonstrated Understanding    Body Mechanics, taught by Chelsey Underwood PT at 8/18/2024 12:30 PM.  Learner: Patient  Readiness: Acceptance  Method: Explanation, Demonstration  Response: Verbalizes Understanding, Demonstrated Understanding    Mobility Training, taught by Chelsey Underwood PT at 8/18/2024 12:30 PM.  Learner: Patient  Readiness: Acceptance  Method: Explanation, Demonstration  Response: Verbalizes Understanding, Demonstrated Understanding    Education Comments  No comments found.           EDUCATION:  Outpatient Education  Patient Response to Education: Patient/Caregiver Verbalized Understanding of Information  Education Comment: Pt educated on R hip precautions prior to mobility training verbally and with demonstration.  Encounter Problems       Encounter Problems (Active)       PT Problem       Pt will demonstrate ability to complete bed mobility with Tk x 1  (Progressing)       Start:  08/17/24     Expected End:  08/31/24            Pt will demonstrate ability to complete all transfers with Tk x 1 and use of FWW  (Progressing)       Start:  08/17/24    Expected End:  08/31/24            Pt will demonstrate ability to ambulate 100' with Tk x 1 and use of FWW  (Progressing)       Start:  08/17/24    Expected End:  08/31/24            Pt will demonstrate ability to verbalize and independently follow hip precautions throughout therapy sessions.  (Progressing)       Start:  08/17/24               Pain - Adult

## 2024-08-18 NOTE — PROGRESS NOTES
Spiritual Care Visit    Clinical Encounter Type  Visited With: Patient  Routine Visit: Introduction  Continue Visiting: Yes    Mandaen Encounters  Mandaen Needs: Prayer, Literature, Mandaen articles         Sacramental Encounters  Communion: Patient wants communion  Communion Given Indicator: Yes  Sacrament of Sick-Anointing: Anointed, Patient requested anointing                             Taxonomy  Intended Effects: Establish rapport and connectedness, Padmini affirmation, Build relationship of care and support, Meaning-making, Demonstrate caring and concern, Lessen someone's feelings of isolation, Promote sense of peace, Helping someone feel comforted

## 2024-08-18 NOTE — CARE PLAN
Problem: Pain - Adult  Goal: Verbalizes/displays adequate comfort level or baseline comfort level  Outcome: Progressing     Problem: Safety - Adult  Goal: Free from fall injury  Outcome: Progressing     Problem: Discharge Planning  Goal: Discharge to home or other facility with appropriate resources  Outcome: Progressing     Problem: Chronic Conditions and Co-morbidities  Goal: Patient's chronic conditions and co-morbidity symptoms are monitored and maintained or improved  Outcome: Progressing     Problem: Diabetes  Goal: Achieve decreasing blood glucose levels by end of shift  Outcome: Progressing  Goal: Increase stability of blood glucose readings by end of shift  Outcome: Progressing  Goal: Decrease in ketones present in urine by end of shift  Outcome: Progressing  Goal: Maintain electrolyte levels within acceptable range throughout shift  Outcome: Progressing  Goal: Maintain glucose levels >70mg/dl to <250mg/dl throughout shift  Outcome: Progressing  Goal: No changes in neurological exam by end of shift  Outcome: Progressing  Goal: Learn about and adhere to nutrition recommendations by end of shift  Outcome: Progressing  Goal: Vital signs within normal range for age by end of shift  Outcome: Progressing  Goal: Increase self care and/or family involovement by end of shift  Outcome: Progressing  Goal: Receive DSME education by end of shift  Outcome: Progressing     Problem: Skin  Goal: Participates in plan/prevention/treatment measures  Outcome: Progressing  Goal: Prevent/manage excess moisture  Outcome: Progressing  Goal: Prevent/minimize sheer/friction injuries  Outcome: Progressing  Goal: Promote/optimize nutrition  Outcome: Progressing  Goal: Promote skin healing  Outcome: Progressing     Problem: Pain  Goal: Takes deep breaths with improved pain control throughout the shift  Outcome: Progressing  Goal: Turns in bed with improved pain control throughout the shift  Outcome: Progressing  Goal: Walks with  improved pain control throughout the shift  Outcome: Progressing  Goal: Performs ADL's with improved pain control throughout shift  Outcome: Progressing  Goal: Participates in PT with improved pain control throughout the shift  Outcome: Progressing  Goal: Free from opioid side effects throughout the shift  Outcome: Progressing  Goal: Free from acute confusion related to pain meds throughout the shift  Outcome: Progressing     Problem: Dressings Lower Extremities  Goal: STG - Patient will complete lower body dressing with mod assist with comp strategies and AE  Outcome: Progressing   The patient's goals for the shift include      The clinical goals for the shift include Pt will report improved pain

## 2024-08-18 NOTE — PROGRESS NOTES
Occupational Therapy                 Therapy Communication Note    Patient Name: Levy Gregory  MRN: [id number]  Today's Date: 8/18/2024     Discipline: Occupational Therapy    Missed Visit Reason:  Pt refused    Missed Time: Attempt    Comment: Pt declining participation in therapy at this time, stating that he wants to remain seated in his chair and is not up for engaging in any self care tasks. Pt occupied on his phone.

## 2024-08-19 LAB
BLOOD EXPIRATION DATE: NORMAL
BLOOD EXPIRATION DATE: NORMAL
DISPENSE STATUS: NORMAL
DISPENSE STATUS: NORMAL
GLUCOSE BLD MANUAL STRIP-MCNC: 152 MG/DL (ref 74–99)
GLUCOSE BLD MANUAL STRIP-MCNC: 174 MG/DL (ref 74–99)
GLUCOSE BLD MANUAL STRIP-MCNC: 181 MG/DL (ref 74–99)
GLUCOSE BLD MANUAL STRIP-MCNC: 214 MG/DL (ref 74–99)
GLUCOSE BLD MANUAL STRIP-MCNC: 217 MG/DL (ref 74–99)
PRODUCT BLOOD TYPE: 5100
PRODUCT BLOOD TYPE: 5100
PRODUCT CODE: NORMAL
PRODUCT CODE: NORMAL
UNIT ABO: NORMAL
UNIT ABO: NORMAL
UNIT NUMBER: NORMAL
UNIT NUMBER: NORMAL
UNIT RH: NORMAL
UNIT RH: NORMAL
UNIT VOLUME: 325
UNIT VOLUME: 400
XM INTEP: NORMAL
XM INTEP: NORMAL

## 2024-08-19 PROCEDURE — 2500000004 HC RX 250 GENERAL PHARMACY W/ HCPCS (ALT 636 FOR OP/ED): Performed by: ORTHOPAEDIC SURGERY

## 2024-08-19 PROCEDURE — 97110 THERAPEUTIC EXERCISES: CPT | Mod: GP,CQ

## 2024-08-19 PROCEDURE — 2500000001 HC RX 250 WO HCPCS SELF ADMINISTERED DRUGS (ALT 637 FOR MEDICARE OP): Performed by: INTERNAL MEDICINE

## 2024-08-19 PROCEDURE — 2500000004 HC RX 250 GENERAL PHARMACY W/ HCPCS (ALT 636 FOR OP/ED): Performed by: INTERNAL MEDICINE

## 2024-08-19 PROCEDURE — 1100000001 HC PRIVATE ROOM DAILY

## 2024-08-19 PROCEDURE — 97535 SELF CARE MNGMENT TRAINING: CPT | Mod: GO,CO

## 2024-08-19 PROCEDURE — 2500000001 HC RX 250 WO HCPCS SELF ADMINISTERED DRUGS (ALT 637 FOR MEDICARE OP): Performed by: ORTHOPAEDIC SURGERY

## 2024-08-19 PROCEDURE — 2500000002 HC RX 250 W HCPCS SELF ADMINISTERED DRUGS (ALT 637 FOR MEDICARE OP, ALT 636 FOR OP/ED): Performed by: ORTHOPAEDIC SURGERY

## 2024-08-19 PROCEDURE — 97530 THERAPEUTIC ACTIVITIES: CPT | Mod: GP,CQ

## 2024-08-19 PROCEDURE — 82947 ASSAY GLUCOSE BLOOD QUANT: CPT

## 2024-08-19 PROCEDURE — 99231 SBSQ HOSP IP/OBS SF/LOW 25: CPT | Performed by: INTERNAL MEDICINE

## 2024-08-19 PROCEDURE — 97530 THERAPEUTIC ACTIVITIES: CPT | Mod: GO,CO

## 2024-08-19 RX ORDER — ENOXAPARIN SODIUM 100 MG/ML
40 INJECTION SUBCUTANEOUS
Qty: 30 EACH | Refills: 0 | Status: SHIPPED | OUTPATIENT
Start: 2024-08-19 | End: 2024-09-18

## 2024-08-19 ASSESSMENT — COGNITIVE AND FUNCTIONAL STATUS - GENERAL
MOVING FROM LYING ON BACK TO SITTING ON SIDE OF FLAT BED WITH BEDRAILS: A LOT
WALKING IN HOSPITAL ROOM: A LITTLE
PERSONAL GROOMING: A LITTLE
MOBILITY SCORE: 13
TOILETING: A LOT
TURNING FROM BACK TO SIDE WHILE IN FLAT BAD: A LOT
TURNING FROM BACK TO SIDE WHILE IN FLAT BAD: A LOT
CLIMB 3 TO 5 STEPS WITH RAILING: TOTAL
MOBILITY SCORE: 13
STANDING UP FROM CHAIR USING ARMS: A LITTLE
MOVING FROM LYING ON BACK TO SITTING ON SIDE OF FLAT BED WITH BEDRAILS: A LOT
MOVING TO AND FROM BED TO CHAIR: A LOT
PERSONAL GROOMING: A LITTLE
TOILETING: A LOT
DAILY ACTIVITIY SCORE: 16
HELP NEEDED FOR BATHING: A LOT
STANDING UP FROM CHAIR USING ARMS: A LITTLE
DRESSING REGULAR LOWER BODY CLOTHING: A LOT
DRESSING REGULAR LOWER BODY CLOTHING: A LOT
HELP NEEDED FOR BATHING: A LOT
DRESSING REGULAR UPPER BODY CLOTHING: A LITTLE
DRESSING REGULAR UPPER BODY CLOTHING: A LITTLE
MOVING TO AND FROM BED TO CHAIR: A LOT
WALKING IN HOSPITAL ROOM: A LITTLE
DAILY ACTIVITIY SCORE: 16
CLIMB 3 TO 5 STEPS WITH RAILING: TOTAL

## 2024-08-19 ASSESSMENT — PAIN SCALES - GENERAL
PAINLEVEL_OUTOF10: 7
PAINLEVEL_OUTOF10: 9
PAINLEVEL_OUTOF10: 9
PAINLEVEL_OUTOF10: 8
PAINLEVEL_OUTOF10: 9
PAINLEVEL_OUTOF10: 8
PAINLEVEL_OUTOF10: 4

## 2024-08-19 ASSESSMENT — PAIN DESCRIPTION - DESCRIPTORS
DESCRIPTORS: ACHING;SORE
DESCRIPTORS: SORE;ACHING

## 2024-08-19 ASSESSMENT — PAIN DESCRIPTION - ORIENTATION
ORIENTATION: RIGHT

## 2024-08-19 ASSESSMENT — PAIN DESCRIPTION - LOCATION
LOCATION: BACK
LOCATION: HIP

## 2024-08-19 ASSESSMENT — PAIN - FUNCTIONAL ASSESSMENT
PAIN_FUNCTIONAL_ASSESSMENT: 0-10
PAIN_FUNCTIONAL_ASSESSMENT: UNABLE TO SELF-REPORT
PAIN_FUNCTIONAL_ASSESSMENT: 0-10
PAIN_FUNCTIONAL_ASSESSMENT: 0-10

## 2024-08-19 ASSESSMENT — ACTIVITIES OF DAILY LIVING (ADL)
HOME_MANAGEMENT_TIME_ENTRY: 16
BATHING_COMMENTS: WITH WIPES
BATHING_WHERE_ASSESSED: BED LEVEL
BATHING_LEVEL_OF_ASSISTANCE: MAXIMUM ASSISTANCE

## 2024-08-19 NOTE — CARE PLAN
Problem: Pain - Adult  Goal: Verbalizes/displays adequate comfort level or baseline comfort level  Outcome: Progressing     Problem: Safety - Adult  Goal: Free from fall injury  Outcome: Progressing     Problem: Discharge Planning  Goal: Discharge to home or other facility with appropriate resources  Outcome: Progressing     Problem: Chronic Conditions and Co-morbidities  Goal: Patient's chronic conditions and co-morbidity symptoms are monitored and maintained or improved  Outcome: Progressing     Problem: Diabetes  Goal: Achieve decreasing blood glucose levels by end of shift  Outcome: Progressing  Goal: Increase stability of blood glucose readings by end of shift  Outcome: Progressing  Goal: Decrease in ketones present in urine by end of shift  Outcome: Progressing  Goal: Maintain electrolyte levels within acceptable range throughout shift  Outcome: Progressing  Goal: Maintain glucose levels >70mg/dl to <250mg/dl throughout shift  Outcome: Progressing  Goal: No changes in neurological exam by end of shift  Outcome: Progressing  Goal: Learn about and adhere to nutrition recommendations by end of shift  Outcome: Progressing  Goal: Vital signs within normal range for age by end of shift  Outcome: Progressing  Goal: Increase self care and/or family involovement by end of shift  Outcome: Progressing  Goal: Receive DSME education by end of shift  Outcome: Progressing     Problem: Skin  Goal: Participates in plan/prevention/treatment measures  Outcome: Progressing  Goal: Prevent/manage excess moisture  Outcome: Progressing  Goal: Prevent/minimize sheer/friction injuries  Outcome: Progressing  Goal: Promote/optimize nutrition  Outcome: Progressing  Goal: Promote skin healing  Outcome: Progressing     Problem: Pain  Goal: Takes deep breaths with improved pain control throughout the shift  Outcome: Progressing  Goal: Turns in bed with improved pain control throughout the shift  Outcome: Progressing  Goal: Walks with  improved pain control throughout the shift  Outcome: Progressing  Goal: Performs ADL's with improved pain control throughout shift  Outcome: Progressing  Goal: Participates in PT with improved pain control throughout the shift  Outcome: Progressing  Goal: Free from opioid side effects throughout the shift  Outcome: Progressing  Goal: Free from acute confusion related to pain meds throughout the shift  Outcome: Progressing     Problem: Dressings Lower Extremities  Goal: STG - Patient will complete lower body dressing with mod assist with comp strategies and AE  Outcome: Progressing   The patient's goals for the shift include      The clinical goals for the shift include Pt will have maintained pain

## 2024-08-19 NOTE — PROGRESS NOTES
Physical Therapy    Physical Therapy Treatment    Patient Name: Levy Gregory  MRN: [id number]  Today's Date: 8/19/2024  Time Calculation  Start Time: 1440  Stop Time: 1520  Time Calculation (min): 40 min     3118/3118-A       08/19/24 1440   PT  Visit   PT Received On 08/19/24   Response to Previous Treatment Patient with no complaints from previous session.   General   Reason for Referral Pt is s/p R hip yonas 8/16/2024.   Referred By Cj Kahn MD   Past Medical History Relevant to Rehab Per EMR: 68-year-old gentleman with a fall at home states that he awakened early this morning in the dark and tried to get to the bathroom.  He was on Eliquis and his last dose was yesterday but his PCP told him to discontinue use so he did not take a dose this morning.  He states he did not strike his head.  Denies any headache or shortness of breath.  States he was mildly dizzy when his fall occurred.  Denies any fevers or chills.  Has had an extensive surgical history in the past.  Received pain medications by EMS that were ineffective.  He is not currently dizzy or lightheaded.     (+) right hip fracture.  Patient had the following completed 8/16/2023: right hip hemiarthroplasty completed by Dr Washington.   Prior to Session Communication Bedside nurse   Patient Position Received Up in chair;Alarm on   Preferred Learning Style verbal   General Comment Pt. was seated in the chair when I arrived. Pain right hip. Cues to maintain hip precautions with mobility.   Precautions   LE Weight Bearing Status Weight Bearing as Tolerated  (RLE WBAT)   Medical Precautions Fall precautions   Post-Surgical Precautions Right hip precautions   Cognition   Overall Cognitive Status WFL   Therapeutic Exercise   Therapeutic Exercise Performed Yes  (Seated Ex's Heel/Toe Raises, LAQ, Hip Abd/Add x 10 reps.)   Therapeutic Activity   Therapeutic Activity Performed Yes   Therapeutic Activity 1 Static standing with the walker. Lateral weight  shift x5 reps.   Bed Mobility   Bed Mobility Yes   Bed Mobility 1   Bed Mobility 1 Sitting to supine   Level of Assistance 1 Maximum assistance   Bed Mobility Comments 1 Assist x1 to lift legs on to the bed and reposition R L E on the bed.   Ambulation/Gait Training   Ambulation/Gait Training Performed Yes   Ambulation/Gait Training 1   Surface 1 Level tile   Device 1 Rolling walker   Gait Support Devices Gait belt   Assistance 1 Minimum assistance   Quality of Gait 1 Decreased step length;Diminished heel strike   Comments/Distance (ft) 1 Pt. ambulated 5'x1 with the wheeled walker wiht min assist. Difficulty advancing R LE.   Transfers   Transfer Yes   Transfer 1   Technique 1 Sit to stand;Stand to sit   Transfer Device 1 Walker;Gait belt   Transfer Level of Assistance 1 Minimum assistance;Moderate assistance   Trials/Comments 1 Cues for hand placement.   PT Assessment   Rehab Prognosis Good   End of Session Communication Bedside nurse   Assessment Comment Pt continues to require 2 person assist for transfers and bed mobility at this time. Pt educated on hip precautions prior to and during mobility. Pt would benefit from continued acute care PT during hospital LOS and upon discharge at a high level intensity.   End of Session Patient Position Bed, 2 rail up;Alarm on   Outpatient Education   Education Comment Pt educated on R hip precautions prior to mobility training verbally and with demonstration.   PT Plan   Inpatient/Swing Bed or Outpatient Inpatient   PT Plan   PT Plan Ongoing PT   PT Frequency Daily   PT Discharge Recommendations High intensity level of continued care       Assessment/Plan   PT Assessment  Rehab Prognosis: Good  End of Session Communication: Bedside nurse  Assessment Comment: Pt continues to require 2 person assist for transfers and bed mobility at this time. Pt educated on hip precautions prior to and during mobility. Pt would benefit from continued acute care PT during hospital LOS and upon  discharge at a high level intensity.  End of Session Patient Position: Bed, 2 rail up, Alarm on  PT Plan  Inpatient/Swing Bed or Outpatient: Inpatient  PT Plan  PT Plan: Ongoing PT  PT Frequency: Daily  PT Discharge Recommendations: High intensity level of continued care  PT Recommended Transfer Status: Assist x2, Assistive device  PT - OK to Discharge: Yes    Outcome Measures:  [facility] Basic Mobility  Turning from your back to your side while in a flat bed without using bedrails: A lot  Moving from lying on your back to sitting on the side of a flat bed without using bedrails: A lot  Moving to and from bed to chair (including a wheelchair): A lot  Standing up from a chair using your arms (e.g. wheelchair or bedside chair): A little  To walk in hospital room: A little  Climbing 3-5 steps with railing: Total  Basic Mobility - Total Score: 13                       EDUCATION:  Outpatient Education  Patient Response to Education: Patient/Caregiver Verbalized Understanding of Information  Education Comment: Pt educated on R hip precautions prior to mobility training verbally and with demonstration.  Education Documentation  Precautions, taught by Cosme Worthington PTA at 8/19/2024  6:49 PM.  Learner: Patient  Readiness: Acceptance  Method: Explanation  Response: Needs Reinforcement    Body Mechanics, taught by Cosme Worthington PTA at 8/19/2024  6:49 PM.  Learner: Patient  Readiness: Acceptance  Method: Explanation  Response: Needs Reinforcement    Mobility Training, taught by Cosme Worthington PTA at 8/19/2024  6:49 PM.  Learner: Patient  Readiness: Acceptance  Method: Explanation  Response: Needs Reinforcement    Education Comments  No comments found.        GOALS:  Encounter Problems       Encounter Problems (Active)       PT Problem       Pt will demonstrate ability to complete bed mobility with Tk x 1  (Progressing)       Start:  08/17/24    Expected End:  08/31/24            Pt will demonstrate ability to complete all  transfers with Tk x 1 and use of FWW  (Progressing)       Start:  08/17/24    Expected End:  08/31/24            Pt will demonstrate ability to ambulate 100' with Tk x 1 and use of FWW  (Progressing)       Start:  08/17/24    Expected End:  08/31/24            Pt will demonstrate ability to verbalize and independently follow hip precautions throughout therapy sessions.  (Progressing)       Start:  08/17/24               Pain - Adult

## 2024-08-19 NOTE — DOCUMENTATION CLARIFICATION NOTE
"    PATIENT:               TATO PRINGLE  ACCT #:                  [id number]  MRN:                       [id number]  :                       1955  ADMIT DATE:       8/15/2024 6:13 AM  DISCH DATE:  RESPONDING PROVIDER #:        [id number]          PROVIDER RESPONSE TEXT:    Acute blood loss anemia 2/2 surgery    CDI QUERY TEXT:    Clarification    Instruction:    Based on your assessment of the patient and the clinical information, please provide the requested documentation by clicking on the appropriate radio button and enter any additional information if prompted.    Question: Please further clarify the diagnosis of anemia as    When answering this query, please exercise your independent professional judgment. The fact that a question is being asked, does not imply that any particular answer is desired or expected.    The patient's clinical indicators include:  Clinical Information: 67 y/o M admitted with right hip fracture s/p hemiarthroplasty    Clinical Indicators:    Hemoglobin:  08/15/24 07:19: 13.8  24 05:28: 12.1  24 05:31: 9.8    ED note 8/15 Dr. Love: \"presenting to the emergency department following a standing level fall.  Femoral neck fracture of right hip noted on XR\"    OP note  Dr. Washington: \"Closed fracture of right hip, initial encounter,  Procedure: Hip Hemiarthroplasty.  Estimated Blood Loss: 300 mL\"    Treatment: daily labs, trend H&H, type and screen    Risk Factors: recent surgery, hx of anemia, hx of ABLA, EBL 300ml  Options provided:  -- Acute blood loss anemia 2/2 surgery  -- Anemia, unspecified, Please specify chronic disease below  -- Other - I will add my own diagnosis  -- Refer to Clinical Documentation Reviewer    Query created by: Anabelle Jacobs on 2024 7:55 AM      Electronically signed by:  BRENDEN BATRES MD 2024 1:07 PM          "

## 2024-08-19 NOTE — PROGRESS NOTES
"Levy Gregory is a 68 y.o. male on day 3 of admission presenting with Closed right hip fracture, initial encounter (Multi).  Patient is now postop day 2 from right hip hemiarthroplasty.    Subjective   Patient reports that pain was improved when working with therapy this morning.  He reports that he sat up in the chair for at least 2 hours today.  He is resting comfortably in bed at the moment.  He denies any additional issues.       Objective     Physical Exam  -Dressings clean dry and intact on right hip  -PF/DF/EHL function intact  -Sensation is intact to light touch in the right lower extremity but is diminished distally secondary to known neuropathy  -DP pulse 2+    Last Recorded Vitals  Blood pressure 127/70, pulse 84, temperature 36 °C (96.8 °F), temperature source Temporal, resp. rate 15, height 1.854 m (6' 1\"), weight 99.8 kg (220 lb), SpO2 95%.  Intake/Output last 3 Shifts:  I/O last 3 completed shifts:  In: 1243.2 (12.5 mL/kg) [P.O.:360; I.V.:883.2 (8.9 mL/kg)]  Out: 1100 (11 mL/kg) [Urine:1100 (0.3 mL/kg/hr)]  Weight: 99.8 kg     Relevant Results  Electrocardiogram, 12-lead PRN ACS symptoms    Result Date: 8/15/2024  Sinus tachycardia Left axis deviation Right bundle branch block Possible Lateral infarct (cited on or before 06-AUG-2023) Inferior infarct (cited on or before 06-AUG-2023) Abnormal ECG When compared with ECG of 15-AUG-2024 06:25, (unconfirmed) Serial changes of Lateral infarct Present    ECG 12 lead    Result Date: 8/15/2024  Sinus tachycardia Right bundle branch block Possible Lateral infarct (cited on or before 06-AUG-2023) Inferior infarct (cited on or before 06-AUG-2023) Abnormal ECG When compared with ECG of 07-DEC-2023 20:40, Right bundle branch block is now Present Questionable change in initial forces of Lateral leads    XR hip right with pelvis when performed 2 or 3 views    Result Date: 8/15/2024  Interpreted By:  Sebastián Chatterjee, STUDY: XR HIP RIGHT WITH PELVIS WHEN PERFORMED 2 " OR 3 VIEWS;  8/15/2024 8:05 am   INDICATION: Signs/Symptoms:fall on thinners, R hip pain.   COMPARISON: None.   ACCESSION NUMBER(S): [id number]   ORDERING CLINICIAN: JORGE DUEÑAS   FINDINGS: There is an impacted subcapital fracture of the right femur. Mild degenerative changes right hip. No pelvic fracture identified.       Right femoral subcapital fracture.   MACRO: None   Signed by: Sebastián Chatterjee 8/15/2024 8:27 AM Dictation workstation:   [id number]    XR chest 1 view    Result Date: 8/15/2024  Interpreted By:  Sebastián Chatterjee, STUDY: XR CHEST 1 VIEW;  8/15/2024 8:05 am   INDICATION: Signs/Symptoms:fall on eliquis.   COMPARISON: 07/06/2024   ACCESSION NUMBER(S): [id number]   ORDERING CLINICIAN: JORGE DUEÑAS   FINDINGS: Patchy right basilar opacities. Grossly clear left lung. No apparent pleural effusion. Cardiomegaly. Widening of the mediastinum relating to gastric pull-through. Mild pulmonary vascular congestion. Partial resection left clavicle.       Mild right basilar atelectasis or infiltrate. Mild pulmonary vascular congestion.   MACRO: None   Signed by: Sebastián Chatterjee 8/15/2024 8:26 AM Dictation workstation:   [id number]    CT head wo IV contrast    Result Date: 8/15/2024  Interpreted By:  Sebastián Chatterjee, STUDY: CT HEAD WO IV CONTRAST  8/15/2024 7:43 am   INDICATION: Signs/Symptoms:fall on eliquis   COMPARISON: 07/06/2024   ACCESSION NUMBER(S): [id number]   ORDERING CLINICIAN: JORGE DUEÑAS   TECHNIQUE: Contiguous axial CT images of the brain were obtained without IV contrast.   FINDINGS: The ventricles, cisterns and sulci are prominent, consistent with severe diffuse volume loss. Areas of white matter low attenuation are nonspecific but likely related to chronic microvascular disease. There is intracranial atherosclerosis.   Gray-white differentiation is preserved. No acute intracranial hemorrhage or mass effect. No midline shift. Patent basal cisterns. No extraaxial fluid collections.   The calvaria is  intact. The visualized paranasal sinuses and mastoid air cells are clear.       No acute intracranial pathology.     Signed by: Sebastián Chatterjee 8/15/2024 8:25 AM Dictation workstation:   [id number]    CT cervical spine wo IV contrast    Result Date: 8/15/2024  Interpreted By:  Sebastián Chatterjee, STUDY: CT CERVICAL SPINE WO IV CONTRAST;  8/15/2024 7:43 am   INDICATION: Signs/Symptoms:fall on eliquis.   COMPARISON: None.   ACCESSION NUMBER(S): [id number]   ORDERING CLINICIAN: JORGE DUEÑAS   TECHNIQUE: Axial CT images of the cervical spine are obtained. Axial, coronal and sagittal reconstructions are provided for review.   FINDINGS: No acute fracture or subluxation of the cervical spine. Severe multilevel degenerative disc disease and facet arthropathy. Trace anterolisthesis C7-T1. No evidence of critical canal stenosis. Multilevel foraminal stenosis most advanced at C5-6 bilaterally.   No prevertebral soft tissue swelling.   Atherosclerosis.         No evidence for an acute fracture or subluxation of the cervical spine.   MACRO: None   Signed by: Sebastián Chatterjee 8/15/2024 8:22 AM Dictation workstation:   [id number]    Holter Or Event Cardiac Monitor    Result Date: 8/1/2024    1.  Medium-term Holter monitoring was performed between July 18 and July 25, 2024.  Heart rate ranged from , with an average heart rate of 79. 2.  Rare PACs were recorded.  Four episodes of SVT were recorded, the longest being 6 beats in duration. 3.  Rare PVCs were recorded.  Two runs of nonsustained ventricular tachycardia were recorded, the longest being 10 beats in duration. 4.  There is no evidence of high-grade AV block or sinus pauses. 5.  A single patient trigger was recorded, correlating with artifact.        Scheduled medications  atorvastatin, 80 mg, oral, Nightly  ferrous sulfate (325 mg ferrous sulfate), 65 mg of iron, oral, Daily  gabapentin, 100 mg, oral, TID  insulin lispro, 0-10 Units, subcutaneous, TID  midodrine, 10 mg, oral,  TID  oxybutynin, 5 mg, oral, TID  pantoprazole, 40 mg, oral, Nightly  polyethylene glycol, 17 g, oral, Daily  sertraline, 25 mg, oral, Nightly  tamsulosin, 0.4 mg, oral, Nightly      Continuous medications       PRN medications  PRN medications: acetaminophen, [DISCONTINUED] ondansetron **OR** ondansetron, oxyCODONE  Results for orders placed or performed during the hospital encounter of 08/15/24 (from the past 24 hour(s))   POCT GLUCOSE   Result Value Ref Range    POCT Glucose 148 (H) 74 - 99 mg/dL   POCT GLUCOSE   Result Value Ref Range    POCT Glucose 216 (H) 74 - 99 mg/dL   POCT GLUCOSE   Result Value Ref Range    POCT Glucose 150 (H) 74 - 99 mg/dL   POCT GLUCOSE   Result Value Ref Range    POCT Glucose 144 (H) 74 - 99 mg/dL                            Assessment/Plan   Assessment & Plan  Diabetic neuropathy associated with type 2 diabetes mellitus (Multi)    History of pulmonary embolism    Obstructive sleep apnea, adult    Achalasia, esophageal    Chronic obstructive pulmonary disease, unspecified COPD type (Multi)    At risk for falls    Balance problems    Coronary artery disease involving native coronary artery of native heart without angina pectoris      -Regular diet  -Weight-bear as tolerated right lower extremity  -Multimodal pain control  -Ancef postop x 2 doses - complete  -Ok to start DVT PPX  -PT/OT eval and treat  -Dressing to remain in place for the first 7 days and then can be removed  -Patient will require outpatient follow-up with me in 2 weeks for wound check  -Patient awaiting discharge to IRF, ok for discharge from ortho perspective   -Please contact me with any questions      I spent 30 minutes in the professional and overall care of this patient.      Randy Washington MD

## 2024-08-19 NOTE — PROGRESS NOTES
Subjective:   No cardiac complaits.  Labs and VS reviewed.    Last Recorded Vitals:  Vitals:    08/19/24 0800 08/19/24 1009 08/19/24 1220 08/19/24 1600   BP: 124/77 156/72 111/64 110/59   BP Location: Right arm  Right arm Right arm   Patient Position: Lying  Lying Lying   Pulse: 83  82 88   Resp: 18  18 18   Temp: 36.2 °C (97.2 °F)  36.2 °C (97.2 °F) 36.8 °C (98.2 °F)   TempSrc: Temporal  Temporal Temporal   SpO2: 92%  95% 95%   Weight:       Height:           Last Labs:    Results from last 7 days   Lab Units 08/17/24  0531 08/16/24  0528 08/15/24  0719   SODIUM mmol/L 137   < > 139   POTASSIUM mmol/L 4.7   < > 5.0   CHLORIDE mmol/L 106   < > 104   CO2 mmol/L 22   < > 25   BUN mg/dL 31*   < > 27*   CREATININE mg/dL 1.51*   < > 1.44*   CALCIUM mg/dL 7.9*   < > 9.3   PROTEIN TOTAL g/dL  --   --  7.3   BILIRUBIN TOTAL mg/dL  --   --  0.6   ALK PHOS U/L  --   --  241*   ALT U/L  --   --  52   AST U/L  --   --  46*   GLUCOSE mg/dL 182*   < > 153*    < > = values in this interval not displayed.        Results for orders placed or performed during the hospital encounter of 08/15/24 (from the past 24 hour(s))   POCT GLUCOSE   Result Value Ref Range    POCT Glucose 144 (H) 74 - 99 mg/dL   POCT GLUCOSE   Result Value Ref Range    POCT Glucose 214 (H) 74 - 99 mg/dL   POCT GLUCOSE   Result Value Ref Range    POCT Glucose 174 (H) 74 - 99 mg/dL   POCT GLUCOSE   Result Value Ref Range    POCT Glucose 217 (H) 74 - 99 mg/dL   POCT GLUCOSE   Result Value Ref Range    POCT Glucose 181 (H) 74 - 99 mg/dL         PTT - 8/15/2024:  8:29 AM  1.5   16.5 34     Troponin I, High Sensitivity   Date/Time Value Ref Range Status   11/29/2023 03:25 PM 19 0 - 53 ng/L Final     Troponin I   Date/Time Value Ref Range Status   09/05/2023 04:52 PM 26 (H) 0 - 20 ng/L Final     Comment:     .  Less than 99th percentile of normal range cutoff-  Female and children under 18 years old <14 ng/L; Male <21 ng/L: Negative  Repeat testing should be performed if  clinically indicated.   .  Female and children under 18 years old 14-50 ng/L; Male 21-50 ng/L:  Consistent with possible cardiac damage and possible increased clinical   risk. Serial measurements may help to assess extent of myocardial damage.   .  >50 ng/L: Consistent with cardiac damage, increased clinical risk and  myocardial infarction. Serial measurements may help assess extent of   myocardial damage.   .   NOTE: Children less than 1 year old may have higher baseline troponin   levels and results should be interpreted in conjunction with the overall   clinical context.   .  NOTE: Troponin I testing is performed using a different   testing methodology at [facility] than at other   [facility] hospitals. Direct result comparisons should only   be made within the same method.     09/05/2023 03:11 PM 22 (H) 0 - 20 ng/L Final     Comment:     .  Less than 99th percentile of normal range cutoff-  Female and children under 18 years old <14 ng/L; Male <21 ng/L: Negative  Repeat testing should be performed if clinically indicated.   .  Female and children under 18 years old 14-50 ng/L; Male 21-50 ng/L:  Consistent with possible cardiac damage and possible increased clinical   risk. Serial measurements may help to assess extent of myocardial damage.   .  >50 ng/L: Consistent with cardiac damage, increased clinical risk and  myocardial infarction. Serial measurements may help assess extent of   myocardial damage.   .   NOTE: Children less than 1 year old may have higher baseline troponin   levels and results should be interpreted in conjunction with the overall   clinical context.   .  NOTE: Troponin I testing is performed using a different   testing methodology at [facility] than at other   [facility]. Direct result comparisons should only   be made within the same method.     09/05/2023 01:18 PM 24 (H) 0 - 20 ng/L Final     Comment:     .  Less than 99th percentile of normal range  cutoff-  Female and children under 18 years old <14 ng/L; Male <21 ng/L: Negative  Repeat testing should be performed if clinically indicated.   .  Female and children under 18 years old 14-50 ng/L; Male 21-50 ng/L:  Consistent with possible cardiac damage and possible increased clinical   risk. Serial measurements may help to assess extent of myocardial damage.   .  >50 ng/L: Consistent with cardiac damage, increased clinical risk and  myocardial infarction. Serial measurements may help assess extent of   myocardial damage.   .   NOTE: Children less than 1 year old may have higher baseline troponin   levels and results should be interpreted in conjunction with the overall   clinical context.   .  NOTE: Troponin I testing is performed using a different   testing methodology at [facility] than at other   [facility] hospitals. Direct result comparisons should only   be made within the same method.       BNP   Date/Time Value Ref Range Status   09/05/2023 01:18 PM 32 0 - 99 pg/mL Final     Comment:     .  <100 pg/mL - Heart failure unlikely  100-299 pg/mL - Intermediate probability of acute heart  .               failure exacerbation. Correlate with clinical  .               context and patient history.    >=300 pg/mL - Heart Failure likely. Correlate with clinical  .               context and patient history.  BNP testing is performed using different testing   methodology at [facility] than at other   [facility] hospitals. Direct result comparisons should   only be made within the same method.     05/29/2023 05:00  (H) 0 - 99 pg/mL Final     Comment:     .  <100 pg/mL - Heart failure unlikely  100-299 pg/mL - Intermediate probability of acute heart  .               failure exacerbation. Correlate with clinical  .               context and patient history.    >=300 pg/mL - Heart Failure likely. Correlate with clinical  .               context and patient history.  BNP testing is performed  using different testing   methodology at [facility] than at other   [facility]. Direct result comparisons should   only be made within the same method.       POC HEMOGLOBIN A1c   Date/Time Value Ref Range Status   02/07/2024 10:01 AM 5.4 4.2 - 6.5 % Final   11/04/2023 10:17 AM 5.6 4.2 - 6.5 % Final     Hemoglobin A1C   Date/Time Value Ref Range Status   07/29/2024 10:36 AM 6.8 (H) see below % Final     LDL Calculated   Date/Time Value Ref Range Status   02/14/2024 02:07 PM 78 <=99 mg/dL Final     Comment:                                 Near   Borderline      AGE      Desirable  Optimal    High     High     Very High     0-19 Y     0 - 109     ---    110-129   >/= 130     ----    20-24 Y     0 - 119     ---    120-159   >/= 160     ----      >24 Y     0 -  99   100-129  130-159   160-189     >/=190       VLDL   Date/Time Value Ref Range Status   02/14/2024 02:07 PM 30 0 - 40 mg/dL Final   03/05/2019 03:24 PM 44 (H) 0 - 40 mg/dL Final      Last I/O:  I/O last 3 completed shifts:  In: - (0 mL/kg)   Out: 1200 (12 mL/kg) [Urine:1200 (0.3 mL/kg/hr)]  Weight: 99.8 kg     Allergies:  Patient has no known allergies.    Inpatient Medications:  Scheduled medications   Medication Dose Route Frequency    atorvastatin  80 mg oral Nightly    enoxaparin  40 mg subcutaneous q24h SYLVESTER    ferrous sulfate (325 mg ferrous sulfate)  65 mg of iron oral Daily    gabapentin  100 mg oral TID    insulin lispro  0-10 Units subcutaneous TID    midodrine  10 mg oral TID    oxybutynin  5 mg oral TID    pantoprazole  40 mg oral Nightly    polyethylene glycol  17 g oral Daily    sertraline  25 mg oral Nightly    tamsulosin  0.4 mg oral Nightly     PRN medications   Medication    acetaminophen    ondansetron    oxyCODONE     Continuous Medications   Medication Dose Last Rate     Outpatient Medications:  Current Outpatient Medications   Medication Instructions    acetaminophen (TYLENOL) 650 mg, oral, Every 6 hours PRN     acetaminophen (TYLENOL) 975 mg, oral, Every 8 hours PRN    apixaban (ELIQUIS) 5 mg, oral, 2 times daily    aspirin 81 mg, oral, Daily    atorvastatin (LIPITOR) 80 mg, oral, Daily    blood sugar diagnostic (OneTouch Ultra Test) strip Test glucose twice daily or as directed    blood-glucose meter misc Check glucose before meals and call if less than 80 or over 300.    enoxaparin (LOVENOX) 40 mg, subcutaneous, Every 24 hours scheduled    ferrous sulfate 65 mg, oral, Daily with breakfast, Do not crush, chew, or split.    flash glucose sensor kit (FreeStyle Kellie 2 Sensor) kit Use as instructed    FreeStyle Kellie 2 [place] misc Use as instructed    gabapentin (NEURONTIN) 100 mg, oral, 3 times daily    lisinopril 5 mg, oral, Daily    midodrine (PROAMATINE) 10 mg, oral, 3 times daily    mirabegron (MYRBETRIQ) 50 mg, oral, Daily    oxyCODONE (ROXICODONE) 5 mg, oral, Every 6 hours PRN    pantoprazole (PROTONIX) 40 mg, oral, Nightly, Do not crush, chew, or split.    sertraline (ZOLOFT) 25 mg, oral, Daily    tamsulosin (FLOMAX) 0.4 mg, oral, Daily       Current Imaging  No results found.              Assessment/Plan   S/P hip fracture with surgery  CAD  Orthostatic hypotension    He is stable from a cardiac perspective and can be discharged cardiac wise, with follow up with me as previously scheduled.    Code Status:  DNR      Papito Maldonado MD

## 2024-08-19 NOTE — PROGRESS NOTES
Occupational Therapy    OT Treatment    Patient Name: Levy Gregory  MRN: [id number]  Today's Date: 8/19/2024  Time Calculation  Start Time: 1009  Stop Time: 1050  Time Calculation (min): 41 min       3118/3118-A    Assessment:  OT Assessment: Patient stated increased pain with mobility, additional time required to complete bed mobility, transfer from bed to chair. Pt stated improvement from day prior.  Prognosis: Good  Medical Staff Made Aware: Yes  End of Session Communication: Bedside nurse  End of Session Patient Position: Up in chair, Alarm off, not on at start of session  OT Assessment Results: Decreased ADL status, Decreased functional mobility  Prognosis: Good  Medical Staff Made Aware: Yes    Plan:  Treatment Interventions: ADL retraining, Functional transfer training  OT Frequency: Daily  Treatment Interventions: ADL retraining, Functional transfer training  Subjective      08/19/24 1009   OT Last Visit   OT Received On 08/19/24   General   Prior to Session Communication Bedside nurse   Patient Position Received Bed, 3 rail up;Alarm off, not on at start of session   Preferred Learning Style verbal;visual   General Comment Patient is agreeable to therapy   Precautions   LE Weight Bearing Status Weight Bearing as Tolerated   Medical Precautions Fall precautions   Post-Surgical Precautions Right hip precautions   Vital Signs   /72   Pain Assessment   Pain Assessment 0-10   0-10 (Numeric) Pain Score 7   Pain Type Surgical pain   Pain Location Hip   Pain Orientation Right   Pain Descriptors Aching;Sore   Pain Frequency Intermittent   Pain Interventions Medication (See MAR);Rest   Response to Interventions increases with movement   RUE    RUE  WFL   LUE    LUE WFL   Grooming   Grooming Level of Assistance Setup   Grooming Where Assessed Edge of bed   Grooming Comments facial hygiene completed, shaving, oral hygiene set up at sink, pt initially agreeable to complete at sink, declined after ambulating to  chair   LE Bathing   LE Bathing Level of Assistance Maximum assistance   LE Bathing Where Assessed Bed level   LE Bathing Comments with wipes   UE Dressing   UE Dressing Level of Assistance Contact guard   UE Dressing Where Assessed Edge of bed   UE Dressing Comments to adjust gown   LE Dressing   LE Dressing Yes   Sock Level of Assistance Maximum assistance   LE Dressing Where Assessed Bed level   LE Dressing Comments to doff/gilbert socks   Bed Mobility   Bed Mobility Yes   Bed Mobility 1   Bed Mobility 1 Supine to sitting   Level of Assistance 1 Moderate assistance   Bed Mobility Comments 1 HOB elevated, additional time required to complete transfer, assist with LE mgmt, pt stated feeling dizzy once seated eob, BP checked /72   Transfers   Transfer Yes   Transfer 1   Transfer From 1 Bed to   Transfer to 1 Chair with arms   Technique 1 Sit to stand;Stand to sit   Transfer Device 1 Walker;Gait belt   Transfer Level of Assistance 1 Moderate assistance   Trials/Comments 1 assist with safe hand placement transfer, bed elevated, verbal cues given for technique   Therapeutic Activity   Therapeutic Activity Performed Yes   Therapeutic Activity 1 ambulation within room, bed to chair transfer   IP OT Assessment   OT Assessment Patient stated increased pain with mobility, additional time required to complete bed mobility, transfer from bed to chair. Pt stated improvement from day prior.   Prognosis Good   Medical Staff Made Aware Yes   End of Session Communication Bedside nurse   End of Session Patient Position Up in chair;Alarm off, not on at start of session   OT Assessment   OT Assessment Results Decreased ADL status;Decreased functional mobility   Education   Individual(s) Educated Patient   Education Provided Fall precautons   Patient Response to Education Patient/Caregiver Verbalized Understanding of Information   Inpatient Plan   Treatment Interventions ADL retraining;Functional transfer training   OT Frequency  Daily         Outcome Measures:[facility] Daily Activity  Putting on and taking off regular lower body clothing: A lot  Bathing (including washing, rinsing, drying): A lot  Putting on and taking off regular upper body clothing: A little  Toileting, which includes using toilet, bedpan or urinal: A lot  Taking care of personal grooming such as brushing teeth: A little  Eating Meals: None  Daily Activity - Total Score: 16  Education Documentation  Body Mechanics, taught by RAINA Antoine at 8/19/2024 11:44 AM.  Learner: Patient  Readiness: Acceptance  Method: Explanation  Response: Verbalizes Understanding    Precautions, taught by RAINA Antoine at 8/19/2024 11:44 AM.  Learner: Patient  Readiness: Acceptance  Method: Explanation  Response: Verbalizes Understanding    ADL Training, taught by RAINA Antoine at 8/19/2024 11:44 AM.  Learner: Patient  Readiness: Acceptance  Method: Explanation  Response: Verbalizes Understanding    Education Comments  No comments found.      Goals:  Encounter Problems       Encounter Problems (Active)       Dressings Lower Extremities       STG - Patient will complete lower body dressing with mod assist with comp strategies and AE (Progressing)       Start:  08/17/24    Expected End:  08/19/24               Functional Balance       STG-Patient will be min assist with assistive device dynamic stand task >5 minutes for ADL completion   (Progressing)       Start:  08/17/24    Expected End:  08/31/24               Functional Mobility       STG-Patient will be min assist with assistive device functional mobility tasks   (Progressing)       Start:  08/17/24    Expected End:  08/31/24               OT Transfers       STG-Patient will be min assist with functional transfers demonstrating good safety   (Progressing)       Start:  08/17/24    Expected End:  08/31/24               Safety       STG-Patient will independently verbalize hip precautions with all functional tasks   (Progressing)        Start:  08/17/24    Expected End:  08/31/24

## 2024-08-20 LAB
GLUCOSE BLD MANUAL STRIP-MCNC: 131 MG/DL (ref 74–99)
GLUCOSE BLD MANUAL STRIP-MCNC: 150 MG/DL (ref 74–99)
GLUCOSE BLD MANUAL STRIP-MCNC: 165 MG/DL (ref 74–99)
GLUCOSE BLD MANUAL STRIP-MCNC: 198 MG/DL (ref 74–99)
GLUCOSE BLD MANUAL STRIP-MCNC: 209 MG/DL (ref 74–99)

## 2024-08-20 PROCEDURE — 2500000001 HC RX 250 WO HCPCS SELF ADMINISTERED DRUGS (ALT 637 FOR MEDICARE OP): Performed by: INTERNAL MEDICINE

## 2024-08-20 PROCEDURE — 1100000001 HC PRIVATE ROOM DAILY

## 2024-08-20 PROCEDURE — 2500000004 HC RX 250 GENERAL PHARMACY W/ HCPCS (ALT 636 FOR OP/ED): Performed by: INTERNAL MEDICINE

## 2024-08-20 PROCEDURE — 97110 THERAPEUTIC EXERCISES: CPT | Mod: GP,CQ

## 2024-08-20 PROCEDURE — 97116 GAIT TRAINING THERAPY: CPT | Mod: GP,CQ

## 2024-08-20 PROCEDURE — 2500000001 HC RX 250 WO HCPCS SELF ADMINISTERED DRUGS (ALT 637 FOR MEDICARE OP): Performed by: ORTHOPAEDIC SURGERY

## 2024-08-20 PROCEDURE — 2500000002 HC RX 250 W HCPCS SELF ADMINISTERED DRUGS (ALT 637 FOR MEDICARE OP, ALT 636 FOR OP/ED): Performed by: ORTHOPAEDIC SURGERY

## 2024-08-20 PROCEDURE — 82947 ASSAY GLUCOSE BLOOD QUANT: CPT

## 2024-08-20 PROCEDURE — 97535 SELF CARE MNGMENT TRAINING: CPT | Mod: GO,CO

## 2024-08-20 ASSESSMENT — PAIN SCALES - GENERAL
PAINLEVEL_OUTOF10: 8
PAINLEVEL_OUTOF10: 6
PAINLEVEL_OUTOF10: 8

## 2024-08-20 ASSESSMENT — COGNITIVE AND FUNCTIONAL STATUS - GENERAL
MOVING TO AND FROM BED TO CHAIR: A LITTLE
MOVING FROM LYING ON BACK TO SITTING ON SIDE OF FLAT BED WITH BEDRAILS: A LITTLE
HELP NEEDED FOR BATHING: A LOT
TOILETING: A LOT
MOBILITY SCORE: 17
STANDING UP FROM CHAIR USING ARMS: A LITTLE
WALKING IN HOSPITAL ROOM: A LITTLE
TURNING FROM BACK TO SIDE WHILE IN FLAT BAD: A LOT
MOVING TO AND FROM BED TO CHAIR: A LITTLE
PERSONAL GROOMING: A LITTLE
CLIMB 3 TO 5 STEPS WITH RAILING: A LOT
DRESSING REGULAR UPPER BODY CLOTHING: A LITTLE
HELP NEEDED FOR BATHING: A LOT
MOVING FROM LYING ON BACK TO SITTING ON SIDE OF FLAT BED WITH BEDRAILS: A LOT
MOBILITY SCORE: 15
STANDING UP FROM CHAIR USING ARMS: A LITTLE
HELP NEEDED FOR BATHING: A LITTLE
CLIMB 3 TO 5 STEPS WITH RAILING: A LOT
TURNING FROM BACK TO SIDE WHILE IN FLAT BAD: A LITTLE
DRESSING REGULAR UPPER BODY CLOTHING: A LITTLE
CLIMB 3 TO 5 STEPS WITH RAILING: A LOT
TOILETING: A LOT
MOBILITY SCORE: 15
DRESSING REGULAR UPPER BODY CLOTHING: A LITTLE
DAILY ACTIVITIY SCORE: 16
WALKING IN HOSPITAL ROOM: A LITTLE
TOILETING: A LITTLE
DRESSING REGULAR LOWER BODY CLOTHING: A LOT
DRESSING REGULAR LOWER BODY CLOTHING: A LOT
MOVING FROM LYING ON BACK TO SITTING ON SIDE OF FLAT BED WITH BEDRAILS: A LOT
DRESSING REGULAR LOWER BODY CLOTHING: A LITTLE
DAILY ACTIVITIY SCORE: 16
PERSONAL GROOMING: A LITTLE
TURNING FROM BACK TO SIDE WHILE IN FLAT BAD: A LOT
DAILY ACTIVITIY SCORE: 19
PERSONAL GROOMING: A LITTLE
STANDING UP FROM CHAIR USING ARMS: A LITTLE
WALKING IN HOSPITAL ROOM: A LITTLE
MOVING TO AND FROM BED TO CHAIR: A LITTLE

## 2024-08-20 ASSESSMENT — PAIN DESCRIPTION - ORIENTATION
ORIENTATION: RIGHT

## 2024-08-20 ASSESSMENT — ACTIVITIES OF DAILY LIVING (ADL): HOME_MANAGEMENT_TIME_ENTRY: 33

## 2024-08-20 ASSESSMENT — PAIN DESCRIPTION - LOCATION
LOCATION: HIP

## 2024-08-20 ASSESSMENT — PAIN - FUNCTIONAL ASSESSMENT
PAIN_FUNCTIONAL_ASSESSMENT: 0-10
PAIN_FUNCTIONAL_ASSESSMENT: UNABLE TO SELF-REPORT

## 2024-08-20 ASSESSMENT — PAIN DESCRIPTION - DESCRIPTORS: DESCRIPTORS: ACHING

## 2024-08-20 NOTE — PROGRESS NOTES
"   08/20/24 0937   Discharge Planning   Expected Discharge Disposition IRF     Received message from direct precert team \"insurance is offering a P2p by calling [phone] option 5 by today 8/20@1pm\" MD notified.     1310: Just received notice that \" Insurance P2P was done and denial still upheld.\" Updated facility and willing to submit appeal if patient prefers.  Patient updated and would like facility to file appeal. Facility aware.   "

## 2024-08-20 NOTE — CARE PLAN
The clinical goals for the shift include remain safe with no fall/injury thru end of shift.      Problem: Pain - Adult  Goal: Verbalizes/displays adequate comfort level or baseline comfort level  Outcome: Progressing     Problem: Safety - Adult  Goal: Free from fall injury  Outcome: Progressing     Problem: Chronic Conditions and Co-morbidities  Goal: Patient's chronic conditions and co-morbidity symptoms are monitored and maintained or improved  Outcome: Progressing     Problem: Diabetes  Goal: Achieve decreasing blood glucose levels by end of shift  Outcome: Progressing  Goal: Increase stability of blood glucose readings by end of shift  Outcome: Progressing  Goal: Decrease in ketones present in urine by end of shift  Outcome: Progressing  Goal: Maintain electrolyte levels within acceptable range throughout shift  Outcome: Progressing  Goal: Maintain glucose levels >70mg/dl to <250mg/dl throughout shift  Outcome: Progressing  Goal: No changes in neurological exam by end of shift  Outcome: Progressing  Goal: Learn about and adhere to nutrition recommendations by end of shift  Outcome: Progressing  Goal: Vital signs within normal range for age by end of shift  Outcome: Progressing  Goal: Increase self care and/or family involovement by end of shift  Outcome: Progressing  Goal: Receive DSME education by end of shift  Outcome: Progressing     Problem: Skin  Goal: Participates in plan/prevention/treatment measures  Outcome: Progressing  Goal: Prevent/manage excess moisture  Outcome: Progressing  Goal: Prevent/minimize sheer/friction injuries  Outcome: Progressing  Goal: Promote/optimize nutrition  Outcome: Progressing  Goal: Promote skin healing  Outcome: Progressing     Problem: Pain  Goal: Takes deep breaths with improved pain control throughout the shift  Outcome: Progressing  Goal: Turns in bed with improved pain control throughout the shift  Outcome: Progressing  Goal: Walks with improved pain control throughout  the shift  Outcome: Progressing  Goal: Performs ADL's with improved pain control throughout shift  Outcome: Progressing  Goal: Participates in PT with improved pain control throughout the shift  Outcome: Progressing  Goal: Free from opioid side effects throughout the shift  Outcome: Progressing  Goal: Free from acute confusion related to pain meds throughout the shift  Outcome: Progressing     Problem: Dressings Lower Extremities  Goal: STG - Patient will complete lower body dressing with mod assist with comp strategies and AE  Outcome: Progressing     Problem: Fall/Injury  Goal: Not fall by end of shift  Outcome: Progressing  Goal: Be free from injury by end of the shift  Outcome: Progressing  Goal: Verbalize understanding of personal risk factors for fall in the hospital  Outcome: Progressing  Goal: Verbalize understanding of risk factor reduction measures to prevent injury from fall in the home  Outcome: Progressing  Goal: Use assistive devices by end of the shift  Outcome: Progressing  Goal: Pace activities to prevent fatigue by end of the shift  Outcome: Progressing

## 2024-08-20 NOTE — PROGRESS NOTES
Occupational Therapy    OT Treatment    Patient Name: Levy Gregory  MRN: [id number]  Today's Date: 8/20/2024  Time Calculation  Start Time: 1357  Stop Time: 1430  Time Calculation (min): 33 min       3118/3118-A    Assessment:  End of Session Communication: Bedside nurse  End of Session Patient Position: Up in chair, Alarm on       Plan:  OT Frequency: Daily  OT Discharge Recommendations: High intensity level of continued care     Subjective      08/20/24 1357   OT Last Visit   OT Received On 08/20/24   General   Reason for Referral Pt is s/p R hip yonas 8/16/2024.   Referred By Cj Kahn MD   Prior to Session Communication Bedside nurse   Patient Position Received Up in chair   General Comment Pt agreeable to tx and cleared for participation. Pt requires max encouragement and education in benefits of participation with understanding.   Precautions   LE Weight Bearing Status Weight Bearing as Tolerated  (R LE)   Medical Precautions Fall precautions   Post-Surgical Precautions Right hip precautions - educated and reiterated throughout tx   Pain Assessment   0-10 (Numeric) Pain Score 8   Pain Type Surgical pain   Pain Location Hip   Pain Orientation Right   Pain Interventions Repositioned;Rest;Distraction   Cognition   Overall Cognitive Status WFL   Insight Mild   Grooming   Grooming Level of Assistance Setup   Grooming Where Assessed Sitting sinkside   Grooming Comments Encouraged grooming tasks in stance as tolerable, pt engaged in static standing at sink ~1 minute, however then declined ADLs in stance. Pt completed various grooming tasks from seated level with setup of supplies.   LE Dressing   LE Dressing Adaptive Equipment Reacher;Sock aide   Sock Level of Assistance Moderate assistance   LE Dressing Where Assessed Recliner   LE Dressing Comments Pt educated in use of AE and compensatory techniques during LB dressing tasks in order to maintain hip precautions, maximize safety and I. Pt doffed socks  with use of AE with SBA following demo. Pt requires need of wide sock aide and only standard sock aide available at time of tx resulting in Max A needed to don socks. Pt reports has wide sock aide at home. Educated in options to obtain further AE with understanding.   Functional Standing Tolerance   Time ~1 minute   Activity static standing   Functional Standing Tolerance Comments B UE support on counter   Functional Mobility   Functional Mobility Performed   (Pt ambulated ~5' from sink>recliner with chair follow, Min A and cues for upright stance. Pt required max encouragement to engage in mobility as had initially declined to ambulate to sink.)   Transfer 1   Transfer From 1 Chair with arms to   Transfer to 1 Stand   Technique 1 Sit to stand;Stand to sit   Transfer Device 1 Gait belt;Walker   Transfer Level of Assistance 1 Minimum assistance;Moderate assistance;Minimal verbal cues   Trials/Comments 1 STS from recliner level x2 trials with Mod A , progressing to Min A with cues for technique and precautions with fair follow through.   Dynamic Sitting Balance   Dynamic Sitting-Balance Support No upper extremity supported   Dynamic Sitting-Level of Assistance Distant supervision   Dynamic Sitting-Balance Reaching across midline;Reaching for objects   Static Standing Balance   Static Standing-Balance Support Bilateral upper extremity supported   Static Standing-Level of Assistance Contact guard   Static Standing-Comment/Number of Minutes ~1 minute   IP OT Assessment   End of Session Communication Bedside nurse   End of Session Patient Position Up in chair;Alarm on   Inpatient Plan   OT Frequency Daily   OT Discharge Recommendations High intensity level of continued care     Outcome Measures:[facility] Daily Activity  Putting on and taking off regular lower body clothing: A lot  Bathing (including washing, rinsing, drying): A lot  Putting on and taking off regular upper body clothing: A little  Toileting, which includes  using toilet, bedpan or urinal: A lot  Taking care of personal grooming such as brushing teeth: A little  Eating Meals: None  Daily Activity - Total Score: 16  Education Documentation  No documentation found.  Education Comments  No comments found.            Goals:  Encounter Problems       Encounter Problems (Active)       Dressings Lower Extremities       STG - Patient will complete lower body dressing with mod assist with comp strategies and AE (Progressing)       Start:  08/17/24    Expected End:  08/22/24               Functional Balance       STG-Patient will be min assist with assistive device dynamic stand task >5 minutes for ADL completion   (Progressing)       Start:  08/17/24    Expected End:  08/31/24               Functional Mobility       STG-Patient will be min assist with assistive device functional mobility tasks   (Progressing)       Start:  08/17/24    Expected End:  08/31/24               OT Transfers       STG-Patient will be min assist with functional transfers demonstrating good safety   (Progressing)       Start:  08/17/24    Expected End:  08/31/24               Safety       STG-Patient will independently verbalize hip precautions with all functional tasks   (Progressing)       Start:  08/17/24    Expected End:  08/31/24

## 2024-08-20 NOTE — PROGRESS NOTES
Physical Therapy    Physical Therapy Treatment    Patient Name: Levy Gregory  MRN: [id number]  Today's Date: 8/20/2024  Time Calculation  Start Time: 1345  Stop Time: 1415  Time Calculation (min): 30 min     3118/3118-A     08/20/24 1345   PT  Visit   PT Received On 08/20/24   Response to Previous Treatment Patient with no complaints from previous session.   General   Reason for Referral Pt is s/p R hip yonas 8/16/2024.   Referred By Cj Kahn MD   Past Medical History Relevant to Rehab Per EMR: 68-year-old gentleman with a fall at home states that he awakened early this morning in the dark and tried to get to the bathroom.  He was on Eliquis and his last dose was yesterday but his PCP told him to discontinue use so he did not take a dose this morning.  He states he did not strike his head.  Denies any headache or shortness of breath.  States he was mildly dizzy when his fall occurred.  Denies any fevers or chills.  Has had an extensive surgical history in the past.  Received pain medications by EMS that were ineffective.  He is not currently dizzy or lightheaded.     (+) right hip fracture.  Patient had the following completed 8/16/2023: right hip hemiarthroplasty completed by Dr Washington.   Prior to Session Communication Bedside nurse   Patient Position Received Up in chair;Alarm on   Preferred Learning Style verbal   General Comment Pt. in the bed when I arrived. Pain right hip 5/10 ti start session. 8/10 after transfer bed to chair. Cues to maintain hip precautions with mobility.   Precautions   LE Weight Bearing Status Weight Bearing as Tolerated  (RLE WBAT)   Medical Precautions Fall precautions   Post-Surgical Precautions Right hip precautions   Precautions Comment Bed/Chair alarm   Cognition   Overall Cognitive Status WFL   Therapeutic Exercise   Therapeutic Exercise Performed Yes  (Supine Ex's AP, QS.Heel Slides Hip  Abd/Add x 10 reps (asssted  on R LE and AROM LE) Seated ROM LAQ, x 10 reps.)    Therapeutic Activity   Therapeutic Activity Performed Yes   Therapeutic Activity 1 Static standing with the walker at bed side and in front of the chair   Bed Mobility   Bed Mobility Yes   Bed Mobility 1   Bed Mobility 1 Sitting to supine   Level of Assistance 1 Moderate assistance  (Drawsheet.use to scoot the hips.)   Bed Mobility Comments 1 Head of bed elevated. Assust to  roll and sit on the EOB   Ambulation/Gait Training   Ambulation/Gait Training Performed Yes   Ambulation/Gait Training 1   Surface 1 Level tile   Device 1 Rolling walker   Gait Support Devices Gait belt   Assistance 1 Contact guard   Quality of Gait 1 Decreased step length;Diminished heel strike   Comments/Distance (ft) 1 Pt. ambulated 5'x1 then 10'x1 with the wheeled walker. No loss of balance. Cues for walker positioning.  Pace was slow.   Transfers   Transfer Yes   Transfer 1   Technique 1 Sit to stand;Stand to sit   Transfer Device 1 Walker;Gait belt   Transfer Level of Assistance 1 Minimum assistance   Trials/Comments 1 MIn asssist for sit to stand, Cues for hand and R Foot placement for sit >< stand.   PT Assessment   Rehab Prognosis Good   End of Session Communication Bedside nurse   Assessment Comment Pt continues to require 2 person assist for transfers and bed mobility at this time. Pt educated on hip precautions prior to and during mobility. Pt would benefit from continued acute care PT during hospital LOS and upon discharge at a high level intensity.   End of Session Patient Position Bed, 2 rail up;Alarm on   Outpatient Education   Education Comment Pt educated on R hip precautions prior to mobility training verbally and with demonstration.   PT Plan   Inpatient/Swing Bed or Outpatient Inpatient   PT Plan   PT Plan Ongoing PT   PT Frequency Daily   PT Discharge Recommendations High intensity level of continued care         Assessment/Plan   PT Assessment  Rehab Prognosis: Good  End of Session Communication: Bedside  nurse  Assessment Comment: Pt continues to require 2 person assist for transfers and bed mobility at this time. Pt educated on hip precautions prior to and during mobility. Pt would benefit from continued acute care PT during hospital LOS and upon discharge at a high level intensity.  End of Session Patient Position: Bed, 2 rail up, Alarm on  PT Plan  Inpatient/Swing Bed or Outpatient: Inpatient  PT Plan  PT Plan: Ongoing PT  PT Frequency: Daily  PT Discharge Recommendations: High intensity level of continued care  PT Recommended Transfer Status: Assist x2, Assistive device  PT - OK to Discharge: Yes    Outcome Measures:  [facility] Basic Mobility  Turning from your back to your side while in a flat bed without using bedrails: A lot  Moving from lying on your back to sitting on the side of a flat bed without using bedrails: A lot  Moving to and from bed to chair (including a wheelchair): A little  Standing up from a chair using your arms (e.g. wheelchair or bedside chair): A little  To walk in hospital room: A little  Climbing 3-5 steps with railing: A lot  Basic Mobility - Total Score: 15                             EDUCATION:  Outpatient Education  Patient Response to Education: Patient/Caregiver Verbalized Understanding of Information  Education Comment: Pt educated on R hip precautions prior to mobility training verbally and with demonstration.  Education Documentation  Home Exercise Program, taught by Cosme Worthington PTA at 8/20/2024  3:07 PM.  Learner: Patient  Readiness: Acceptance  Method: Explanation  Response: Needs Reinforcement, Verbalizes Understanding    Precautions, taught by Cosme Worthington PTA at 8/20/2024  3:07 PM.  Learner: Patient  Readiness: Acceptance  Method: Explanation  Response: Needs Reinforcement, Verbalizes Understanding    Body Mechanics, taught by Cosme Worthington PTA at 8/20/2024  3:07 PM.  Learner: Patient  Readiness: Acceptance  Method: Explanation  Response: Needs Reinforcement, Verbalizes  Understanding    Mobility Training, taught by Cosme Worthington PTA at 8/20/2024  3:07 PM.  Learner: Patient  Readiness: Acceptance  Method: Explanation  Response: Needs Reinforcement, Verbalizes Understanding    Education Comments  No comments found.        GOALS:  Encounter Problems       Encounter Problems (Active)       PT Problem       Pt will demonstrate ability to complete bed mobility with Tk x 1  (Progressing)       Start:  08/17/24    Expected End:  08/31/24            Pt will demonstrate ability to complete all transfers with Tk x 1 and use of FWW  (Progressing)       Start:  08/17/24    Expected End:  08/31/24            Pt will demonstrate ability to ambulate 100' with Tk x 1 and use of FWW  (Progressing)       Start:  08/17/24    Expected End:  08/31/24            Pt will demonstrate ability to verbalize and independently follow hip precautions throughout therapy sessions.  (Progressing)       Start:  08/17/24               Pain - Adult

## 2024-08-20 NOTE — PROGRESS NOTES
Spiritual Care Visit    Clinical Encounter Type  Visited With: Patient and family together  Routine Visit: Follow-up  Continue Visiting: Yes    Sikhism Encounters  Sikhism Needs: Prayer         Sacramental Encounters  Communion: Patient wants communion  Communion Given Indicator: Yes                             Taxonomy  Intended Effects: Establish rapport and connectedness, Padmini affirmation, Build relationship of care and support, Demonstrate caring and concern    When I visited this morning the patient asked me to return later, since he was with family, and so I saw him this afternoon, at which time I gave him eucharist.

## 2024-08-20 NOTE — CARE PLAN
Problem: Pain - Adult  Goal: Verbalizes/displays adequate comfort level or baseline comfort level  Outcome: Progressing     Problem: Safety - Adult  Goal: Free from fall injury  Outcome: Progressing     Problem: Discharge Planning  Goal: Discharge to home or other facility with appropriate resources  Outcome: Progressing     Problem: Chronic Conditions and Co-morbidities  Goal: Patient's chronic conditions and co-morbidity symptoms are monitored and maintained or improved  Outcome: Progressing     Problem: Diabetes  Goal: Achieve decreasing blood glucose levels by end of shift  Outcome: Progressing  Goal: Increase stability of blood glucose readings by end of shift  Outcome: Progressing  Goal: Decrease in ketones present in urine by end of shift  Outcome: Progressing  Goal: Maintain electrolyte levels within acceptable range throughout shift  Outcome: Progressing  Goal: Maintain glucose levels >70mg/dl to <250mg/dl throughout shift  Outcome: Progressing  Goal: No changes in neurological exam by end of shift  Outcome: Progressing  Goal: Learn about and adhere to nutrition recommendations by end of shift  Outcome: Progressing  Goal: Vital signs within normal range for age by end of shift  Outcome: Progressing  Goal: Increase self care and/or family involovement by end of shift  Outcome: Progressing  Goal: Receive DSME education by end of shift  Outcome: Progressing     Problem: Skin  Goal: Participates in plan/prevention/treatment measures  Outcome: Progressing  Goal: Prevent/manage excess moisture  Outcome: Progressing  Goal: Prevent/minimize sheer/friction injuries  Outcome: Progressing  Goal: Promote/optimize nutrition  Outcome: Progressing  Goal: Promote skin healing  Outcome: Progressing     Problem: Pain  Goal: Takes deep breaths with improved pain control throughout the shift  Outcome: Progressing  Goal: Turns in bed with improved pain control throughout the shift  Outcome: Progressing  Goal: Walks with  improved pain control throughout the shift  Outcome: Progressing  Goal: Performs ADL's with improved pain control throughout shift  Outcome: Progressing  Goal: Participates in PT with improved pain control throughout the shift  Outcome: Progressing  Goal: Free from opioid side effects throughout the shift  Outcome: Progressing  Goal: Free from acute confusion related to pain meds throughout the shift  Outcome: Progressing     Problem: Dressings Lower Extremities  Goal: STG - Patient will complete lower body dressing with mod assist with comp strategies and AE  Outcome: Progressing     Problem: Fall/Injury  Goal: Not fall by end of shift  Outcome: Progressing  Goal: Be free from injury by end of the shift  Outcome: Progressing  Goal: Verbalize understanding of personal risk factors for fall in the hospital  Outcome: Progressing  Goal: Verbalize understanding of risk factor reduction measures to prevent injury from fall in the home  Outcome: Progressing  Goal: Use assistive devices by end of the shift  Outcome: Progressing  Goal: Pace activities to prevent fatigue by end of the shift  Outcome: Progressing   The patient's goals for the shift include      The clinical goals for the shift include pt will remain hemodynamically stable t/o the shift

## 2024-08-21 LAB
GLUCOSE BLD MANUAL STRIP-MCNC: 127 MG/DL (ref 74–99)
GLUCOSE BLD MANUAL STRIP-MCNC: 163 MG/DL (ref 74–99)
GLUCOSE BLD MANUAL STRIP-MCNC: 171 MG/DL (ref 74–99)
GLUCOSE BLD MANUAL STRIP-MCNC: 195 MG/DL (ref 74–99)

## 2024-08-21 PROCEDURE — 2500000002 HC RX 250 W HCPCS SELF ADMINISTERED DRUGS (ALT 637 FOR MEDICARE OP, ALT 636 FOR OP/ED): Performed by: ORTHOPAEDIC SURGERY

## 2024-08-21 PROCEDURE — 97530 THERAPEUTIC ACTIVITIES: CPT | Mod: GP,CQ

## 2024-08-21 PROCEDURE — 2500000004 HC RX 250 GENERAL PHARMACY W/ HCPCS (ALT 636 FOR OP/ED): Performed by: ORTHOPAEDIC SURGERY

## 2024-08-21 PROCEDURE — 2500000001 HC RX 250 WO HCPCS SELF ADMINISTERED DRUGS (ALT 637 FOR MEDICARE OP): Performed by: INTERNAL MEDICINE

## 2024-08-21 PROCEDURE — 2500000004 HC RX 250 GENERAL PHARMACY W/ HCPCS (ALT 636 FOR OP/ED): Performed by: INTERNAL MEDICINE

## 2024-08-21 PROCEDURE — 1100000001 HC PRIVATE ROOM DAILY

## 2024-08-21 PROCEDURE — 97530 THERAPEUTIC ACTIVITIES: CPT | Mod: GO,CO

## 2024-08-21 PROCEDURE — 82947 ASSAY GLUCOSE BLOOD QUANT: CPT

## 2024-08-21 PROCEDURE — 97116 GAIT TRAINING THERAPY: CPT | Mod: GP,CQ

## 2024-08-21 PROCEDURE — 2500000001 HC RX 250 WO HCPCS SELF ADMINISTERED DRUGS (ALT 637 FOR MEDICARE OP): Performed by: ORTHOPAEDIC SURGERY

## 2024-08-21 RX ORDER — OXYCODONE HYDROCHLORIDE 5 MG/1
5 TABLET ORAL ONCE
Status: COMPLETED | OUTPATIENT
Start: 2024-08-21 | End: 2024-08-21

## 2024-08-21 ASSESSMENT — COGNITIVE AND FUNCTIONAL STATUS - GENERAL
TURNING FROM BACK TO SIDE WHILE IN FLAT BAD: A LOT
PERSONAL GROOMING: A LITTLE
MOVING FROM LYING ON BACK TO SITTING ON SIDE OF FLAT BED WITH BEDRAILS: A LOT
DRESSING REGULAR UPPER BODY CLOTHING: A LITTLE
TOILETING: A LOT
DAILY ACTIVITIY SCORE: 15
DRESSING REGULAR LOWER BODY CLOTHING: A LOT
CLIMB 3 TO 5 STEPS WITH RAILING: A LOT
HELP NEEDED FOR BATHING: TOTAL
MOVING FROM LYING ON BACK TO SITTING ON SIDE OF FLAT BED WITH BEDRAILS: A LOT
DAILY ACTIVITIY SCORE: 16
MOBILITY SCORE: 15
DRESSING REGULAR LOWER BODY CLOTHING: A LOT
STANDING UP FROM CHAIR USING ARMS: A LITTLE
STANDING UP FROM CHAIR USING ARMS: A LITTLE
PERSONAL GROOMING: A LITTLE
TOILETING: A LOT
WALKING IN HOSPITAL ROOM: A LITTLE
WALKING IN HOSPITAL ROOM: A LITTLE
DAILY ACTIVITIY SCORE: 16
CLIMB 3 TO 5 STEPS WITH RAILING: A LOT
STANDING UP FROM CHAIR USING ARMS: A LITTLE
MOVING FROM LYING ON BACK TO SITTING ON SIDE OF FLAT BED WITH BEDRAILS: A LOT
MOVING TO AND FROM BED TO CHAIR: A LITTLE
HELP NEEDED FOR BATHING: A LOT
MOBILITY SCORE: 15
MOVING TO AND FROM BED TO CHAIR: A LITTLE
HELP NEEDED FOR BATHING: A LOT
TURNING FROM BACK TO SIDE WHILE IN FLAT BAD: A LOT
MOVING TO AND FROM BED TO CHAIR: A LITTLE
TOILETING: A LOT
PERSONAL GROOMING: A LITTLE
TURNING FROM BACK TO SIDE WHILE IN FLAT BAD: A LOT
DRESSING REGULAR LOWER BODY CLOTHING: A LOT
DRESSING REGULAR UPPER BODY CLOTHING: A LITTLE
MOBILITY SCORE: 15
DRESSING REGULAR UPPER BODY CLOTHING: A LITTLE
CLIMB 3 TO 5 STEPS WITH RAILING: A LOT
WALKING IN HOSPITAL ROOM: A LITTLE

## 2024-08-21 ASSESSMENT — PAIN SCALES - GENERAL
PAINLEVEL_OUTOF10: 3
PAINLEVEL_OUTOF10: 7
PAINLEVEL_OUTOF10: 0 - NO PAIN
PAINLEVEL_OUTOF10: 5 - MODERATE PAIN
PAINLEVEL_OUTOF10: 8
PAINLEVEL_OUTOF10: 7

## 2024-08-21 ASSESSMENT — PAIN - FUNCTIONAL ASSESSMENT
PAIN_FUNCTIONAL_ASSESSMENT: 0-10

## 2024-08-21 ASSESSMENT — PAIN DESCRIPTION - ORIENTATION: ORIENTATION: RIGHT

## 2024-08-21 ASSESSMENT — PAIN DESCRIPTION - DESCRIPTORS: DESCRIPTORS: ACHING

## 2024-08-21 ASSESSMENT — PAIN DESCRIPTION - LOCATION: LOCATION: HIP

## 2024-08-21 NOTE — PROGRESS NOTES
Physical Therapy    Physical Therapy Treatment    Patient Name: Levy Gregory  MRN: [id number]  Today's Date: 8/21/2024  Time Calculation  Start Time: 1540  Stop Time: 1605  Time Calculation (min): 25 min     3118/3118-A     08/21/24 1535   PT  Visit   PT Received On 08/21/24   Response to Previous Treatment Patient with no complaints from previous session.   General   Reason for Referral Pt is s/p R hip yonas 8/16/2024.   Referred By Cj Kahn MD   Past Medical History Relevant to Rehab Per EMR: 68-year-old gentleman with a fall at home states that he awakened early this morning in the dark and tried to get to the bathroom.  He was on Eliquis and his last dose was yesterday but his PCP told him to discontinue use so he did not take a dose this morning.  He states he did not strike his head.  Denies any headache or shortness of breath.  States he was mildly dizzy when his fall occurred.  Denies any fevers or chills.  Has had an extensive surgical history in the past.  Received pain medications by EMS that were ineffective.  He is not currently dizzy or lightheaded.     (+) right hip fracture.  Patient had the following completed 8/16/2023: right hip hemiarthroplasty completed by Dr Washington.   Prior to Session Communication Bedside nurse   Patient Position Received Up in chair;Alarm on   Preferred Learning Style verbal   General Comment Pt. in the bed when I arrived. Pain right hip 5/10 ti start session. 7/10 after return to bed. Ice pack to L hip, Nuse was in to give pain mes.. Cues to maintain hip precautions with mobility.   Precautions   LE Weight Bearing Status Weight Bearing as Tolerated  (RLE WBAT)   Medical Precautions Fall precautions   Post-Surgical Precautions Right hip precautions   Precautions Comment Bed/Chair alarm   Cognition   Orientation Level Oriented X4   Therapeutic Exercise   Therapeutic Exercise Performed Yes  (Seated ROM, Heel/Toe raises, Assisted hip flexion,  LAQ, x 10 reps.)    Therapeutic Activity   Therapeutic Activity Performed Yes   Bed Mobility   Bed Mobility Yes   Bed Mobility 1   Bed Mobility 1 Sitting to supine   Level of Assistance 1 Moderate assistance  (Assist to lift R LE onto the bed Cues to maintain  hip precautions when moing on [facility] bed.)   Ambulation/Gait Training   Ambulation/Gait Training Performed Yes   Ambulation/Gait Training 1   Surface 1 Level tile   Device 1 Rolling walker   Gait Support Devices Gait belt   Assistance 1 Contact guard   Quality of Gait 1 Decreased step length;Diminished heel strike   Comments/Distance (ft) 1 Pt,.ambulated with the wheeled walker no loss of balance.Steady. Standing rest breaks.   Transfers   Transfer Yes   Transfer 1   Technique 1 Sit to stand;Stand to sit   Transfer Device 1 Walker;Gait belt   Transfer Level of Assistance 1 Minimum assistance   Trials/Comments 1 Cues for hand place placemnt for sit to stand.   PT Assessment   Rehab Prognosis Good   End of Session Communication Bedside nurse   Assessment Comment Pt continues to require 2 person assist for transfers and bed mobility at this time. Pt educated on hip precautions prior to and during mobility. Pt would benefit from continued acute care PT during hospital LOS and upon discharge at a high level intensity.   End of Session Patient Position Bed, 2 rail up;Alarm on   Outpatient Education   Education Comment Pt educated on R hip precautions prior to mobility training verbally and with demonstration.   PT Plan   Inpatient/Swing Bed or Outpatient Inpatient   PT Plan   PT Plan Ongoing PT   PT Frequency Daily   PT Discharge Recommendations High intensity level of continued care         Assessment/Plan   PT Assessment  Rehab Prognosis: Good  End of Session Communication: Bedside nurse  Assessment Comment: Pt continues to require 2 person assist for transfers and bed mobility at this time. Pt educated on hip precautions prior to and during mobility. Pt would benefit from continued  acute care PT during hospital LOS and upon discharge at a high level intensity.  End of Session Patient Position: Bed, 2 rail up, Alarm on  PT Plan  Inpatient/Swing Bed or Outpatient: Inpatient  PT Plan  PT Plan: Ongoing PT  PT Frequency: Daily  PT Discharge Recommendations: High intensity level of continued care  PT Recommended Transfer Status: Assist x2, Assistive device  PT - OK to Discharge: Yes    Outcome Measures:  [facility] Basic Mobility  Turning from your back to your side while in a flat bed without using bedrails: A lot  Moving from lying on your back to sitting on the side of a flat bed without using bedrails: A lot  Moving to and from bed to chair (including a wheelchair): A little  Standing up from a chair using your arms (e.g. wheelchair or bedside chair): A little  To walk in hospital room: A little  Climbing 3-5 steps with railing: A lot  Basic Mobility - Total Score: 15                             EDUCATION:  Outpatient Education  Patient Response to Education: Patient/Caregiver Verbalized Understanding of Information  Education Comment: Pt educated on R hip precautions prior to mobility training verbally and with demonstration.  Education Documentation  Home Exercise Program, taught by Cosme Worthington PTA at 8/21/2024  5:03 PM.  Learner: Patient  Readiness: Acceptance  Method: Explanation, Demonstration  Response: Needs Reinforcement, Verbalizes Understanding    Precautions, taught by Cosme Worthington PTA at 8/21/2024  5:03 PM.  Learner: Patient  Readiness: Acceptance  Method: Explanation, Demonstration  Response: Needs Reinforcement, Verbalizes Understanding    Body Mechanics, taught by Cosme Worthington PTA at 8/21/2024  5:03 PM.  Learner: Patient  Readiness: Acceptance  Method: Explanation, Demonstration  Response: Needs Reinforcement, Verbalizes Understanding    Mobility Training, taught by Cosme Worthington PTA at 8/21/2024  5:03 PM.  Learner: Patient  Readiness: Acceptance  Method: Explanation,  Demonstration  Response: Needs Reinforcement, Verbalizes Understanding    Education Comments  No comments found.        GOALS:  Encounter Problems       Encounter Problems (Active)       PT Problem       Pt will demonstrate ability to complete bed mobility with Tk x 1  (Progressing)       Start:  08/17/24    Expected End:  08/31/24            Pt will demonstrate ability to complete all transfers with Tk x 1 and use of FWW  (Progressing)       Start:  08/17/24    Expected End:  08/31/24            Pt will demonstrate ability to ambulate 100' with Tk x 1 and use of FWW  (Progressing)       Start:  08/17/24    Expected End:  08/31/24            Pt will demonstrate ability to verbalize and independently follow hip precautions throughout therapy sessions.  (Progressing)       Start:  08/17/24               Pain - Adult

## 2024-08-21 NOTE — PROGRESS NOTES
Occupational Therapy    OT Treatment    Patient Name: Levy Gregory  MRN: [id number]  Today's Date: 8/21/2024  Time Calculation  Start Time: 1327  Stop Time: 1345  Time Calculation (min): 18 min       3118/3118-A    Assessment:  End of Session Communication: Bedside nurse  End of Session Patient Position: Up in chair, Alarm on       Plan:  OT Frequency: Daily  OT Discharge Recommendations: High intensity level of continued care     Subjective      08/21/24 1327   OT Last Visit   OT Received On 08/21/24   General   Reason for Referral Pt is s/p R hip yonas 8/16/2024.   Referred By Cj Kahn MD   Prior to Session Communication Bedside nurse   Patient Position Received Bed, 3 rail up;Alarm on   General Comment Pt agreeable to tx and cleared for participation. Pt is pleasant and cooperative, agreeable for bed>chair only at this time as reports increased pain, nursing informed.   Precautions   LE Weight Bearing Status Weight Bearing as Tolerated   Post-Surgical Precautions Right hip precautions   Pain Assessment   Pain Assessment 0-10   0-10 (Numeric) Pain Score 8   Pain Type Surgical pain   Pain Location Hip   Pain Orientation Right   Pain Interventions Rest;Repositioned   Cognition   Overall Cognitive Status WFL   Bed Mobility 1   Bed Mobility 1 Sitting to supine   Level of Assistance 1 Moderate assistance;Minimal verbal cues   Bed Mobility Comments 1 HOB elevated, educated in use of gait belt as leg  with fair effort. Pt ultimately required Mod A with use of draw sheet to scoot hips out toward EOB.   Transfer 1   Transfer From 1 Bed to   Transfer to 1 Stand   Technique 1 Sit to stand   Transfer Device 1 Gait belt;Walker   Transfer Level of Assistance 1 Minimum assistance;Minimal verbal cues   Trials/Comments 1 STS from elevated EOB with cues for technique and precautions. Pt ambulated short distance bed>recliner at fww level with Min A, cues for technqiue with fww and AD with good follow through.   IP  OT Assessment   End of Session Communication Bedside nurse   End of Session Patient Position Up in chair;Alarm on   Inpatient Plan   OT Frequency Daily   OT Discharge Recommendations High intensity level of continued care     Outcome Measures:[facility] Daily Activity  Putting on and taking off regular lower body clothing: A lot  Bathing (including washing, rinsing, drying): A lot  Putting on and taking off regular upper body clothing: A little  Toileting, which includes using toilet, bedpan or urinal: A lot  Taking care of personal grooming such as brushing teeth: A little  Eating Meals: None  Daily Activity - Total Score: 16  Education Documentation  No documentation found.  Education Comments  No comments found.            Goals:  Encounter Problems       Encounter Problems (Active)       Dressings Lower Extremities       STG - Patient will complete lower body dressing with mod assist with comp strategies and AE (Progressing)       Start:  08/17/24    Expected End:  08/23/24               Functional Balance       STG-Patient will be min assist with assistive device dynamic stand task >5 minutes for ADL completion   (Progressing)       Start:  08/17/24    Expected End:  08/31/24               Functional Mobility       STG-Patient will be min assist with assistive device functional mobility tasks   (Progressing)       Start:  08/17/24    Expected End:  08/31/24               OT Transfers       STG-Patient will be min assist with functional transfers demonstrating good safety   (Progressing)       Start:  08/17/24    Expected End:  08/31/24               Safety       STG-Patient will independently verbalize hip precautions with all functional tasks   (Progressing)       Start:  08/17/24    Expected End:  08/31/24

## 2024-08-21 NOTE — CARE PLAN
The clinical goals for the shift include remain free from worsening pain thru end of this shift.  Medicated once overnight for pain.       Problem: Pain - Adult  Goal: Verbalizes/displays adequate comfort level or baseline comfort level  Outcome: Progressing     Problem: Safety - Adult  Goal: Free from fall injury  Outcome: Progressing     Problem: Discharge Planning  Goal: Discharge to home or other facility with appropriate resources  Outcome: Progressing     Problem: Chronic Conditions and Co-morbidities  Goal: Patient's chronic conditions and co-morbidity symptoms are monitored and maintained or improved  Outcome: Progressing     Problem: Diabetes  Goal: Achieve decreasing blood glucose levels by end of shift  Outcome: Progressing  Goal: Increase stability of blood glucose readings by end of shift  Outcome: Progressing  Goal: Decrease in ketones present in urine by end of shift  Outcome: Progressing  Goal: Maintain electrolyte levels within acceptable range throughout shift  Outcome: Progressing  Goal: Maintain glucose levels >70mg/dl to <250mg/dl throughout shift  Outcome: Progressing  Goal: No changes in neurological exam by end of shift  Outcome: Progressing  Goal: Learn about and adhere to nutrition recommendations by end of shift  Outcome: Progressing  Goal: Vital signs within normal range for age by end of shift  Outcome: Progressing  Goal: Increase self care and/or family involovement by end of shift  Outcome: Progressing  Goal: Receive DSME education by end of shift  Outcome: Progressing     Problem: Skin  Goal: Participates in plan/prevention/treatment measures  Outcome: Progressing  Goal: Prevent/manage excess moisture  Outcome: Progressing  Goal: Prevent/minimize sheer/friction injuries  Outcome: Progressing  Goal: Promote/optimize nutrition  Outcome: Progressing  Goal: Promote skin healing  Outcome: Progressing     Problem: Pain  Goal: Takes deep breaths with improved pain control throughout the  shift  Outcome: Progressing  Goal: Turns in bed with improved pain control throughout the shift  Outcome: Progressing  Goal: Walks with improved pain control throughout the shift  Outcome: Progressing  Goal: Performs ADL's with improved pain control throughout shift  Outcome: Progressing  Goal: Participates in PT with improved pain control throughout the shift  Outcome: Progressing  Goal: Free from opioid side effects throughout the shift  Outcome: Progressing  Goal: Free from acute confusion related to pain meds throughout the shift  Outcome: Progressing

## 2024-08-21 NOTE — CARE PLAN
The clinical goals for the shift include remain afebrile this shift      Problem: Pain - Adult  Goal: Verbalizes/displays adequate comfort level or baseline comfort level  Outcome: Progressing  Flowsheets (Taken 8/21/2024 1443)  Verbalizes/displays adequate comfort level or baseline comfort level:   Encourage patient to monitor pain and request assistance   Assess pain using appropriate pain scale   Administer analgesics based on type and severity of pain and evaluate response   Implement non-pharmacological measures as appropriate and evaluate response   Consider cultural and social influences on pain and pain management   Notify Licensed Independent Practitioner if interventions unsuccessful or patient reports new pain     Problem: Safety - Adult  Goal: Free from fall injury  Outcome: Progressing  Flowsheets (Taken 8/21/2024 1443)  Free from fall injury:   Instruct family/caregiver on patient safety   Based on caregiver fall risk screen, instruct family/caregiver to ask for assistance with transferring infant if caregiver noted to have fall risk factors     Problem: Discharge Planning  Goal: Discharge to home or other facility with appropriate resources  Outcome: Progressing  Flowsheets (Taken 8/21/2024 1443)  Discharge to home or other facility with appropriate resources:   Identify barriers to discharge with patient and caregiver   Arrange for needed discharge resources and transportation as appropriate   Identify discharge learning needs (meds, wound care, etc)   Arrange for interpreters to assist at discharge as needed   Refer to discharge planning if patient needs post-hospital services based on physician order or complex needs related to functional status, cognitive ability or social support system     Problem: Chronic Conditions and Co-morbidities  Goal: Patient's chronic conditions and co-morbidity symptoms are monitored and maintained or improved  Outcome: Progressing  Flowsheets (Taken 8/21/2024  1443)  Care Plan - Patient's Chronic Conditions and Co-Morbidity Symptoms are Monitored and Maintained or Improved:   Monitor and assess patient's chronic conditions and comorbid symptoms for stability, deterioration, or improvement   Collaborate with multidisciplinary team to address chronic and comorbid conditions and prevent exacerbation or deterioration   Update acute care plan with appropriate goals if chronic or comorbid symptoms are exacerbated and prevent overall improvement and discharge     Problem: Diabetes  Goal: Achieve decreasing blood glucose levels by end of shift  Outcome: Progressing  Flowsheets (Taken 8/21/2024 1443)  Achieve decreasing blood glucose levels by end of shift: Med administration/monitoring of effect  Goal: Increase stability of blood glucose readings by end of shift  Outcome: Progressing  Flowsheets (Taken 8/21/2024 1443)  Increase stability of blood glucose readings by end of shift: Med administration/monitoring of effect  Goal: Decrease in ketones present in urine by end of shift  Outcome: Progressing  Flowsheets (Taken 8/21/2024 1443)  Decrease in ketones present in urine by end of shift:   Med administration/monitoring of effect   Monitor urine output  Goal: Maintain electrolyte levels within acceptable range throughout shift  Outcome: Progressing  Flowsheets (Taken 8/21/2024 1443)  Maintain electrolyte levels within acceptable range throughout shift:   Med administration/monitoring of effect   Monitor urine output  Goal: Maintain glucose levels >70mg/dl to <250mg/dl throughout shift  Outcome: Progressing  Flowsheets (Taken 8/21/2024 1443)  Maintain glucose levels >70mg/dl to <250mg/dl throughout shift: Med administration/monitoring of effect  Goal: No changes in neurological exam by end of shift  Outcome: Progressing  Goal: Learn about and adhere to nutrition recommendations by end of shift  Outcome: Progressing  Flowsheets (Taken 8/21/2024 1443)  Learn about and adhere to  nutrition recommendations by end of shift: Ensure/encourage compliance with appropriate diet  Goal: Vital signs within normal range for age by end of shift  Outcome: Progressing  Flowsheets (Taken 8/21/2024 1443)  Vital signs within normal range for age by end of shift: Med administration/monitoring of effect  Goal: Increase self care and/or family involovement by end of shift  Outcome: Progressing  Goal: Receive DSME education by end of shift  Outcome: Progressing     Problem: Skin  Goal: Participates in plan/prevention/treatment measures  Outcome: Progressing  Flowsheets (Taken 8/21/2024 1443)  Participates in plan/prevention/treatment measures:   Discuss with provider PT/OT consult   Elevate heels   Increase activity/out of bed for meals  Goal: Prevent/manage excess moisture  Outcome: Progressing  Flowsheets (Taken 8/21/2024 1443)  Prevent/manage excess moisture:   Cleanse incontinence/protect with barrier cream   Follow provider orders for dressing changes   Monitor for/manage infection if present   Moisturize dry skin   Use wicking fabric (obtain order)  Goal: Prevent/minimize sheer/friction injuries  Outcome: Progressing  Flowsheets (Taken 8/21/2024 1443)  Prevent/minimize sheer/friction injuries:   Complete micro-shifts as needed if patient unable. Adjust patient position to relieve pressure points, not a full turn   HOB 30 degrees or less   Increase activity/out of bed for meals   Turn/reposition every 2 hours/use positioning/transfer devices   Use pull sheet  Goal: Promote/optimize nutrition  Outcome: Progressing  Flowsheets (Taken 8/21/2024 1443)  Promote/optimize nutrition:   Assist with feeding   Discuss with provider if NPO > 2 days   Offer water/supplements/favorite foods   Consume > 50% meals/supplements   Monitor/record intake including meals   Reassess MST if dietician not consulted  Goal: Promote skin healing  Outcome: Progressing  Flowsheets (Taken 8/21/2024 1443)  Promote skin healing:   Assess  skin/pad under line(s)/device(s)   Protective dressings over bony prominences   Turn/reposition every 2 hours/use positioning/transfer devices     Problem: Pain  Goal: Takes deep breaths with improved pain control throughout the shift  Outcome: Progressing  Goal: Turns in bed with improved pain control throughout the shift  Outcome: Progressing  Goal: Walks with improved pain control throughout the shift  Outcome: Progressing  Goal: Performs ADL's with improved pain control throughout shift  Outcome: Progressing  Goal: Participates in PT with improved pain control throughout the shift  Outcome: Progressing  Goal: Free from opioid side effects throughout the shift  Outcome: Progressing  Goal: Free from acute confusion related to pain meds throughout the shift  Outcome: Progressing     Problem: Dressings Lower Extremities  Goal: STG - Patient will complete lower body dressing with mod assist with comp strategies and AE  Outcome: Progressing     Problem: Fall/Injury  Goal: Not fall by end of shift  Outcome: Progressing  Goal: Be free from injury by end of the shift  Outcome: Progressing  Goal: Verbalize understanding of personal risk factors for fall in the hospital  Outcome: Progressing  Goal: Verbalize understanding of risk factor reduction measures to prevent injury from fall in the home  Outcome: Progressing  Goal: Use assistive devices by end of the shift  Outcome: Progressing  Goal: Pace activities to prevent fatigue by end of the shift  Outcome: Progressing   .

## 2024-08-21 NOTE — PROGRESS NOTES
08/21/24 1126   Discharge Planning   Expected Discharge Disposition IRF   Does the patient need discharge transport arranged? Yes   RoundTrip coordination needed? Yes   Has discharge transport been arranged? No     Received message from TATYANA MATA that appeal is still pending at this time.

## 2024-08-22 LAB
GLUCOSE BLD MANUAL STRIP-MCNC: 133 MG/DL (ref 74–99)
GLUCOSE BLD MANUAL STRIP-MCNC: 168 MG/DL (ref 74–99)
GLUCOSE BLD MANUAL STRIP-MCNC: 198 MG/DL (ref 74–99)
GLUCOSE BLD MANUAL STRIP-MCNC: 217 MG/DL (ref 74–99)

## 2024-08-22 PROCEDURE — 2500000001 HC RX 250 WO HCPCS SELF ADMINISTERED DRUGS (ALT 637 FOR MEDICARE OP): Performed by: ORTHOPAEDIC SURGERY

## 2024-08-22 PROCEDURE — 2500000004 HC RX 250 GENERAL PHARMACY W/ HCPCS (ALT 636 FOR OP/ED): Performed by: INTERNAL MEDICINE

## 2024-08-22 PROCEDURE — 82947 ASSAY GLUCOSE BLOOD QUANT: CPT

## 2024-08-22 PROCEDURE — 2500000001 HC RX 250 WO HCPCS SELF ADMINISTERED DRUGS (ALT 637 FOR MEDICARE OP): Performed by: INTERNAL MEDICINE

## 2024-08-22 PROCEDURE — 1100000001 HC PRIVATE ROOM DAILY

## 2024-08-22 PROCEDURE — 2500000002 HC RX 250 W HCPCS SELF ADMINISTERED DRUGS (ALT 637 FOR MEDICARE OP, ALT 636 FOR OP/ED): Performed by: ORTHOPAEDIC SURGERY

## 2024-08-22 RX ORDER — MIRTAZAPINE 15 MG/1
15 TABLET, FILM COATED ORAL NIGHTLY PRN
Status: DISCONTINUED | OUTPATIENT
Start: 2024-08-22 | End: 2024-08-24 | Stop reason: HOSPADM

## 2024-08-22 ASSESSMENT — PAIN SCALES - GENERAL
PAINLEVEL_OUTOF10: 0 - NO PAIN
PAINLEVEL_OUTOF10: 2

## 2024-08-22 NOTE — CARE PLAN
The patient's goals for the shift include  sleep and comfort    The clinical goals for the shift include remainhemodynamically stable this shift    Over the shift, the patient did not make progress toward the following goals.- pt stated slept very little.  Barriers to progression include anxiety over d/c plan,discomfort, continued rt hip pain-stated better but still bothering pt.  Recommendations to address these barriers include sleep aide, relaxation,address pain with pcp today..

## 2024-08-22 NOTE — PROGRESS NOTES
Physical Therapy                 Therapy Communication Note    Patient Name: Levy Gregory  MRN: [id number]  Today's Date: 8/22/2024     Discipline: Physical Therapy    Room 3118    Missed Time:   Missed Visit Reason:     Comment     08/22/24 1358   General   Missed Visit Yes   Missed Visit Reason Other (Comment)  (Attempted to see pt. at this time but he c/o not feeling well. He c/o being very tired not ablet to sleep last night due to coughing. PT stated his cough was progductive. Pt. uses the incentive spirometer, coughing after ever 1-2 breaths. Nurse informed.)

## 2024-08-22 NOTE — PROGRESS NOTES
Occupational Therapy                 Therapy Communication Note    Patient Name: Levy Gregory  MRN: [id number]  Today's Date: 8/22/2024     Discipline: Occupational Therapy    Missed Visit Reason: Missed Visit Reason: Patient sleeping    Missed Time: Attempt    Comment: Attempted x2 this date, first attempt pt declined as had recently returned to bed, reports slept poorly. Second attempt pt asleep in bed, will continue to follow and attempt at next opportunity.

## 2024-08-23 LAB
GLUCOSE BLD MANUAL STRIP-MCNC: 163 MG/DL (ref 74–99)
GLUCOSE BLD MANUAL STRIP-MCNC: 165 MG/DL (ref 74–99)
GLUCOSE BLD MANUAL STRIP-MCNC: 166 MG/DL (ref 74–99)
GLUCOSE BLD MANUAL STRIP-MCNC: 183 MG/DL (ref 74–99)

## 2024-08-23 PROCEDURE — 2500000005 HC RX 250 GENERAL PHARMACY W/O HCPCS: Performed by: INTERNAL MEDICINE

## 2024-08-23 PROCEDURE — 2500000002 HC RX 250 W HCPCS SELF ADMINISTERED DRUGS (ALT 637 FOR MEDICARE OP, ALT 636 FOR OP/ED): Performed by: ORTHOPAEDIC SURGERY

## 2024-08-23 PROCEDURE — 2500000004 HC RX 250 GENERAL PHARMACY W/ HCPCS (ALT 636 FOR OP/ED): Performed by: INTERNAL MEDICINE

## 2024-08-23 PROCEDURE — 2500000001 HC RX 250 WO HCPCS SELF ADMINISTERED DRUGS (ALT 637 FOR MEDICARE OP): Performed by: ORTHOPAEDIC SURGERY

## 2024-08-23 PROCEDURE — 97110 THERAPEUTIC EXERCISES: CPT | Mod: GP,CQ

## 2024-08-23 PROCEDURE — 1100000001 HC PRIVATE ROOM DAILY

## 2024-08-23 PROCEDURE — 97116 GAIT TRAINING THERAPY: CPT | Mod: GP,CQ

## 2024-08-23 PROCEDURE — 82947 ASSAY GLUCOSE BLOOD QUANT: CPT

## 2024-08-23 PROCEDURE — 2500000001 HC RX 250 WO HCPCS SELF ADMINISTERED DRUGS (ALT 637 FOR MEDICARE OP): Performed by: INTERNAL MEDICINE

## 2024-08-23 ASSESSMENT — COGNITIVE AND FUNCTIONAL STATUS - GENERAL
DRESSING REGULAR LOWER BODY CLOTHING: A LITTLE
MOVING TO AND FROM BED TO CHAIR: A LITTLE
MOVING FROM LYING ON BACK TO SITTING ON SIDE OF FLAT BED WITH BEDRAILS: A LITTLE
WALKING IN HOSPITAL ROOM: A LITTLE
DRESSING REGULAR UPPER BODY CLOTHING: A LITTLE
WALKING IN HOSPITAL ROOM: A LITTLE
CLIMB 3 TO 5 STEPS WITH RAILING: A LOT
CLIMB 3 TO 5 STEPS WITH RAILING: A LOT
STANDING UP FROM CHAIR USING ARMS: A LITTLE
MOBILITY SCORE: 16
TOILETING: A LITTLE
PERSONAL GROOMING: A LITTLE
DAILY ACTIVITIY SCORE: 19
MOVING TO AND FROM BED TO CHAIR: A LITTLE
STANDING UP FROM CHAIR USING ARMS: A LITTLE
PERSONAL GROOMING: A LITTLE
TOILETING: A LITTLE
MOBILITY SCORE: 16
HELP NEEDED FOR BATHING: A LITTLE
DRESSING REGULAR LOWER BODY CLOTHING: A LITTLE
TURNING FROM BACK TO SIDE WHILE IN FLAT BAD: A LOT
HELP NEEDED FOR BATHING: A LITTLE
TURNING FROM BACK TO SIDE WHILE IN FLAT BAD: A LOT
DRESSING REGULAR UPPER BODY CLOTHING: A LITTLE
DAILY ACTIVITIY SCORE: 19
MOVING FROM LYING ON BACK TO SITTING ON SIDE OF FLAT BED WITH BEDRAILS: A LITTLE

## 2024-08-23 ASSESSMENT — PAIN SCALES - GENERAL
PAINLEVEL_OUTOF10: 0 - NO PAIN
PAINLEVEL_OUTOF10: 8
PAINLEVEL_OUTOF10: 0 - NO PAIN
PAINLEVEL_OUTOF10: 4

## 2024-08-23 ASSESSMENT — PAIN - FUNCTIONAL ASSESSMENT
PAIN_FUNCTIONAL_ASSESSMENT: 0-10
PAIN_FUNCTIONAL_ASSESSMENT: 0-10

## 2024-08-23 NOTE — PROGRESS NOTES
Spiritual Care Visit    Clinical Encounter Type  Visited With: Patient and family together  Routine Visit: Follow-up  Continue Visiting: No    Faith Encounters  Faith Needs: Prayer                                       Taxonomy  Intended Effects: Establish rapport and connectedness, Padmini affirmation, Build relationship of care and support, Demonstrate caring and concern    When I visited, the patient was preparing for discharge.

## 2024-08-23 NOTE — PROGRESS NOTES
08/23/24 0933   Discharge Planning   Expected Discharge Disposition SNF   Does the patient need discharge transport arranged? Yes   RoundTrip coordination needed? Yes   Has discharge transport been arranged? No     Denial for AR was upheld. Patient updated and requesting SNF list and for me to call his wife to let her know as well. Spoke to patient's wife Symone and she also requested the list to be sent to her cell phone. List printed for patient and sent to wife. TCC to follow up for choices.     0950: Patient requesting to see TCC, met with patient at bedside. Patient states he would like referrals sent to [facility] and [facility] [place]. Message sent to [facility] to request referrals sent.     1530: Spoke to patient at bedside to get FOC, he stated to call his wife. Spoke to Symone on the phone and she stated [facility]. Message sent to facility to update them on FOC. Message sent to direct precert team to start precert to [facility].

## 2024-08-23 NOTE — CARE PLAN
The patient's goals for the shift include      The clinical goals for the shift include remain afebrile    Has been afebrile thru the end of shift.      Problem: Pain - Adult  Goal: Verbalizes/displays adequate comfort level or baseline comfort level  Outcome: Progressing     Problem: Safety - Adult  Goal: Free from fall injury  Outcome: Progressing

## 2024-08-23 NOTE — PROGRESS NOTES
"Nutrition Initial Assessment:   Nutrition Assessment    Reason for Assessment: Length of stay    Patient is a 68 y.o. male presenting from home w/wife for diabetic neuropathy associated w/type 2 diabetes. POD 7 rt hip arthroplasty post fall at home. Pt awaiting precert.     Past Medical History   has a past medical history of Achalasia, esophageal, Anemia, Contusion of abdominal wall, initial encounter (01/12/2021), COPD (chronic obstructive pulmonary disease) (Multi), Diabetes mellitus (Multi), DVT (deep venous thrombosis) (Multi), DVT (deep venous thrombosis) (Multi), Dysphagia, GERD (gastroesophageal reflux disease), Hemoptysis (05/12/2020), Herpes labialis, Hiatal hernia, Hyperlipidemia, Hypotension due to drugs, Irregular heart beat, Pain in left knee, Peripheral neuropathy, Personal history of other diseases of the respiratory system (06/19/2019), Sleep apnea, and Solar purpura (CMS-[facility]).  Surgical History   has a past surgical history that includes Other surgical history (01/21/2019); IR CVC PICC (08/16/2023); Umbilical hernia repair (N/A, 2013); Gastrostomy tube placement (N/A); Esophagogastroduodenoscopy; Esophagogastrectomy; and Conduit replacement (12/07/2023).        Nutrition History:  Food and Nutrient History: Pt reports regular diet PTA, diabetic but doesn't do carb counting. Pt wife does the meal prep. Per meal documentation: eating 100% of meals 8/19 and 8/21. Pt ate 33%B on 8/22 and refused breakfast today. Pt frustrated w/continued admission, awaiting precert  Food Allergies/Intolerances:  None  GI Symptoms: None  Oral Problems: None       Anthropometrics:  Height: 185.4 cm (6' 1\")   Weight: 99.8 kg (220 lb)   BMI (Calculated): 29.03             Weight History:   Wt Readings from Last 22 Encounters:   08/15/24 99.8 kg (220 lb)   08/14/24 101 kg (223 lb)   08/13/24 101 kg (223 lb)   07/29/24 102 kg (225 lb 15.5 oz)   07/09/24 103 kg (226 lb)   07/06/24 99.8 kg (220 lb)   05/31/24 99.8 kg (220 lb) "   05/22/24 102 kg (224 lb)   05/21/24 102 kg (224 lb)   05/13/24 99.2 kg (218 lb 12.8 oz)   04/24/24 99.8 kg (220 lb 1.6 oz)   04/11/24 96.2 kg (212 lb)   04/09/24 97.5 kg (215 lb)   03/14/24 96.5 kg (212 lb 11.9 oz)   03/06/24 96.2 kg (212 lb)   02/20/24 93.9 kg (207 lb)   02/13/24 92.1 kg (203 lb)   02/07/24 93 kg (205 lb)   01/23/24 90.7 kg (200 lb)   01/18/24 90.4 kg (199 lb 4.7 oz)   12/28/23 86.5 kg (190 lb 11.2 oz)   12/12/23 90.9 kg (200 lb 4.6 oz)       Weight Change %:  Weight History / % Weight Change: stable wt per pt.    Nutrition Focused Physical Exam Findings:    Subcutaneous Fat Loss:   Orbital Fat Pads: Well nourished (slightly bulging fat pads)  Buccal Fat Pads: Well nourished (full, rounded cheeks)  Muscle Wasting:  Temporalis: Well nourished (well-defined muscle)  Pectoralis (Clavicular Region): Well nourished (clavicle not visible)  Edema:  Edema Location: nonpitting BLE  Physical Findings:  Skin: Negative (bruising, ecchymosis, rt leg wound)    Nutrition Significant Labs:  CBC Trend:   Results from last 7 days   Lab Units 08/17/24  0531   WBC AUTO x10*3/uL 8.9   RBC AUTO x10*6/uL 3.02*   HEMOGLOBIN g/dL 9.8*   HEMATOCRIT % 31.2*   MCV fL 103*   PLATELETS AUTO x10*3/uL 129*    , BMP Trend:   Results from last 7 days   Lab Units 08/17/24  0531   GLUCOSE mg/dL 182*   CALCIUM mg/dL 7.9*   SODIUM mmol/L 137   POTASSIUM mmol/L 4.7   CO2 mmol/L 22   CHLORIDE mmol/L 106   BUN mg/dL 31*   CREATININE mg/dL 1.51*    , A1C:  Lab Results   Component Value Date    HGBA1C 6.8 (H) 07/29/2024   , BG POCT trend:   Results from last 7 days   Lab Units 08/23/24  1634 08/23/24  1123 08/23/24  0752 08/22/24  2101 08/22/24  1812   POCT GLUCOSE mg/dL 183* 165* 166* 168* 133*    , Liver Function Trend:    , Vit D:   Lab Results   Component Value Date    VITD25 50 07/29/2024    , Vit B12:   Lab Results   Component Value Date    NYMCQOWB04 479 07/29/2024    , Iron Panel:   Lab Results   Component Value Date    IRON 39  07/24/2023    TIBC 253 07/24/2023    FERRITIN 2764 (H) 07/27/2023    , Folate:   Lab Results   Component Value Date    FOLATE 7.8 07/29/2024        Nutrition Specific Medications:  Scheduled medications  atorvastatin, 80 mg, oral, Nightly  enoxaparin, 40 mg, subcutaneous, q24h SYLVESTER  ferrous sulfate (325 mg ferrous sulfate), 65 mg of iron, oral, Daily  gabapentin, 100 mg, oral, TID  insulin lispro, 0-10 Units, subcutaneous, TID  midodrine, 10 mg, oral, TID  oxybutynin, 5 mg, oral, TID  pantoprazole, 40 mg, oral, Nightly  polyethylene glycol, 17 g, oral, Daily  sertraline, 25 mg, oral, Nightly  tamsulosin, 0.4 mg, oral, Nightly      Continuous medications     PRN medications  PRN medications: acetaminophen, benzocaine-menthol, lidocaine-diphenhydraMINE-Maalox 1:1:1, mirtazapine, [DISCONTINUED] ondansetron **OR** ondansetron, oxyCODONE.  0-10 (Numeric) Pain Score: 8     I/O:   Last BM Date: 08/20/24;      Dietary Orders (From admission, onward)       Start     Ordered    08/23/24 1634  Oral nutritional supplements  Until discontinued        Comments: PRN per pt request   Question:  Select supplement:  Answer:  Ensure High Protein    08/23/24 1634    08/16/24 1753  Adult diet Consistent Carb; CCD 60 gm/meal  Diet effective now        Question Answer Comment   Diet type Consistent Carb    Carb diet selection: CCD 60 gm/meal        08/16/24 1752                     Estimated Needs:   Total Energy Estimated Needs (kCal):  (1564-7122 (20-22kcal/kg))     Total Protein Estimated Needs (g):  (100-120 (1.0-1.2g/kg))     Total Fluid Estimated Needs (mL):  (1mL/kcal)           Nutrition Diagnosis   Malnutrition Diagnosis  Patient has Malnutrition Diagnosis: No    Nutrition Diagnosis  Patient has Nutrition Diagnosis: Yes  Diagnosis Status (1): New  Nutrition Diagnosis 1: Increased nutrient needs  Related to (1): acute nutritional stress  As Evidenced by (1): POD 7 right hip hemiarthroplasty.       Nutrition  Interventions/Recommendations         Nutrition Prescription:  Individualized Nutrition Prescription Provided for : Diet: continue w/oral diet as ordered of Consistent Carb 60g carb/meal        Nutrition Interventions:   Interventions: Meals and snacks, Medical food supplement  Meals and Snacks: Carbohydrate-modified diet  Goal: will consume 75% of meals.  Medical Food Supplement: Commercial beverage  Goal: declined, but aware of availability. Will add PRN Ensure HP (160kcal, 16g pro per serving)    Collaboration and Referral of Nutrition Care: Collaboration by nutrition professional with other providers  Goal: PRIYA brennan    Nutrition Education:   Encouraged PO intakes and discussed ONS.       Nutrition Monitoring and Evaluation   Food/Nutrient Related History Monitoring  Monitoring and Evaluation Plan: Energy intake  Energy Intake: Estimated energy intake  Criteria: will meet 75% of estimated energy needs         Biochemical Data, Medical Tests and Procedures  Monitoring and Evaluation Plan: Electrolyte/renal panel, Glucose/endocrine profile  Electrolyte and Renal Panel: Sodium, Potassium, Phosphorus, Magnesium, Creatinine  Criteria: WNR  Glucose/Endocrine Profile: Glucose, casual  Criteria: BG 70-180mg/dL    Nutrition Focused Physical Findings  Monitoring and Evaluation Plan: Skin  Skin: Other (Comment)  Criteria: promote healing         Time Spent (min): 45 minutes  Last RD note 8/23/24  Follow up 5-7 days

## 2024-08-23 NOTE — PROGRESS NOTES
Physical Therapy    Physical Therapy    Physical Therapy Treatment    Patient Name: Levy Gregory  MRN: [id number]  Today's Date: 8/23/2024  Time Calculation  Start Time: 0816  Stop Time: 0841  Time Calculation (min): 25 min     3118/3118-A         08/23/24 0816   PT  Visit   PT Received On 08/23/24   Response to Previous Treatment Patient with no complaints from previous session.   General   Reason for Referral Pt is s/p R hip yonas 8/16/2024.   Referred By Cj Kahn MD   Past Medical History Relevant to Rehab Per EMR: 68-year-old gentleman with a fall at home states that he awakened early this morning in the dark and tried to get to the bathroom.  He was on Eliquis and his last dose was yesterday but his PCP told him to discontinue use so he did not take a dose this morning.  He states he did not strike his head.  Denies any headache or shortness of breath.  States he was mildly dizzy when his fall occurred.  Denies any fevers or chills.  Has had an extensive surgical history in the past.  Received pain medications by EMS that were ineffective.  He is not currently dizzy or lightheaded.     (+) right hip fracture.  Patient had the following completed 8/16/2023: right hip hemiarthroplasty completed by Dr Washington.   Patient Position Received Bed, 3 rail up;Alarm on   General Comment Pr agreeable to therapy and cleared for participation   Precautions   LE Weight Bearing Status Weight Bearing as Tolerated  (RLE)   Medical Precautions Fall precautions   Post-Surgical Precautions Right hip precautions   Pain Assessment   Pain Assessment 0-10   0-10 (Numeric) Pain Score 8   Pain Type Acute pain   Pain Location Hip   Pain Orientation Right   Pain Interventions Repositioned;Medication (See MAR)   Cognition   Overall Cognitive Status WFL   Orientation Level Oriented X4   Therapeutic Exercise   Therapeutic Exercise Performed Yes   Therapeutic Exercise Activity 1 AP/GS/QS/LAQ/ADD/ABD x10 limited d/t pain   Bed  Mobility   Bed Mobility Yes   Bed Mobility 1   Bed Mobility 1 Supine to sitting   Level of Assistance 1 Moderate assistance;Moderate verbal cues   Bed Mobility Comments 1 Cues for sequencing anf technique while maintaining hip precautions. Increased time and effort to complete   Ambulation/Gait Training   Ambulation/Gait Training Performed Yes   Ambulation/Gait Training 1   Surface 1 Level tile   Device 1 Rolling walker   Gait Support Devices Gait belt   Assistance 1 Contact guard   Quality of Gait 1 Decreased step length;Diminished heel strike   Comments/Distance (ft) 1 15'x2, 30'x2 slow step to progressing to step through gait pattern. Min cues for sequencing with WW, good follow through. Pt is limite by dizziness.   Transfers   Transfer Yes   Transfer 1   Transfer From 1 Sit to   Transfer to 1 Stand   Technique 1 Sit to stand;Stand to sit   Transfer Device 1 Walker;Gait belt   Transfer Level of Assistance 1 Minimum assistance;Moderate verbal cues   Trials/Comments 1 Multiple STS from raised bed and chair levels. Cues for safe UE placement and sequencing for increased safety. good follow through.   Activity Tolerance   Endurance Tolerates 10 - 20 min exercise with multiple rests   PT Assessment   Rehab Prognosis Good   End of Session Communication Bedside nurse   Assessment Comment Pt is limited by pain, acid reflux and dizziness this session. min/modAx1 for all activities with cues for sequencing and safety, good follow through. Pt able to recall and maintain 3/3 hip precautions.   End of Session Patient Position Up in chair;Alarm on     Outcome Measures:  [facility] Basic Mobility  Turning from your back to your side while in a flat bed without using bedrails: A little  Moving from lying on your back to sitting on the side of a flat bed without using bedrails: A lot  Moving to and from bed to chair (including a wheelchair): A little  Standing up from a chair using your arms (e.g. wheelchair or bedside chair): A  little  To walk in hospital room: A little  Climbing 3-5 steps with railing: A lot  Basic Mobility - Total Score: 16                             EDUCATION:  Outpatient Education  Patient Response to Education: Patient/Caregiver Verbalized Understanding of Information  Education Comment: Pt educated on R hip precautions prior to mobility training verbally and with demonstration.  Education Documentation  No documentation found.  Education Comments  No comments found.        GOALS:  Encounter Problems       Encounter Problems (Active)       PT Problem       Pt will demonstrate ability to complete bed mobility with Tk x 1  (Progressing)       Start:  08/17/24    Expected End:  08/31/24            Pt will demonstrate ability to complete all transfers with Tk x 1 and use of FWW  (Progressing)       Start:  08/17/24    Expected End:  08/31/24            Pt will demonstrate ability to ambulate 100' with Tk x 1 and use of FWW  (Progressing)       Start:  08/17/24    Expected End:  08/31/24            Pt will demonstrate ability to verbalize and independently follow hip precautions throughout therapy sessions.  (Progressing)       Start:  08/17/24               Pain - Adult          Safety       LTG - Patient will adhere to hip precautions during ADL's and transfers       Start:  08/21/24            LTG - Patient will demonstrate safety requirements appropriate to situation/environment       Start:  08/21/24            LTG - Patient will utilize safety techniques       Start:  08/21/24            STG - Patient locks brakes on wheelchair       Start:  08/21/24            STG - Patient uses call light consistently to request assistance with transfers       Start:  08/21/24            STG - Patient uses gait belt during all transfers       Start:  08/21/24

## 2024-08-24 VITALS
BODY MASS INDEX: 29.16 KG/M2 | WEIGHT: 220 LBS | RESPIRATION RATE: 16 BRPM | OXYGEN SATURATION: 92 % | DIASTOLIC BLOOD PRESSURE: 62 MMHG | SYSTOLIC BLOOD PRESSURE: 109 MMHG | HEIGHT: 73 IN | TEMPERATURE: 96.8 F | HEART RATE: 84 BPM

## 2024-08-24 LAB — GLUCOSE BLD MANUAL STRIP-MCNC: 152 MG/DL (ref 74–99)

## 2024-08-24 PROCEDURE — 2500000002 HC RX 250 W HCPCS SELF ADMINISTERED DRUGS (ALT 637 FOR MEDICARE OP, ALT 636 FOR OP/ED): Performed by: ORTHOPAEDIC SURGERY

## 2024-08-24 PROCEDURE — 82947 ASSAY GLUCOSE BLOOD QUANT: CPT

## 2024-08-24 PROCEDURE — 2500000004 HC RX 250 GENERAL PHARMACY W/ HCPCS (ALT 636 FOR OP/ED): Performed by: INTERNAL MEDICINE

## 2024-08-24 PROCEDURE — 2500000001 HC RX 250 WO HCPCS SELF ADMINISTERED DRUGS (ALT 637 FOR MEDICARE OP): Performed by: ORTHOPAEDIC SURGERY

## 2024-08-24 ASSESSMENT — COGNITIVE AND FUNCTIONAL STATUS - GENERAL
DRESSING REGULAR UPPER BODY CLOTHING: A LITTLE
DAILY ACTIVITIY SCORE: 19
HELP NEEDED FOR BATHING: A LITTLE
MOVING TO AND FROM BED TO CHAIR: A LITTLE
TOILETING: A LITTLE
DRESSING REGULAR LOWER BODY CLOTHING: A LITTLE
MOBILITY SCORE: 18
WALKING IN HOSPITAL ROOM: A LITTLE
CLIMB 3 TO 5 STEPS WITH RAILING: A LOT
STANDING UP FROM CHAIR USING ARMS: A LITTLE
TURNING FROM BACK TO SIDE WHILE IN FLAT BAD: A LITTLE
PERSONAL GROOMING: A LITTLE

## 2024-08-24 ASSESSMENT — PAIN SCALES - GENERAL: PAINLEVEL_OUTOF10: 0 - NO PAIN

## 2024-08-24 NOTE — PROGRESS NOTES
08/24/24 0850   Discharge Planning   Expected Discharge Disposition SNF   Does the patient need discharge transport arranged? Yes   RoundTrip coordination needed? Yes   Has discharge transport been arranged? No     Precert obtained. Message sent to [facility] to request to send final paperwork, submit 7000 and arrange transport.    0940: Transport arranged for 12pm. Facility and nursing aware. Nursing to update patient. Patient's wife, Symone, called and notified of transport.

## 2024-08-24 NOTE — CARE PLAN
The patient's goals for the shift include      The clinical goals for the shift include Remain safe with no falls/injury.      Problem: Pain - Adult  Goal: Verbalizes/displays adequate comfort level or baseline comfort level  Outcome: Progressing     Problem: Safety - Adult  Goal: Free from fall injury  Outcome: Progressing     Problem: Discharge Planning  Goal: Discharge to home or other facility with appropriate resources  Outcome: Progressing     Problem: Chronic Conditions and Co-morbidities  Goal: Patient's chronic conditions and co-morbidity symptoms are monitored and maintained or improved  Outcome: Progressing     Problem: Diabetes  Goal: Achieve decreasing blood glucose levels by end of shift  Outcome: Progressing  Goal: Increase stability of blood glucose readings by end of shift  Outcome: Progressing  Goal: Decrease in ketones present in urine by end of shift  Outcome: Progressing  Goal: Maintain electrolyte levels within acceptable range throughout shift  Outcome: Progressing  Goal: Maintain glucose levels >70mg/dl to <250mg/dl throughout shift  Outcome: Progressing  Goal: No changes in neurological exam by end of shift  Outcome: Progressing  Goal: Learn about and adhere to nutrition recommendations by end of shift  Outcome: Progressing  Goal: Vital signs within normal range for age by end of shift  Outcome: Progressing  Goal: Increase self care and/or family involovement by end of shift  Outcome: Progressing  Goal: Receive DSME education by end of shift  Outcome: Progressing     Problem: Dressings Lower Extremities  Goal: STG - Patient will complete lower body dressing with mod assist with comp strategies and AE  Outcome: Progressing     Problem: Fall/Injury  Goal: Not fall by end of shift  Outcome: Progressing  Goal: Be free from injury by end of the shift  Outcome: Progressing  Goal: Verbalize understanding of personal risk factors for fall in the hospital  Outcome: Progressing  Goal: Verbalize  understanding of risk factor reduction measures to prevent injury from fall in the home  Outcome: Progressing  Goal: Use assistive devices by end of the shift  Outcome: Progressing  Goal: Pace activities to prevent fatigue by end of the shift  Outcome: Progressing

## 2024-08-26 ENCOUNTER — NURSING HOME VISIT (OUTPATIENT)
Dept: POST ACUTE CARE | Facility: EXTERNAL LOCATION | Age: 69
End: 2024-08-26
Payer: MEDICARE

## 2024-08-26 DIAGNOSIS — N17.9 ACUTE RENAL FAILURE SUPERIMPOSED ON CHRONIC KIDNEY DISEASE, UNSPECIFIED ACUTE RENAL FAILURE TYPE, UNSPECIFIED CKD STAGE (CMS-HCC): Primary | ICD-10-CM

## 2024-08-26 DIAGNOSIS — I95.1 ORTHOSTATIC HYPOTENSION: ICD-10-CM

## 2024-08-26 DIAGNOSIS — I82.402 DEEP VEIN THROMBOSIS (DVT) OF LEFT LOWER EXTREMITY, UNSPECIFIED CHRONICITY, UNSPECIFIED VEIN (MULTI): ICD-10-CM

## 2024-08-26 DIAGNOSIS — J44.9 CHRONIC OBSTRUCTIVE PULMONARY DISEASE, UNSPECIFIED COPD TYPE (MULTI): ICD-10-CM

## 2024-08-26 DIAGNOSIS — I11.0 HYPERTENSIVE HEART DISEASE WITH HEART FAILURE (MULTI): ICD-10-CM

## 2024-08-26 DIAGNOSIS — Z79.4 TYPE 2 DIABETES MELLITUS WITH HYPERGLYCEMIA, WITH LONG-TERM CURRENT USE OF INSULIN (MULTI): ICD-10-CM

## 2024-08-26 DIAGNOSIS — S72.001D CLOSED FRACTURE OF RIGHT HIP REQUIRING OPERATIVE REPAIR WITH ROUTINE HEALING: ICD-10-CM

## 2024-08-26 DIAGNOSIS — E11.65 TYPE 2 DIABETES MELLITUS WITH HYPERGLYCEMIA, WITH LONG-TERM CURRENT USE OF INSULIN (MULTI): ICD-10-CM

## 2024-08-26 DIAGNOSIS — Z79.01 ANTICOAGULANT LONG-TERM USE: ICD-10-CM

## 2024-08-26 DIAGNOSIS — N18.9 ACUTE RENAL FAILURE SUPERIMPOSED ON CHRONIC KIDNEY DISEASE, UNSPECIFIED ACUTE RENAL FAILURE TYPE, UNSPECIFIED CKD STAGE (CMS-HCC): Primary | ICD-10-CM

## 2024-08-26 DIAGNOSIS — I25.10 CORONARY ARTERY DISEASE INVOLVING NATIVE CORONARY ARTERY OF NATIVE HEART WITHOUT ANGINA PECTORIS: ICD-10-CM

## 2024-08-26 DIAGNOSIS — F32.A DEPRESSION, UNSPECIFIED DEPRESSION TYPE: ICD-10-CM

## 2024-08-26 DIAGNOSIS — E78.5 HYPERLIPIDEMIA, UNSPECIFIED HYPERLIPIDEMIA TYPE: ICD-10-CM

## 2024-08-26 DIAGNOSIS — E11.49 OTHER DIABETIC NEUROLOGICAL COMPLICATION ASSOCIATED WITH TYPE 2 DIABETES MELLITUS (MULTI): ICD-10-CM

## 2024-08-26 PROBLEM — T85.598A: Status: RESOLVED | Noted: 2023-10-03 | Resolved: 2024-08-26

## 2024-08-26 LAB
ATRIAL RATE: 105 BPM
ATRIAL RATE: 106 BPM
P AXIS: 62 DEGREES
P OFFSET: 182 MS
P OFFSET: 207 MS
P ONSET: 125 MS
P ONSET: 136 MS
PR INTERVAL: 150 MS
PR INTERVAL: 172 MS
Q ONSET: 211 MS
Q ONSET: 211 MS
QRS COUNT: 17 BEATS
QRS COUNT: 17 BEATS
QRS DURATION: 132 MS
QRS DURATION: 134 MS
QT INTERVAL: 380 MS
QT INTERVAL: 380 MS
QTC CALCULATION(BAZETT): 502 MS
QTC CALCULATION(BAZETT): 504 MS
QTC FREDERICIA: 457 MS
QTC FREDERICIA: 459 MS
R AXIS: -69 DEGREES
R AXIS: 269 DEGREES
T AXIS: 64 DEGREES
T AXIS: 77 DEGREES
T OFFSET: 401 MS
T OFFSET: 401 MS
VENTRICULAR RATE: 105 BPM
VENTRICULAR RATE: 106 BPM

## 2024-08-26 PROCEDURE — 99309 SBSQ NF CARE MODERATE MDM 30: CPT

## 2024-08-26 NOTE — ASSESSMENT & PLAN NOTE
Lungs are CTA bilaterally. He denies any chest discomfort or shortness of breath. Not on any maintenance therapies currently. stable

## 2024-08-26 NOTE — ASSESSMENT & PLAN NOTE
Lab Results   Component Value Date    HGBA1C 6.8 (H) 07/29/2024     This is well controlled with diet alone

## 2024-08-26 NOTE — ASSESSMENT & PLAN NOTE
Orthopedics recommended weightbearing as tolerated the right lower extremity, DVT prophylaxis with daily Lovenox for 30 days, pain is controlled with oxycodone, Mepilex dressing can be removed postoperative day 7, he is to follow-up in approximately 2 weeks with orthopedic surgeon.

## 2024-08-26 NOTE — ASSESSMENT & PLAN NOTE
Vascular medicine recently took him off eliquis. He is currently on lovanox injections for DVT prophylaxis post operatively.

## 2024-08-26 NOTE — ASSESSMENT & PLAN NOTE
Cardiology evaluated while he was hospitalized and recommended: continue conservative medical therapy.  We cannot add a beta-blocker, oral nitrate, or other such medications for his coronary disease because of the potential risk of aggravating orthostatic hypotension is greater than potential benefit.    Will add low-dose aspirin when Eliquis is eventually discontinued (deferring to vascular medicine on the latter)

## 2024-08-26 NOTE — LETTER
Patient: Levy Gregory  : 1955    Encounter Date: 2024    Visit  Note   Subjective  Levy Gregory is a 68 y.o. male who is being seen at [facility] and evaluated for multiple medical problems. Nursing notes, vital signs, and labs were reviewed in the local facility chart.  No chief complaint on file.     This is a 68-year-old male who presented to the emergency room and was found to have a femoral neck fracture of the right hip secondary to mechanical fall at home.  Dr. Washington with orthopedics and completed a right hip hemiarthroplasty.  His hospital course was uncomplicated and he was discharged to [facility] for aggressive physical and occupational therapy.    He has a past medical history of diabetes, hyperlipidemia, coronary artery disease, orthostatic hypotension, DVT (recently discontinued Eliquis by vascular service).       Objective  There were no vitals taken for this visit.  Physical Exam  Vitals reviewed.   Constitutional:       Appearance: Normal appearance.   HENT:      Head: Normocephalic.   Cardiovascular:      Rate and Rhythm: Normal rate and regular rhythm.   Pulmonary:      Effort: Pulmonary effort is normal. No respiratory distress.      Breath sounds: Normal breath sounds. No wheezing, rhonchi or rales.   Abdominal:      General: Bowel sounds are normal. There is no distension.      Palpations: Abdomen is soft.      Tenderness: There is no abdominal tenderness. There is no guarding.      Hernia: No hernia is present.   Musculoskeletal:      Cervical back: Neck supple.   Skin:     General: Skin is warm and dry.   Neurological:      Mental Status: He is alert.       Assessment/Plan  Assessment & Plan  Acute renal failure superimposed on chronic kidney disease, unspecified acute renal failure type, unspecified CKD stage (CMS-HCC)  Lab Results   Component Value Date    GLUCOSE 182 (H) 2024    CALCIUM 7.9 (L) 2024     2024    K 4.7 2024    CO2 22  08/17/2024     08/17/2024    BUN 31 (H) 08/17/2024    CREATININE 1.51 (H) 08/17/2024     Appears near baseline; we will confirm with repeat labs.          Anticoagulant long-term use  Vascular medicine recently took him off eliquis. He is currently on lovanox injections for DVT prophylaxis post operatively.          Chronic obstructive pulmonary disease, unspecified COPD type (Multi)  Lungs are CTA bilaterally. He denies any chest discomfort or shortness of breath. Not on any maintenance therapies currently. stable         Coronary artery disease involving native coronary artery of native heart without angina pectoris  Cardiology evaluated while he was hospitalized and recommended: continue conservative medical therapy.  We cannot add a beta-blocker, oral nitrate, or other such medications for his coronary disease because of the potential risk of aggravating orthostatic hypotension is greater than potential benefit.    Will add low-dose aspirin when Eliquis is eventually discontinued (deferring to vascular medicine on the latter)         Deep vein thrombosis (DVT) of left lower extremity, unspecified chronicity, unspecified vein (Multi)  Eliquis recently discontinued by vascular medicine. He is on lovenox for DVT prophylaxis.          Depression, unspecified depression type  On zoloft for symptom control. No reported mood disturbances we will continue to monitor         Other diabetic neurological complication associated with type 2 diabetes mellitus (Multi)  Continue gabapentin as ordered.          Hyperlipidemia, unspecified hyperlipidemia type  Continue high intensity statin         Hypertensive heart disease with heart failure (Multi)         Orthostatic hypotension  He is currently on midodrine TID for added BP and symptom support.          Type 2 diabetes mellitus with hyperglycemia, with long-term current use of insulin (Multi)  Lab Results   Component Value Date    HGBA1C 6.8 (H) 07/29/2024     This is  well controlled with diet alone         Closed fracture of right hip requiring operative repair with routine healing  Orthopedics recommended weightbearing as tolerated the right lower extremity, DVT prophylaxis with daily Lovenox for 30 days, pain is controlled with oxycodone, Mepilex dressing can be removed postoperative day 7, he is to follow-up in approximately 2 weeks with orthopedic surgeon.            Time for coordination of care was greater than 35 minutes [facility] chart review, visit and exam, discussion with nursing, therapy and/or social service staff.       Please excuse any errors in grammar or translation related to this dictation. Voice recognition software was utilized to prepare this document.       Electronically Signed By: MAGDALENA Gamboa   8/26/24  5:01 PM

## 2024-08-26 NOTE — ASSESSMENT & PLAN NOTE
>>ASSESSMENT AND PLAN FOR DEPRESSION WRITTEN ON 8/26/2024  5:01 PM BY IVÁN ROJO-CNP    On zoloft for symptom control. No reported mood disturbances we will continue to monitor

## 2024-08-26 NOTE — ASSESSMENT & PLAN NOTE
Lab Results   Component Value Date    GLUCOSE 182 (H) 08/17/2024    CALCIUM 7.9 (L) 08/17/2024     08/17/2024    K 4.7 08/17/2024    CO2 22 08/17/2024     08/17/2024    BUN 31 (H) 08/17/2024    CREATININE 1.51 (H) 08/17/2024     Appears near baseline; we will confirm with repeat labs.

## 2024-08-26 NOTE — PROGRESS NOTES
Visit  Note   Subjective   Levy Gregory is a 68 y.o. male who is being seen at [facility] and evaluated for multiple medical problems. Nursing notes, vital signs, and labs were reviewed in the local facility chart.  No chief complaint on file.     This is a 68-year-old male who presented to the emergency room and was found to have a femoral neck fracture of the right hip secondary to mechanical fall at home.  Dr. Sylvesteram with orthopedics and completed a right hip hemiarthroplasty.  His hospital course was uncomplicated and he was discharged to [facility] for aggressive physical and occupational therapy.    He has a past medical history of diabetes, hyperlipidemia, coronary artery disease, orthostatic hypotension, DVT (recently discontinued Eliquis by vascular service).       Objective   There were no vitals taken for this visit.  Physical Exam  Vitals reviewed.   Constitutional:       Appearance: Normal appearance.   HENT:      Head: Normocephalic.   Cardiovascular:      Rate and Rhythm: Normal rate and regular rhythm.   Pulmonary:      Effort: Pulmonary effort is normal. No respiratory distress.      Breath sounds: Normal breath sounds. No wheezing, rhonchi or rales.   Abdominal:      General: Bowel sounds are normal. There is no distension.      Palpations: Abdomen is soft.      Tenderness: There is no abdominal tenderness. There is no guarding.      Hernia: No hernia is present.   Musculoskeletal:      Cervical back: Neck supple.   Skin:     General: Skin is warm and dry.   Neurological:      Mental Status: He is alert.       Assessment/Plan   Assessment & Plan  Acute renal failure superimposed on chronic kidney disease, unspecified acute renal failure type, unspecified CKD stage (CMS-[facility])  Lab Results   Component Value Date    GLUCOSE 182 (H) 08/17/2024    CALCIUM 7.9 (L) 08/17/2024     08/17/2024    K 4.7 08/17/2024    CO2 22 08/17/2024     08/17/2024    BUN 31 (H) 08/17/2024    CREATININE  1.51 (H) 08/17/2024     Appears near baseline; we will confirm with repeat labs.          Anticoagulant long-term use  Vascular medicine recently took him off eliquis. He is currently on lovanox injections for DVT prophylaxis post operatively.          Chronic obstructive pulmonary disease, unspecified COPD type (Multi)  Lungs are CTA bilaterally. He denies any chest discomfort or shortness of breath. Not on any maintenance therapies currently. stable         Coronary artery disease involving native coronary artery of native heart without angina pectoris  Cardiology evaluated while he was hospitalized and recommended: continue conservative medical therapy.  We cannot add a beta-blocker, oral nitrate, or other such medications for his coronary disease because of the potential risk of aggravating orthostatic hypotension is greater than potential benefit.    Will add low-dose aspirin when Eliquis is eventually discontinued (deferring to vascular medicine on the latter)         Deep vein thrombosis (DVT) of left lower extremity, unspecified chronicity, unspecified vein (Multi)  Eliquis recently discontinued by vascular medicine. He is on lovenox for DVT prophylaxis.          Depression, unspecified depression type  On zoloft for symptom control. No reported mood disturbances we will continue to monitor         Other diabetic neurological complication associated with type 2 diabetes mellitus (Multi)  Continue gabapentin as ordered.          Hyperlipidemia, unspecified hyperlipidemia type  Continue high intensity statin         Hypertensive heart disease with heart failure (Multi)         Orthostatic hypotension  He is currently on midodrine TID for added BP and symptom support.          Type 2 diabetes mellitus with hyperglycemia, with long-term current use of insulin (Multi)  Lab Results   Component Value Date    HGBA1C 6.8 (H) 07/29/2024     This is well controlled with diet alone         Closed fracture of right hip  requiring operative repair with routine healing  Orthopedics recommended weightbearing as tolerated the right lower extremity, DVT prophylaxis with daily Lovenox for 30 days, pain is controlled with oxycodone, Mepilex dressing can be removed postoperative day 7, he is to follow-up in approximately 2 weeks with orthopedic surgeon.            Time for coordination of care was greater than 35 minutes [facility] chart review, visit and exam, discussion with nursing, therapy and/or social service staff.       Please excuse any errors in grammar or translation related to this dictation. Voice recognition software was utilized to prepare this document.

## 2024-08-27 ENCOUNTER — NURSING HOME VISIT (OUTPATIENT)
Dept: POST ACUTE CARE | Facility: EXTERNAL LOCATION | Age: 69
End: 2024-08-27
Payer: MEDICARE

## 2024-08-27 DIAGNOSIS — E11.65 TYPE 2 DIABETES MELLITUS WITH HYPERGLYCEMIA, WITHOUT LONG-TERM CURRENT USE OF INSULIN (MULTI): ICD-10-CM

## 2024-08-27 DIAGNOSIS — I95.1 ORTHOSTATIC HYPOTENSION: ICD-10-CM

## 2024-08-27 DIAGNOSIS — S72.001D CLOSED FRACTURE OF RIGHT HIP REQUIRING OPERATIVE REPAIR WITH ROUTINE HEALING: Primary | ICD-10-CM

## 2024-08-27 DIAGNOSIS — I82.402 DEEP VEIN THROMBOSIS (DVT) OF LEFT LOWER EXTREMITY, UNSPECIFIED CHRONICITY, UNSPECIFIED VEIN (MULTI): ICD-10-CM

## 2024-08-27 DIAGNOSIS — I11.0 HYPERTENSIVE HEART DISEASE WITH HEART FAILURE (MULTI): ICD-10-CM

## 2024-08-27 PROBLEM — R55: Status: RESOLVED | Noted: 2018-07-23 | Resolved: 2024-08-27

## 2024-08-27 PROBLEM — M79.675 PAIN IN TOES OF BOTH FEET: Status: RESOLVED | Noted: 2023-11-20 | Resolved: 2024-08-27

## 2024-08-27 PROBLEM — L70.9 ACNE: Status: RESOLVED | Noted: 2023-02-17 | Resolved: 2024-08-27

## 2024-08-27 PROBLEM — L60.3 ONYCHODYSTROPHY: Status: RESOLVED | Noted: 2023-11-20 | Resolved: 2024-08-27

## 2024-08-27 PROBLEM — D62 ACUTE POSTHEMORRHAGIC ANEMIA: Status: RESOLVED | Noted: 2023-08-15 | Resolved: 2024-08-27

## 2024-08-27 PROBLEM — M79.674 PAIN IN TOES OF BOTH FEET: Status: RESOLVED | Noted: 2023-11-20 | Resolved: 2024-08-27

## 2024-08-27 PROBLEM — B35.1 ONYCHOMYCOSIS: Status: RESOLVED | Noted: 2023-11-20 | Resolved: 2024-08-27

## 2024-08-27 PROBLEM — R00.2 PALPITATIONS: Status: RESOLVED | Noted: 2024-05-21 | Resolved: 2024-08-27

## 2024-08-27 PROBLEM — K11.20 SIALOADENITIS: Status: RESOLVED | Noted: 2023-02-17 | Resolved: 2024-08-27

## 2024-08-27 PROBLEM — Z98.890 STATUS POST ENDOSCOPY: Status: RESOLVED | Noted: 2024-03-21 | Resolved: 2024-08-27

## 2024-08-27 PROBLEM — Z23 ENCOUNTER FOR IMMUNIZATION: Status: RESOLVED | Noted: 2024-02-04 | Resolved: 2024-08-27

## 2024-08-27 PROBLEM — Z86.39 HISTORY OF DIABETES MELLITUS: Status: RESOLVED | Noted: 2024-03-21 | Resolved: 2024-08-27

## 2024-08-27 PROBLEM — R09.02 HYPOXIA: Status: RESOLVED | Noted: 2024-03-21 | Resolved: 2024-08-27

## 2024-08-27 PROBLEM — R94.31 PROLONGED QT INTERVAL: Status: RESOLVED | Noted: 2024-03-21 | Resolved: 2024-08-27

## 2024-08-27 PROBLEM — R26.89 BALANCE PROBLEMS: Status: RESOLVED | Noted: 2023-11-20 | Resolved: 2024-08-27

## 2024-08-27 PROBLEM — R80.9 MICROALBUMINURIA: Status: RESOLVED | Noted: 2023-02-17 | Resolved: 2024-08-27

## 2024-08-27 PROBLEM — I51.89 MILD LEFT VENTRICULAR SYSTOLIC DYSFUNCTION: Status: RESOLVED | Noted: 2024-04-09 | Resolved: 2024-08-27

## 2024-08-27 PROBLEM — Z66 DO NOT RESUSCITATE: Status: RESOLVED | Noted: 2023-08-15 | Resolved: 2024-08-27

## 2024-08-27 PROBLEM — Z86.718 HISTORY OF DVT (DEEP VEIN THROMBOSIS): Status: RESOLVED | Noted: 2023-02-17 | Resolved: 2024-08-27

## 2024-08-27 PROBLEM — Z91.81 AT RISK FOR FALLS: Status: RESOLVED | Noted: 2024-02-26 | Resolved: 2024-08-27

## 2024-08-27 PROBLEM — R94.31 ABNORMAL ECG: Status: RESOLVED | Noted: 2022-11-26 | Resolved: 2024-08-27

## 2024-08-27 PROBLEM — R35.1 NOCTURIA: Status: RESOLVED | Noted: 2023-02-17 | Resolved: 2024-08-27

## 2024-08-27 PROBLEM — R07.81 RIB PAIN ON LEFT SIDE: Status: RESOLVED | Noted: 2023-02-17 | Resolved: 2024-08-27

## 2024-08-27 PROBLEM — H69.93 DYSFUNCTION OF BOTH EUSTACHIAN TUBES: Status: RESOLVED | Noted: 2023-11-21 | Resolved: 2024-08-27

## 2024-08-27 PROBLEM — R60.0 PEDAL EDEMA: Status: RESOLVED | Noted: 2024-02-13 | Resolved: 2024-08-27

## 2024-08-27 PROBLEM — Z95.828 HISTORY OF INFERIOR VENA CAVAL FILTER PLACEMENT: Status: RESOLVED | Noted: 2024-03-21 | Resolved: 2024-08-27

## 2024-08-27 PROCEDURE — 99305 1ST NF CARE MODERATE MDM 35: CPT | Performed by: FAMILY MEDICINE

## 2024-08-27 NOTE — PROGRESS NOTES
Admission H&P  Subjective   Levy Gregory is a 68 y.o. male who is being seen for an admission H&P at [facility].  Nursing notes, vital signs, and labs were reviewed in the local facility chart and he  is being evaluated for multiple medical problems.   HPI 68-year-old male had a mechanical fall resulting in a right femoral neck fracture.  Orthopedic surgery was consulted and performed a right hip hemiarthroplasty.  His hospital course was uncomplicated and he comes to this facility for PT/OT/supportive care.  He is complaining of very minimal pain currently and would like to go home at the end of this week.  Objective   There were no vitals taken for this visit.  Physical Exam  Constitutional:       Appearance: Normal appearance.   HENT:      Head: Normocephalic.   Pulmonary:      Effort: Pulmonary effort is normal.   Musculoskeletal:      Cervical back: Neck supple.   Skin:     General: Skin is warm and dry.   Psychiatric:         Mood and Affect: Mood normal.       Assessment/Plan   Assessment & Plan  Closed fracture of right hip requiring operative repair with routine healing  Orthopedics recommended WBAT to right lower extremity, DVT prophylaxis with daily Lovenox for 30 days thru 9/16/2024., pain is controlled with oxycodone, He is to follow-up in approximately 1 weeks with orthopedic surgeon.           Deep vein thrombosis (DVT) of left lower extremity, unspecified chronicity, unspecified vein (Multi)  He completed a course of Eliquis and was reevaluated by cardiology prior to this fracture.  At that point it was determined that Eliquis prophylaxis was no longer indicated and this was discontinued.  It was recommended he stay on 81 mg aspirin for primary prophylaxis which he will continue after completing the 30 day course lovenox.         Orthostatic hypotension  He is currently on midodrine TID for added BP and symptom support and /70           Hypertensive heart disease with heart failure  (Multi)         Type 2 diabetes mellitus with hyperglycemia, without long-term current use of insulin (Multi)  Lab Results   Component Value Date    HGBA1C 6.8 (H) 07/29/2024     This is well controlled with diet alone                  Please excuse any errors in grammar or translation related to this dictation. Voice recognition software was utilized to prepare this document.

## 2024-08-27 NOTE — ASSESSMENT & PLAN NOTE
Orthopedics recommended WBAT to right lower extremity, DVT prophylaxis with daily Lovenox for 30 days thru 9/16/2024., pain is controlled with oxycodone, He is to follow-up in approximately 1 weeks with orthopedic surgeon.

## 2024-08-27 NOTE — LETTER
Patient: Levy Gregory  : 1955    Encounter Date: 2024    Admission H&P  Subjective  Levy Gregory is a 68 y.o. male who is being seen for an admission H&P at [facility].  Nursing notes, vital signs, and labs were reviewed in the local facility chart and he  is being evaluated for multiple medical problems.   HPI 68-year-old male had a mechanical fall resulting in a right femoral neck fracture.  Orthopedic surgery was consulted and performed a right hip hemiarthroplasty.  His hospital course was uncomplicated and he comes to this facility for PT/OT/supportive care.  He is complaining of very minimal pain currently and would like to go home at the end of this week.  Objective  There were no vitals taken for this visit.  Physical Exam  Constitutional:       Appearance: Normal appearance.   HENT:      Head: Normocephalic.   Pulmonary:      Effort: Pulmonary effort is normal.   Musculoskeletal:      Cervical back: Neck supple.   Skin:     General: Skin is warm and dry.   Psychiatric:         Mood and Affect: Mood normal.       Assessment/Plan  Assessment & Plan  Closed fracture of right hip requiring operative repair with routine healing  Orthopedics recommended WBAT to right lower extremity, DVT prophylaxis with daily Lovenox for 30 days thru 2024., pain is controlled with oxycodone, He is to follow-up in approximately 1 weeks with orthopedic surgeon.           Deep vein thrombosis (DVT) of left lower extremity, unspecified chronicity, unspecified vein (Multi)  He completed a course of Eliquis and was reevaluated by cardiology prior to this fracture.  At that point it was determined that Eliquis prophylaxis was no longer indicated and this was discontinued.  It was recommended he stay on 81 mg aspirin for primary prophylaxis which he will continue after completing the 30 day course lovenox.         Orthostatic hypotension  He is currently on midodrine TID for added BP and symptom support  and /70           Hypertensive heart disease with heart failure (Multi)         Type 2 diabetes mellitus with hyperglycemia, without long-term current use of insulin (Multi)  Lab Results   Component Value Date    HGBA1C 6.8 (H) 07/29/2024     This is well controlled with diet alone                  Please excuse any errors in grammar or translation related to this dictation. Voice recognition software was utilized to prepare this document.       Electronically Signed By: Micheal Mckeon MD   8/27/24 10:58 AM

## 2024-08-27 NOTE — ASSESSMENT & PLAN NOTE
He completed a course of Eliquis and was reevaluated by cardiology prior to this fracture.  At that point it was determined that Eliquis prophylaxis was no longer indicated and this was discontinued.  It was recommended he stay on 81 mg aspirin for primary prophylaxis which he will continue after completing the 30 day course lovenox.

## 2024-08-29 ENCOUNTER — APPOINTMENT (OUTPATIENT)
Dept: RADIOLOGY | Facility: CLINIC | Age: 69
End: 2024-08-29
Payer: MEDICARE

## 2024-08-29 DIAGNOSIS — S72.001A CLOSED RIGHT HIP FRACTURE, INITIAL ENCOUNTER (MULTI): ICD-10-CM

## 2024-08-29 RX ORDER — OXYCODONE HYDROCHLORIDE 5 MG/1
5 TABLET ORAL EVERY 6 HOURS PRN
Qty: 56 TABLET | Refills: 0 | Status: SHIPPED | OUTPATIENT
Start: 2024-08-29 | End: 2024-09-12

## 2024-09-03 ENCOUNTER — DOCUMENTATION (OUTPATIENT)
Dept: PRIMARY CARE | Facility: CLINIC | Age: 69
End: 2024-09-03
Payer: MEDICARE

## 2024-09-03 ENCOUNTER — TELEPHONE (OUTPATIENT)
Dept: PRIMARY CARE | Facility: CLINIC | Age: 69
End: 2024-09-03
Payer: MEDICARE

## 2024-09-03 PROBLEM — Z87.81 S/P RIGHT HIP FRACTURE: Status: ACTIVE | Noted: 2024-09-03

## 2024-09-03 PROBLEM — N18.5 CHRONIC KIDNEY DISEASE, STAGE 5 (MULTI): Status: ACTIVE | Noted: 2024-09-03

## 2024-09-03 PROBLEM — E87.0 HYPERNATREMIA: Status: RESOLVED | Noted: 2024-03-21 | Resolved: 2024-09-03

## 2024-09-03 PROBLEM — F43.21 ADJUSTMENT DISORDER WITH DEPRESSED MOOD: Status: ACTIVE | Noted: 2023-02-17

## 2024-09-03 NOTE — TELEPHONE ENCOUNTER
Cris from [facility] is calling to see if you will be following the pt for skilled nursing home care? They can be reached at [phone], option 1.

## 2024-09-03 NOTE — PROGRESS NOTES
Discharge Facility:[facility]  Discharge Diagnosis:mechanical fall, femoral neck fracture of right hip, Hip Hemiarthroplasty  Admission Date8.24.24  Discharge Date: 8.30.24    PCP Appointment Date:9.4.24  Specialist Appointment Date:   Hospital Encounter and Summary Linked: Yes  Appt within 2 business days

## 2024-09-03 NOTE — PATIENT INSTRUCTIONS
Use your albuterol inhaler (two puffs four times daily) for the full two weeks even though you should be improving within two days. Stop your atorvastatin while taking the cipro.      Your kidney function is impaired.  Make sure that you do not become dehydrated, and take only Tylenol for pain, avoiding all NSAIDS.    Follow up in three months for your next routine appointment.

## 2024-09-04 ENCOUNTER — APPOINTMENT (OUTPATIENT)
Dept: PRIMARY CARE | Facility: CLINIC | Age: 69
End: 2024-09-04
Payer: MEDICARE

## 2024-09-04 VITALS — DIASTOLIC BLOOD PRESSURE: 62 MMHG | TEMPERATURE: 98.3 F | SYSTOLIC BLOOD PRESSURE: 108 MMHG

## 2024-09-04 DIAGNOSIS — E11.49 OTHER DIABETIC NEUROLOGICAL COMPLICATION ASSOCIATED WITH TYPE 2 DIABETES MELLITUS (MULTI): ICD-10-CM

## 2024-09-04 DIAGNOSIS — J45.30 MILD PERSISTENT ASTHMATIC BRONCHITIS WITHOUT COMPLICATION (HHS-HCC): ICD-10-CM

## 2024-09-04 DIAGNOSIS — E11.65 TYPE 2 DIABETES MELLITUS WITH HYPERGLYCEMIA, WITHOUT LONG-TERM CURRENT USE OF INSULIN (MULTI): ICD-10-CM

## 2024-09-04 DIAGNOSIS — Z87.81 S/P RIGHT HIP FRACTURE: Primary | ICD-10-CM

## 2024-09-04 DIAGNOSIS — I11.0 HYPERTENSIVE HEART DISEASE WITH HEART FAILURE (MULTI): ICD-10-CM

## 2024-09-04 DIAGNOSIS — N18.5 CHRONIC KIDNEY DISEASE, STAGE 5 (MULTI): ICD-10-CM

## 2024-09-04 PROCEDURE — 3044F HG A1C LEVEL LT 7.0%: CPT | Performed by: FAMILY MEDICINE

## 2024-09-04 PROCEDURE — 1159F MED LIST DOCD IN RCRD: CPT | Performed by: FAMILY MEDICINE

## 2024-09-04 PROCEDURE — 1160F RVW MEDS BY RX/DR IN RCRD: CPT | Performed by: FAMILY MEDICINE

## 2024-09-04 PROCEDURE — 99214 OFFICE O/P EST MOD 30 MIN: CPT | Performed by: FAMILY MEDICINE

## 2024-09-04 PROCEDURE — 1123F ACP DISCUSS/DSCN MKR DOCD: CPT | Performed by: FAMILY MEDICINE

## 2024-09-04 PROCEDURE — 1111F DSCHRG MED/CURRENT MED MERGE: CPT | Performed by: FAMILY MEDICINE

## 2024-09-04 PROCEDURE — 3078F DIAST BP <80 MM HG: CPT | Performed by: FAMILY MEDICINE

## 2024-09-04 PROCEDURE — 3048F LDL-C <100 MG/DL: CPT | Performed by: FAMILY MEDICINE

## 2024-09-04 PROCEDURE — 3074F SYST BP LT 130 MM HG: CPT | Performed by: FAMILY MEDICINE

## 2024-09-04 PROCEDURE — 99495 TRANSJ CARE MGMT MOD F2F 14D: CPT | Performed by: FAMILY MEDICINE

## 2024-09-04 PROCEDURE — 2028F FOOT EXAM PERFORMED: CPT | Performed by: FAMILY MEDICINE

## 2024-09-04 RX ORDER — ALBUTEROL SULFATE 90 UG/1
INHALANT RESPIRATORY (INHALATION)
Qty: 8.5 G | Refills: 0 | Status: SHIPPED | OUTPATIENT
Start: 2024-09-04

## 2024-09-04 RX ORDER — CIPROFLOXACIN 250 MG/1
250 TABLET, FILM COATED ORAL 2 TIMES DAILY
Qty: 20 TABLET | Refills: 0 | Status: SHIPPED | OUTPATIENT
Start: 2024-09-04 | End: 2024-09-14

## 2024-09-13 ENCOUNTER — PATIENT OUTREACH (OUTPATIENT)
Dept: PRIMARY CARE | Facility: CLINIC | Age: 69
End: 2024-09-13
Payer: MEDICARE

## 2024-09-13 NOTE — PROGRESS NOTES
Unable to reach patient for call back after recent hospital stay.  LVM  with call back number for patient to call if needed.

## 2024-09-14 DIAGNOSIS — K21.9 GASTROESOPHAGEAL REFLUX DISEASE WITHOUT ESOPHAGITIS: ICD-10-CM

## 2024-09-14 RX ORDER — PANTOPRAZOLE SODIUM 40 MG/1
TABLET, DELAYED RELEASE ORAL
Qty: 90 TABLET | Refills: 1 | OUTPATIENT
Start: 2024-09-14

## 2024-09-19 ENCOUNTER — APPOINTMENT (OUTPATIENT)
Dept: SURGERY | Facility: HOSPITAL | Age: 69
End: 2024-09-19
Payer: MEDICARE

## 2024-09-23 ENCOUNTER — APPOINTMENT (OUTPATIENT)
Dept: UROLOGY | Facility: CLINIC | Age: 69
End: 2024-09-23
Payer: MEDICARE

## 2024-09-23 VITALS
TEMPERATURE: 97.7 F | DIASTOLIC BLOOD PRESSURE: 78 MMHG | WEIGHT: 220 LBS | BODY MASS INDEX: 29.16 KG/M2 | HEIGHT: 73 IN | HEART RATE: 97 BPM | SYSTOLIC BLOOD PRESSURE: 126 MMHG

## 2024-09-23 DIAGNOSIS — R35.1 NOCTURIA: Primary | ICD-10-CM

## 2024-09-23 DIAGNOSIS — N34.3 DYSURIA-FREQUENCY SYNDROME: ICD-10-CM

## 2024-09-23 DIAGNOSIS — N32.81 OAB (OVERACTIVE BLADDER): ICD-10-CM

## 2024-09-23 LAB
POC APPEARANCE, URINE: CLEAR
POC BILIRUBIN, URINE: NEGATIVE
POC BLOOD, URINE: ABNORMAL
POC COLOR, URINE: YELLOW
POC GLUCOSE, URINE: NEGATIVE MG/DL
POC KETONES, URINE: NEGATIVE MG/DL
POC LEUKOCYTES, URINE: NEGATIVE
POC NITRITE,URINE: NEGATIVE
POC PH, URINE: 6 PH
POC PROTEIN, URINE: NEGATIVE MG/DL
POC SPECIFIC GRAVITY, URINE: 1.02
POC UROBILINOGEN, URINE: 0.2 EU/DL

## 2024-09-23 PROCEDURE — 3074F SYST BP LT 130 MM HG: CPT | Performed by: NURSE PRACTITIONER

## 2024-09-23 PROCEDURE — 3078F DIAST BP <80 MM HG: CPT | Performed by: NURSE PRACTITIONER

## 2024-09-23 PROCEDURE — 81003 URINALYSIS AUTO W/O SCOPE: CPT | Performed by: NURSE PRACTITIONER

## 2024-09-23 PROCEDURE — 3048F LDL-C <100 MG/DL: CPT | Performed by: NURSE PRACTITIONER

## 2024-09-23 PROCEDURE — 3044F HG A1C LEVEL LT 7.0%: CPT | Performed by: NURSE PRACTITIONER

## 2024-09-23 PROCEDURE — 1159F MED LIST DOCD IN RCRD: CPT | Performed by: NURSE PRACTITIONER

## 2024-09-23 PROCEDURE — 3008F BODY MASS INDEX DOCD: CPT | Performed by: NURSE PRACTITIONER

## 2024-09-23 PROCEDURE — 1123F ACP DISCUSS/DSCN MKR DOCD: CPT | Performed by: NURSE PRACTITIONER

## 2024-09-23 PROCEDURE — 99213 OFFICE O/P EST LOW 20 MIN: CPT | Performed by: NURSE PRACTITIONER

## 2024-09-23 PROCEDURE — 1111F DSCHRG MED/CURRENT MED MERGE: CPT | Performed by: NURSE PRACTITIONER

## 2024-09-23 PROCEDURE — 2028F FOOT EXAM PERFORMED: CPT | Performed by: NURSE PRACTITIONER

## 2024-09-23 PROCEDURE — G2211 COMPLEX E/M VISIT ADD ON: HCPCS | Performed by: NURSE PRACTITIONER

## 2024-09-23 RX ORDER — VIBEGRON 75 MG/1
75 TABLET, FILM COATED ORAL DAILY
Qty: 30 TABLET | Refills: 5 | Status: SHIPPED | OUTPATIENT
Start: 2024-09-23 | End: 2024-09-23

## 2024-09-23 RX ORDER — VIBEGRON 75 MG/1
75 TABLET, FILM COATED ORAL NIGHTLY
Qty: 30 TABLET | Refills: 5 | Status: SHIPPED | OUTPATIENT
Start: 2024-09-23 | End: 2025-03-22

## 2024-09-23 NOTE — PROGRESS NOTES
Subjective   Patient ID: Levy Gregory is a 68 y.o. male who presents for Nocturia.  Hx of paraesophageal hernia and achalasia in March 2023 complicated by sepsis. He had multiple complications afterwards requiring lengthy ICU stay. He has also had several feeding tubes, etc.  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 Recovering from hip fracture s/p fall in August 2024.      Unsure exactly when catheter was initially placed, however he has failed multiple TOVs per his recollection. Denies urinary difficulties prior to surgeries in 2023. Passed in office TOV in Jan. NTF up to 4 since cath removal, very bothersome to him. Wakes him up, some dysuria if he delays urination. Moderate volume voids. Tried stopping tamsulosin. Remains on myrbetriq and finasteride. No improvement. Did not complete in lab sleep study      Denies family history of prostate cancer.      Drinks an energy drink a few times a week, 32 oz of water daily, coffee in the AM, and apple juice. Stops drinking at least 2 hours before bed.           Review of Systems   All other systems reviewed and are negative.      Objective   Physical Exam  Alert and oriented x3  Moist mucous membranes  Breathes easily on room air  Abdomen soft, nondistended  No edema  No scleral icterus  No focal neurological deficits  Appears stated age, no acute distress  Ambulates with cane    Office Visit on 09/23/2024   Component Date Value Ref Range Status    POC Color, Urine 09/23/2024 Yellow  Straw, Yellow, Light-Yellow Final    POC Appearance, Urine 09/23/2024 Clear  Clear Final    POC Glucose, Urine 09/23/2024 NEGATIVE  NEGATIVE mg/dl Final    POC Bilirubin, Urine 09/23/2024 NEGATIVE  NEGATIVE Final    POC Ketones, Urine 09/23/2024 NEGATIVE  NEGATIVE mg/dl Final    POC Specific Gravity, Urine 09/23/2024 1.020  1.005 - 1.035 Final    POC Blood, Urine 09/23/2024 SMALL (1+) (A)  NEGATIVE Final    POC PH, Urine 09/23/2024 6.0  No Reference Range Established PH Final     POC Protein, Urine 09/23/2024 NEGATIVE  NEGATIVE, 30 (1+) mg/dl Final    POC Urobilinogen, Urine 09/23/2024 0.2  0.2, 1.0 EU/DL Final    Poc Nitrite, Urine 09/23/2024 NEGATIVE  NEGATIVE Final    POC Leukocytes, Urine 09/23/2024 NEGATIVE  NEGATIVE Final       Assessment/Plan   Diagnoses and all orders for this visit:  Nocturia  -     POCT UA Automated manually resulted  -     Post-Void Residual  -     vibegron (Gemtesa) 75 mg tablet; Take 1 tablet (75 mg) by mouth once daily at bedtime.  OAB (overactive bladder)  -     vibegron (Gemtesa) 75 mg tablet; Take 1 tablet (75 mg) by mouth once daily at bedtime.  -     Urinalysis with Reflex Culture and Microscopic; Future  Dysuria-frequency syndrome  -     Urinalysis with Reflex Culture and Microscopic; Future    Stop myrbetriq. Plan to trial Gemtesa instead. Continue on finasteride. Not interested in home or in lab sleep study. Planning for follow up in a few months.          IVÁN Richardson-CNP 09/23/24 4:19 PM

## 2024-10-14 ENCOUNTER — PATIENT OUTREACH (OUTPATIENT)
Dept: PRIMARY CARE | Facility: CLINIC | Age: 69
End: 2024-10-14
Payer: MEDICARE

## 2024-10-17 ENCOUNTER — OFFICE VISIT (OUTPATIENT)
Dept: SURGERY | Facility: HOSPITAL | Age: 69
End: 2024-10-17
Payer: MEDICARE

## 2024-10-17 ENCOUNTER — HOSPITAL ENCOUNTER (OUTPATIENT)
Dept: RADIOLOGY | Facility: HOSPITAL | Age: 69
Discharge: HOME | End: 2024-10-17
Payer: MEDICARE

## 2024-10-17 VITALS
TEMPERATURE: 96.4 F | OXYGEN SATURATION: 97 % | DIASTOLIC BLOOD PRESSURE: 62 MMHG | RESPIRATION RATE: 17 BRPM | HEART RATE: 89 BPM | SYSTOLIC BLOOD PRESSURE: 105 MMHG

## 2024-10-17 DIAGNOSIS — K22.3 ESOPHAGEAL PERFORATION: ICD-10-CM

## 2024-10-17 PROCEDURE — 71046 X-RAY EXAM CHEST 2 VIEWS: CPT

## 2024-10-17 PROCEDURE — 2028F FOOT EXAM PERFORMED: CPT | Performed by: STUDENT IN AN ORGANIZED HEALTH CARE EDUCATION/TRAINING PROGRAM

## 2024-10-17 PROCEDURE — 99214 OFFICE O/P EST MOD 30 MIN: CPT | Performed by: STUDENT IN AN ORGANIZED HEALTH CARE EDUCATION/TRAINING PROGRAM

## 2024-10-17 PROCEDURE — 3044F HG A1C LEVEL LT 7.0%: CPT | Performed by: STUDENT IN AN ORGANIZED HEALTH CARE EDUCATION/TRAINING PROGRAM

## 2024-10-17 PROCEDURE — 3048F LDL-C <100 MG/DL: CPT | Performed by: STUDENT IN AN ORGANIZED HEALTH CARE EDUCATION/TRAINING PROGRAM

## 2024-10-17 PROCEDURE — 3078F DIAST BP <80 MM HG: CPT | Performed by: STUDENT IN AN ORGANIZED HEALTH CARE EDUCATION/TRAINING PROGRAM

## 2024-10-17 PROCEDURE — 3074F SYST BP LT 130 MM HG: CPT | Performed by: STUDENT IN AN ORGANIZED HEALTH CARE EDUCATION/TRAINING PROGRAM

## 2024-10-17 PROCEDURE — 1123F ACP DISCUSS/DSCN MKR DOCD: CPT | Performed by: STUDENT IN AN ORGANIZED HEALTH CARE EDUCATION/TRAINING PROGRAM

## 2024-10-17 NOTE — PATIENT INSTRUCTIONS
Follow up in 1 year with Chest XRAY 30 minutes prior in radiology 2nd floor [place].  Thursday, October 16, 2025 at 1:30 pm. Chest xray at 1pm. This will be viewable in your MyChart.     Call our office with any questions. [phone]

## 2024-10-22 ENCOUNTER — OFFICE VISIT (OUTPATIENT)
Dept: PRIMARY CARE | Facility: CLINIC | Age: 69
End: 2024-10-22
Payer: COMMERCIAL

## 2024-10-22 VITALS
BODY MASS INDEX: 28.63 KG/M2 | HEIGHT: 73 IN | HEART RATE: 96 BPM | SYSTOLIC BLOOD PRESSURE: 132 MMHG | TEMPERATURE: 97.9 F | OXYGEN SATURATION: 97 % | DIASTOLIC BLOOD PRESSURE: 64 MMHG | RESPIRATION RATE: 18 BRPM | WEIGHT: 216 LBS

## 2024-10-22 DIAGNOSIS — J45.30 MILD PERSISTENT ASTHMATIC BRONCHITIS WITHOUT COMPLICATION (HHS-HCC): ICD-10-CM

## 2024-10-22 DIAGNOSIS — I95.9 HYPOTENSION, UNSPECIFIED HYPOTENSION TYPE: ICD-10-CM

## 2024-10-22 DIAGNOSIS — F32.A DEPRESSION, UNSPECIFIED DEPRESSION TYPE: ICD-10-CM

## 2024-10-22 PROCEDURE — 3075F SYST BP GE 130 - 139MM HG: CPT

## 2024-10-22 PROCEDURE — 1123F ACP DISCUSS/DSCN MKR DOCD: CPT

## 2024-10-22 PROCEDURE — 1159F MED LIST DOCD IN RCRD: CPT

## 2024-10-22 PROCEDURE — 2028F FOOT EXAM PERFORMED: CPT

## 2024-10-22 PROCEDURE — 1036F TOBACCO NON-USER: CPT

## 2024-10-22 PROCEDURE — 3044F HG A1C LEVEL LT 7.0%: CPT

## 2024-10-22 PROCEDURE — 99214 OFFICE O/P EST MOD 30 MIN: CPT

## 2024-10-22 PROCEDURE — 3078F DIAST BP <80 MM HG: CPT

## 2024-10-22 PROCEDURE — 3048F LDL-C <100 MG/DL: CPT

## 2024-10-22 PROCEDURE — 3008F BODY MASS INDEX DOCD: CPT

## 2024-10-22 PROCEDURE — G2211 COMPLEX E/M VISIT ADD ON: HCPCS

## 2024-10-22 RX ORDER — GUAIFENESIN 600 MG/1
1200 TABLET, EXTENDED RELEASE ORAL 2 TIMES DAILY
Qty: 120 TABLET | Refills: 11 | Status: SHIPPED | OUTPATIENT
Start: 2024-10-22 | End: 2025-10-22

## 2024-10-22 RX ORDER — SERTRALINE HYDROCHLORIDE 50 MG/1
50 TABLET, FILM COATED ORAL DAILY
Qty: 30 TABLET | Refills: 0 | Status: SHIPPED | OUTPATIENT
Start: 2024-10-22

## 2024-10-22 RX ORDER — AZITHROMYCIN 250 MG/1
TABLET, FILM COATED ORAL
Qty: 6 TABLET | Refills: 0 | Status: SHIPPED | OUTPATIENT
Start: 2024-10-22 | End: 2024-10-27

## 2024-10-22 RX ORDER — MIDODRINE HYDROCHLORIDE 5 MG/1
10 TABLET ORAL 3 TIMES DAILY
Qty: 180 TABLET | Refills: 0 | Status: SHIPPED | OUTPATIENT
Start: 2024-10-22

## 2024-10-22 RX ORDER — ALBUTEROL SULFATE 90 UG/1
INHALANT RESPIRATORY (INHALATION)
Qty: 8.5 G | Refills: 0 | Status: SHIPPED | OUTPATIENT
Start: 2024-10-22

## 2024-10-22 NOTE — PROGRESS NOTES
"Subjective   Patient ID: Levy Gregory is a 69 y.o. male who presents for Cough (Cough, chest congestion x 1 week. Denies fever or chills.).    Patient here today with 1 week history of cough/chest congestion.  Symptoms have been unchanged over the past week.  Has had some minimal SOB, isolated to coughing fits, otherwise has not noticed with exertion. Had recent partial hip arthroplasty therefore difficulty in walking at brisk pace. Has been using inhaler with some improvement in symptoms, namely associated wheezing.  Has sick contacts through wife who is also suffering from similar symptoms at this time.  Is taking nyquil and dayquil, states to help with cough suppression.   PMHx significant for PE, esophagectomy, T2DM.         Review of Systems    Objective   /64 (BP Location: Left arm, Patient Position: Sitting)   Pulse 96   Temp 36.6 °C (97.9 °F)   Resp 18   Ht 1.854 m (6' 1\")   Wt 98 kg (216 lb)   SpO2 97%   BMI 28.50 kg/m²     Physical Exam  Constitutional:       Appearance: Normal appearance. He is not toxic-appearing.   HENT:      Nose: No congestion or rhinorrhea.   Cardiovascular:      Rate and Rhythm: Normal rate and regular rhythm.      Heart sounds: No murmur heard.  Pulmonary:      Effort: Pulmonary effort is normal.      Breath sounds: Wheezing and rales present.   Neurological:      Mental Status: He is alert.         Assessment/Plan   Problem List Items Addressed This Visit    None  Visit Diagnoses         Codes    Mild persistent asthmatic bronchitis without complication ([facility])     J45.30    Relevant Medications    azithromycin (Zithromax) 250 mg tablet    albuterol (ProAir HFA) 90 mcg/actuation inhaler    guaiFENesin (Mucinex) 600 mg 12 hr tablet    Other Relevant Orders    XR chest 2 views        Concern at this time for bronchitis versus pneumonia.  On auscultation patient does have diffuse crackles notable along with wheezing.  Added albuterol inhaler as needed for " wheezing.  Additionally patient may take expectorant that was prescribed today.  Will start Z-Akira while awaiting chest x-ray, consider Augmentin based upon films being evident for lobar pneumonia.  Patient currently is afebrile without antipyretics and saturating well on room air.  Discussed ER precautions including new onset fever, worsening shortness of breath, confusion, new onset nausea/vomiting to go to the emergency room at once.  Discussed potential influenza/COVID test, given that patient is 1 week into illness is no longer candidate for Paxlovid therefore we will abstain from testing at this time.  Low suspicion for influenza given prolonged nature of illness.

## 2024-10-23 NOTE — PROGRESS NOTES
C/C:  Established visit    History Of Present Illness  Levy Gregory is a 69 y.o. male presenting for follow up/symptom check. Most recently he is s/p a substernal gastric pull up with esophagostomy reversal on 11/29/23. He was taken back to OR on 12/7/23 for repeat laparotomy and cervical exploration with reduction of the redundancy.     Since his last visit has been doing well but did unfortunately suffer a hip fracture after falling (felt a little dizzy prior to fall). He had a surgical repair but has thankfully recovered well overall. He does notice sometimes a pressure sensation in his chest - sometimes after eating. He has to lay down and relax in order for this to pass. Also hears loud gurgling sounds in his neck. He otherwise is tolerating food well. His weight is stable. Denies other acute issues. He does report the dizziness occurs a few times monthly     By way of review: patient has a notable PMHx including h/o COPD, T2DM, type II achalasia, paraesophageal hernia s/p robotic Laparoscopic Heller myotomy with mary fundoplication on 3/1 with Dr. Hoang. He presented to ED on 3/7 after discharge with SOB, chills, RUQ pain. CT completed concerning for esophageal perforation and he underwent diagnostic lap with drain placement, EGD with stent placement x2, R chest tube on 3/7. He was transferred to  SICU on 3/9 for further management. Thoracic surgery was consulted. A second chest tube was placed on 3/13 by thoracic surgery. On 3/15 he went to OR for R VATs, decortication, bronchoscopy, and NJ placement . Large empyema was seen on CT chest and he underwent IR guided pigtail placement on 3/24.   On 3/27 he went to OR for EGD with esophageal stent replacement and nasal tube post pyloric placement with Dr. Hoang, previous esophageal stent found to have migrated below LES and MIGUELINA drain visible and traveling through esophageal perforation. A new esophageal stent was placed more proximally. On 3/30 all drains  with bilious fluid with concern for persistent leak. He returned to OR on 3/31 with thoracic and fuentes surgery and underwent EGD, removal of esophageal stent, endovac placement, dobhoff tube placement, PEG tube placement. He returned to OR on 4/3 for endovac replacement with Dr. Gongora and was found to have perforation from 42 to 35 cm from the lips. CT     C/A/P obtained on 4/4 showed previous findings as well as increased pneumoperitoneum.   On 4/5, he returned to OR for right thoracotomy and esophagectomy with cervical esophagostomy and lap takedown of prior fundoplication, lysis of adhesions, and revision of gastrostomy tube for esophageal perforation with thoracic surgery. Transferred to SICU post-op. On 4/7 return to OR for PEG replacement and J tube placement with thoracic. While in SICU he was weaned off of pressors on 4/7. Successfully extubated on 4/8. Reintubated on 4/10 d/t respiratory fatigue. Amio drip initiated on the same day for uncontrolled Afib with RVR. He was also started on CVVH on 4/10. He was extubated in ICU on 4/12. CVVH discontinued. CT CAP completed which demonstrated intraabdominal RP abscesses and intramuscular psoas hematoma. CT CAP repeated on 4/24, which demonstrated increase in size of   RP hematoma and decrease in size of psoas hematoma. He was transferred to [facility] on 4/25. While on [facility], he developed rising leukocytosis and tachycardia and repeat CT PE and CT C/A/P with PO and IV contrast completed which was negative for PE, showed decreasing size of hematomas, and likely acalculous cholecystitis visualized. On 4/28 he underwent percutaneous cholecystostomy tube placement by IR, tube connected to accordion drain and remains in place at time of discharge. Repeat CT CA/P on 5/7 with evidence of persistent anastomotic leak from gastric stump. MIGUELINA drain remain in place.      Throughout hospitalization infectious disease was consulted for complicated abdominal abscesses as well  resistant MRSA and CR-acinetobacter for which he was placed on contact precautions. Antibiotic regimen completed on 5/18 and per ID should follow up with infectious disease/primary attending at LTAC facility. Vascular medicine also consulted during hospitalization for management of DVTs and RPB/psoas hematomas. IVC filter was placed on 4/20. Repeat Duplex ultrasound completed on 5/2 showed persistent LE and UE DVTs. He was started on a low intensity heparin drip x24 hours and transitioned to PO Eliquis which he will continue at discharge and follow up with vascular medicine as outpatient.      He was evaluated by nutrition throughout hospital course and was started on tube feeds via J-tube which he tolerated well and will continue on continuous feeding schedule at nursing facility. Nephrology was also consulted during hospitalization for elevated BUN/creatinine and hypernatremia. Hypernatremia resolved and BUN/Creatinine continues to wax and wane. He was evaluated by nephrology prior to discharge and per nephrology recommendations, he will discharge with weekly RFP labs with close monitoring with nephrology attending at LTAC facility.   He was discharged to nursing facility with R chest tube to waterseal and abdominal drains to bulbs on 5/22/23    Past Medical History  He has a past medical history of Achalasia, esophageal, Anemia, Contusion of abdominal wall, initial encounter (01/12/2021), COPD (chronic obstructive pulmonary disease) (Multi), Diabetes mellitus (Multi), DVT (deep venous thrombosis) (Multi), DVT (deep venous thrombosis) (Multi), Dysphagia, GERD (gastroesophageal reflux disease), Hemoptysis (05/12/2020), Herpes labialis, Hiatal hernia, Hyperlipidemia, Hypotension due to drugs, Impaired oral gastric feeding tube, initial encounter (10/03/2023), Irregular heart beat, Pain in left knee, Peripheral neuropathy, Personal history of other diseases of the respiratory system (06/19/2019), Sleep apnea, and  Solar purpura ([facility]).    Social History  He reports that he quit smoking about 19 months ago. His smoking use included cigars. He started smoking about 35 years ago. He has been exposed to tobacco smoke. He has never used smokeless tobacco. He reports that he does not drink alcohol and does not use drugs.      Medications    Current Outpatient Medications:     albuterol (ProAir HFA) 90 mcg/actuation inhaler, two puffs four times daily for two weeks, Disp: 8.5 g, Rfl: 0    aspirin 81 mg chewable tablet, Chew 1 tablet (81 mg) once daily., Disp: 30 tablet, Rfl: 11    atorvastatin (Lipitor) 80 mg tablet, Take 1 tablet (80 mg) by mouth once daily., Disp: 90 tablet, Rfl: 3    azithromycin (Zithromax) 250 mg tablet, Take 2 tablets (500 mg) by mouth once daily for 1 day, THEN 1 tablet (250 mg) once daily for 4 days. Take 2 tabs (500 mg) by mouth today, than 1 daily for 4 days.., Disp: 6 tablet, Rfl: 0    blood sugar diagnostic (OneTouch Ultra Test) strip, Test glucose twice daily or as directed, Disp: 200 strip, Rfl: 1    blood-glucose meter misc, Check glucose before meals and call if less than 80 or over 300., Disp: 1 each, Rfl: 0    ferrous sulfate 325 (65 Fe) MG EC tablet, Take 65 mg by mouth once daily with breakfast. Do not crush, chew, or split., Disp: , Rfl:     flash glucose sensor kit (FreeStyle Kellie 2 Sensor) kit, Use as instructed, Disp: 6 each, Rfl: 1    FreeStyle Kellie 2 Lucas misc, Use as instructed, Disp: 1 each, Rfl: 0    gabapentin (Neurontin) 100 mg capsule, Take 1 capsule (100 mg) by mouth 3 times a day., Disp: 90 capsule, Rfl: 11    guaiFENesin (Mucinex) 600 mg 12 hr tablet, Take 2 tablets (1,200 mg) by mouth 2 times a day. Do not crush, chew, or split., Disp: 120 tablet, Rfl: 11    midodrine (Proamatine) 5 mg tablet, TAKE 2 TABLETS BY MOUTH THREE TIMES DAILY, Disp: 180 tablet, Rfl: 0    pantoprazole (ProtoNix) 40 mg EC tablet, Take 1 tablet (40 mg) by mouth once daily at bedtime. Do not crush,  chew, or split. (Patient not taking: Reported on 10/22/2024), Disp: 90 tablet, Rfl: 1    sertraline (Zoloft) 50 mg tablet, TAKE 1 TABLET BY MOUTH ONCE DAILY, Disp: 30 tablet, Rfl: 0    tamsulosin (Flomax) 0.4 mg 24 hr capsule, Take 1 capsule (0.4 mg) by mouth once daily., Disp: 90 capsule, Rfl: 1    vibegron (Gemtesa) 75 mg tablet, Take 1 tablet (75 mg) by mouth once daily at bedtime., Disp: 30 tablet, Rfl: 5    Allergies  Patient has no known allergies.    Review of Systems:  Review of Systems   Constitutional: No fevers, chills, unexpected weight change  HENT: No sore throat, congestion, or nasal drainage  Eyes: No visual changes or eye itching  Respiratory: see HPI. No cough, worsening dyspnea, wheezing  Cardiac: No chest pain, palpitations, or lower extremity edema  Gastrointestinal: No nausea, vomiting, diarrhea. No abdominal pain  Genitourinary: No dysuria or hematuria  Musculoskeletal: No back pain. No significant myalgias or arthralgias  Neurologic: No headaches, dizziness, or seizures.  Hematologic: No east bleeding or bruising.  Psychiatric: No anxiety or depression.    Physical Exam:  Physical Exam  /62 (BP Location: Left arm, Patient Position: Sitting, BP Cuff Size: Adult)   Pulse 89   Temp 35.8 °C (96.4 °F) (Temporal)   Resp 17   SpO2 97%   Constitutional:       General: Patient is not in acute distress.     Appearance: Normal appearance; not ill-appearing.   HENT:      Head: Normocephalic.      Nose: No congestion or rhinorrhea.     Neck: left neck incision c/d/i  Cardiovascular:      Rate and Rhythm: Normal rate and regular rhythm.      Pulses: Normal pulses.   Chest: left chest prior esophagostomy site sutures removed   Pulmonary:      Effort: Pulmonary effort is normal. No respiratory distress.  No conversational dyspnea     Breath sounds: No stridor. No wheezing.   Abdominal:      General: There is no distension.      Palpations: Abdomen is soft.     His laparotomy incision is well  "appearing, no drainage, overall healing well.   Musculoskeletal:         General: No swelling, tenderness or deformity. Normal range of motion.      Cervical back: Normal range of motion. No rigidity. Utilizing wheel chair  Lymphadenopathy:      Cervical: No cervical adenopathy.   Skin:     General: Skin is warm and dry.   Neurological:      General: No focal deficit present.      Mental Status: Patient is alert and oriented to person, place, and time.   Psychiatric:         Mood and Affect: Mood normal.       Relevant Results:       Imaging:      CXR personally reviewed    Pulmonary Functions Testing Results:    No results found for: \"FEV1\", \"FVC\", \"GDG7JRJ\", \"TLC\", \"DLCO\"      Assessment/Plan   Diagnoses and all orders for this visit:  Esophageal perforation  -     XR chest 2 views; Future      Levy Gregory is a 69 y.o. male presenting for symptom check/established visit. He is s/p a substernal gastric pull up with esophagostomy reversal on 11/29/23 and return to OR on 12/7/23 for repeat laparotomy and cervical exploration with reduction of the redundancy. Sounds like he is experiencing some spasm like sensations which he is handling well by resting. If his dizziness continues I did tell him he should call his PCP to further discuss. For now I will plan to see him in 1 year for sx check, he can call me in the meantime for any issues.     Brielle Gongora, DO  Thoracic & Esophageal Surgery     "

## 2024-10-24 ENCOUNTER — HOSPITAL ENCOUNTER (OUTPATIENT)
Dept: RADIOLOGY | Facility: HOSPITAL | Age: 69
Discharge: HOME | End: 2024-10-24
Payer: COMMERCIAL

## 2024-10-24 DIAGNOSIS — J45.30 MILD PERSISTENT ASTHMATIC BRONCHITIS WITHOUT COMPLICATION (HHS-HCC): ICD-10-CM

## 2024-10-24 PROCEDURE — 71046 X-RAY EXAM CHEST 2 VIEWS: CPT

## 2024-10-25 ENCOUNTER — APPOINTMENT (OUTPATIENT)
Dept: NEUROLOGY | Facility: CLINIC | Age: 69
End: 2024-10-25
Payer: MEDICARE

## 2024-10-28 DIAGNOSIS — J44.9 CHRONIC OBSTRUCTIVE PULMONARY DISEASE, UNSPECIFIED COPD TYPE (MULTI): ICD-10-CM

## 2024-10-28 DIAGNOSIS — J18.9 PNEUMONIA OF BOTH LUNGS DUE TO INFECTIOUS ORGANISM, UNSPECIFIED PART OF LUNG: Primary | ICD-10-CM

## 2024-10-28 RX ORDER — AMOXICILLIN AND CLAVULANATE POTASSIUM 875; 125 MG/1; MG/1
875 TABLET, FILM COATED ORAL 2 TIMES DAILY
Qty: 10 TABLET | Refills: 0 | Status: SHIPPED | OUTPATIENT
Start: 2024-10-28 | End: 2024-11-02

## 2024-10-29 ENCOUNTER — APPOINTMENT (OUTPATIENT)
Dept: PRIMARY CARE | Facility: CLINIC | Age: 69
End: 2024-10-29
Payer: MEDICARE

## 2024-10-29 VITALS
DIASTOLIC BLOOD PRESSURE: 112 MMHG | BODY MASS INDEX: 27.98 KG/M2 | WEIGHT: 212.08 LBS | SYSTOLIC BLOOD PRESSURE: 130 MMHG | TEMPERATURE: 98.1 F

## 2024-10-29 DIAGNOSIS — J45.40 MODERATE PERSISTENT ASTHMATIC BRONCHITIS WITHOUT COMPLICATION (HHS-HCC): ICD-10-CM

## 2024-10-29 DIAGNOSIS — E11.49 OTHER DIABETIC NEUROLOGICAL COMPLICATION ASSOCIATED WITH TYPE 2 DIABETES MELLITUS: ICD-10-CM

## 2024-10-29 DIAGNOSIS — E11.65 TYPE 2 DIABETES MELLITUS WITH HYPERGLYCEMIA, WITHOUT LONG-TERM CURRENT USE OF INSULIN: Primary | ICD-10-CM

## 2024-10-29 DIAGNOSIS — G47.33 OBSTRUCTIVE SLEEP APNEA, ADULT: ICD-10-CM

## 2024-10-29 DIAGNOSIS — I11.0 HYPERTENSIVE HEART DISEASE WITH HEART FAILURE: ICD-10-CM

## 2024-10-29 DIAGNOSIS — F43.21 ADJUSTMENT DISORDER WITH DEPRESSED MOOD: ICD-10-CM

## 2024-10-29 PROBLEM — J45.909 ASTHMATIC BRONCHITIS WITHOUT COMPLICATION (HHS-HCC): Status: ACTIVE | Noted: 2024-10-29

## 2024-10-29 PROCEDURE — 3044F HG A1C LEVEL LT 7.0%: CPT | Performed by: FAMILY MEDICINE

## 2024-10-29 PROCEDURE — 3078F DIAST BP <80 MM HG: CPT | Performed by: FAMILY MEDICINE

## 2024-10-29 PROCEDURE — 3048F LDL-C <100 MG/DL: CPT | Performed by: FAMILY MEDICINE

## 2024-10-29 PROCEDURE — 1159F MED LIST DOCD IN RCRD: CPT | Performed by: FAMILY MEDICINE

## 2024-10-29 PROCEDURE — 2028F FOOT EXAM PERFORMED: CPT | Performed by: FAMILY MEDICINE

## 2024-10-29 PROCEDURE — 1123F ACP DISCUSS/DSCN MKR DOCD: CPT | Performed by: FAMILY MEDICINE

## 2024-10-29 PROCEDURE — 3074F SYST BP LT 130 MM HG: CPT | Performed by: FAMILY MEDICINE

## 2024-10-29 PROCEDURE — 99214 OFFICE O/P EST MOD 30 MIN: CPT | Performed by: FAMILY MEDICINE

## 2024-10-29 PROCEDURE — 1160F RVW MEDS BY RX/DR IN RCRD: CPT | Performed by: FAMILY MEDICINE

## 2024-10-29 RX ORDER — MIRABEGRON 50 MG/1
1 TABLET, FILM COATED, EXTENDED RELEASE ORAL
COMMUNITY
Start: 2024-10-23

## 2024-10-31 ENCOUNTER — LAB (OUTPATIENT)
Dept: LAB | Facility: LAB | Age: 69
End: 2024-10-31
Payer: MEDICARE

## 2024-10-31 DIAGNOSIS — J45.40 MODERATE PERSISTENT ASTHMATIC BRONCHITIS WITHOUT COMPLICATION (HHS-HCC): ICD-10-CM

## 2024-10-31 PROCEDURE — 87205 SMEAR GRAM STAIN: CPT

## 2024-11-01 LAB
BACTERIA SPEC RESP CULT: ABNORMAL
GRAM STN SPEC: ABNORMAL

## 2024-11-12 ENCOUNTER — APPOINTMENT (OUTPATIENT)
Dept: PRIMARY CARE | Facility: CLINIC | Age: 69
End: 2024-11-12
Payer: MEDICARE

## 2024-11-12 VITALS
TEMPERATURE: 98.2 F | BODY MASS INDEX: 29.09 KG/M2 | WEIGHT: 220.46 LBS | DIASTOLIC BLOOD PRESSURE: 67 MMHG | SYSTOLIC BLOOD PRESSURE: 123 MMHG

## 2024-11-12 DIAGNOSIS — J44.9 CHRONIC OBSTRUCTIVE PULMONARY DISEASE, UNSPECIFIED COPD TYPE (MULTI): Primary | ICD-10-CM

## 2024-11-12 DIAGNOSIS — E11.49 OTHER DIABETIC NEUROLOGICAL COMPLICATION ASSOCIATED WITH TYPE 2 DIABETES MELLITUS: ICD-10-CM

## 2024-11-12 DIAGNOSIS — I25.10 CORONARY ARTERY DISEASE INVOLVING NATIVE CORONARY ARTERY OF NATIVE HEART WITHOUT ANGINA PECTORIS: ICD-10-CM

## 2024-11-12 DIAGNOSIS — Z79.01 ANTICOAGULANT LONG-TERM USE: ICD-10-CM

## 2024-11-12 PROCEDURE — 1160F RVW MEDS BY RX/DR IN RCRD: CPT | Performed by: FAMILY MEDICINE

## 2024-11-12 PROCEDURE — 99214 OFFICE O/P EST MOD 30 MIN: CPT | Performed by: FAMILY MEDICINE

## 2024-11-12 PROCEDURE — 90677 PCV20 VACCINE IM: CPT | Performed by: FAMILY MEDICINE

## 2024-11-12 PROCEDURE — G0009 ADMIN PNEUMOCOCCAL VACCINE: HCPCS | Performed by: FAMILY MEDICINE

## 2024-11-12 PROCEDURE — 2028F FOOT EXAM PERFORMED: CPT | Performed by: FAMILY MEDICINE

## 2024-11-12 PROCEDURE — 3074F SYST BP LT 130 MM HG: CPT | Performed by: FAMILY MEDICINE

## 2024-11-12 PROCEDURE — 90662 IIV NO PRSV INCREASED AG IM: CPT | Performed by: FAMILY MEDICINE

## 2024-11-12 PROCEDURE — 1159F MED LIST DOCD IN RCRD: CPT | Performed by: FAMILY MEDICINE

## 2024-11-12 PROCEDURE — 3048F LDL-C <100 MG/DL: CPT | Performed by: FAMILY MEDICINE

## 2024-11-12 PROCEDURE — 3078F DIAST BP <80 MM HG: CPT | Performed by: FAMILY MEDICINE

## 2024-11-12 PROCEDURE — 3044F HG A1C LEVEL LT 7.0%: CPT | Performed by: FAMILY MEDICINE

## 2024-11-12 PROCEDURE — 1123F ACP DISCUSS/DSCN MKR DOCD: CPT | Performed by: FAMILY MEDICINE

## 2024-11-12 PROCEDURE — G0008 ADMIN INFLUENZA VIRUS VAC: HCPCS | Performed by: FAMILY MEDICINE

## 2024-11-12 NOTE — PROGRESS NOTES
Pt advised.  Future orders added Subjective   Patient ID: [id number]     Levy Gregory is a 69 y.o. male who presents for Follow-up.    HPI  Levy complained of coughing up dark mucous with a normal exam on 29OCT2024 with a decreased appetite and weight loss.  He had complained that food didn't taste the same and it still doesn't. A sputum culture was requested but rejected as contaminated as saliva.    Sputum is no longer black and he feels better and is gaining weight but is eating junk food.    Wife states that Levy felt he was taking too many meds so he stopped everything two weeks, but got irritable so he resumed his sertraline and gabapentin.    Review of Systems  ID-no fever  GI-No blood in stool, tarry stools, pain, vomiting, heartburn, constipation or diarrhea  PULM-Decreasing coughing and  shortness of breath is improved  Urol-nocturia times four at night and stopped his Gemtesa and Myrbetriq and continued the tamsulosin and had no change in his nocturia    Objective     /67 (BP Location: Right arm, Patient Position: Sitting)   Temp 36.8 °C (98.2 °F) (Oral)   Wt 100 kg (220 lb 7.4 oz)   BMI 29.09 kg/m²      Physical Exam  Neck-supple without lymphadenopathy or thyromegaly; no carotid bruits  Heart- regular rate and rhythm, normal s1 and s2 without murmur or gallop; 1+ edema  Lungs-clear to auscultation  Abdomen-soft, positive bowel sounds, without masses, HSmegaly or pain     Assessment/Plan     Problem List Items Addressed This Visit       Diabetic neuropathy associated with type 2 diabetes mellitus (Multi)    Anticoagulant long-term use    Chronic obstructive pulmonary disease, unspecified COPD type (Multi) - Primary    Coronary artery disease involving native coronary artery of native heart without angina pectoris     I recommend that you eat less than 2,000 mg of sodium per day. (A single teaspoon of salt has about 2,300 mg of sodium.) Your body really only needs around 500 mg of sodium (about ¼ teaspoon of salt), but most  people get 4,000 to 6,000 mg per day.      Follow up fasting (no alcohol for 48 hours and just water for 14 hours) in three months for your next routine appointment.  In general, take any medications on schedule (except for types of Insulin).      Chris Huizar MD

## 2024-11-13 ENCOUNTER — APPOINTMENT (OUTPATIENT)
Dept: NEUROLOGY | Facility: CLINIC | Age: 69
End: 2024-11-13
Payer: MEDICARE

## 2024-11-13 ENCOUNTER — APPOINTMENT (OUTPATIENT)
Dept: RADIOLOGY | Facility: HOSPITAL | Age: 69
End: 2024-11-13
Payer: MEDICARE

## 2024-11-19 ENCOUNTER — HOSPITAL ENCOUNTER (OUTPATIENT)
Dept: RADIOLOGY | Facility: HOSPITAL | Age: 69
Discharge: HOME | End: 2024-11-19
Payer: MEDICARE

## 2024-11-19 DIAGNOSIS — M85.80 OSTEOPENIA, UNSPECIFIED LOCATION: ICD-10-CM

## 2024-11-19 DIAGNOSIS — M81.0 AGE-RELATED OSTEOPOROSIS WITHOUT CURRENT PATHOLOGICAL FRACTURE: ICD-10-CM

## 2024-11-19 PROCEDURE — 77080 DXA BONE DENSITY AXIAL: CPT | Performed by: RADIOLOGY

## 2024-11-19 PROCEDURE — 77080 DXA BONE DENSITY AXIAL: CPT

## 2024-11-20 DIAGNOSIS — R33.9 URINARY RETENTION: ICD-10-CM

## 2024-11-21 RX ORDER — TAMSULOSIN HYDROCHLORIDE 0.4 MG/1
0.4 CAPSULE ORAL DAILY
Qty: 90 CAPSULE | Refills: 1 | Status: SHIPPED | OUTPATIENT
Start: 2024-11-21

## 2024-11-22 ENCOUNTER — APPOINTMENT (OUTPATIENT)
Dept: UROLOGY | Facility: CLINIC | Age: 69
End: 2024-11-22
Payer: MEDICARE

## 2024-11-26 ENCOUNTER — APPOINTMENT (OUTPATIENT)
Dept: UROLOGY | Facility: CLINIC | Age: 69
End: 2024-11-26
Payer: MEDICARE

## 2024-11-29 ENCOUNTER — PATIENT OUTREACH (OUTPATIENT)
Dept: PRIMARY CARE | Facility: CLINIC | Age: 69
End: 2024-11-29
Payer: MEDICARE

## 2024-12-04 ENCOUNTER — TELEPHONE (OUTPATIENT)
Dept: PRIMARY CARE | Facility: CLINIC | Age: 69
End: 2024-12-04
Payer: MEDICARE

## 2024-12-04 DIAGNOSIS — K21.9 GASTROESOPHAGEAL REFLUX DISEASE WITHOUT ESOPHAGITIS: ICD-10-CM

## 2024-12-04 NOTE — TELEPHONE ENCOUNTER
Rx Refill Request Telephone Encounter    Name:  Levy Gregory  :  [id number]  Medication Name:  pantoprazole (ProtoNix) 40 mg EC tablet [[id number]]   Dose: 40 mg   Route: oral   Frequency: Nightly  Dispense Quantity: 90 tablet Refills: 1        Sig: Take 1 tablet (40 mg) by mouth once daily at bedtime. Do not crush, chew, or split.    Last refill: 24    Specific Pharmacy location:  [facility] [address] [place] [id number] [facility] [id number]   Date of last appointment:  24  Date of next appointment:  25  Best number to reach patient:  [phone]

## 2024-12-05 RX ORDER — PANTOPRAZOLE SODIUM 40 MG/1
40 TABLET, DELAYED RELEASE ORAL NIGHTLY
Qty: 90 TABLET | Refills: 1 | Status: SHIPPED | OUTPATIENT
Start: 2024-12-05

## 2024-12-12 NOTE — PATIENT INSTRUCTIONS
See your eye doctor for a dilated eye exam at least every year to screen for the gradual loss of vision that can occur with Glaucoma and the potentially catastrophic effects of Diabetes.    You are eligible for the COVID booster.  Without a recent (within 90 days) challenge (booster or infection) your immune system will be three days behind in protecting you from the infection.  You will infect approximately five people by that time.    Please check with your insurance to verify whether you should get your shingles vaccination here or at the pharmacy, and whether it is covered.  The purpose of this shot is to prevent the permanent unremitting pain of Post Herpetic Neuralgia which becomes more common in elderly patients that get Shingles.  This shot can be very expensive.    Follow up fasting (no alcohol for 48 hours and just water for 14 hours) in three months for your next routine appointment.  In general, take any medications on schedule (except for types of Insulin).

## 2024-12-12 NOTE — PROGRESS NOTES
Subjective   Patient ID: [id number]     Levy Gregory is a 69 y.o. male who presents for New Med Request (Wants to resume insulin).    HPI  Taking meds as directed without tolerability or affordability issues; no complaints today.    Review of Systems  PSYCH-No complaints regarding appetite, memory or concentration;  no drug use or alcohol usage over six per week  SLEEP-No complaints of insomnia, apnea or restless legs;  wake up rested  ALL/IMMUN-No history of sneezing or itching  HEM-No unexplained bruising or bleeding    Objective     /74 (BP Location: Left arm, Patient Position: Sitting)   Temp 36.4 °C (97.5 °F) (Oral)      Physical Exam  Neck-supple without lymphadenopathy or thyromegaly; no carotid bruits  Heart- regular rate and rhythm, normal s1 and s2 without murmur or gallop  Lungs-clear to auscultation  Abdomen-soft, positive bowel sounds, without masses, HSmegaly or pain     Assessment/Plan     Problem List Items Addressed This Visit       Type 2 diabetes mellitus with hyperglycemia, without long-term current use of insulin - Primary    Relevant Medications    pioglitazone (Actos) 15 mg tablet    Other Relevant Orders    POCT glycosylated hemoglobin (Hb A1C) manually resulted (Completed)    Anticoagulant long-term use     See your eye doctor for a dilated eye exam at least every year to screen for the gradual loss of vision that can occur with Glaucoma and the potentially catastrophic effects of Diabetes.    You are eligible for the COVID booster.  Without a recent (within 90 days) challenge (booster or infection) your immune system will be three days behind in protecting you from the infection.  You will infect approximately five people by that time.    Please check with your insurance to verify whether you should get your shingles vaccination here or at the pharmacy, and whether it is covered.  The purpose of this shot is to prevent the permanent unremitting pain of Post Herpetic Neuralgia  which becomes more common in elderly patients that get Shingles.  This shot can be very expensive.    Follow up fasting (no alcohol for 48 hours and just water for 14 hours) in three months for your next routine appointment.  In general, take any medications on schedule (except for types of Insulin).      Chris Huizar MD

## 2024-12-13 ENCOUNTER — OFFICE VISIT (OUTPATIENT)
Dept: PRIMARY CARE | Facility: CLINIC | Age: 69
End: 2024-12-13
Payer: MEDICARE

## 2024-12-13 VITALS — TEMPERATURE: 97.5 F | DIASTOLIC BLOOD PRESSURE: 74 MMHG | SYSTOLIC BLOOD PRESSURE: 120 MMHG

## 2024-12-13 DIAGNOSIS — E11.65 TYPE 2 DIABETES MELLITUS WITH HYPERGLYCEMIA, WITHOUT LONG-TERM CURRENT USE OF INSULIN: Primary | ICD-10-CM

## 2024-12-13 DIAGNOSIS — Z79.01 ANTICOAGULANT LONG-TERM USE: ICD-10-CM

## 2024-12-13 LAB — POC HEMOGLOBIN A1C: 6.3 % (ref 4.2–6.5)

## 2024-12-13 PROCEDURE — 3048F LDL-C <100 MG/DL: CPT | Performed by: FAMILY MEDICINE

## 2024-12-13 PROCEDURE — 3074F SYST BP LT 130 MM HG: CPT | Performed by: FAMILY MEDICINE

## 2024-12-13 PROCEDURE — 1123F ACP DISCUSS/DSCN MKR DOCD: CPT | Performed by: FAMILY MEDICINE

## 2024-12-13 PROCEDURE — 1160F RVW MEDS BY RX/DR IN RCRD: CPT | Performed by: FAMILY MEDICINE

## 2024-12-13 PROCEDURE — 2028F FOOT EXAM PERFORMED: CPT | Performed by: FAMILY MEDICINE

## 2024-12-13 PROCEDURE — 3044F HG A1C LEVEL LT 7.0%: CPT | Performed by: FAMILY MEDICINE

## 2024-12-13 PROCEDURE — 3078F DIAST BP <80 MM HG: CPT | Performed by: FAMILY MEDICINE

## 2024-12-13 PROCEDURE — 99214 OFFICE O/P EST MOD 30 MIN: CPT | Performed by: FAMILY MEDICINE

## 2024-12-13 PROCEDURE — 83036 HEMOGLOBIN GLYCOSYLATED A1C: CPT | Performed by: FAMILY MEDICINE

## 2024-12-13 PROCEDURE — 1159F MED LIST DOCD IN RCRD: CPT | Performed by: FAMILY MEDICINE

## 2024-12-13 RX ORDER — PIOGLITAZONEHYDROCHLORIDE 15 MG/1
15 TABLET ORAL DAILY
Qty: 30 TABLET | Refills: 11 | Status: SHIPPED | OUTPATIENT
Start: 2024-12-13 | End: 2025-12-13

## 2024-12-13 RX ORDER — PIOGLITAZONEHYDROCHLORIDE 15 MG/1
15 TABLET ORAL DAILY
Qty: 30 TABLET | Refills: 11 | Status: SHIPPED | OUTPATIENT
Start: 2024-12-13 | End: 2024-12-13

## 2024-12-19 ENCOUNTER — TELEPHONE (OUTPATIENT)
Dept: UROLOGY | Facility: CLINIC | Age: 69
End: 2024-12-19
Payer: MEDICARE

## 2024-12-19 DIAGNOSIS — R33.9 URINARY RETENTION: ICD-10-CM

## 2024-12-19 RX ORDER — TAMSULOSIN HYDROCHLORIDE 0.4 MG/1
0.4 CAPSULE ORAL DAILY
Qty: 90 CAPSULE | Refills: 1 | Status: SHIPPED | OUTPATIENT
Start: 2024-12-19

## 2024-12-27 ENCOUNTER — TELEPHONE (OUTPATIENT)
Dept: PRIMARY CARE | Facility: CLINIC | Age: 69
End: 2024-12-27
Payer: MEDICARE

## 2024-12-27 DIAGNOSIS — F32.A DEPRESSION, UNSPECIFIED DEPRESSION TYPE: ICD-10-CM

## 2024-12-27 RX ORDER — SERTRALINE HYDROCHLORIDE 50 MG/1
50 TABLET, FILM COATED ORAL DAILY
Qty: 90 TABLET | Refills: 0 | Status: SHIPPED | OUTPATIENT
Start: 2024-12-27

## 2024-12-27 NOTE — TELEPHONE ENCOUNTER
Refill request     Med name-sertraline  Med dose-50mg  Med directions-take 1 tablet daily    Pharmacy-[facility]  Pharmacy address-[place]    LR-10/22/24  LV-12/13/24  Nv-02/12/25    Thanks

## 2025-01-08 ENCOUNTER — OFFICE VISIT (OUTPATIENT)
Dept: CARDIOLOGY | Facility: CLINIC | Age: 70
End: 2025-01-08
Payer: MEDICARE

## 2025-01-08 VITALS
HEIGHT: 73 IN | WEIGHT: 218.1 LBS | SYSTOLIC BLOOD PRESSURE: 99 MMHG | DIASTOLIC BLOOD PRESSURE: 66 MMHG | RESPIRATION RATE: 18 BRPM | HEART RATE: 83 BPM | BODY MASS INDEX: 28.91 KG/M2

## 2025-01-08 DIAGNOSIS — Z86.718 HISTORY OF DVT (DEEP VEIN THROMBOSIS): ICD-10-CM

## 2025-01-08 DIAGNOSIS — I87.009 POST-THROMBOTIC SYNDROME: Primary | ICD-10-CM

## 2025-01-08 PROCEDURE — 3078F DIAST BP <80 MM HG: CPT | Performed by: INTERNAL MEDICINE

## 2025-01-08 PROCEDURE — 99214 OFFICE O/P EST MOD 30 MIN: CPT | Performed by: INTERNAL MEDICINE

## 2025-01-08 PROCEDURE — 1160F RVW MEDS BY RX/DR IN RCRD: CPT | Performed by: INTERNAL MEDICINE

## 2025-01-08 PROCEDURE — 3008F BODY MASS INDEX DOCD: CPT | Performed by: INTERNAL MEDICINE

## 2025-01-08 PROCEDURE — 1123F ACP DISCUSS/DSCN MKR DOCD: CPT | Performed by: INTERNAL MEDICINE

## 2025-01-08 PROCEDURE — 3074F SYST BP LT 130 MM HG: CPT | Performed by: INTERNAL MEDICINE

## 2025-01-08 PROCEDURE — 1159F MED LIST DOCD IN RCRD: CPT | Performed by: INTERNAL MEDICINE

## 2025-01-08 PROCEDURE — 1036F TOBACCO NON-USER: CPT | Performed by: INTERNAL MEDICINE

## 2025-01-08 PROCEDURE — 1126F AMNT PAIN NOTED NONE PRSNT: CPT | Performed by: INTERNAL MEDICINE

## 2025-01-08 ASSESSMENT — PAIN SCALES - GENERAL: PAINLEVEL_OUTOF10: 0-NO PAIN

## 2025-01-08 NOTE — PROGRESS NOTES
OUTPATIENT FOLLOW-UP -  VASCULAR MEDICINE    DOS: 1/8/2025  Last seen:    8/14/2024    REQUESTING PHYSICIAN:  Dr. Papito Maldonado MD PCP    REASON FOR FOLLOW-UP:  here for follow h/o DVT off ac    HISTORY OF PRESENT ILLNESS:     68 yo man here for follow h/o DVT off ac. Doing well with out new sx. Still with swelling. Not using the compression. Reports continues to recover. Main complaint is everette LE /foot numbness that impacts his walking. Has appt with neurology upcoming.     Since last seen had a fall and hip fx. Had partial hip replacement. Does not remember if he was on eliquis or xarelto for prophylaxis at that time. From the DC summary rx ASA and lovenox for 30 days.     +prev h/o DVT 2016/2017 - in the setting of GIB - IVC filter at that time. No FH VTE.      From prev notes:  here for evaluation of DVT - currently on eliquis and IVC filter. Hx dates to 3/1/2023 underwent LS robotic assisted hiatal hernia repair, Heller myotomy, Shai fundoplication, EGD and everette TAP blocks. Represented approx 1 week post-op with esophageal perforation, pneumomediastinum and pleural effusion. Underwent VATS decortication and CT management/5/2023 underwent right thoracotomy, decortication, esophgectomy, takedown of the fundoplication, ISA and G-tube. Course c/b CORA = CVVHD, septic shock req pressors and ICU support, and prolonged resp failure = intubation.      During admission dx right pop DVT ?subacute or chronic and everette UE DVT - in the setting of RP/iliopsoas hematomas. SC heparin initially but noted to have progression to left CFV, FV DVT and IVC filter was place. Ultimately DC to LTAC on reduced dosing of eliquis 2.5 mg q 12 hours 2/2 renal disease and complicated admission as noted ( DC to LTAC 5/22/2023).     Over the summer 2023 had several other admission for feeding tube issues - unclear if AC was interrupted during this time.     August readmitted for empyema managed with pigtail drain. G-J tube revision at that time.  "Maintained on AC at DC to SNF - LMWH initially 2/2 fluconazole.      Dec had esophagus reconstruction with \"stomach pullup\" with esophagostomy reversal on 11/29/23 . This went well but has some issues eating.      Does not know when the eliquis was resumed - currently on 5 mg twice - states this was changed from 2.5 mg q 12 hours to the higher dose on March 1st by Beverly GIL with Dr. Huizar - unclear why this was increased.      He denies bleeding related to the AC. Reports 80% recovered. Navigating ADLs. Started driving.      Last visit stopped the AC - remains stable.     REVIEW OF SYSTEMS:     weight is stable  Reports appetite is poor  No sores, ulcers, rashes, skin lesions  No CP, chest pressure  No cough, SOB  No BRBPR, melena, hematuria  No bleeding  + edema, no calf pain    PHYSICAL EXAMINATION:   BP 99/66 (BP Location: Right arm, Patient Position: Sitting)   Pulse 83   Resp 18   Ht 1.854 m (6' 1\")   Wt 98.9 kg (218 lb 1.6 oz)   BMI 28.77 kg/m²     Gen: Appears well, NAD  Chest: CTA  CVS: regular without murmur or gallop  Ext: 1+ ankle edema, nontender  Skin: good condition without wounds or lesions  Pulses: DP 2+;   Mood and affect appropriate    ADDITIONAL DATA:   No compression worn today. Right calf: 46.5cm  Left calf:  46.5cm    ASSESSMENT/PLAN:    here for follow h/o DVT off ac - discussed increase risk for VTE in high risk setting. Discussed the need for aggressive PPX in high risk setting. Discussed consideration for pool walking. The numbness is likely PN - seeing neurology and will discuss this with them at that time.    Can follow up here as needed for any new concerns.     "

## 2025-01-08 NOTE — PATIENT INSTRUCTIONS
ASSESSMENT/PLAN:    here for follow h/o DVT off ac - discussed increase risk for VTE in high risk setting. Discussed the need for aggressive PPX in high risk setting. Discussed consideration for pool walking. The numbness is likely PN - seeing neurology and will discuss this with them at that time.    Can follow up here as needed for any new concerns.

## 2025-01-27 ENCOUNTER — TELEPHONE (OUTPATIENT)
Dept: PRIMARY CARE | Facility: CLINIC | Age: 70
End: 2025-01-27
Payer: MEDICARE

## 2025-01-27 DIAGNOSIS — E11.49 OTHER DIABETIC NEUROLOGICAL COMPLICATION ASSOCIATED WITH TYPE 2 DIABETES MELLITUS: ICD-10-CM

## 2025-01-27 RX ORDER — GABAPENTIN 100 MG/1
100 CAPSULE ORAL 3 TIMES DAILY
Qty: 270 CAPSULE | Refills: 0 | Status: SHIPPED | OUTPATIENT
Start: 2025-01-27

## 2025-01-27 NOTE — TELEPHONE ENCOUNTER
Patients wife called and asked for a refill on his gabapentin, however I see that you discontinued it at the 11/12/24 appointment   I see in your notes on that visit that the patient was going to resume taking it. Could you please send a refill to [facility] in [facility].   LV-12/13/24  NV-02/12/25    Thanks

## 2025-01-28 ENCOUNTER — APPOINTMENT (OUTPATIENT)
Dept: UROLOGY | Facility: CLINIC | Age: 70
End: 2025-01-28
Payer: MEDICARE

## 2025-02-10 ENCOUNTER — APPOINTMENT (OUTPATIENT)
Dept: PRIMARY CARE | Facility: CLINIC | Age: 70
End: 2025-02-10
Payer: MEDICARE

## 2025-02-10 VITALS
SYSTOLIC BLOOD PRESSURE: 97 MMHG | TEMPERATURE: 98 F | HEIGHT: 73 IN | DIASTOLIC BLOOD PRESSURE: 58 MMHG | WEIGHT: 213 LBS | BODY MASS INDEX: 28.23 KG/M2

## 2025-02-10 DIAGNOSIS — E11.65 TYPE 2 DIABETES MELLITUS WITH HYPERGLYCEMIA, WITHOUT LONG-TERM CURRENT USE OF INSULIN: Primary | ICD-10-CM

## 2025-02-10 DIAGNOSIS — E78.5 HYPERLIPIDEMIA, UNSPECIFIED HYPERLIPIDEMIA TYPE: ICD-10-CM

## 2025-02-10 DIAGNOSIS — I11.0 HYPERTENSIVE HEART DISEASE WITH HEART FAILURE: ICD-10-CM

## 2025-02-10 DIAGNOSIS — R05.3 CHRONIC COUGH: ICD-10-CM

## 2025-02-10 DIAGNOSIS — Z87.19 HISTORY OF MELENA: ICD-10-CM

## 2025-02-10 PROCEDURE — 1160F RVW MEDS BY RX/DR IN RCRD: CPT | Performed by: FAMILY MEDICINE

## 2025-02-10 PROCEDURE — 99214 OFFICE O/P EST MOD 30 MIN: CPT | Performed by: FAMILY MEDICINE

## 2025-02-10 PROCEDURE — G0439 PPPS, SUBSEQ VISIT: HCPCS | Performed by: FAMILY MEDICINE

## 2025-02-10 PROCEDURE — G0444 DEPRESSION SCREEN ANNUAL: HCPCS | Performed by: FAMILY MEDICINE

## 2025-02-10 PROCEDURE — 1123F ACP DISCUSS/DSCN MKR DOCD: CPT | Performed by: FAMILY MEDICINE

## 2025-02-10 PROCEDURE — 3008F BODY MASS INDEX DOCD: CPT | Performed by: FAMILY MEDICINE

## 2025-02-10 PROCEDURE — G0442 ANNUAL ALCOHOL SCREEN 15 MIN: HCPCS | Performed by: FAMILY MEDICINE

## 2025-02-10 PROCEDURE — 1159F MED LIST DOCD IN RCRD: CPT | Performed by: FAMILY MEDICINE

## 2025-02-10 PROCEDURE — 3078F DIAST BP <80 MM HG: CPT | Performed by: FAMILY MEDICINE

## 2025-02-10 PROCEDURE — 1170F FXNL STATUS ASSESSED: CPT | Performed by: FAMILY MEDICINE

## 2025-02-10 PROCEDURE — 3074F SYST BP LT 130 MM HG: CPT | Performed by: FAMILY MEDICINE

## 2025-02-10 RX ORDER — FLUTICASONE PROPIONATE AND SALMETEROL 100; 50 UG/1; UG/1
1 POWDER RESPIRATORY (INHALATION)
Qty: 60 EACH | Refills: 2 | Status: ON HOLD | OUTPATIENT
Start: 2025-02-10 | End: 2026-02-10

## 2025-02-10 ASSESSMENT — ACTIVITIES OF DAILY LIVING (ADL)
DRESSING: INDEPENDENT
GROCERY_SHOPPING: INDEPENDENT
TAKING_MEDICATION: INDEPENDENT
BATHING: INDEPENDENT
MANAGING_FINANCES: INDEPENDENT
DOING_HOUSEWORK: INDEPENDENT

## 2025-02-10 ASSESSMENT — ENCOUNTER SYMPTOMS
LOSS OF SENSATION IN FEET: 1
OCCASIONAL FEELINGS OF UNSTEADINESS: 1
DEPRESSION: 0

## 2025-02-10 ASSESSMENT — PATIENT HEALTH QUESTIONNAIRE - PHQ9
2. FEELING DOWN, DEPRESSED OR HOPELESS: NOT AT ALL
SUM OF ALL RESPONSES TO PHQ9 QUESTIONS 1 AND 2: 0
1. LITTLE INTEREST OR PLEASURE IN DOING THINGS: NOT AT ALL

## 2025-02-10 NOTE — PATIENT INSTRUCTIONS
See your eye doctor for a dilated eye exam at least every year to screen for the gradual loss of vision that can occur with Glaucoma and the potentially catastrophic effects of Diabetes.    You are eligible for the COVID booster.  Without a recent (within 90 days) challenge (booster or infection) your immune system will be three days behind in protecting you from the infection.  You will infect approximately five people by that time.    Please check with your insurance to verify whether you should get your shingles vaccination here or at the pharmacy, and whether it is covered.  The purpose of this shot is to prevent the permanent unremitting pain of Post Herpetic Neuralgia which becomes more common in elderly patients that get Shingles.  This shot can be very expensive.    Follow up in one week for a spirometry

## 2025-02-10 NOTE — PROGRESS NOTES
Subjective   Reason for Visit: Levy Gregory is an 69 y.o. male here for a Medicare Wellness visit.   Past Medical, Surgical, and Family History reviewed and updated in chart.  In the past 2 weeks this patient has not felt down, depressed, hopeless, lost interest or pleasure in doing things or thought of harming herself or others and this was discussed over five minutes. The patient has not fallen in the last six months and has no loose rugs,  uneven floors or poor lighting at home.  Advance Care Planning discussion was held over 5 minutes.  The patient has no spiritual/Congregational/cultural values or needs that we need to know. The patient does not feel threatened or abused physically, emotionally or sexually.  The patient feels safe in the home.  In the past month, there was not a day when the patient of anyone in the patient's family went hungry because there was not enough food.  The patient denies that they or the person with them has problems with hearing, speaking, reading, moving around or learning.  The patient states they are comfortable filling out medical forms. Alcohol usage was dicussed over five minutes.  Reviewed all medications by prescribing practitioner or clinical pharmacist (such as prescriptions, OTCs, herbal therapies and supplements) and documented in the medical record.  HPI  Taking meds as directed without tolerability or affordability issues.  Not fasting today.    Patient Care Team:  Chris Huizar MD as PCP - General (Family Medicine)  Chris Huizar MD as PCP - [employer] PCP  Papito Maldonado MD as Consulting Physician (Cardiology)  Karla Martin MD as Consulting Physician (Cardiology)     Review of Systems  General-no unexplained fever or chills;  feeling weak for the last ten days  GI- black tarry stools ten days ago;  he is taking 8 mg Asprin every days butnotoher otc pain relievers  Opth-no dry eyes, itchy eyes or blurry vision  PULM-has been coughing with clear  "phlegm for almost one year  ENT-No hearing loss, tinnitus or vertigo  Neck-no unexplained swelling in neck or pain  Pulm-has been coughing for almost one year but denies wheezing;  he has shortness of breath  GI-no abdominal pain    Objective   Vitals:  BP 97/58 (BP Location: Right arm, Patient Position: Sitting)   Temp 36.7 °C (98 °F) (Oral)   Ht 1.854 m (6' 1\")   Wt 96.6 kg (213 lb)   BMI 28.10 kg/m²       Physical Exam  General- well defined, well nourished, pale man, well hydrated individual in NAD  Neck-supple without lymphadenopathy or thyromegaly; no carotid bruits  Heart- regular rate and rhythm, normal s1 and s2 without murmur or gallop;  no edema  Lungs-clear to auscultation  Abdomen-soft, positive bowel sounds, without masses, HSmegaly or pain;  one suture poking through midline incision (Dr SUSAN Agrawal is aware as per patient)    Assessment/Plan   Problem List Items Addressed This Visit       Hyperlipidemia    Type 2 diabetes mellitus with hyperglycemia, without long-term current use of insulin - Primary    Hypertensive heart disease with heart failure     Other Visit Diagnoses       Chronic cough        Relevant Medications    fluticasone propion-salmeteroL (Advair Diskus) 100-50 mcg/dose diskus inhaler    Other Relevant Orders    XR chest 2 views    History of melena        Relevant Orders    Comprehensive Metabolic Panel    CBC and Auto Differential    Referral to Gastroenterology             See your eye doctor for a dilated eye exam at least every year to screen for the gradual loss of vision that can occur with Glaucoma and the potentially catastrophic effects of Diabetes.    You are eligible for the COVID booster.  Without a recent (within 90 days) challenge (booster or infection) your immune system will be three days behind in protecting you from the infection.  You will infect approximately five people by that time.    Please check with your insurance to verify whether you should get your " shingles vaccination here or at the pharmacy, and whether it is covered.  The purpose of this shot is to prevent the permanent unremitting pain of Post Herpetic Neuralgia which becomes more common in elderly patients that get Shingles.  This shot can be very expensive.    Follow up in one week for a spirometry

## 2025-02-11 LAB
ALBUMIN SERPL-MCNC: 3.4 G/DL (ref 3.6–5.1)
ALP SERPL-CCNC: 152 U/L (ref 35–144)
ALT SERPL-CCNC: 24 U/L (ref 9–46)
ANION GAP SERPL CALCULATED.4IONS-SCNC: 8 MMOL/L (CALC) (ref 7–17)
AST SERPL-CCNC: 27 U/L (ref 10–35)
BASOPHILS # BLD AUTO: 40 CELLS/UL (ref 0–200)
BASOPHILS NFR BLD AUTO: 0.5 %
BILIRUB SERPL-MCNC: 0.3 MG/DL (ref 0.2–1.2)
BUN SERPL-MCNC: 47 MG/DL (ref 7–25)
CALCIUM SERPL-MCNC: 8.3 MG/DL (ref 8.6–10.3)
CHLORIDE SERPL-SCNC: 108 MMOL/L (ref 98–110)
CO2 SERPL-SCNC: 22 MMOL/L (ref 20–32)
CREAT SERPL-MCNC: 1.62 MG/DL (ref 0.7–1.35)
EGFRCR SERPLBLD CKD-EPI 2021: 46 ML/MIN/1.73M2
EOSINOPHIL # BLD AUTO: 79 CELLS/UL (ref 15–500)
EOSINOPHIL NFR BLD AUTO: 1 %
ERYTHROCYTE [DISTWIDTH] IN BLOOD BY AUTOMATED COUNT: 12.3 % (ref 11–15)
GLUCOSE SERPL-MCNC: 242 MG/DL (ref 65–99)
HCT VFR BLD AUTO: 26 % (ref 38.5–50)
HGB BLD-MCNC: 7.9 G/DL (ref 13.2–17.1)
LYMPHOCYTES # BLD AUTO: 1225 CELLS/UL (ref 850–3900)
LYMPHOCYTES NFR BLD AUTO: 15.5 %
MCH RBC QN AUTO: 29.5 PG (ref 27–33)
MCHC RBC AUTO-ENTMCNC: 30.4 G/DL (ref 32–36)
MCV RBC AUTO: 97 FL (ref 80–100)
MONOCYTES # BLD AUTO: 711 CELLS/UL (ref 200–950)
MONOCYTES NFR BLD AUTO: 9 %
NEUTROPHILS # BLD AUTO: 5846 CELLS/UL (ref 1500–7800)
NEUTROPHILS NFR BLD AUTO: 74 %
PLATELET # BLD AUTO: 249 THOUSAND/UL (ref 140–400)
PMV BLD REES-ECKER: 10.7 FL (ref 7.5–12.5)
POTASSIUM SERPL-SCNC: 5.2 MMOL/L (ref 3.5–5.3)
PROT SERPL-MCNC: 6.3 G/DL (ref 6.1–8.1)
RBC # BLD AUTO: 2.68 MILLION/UL (ref 4.2–5.8)
SODIUM SERPL-SCNC: 138 MMOL/L (ref 135–146)
WBC # BLD AUTO: 7.9 THOUSAND/UL (ref 3.8–10.8)

## 2025-02-12 ENCOUNTER — APPOINTMENT (OUTPATIENT)
Dept: PRIMARY CARE | Facility: CLINIC | Age: 70
End: 2025-02-12
Payer: MEDICARE

## 2025-02-12 DIAGNOSIS — D64.9 ANEMIA, UNSPECIFIED TYPE: Primary | ICD-10-CM

## 2025-02-12 RX ORDER — FERROUS SULFATE 325(65) MG
325 TABLET, DELAYED RELEASE (ENTERIC COATED) ORAL
Qty: 270 TABLET | Refills: 0 | Status: ON HOLD | OUTPATIENT
Start: 2025-02-12 | End: 2025-05-13

## 2025-02-13 ENCOUNTER — APPOINTMENT (OUTPATIENT)
Dept: RADIOLOGY | Facility: HOSPITAL | Age: 70
End: 2025-02-13
Payer: MEDICARE

## 2025-02-13 ENCOUNTER — APPOINTMENT (OUTPATIENT)
Dept: CARDIOLOGY | Facility: HOSPITAL | Age: 70
End: 2025-02-13
Payer: MEDICARE

## 2025-02-13 ENCOUNTER — APPOINTMENT (OUTPATIENT)
Dept: PRIMARY CARE | Facility: CLINIC | Age: 70
End: 2025-02-13
Payer: MEDICARE

## 2025-02-13 ENCOUNTER — HOSPITAL ENCOUNTER (INPATIENT)
Facility: HOSPITAL | Age: 70
DRG: 377 | End: 2025-02-13
Attending: EMERGENCY MEDICINE | Admitting: INTERNAL MEDICINE
Payer: MEDICARE

## 2025-02-13 DIAGNOSIS — K92.1 MELENA: ICD-10-CM

## 2025-02-13 DIAGNOSIS — I95.1 ORTHOSTATIC HYPOTENSION: ICD-10-CM

## 2025-02-13 DIAGNOSIS — K21.9 GASTROESOPHAGEAL REFLUX DISEASE WITHOUT ESOPHAGITIS: ICD-10-CM

## 2025-02-13 DIAGNOSIS — K92.2 GI BLEED: Primary | ICD-10-CM

## 2025-02-13 DIAGNOSIS — D64.9 ANEMIA, UNSPECIFIED TYPE: ICD-10-CM

## 2025-02-13 LAB
ABO GROUP (TYPE) IN BLOOD: NORMAL
ALBUMIN SERPL BCP-MCNC: 3.4 G/DL (ref 3.4–5)
ALP SERPL-CCNC: 151 U/L (ref 33–136)
ALT SERPL W P-5'-P-CCNC: 38 U/L (ref 10–52)
ANION GAP SERPL CALC-SCNC: 14 MMOL/L (ref 10–20)
ANTIBODY SCREEN: NORMAL
AST SERPL W P-5'-P-CCNC: 58 U/L (ref 9–39)
BASOPHILS # BLD AUTO: 0.02 X10*3/UL (ref 0–0.1)
BASOPHILS NFR BLD AUTO: 0.3 %
BILIRUB SERPL-MCNC: 0.4 MG/DL (ref 0–1.2)
BLOOD EXPIRATION DATE: NORMAL
BLOOD EXPIRATION DATE: NORMAL
BUN SERPL-MCNC: 40 MG/DL (ref 6–23)
CALCIUM SERPL-MCNC: 8.2 MG/DL (ref 8.6–10.3)
CARDIAC TROPONIN I PNL SERPL HS: 160 NG/L (ref 0–20)
CARDIAC TROPONIN I PNL SERPL HS: 161 NG/L (ref 0–20)
CHLORIDE SERPL-SCNC: 104 MMOL/L (ref 98–107)
CO2 SERPL-SCNC: 20 MMOL/L (ref 21–32)
CREAT SERPL-MCNC: 2.14 MG/DL (ref 0.5–1.3)
DISPENSE STATUS: NORMAL
DISPENSE STATUS: NORMAL
EGFRCR SERPLBLD CKD-EPI 2021: 33 ML/MIN/1.73M*2
EOSINOPHIL # BLD AUTO: 0.02 X10*3/UL (ref 0–0.7)
EOSINOPHIL NFR BLD AUTO: 0.3 %
ERYTHROCYTE [DISTWIDTH] IN BLOOD BY AUTOMATED COUNT: 13.7 % (ref 11.5–14.5)
GLUCOSE BLD MANUAL STRIP-MCNC: 126 MG/DL (ref 74–99)
GLUCOSE BLD MANUAL STRIP-MCNC: 145 MG/DL (ref 74–99)
GLUCOSE SERPL-MCNC: 210 MG/DL (ref 74–99)
HCT VFR BLD AUTO: 25.1 % (ref 41–52)
HCT VFR BLD AUTO: 26.6 % (ref 41–52)
HGB BLD-MCNC: 7.7 G/DL (ref 13.5–17.5)
HGB BLD-MCNC: 8.2 G/DL (ref 13.5–17.5)
IMM GRANULOCYTES # BLD AUTO: 0.03 X10*3/UL (ref 0–0.7)
IMM GRANULOCYTES NFR BLD AUTO: 0.5 % (ref 0–0.9)
LYMPHOCYTES # BLD AUTO: 0.71 X10*3/UL (ref 1.2–4.8)
LYMPHOCYTES NFR BLD AUTO: 11.1 %
MCH RBC QN AUTO: 29.4 PG (ref 26–34)
MCHC RBC AUTO-ENTMCNC: 30.7 G/DL (ref 32–36)
MCV RBC AUTO: 96 FL (ref 80–100)
MONOCYTES # BLD AUTO: 0.43 X10*3/UL (ref 0.1–1)
MONOCYTES NFR BLD AUTO: 6.7 %
NEUTROPHILS # BLD AUTO: 5.18 X10*3/UL (ref 1.2–7.7)
NEUTROPHILS NFR BLD AUTO: 81.1 %
NRBC BLD-RTO: 0 /100 WBCS (ref 0–0)
PLATELET # BLD AUTO: 218 X10*3/UL (ref 150–450)
POTASSIUM SERPL-SCNC: 5.1 MMOL/L (ref 3.5–5.3)
PRODUCT BLOOD TYPE: 5100
PRODUCT BLOOD TYPE: 5100
PRODUCT CODE: NORMAL
PRODUCT CODE: NORMAL
PROT SERPL-MCNC: 6.8 G/DL (ref 6.4–8.2)
RBC # BLD AUTO: 2.62 X10*6/UL (ref 4.5–5.9)
RH FACTOR (ANTIGEN D): NORMAL
SODIUM SERPL-SCNC: 133 MMOL/L (ref 136–145)
UNIT ABO: NORMAL
UNIT ABO: NORMAL
UNIT NUMBER: NORMAL
UNIT NUMBER: NORMAL
UNIT RH: NORMAL
UNIT RH: NORMAL
UNIT VOLUME: 350
UNIT VOLUME: 350
WBC # BLD AUTO: 6.4 X10*3/UL (ref 4.4–11.3)
XM INTEP: NORMAL
XM INTEP: NORMAL

## 2025-02-13 PROCEDURE — 99285 EMERGENCY DEPT VISIT HI MDM: CPT

## 2025-02-13 PROCEDURE — 2500000002 HC RX 250 W HCPCS SELF ADMINISTERED DRUGS (ALT 637 FOR MEDICARE OP, ALT 636 FOR OP/ED): Performed by: NURSE PRACTITIONER

## 2025-02-13 PROCEDURE — 96360 HYDRATION IV INFUSION INIT: CPT

## 2025-02-13 PROCEDURE — 2500000001 HC RX 250 WO HCPCS SELF ADMINISTERED DRUGS (ALT 637 FOR MEDICARE OP): Performed by: NURSE PRACTITIONER

## 2025-02-13 PROCEDURE — 71045 X-RAY EXAM CHEST 1 VIEW: CPT | Performed by: INTERNAL MEDICINE

## 2025-02-13 PROCEDURE — 80053 COMPREHEN METABOLIC PANEL: CPT | Performed by: EMERGENCY MEDICINE

## 2025-02-13 PROCEDURE — 74174 CTA ABD&PLVS W/CONTRAST: CPT | Performed by: RADIOLOGY

## 2025-02-13 PROCEDURE — 85014 HEMATOCRIT: CPT | Performed by: NURSE PRACTITIONER

## 2025-02-13 PROCEDURE — 82947 ASSAY GLUCOSE BLOOD QUANT: CPT

## 2025-02-13 PROCEDURE — 1200000002 HC GENERAL ROOM WITH TELEMETRY DAILY

## 2025-02-13 PROCEDURE — 85018 HEMOGLOBIN: CPT | Performed by: NURSE PRACTITIONER

## 2025-02-13 PROCEDURE — 99223 1ST HOSP IP/OBS HIGH 75: CPT | Performed by: NURSE PRACTITIONER

## 2025-02-13 PROCEDURE — 93005 ELECTROCARDIOGRAM TRACING: CPT

## 2025-02-13 PROCEDURE — 36430 TRANSFUSION BLD/BLD COMPNT: CPT

## 2025-02-13 PROCEDURE — 85025 COMPLETE CBC W/AUTO DIFF WBC: CPT | Performed by: EMERGENCY MEDICINE

## 2025-02-13 PROCEDURE — 2500000004 HC RX 250 GENERAL PHARMACY W/ HCPCS (ALT 636 FOR OP/ED): Performed by: NURSE PRACTITIONER

## 2025-02-13 PROCEDURE — 96361 HYDRATE IV INFUSION ADD-ON: CPT

## 2025-02-13 PROCEDURE — 71045 X-RAY EXAM CHEST 1 VIEW: CPT

## 2025-02-13 PROCEDURE — 36415 COLL VENOUS BLD VENIPUNCTURE: CPT | Performed by: EMERGENCY MEDICINE

## 2025-02-13 PROCEDURE — 86901 BLOOD TYPING SEROLOGIC RH(D): CPT | Performed by: EMERGENCY MEDICINE

## 2025-02-13 PROCEDURE — 84075 ASSAY ALKALINE PHOSPHATASE: CPT | Performed by: EMERGENCY MEDICINE

## 2025-02-13 PROCEDURE — 84484 ASSAY OF TROPONIN QUANT: CPT | Performed by: EMERGENCY MEDICINE

## 2025-02-13 PROCEDURE — 2550000001 HC RX 255 CONTRASTS: Performed by: EMERGENCY MEDICINE

## 2025-02-13 PROCEDURE — 86922 COMPATIBILITY TEST ANTIGLOB: CPT

## 2025-02-13 PROCEDURE — 99221 1ST HOSP IP/OBS SF/LOW 40: CPT | Performed by: NURSE PRACTITIONER

## 2025-02-13 PROCEDURE — 36415 COLL VENOUS BLD VENIPUNCTURE: CPT

## 2025-02-13 PROCEDURE — 2500000004 HC RX 250 GENERAL PHARMACY W/ HCPCS (ALT 636 FOR OP/ED)

## 2025-02-13 PROCEDURE — 74174 CTA ABD&PLVS W/CONTRAST: CPT

## 2025-02-13 PROCEDURE — P9016 RBC LEUKOCYTES REDUCED: HCPCS

## 2025-02-13 PROCEDURE — 84484 ASSAY OF TROPONIN QUANT: CPT

## 2025-02-13 PROCEDURE — 99285 EMERGENCY DEPT VISIT HI MDM: CPT | Mod: 25 | Performed by: EMERGENCY MEDICINE

## 2025-02-13 RX ORDER — FLUTICASONE FUROATE AND VILANTEROL 100; 25 UG/1; UG/1
1 POWDER RESPIRATORY (INHALATION)
Status: DISCONTINUED | OUTPATIENT
Start: 2025-02-13 | End: 2025-02-13

## 2025-02-13 RX ORDER — BUDESONIDE 0.25 MG/2ML
0.25 INHALANT ORAL
Status: DISCONTINUED | OUTPATIENT
Start: 2025-02-13 | End: 2025-02-19 | Stop reason: HOSPADM

## 2025-02-13 RX ORDER — NAPROXEN SODIUM 220 MG/1
81 TABLET, FILM COATED ORAL DAILY
Status: DISCONTINUED | OUTPATIENT
Start: 2025-02-13 | End: 2025-02-19 | Stop reason: HOSPADM

## 2025-02-13 RX ORDER — POLYETHYLENE GLYCOL 3350 17 G/17G
17 POWDER, FOR SOLUTION ORAL DAILY PRN
Status: DISCONTINUED | OUTPATIENT
Start: 2025-02-13 | End: 2025-02-19 | Stop reason: HOSPADM

## 2025-02-13 RX ORDER — DEXTROSE 50 % IN WATER (D50W) INTRAVENOUS SYRINGE
25
Status: DISCONTINUED | OUTPATIENT
Start: 2025-02-13 | End: 2025-02-19 | Stop reason: HOSPADM

## 2025-02-13 RX ORDER — PIOGLITAZONEHYDROCHLORIDE 15 MG/1
15 TABLET ORAL DAILY
Status: DISCONTINUED | OUTPATIENT
Start: 2025-02-13 | End: 2025-02-19 | Stop reason: HOSPADM

## 2025-02-13 RX ORDER — ONDANSETRON HYDROCHLORIDE 2 MG/ML
4 INJECTION, SOLUTION INTRAVENOUS EVERY 8 HOURS PRN
Status: DISCONTINUED | OUTPATIENT
Start: 2025-02-13 | End: 2025-02-19 | Stop reason: HOSPADM

## 2025-02-13 RX ORDER — GABAPENTIN 100 MG/1
100 CAPSULE ORAL 3 TIMES DAILY
Status: DISCONTINUED | OUTPATIENT
Start: 2025-02-13 | End: 2025-02-19 | Stop reason: HOSPADM

## 2025-02-13 RX ORDER — ATORVASTATIN CALCIUM 80 MG/1
80 TABLET, FILM COATED ORAL DAILY
Status: DISCONTINUED | OUTPATIENT
Start: 2025-02-13 | End: 2025-02-19 | Stop reason: HOSPADM

## 2025-02-13 RX ORDER — ONDANSETRON 4 MG/1
4 TABLET, FILM COATED ORAL EVERY 8 HOURS PRN
Status: DISCONTINUED | OUTPATIENT
Start: 2025-02-13 | End: 2025-02-19 | Stop reason: HOSPADM

## 2025-02-13 RX ORDER — ACETAMINOPHEN 325 MG/1
650 TABLET ORAL EVERY 4 HOURS PRN
Status: DISCONTINUED | OUTPATIENT
Start: 2025-02-13 | End: 2025-02-19 | Stop reason: HOSPADM

## 2025-02-13 RX ORDER — TAMSULOSIN HYDROCHLORIDE 0.4 MG/1
0.4 CAPSULE ORAL DAILY
Status: DISCONTINUED | OUTPATIENT
Start: 2025-02-13 | End: 2025-02-19 | Stop reason: HOSPADM

## 2025-02-13 RX ORDER — PANTOPRAZOLE SODIUM 40 MG/10ML
40 INJECTION, POWDER, LYOPHILIZED, FOR SOLUTION INTRAVENOUS 2 TIMES DAILY
Status: DISCONTINUED | OUTPATIENT
Start: 2025-02-13 | End: 2025-02-19 | Stop reason: HOSPADM

## 2025-02-13 RX ORDER — FERROUS SULFATE 325(65) MG
325 TABLET ORAL
Status: DISCONTINUED | OUTPATIENT
Start: 2025-02-13 | End: 2025-02-19 | Stop reason: HOSPADM

## 2025-02-13 RX ORDER — ALBUTEROL SULFATE 0.83 MG/ML
2.5 SOLUTION RESPIRATORY (INHALATION) EVERY 6 HOURS PRN
Status: DISCONTINUED | OUTPATIENT
Start: 2025-02-13 | End: 2025-02-19 | Stop reason: HOSPADM

## 2025-02-13 RX ORDER — SERTRALINE HYDROCHLORIDE 50 MG/1
50 TABLET, FILM COATED ORAL DAILY
Status: DISCONTINUED | OUTPATIENT
Start: 2025-02-13 | End: 2025-02-19 | Stop reason: HOSPADM

## 2025-02-13 RX ORDER — DEXTROSE 50 % IN WATER (D50W) INTRAVENOUS SYRINGE
12.5
Status: DISCONTINUED | OUTPATIENT
Start: 2025-02-13 | End: 2025-02-19 | Stop reason: HOSPADM

## 2025-02-13 RX ORDER — INSULIN LISPRO 100 [IU]/ML
0-10 INJECTION, SOLUTION INTRAVENOUS; SUBCUTANEOUS
Status: DISCONTINUED | OUTPATIENT
Start: 2025-02-13 | End: 2025-02-19 | Stop reason: HOSPADM

## 2025-02-13 RX ORDER — FORMOTEROL FUMARATE 20 UG/2ML
20 SOLUTION RESPIRATORY (INHALATION)
Status: DISCONTINUED | OUTPATIENT
Start: 2025-02-13 | End: 2025-02-19 | Stop reason: HOSPADM

## 2025-02-13 RX ADMIN — GABAPENTIN 100 MG: 100 CAPSULE ORAL at 21:12

## 2025-02-13 RX ADMIN — IOHEXOL 90 ML: 350 INJECTION, SOLUTION INTRAVENOUS at 12:56

## 2025-02-13 RX ADMIN — PANTOPRAZOLE SODIUM 40 MG: 40 INJECTION, POWDER, FOR SOLUTION INTRAVENOUS at 21:12

## 2025-02-13 RX ADMIN — SODIUM CHLORIDE, POTASSIUM CHLORIDE, SODIUM LACTATE AND CALCIUM CHLORIDE 1000 ML: 600; 310; 30; 20 INJECTION, SOLUTION INTRAVENOUS at 11:48

## 2025-02-13 RX ADMIN — SERTRALINE HYDROCHLORIDE 50 MG: 50 TABLET ORAL at 16:33

## 2025-02-13 RX ADMIN — TAMSULOSIN HYDROCHLORIDE 0.4 MG: 0.4 CAPSULE ORAL at 16:33

## 2025-02-13 RX ADMIN — GABAPENTIN 100 MG: 100 CAPSULE ORAL at 16:33

## 2025-02-13 RX ADMIN — ATORVASTATIN CALCIUM 80 MG: 80 TABLET, FILM COATED ORAL at 16:34

## 2025-02-13 SDOH — HEALTH STABILITY: MENTAL HEALTH: HOW OFTEN DO YOU HAVE A DRINK CONTAINING ALCOHOL?: NEVER

## 2025-02-13 SDOH — SOCIAL STABILITY: SOCIAL INSECURITY: ARE THERE ANY APPARENT SIGNS OF INJURIES/BEHAVIORS THAT COULD BE RELATED TO ABUSE/NEGLECT?: NO

## 2025-02-13 SDOH — SOCIAL STABILITY: SOCIAL INSECURITY: ABUSE: ADULT

## 2025-02-13 SDOH — SOCIAL STABILITY: SOCIAL INSECURITY
WITHIN THE LAST YEAR, HAVE YOU BEEN RAPED OR FORCED TO HAVE ANY KIND OF SEXUAL ACTIVITY BY YOUR PARTNER OR EX-PARTNER?: NO

## 2025-02-13 SDOH — ECONOMIC STABILITY: INCOME INSECURITY
IN THE PAST 12 MONTHS HAS THE ELECTRIC, GAS, OIL, OR WATER COMPANY THREATENED TO SHUT OFF SERVICES IN YOUR HOME?: PATIENT DECLINED

## 2025-02-13 SDOH — ECONOMIC STABILITY: FOOD INSECURITY: WITHIN THE PAST 12 MONTHS, YOU WORRIED THAT YOUR FOOD WOULD RUN OUT BEFORE YOU GOT THE MONEY TO BUY MORE.: NEVER TRUE

## 2025-02-13 SDOH — HEALTH STABILITY: MENTAL HEALTH: HOW MANY DRINKS CONTAINING ALCOHOL DO YOU HAVE ON A TYPICAL DAY WHEN YOU ARE DRINKING?: PATIENT DOES NOT DRINK

## 2025-02-13 SDOH — HEALTH STABILITY: MENTAL HEALTH: HOW OFTEN DO YOU HAVE SIX OR MORE DRINKS ON ONE OCCASION?: NEVER

## 2025-02-13 SDOH — SOCIAL STABILITY: SOCIAL INSECURITY: DOES ANYONE TRY TO KEEP YOU FROM HAVING/CONTACTING OTHER FRIENDS OR DOING THINGS OUTSIDE YOUR HOME?: NO

## 2025-02-13 SDOH — SOCIAL STABILITY: SOCIAL INSECURITY: WITHIN THE LAST YEAR, HAVE YOU BEEN HUMILIATED OR EMOTIONALLY ABUSED IN OTHER WAYS BY YOUR PARTNER OR EX-PARTNER?: NO

## 2025-02-13 SDOH — SOCIAL STABILITY: SOCIAL INSECURITY: WITHIN THE LAST YEAR, HAVE YOU BEEN AFRAID OF YOUR PARTNER OR EX-PARTNER?: NO

## 2025-02-13 SDOH — ECONOMIC STABILITY: FOOD INSECURITY: WITHIN THE PAST 12 MONTHS, THE FOOD YOU BOUGHT JUST DIDN'T LAST AND YOU DIDN'T HAVE MONEY TO GET MORE.: NEVER TRUE

## 2025-02-13 SDOH — SOCIAL STABILITY: SOCIAL INSECURITY: HAVE YOU HAD ANY THOUGHTS OF HARMING ANYONE ELSE?: NO

## 2025-02-13 SDOH — SOCIAL STABILITY: SOCIAL INSECURITY: HAS ANYONE EVER THREATENED TO HURT YOUR FAMILY OR YOUR PETS?: NO

## 2025-02-13 SDOH — SOCIAL STABILITY: SOCIAL INSECURITY: ARE YOU OR HAVE YOU BEEN THREATENED OR ABUSED PHYSICALLY, EMOTIONALLY, OR SEXUALLY BY ANYONE?: NO

## 2025-02-13 SDOH — SOCIAL STABILITY: SOCIAL INSECURITY: DO YOU FEEL UNSAFE GOING BACK TO THE PLACE WHERE YOU ARE LIVING?: NO

## 2025-02-13 SDOH — SOCIAL STABILITY: SOCIAL INSECURITY: DO YOU FEEL ANYONE HAS EXPLOITED OR TAKEN ADVANTAGE OF YOU FINANCIALLY OR OF YOUR PERSONAL PROPERTY?: NO

## 2025-02-13 SDOH — SOCIAL STABILITY: SOCIAL INSECURITY: WERE YOU ABLE TO COMPLETE ALL THE BEHAVIORAL HEALTH SCREENINGS?: YES

## 2025-02-13 SDOH — SOCIAL STABILITY: SOCIAL INSECURITY: HAVE YOU HAD THOUGHTS OF HARMING ANYONE ELSE?: NO

## 2025-02-13 ASSESSMENT — PAIN SCALES - GENERAL
PAINLEVEL_OUTOF10: 0 - NO PAIN

## 2025-02-13 ASSESSMENT — ENCOUNTER SYMPTOMS
MUSCULOSKELETAL NEGATIVE: 1
FATIGUE: 1
PSYCHIATRIC NEGATIVE: 1
NAUSEA: 0
ABDOMINAL PAIN: 0
CARDIOVASCULAR NEGATIVE: 1
BLOOD IN STOOL: 1
ABDOMINAL DISTENTION: 0
NEUROLOGICAL NEGATIVE: 1
SHORTNESS OF BREATH: 1
VOMITING: 0

## 2025-02-13 ASSESSMENT — ACTIVITIES OF DAILY LIVING (ADL)
JUDGMENT_ADEQUATE_SAFELY_COMPLETE_DAILY_ACTIVITIES: YES
LACK_OF_TRANSPORTATION: NO
DRESSING YOURSELF: INDEPENDENT
TOILETING: NEEDS ASSISTANCE
PATIENT'S MEMORY ADEQUATE TO SAFELY COMPLETE DAILY ACTIVITIES?: YES
GROOMING: INDEPENDENT
HEARING - LEFT EAR: FUNCTIONAL
FEEDING YOURSELF: INDEPENDENT
BATHING: INDEPENDENT
WALKS IN HOME: NEEDS ASSISTANCE
HEARING - RIGHT EAR: FUNCTIONAL
LACK_OF_TRANSPORTATION: NO
ADEQUATE_TO_COMPLETE_ADL: YES

## 2025-02-13 ASSESSMENT — COGNITIVE AND FUNCTIONAL STATUS - GENERAL
CLIMB 3 TO 5 STEPS WITH RAILING: A LITTLE
WALKING IN HOSPITAL ROOM: A LITTLE
PATIENT BASELINE BEDBOUND: NO
DAILY ACTIVITIY SCORE: 24
CLIMB 3 TO 5 STEPS WITH RAILING: A LITTLE
MOBILITY SCORE: 22
DAILY ACTIVITIY SCORE: 24
WALKING IN HOSPITAL ROOM: A LITTLE
MOBILITY SCORE: 22

## 2025-02-13 ASSESSMENT — LIFESTYLE VARIABLES
AUDIT-C TOTAL SCORE: 0
AUDIT-C TOTAL SCORE: 0
HAVE PEOPLE ANNOYED YOU BY CRITICIZING YOUR DRINKING: NO
HOW OFTEN DO YOU HAVE A DRINK CONTAINING ALCOHOL: NEVER
SKIP TO QUESTIONS 9-10: 1
EVER FELT BAD OR GUILTY ABOUT YOUR DRINKING: NO
HOW OFTEN DO YOU HAVE 6 OR MORE DRINKS ON ONE OCCASION: NEVER
SKIP TO QUESTIONS 9-10: 1
HAVE YOU EVER FELT YOU SHOULD CUT DOWN ON YOUR DRINKING: NO
HOW MANY STANDARD DRINKS CONTAINING ALCOHOL DO YOU HAVE ON A TYPICAL DAY: PATIENT DOES NOT DRINK
EVER HAD A DRINK FIRST THING IN THE MORNING TO STEADY YOUR NERVES TO GET RID OF A HANGOVER: NO
AUDIT-C TOTAL SCORE: 0
TOTAL SCORE: 0

## 2025-02-13 ASSESSMENT — PAIN - FUNCTIONAL ASSESSMENT
PAIN_FUNCTIONAL_ASSESSMENT: 0-10

## 2025-02-13 ASSESSMENT — PATIENT HEALTH QUESTIONNAIRE - PHQ9
SUM OF ALL RESPONSES TO PHQ9 QUESTIONS 1 & 2: 0
2. FEELING DOWN, DEPRESSED OR HOPELESS: NOT AT ALL
1. LITTLE INTEREST OR PLEASURE IN DOING THINGS: NOT AT ALL

## 2025-02-13 ASSESSMENT — COLUMBIA-SUICIDE SEVERITY RATING SCALE - C-SSRS
1. IN THE PAST MONTH, HAVE YOU WISHED YOU WERE DEAD OR WISHED YOU COULD GO TO SLEEP AND NOT WAKE UP?: NO
6. HAVE YOU EVER DONE ANYTHING, STARTED TO DO ANYTHING, OR PREPARED TO DO ANYTHING TO END YOUR LIFE?: NO
2. HAVE YOU ACTUALLY HAD ANY THOUGHTS OF KILLING YOURSELF?: NO

## 2025-02-13 NOTE — CONSULTS
Reason For Consult  Anemia/black stools    This note was created using voice recognition transcription software. Despite proofreading, unintentional typographical errors may be present. Please contact the GI office with any questions or concerns.       This is a 68 yo Male with a PMH of COPD, T2DM, chronic cough, melena, T2 achalasia, DVTs s/p IVC filter, acalculous cholecystitis, Grade D esophagitis, paraesophageal hernia s/p robotic laparoscopic myotomy w/ fundoplication c/b esophageal perforation s/p esophagectomy c/b cardiac arrest (2023), Schatzki ring, food impaction, and dysphagia who presented to [facility]  on 2/13/25 with reports of GIB.  GI was consulted for anemia/black stools.  He is an established patient of Dr. Zayas LOV 1/26/23.        Subjective  On oral iron for some time.  Black stools intermittently x 2 weeks with increased frequency.  Last black stool was today.  Denies any other GI symptoms but is symptomatic and having SOB, profound weakness.  Currently receiving 1 unit of blood.           Esophageal Impedance 2/23/23 showing T 2 achalasia with large HH.  EGD-2/23/23 showing esophageal stenosis and gastritis.  2/11/23 dilation.  11/25/22 showing a 5 cm HH, dilation, low grade narrowing of Schatzki ring, food impaction, LA Grade D esophagitis.  Colonoscopy-3/12/21 diverticulosis, NBIH.  Repeat in 5 years.  BM-Daily.  Normal consistency.  No bleeding or weight loss.  FHX-Denies   SHX-No tobacco/illicits/ETOH  Ab Sx-As stated above.  NSAIDs- Denies         Allergies: as mentioned in H&P      A 10 point review of system is negative except for what is mentioned in the HPI    Vital Signs: Reviewed    Physical Exam:  General: Polite, calm, resting  Skin:  Warm and dry, no jaundice  HEENT: No scleral icterus, no conjunctival pallor, normocephalic, atraumatic, mucous membranes moist  Neck:  atraumatic, trachea midline, no JVD  Chest:  Clear to auscultation bilaterally. No wheezes, rales, or rhonchi  CV:   Regular rate and rhythm.  Positive S1/S2  Abdomen: no distension, +BS, soft, non-tender to palpation, no rebound tenderness, no guarding, no rigidity, no discernible ascites   Extremities: no lower extremity edema, chronic pigmentary changes, no cyanosis  Neurological:  A&Ox3  Psychiatric: cooperative     Investigations:  Labs, radiological imaging and cardiac work up were reviewed      Objective:         2/13/2025    12:35 PM 2/13/2025    12:40 PM 2/13/2025    12:45 PM 2/13/2025     1:30 PM 2/13/2025     1:36 PM 2/13/2025     1:45 PM 2/13/2025     2:00 PM   Vitals   Systolic    110 123 103 99   Diastolic    67 67 63 54   Heart Rate 80 84 82 80 89 86 84   Temp     37.1 °C (98.8 °F) 37.1 °C (98.8 °F) 37.1 °C (98.8 °F)   Resp 17 12 18 16 16 19 15          Medications:       Recent Results (from the past 72 hours)   CBC with Differential    Collection Time: 02/13/25 10:41 AM   Result Value Ref Range    WBC 6.4 4.4 - 11.3 x10*3/uL    nRBC 0.0 0.0 - 0.0 /100 WBCs    RBC 2.62 (L) 4.50 - 5.90 x10*6/uL    Hemoglobin 7.7 (L) 13.5 - 17.5 g/dL    Hematocrit 25.1 (L) 41.0 - 52.0 %    MCV 96 80 - 100 fL    MCH 29.4 26.0 - 34.0 pg    MCHC 30.7 (L) 32.0 - 36.0 g/dL    RDW 13.7 11.5 - 14.5 %    Platelets 218 150 - 450 x10*3/uL    Neutrophils % 81.1 40.0 - 80.0 %    Immature Granulocytes %, Automated 0.5 0.0 - 0.9 %    Lymphocytes % 11.1 13.0 - 44.0 %    Monocytes % 6.7 2.0 - 10.0 %    Eosinophils % 0.3 0.0 - 6.0 %    Basophils % 0.3 0.0 - 2.0 %    Neutrophils Absolute 5.18 1.20 - 7.70 x10*3/uL    Immature Granulocytes Absolute, Automated 0.03 0.00 - 0.70 x10*3/uL    Lymphocytes Absolute 0.71 (L) 1.20 - 4.80 x10*3/uL    Monocytes Absolute 0.43 0.10 - 1.00 x10*3/uL    Eosinophils Absolute 0.02 0.00 - 0.70 x10*3/uL    Basophils Absolute 0.02 0.00 - 0.10 x10*3/uL   Comprehensive Metabolic Panel    Collection Time: 02/13/25 10:41 AM   Result Value Ref Range    Glucose 210 (H) 74 - 99 mg/dL    Sodium 133 (L) 136 - 145 mmol/L    Potassium  5.1 3.5 - 5.3 mmol/L    Chloride 104 98 - 107 mmol/L    Bicarbonate 20 (L) 21 - 32 mmol/L    Anion Gap 14 10 - 20 mmol/L    Urea Nitrogen 40 (H) 6 - 23 mg/dL    Creatinine 2.14 (H) 0.50 - 1.30 mg/dL    eGFR 33 (L) >60 mL/min/1.73m*2    Calcium 8.2 (L) 8.6 - 10.3 mg/dL    Albumin 3.4 3.4 - 5.0 g/dL    Alkaline Phosphatase 151 (H) 33 - 136 U/L    Total Protein 6.8 6.4 - 8.2 g/dL    AST 58 (H) 9 - 39 U/L    Bilirubin, Total 0.4 0.0 - 1.2 mg/dL    ALT 38 10 - 52 U/L   Troponin I, High Sensitivity    Collection Time: 02/13/25 10:41 AM   Result Value Ref Range    Troponin I, High Sensitivity 160 (HH) 0 - 20 ng/L   Type and Screen    Collection Time: 02/13/25 10:41 AM   Result Value Ref Range    ABO TYPE O     Rh TYPE POS     ANTIBODY SCREEN NEG    Troponin I, High Sensitivity    Collection Time: 02/13/25 11:44 AM   Result Value Ref Range    Troponin I, High Sensitivity 161 (HH) 0 - 20 ng/L   Prepare RBC: 1 Units    Collection Time: 02/13/25 11:50 AM   Result Value Ref Range    PRODUCT CODE Z2864F90     Unit Number [id number]     Unit ABO O     Unit RH POS     XM INTEP COMP     Dispense Status IS     Blood Expiration Date 3/12/2025 11:59:00 PM EDT     PRODUCT BLOOD TYPE 5100     UNIT VOLUME 350           Assessment:    This is a 68 yo Male with a PMH of COPD, T2DM, chronic cough, melena, T2 achalasia, DVTs s/p IVC filter, acalculous cholecystitis, Grade D esophagitis, paraesophageal hernia s/p robotic laparoscopic myotomy w/ fundoplication c/b esophageal perforation s/p esophagectomy c/b cardiac arrest (2023), Schatzki ring, food impaction, and dysphagia who presented to [facility]  on 2/13/25 with reports of GIB.  GI was consulted for anemia/black stools.  He is an established patient of Dr. Zayas LOV 1/26/23.  On oral iron.  2 weeks of intermittent black stools with increased frequency, profound weakness, SOB, heart palpitations.  No other GI symptoms.  Hemodynamically stable but somewhat hypotensive.  Will tentatively  plan for EGD tomorrow pending patient's vital signs/cardiac status.        CT imaging shows cirrhosis.  Chronically elevate Alk Phos.  Direct T bili 0.4  AST 58  Hgb 7.7  INR 1.5          Plan  1.)  Anemia/black stools-Trend Hgb, monitor for overt bleeding, transfuse as necessary, avoid NSAIDs.  NPO after MN.  CLD For now.  EGD tomorrow pending patient's VS, labs.  Please ensure his Hgb is >7 and electrolytes are WNL to avoid any delays.  2.)  Cirrhosis-Patient to follow up with the GI office after discharge-Dr. Zayas.    Discussed above patient assessment and plan with Dr. Hanies.    I spent 75 minutes in the professional and overall care of this patient.      02/13/25 at 2:34 PM - IVÁN Colbert-CNP

## 2025-02-13 NOTE — ED PROVIDER NOTES
HPI   Chief Complaint   Patient presents with    Black or Bloody Stool    Syncope       HPI    Levy Gregory is a 69 male with history of COPD, type 2 diabetes, GERD, DVTs s/p IVC filter, acalculous cholecystitis, type II achalasia, paraesophageal hernia s/p robotic laparoscopic myotomy with fundoplication C/B esophageal perforation presenting to the ED with concern for GI bleed.  Patient states he has been having black stools with each bowel movement for the past 2 weeks.  Patient denies nausea and vomiting.  Patient states he does have abdominal pain however this is not new due to the multiple surgeries he has had in the past.  Patient states he did have a syncopal episode this morning.  Patient states he also has been short of breath for 2 weeks.  Patient states has been having chills but denies fevers and bodyaches.  Patient denies chest pain, dizziness, lightheadedness.    Patient History   Past Medical History:   Diagnosis Date    Achalasia, esophageal     per patient of esophagus    Anemia     Contusion of abdominal wall, initial encounter 01/12/2021    Contusion, flank    COPD (chronic obstructive pulmonary disease) (Multi)     Diabetes mellitus (Multi)     F/W PCP    DVT (deep venous thrombosis) (Multi)     DVT (deep venous thrombosis) (Multi)     IVC filter placed on 04/20/2023 and on Eliquis    Dysphagia     GERD (gastroesophageal reflux disease)     Hemoptysis 05/12/2020    Cough with hemoptysis    Herpes labialis     Hiatal hernia     Hernia Repair    Hyperlipidemia     Hypotension due to drugs     Impaired oral gastric feeding tube, initial encounter 10/03/2023    Irregular heart beat     AFIB: patient denies    Pain in left knee     Peripheral neuropathy     Personal history of other diseases of the respiratory system 06/19/2019    History of bronchitis    Sleep apnea     Patient states does not have sleep apnea since martinez loss and does not use CPAP    Solar purpura (CMS-HCC)      Past Surgical  History:   Procedure Laterality Date    CONDUIT REPLACEMENT  2023    Gastric conduit revision; mini laparotomy, General Ex-lap, ISA, Gastric pull up with anastmosis in neck, MIGUELINA to bulb sx. New J tube.    ESOPHAGOGASTRECTOMY      ESOPHAGOGASTRODUODENOSCOPY      with stents x2    GASTROSTOMY TUBE PLACEMENT N/A     IR CVC PICC  2023    IR CVC PICC 2023 [facility] INPATIENT LEGAC[facility]    OTHER SURGICAL HISTORY  2019    Giovanna filter placement    UMBILICAL HERNIA REPAIR N/A      Family History   Problem Relation Name Age of Onset    Diabetes Mother      Hypertension Mother      Diabetes type I Father       Social History     Tobacco Use    Smoking status: Former     Types: Cigars     Start date: 1989     Quit date: 2023     Years since quittin.9     Passive exposure: Past    Smokeless tobacco: Never   Vaping Use    Vaping status: Never Used   Substance Use Topics    Alcohol use: Never    Drug use: Never       Physical Exam   ED Triage Vitals [25 0958]   Temperature Heart Rate Respirations BP   37 °C (98.6 °F) 89 18 100/60      Pulse Ox Temp Source Heart Rate Source Patient Position   98 % Temporal -- --      BP Location FiO2 (%)     -- --       Physical Exam  Vitals and nursing note reviewed. Exam conducted with a chaperone present.   Cardiovascular:      Rate and Rhythm: Normal rate and regular rhythm.      Heart sounds: Normal heart sounds. No murmur heard.     No gallop.   Pulmonary:      Effort: Pulmonary effort is normal. No respiratory distress.      Breath sounds: Normal breath sounds. No stridor. No wheezing, rhonchi or rales.   Abdominal:      General: Abdomen is flat. Bowel sounds are normal. There is no distension.      Palpations: There is no mass.      Tenderness: There is no abdominal tenderness. There is no guarding or rebound.      Hernia: No hernia is present.   Genitourinary:     Rectum: No mass, tenderness, anal fissure, external hemorrhoid or internal hemorrhoid.  Normal anal tone.      Comments: Black stool noted on rectal exam   Musculoskeletal:      Right lower leg: No edema.      Left lower leg: No edema.   Skin:     General: Skin is warm and dry.   Neurological:      General: No focal deficit present.      Mental Status: He is oriented to person, place, and time.   Psychiatric:         Mood and Affect: Mood normal.         Behavior: Behavior normal.           ED Course & MDM   Diagnoses as of 02/13/25 2328   GI bleed                 No data recorded     [place] Coma Scale Score: 15 (02/13/25 0959 : Savana Esquivel RN)                           Medical Decision Making    .  This is a 69-year-old male presenting to the ED with concern for GI bleed.  Patient states he has been having black stools with each bowel movement for the past 2 weeks.  Rectal exam was performed with chaperone present today and black stool was noted.  CBC was obtained for evaluation of hemoglobin with concern for anemia given patient has been having black stools for 2 weeks.  CBC shows hemoglobin of 7.7.  Per chart review this is lower than the patient's baseline.  CMP also obtained today and shows CORA with BUN 40, creatinine 2.14, GFR 33.  When patient arrived to the emergency department his blood pressure was hypotensive with blood pressure of 100/60.  Patient was given 1 L LR bolus for hypotension and dehydration.  Patient states he also had a syncopal episode today.  Troponin and EKG were obtained to rule out ACS causing the syncopal episode.  Initial troponin was elevated at 160 and repeat troponin was also elevated at 161.  Given there is concern for GI bleed aspirin not given for elevated troponins.  CTA abdomen pelvis was obtained with concern for GI bleed.  Chest x-ray also obtained for shortness of breath.  X-ray shows unchanged small pleural effusions and cardiomegaly/pericardial effusion.  CTA shows no active hemorrhage within the GI tract.  While in the emergency department the  patient's blood pressure decreased to once 77/57.  Given patient's blood pressure decreased and there is a decrease in hemoglobin from baseline, the most likely cause of patient's hypotension is from anemia and therefore 1 unit RBC ordered and given.  Patient was consented for blood transfusion.      EKG was obtained for syncope.  EKG shows normal sinus rhythm with regular rate of 84 bpm, parable 176, , QTc 456, left axis deviation, right bundle branch block.  No ST elevations noted.  Right bundle branch block noted on EKG from August 2024.    Disposition: Hospital admission  Patient is admitted to medicine under Dr. Hassan for further management of hypotension, anemia, and concern for GI bleed.  Patient agrees to hospital admission at this time.    Patient was discussed and staffed with Dr. Shukla      Procedure  Procedures     Marco Schwarz PA-C  02/13/25 0255

## 2025-02-13 NOTE — H&P
History Of Present Illness  Levy Gregory is a 69 y.o. male with history of COPD, type 2 diabetes, GERD, DVTs s/p IVC filter, acalculous cholecystitis, type II achalasia, paraesophageal hernia s/p robotic laparoscopic myotomy with fundoplication C/B esophageal perforation presents to [facility] ER with concerns for  GI bleed. Patient states he has been having black stools with each bowel movement for the past 2 weeks. Patient denies nausea and vomiting. Patient states he does have abdominal pain however this is not new due to the multiple surgeries he has had in the past. Patient states he did have a syncopal episode this morning. Patient states he also has been short of breath for 2 weeks. Patient states has been having chills but denies fevers and bodyaches. Patient denies chest pain, dizziness, lightheadedness. He was transfused one uPRBC and indicates he is feeling better than he was on admission. Currently he has no complaints.      Past Medical History  Colon polyp  Diabetic neuropathy associated with type 2 diabetes mellitus (Multi)  ED (erectile dysfunction)  High serum bone-specific alkaline phosphatase  Gout  History of pulmonary embolism  Hyperlipidemia  Obstructive sleep apnea, adult  Osteopenia  Vitamin D deficiency  Achalasia, esophageal  Type 2 diabetes mellitus with hyperglycemia, without long-term current use of insulin (Multi)  Orthostatic hypotension  Solar purpura (CMS-[facility])  Anticoagulant long-term use  Other dysphagia  Hypertensive heart disease with heart failure (Multi)  Chronic obstructive pulmonary disease, unspecified COPD type (Multi)  Acute renal failure superimposed on chronic kidney disease (CMS-[facility])  Adjustment disorder with depressed mood  Edema of both lower legs due to peripheral venous insufficiency  Schatzki's ring  Coronary artery disease involving native coronary artery of native heart without angina pectoris  Deep vein thrombosis (DVT) of left lower  extremity (Multi)  Macrocytosis  Closed fracture of right hip requiring operative repair with routine healing  S/P right hip fracture  Chronic kidney disease, stage 5 (Multi)  Asthmatic bronchitis without complication ([facility])    Surgical History  He has a past surgical history that includes Other surgical history (01/21/2019); IR CVC PICC (08/16/2023); Umbilical hernia repair (N/A, 2013); Gastrostomy tube placement (N/A); Esophagogastroduodenoscopy; Esophagogastrectomy; and Conduit replacement (12/07/2023).     Social History  He reports that he quit smoking about 1 years ago. His smoking use included cigars. He started smoking about 35 years ago. He has been exposed to tobacco smoke. He has never used smokeless tobacco. He reports that he does not drink alcohol and does not use drugs.    Family History  Family History   Problem Relation Name Age of Onset    Diabetes Mother      Hypertension Mother      Diabetes type I Father          Allergies  Patient has no known allergies.    Review of Systems   Constitutional:  Positive for fatigue.   HENT: Negative.     Respiratory:  Positive for shortness of breath.    Cardiovascular: Negative.    Gastrointestinal:  Positive for blood in stool. Negative for abdominal distention, abdominal pain, nausea and vomiting.   Genitourinary: Negative.    Musculoskeletal: Negative.    Skin: Negative.    Neurological: Negative.    Psychiatric/Behavioral: Negative.        ROS: 12 systems reviewed and negative except per HPI above     Physical Exam by System:     Constitutional: Well developed, awake/alert/oriented x3, no distress, alert and cooperative   ENMT: mucous membranes moist   Head/Neck: Neck supple   Respiratory/Thorax: Patent airways, CTAB, normal breath sounds with good chest expansion, thorax symmetric   Cardiovascular: Regular, rate and rhythm, no murmurs, 2+ equal pulses of the extremities, normal S 1and S 2   Gastrointestinal: Nondistended, soft, non-tender, no rebound  "tenderness or guarding, no masses palpable, no organomegaly, +BS, no bruits   Musculoskeletal: ROM intact, no joint swelling, normal strength   Extremities: normal extremities, no cyanosis edema, contusions or wounds, no clubbing   Neurological: alert and oriented x3, intact senses, motor, response and reflexes, normal strength       Last Recorded Vitals  Blood pressure 99/54, pulse 84, temperature 37.1 °C (98.8 °F), temperature source Temporal, resp. rate 15, height 1.854 m (6' 1\"), weight 95.3 kg (210 lb), SpO2 96%.    Relevant Results  Results for orders placed or performed during the hospital encounter of 02/13/25 (from the past 24 hours)   CBC with Differential   Result Value Ref Range    WBC 6.4 4.4 - 11.3 x10*3/uL    nRBC 0.0 0.0 - 0.0 /100 WBCs    RBC 2.62 (L) 4.50 - 5.90 x10*6/uL    Hemoglobin 7.7 (L) 13.5 - 17.5 g/dL    Hematocrit 25.1 (L) 41.0 - 52.0 %    MCV 96 80 - 100 fL    MCH 29.4 26.0 - 34.0 pg    MCHC 30.7 (L) 32.0 - 36.0 g/dL    RDW 13.7 11.5 - 14.5 %    Platelets 218 150 - 450 x10*3/uL    Neutrophils % 81.1 40.0 - 80.0 %    Immature Granulocytes %, Automated 0.5 0.0 - 0.9 %    Lymphocytes % 11.1 13.0 - 44.0 %    Monocytes % 6.7 2.0 - 10.0 %    Eosinophils % 0.3 0.0 - 6.0 %    Basophils % 0.3 0.0 - 2.0 %    Neutrophils Absolute 5.18 1.20 - 7.70 x10*3/uL    Immature Granulocytes Absolute, Automated 0.03 0.00 - 0.70 x10*3/uL    Lymphocytes Absolute 0.71 (L) 1.20 - 4.80 x10*3/uL    Monocytes Absolute 0.43 0.10 - 1.00 x10*3/uL    Eosinophils Absolute 0.02 0.00 - 0.70 x10*3/uL    Basophils Absolute 0.02 0.00 - 0.10 x10*3/uL   Comprehensive Metabolic Panel   Result Value Ref Range    Glucose 210 (H) 74 - 99 mg/dL    Sodium 133 (L) 136 - 145 mmol/L    Potassium 5.1 3.5 - 5.3 mmol/L    Chloride 104 98 - 107 mmol/L    Bicarbonate 20 (L) 21 - 32 mmol/L    Anion Gap 14 10 - 20 mmol/L    Urea Nitrogen 40 (H) 6 - 23 mg/dL    Creatinine 2.14 (H) 0.50 - 1.30 mg/dL    eGFR 33 (L) >60 mL/min/1.73m*2    Calcium 8.2 " (L) 8.6 - 10.3 mg/dL    Albumin 3.4 3.4 - 5.0 g/dL    Alkaline Phosphatase 151 (H) 33 - 136 U/L    Total Protein 6.8 6.4 - 8.2 g/dL    AST 58 (H) 9 - 39 U/L    Bilirubin, Total 0.4 0.0 - 1.2 mg/dL    ALT 38 10 - 52 U/L   Troponin I, High Sensitivity   Result Value Ref Range    Troponin I, High Sensitivity 160 (HH) 0 - 20 ng/L   Type and Screen   Result Value Ref Range    ABO TYPE O     Rh TYPE POS     ANTIBODY SCREEN NEG    Troponin I, High Sensitivity   Result Value Ref Range    Troponin I, High Sensitivity 161 (HH) 0 - 20 ng/L   Prepare RBC: 1 Units   Result Value Ref Range    PRODUCT CODE J0692R07     Unit Number [id number]     Unit ABO O     Unit RH POS     XM INTEP COMP     Dispense Status IS     Blood Expiration Date 3/12/2025 11:59:00 PM EDT     PRODUCT BLOOD TYPE 5100     UNIT VOLUME 350      *Note: Due to a large number of results and/or encounters for the requested time period, some results have not been displayed. A complete set of results can be found in Results Review.      Scheduled medications    Continuous medications    PRN medications       CT angio abdomen pelvis w and or wo IV IV contrast    Result Date: 2/13/2025  Interpreted By:  Alvarado Lane, STUDY: CT ANGIO ABDOMEN PELVIS W AND/OR WO IV IV CONTRAST;  2/13/2025 1:15 pm   INDICATION: Signs/Symptoms:black stools x2 weeks, concern for GI bleed   COMPARISON: 08/06/2023   ACCESSION NUMBER(S): [id number]   ORDERING CLINICIAN: SANJIV HOOVER   TECHNIQUE: Thin-section axial images of the abdomen and pelvis in the arterial phase after intravenous administration of  90 mL Omnipaque 350. Sagittal and coronal reconstructions. 3D reconstructions were obtained at a separate workstation.   FINDINGS: Vascular:  No aortic aneurysm or dissection.   The celiac trunk, superior mesenteric artery, renal arteries and inferior mesenteric artery are patent.   LOWER CHEST: Bibasilar scarring. BONES: No acute osseous abnormality. Right hip arthroplasty.  Compression deformities of L3, L1, and T12. The T12 and L1 deformities have progressed slightly over the interval.   ABDOMEN:   LIVER: Cirrhotic hepatic contours. No focal lesions appreciated. BILE DUCTS: Normal caliber. GALLBLADDER: Surgically absent. PANCREAS: Within normal limits. SPLEEN: Granulomatous calcifications. Spleen otherwise unremarkable. ADRENALS: Within normal limits. KIDNEYS and URETERS: Symmetric renal enhancement. No hydronephrosis. 3 mm non-obstructing stone about the inferior pole of the right kidney.   RETROPERITONEUM: No pathologically enlarged retroperitoneal lymph nodes.   PELVIS:   REPRODUCTIVE ORGANS: No pelvic masses. BLADDER: Within normal limits.   BOWEL: Patient is status post esophagectomy with anterior gastric pull-through which is only partially visualized. No focal inflammatory change identified within the bowel. No active hemorrhage. Appendix unremarkable. PERITONEUM: No ascites or free air, no fluid collection.       1. No active hemorrhage within the GI tract. 2. Patient is status post esophagectomy with anterior gastric pull-through which is only partly visualized about the lung bases. 3. 3 mm non-obstructing right renal stone. 4. Cirrhotic appearing liver. 5. Nonacute appearing compression deformities of T12, L1, and L3.   MACRO: none   Signed by: Alvarado Lane 2/13/2025 1:41 PM Dictation workstation:   [id number]    XR chest 1 view    Result Date: 2/13/2025  Interpreted By:  Carmen Robledo, STUDY: XR CHEST 1 VIEW;  2/13/2025 12:05 pm   INDICATION: Signs/Symptoms:shortness of breath.   COMPARISON: 10/24/2024   ACCESSION NUMBER(S): [id number]   ORDERING CLINICIAN: SANJIV HOOVER   FINDINGS: AP radiograph of the chest was provided.       CARDIOMEDIASTINAL SILHOUETTE: Persistently enlarged cardiomediastinal silhouette.   LUNGS: Unchanged mild blunting of the bilateral costophrenic angles. No consolidation, pulmonary edema, or pneumothorax.   ABDOMEN: No remarkable upper  abdominal findings.   BONES: No acute osseous changes.       Unchanged small pleural effusions and cardiomegaly/pericardial effusion.   Signed by: Carmen Robledo 2/13/2025 12:13 PM Dictation workstation:   [id number]         Assessment/Plan     #Black stools concern for GI bleed  #Anemia, likely secondary to acute blood loss  #hypotension  #History of COPD,   #History of type 2 diabetes,   #History of GERD  #History of type II achalasia, paraesophageal hernia s/p robotic laparoscopic myotomy with fundoplication C/B esophageal perforation   #History of Schatzki ring  #History of CKD 5  #elevated troponin    Plan:    -Admit to inpatient, acute floor, with tele  -Hemoglobin 7.7,  below patient baseline 9.8-13.4 in 2024  -type and screen completed  -Receiving 1 uPRBC in ED, will obtain H/H one hour post transfusion  -Systolic 70-90 on admission, received 1 L of IV fluids, currently 99/54 will check q 4   -cont LR at 100 ml hour  -Cr 2.14 above baseline (2024-1.17-1.51)  -Hold nephrotoxic medications  -CTA-No active hemorrhage within the GI tract, status post esophagectomy with anterior gastric pull-through which is only partly visualize  -Gastroenterology placed on consult  -currently NPO, wait GI recommendations  -POC glucose monitoring  -Insulin sliding scale  -hypoglycemia protocol  -Protonix 40 mg BID, takes PO daily at home  -troponin 160, 161  -Am labs including cbc, cmp, mag  -POC discussed with patient and attending  -Resume patient medications, holding aspirin, hold oral diabetic medications  -Dvtp: scd's, hold pharmacological prophylaxis with possible GI bleed  -Dispo: likely require > 2 midnight stays to adequately treat and safe dc plan    I spent >50 minutes in the professional and overall care of this patient.    SIGNATURE: IVÁN Faith-CNP PATIENT NAME: Levy Gregory   DATE: February 13, 2025 MRN: [id number]   TIME: 2:22 PM

## 2025-02-13 NOTE — CARE PLAN
"1632: Pt arrived to 3N at this time    1858: Lab called RN into room, pt. Currently refusing to allow phlebotomy or nursing to draw labs ordered at this time. Pt. Educated on importance of labs for H&H with bloody stools and after receiving blood. Pt states \"I dont care, I'm not doing this\" NP notified at this time of pt. Refusal.     EOS: Pt. Stable after arrival to unit at 1858. Pt. Had no BM and no c/o pain. Pt. Tolerating diet at this time. Pt. Was able to ambulate to the bathroom this shift with staff assistance. Pt. Currently resting in bed at this time. Call light within reach. Bed alarm on.    "

## 2025-02-14 ENCOUNTER — ANESTHESIA (OUTPATIENT)
Dept: GASTROENTEROLOGY | Facility: HOSPITAL | Age: 70
End: 2025-02-14
Payer: MEDICARE

## 2025-02-14 ENCOUNTER — APPOINTMENT (OUTPATIENT)
Dept: GASTROENTEROLOGY | Facility: HOSPITAL | Age: 70
End: 2025-02-14
Payer: MEDICARE

## 2025-02-14 ENCOUNTER — ANESTHESIA EVENT (OUTPATIENT)
Dept: GASTROENTEROLOGY | Facility: HOSPITAL | Age: 70
End: 2025-02-14
Payer: MEDICARE

## 2025-02-14 LAB
ALBUMIN SERPL BCP-MCNC: 3.2 G/DL (ref 3.4–5)
ALP SERPL-CCNC: 140 U/L (ref 33–136)
ALT SERPL W P-5'-P-CCNC: 30 U/L (ref 10–52)
ANION GAP SERPL CALC-SCNC: 10 MMOL/L (ref 10–20)
AST SERPL W P-5'-P-CCNC: 30 U/L (ref 9–39)
ATRIAL RATE: 84 BPM
BILIRUB SERPL-MCNC: 0.4 MG/DL (ref 0–1.2)
BUN SERPL-MCNC: 31 MG/DL (ref 6–23)
CALCIUM SERPL-MCNC: 8.1 MG/DL (ref 8.6–10.3)
CHLORIDE SERPL-SCNC: 105 MMOL/L (ref 98–107)
CO2 SERPL-SCNC: 22 MMOL/L (ref 21–32)
CREAT SERPL-MCNC: 1.61 MG/DL (ref 0.5–1.3)
EGFRCR SERPLBLD CKD-EPI 2021: 46 ML/MIN/1.73M*2
ERYTHROCYTE [DISTWIDTH] IN BLOOD BY AUTOMATED COUNT: 14.2 % (ref 11.5–14.5)
GLUCOSE BLD MANUAL STRIP-MCNC: 145 MG/DL (ref 74–99)
GLUCOSE BLD MANUAL STRIP-MCNC: 162 MG/DL (ref 74–99)
GLUCOSE BLD MANUAL STRIP-MCNC: 201 MG/DL (ref 74–99)
GLUCOSE BLD MANUAL STRIP-MCNC: 94 MG/DL (ref 74–99)
GLUCOSE SERPL-MCNC: 149 MG/DL (ref 74–99)
HCT VFR BLD AUTO: 27.2 % (ref 41–52)
HGB BLD-MCNC: 8.5 G/DL (ref 13.5–17.5)
MAGNESIUM SERPL-MCNC: 1.75 MG/DL (ref 1.6–2.4)
MCH RBC QN AUTO: 29.5 PG (ref 26–34)
MCHC RBC AUTO-ENTMCNC: 31.3 G/DL (ref 32–36)
MCV RBC AUTO: 94 FL (ref 80–100)
NRBC BLD-RTO: 0 /100 WBCS (ref 0–0)
P AXIS: 78 DEGREES
P OFFSET: 186 MS
P ONSET: 124 MS
PHOSPHATE SERPL-MCNC: 3.8 MG/DL (ref 2.5–4.9)
PLATELET # BLD AUTO: 185 X10*3/UL (ref 150–450)
POTASSIUM SERPL-SCNC: 4.1 MMOL/L (ref 3.5–5.3)
PR INTERVAL: 176 MS
PROT SERPL-MCNC: 6.1 G/DL (ref 6.4–8.2)
Q ONSET: 212 MS
QRS COUNT: 14 BEATS
QRS DURATION: 128 MS
QT INTERVAL: 386 MS
QTC CALCULATION(BAZETT): 456 MS
QTC FREDERICIA: 431 MS
R AXIS: -69 DEGREES
RBC # BLD AUTO: 2.88 X10*6/UL (ref 4.5–5.9)
SODIUM SERPL-SCNC: 133 MMOL/L (ref 136–145)
T AXIS: 53 DEGREES
T OFFSET: 405 MS
VENTRICULAR RATE: 84 BPM
WBC # BLD AUTO: 6.8 X10*3/UL (ref 4.4–11.3)

## 2025-02-14 PROCEDURE — 2500000004 HC RX 250 GENERAL PHARMACY W/ HCPCS (ALT 636 FOR OP/ED)

## 2025-02-14 PROCEDURE — 3700000002 HC GENERAL ANESTHESIA TIME - EACH INCREMENTAL 1 MINUTE

## 2025-02-14 PROCEDURE — 36415 COLL VENOUS BLD VENIPUNCTURE: CPT | Performed by: NURSE PRACTITIONER

## 2025-02-14 PROCEDURE — 2500000001 HC RX 250 WO HCPCS SELF ADMINISTERED DRUGS (ALT 637 FOR MEDICARE OP): Performed by: NURSE PRACTITIONER

## 2025-02-14 PROCEDURE — 2500000002 HC RX 250 W HCPCS SELF ADMINISTERED DRUGS (ALT 637 FOR MEDICARE OP, ALT 636 FOR OP/ED): Performed by: NURSE PRACTITIONER

## 2025-02-14 PROCEDURE — 7100000001 HC RECOVERY ROOM TIME - INITIAL BASE CHARGE

## 2025-02-14 PROCEDURE — 94640 AIRWAY INHALATION TREATMENT: CPT

## 2025-02-14 PROCEDURE — 99233 SBSQ HOSP IP/OBS HIGH 50: CPT | Performed by: INTERNAL MEDICINE

## 2025-02-14 PROCEDURE — 80053 COMPREHEN METABOLIC PANEL: CPT | Performed by: NURSE PRACTITIONER

## 2025-02-14 PROCEDURE — 84100 ASSAY OF PHOSPHORUS: CPT | Performed by: NURSE PRACTITIONER

## 2025-02-14 PROCEDURE — 2500000005 HC RX 250 GENERAL PHARMACY W/O HCPCS: Performed by: NURSE ANESTHETIST, CERTIFIED REGISTERED

## 2025-02-14 PROCEDURE — 1200000002 HC GENERAL ROOM WITH TELEMETRY DAILY

## 2025-02-14 PROCEDURE — 2500000001 HC RX 250 WO HCPCS SELF ADMINISTERED DRUGS (ALT 637 FOR MEDICARE OP): Performed by: STUDENT IN AN ORGANIZED HEALTH CARE EDUCATION/TRAINING PROGRAM

## 2025-02-14 PROCEDURE — 43235 EGD DIAGNOSTIC BRUSH WASH: CPT | Performed by: STUDENT IN AN ORGANIZED HEALTH CARE EDUCATION/TRAINING PROGRAM

## 2025-02-14 PROCEDURE — 7100000002 HC RECOVERY ROOM TIME - EACH INCREMENTAL 1 MINUTE

## 2025-02-14 PROCEDURE — 2500000004 HC RX 250 GENERAL PHARMACY W/ HCPCS (ALT 636 FOR OP/ED): Performed by: NURSE PRACTITIONER

## 2025-02-14 PROCEDURE — 82947 ASSAY GLUCOSE BLOOD QUANT: CPT

## 2025-02-14 PROCEDURE — 2500000004 HC RX 250 GENERAL PHARMACY W/ HCPCS (ALT 636 FOR OP/ED): Performed by: NURSE ANESTHETIST, CERTIFIED REGISTERED

## 2025-02-14 PROCEDURE — 3700000001 HC GENERAL ANESTHESIA TIME - INITIAL BASE CHARGE

## 2025-02-14 PROCEDURE — 83735 ASSAY OF MAGNESIUM: CPT | Performed by: NURSE PRACTITIONER

## 2025-02-14 PROCEDURE — 85027 COMPLETE CBC AUTOMATED: CPT | Performed by: NURSE PRACTITIONER

## 2025-02-14 RX ORDER — DIPHENHYDRAMINE HYDROCHLORIDE 50 MG/ML
12.5 INJECTION INTRAMUSCULAR; INTRAVENOUS ONCE AS NEEDED
Status: DISCONTINUED | OUTPATIENT
Start: 2025-02-14 | End: 2025-02-17 | Stop reason: WASHOUT

## 2025-02-14 RX ORDER — ONDANSETRON HYDROCHLORIDE 2 MG/ML
4 INJECTION, SOLUTION INTRAVENOUS ONCE AS NEEDED
Status: DISCONTINUED | OUTPATIENT
Start: 2025-02-14 | End: 2025-02-17 | Stop reason: WASHOUT

## 2025-02-14 RX ORDER — ACETAMINOPHEN 325 MG/1
975 TABLET ORAL ONCE
Status: CANCELLED | OUTPATIENT
Start: 2025-02-14 | End: 2025-02-14

## 2025-02-14 RX ORDER — OXYCODONE HYDROCHLORIDE 5 MG/1
5 TABLET ORAL EVERY 4 HOURS PRN
Status: CANCELLED | OUTPATIENT
Start: 2025-02-14

## 2025-02-14 RX ORDER — CEFTRIAXONE 1 G/50ML
1 INJECTION, SOLUTION INTRAVENOUS EVERY 24 HOURS
Status: DISCONTINUED | OUTPATIENT
Start: 2025-02-14 | End: 2025-02-14

## 2025-02-14 RX ORDER — ACETAMINOPHEN 325 MG/1
975 TABLET ORAL ONCE
Status: COMPLETED | OUTPATIENT
Start: 2025-02-14 | End: 2025-02-14

## 2025-02-14 RX ORDER — DIPHENHYDRAMINE HYDROCHLORIDE 50 MG/ML
12.5 INJECTION INTRAMUSCULAR; INTRAVENOUS ONCE AS NEEDED
Status: CANCELLED | OUTPATIENT
Start: 2025-02-14

## 2025-02-14 RX ORDER — FENTANYL CITRATE 50 UG/ML
50 INJECTION, SOLUTION INTRAMUSCULAR; INTRAVENOUS EVERY 5 MIN PRN
Status: CANCELLED | OUTPATIENT
Start: 2025-02-14

## 2025-02-14 RX ORDER — LABETALOL HYDROCHLORIDE 5 MG/ML
5 INJECTION, SOLUTION INTRAVENOUS ONCE AS NEEDED
Status: DISCONTINUED | OUTPATIENT
Start: 2025-02-14 | End: 2025-02-17 | Stop reason: WASHOUT

## 2025-02-14 RX ORDER — ONDANSETRON HYDROCHLORIDE 2 MG/ML
4 INJECTION, SOLUTION INTRAVENOUS ONCE AS NEEDED
Status: CANCELLED | OUTPATIENT
Start: 2025-02-14

## 2025-02-14 RX ORDER — LIDOCAINE HYDROCHLORIDE 10 MG/ML
0.1 INJECTION, SOLUTION INFILTRATION; PERINEURAL ONCE
Status: CANCELLED | OUTPATIENT
Start: 2025-02-14 | End: 2025-02-14

## 2025-02-14 RX ORDER — OXYCODONE HYDROCHLORIDE 5 MG/1
5 TABLET ORAL EVERY 4 HOURS PRN
Status: DISCONTINUED | OUTPATIENT
Start: 2025-02-14 | End: 2025-02-17 | Stop reason: WASHOUT

## 2025-02-14 RX ORDER — DEXMEDETOMIDINE HYDROCHLORIDE 100 UG/ML
INJECTION, SOLUTION INTRAVENOUS AS NEEDED
Status: DISCONTINUED | OUTPATIENT
Start: 2025-02-14 | End: 2025-02-14

## 2025-02-14 RX ORDER — LIDOCAINE HYDROCHLORIDE 10 MG/ML
0.1 INJECTION, SOLUTION EPIDURAL; INFILTRATION; INTRACAUDAL; PERINEURAL ONCE
Status: DISCONTINUED | OUTPATIENT
Start: 2025-02-14 | End: 2025-02-17 | Stop reason: WASHOUT

## 2025-02-14 RX ORDER — ALBUTEROL SULFATE 0.83 MG/ML
2.5 SOLUTION RESPIRATORY (INHALATION) ONCE AS NEEDED
Status: CANCELLED | OUTPATIENT
Start: 2025-02-14

## 2025-02-14 RX ORDER — LABETALOL HYDROCHLORIDE 5 MG/ML
5 INJECTION, SOLUTION INTRAVENOUS ONCE AS NEEDED
Status: CANCELLED | OUTPATIENT
Start: 2025-02-14

## 2025-02-14 RX ORDER — ALBUTEROL SULFATE 0.83 MG/ML
2.5 SOLUTION RESPIRATORY (INHALATION) ONCE AS NEEDED
Status: DISCONTINUED | OUTPATIENT
Start: 2025-02-14 | End: 2025-02-17 | Stop reason: WASHOUT

## 2025-02-14 RX ORDER — FENTANYL CITRATE 50 UG/ML
50 INJECTION, SOLUTION INTRAMUSCULAR; INTRAVENOUS EVERY 5 MIN PRN
Status: DISCONTINUED | OUTPATIENT
Start: 2025-02-14 | End: 2025-02-17 | Stop reason: WASHOUT

## 2025-02-14 RX ORDER — MIDAZOLAM HYDROCHLORIDE 1 MG/ML
INJECTION, SOLUTION INTRAMUSCULAR; INTRAVENOUS AS NEEDED
Status: DISCONTINUED | OUTPATIENT
Start: 2025-02-14 | End: 2025-02-14

## 2025-02-14 RX ADMIN — GABAPENTIN 100 MG: 100 CAPSULE ORAL at 11:50

## 2025-02-14 RX ADMIN — MIDAZOLAM 1 MG: 1 INJECTION INTRAMUSCULAR; INTRAVENOUS at 10:21

## 2025-02-14 RX ADMIN — GABAPENTIN 100 MG: 100 CAPSULE ORAL at 20:35

## 2025-02-14 RX ADMIN — Medication 20 MG: at 10:21

## 2025-02-14 RX ADMIN — INSULIN LISPRO 2 UNITS: 100 INJECTION, SOLUTION INTRAVENOUS; SUBCUTANEOUS at 11:57

## 2025-02-14 RX ADMIN — DEXMEDETOMIDINE HCL 10 MCG: 100 INJECTION INTRAVENOUS at 10:23

## 2025-02-14 RX ADMIN — Medication 10 MG: at 10:23

## 2025-02-14 RX ADMIN — ALBUTEROL SULFATE 2.5 MG: 2.5 SOLUTION RESPIRATORY (INHALATION) at 09:21

## 2025-02-14 RX ADMIN — SERTRALINE HYDROCHLORIDE 50 MG: 50 TABLET ORAL at 11:50

## 2025-02-14 RX ADMIN — OCTREOTIDE ACETATE 50 MCG/HR: 500 INJECTION, SOLUTION INTRAVENOUS; SUBCUTANEOUS at 16:47

## 2025-02-14 RX ADMIN — FERROUS SULFATE TAB 325 MG (65 MG ELEMENTAL FE) 325 MG: 325 (65 FE) TAB at 16:10

## 2025-02-14 RX ADMIN — GABAPENTIN 100 MG: 100 CAPSULE ORAL at 15:17

## 2025-02-14 RX ADMIN — ACETAMINOPHEN 975 MG: 325 TABLET ORAL at 11:49

## 2025-02-14 RX ADMIN — OCTREOTIDE ACETATE 50 MCG/HR: 500 INJECTION, SOLUTION INTRAVENOUS; SUBCUTANEOUS at 23:22

## 2025-02-14 RX ADMIN — TAMSULOSIN HYDROCHLORIDE 0.4 MG: 0.4 CAPSULE ORAL at 11:50

## 2025-02-14 RX ADMIN — Medication 20 MG: at 10:28

## 2025-02-14 RX ADMIN — SODIUM CHLORIDE, SODIUM LACTATE, POTASSIUM CHLORIDE, AND CALCIUM CHLORIDE: .6; .31; .03; .02 INJECTION, SOLUTION INTRAVENOUS at 10:16

## 2025-02-14 RX ADMIN — MIDAZOLAM 1 MG: 1 INJECTION INTRAMUSCULAR; INTRAVENOUS at 10:19

## 2025-02-14 RX ADMIN — FERROUS SULFATE TAB 325 MG (65 MG ELEMENTAL FE) 325 MG: 325 (65 FE) TAB at 11:50

## 2025-02-14 RX ADMIN — INSULIN LISPRO 4 UNITS: 100 INJECTION, SOLUTION INTRAVENOUS; SUBCUTANEOUS at 15:23

## 2025-02-14 RX ADMIN — DEXMEDETOMIDINE HCL 10 MCG: 100 INJECTION INTRAVENOUS at 10:21

## 2025-02-14 RX ADMIN — PANTOPRAZOLE SODIUM 40 MG: 40 INJECTION, POWDER, FOR SOLUTION INTRAVENOUS at 20:35

## 2025-02-14 RX ADMIN — PANTOPRAZOLE SODIUM 40 MG: 40 INJECTION, POWDER, FOR SOLUTION INTRAVENOUS at 08:59

## 2025-02-14 RX ADMIN — ATORVASTATIN CALCIUM 80 MG: 80 TABLET, FILM COATED ORAL at 11:50

## 2025-02-14 RX ADMIN — CEFTRIAXONE 2 G: 2 INJECTION, POWDER, FOR SOLUTION INTRAMUSCULAR; INTRAVENOUS at 15:17

## 2025-02-14 SDOH — HEALTH STABILITY: MENTAL HEALTH: CURRENT SMOKER: 0

## 2025-02-14 ASSESSMENT — COGNITIVE AND FUNCTIONAL STATUS - GENERAL
CLIMB 3 TO 5 STEPS WITH RAILING: A LITTLE
WALKING IN HOSPITAL ROOM: A LITTLE
CLIMB 3 TO 5 STEPS WITH RAILING: A LITTLE
MOBILITY SCORE: 22
WALKING IN HOSPITAL ROOM: A LITTLE
DAILY ACTIVITIY SCORE: 24
DAILY ACTIVITIY SCORE: 24
MOBILITY SCORE: 22

## 2025-02-14 ASSESSMENT — PAIN SCALES - GENERAL
PAINLEVEL_OUTOF10: 0 - NO PAIN

## 2025-02-14 ASSESSMENT — PAIN - FUNCTIONAL ASSESSMENT
PAIN_FUNCTIONAL_ASSESSMENT: 0-10
PAIN_FUNCTIONAL_ASSESSMENT: 0-10

## 2025-02-14 NOTE — ANESTHESIA PREPROCEDURE EVALUATION
Patient: Levy Gregory    Procedure Information       Date/Time: 02/14/25 1000    Scheduled providers: Elicia Soria MD    Procedure: EGD    Location: [facility]            Relevant Problems   Cardiac   (+) Coronary artery disease involving native coronary artery of native heart without angina pectoris   (+) Hyperlipidemia   (+) Solar purpura (CMS-HCC)      Pulmonary   (+) Asthmatic bronchitis without complication ([facility])   (+) Chronic obstructive pulmonary disease, unspecified COPD type (Multi)      GI   (+) GI bleed   (+) Other dysphagia      Endocrine   (+) Diabetic neuropathy associated with type 2 diabetes mellitus (Multi)   (+) Type 2 diabetes mellitus with hyperglycemia, without long-term current use of insulin      Hematology   (+) Anticoagulant long-term use   (+) Deep vein thrombosis (DVT) of left lower extremity (Multi)       Clinical information reviewed:   Tobacco  Allergies  Meds   Med Hx  Surg Hx   Fam Hx  Soc Hx        NPO Detail:  No data recorded     Physical Exam    Airway  Mallampati: II  TM distance: >3 FB  Neck ROM: full     Cardiovascular - normal exam     Dental - normal exam     Pulmonary - normal exam     Abdominal - normal exam             Anesthesia Plan    History of general anesthesia?: yes  History of complications of general anesthesia?: no    ASA 4     MAC     The patient is not a current smoker.  Patient did not smoke on day of procedure.  Education provided regarding risk of obstructive sleep apnea.  intravenous induction   Anesthetic plan and risks discussed with patient.  Use of blood products discussed with patient who consented to blood products.    Plan discussed with CRNA.

## 2025-02-14 NOTE — NURSING NOTE
0900 pt A&O, calm and cooperative. Pt repositioned for safety and comfort, pt informed to stay upright to help reduce coughing. Pt sts that he has had a cough for 2 weeks that has improved with his inhaler. Wheezing posterior. Notified Resp to give breathing treatment. Spo2 93% RA.Call light w/I reach. Bed alarm maintained. Pt maintained NPO for EGD.  0934 pt taken off the floor to [facility].

## 2025-02-14 NOTE — ANESTHESIA POSTPROCEDURE EVALUATION
Patient: Levy Gregory    Procedure Summary       Date: 02/14/25 Room / Location: [facility]    Anesthesia Start: 1019 Anesthesia Stop: 1038    Procedure: EGD Diagnosis: Melena    Scheduled Providers: Elicia Soria MD Responsible Provider: Jeremiah Aldridge DO    Anesthesia Type: MAC ASA Status: 4            Anesthesia Type: MAC    Vitals Value Taken Time   BP 83/57 02/14/25 1055   Temp 36.8 °C (98.2 °F) 02/14/25 1035   Pulse 84 02/14/25 1101   Resp 17 02/14/25 1101   SpO2 100 % 02/14/25 1101   Vitals shown include unfiled device data.    Anesthesia Post Evaluation    Patient location during evaluation: PACU  Patient participation: complete - patient participated  Level of consciousness: awake and alert  Pain management: adequate  Airway patency: patent  Cardiovascular status: acceptable  Respiratory status: acceptable  Hydration status: acceptable  Postoperative Nausea and Vomiting: none        There were no known notable events for this encounter.

## 2025-02-14 NOTE — CARE PLAN
The patient's goals for the shift include      The clinical goals for the shift include see care plan      Problem: Pain - Adult  Goal: Verbalizes/displays adequate comfort level or baseline comfort level  Outcome: Progressing     Problem: Safety - Adult  Goal: Free from fall injury  Outcome: Progressing     Problem: Chronic Conditions and Co-morbidities  Goal: Patient's chronic conditions and co-morbidity symptoms are monitored and maintained or improved  Outcome: Progressing     Problem: Nutrition  Goal: Nutrient intake appropriate for maintaining nutritional needs  Outcome: Progressing     Problem: Diabetes  Goal: Achieve decreasing blood glucose levels by end of shift  Outcome: Progressing  Goal: Increase stability of blood glucose readings by end of shift  Outcome: Progressing  Goal: Maintain electrolyte levels within acceptable range throughout shift  Outcome: Progressing  Goal: No changes in neurological exam by end of shift  Outcome: Progressing  Goal: Learn about and adhere to nutrition recommendations by end of shift  Outcome: Progressing  Goal: Vital signs within normal range for age by end of shift  Outcome: Progressing  Goal: Increase self care and/or family involovement by end of shift  Outcome: Progressing

## 2025-02-14 NOTE — NURSING NOTE
SHIFT NOTE  Pt was agitated that he couldn't eat and refused getting his blood drawn after getting a unit of blood. PT had a large black diarrhea episode, and with nurses' explanation of possible GIB and need for H&H, pt changed his mind. Hemoglobin resolved from 7.7 to 8.2.  Pt has been NPO since midnight for EGD today. Vitals remained stable.   Safety maintained.

## 2025-02-14 NOTE — CARE PLAN
The patient's goals for the shift include      The clinical goals for the shift include pt will remain HDS this shift    Over the shift, the patient did make progress toward the following goals.

## 2025-02-14 NOTE — PROGRESS NOTES
Levy Gregory is a 69 y.o. male on day 1 of admission presenting with GI bleed.    Subjective   Patient in good spirits today. He has been NPO. Last stool with melena was overnight x2. VS stabilized this morning as opposed to yesterday evening. Has been on oral iron for some time. Black stools intermittently x 2 weeks with increased frequency, last melanatic stool being overnight x2.  His SOB and weakness seems to be improved since yesterday's assessment. Had received 1 unit of blood yesterday in the ED.  Denies family hx of GI related disorders and malignancies. Denies smoking, drinking, EtOH use. Denies NSAID use. EGD today.    Previous Endoscopy  Esophageal Impedance 2/23/23 showing T 2 achalasia with large HH.  EGD-2/23/23 showing esophageal stenosis and gastritis.  2/11/23 dilation.  11/25/22 showing a 5 cm HH, dilation, low grade narrowing of Schatzki ring, food impaction, LA Grade D esophagitis.  Colonoscopy-3/12/21 diverticulosis, NBIH. Repeat in 5 years.        Objective     Physical Exam  Vitals reviewed.   Constitutional:       General: He is awake. He is not in acute distress.     Appearance: Normal appearance. He is normal weight. He is not ill-appearing, toxic-appearing or diaphoretic.   HENT:      Head: Normocephalic and atraumatic.      Nose: Nose normal.      Mouth/Throat:      Mouth: Mucous membranes are moist.      Pharynx: No oropharyngeal exudate or posterior oropharyngeal erythema.   Eyes:      Pupils: Pupils are equal, round, and reactive to light.   Neck:      Thyroid: No thyroid mass.      Trachea: Phonation normal.   Cardiovascular:      Rate and Rhythm: Normal rate and regular rhythm.      Pulses: Normal pulses.      Heart sounds: Normal heart sounds.   Pulmonary:      Effort: Pulmonary effort is normal. No respiratory distress.      Breath sounds: Normal breath sounds and air entry. No decreased breath sounds, wheezing, rhonchi or rales.   Abdominal:      General: Abdomen is flat.  "Bowel sounds are normal. There is no distension.      Palpations: Abdomen is soft. There is no mass.      Tenderness: There is no abdominal tenderness. There is no right CVA tenderness, left CVA tenderness, guarding or rebound.      Hernia: No hernia is present.   Musculoskeletal:         General: No swelling. Normal range of motion.      Cervical back: Normal range of motion and neck supple.      Right lower leg: No edema.      Left lower leg: No edema.   Skin:     General: Skin is warm and dry.      Capillary Refill: Capillary refill takes less than 2 seconds.      Coloration: Skin is not jaundiced or pale.      Findings: No bruising, lesion or rash.   Neurological:      General: No focal deficit present.      Mental Status: He is alert and oriented to person, place, and time. Mental status is at baseline.      Cranial Nerves: Cranial nerves 2-12 are intact.      Motor: Motor function is intact.   Psychiatric:         Attention and Perception: Attention and perception normal.         Mood and Affect: Mood normal.         Speech: Speech normal.         Behavior: Behavior normal.         Thought Content: Thought content normal.         Judgment: Judgment normal.         Last Recorded Vitals  Blood pressure 118/71, pulse 70, temperature 35.9 °C (96.6 °F), resp. rate 18, height 1.854 m (6' 1\"), weight 95.3 kg (210 lb), SpO2 96%.  Intake/Output last 3 Shifts:  I/O last 3 completed shifts:  In: 548.3 (5.8 mL/kg) [P.O.:240; Blood:308.3]  Out: - (0 mL/kg)   Weight: 95.3 kg     Relevant Results  Scheduled medications  [Held by provider] aspirin, 81 mg, oral, Daily  atorvastatin, 80 mg, oral, Daily  budesonide, 0.25 mg, nebulization, BID  ferrous sulfate (325 mg ferrous sulfate), 325 mg, oral, TID  formoterol, 20 mcg, nebulization, BID  gabapentin, 100 mg, oral, TID  insulin lispro, 0-10 Units, subcutaneous, TID AC  pantoprazole, 40 mg, intravenous, BID  [Held by provider] pioglitazone, 15 mg, oral, Daily  sertraline, 50 " mg, oral, Daily  tamsulosin, 0.4 mg, oral, Daily      Continuous medications     PRN medications  PRN medications: acetaminophen, albuterol, dextrose, dextrose, glucagon, glucagon, ondansetron **OR** ondansetron, polyethylene glycol    Results for orders placed or performed during the hospital encounter of 02/13/25 (from the past 24 hours)   CBC with Differential   Result Value Ref Range    WBC 6.4 4.4 - 11.3 x10*3/uL    nRBC 0.0 0.0 - 0.0 /100 WBCs    RBC 2.62 (L) 4.50 - 5.90 x10*6/uL    Hemoglobin 7.7 (L) 13.5 - 17.5 g/dL    Hematocrit 25.1 (L) 41.0 - 52.0 %    MCV 96 80 - 100 fL    MCH 29.4 26.0 - 34.0 pg    MCHC 30.7 (L) 32.0 - 36.0 g/dL    RDW 13.7 11.5 - 14.5 %    Platelets 218 150 - 450 x10*3/uL    Neutrophils % 81.1 40.0 - 80.0 %    Immature Granulocytes %, Automated 0.5 0.0 - 0.9 %    Lymphocytes % 11.1 13.0 - 44.0 %    Monocytes % 6.7 2.0 - 10.0 %    Eosinophils % 0.3 0.0 - 6.0 %    Basophils % 0.3 0.0 - 2.0 %    Neutrophils Absolute 5.18 1.20 - 7.70 x10*3/uL    Immature Granulocytes Absolute, Automated 0.03 0.00 - 0.70 x10*3/uL    Lymphocytes Absolute 0.71 (L) 1.20 - 4.80 x10*3/uL    Monocytes Absolute 0.43 0.10 - 1.00 x10*3/uL    Eosinophils Absolute 0.02 0.00 - 0.70 x10*3/uL    Basophils Absolute 0.02 0.00 - 0.10 x10*3/uL   Comprehensive Metabolic Panel   Result Value Ref Range    Glucose 210 (H) 74 - 99 mg/dL    Sodium 133 (L) 136 - 145 mmol/L    Potassium 5.1 3.5 - 5.3 mmol/L    Chloride 104 98 - 107 mmol/L    Bicarbonate 20 (L) 21 - 32 mmol/L    Anion Gap 14 10 - 20 mmol/L    Urea Nitrogen 40 (H) 6 - 23 mg/dL    Creatinine 2.14 (H) 0.50 - 1.30 mg/dL    eGFR 33 (L) >60 mL/min/1.73m*2    Calcium 8.2 (L) 8.6 - 10.3 mg/dL    Albumin 3.4 3.4 - 5.0 g/dL    Alkaline Phosphatase 151 (H) 33 - 136 U/L    Total Protein 6.8 6.4 - 8.2 g/dL    AST 58 (H) 9 - 39 U/L    Bilirubin, Total 0.4 0.0 - 1.2 mg/dL    ALT 38 10 - 52 U/L   Troponin I, High Sensitivity   Result Value Ref Range    Troponin I, High Sensitivity  160 (HH) 0 - 20 ng/L   Type and Screen   Result Value Ref Range    ABO TYPE O     Rh TYPE POS     ANTIBODY SCREEN NEG    Troponin I, High Sensitivity   Result Value Ref Range    Troponin I, High Sensitivity 161 (HH) 0 - 20 ng/L   ECG 12 lead   Result Value Ref Range    Ventricular Rate 84 BPM    Atrial Rate 84 BPM    VA Interval 176 ms    QRS Duration 128 ms    QT Interval 386 ms    QTC Calculation(Bazett) 456 ms    P Axis 78 degrees    R Axis -69 degrees    T Axis 53 degrees    QRS Count 14 beats    Q Onset 212 ms    P Onset 124 ms    P Offset 186 ms    T Offset 405 ms    QTC Fredericia 431 ms   Prepare RBC: 1 Units   Result Value Ref Range    PRODUCT CODE V3859U42     Unit Number [id number]     Unit ABO O     Unit RH POS     XM INTEP COMP     Dispense Status TR     Blood Expiration Date 3/12/2025 11:59:00 PM EDT     PRODUCT BLOOD TYPE 5100     UNIT VOLUME 350    Prepare RBC   Result Value Ref Range    PRODUCT CODE N3531V79     Unit Number [id number]     Unit ABO O     Unit RH POS     XM INTEP COMP     Dispense Status XM     Blood Expiration Date 3/12/2025 11:59:00 PM EDT     PRODUCT BLOOD TYPE 5100     UNIT VOLUME 350    POCT GLUCOSE   Result Value Ref Range    POCT Glucose 145 (H) 74 - 99 mg/dL   POCT GLUCOSE   Result Value Ref Range    POCT Glucose 126 (H) 74 - 99 mg/dL   Hemoglobin and Hematocrit, Blood   Result Value Ref Range    Hemoglobin 8.2 (L) 13.5 - 17.5 g/dL    Hematocrit 26.6 (L) 41.0 - 52.0 %   CBC   Result Value Ref Range    WBC 6.8 4.4 - 11.3 x10*3/uL    nRBC 0.0 0.0 - 0.0 /100 WBCs    RBC 2.88 (L) 4.50 - 5.90 x10*6/uL    Hemoglobin 8.5 (L) 13.5 - 17.5 g/dL    Hematocrit 27.2 (L) 41.0 - 52.0 %    MCV 94 80 - 100 fL    MCH 29.5 26.0 - 34.0 pg    MCHC 31.3 (L) 32.0 - 36.0 g/dL    RDW 14.2 11.5 - 14.5 %    Platelets 185 150 - 450 x10*3/uL   Comprehensive metabolic panel   Result Value Ref Range    Glucose 149 (H) 74 - 99 mg/dL    Sodium 133 (L) 136 - 145 mmol/L    Potassium 4.1 3.5 - 5.3  mmol/L    Chloride 105 98 - 107 mmol/L    Bicarbonate 22 21 - 32 mmol/L    Anion Gap 10 10 - 20 mmol/L    Urea Nitrogen 31 (H) 6 - 23 mg/dL    Creatinine 1.61 (H) 0.50 - 1.30 mg/dL    eGFR 46 (L) >60 mL/min/1.73m*2    Calcium 8.1 (L) 8.6 - 10.3 mg/dL    Albumin 3.2 (L) 3.4 - 5.0 g/dL    Alkaline Phosphatase 140 (H) 33 - 136 U/L    Total Protein 6.1 (L) 6.4 - 8.2 g/dL    AST 30 9 - 39 U/L    Bilirubin, Total 0.4 0.0 - 1.2 mg/dL    ALT 30 10 - 52 U/L   Magnesium   Result Value Ref Range    Magnesium 1.75 1.60 - 2.40 mg/dL   Phosphorus   Result Value Ref Range    Phosphorus 3.8 2.5 - 4.9 mg/dL   POCT GLUCOSE   Result Value Ref Range    POCT Glucose 145 (H) 74 - 99 mg/dL     *Note: Due to a large number of results and/or encounters for the requested time period, some results have not been displayed. A complete set of results can be found in Results Review.     ECG 12 lead    Result Date: 2/13/2025  Normal sinus rhythm Right bundle branch block Left anterior fascicular block  Bifascicular block  Possible Lateral infarct (cited on or before 06-AUG-2023) Inferior infarct (cited on or before 06-AUG-2023) Abnormal ECG When compared with ECG of 15-AUG-2024 07:20, Left anterior fascicular block is now Present ST no longer elevated in Inferior leads Non-specific change in ST segment in Lateral leads    CT angio abdomen pelvis w and or wo IV IV contrast    Result Date: 2/13/2025  Interpreted By:  Alvarado Lane, STUDY: CT ANGIO ABDOMEN PELVIS W AND/OR WO IV IV CONTRAST;  2/13/2025 1:15 pm   INDICATION: Signs/Symptoms:black stools x2 weeks, concern for GI bleed   COMPARISON: 08/06/2023   ACCESSION NUMBER(S): [id number]   ORDERING CLINICIAN: SANJIV HOOVER   TECHNIQUE: Thin-section axial images of the abdomen and pelvis in the arterial phase after intravenous administration of  90 mL Omnipaque 350. Sagittal and coronal reconstructions. 3D reconstructions were obtained at a separate workstation.   FINDINGS: Vascular:  No aortic  aneurysm or dissection.   The celiac trunk, superior mesenteric artery, renal arteries and inferior mesenteric artery are patent.   LOWER CHEST: Bibasilar scarring. BONES: No acute osseous abnormality. Right hip arthroplasty. Compression deformities of L3, L1, and T12. The T12 and L1 deformities have progressed slightly over the interval.   ABDOMEN:   LIVER: Cirrhotic hepatic contours. No focal lesions appreciated. BILE DUCTS: Normal caliber. GALLBLADDER: Surgically absent. PANCREAS: Within normal limits. SPLEEN: Granulomatous calcifications. Spleen otherwise unremarkable. ADRENALS: Within normal limits. KIDNEYS and URETERS: Symmetric renal enhancement. No hydronephrosis. 3 mm non-obstructing stone about the inferior pole of the right kidney.   RETROPERITONEUM: No pathologically enlarged retroperitoneal lymph nodes.   PELVIS:   REPRODUCTIVE ORGANS: No pelvic masses. BLADDER: Within normal limits.   BOWEL: Patient is status post esophagectomy with anterior gastric pull-through which is only partially visualized. No focal inflammatory change identified within the bowel. No active hemorrhage. Appendix unremarkable. PERITONEUM: No ascites or free air, no fluid collection.       1. No active hemorrhage within the GI tract. 2. Patient is status post esophagectomy with anterior gastric pull-through which is only partly visualized about the lung bases. 3. 3 mm non-obstructing right renal stone. 4. Cirrhotic appearing liver. 5. Nonacute appearing compression deformities of T12, L1, and L3.   MACRO: none   Signed by: Alvarado Lane 2/13/2025 1:41 PM Dictation workstation:   [id number]    XR chest 1 view    Result Date: 2/13/2025  Interpreted By:  Carmen Robledo, STUDY: XR CHEST 1 VIEW;  2/13/2025 12:05 pm   INDICATION: Signs/Symptoms:shortness of breath.   COMPARISON: 10/24/2024   ACCESSION NUMBER(S): [id number]   ORDERING CLINICIAN: SANJIV HOOVER   FINDINGS: AP radiograph of the chest was provided.        CARDIOMEDIASTINAL SILHOUETTE: Persistently enlarged cardiomediastinal silhouette.   LUNGS: Unchanged mild blunting of the bilateral costophrenic angles. No consolidation, pulmonary edema, or pneumothorax.   ABDOMEN: No remarkable upper abdominal findings.   BONES: No acute osseous changes.       Unchanged small pleural effusions and cardiomegaly/pericardial effusion.   Signed by: Carmen Robledo 2/13/2025 12:13 PM Dictation workstation:   [id number]    * Cannot find OR log *  Last relevant procedure:                    Assessment/Plan   Levy Gregory is a 68 yo male who has a PMH of COPD, T2DM, chronic cough, melena, T2 achalasia, DVTs s/p IVC filter, acalculous cholecystitis, Grade D esophagitis, paraesophageal hernia s/p robotic laparoscopic myotomy w/ fundoplication c/b esophageal perforation s/p esophagectomy c/b cardiac arrest (2023), Schatzki ring, food impaction, and dysphagia who presented to [facility]  on 2/13/25 with reports of GIB.  GI was consulted for anemia/black stools.  He is an established patient of Dr. Zayas LOV 1/26/23 . GI consulted due to intermittent black stools x2 weeks and weakness, SOB and palpitation. Somewhat hypotensive in the ED 90s/60s.  1Unit PRBC in the ED. Patient on oral iron.    Today, patient had good response to blood transfusion as his hgb is now 8.5. EGD today was aborted due to food being present in the stomach. Coffee ground content noted.     Plan:  Octretotide IV for 72 hrs 500mcg/hr  Ceftraixone prohphylaxis 2g   CLD  Repeat EGD Monday or this weekend pending Dr. Haines preference (will need NPO starting at midnight and will re-assess pt in morning 2/15)  Continue to monitor Hgb and transfuse if less than 7, monitor for overt bleeding  Avoid NSAIDS  Continue to monitor VS   Patient to F/U on cirrhosis out patiently post dc with Dr Zayas  Assessment & Plan  GI bleed         Discusssed above patient assessment and plan with Dr. Yancy Roe, APRN-CNP

## 2025-02-14 NOTE — PROGRESS NOTES
Levy Gregory is a 69 y.o. male on day 1 of admission presenting with GI bleed.      Subjective   N.p.o. for EGD.       Objective     Last Recorded Vitals  /62   Pulse 84   Temp 36.8 °C (98.2 °F)   Resp 15   Wt 95.3 kg (210 lb)   SpO2 100%   Intake/Output last 3 Shifts:    Intake/Output Summary (Last 24 hours) at 2/14/2025 1126  Last data filed at 2/14/2025 1100  Gross per 24 hour   Intake 1298.33 ml   Output --   Net 1298.33 ml       Admission Weight  Weight: 95.3 kg (210 lb) (02/13/25 0958)    Daily Weight  02/13/25 : 95.3 kg (210 lb)      Physical Exam:  General: Not in acute distress, alert  HEENT: PERRLA, head intact and normocephalic  Neck: Normal to inspection  Lungs: Clear to auscultation, work of breathing within normal limit  Cardiac: Regular rate and rhythm  Abdomen: Soft nontender, positive bowel sounds  : Exam deferred  Skin: Intact  Hematology: No petechia or excessive ecchymosis  Musculoskeletal: Without significant trauma  Neurological: Alert awake oriented, no focal deficit, cranial nerves grossly intact  Psych: No suicidal ideation or homicidal ideation    Relevant Results  Scheduled medications  acetaminophen, 975 mg, oral, Once  [Held by provider] aspirin, 81 mg, oral, Daily  atorvastatin, 80 mg, oral, Daily  budesonide, 0.25 mg, nebulization, BID  ferrous sulfate (325 mg ferrous sulfate), 325 mg, oral, TID  formoterol, 20 mcg, nebulization, BID  gabapentin, 100 mg, oral, TID  insulin lispro, 0-10 Units, subcutaneous, TID AC  lidocaine, 0.1 mL, subcutaneous, Once  pantoprazole, 40 mg, intravenous, BID  [Held by provider] pioglitazone, 15 mg, oral, Daily  sertraline, 50 mg, oral, Daily  tamsulosin, 0.4 mg, oral, Daily      Continuous medications     PRN medications  PRN medications: acetaminophen, albuterol, albuterol, dextrose, dextrose, diphenhydrAMINE, fentaNYL PF, glucagon, glucagon, HYDROmorphone, labetaloL, ondansetron **OR** ondansetron, ondansetron, oxyCODONE, oxygen,  polyethylene glycol, promethazine   Labs  Results from last 7 days   Lab Units 02/14/25  0651 02/13/25  2231 02/13/25  1041 02/10/25  1114   WBC AUTO x10*3/uL 6.8  --  6.4  --    [facility] WBC AUTO Thousand/uL  --   --   --  7.9   HEMOGLOBIN g/dL 8.5* 8.2* 7.7*  --    [facility] HEMOGLOBIN g/dL  --   --   --  7.9*   HEMATOCRIT % 27.2* 26.6* 25.1*  --    [facility] HEMATOCRIT %  --   --   --  26.0*   PLATELETS AUTO x10*3/uL 185  --  218  --    [facility] PLATELETS AUTO Thousand/uL  --   --   --  249     Results from last 7 days   Lab Units 02/14/25 0651 02/13/25  1041 02/10/25  1114   [facility] SODIUM mmol/L  --   --  138   SODIUM mmol/L 133* 133*  --    [facility] POTASSIUM mmol/L  --   --  5.2   POTASSIUM mmol/L 4.1 5.1  --    [facility] CHLORIDE mmol/L  --   --  108   CHLORIDE mmol/L 105 104  --    [facility] CO2 mmol/L  --   --  22   CO2 mmol/L 22 20*  --    BUN mg/dL 31* 40*  --    [facility] BUN mg/dL  --   --  47*   CREATININE mg/dL 1.61* 2.14*  --    [facility] CREATININE mg/dL  --   --  1.62*   [facility] CALCIUM mg/dL  --   --  8.3*   CALCIUM mg/dL 8.1* 8.2*  --    [facility] PROTEIN TOTAL g/dL  --   --  6.3   PROTEIN TOTAL g/dL 6.1* 6.8  --    BILIRUBIN TOTAL mg/dL 0.4 0.4  --    [facility] BILIRUBIN TOTAL mg/dL  --   --  0.3   [facility] ALK PHOS U/L  --   --  152*   ALK PHOS U/L 140* 151*  --    [facility] ALT U/L  --   --  24   ALT U/L 30 38  --    [facility] AST U/L  --   --  27   AST U/L 30 58*  --    [facility] GLUCOSE mg/dL  --   --  242*   GLUCOSE mg/dL 149* 210*  --        ECG 12 lead    Result Date: 2/14/2025  Normal sinus rhythm Right bundle branch block Left anterior fascicular block  Bifascicular block  Possible Lateral infarct (cited on or before 06-AUG-2023) Inferior infarct (cited on or before 06-AUG-2023) Abnormal ECG When compared with ECG of 15-AUG-2024 07:20, Left anterior fascicular block is now Present ST no longer elevated in Inferior leads Non-specific change in ST segment in Lateral leads Confirmed by Kaiser Hernandez ([id number]) on 2/14/2025 11:00:07 AM    ECG 12  lead    Result Date: 2/14/2025  Normal sinus rhythm Left axis deviation Right bundle branch block Possible Lateral infarct (cited on or before 06-AUG-2023) Inferior infarct (cited on or before 06-AUG-2023) Abnormal ECG When compared with ECG of 15-AUG-2024 07:20, No significant change was found    CT angio abdomen pelvis w and or wo IV IV contrast    Result Date: 2/13/2025  Interpreted By:  Alvarado Lane, STUDY: CT ANGIO ABDOMEN PELVIS W AND/OR WO IV IV CONTRAST;  2/13/2025 1:15 pm   INDICATION: Signs/Symptoms:black stools x2 weeks, concern for GI bleed   COMPARISON: 08/06/2023   ACCESSION NUMBER(S): [id number]   ORDERING CLINICIAN: SANJIV HOOVER   TECHNIQUE: Thin-section axial images of the abdomen and pelvis in the arterial phase after intravenous administration of  90 mL Omnipaque 350. Sagittal and coronal reconstructions. 3D reconstructions were obtained at a separate workstation.   FINDINGS: Vascular:  No aortic aneurysm or dissection.   The celiac trunk, superior mesenteric artery, renal arteries and inferior mesenteric artery are patent.   LOWER CHEST: Bibasilar scarring. BONES: No acute osseous abnormality. Right hip arthroplasty. Compression deformities of L3, L1, and T12. The T12 and L1 deformities have progressed slightly over the interval.   ABDOMEN:   LIVER: Cirrhotic hepatic contours. No focal lesions appreciated. BILE DUCTS: Normal caliber. GALLBLADDER: Surgically absent. PANCREAS: Within normal limits. SPLEEN: Granulomatous calcifications. Spleen otherwise unremarkable. ADRENALS: Within normal limits. KIDNEYS and URETERS: Symmetric renal enhancement. No hydronephrosis. 3 mm non-obstructing stone about the inferior pole of the right kidney.   RETROPERITONEUM: No pathologically enlarged retroperitoneal lymph nodes.   PELVIS:   REPRODUCTIVE ORGANS: No pelvic masses. BLADDER: Within normal limits.   BOWEL: Patient is status post esophagectomy with anterior gastric pull-through which is only partially  visualized. No focal inflammatory change identified within the bowel. No active hemorrhage. Appendix unremarkable. PERITONEUM: No ascites or free air, no fluid collection.       1. No active hemorrhage within the GI tract. 2. Patient is status post esophagectomy with anterior gastric pull-through which is only partly visualized about the lung bases. 3. 3 mm non-obstructing right renal stone. 4. Cirrhotic appearing liver. 5. Nonacute appearing compression deformities of T12, L1, and L3.   MACRO: none   Signed by: Alvarado Lane 2/13/2025 1:41 PM Dictation workstation:   [id number]    XR chest 1 view    Result Date: 2/13/2025  Interpreted By:  Carmen Robledo, STUDY: XR CHEST 1 VIEW;  2/13/2025 12:05 pm   INDICATION: Signs/Symptoms:shortness of breath.   COMPARISON: 10/24/2024   ACCESSION NUMBER(S): [id number]   ORDERING CLINICIAN: SANJIV HOOVER   FINDINGS: AP radiograph of the chest was provided.       CARDIOMEDIASTINAL SILHOUETTE: Persistently enlarged cardiomediastinal silhouette.   LUNGS: Unchanged mild blunting of the bilateral costophrenic angles. No consolidation, pulmonary edema, or pneumothorax.   ABDOMEN: No remarkable upper abdominal findings.   BONES: No acute osseous changes.       Unchanged small pleural effusions and cardiomegaly/pericardial effusion.   Signed by: Carmen Robledo 2/13/2025 12:13 PM Dictation workstation:   [id number]                    Assessment/Plan   69-year-old female with past medical history of COPD, type 2 diabetes mellitus, GERD, DVT status post IVC filter, acalculous cholecystitis, type II achalasia, paraesophageal hernia status post robotic laparoscopic myotomy with fundoplication complicated by esophageal perforation presented due to concern for GI bleed with dark stool for past 2 weeks.  Assessment & Plan  GI bleed    Upper GI bleed with acute blood loss anemia  type and screen done  Hemoglobin stable this morning  Will transfuse if any acute bleeding or hemoglobin below  7  N.p.o. for EGD  Preliminary report showing that there is some old blood and a lot of debris that could not be suctioned  Plan for repeat scope on Monday  Clear liquid diet in the meantime  CT angiogram results are noted for no active hemorrhage trend H&H daily  PPI twice a day  Hold aspirin  Type 2 diabetes mellitus  Accu-Chek    Insulin    Neuropathy  Continue gabapentin    COPD  Stable  Continue with breathing treatment    Hyperlipidemia  Continue with atorvastatin    DVT prophylaxis  SCDs         Plan discussed with patient at bedside    High2 level of MDM based on above issue and discussing plan    This note is created using voice recognition software. All efforts are made to minimize errors, if there are errors there due to transcription.    Shane Brown  Hospitalist

## 2025-02-14 NOTE — PROGRESS NOTES
02/14/25 1429   Discharge Planning   Living Arrangements Spouse/significant other   Support Systems Spouse/significant other   Assistance Needed None   Type of Residence Private residence   Number of Stairs Within Residence   (Reports no difficulty with steps)   Home or Post Acute Services None   Expected Discharge Disposition Home   Does the patient need discharge transport arranged? No   Financial Resource Strain   How hard is it for you to pay for the very basics like food, housing, medical care, and heating? Not hard   Stroke Family Assessment   Stroke Family Assessment Needed No   Intensity of Service   Intensity of Service 0-30 min     Met with pt and his wife to review dc plans. The pt states we can speak freely. Lives at home with his wife and is independent. Drives, denies difficulty with steps. No use of assistant devices. PCP is Dr. Huizar and prescriptions are filled at [facility] with no barriers noted. Denies difficulty with his living expenses including prescriptions/groceries/utilities. Per GI note the pt will have a repeat EGD Monday 2/17.

## 2025-02-14 NOTE — PROGRESS NOTES
"Nutrition Initial Assessment:   Nutrition Assessment    Reason for Assessment: Admission nursing screening (MST=3 for \"weight loss, poor appetite\")    Patient is a 69 y.o. male presenting with concerns for  GI bleed. Per chart, he was transfused one uPRBC and indicates he is feeling better than he was on admission. NPO this am for EGD.    Past history of COPD, type 2 diabetes, GERD, DVTs s/p IVC filter, acalculous cholecystitis, type II achalasia, paraesophageal hernia s/p robotic laparoscopic myotomy with fundoplication C/B esophageal perforation     Past surgical history includes Other surgical history (01/21/2019); IR CVC PICC (08/16/2023); Umbilical hernia repair (N/A, 2013); Gastrostomy tube placement (N/A); Esophagogastroduodenoscopy; Esophagogastrectomy; and Conduit replacement (12/07/2023).    Nutrition History:  Food and Nutrient History: Pt familiar to Dietitians at [facility] from previous visit 5 months ago. Unfotunately, he is off the floor today for a procedure. Will continue to follow when he is available and update history.       Anthropometrics:  Height: 185.4 cm (6' 1\")   Weight: 95.3 kg (210 lb)   BMI (Calculated): 27.71  IBW/kg (Dietitian Calculated): 83.46 kg  Percent of IBW: 114.13 %       Weight History:   Wt Readings from Last 10 Encounters:   02/13/25 95.3 kg (210 lb)   02/10/25 96.6 kg (213 lb)   01/08/25 98.9 kg (218 lb 1.6 oz)   11/12/24 100 kg (220 lb 7.4 oz)   10/29/24 96.2 kg (212 lb 1.3 oz)   10/22/24 98 kg (216 lb)   09/23/24 99.8 kg (220 lb)   08/15/24 99.8 kg (220 lb)   08/14/24 101 kg (223 lb)   08/13/24 101 kg (223 lb)       Weight Change %:  Weight History / % Weight Change: weight trending down - pt was 205 lbs 1 year ago.  Significant Weight Loss: No    Nutrition Focused Physical Exam Findings:    Subcutaneous Fat Loss:   Defer Subcutaneous Fat Loss Assessment: Defer all  Defer All Reason: Pt not available - off floor for procedure.  Muscle Wasting:     Edema:  Edema Location: " Per chart: edema of both lower legs due to peripheral venous insufficiency  Physical Findings:  Skin: Negative    Nutrition Significant Labs:  CBC Trend:   Results from last 7 days   Lab Units 02/14/25  0651 02/13/25 2231 02/13/25  1041 02/10/25  1114   0000   WBC AUTO x10*3/uL 6.8  --  6.4  --   --    [facility] WBC AUTO Thousand/uL  --   --   --  7.9  --    RBC AUTO x10*6/uL 2.88*  --  2.62*  --    < >   [facility] RBC AUTO Million/uL  --   --   --  2.68*  --    HEMOGLOBIN g/dL 8.5*   < > 7.7*  --   --    [facility] HEMOGLOBIN g/dL  --   --   --  7.9*  --    HEMATOCRIT % 27.2*   < > 25.1*  --   --    [facility] HEMATOCRIT %  --   --   --  26.0*  --    MCV fL 94  --  96  --    < >   [facility] MCV fL  --   --   --  97.0  --    PLATELETS AUTO x10*3/uL 185  --  218  --    < >   [facility] PLATELETS AUTO Thousand/uL  --   --   --  249  --     < > = values in this interval not displayed.    , BMP Trend:   Results from last 7 days   Lab Units 02/14/25  0651 02/13/25 1041 02/13/25  1041 02/10/25  1114   [facility] GLUCOSE mg/dL  --   --   --  242*   GLUCOSE mg/dL 149*  --  210*  --    [facility] CALCIUM mg/dL  --   --   --  8.3*   CALCIUM mg/dL 8.1*   < > 8.2*  --    [facility] SODIUM mmol/L  --   --   --  138   SODIUM mmol/L 133*   < > 133*  --    [facility] POTASSIUM mmol/L  --   --   --  5.2   POTASSIUM mmol/L 4.1   < > 5.1  --    [facility] CO2 mmol/L  --   --   --  22   CO2 mmol/L 22   < > 20*  --    [facility] CHLORIDE mmol/L  --   --   --  108   CHLORIDE mmol/L 105   < > 104  --    BUN mg/dL 31*   < > 40*  --    [facility] BUN mg/dL  --   --   --  47*   CREATININE mg/dL 1.61*   < > 2.14*  --    [facility] CREATININE mg/dL  --   --   --  1.62*    < > = values in this interval not displayed.    , A1C:  Lab Results   Component Value Date    HGBA1C 6.3 12/13/2024   , BG POCT trend:   Results from last 7 days   Lab Units 02/14/25  1143 02/14/25  0802 02/13/25 1951 02/13/25  1657   POCT GLUCOSE mg/dL 162* 145* 126* 145*    , Vit D:   Lab Results   Component Value Date    VITD25 50  07/29/2024    , Vit B12:   Lab Results   Component Value Date    DJGDUKCN86 479 07/29/2024    , Iron Panel:   Lab Results   Component Value Date    IRON 39 07/24/2023    TIBC 253 07/24/2023    FERRITIN 2764 (H) 07/27/2023    , Folate:   Lab Results   Component Value Date    FOLATE 7.8 07/29/2024        Nutrition Specific Medications:  Scheduled medications  [Held by provider] aspirin, 81 mg, oral, Daily  atorvastatin, 80 mg, oral, Daily  budesonide, 0.25 mg, nebulization, BID  ferrous sulfate (325 mg ferrous sulfate), 325 mg, oral, TID  formoterol, 20 mcg, nebulization, BID  gabapentin, 100 mg, oral, TID  insulin lispro, 0-10 Units, subcutaneous, TID AC  lidocaine, 0.1 mL, subcutaneous, Once  pantoprazole, 40 mg, intravenous, BID  [Held by provider] pioglitazone, 15 mg, oral, Daily  sertraline, 50 mg, oral, Daily  tamsulosin, 0.4 mg, oral, Daily      Continuous medications     PRN medications  PRN medications: acetaminophen, albuterol, albuterol, dextrose, dextrose, diphenhydrAMINE, fentaNYL PF, glucagon, glucagon, HYDROmorphone, labetaloL, ondansetron **OR** ondansetron, ondansetron, oxyCODONE, oxygen, polyethylene glycol, promethazine      I/O:   Last BM Date: 02/13/25; Stool Appearance: Watery (02/13/25 1946)    Dietary Orders (From admission, onward)       Start     Ordered    02/14/25 1125  Adult diet Clear Liquid  Diet effective now        Question:  Diet type  Answer:  Clear Liquid    02/14/25 1124    02/13/25 1631  May Participate in Room Service  ( ROOM SERVICE MAY PARTICIPATE)  Once        Question:  .  Answer:  Yes    02/13/25 1630                     Estimated Needs:   Total Energy Estimated Needs in 24 hours (kCal): 2310 kCal  Method for Estimating Needs: MSJ (1777) x 1.3 x 1.0  Total Protein Estimated Needs in 24 Hours (g): 95 g  Method for Estimating 24 Hour Protein Needs: 1.0g/kg  Total Fluid Estimated Needs in 24 Hours (mL): 2300 mL  Method for Estimating 24 Hour Fluid Needs: 1 ml/kcal         Nutrition Diagnosis   Malnutrition Diagnosis  Patient has Malnutrition Diagnosis: No    Nutrition Diagnosis  Patient has Nutrition Diagnosis: Yes  Diagnosis Status (1): New  Nutrition Diagnosis 1: Inadequate oral intake  Related to (1): concerns for GI bleed  As Evidenced by (1): NPO for procedure       Nutrition Interventions/Recommendations   Nutrition prescription for oral nutrition    Nutrition Recommendations:  Individualized Nutrition Prescription Provided for : When diet advanced, recommend CCD 75-gram    Nutrition Interventions/Goals:   Interventions: Meals and snacks  Meals and Snacks: Carbohydrate-modified diet  Goal: Diet to advance to solid food within the next 48 hours.      Education Documentation  No documentation found.     Will continue to follow.       Nutrition Monitoring and Evaluation   Food/Nutrient Related History Monitoring  Monitoring and Evaluation Plan: Intake / amount of food  Intake / Amount of food: Consumes at least 75% or more of meals/snacks/supplements  Additional Plans: Pt to tolerate diet advancement.    Anthropometric Measurements  Monitoring and Evaluation Plan: Body weight  Body Weight: Body weight - Maintain stable weight    Biochemical Data, Medical Tests and Procedures  Monitoring and Evaluation Plan: Glucose/endocrine profile, Electrolyte/renal panel  Glucose/Endocrine Profile: Glucose within normal limits ( mg/dL)         Goal Status: New goal(s) identified

## 2025-02-15 LAB
ANION GAP SERPL CALC-SCNC: 12 MMOL/L (ref 10–20)
ATRIAL RATE: 91 BPM
BASOPHILS # BLD AUTO: 0.02 X10*3/UL (ref 0–0.1)
BASOPHILS NFR BLD AUTO: 0.5 %
BUN SERPL-MCNC: 23 MG/DL (ref 6–23)
CALCIUM SERPL-MCNC: 7.9 MG/DL (ref 8.6–10.3)
CHLORIDE SERPL-SCNC: 107 MMOL/L (ref 98–107)
CO2 SERPL-SCNC: 21 MMOL/L (ref 21–32)
CREAT SERPL-MCNC: 1.44 MG/DL (ref 0.5–1.3)
EGFRCR SERPLBLD CKD-EPI 2021: 53 ML/MIN/1.73M*2
EOSINOPHIL # BLD AUTO: 0.07 X10*3/UL (ref 0–0.7)
EOSINOPHIL NFR BLD AUTO: 1.6 %
ERYTHROCYTE [DISTWIDTH] IN BLOOD BY AUTOMATED COUNT: 14 % (ref 11.5–14.5)
ERYTHROCYTE [DISTWIDTH] IN BLOOD BY AUTOMATED COUNT: 14.1 % (ref 11.5–14.5)
GLUCOSE BLD MANUAL STRIP-MCNC: 138 MG/DL (ref 74–99)
GLUCOSE BLD MANUAL STRIP-MCNC: 143 MG/DL (ref 74–99)
GLUCOSE BLD MANUAL STRIP-MCNC: 216 MG/DL (ref 74–99)
GLUCOSE BLD MANUAL STRIP-MCNC: 241 MG/DL (ref 74–99)
GLUCOSE SERPL-MCNC: 151 MG/DL (ref 74–99)
HCT VFR BLD AUTO: 25.4 % (ref 41–52)
HCT VFR BLD AUTO: 28.7 % (ref 41–52)
HGB BLD-MCNC: 7.7 G/DL (ref 13.5–17.5)
HGB BLD-MCNC: 8.5 G/DL (ref 13.5–17.5)
IMM GRANULOCYTES # BLD AUTO: 0.02 X10*3/UL (ref 0–0.7)
IMM GRANULOCYTES NFR BLD AUTO: 0.5 % (ref 0–0.9)
LYMPHOCYTES # BLD AUTO: 0.92 X10*3/UL (ref 1.2–4.8)
LYMPHOCYTES NFR BLD AUTO: 21.2 %
MAGNESIUM SERPL-MCNC: 1.6 MG/DL (ref 1.6–2.4)
MCH RBC QN AUTO: 28.7 PG (ref 26–34)
MCH RBC QN AUTO: 29.2 PG (ref 26–34)
MCHC RBC AUTO-ENTMCNC: 29.6 G/DL (ref 32–36)
MCHC RBC AUTO-ENTMCNC: 30.3 G/DL (ref 32–36)
MCV RBC AUTO: 95 FL (ref 80–100)
MCV RBC AUTO: 99 FL (ref 80–100)
MONOCYTES # BLD AUTO: 0.39 X10*3/UL (ref 0.1–1)
MONOCYTES NFR BLD AUTO: 9 %
NEUTROPHILS # BLD AUTO: 2.92 X10*3/UL (ref 1.2–7.7)
NEUTROPHILS NFR BLD AUTO: 67.2 %
NRBC BLD-RTO: 0 /100 WBCS (ref 0–0)
NRBC BLD-RTO: 0 /100 WBCS (ref 0–0)
P AXIS: 12 DEGREES
P OFFSET: 183 MS
P ONSET: 128 MS
PLATELET # BLD AUTO: 161 X10*3/UL (ref 150–450)
PLATELET # BLD AUTO: 174 X10*3/UL (ref 150–450)
POTASSIUM SERPL-SCNC: 4.4 MMOL/L (ref 3.5–5.3)
PR INTERVAL: 170 MS
Q ONSET: 213 MS
QRS COUNT: 15 BEATS
QRS DURATION: 134 MS
QT INTERVAL: 370 MS
QTC CALCULATION(BAZETT): 455 MS
QTC FREDERICIA: 425 MS
R AXIS: -70 DEGREES
RBC # BLD AUTO: 2.68 X10*6/UL (ref 4.5–5.9)
RBC # BLD AUTO: 2.91 X10*6/UL (ref 4.5–5.9)
SODIUM SERPL-SCNC: 136 MMOL/L (ref 136–145)
T AXIS: 44 DEGREES
T OFFSET: 398 MS
VENTRICULAR RATE: 91 BPM
WBC # BLD AUTO: 4.3 X10*3/UL (ref 4.4–11.3)
WBC # BLD AUTO: 4.9 X10*3/UL (ref 4.4–11.3)

## 2025-02-15 PROCEDURE — 85025 COMPLETE CBC W/AUTO DIFF WBC: CPT | Performed by: INTERNAL MEDICINE

## 2025-02-15 PROCEDURE — 83735 ASSAY OF MAGNESIUM: CPT | Performed by: INTERNAL MEDICINE

## 2025-02-15 PROCEDURE — 2500000002 HC RX 250 W HCPCS SELF ADMINISTERED DRUGS (ALT 637 FOR MEDICARE OP, ALT 636 FOR OP/ED): Performed by: INTERNAL MEDICINE

## 2025-02-15 PROCEDURE — 2500000001 HC RX 250 WO HCPCS SELF ADMINISTERED DRUGS (ALT 637 FOR MEDICARE OP): Performed by: NURSE PRACTITIONER

## 2025-02-15 PROCEDURE — 2500000004 HC RX 250 GENERAL PHARMACY W/ HCPCS (ALT 636 FOR OP/ED)

## 2025-02-15 PROCEDURE — 85027 COMPLETE CBC AUTOMATED: CPT | Performed by: INTERNAL MEDICINE

## 2025-02-15 PROCEDURE — 82947 ASSAY GLUCOSE BLOOD QUANT: CPT

## 2025-02-15 PROCEDURE — 94640 AIRWAY INHALATION TREATMENT: CPT

## 2025-02-15 PROCEDURE — 82374 ASSAY BLOOD CARBON DIOXIDE: CPT | Performed by: INTERNAL MEDICINE

## 2025-02-15 PROCEDURE — 99233 SBSQ HOSP IP/OBS HIGH 50: CPT | Performed by: INTERNAL MEDICINE

## 2025-02-15 PROCEDURE — 1200000002 HC GENERAL ROOM WITH TELEMETRY DAILY

## 2025-02-15 PROCEDURE — 2500000002 HC RX 250 W HCPCS SELF ADMINISTERED DRUGS (ALT 637 FOR MEDICARE OP, ALT 636 FOR OP/ED): Performed by: NURSE PRACTITIONER

## 2025-02-15 PROCEDURE — 99232 SBSQ HOSP IP/OBS MODERATE 35: CPT | Performed by: PHYSICIAN ASSISTANT

## 2025-02-15 PROCEDURE — 36415 COLL VENOUS BLD VENIPUNCTURE: CPT | Performed by: INTERNAL MEDICINE

## 2025-02-15 PROCEDURE — 2500000004 HC RX 250 GENERAL PHARMACY W/ HCPCS (ALT 636 FOR OP/ED): Performed by: NURSE PRACTITIONER

## 2025-02-15 RX ADMIN — CEFTRIAXONE 2 G: 2 INJECTION, POWDER, FOR SOLUTION INTRAMUSCULAR; INTRAVENOUS at 14:20

## 2025-02-15 RX ADMIN — OCTREOTIDE ACETATE 50 MCG/HR: 500 INJECTION, SOLUTION INTRAVENOUS; SUBCUTANEOUS at 10:09

## 2025-02-15 RX ADMIN — GABAPENTIN 100 MG: 100 CAPSULE ORAL at 14:20

## 2025-02-15 RX ADMIN — PANTOPRAZOLE SODIUM 40 MG: 40 INJECTION, POWDER, FOR SOLUTION INTRAVENOUS at 08:08

## 2025-02-15 RX ADMIN — GABAPENTIN 100 MG: 100 CAPSULE ORAL at 08:08

## 2025-02-15 RX ADMIN — BUDESONIDE 0.25 MG: 0.25 INHALANT RESPIRATORY (INHALATION) at 20:28

## 2025-02-15 RX ADMIN — FERROUS SULFATE TAB 325 MG (65 MG ELEMENTAL FE) 325 MG: 325 (65 FE) TAB at 16:19

## 2025-02-15 RX ADMIN — PANTOPRAZOLE SODIUM 40 MG: 40 INJECTION, POWDER, FOR SOLUTION INTRAVENOUS at 20:00

## 2025-02-15 RX ADMIN — FORMOTEROL FUMARATE 20 MCG: 20 SOLUTION RESPIRATORY (INHALATION) at 20:28

## 2025-02-15 RX ADMIN — FERROUS SULFATE TAB 325 MG (65 MG ELEMENTAL FE) 325 MG: 325 (65 FE) TAB at 08:08

## 2025-02-15 RX ADMIN — SERTRALINE HYDROCHLORIDE 50 MG: 50 TABLET ORAL at 08:08

## 2025-02-15 RX ADMIN — GABAPENTIN 100 MG: 100 CAPSULE ORAL at 20:00

## 2025-02-15 RX ADMIN — ATORVASTATIN CALCIUM 80 MG: 80 TABLET, FILM COATED ORAL at 08:08

## 2025-02-15 RX ADMIN — OCTREOTIDE ACETATE 50 MCG/HR: 500 INJECTION, SOLUTION INTRAVENOUS; SUBCUTANEOUS at 21:37

## 2025-02-15 RX ADMIN — FERROUS SULFATE TAB 325 MG (65 MG ELEMENTAL FE) 325 MG: 325 (65 FE) TAB at 11:57

## 2025-02-15 RX ADMIN — TAMSULOSIN HYDROCHLORIDE 0.4 MG: 0.4 CAPSULE ORAL at 08:08

## 2025-02-15 ASSESSMENT — COGNITIVE AND FUNCTIONAL STATUS - GENERAL
DAILY ACTIVITIY SCORE: 24
MOBILITY SCORE: 24
CLIMB 3 TO 5 STEPS WITH RAILING: A LITTLE
DAILY ACTIVITIY SCORE: 24
MOBILITY SCORE: 23

## 2025-02-15 ASSESSMENT — PAIN - FUNCTIONAL ASSESSMENT
PAIN_FUNCTIONAL_ASSESSMENT: 0-10
PAIN_FUNCTIONAL_ASSESSMENT: 0-10

## 2025-02-15 ASSESSMENT — PAIN SCALES - GENERAL
PAINLEVEL_OUTOF10: 0 - NO PAIN

## 2025-02-15 NOTE — PROGRESS NOTES
Levy Gregory is a 69 y.o. male on day 2 of admission presenting with GI bleed.      Subjective   Doing well.  Had 1 bowel movement yesterday that was dark.  None this morning.  Talked about hemoglobin dropping as well.  Denies any vomiting of blood.  Will repeat hemoglobin around 1:00 and then tomorrow morning       Objective     Last Recorded Vitals  /79   Pulse 89   Temp 36.6 °C (97.9 °F) (Temporal)   Resp 20   Wt 95.3 kg (210 lb)   SpO2 97%   Intake/Output last 3 Shifts:    Intake/Output Summary (Last 24 hours) at 2/15/2025 0910  Last data filed at 2/14/2025 1647  Gross per 24 hour   Intake 900 ml   Output --   Net 900 ml       Admission Weight  Weight: 95.3 kg (210 lb) (02/13/25 0958)    Daily Weight  02/13/25 : 95.3 kg (210 lb)      Physical Exam:  General: Not in acute distress, alert  HEENT: PERRLA, head intact and normocephalic  Neck: Normal to inspection  Lungs: Clear to auscultation, work of breathing within normal limit  Cardiac: Regular rate and rhythm  Abdomen: Soft nontender, positive bowel sounds  : Exam deferred  Skin: Intact  Hematology: No petechia or excessive ecchymosis  Musculoskeletal: Without significant trauma  Neurological: Alert awake oriented, no focal deficit, cranial nerves grossly intact  Psych: No suicidal ideation or homicidal ideation    Relevant Results  Scheduled medications  [Held by provider] aspirin, 81 mg, oral, Daily  atorvastatin, 80 mg, oral, Daily  budesonide, 0.25 mg, nebulization, BID  cefTRIAXone, 2 g, intravenous, q24h  ferrous sulfate (325 mg ferrous sulfate), 325 mg, oral, TID  formoterol, 20 mcg, nebulization, BID  gabapentin, 100 mg, oral, TID  insulin lispro, 0-10 Units, subcutaneous, TID AC  lidocaine, 0.1 mL, subcutaneous, Once  pantoprazole, 40 mg, intravenous, BID  [Held by provider] pioglitazone, 15 mg, oral, Daily  sertraline, 50 mg, oral, Daily  tamsulosin, 0.4 mg, oral, Daily      Continuous medications  octreotide, 50 mcg/hr, Last Rate:  50 mcg/hr (02/14/25 7842)      PRN medications  PRN medications: acetaminophen, albuterol, albuterol, dextrose, dextrose, diphenhydrAMINE, fentaNYL PF, glucagon, glucagon, HYDROmorphone, labetaloL, ondansetron **OR** ondansetron, ondansetron, oxyCODONE, oxygen, polyethylene glycol, promethazine   Labs  Results from last 7 days   Lab Units 02/15/25  0654 02/14/25  0651 02/13/25  2231 02/13/25  1041   WBC AUTO x10*3/uL 4.3* 6.8  --  6.4   HEMOGLOBIN g/dL 7.7* 8.5* 8.2* 7.7*   HEMATOCRIT % 25.4* 27.2* 26.6* 25.1*   PLATELETS AUTO x10*3/uL 161 185  --  218     Results from last 7 days   Lab Units 02/15/25  0654 02/14/25  0651 02/13/25  1041 02/10/25  1114   [facility] SODIUM mmol/L  --   --   --  138   SODIUM mmol/L 136 133* 133*  --    [facility] POTASSIUM mmol/L  --   --   --  5.2   POTASSIUM mmol/L 4.4 4.1 5.1  --    [facility] CHLORIDE mmol/L  --   --   --  108   CHLORIDE mmol/L 107 105 104  --    [facility] CO2 mmol/L  --   --   --  22   CO2 mmol/L 21 22 20*  --    BUN mg/dL 23 31* 40*  --    [facility] BUN mg/dL  --   --   --  47*   CREATININE mg/dL 1.44* 1.61* 2.14*  --    [facility] CREATININE mg/dL  --   --   --  1.62*   [facility] CALCIUM mg/dL  --   --   --  8.3*   CALCIUM mg/dL 7.9* 8.1* 8.2*  --    [facility] PROTEIN TOTAL g/dL  --   --   --  6.3   PROTEIN TOTAL g/dL  --  6.1* 6.8  --    BILIRUBIN TOTAL mg/dL  --  0.4 0.4  --    [facility] BILIRUBIN TOTAL mg/dL  --   --   --  0.3   [facility] ALK PHOS U/L  --   --   --  152*   ALK PHOS U/L  --  140* 151*  --    [facility] ALT U/L  --   --   --  24   ALT U/L  --  30 38  --    [facility] AST U/L  --   --   --  27   AST U/L  --  30 58*  --    [facility] GLUCOSE mg/dL  --   --   --  242*   GLUCOSE mg/dL 151* 149* 210*  --        ECG 12 lead    Result Date: 2/14/2025  Normal sinus rhythm Right bundle branch block Left anterior fascicular block  Bifascicular block  Possible Lateral infarct (cited on or before 06-AUG-2023) Inferior infarct (cited on or before 06-AUG-2023) Abnormal ECG When compared with ECG of 15-AUG-2024  07:20, Left anterior fascicular block is now Present ST no longer elevated in Inferior leads Non-specific change in ST segment in Lateral leads Confirmed by Kaiser Hernandez ([id number]) on 2/14/2025 11:00:07 AM    ECG 12 lead    Result Date: 2/14/2025  Normal sinus rhythm Left axis deviation Right bundle branch block Possible Lateral infarct (cited on or before 06-AUG-2023) Inferior infarct (cited on or before 06-AUG-2023) Abnormal ECG When compared with ECG of 15-AUG-2024 07:20, No significant change was found    CT angio abdomen pelvis w and or wo IV IV contrast    Result Date: 2/13/2025  Interpreted By:  Alvarado Lane, STUDY: CT ANGIO ABDOMEN PELVIS W AND/OR WO IV IV CONTRAST;  2/13/2025 1:15 pm   INDICATION: Signs/Symptoms:black stools x2 weeks, concern for GI bleed   COMPARISON: 08/06/2023   ACCESSION NUMBER(S): [id number]   ORDERING CLINICIAN: SANJIV HOOVER   TECHNIQUE: Thin-section axial images of the abdomen and pelvis in the arterial phase after intravenous administration of  90 mL Omnipaque 350. Sagittal and coronal reconstructions. 3D reconstructions were obtained at a separate workstation.   FINDINGS: Vascular:  No aortic aneurysm or dissection.   The celiac trunk, superior mesenteric artery, renal arteries and inferior mesenteric artery are patent.   LOWER CHEST: Bibasilar scarring. BONES: No acute osseous abnormality. Right hip arthroplasty. Compression deformities of L3, L1, and T12. The T12 and L1 deformities have progressed slightly over the interval.   ABDOMEN:   LIVER: Cirrhotic hepatic contours. No focal lesions appreciated. BILE DUCTS: Normal caliber. GALLBLADDER: Surgically absent. PANCREAS: Within normal limits. SPLEEN: Granulomatous calcifications. Spleen otherwise unremarkable. ADRENALS: Within normal limits. KIDNEYS and URETERS: Symmetric renal enhancement. No hydronephrosis. 3 mm non-obstructing stone about the inferior pole of the right kidney.   RETROPERITONEUM: No pathologically enlarged  retroperitoneal lymph nodes.   PELVIS:   REPRODUCTIVE ORGANS: No pelvic masses. BLADDER: Within normal limits.   BOWEL: Patient is status post esophagectomy with anterior gastric pull-through which is only partially visualized. No focal inflammatory change identified within the bowel. No active hemorrhage. Appendix unremarkable. PERITONEUM: No ascites or free air, no fluid collection.       1. No active hemorrhage within the GI tract. 2. Patient is status post esophagectomy with anterior gastric pull-through which is only partly visualized about the lung bases. 3. 3 mm non-obstructing right renal stone. 4. Cirrhotic appearing liver. 5. Nonacute appearing compression deformities of T12, L1, and L3.   MACRO: none   Signed by: Alvarado Lane 2/13/2025 1:41 PM Dictation workstation:   [id number]    XR chest 1 view    Result Date: 2/13/2025  Interpreted By:  Carmen Robledo, STUDY: XR CHEST 1 VIEW;  2/13/2025 12:05 pm   INDICATION: Signs/Symptoms:shortness of breath.   COMPARISON: 10/24/2024   ACCESSION NUMBER(S): [id number]   ORDERING CLINICIAN: SANJIV HOOVER   FINDINGS: AP radiograph of the chest was provided.       CARDIOMEDIASTINAL SILHOUETTE: Persistently enlarged cardiomediastinal silhouette.   LUNGS: Unchanged mild blunting of the bilateral costophrenic angles. No consolidation, pulmonary edema, or pneumothorax.   ABDOMEN: No remarkable upper abdominal findings.   BONES: No acute osseous changes.       Unchanged small pleural effusions and cardiomegaly/pericardial effusion.   Signed by: Carmen Robledo 2/13/2025 12:13 PM Dictation workstation:   [id number]                    Assessment/Plan   69-year-old female with past medical history of COPD, type 2 diabetes mellitus, GERD, DVT status post IVC filter, acalculous cholecystitis, type II achalasia, paraesophageal hernia status post robotic laparoscopic myotomy with fundoplication complicated by esophageal perforation presented due to concern for GI bleed with  dark stool for past 2 weeks.  Assessment & Plan  GI bleed    Upper GI bleed with acute blood loss anemia  Type and screen is done  Status post 1 unit of packed RBC since admission  Hemoglobin dropped this morning  Repeat hemoglobin around 1 PM and tomorrow morning  Will transfuse if any acute bleeding or hemoglobin below 7  N.p.o. for EGD on Monday unless patient hemodynamically decompensates  Clear liquid diet in the meantime  Continue with SBP prophylaxis with ceftriaxone  Continue with octreotide drip  CT angiogram results are noted for no active hemorrhage trend H&H daily  PPI twice a day  Hold aspirin    Type 2 diabetes mellitus  Accu-Chek  Sliding scale insulin    Diabetes with neuropathy  Continue gabapentin    COPD  Stable  Continue with breathing treatment    Hyperlipidemia  Continue with atorvastatin    DVT prophylaxis  SCDs       Plan discussed with patient at bedside    High level of MDM based on above issue and discussing plan    This note is created using voice recognition software. All efforts are made to minimize errors, if there are errors there due to transcription.    Shane Brown  Hospitalist

## 2025-02-15 NOTE — CARE PLAN
The patient's goals for the shift include      The clinical goals for the shift include see plan of care    Problem: Pain - Adult  Goal: Verbalizes/displays adequate comfort level or baseline comfort level  Outcome: Progressing     Problem: Safety - Adult  Goal: Free from fall injury  Outcome: Progressing     Problem: Discharge Planning  Goal: Discharge to home or other facility with appropriate resources  Outcome: Progressing     Problem: Chronic Conditions and Co-morbidities  Goal: Patient's chronic conditions and co-morbidity symptoms are monitored and maintained or improved  Outcome: Progressing     Problem: Nutrition  Goal: Nutrient intake appropriate for maintaining nutritional needs  Outcome: Progressing     Problem: Diabetes  Goal: Achieve decreasing blood glucose levels by end of shift  Outcome: Progressing  Goal: Increase stability of blood glucose readings by end of shift  Outcome: Progressing  Goal: Maintain electrolyte levels within acceptable range throughout shift  Outcome: Progressing  Goal: No changes in neurological exam by end of shift  Outcome: Progressing  Goal: Learn about and adhere to nutrition recommendations by end of shift  Outcome: Progressing  Goal: Vital signs within normal range for age by end of shift  Outcome: Progressing  Goal: Increase self care and/or family involovement by end of shift  Outcome: Progressing     Problem: Fall/Injury  Goal: Not fall by end of shift  Outcome: Progressing  Goal: Be free from injury by end of the shift  Outcome: Progressing  Goal: Verbalize understanding of personal risk factors for fall in the hospital  Outcome: Progressing  Goal: Verbalize understanding of risk factor reduction measures to prevent injury from fall in the home  Outcome: Progressing  Goal: Use assistive devices by end of the shift  Outcome: Progressing  Goal: Pace activities to prevent fatigue by end of the shift  Outcome: Progressing

## 2025-02-15 NOTE — NURSING NOTE
Pt has been resting, sits on side of bed, ambulates to bathroom had dark loose BM. Pt is refusing insulin, dr Brown aware. On clear liquids. Voices no complaints. Repeat H/H this afternoon improved. Call light in [facility], safety maintained, bed alarm on

## 2025-02-15 NOTE — PROGRESS NOTES
Department of Internal Medicine  Gastroenterology  Progress note      Subjective  GI is following for melena with anemia    Today, he had one large black loose bowel movement. He did feel a little lightheaded walking to the bathroom.Patient advised to call for help when ambulating. He denies nausea or vomiting.     He denies having a history of cirrhosis and this would be a new diagnosis for him      Current Medication    Current Facility-Administered Medications:     acetaminophen (Tylenol) tablet 650 mg, 650 mg, oral, q4h PRN, MAGDALENA Faith    albuterol 2.5 mg /3 mL (0.083 %) nebulizer solution 2.5 mg, 2.5 mg, nebulization, q6h PRN, MAGDALENA Faith, 2.5 mg at 02/14/25 0921    albuterol 2.5 mg /3 mL (0.083 %) nebulizer solution 2.5 mg, 2.5 mg, nebulization, Once PRN, Jeremiah Aldridge DO    [Held by provider] aspirin chewable tablet 81 mg, 81 mg, oral, Daily, MAGDALENA Faith    atorvastatin (Lipitor) tablet 80 mg, 80 mg, oral, Daily, IVÁN Faith-CNP, 80 mg at 02/15/25 0808    budesonide (Pulmicort) 0.25 mg/2 mL nebulizer solution 0.25 mg, 0.25 mg, nebulization, BID, Kyle Hassan MD    cefTRIAXone (Rocephin) 2 g in sodium chloride 0.9% IV 50 mL, 2 g, intravenous, q24h, MAGDALENA Hooper, Stopped at 02/14/25 1606    dextrose 50 % injection 12.5 g, 12.5 g, intravenous, q15 min PRN, MAGDALENA Faith    dextrose 50 % injection 25 g, 25 g, intravenous, q15 min PRN, MAGDALENA Faith    diphenhydrAMINE (BENADryl) injection 12.5 mg, 12.5 mg, intravenous, Once PRN, Jeremiah Aldridge DO    fentaNYL PF (Sublimaze) injection 50 mcg, 50 mcg, intravenous, q5 min PRN, Jeremiah Aldridge, DO    ferrous sulfate (325 mg ferrous sulfate) tablet 325 mg, 325 mg, oral, TID, Britney Stewart, APRN-CNP, 325 mg at 02/15/25 0808    formoterol (Perforomist) 20 mcg/2 mL nebulizer solution 20 mcg, 20 mcg, nebulization, BID, Kyle Hassan MD    gabapentin (Neurontin)  capsule 100 mg, 100 mg, oral, TID, IVÁN Faith-CNP, 100 mg at 02/15/25 0808    glucagon (Glucagen) injection 1 mg, 1 mg, intramuscular, q15 min PRN, IVÁN Faith-CNP    glucagon (Glucagen) injection 1 mg, 1 mg, intramuscular, q15 min PRN, IVÁN Faith-CNP    HYDROmorphone (Dilaudid) injection 0.5 mg, 0.5 mg, intravenous, q5 min PRN, Jeremiah Aldridge, DO    insulin lispro injection 0-10 Units, 0-10 Units, subcutaneous, TID AC, MAGDALENA Faith, 4 Units at 02/14/25 1523    labetaloL (Normodyne,Trandate) injection 5 mg, 5 mg, intravenous, Once PRN, Jeremiah Aldridge, DO    lidocaine PF (Xylocaine) 10 mg/mL (1 %) injection 1 mg, 0.1 mL, subcutaneous, Once, Jeremiah Aldridge, DO    octreotide (SandoSTATIN) 500 mcg in sodium chloride 0.9% 100 mL (5 mcg/mL) infusion, 50 mcg/hr, intravenous, Continuous, MAGDALENA Hooper, Last Rate: 10 mL/hr at 02/15/25 1009, 50 mcg/hr at 02/15/25 1009    ondansetron (Zofran) tablet 4 mg, 4 mg, oral, q8h PRN **OR** ondansetron (Zofran) injection 4 mg, 4 mg, intravenous, q8h PRN, IVÁN Faith-CNP    ondansetron (Zofran) injection 4 mg, 4 mg, intravenous, Once PRN, Jeremiah Aldridge, DO    oxyCODONE (Roxicodone) immediate release tablet 5 mg, 5 mg, oral, q4h PRN, Jeremiah Aldridge, DO    oxygen (O2) therapy, , inhalation, Continuous PRN - O2/gases, Jeremiah Aldridge, DO    pantoprazole (ProtoNix) injection 40 mg, 40 mg, intravenous, BID, MAGDALENA Faith, 40 mg at 02/15/25 0808    [Held by provider] pioglitazone (Actos) tablet 15 mg, 15 mg, oral, Daily, MAGDALENA Faith    polyethylene glycol (Glycolax, Miralax) packet 17 g, 17 g, oral, Daily PRN, MAGDALENA Faith    promethazine (Phenergan) 6.25 mg in sodium chloride 0.9% 50 mL IV, 6.25 mg, intravenous, Once PRN, Jeremiah Aldridge DO    sertraline (Zoloft) tablet 50 mg, 50 mg, oral, Daily, MAGDALENA Faith, 50 mg at 02/15/25 0808    tamsulosin (Flomax)  24 hr capsule 0.4 mg, 0.4 mg, oral, Daily, Britney DONY Stewart, APRN-CNP, 0.4 mg at 02/15/25 0808    Past Medical History  Active Ambulatory Problems     Diagnosis Date Noted    Colon polyp 02/17/2023    Diabetic neuropathy associated with type 2 diabetes mellitus (Multi) 02/17/2023    ED (erectile dysfunction) 02/17/2023    High serum bone-specific alkaline phosphatase 02/17/2023    Gout 02/17/2023    History of pulmonary embolism 02/17/2023    Hyperlipidemia 02/17/2023    Obstructive sleep apnea, adult 02/17/2023    Osteopenia 02/17/2023    Vitamin D deficiency 02/17/2023    Achalasia, esophageal 06/22/2023    Type 2 diabetes mellitus with hyperglycemia, without long-term current use of insulin 06/22/2023    Orthostatic hypotension 07/06/2023    Solar purpura (CMS-HCC) 07/24/2023    Anticoagulant long-term use 11/29/2023    Other dysphagia 10/31/2023    Hypertensive heart disease with heart failure 02/04/2024    Chronic obstructive pulmonary disease, unspecified COPD type (Multi) 02/04/2024    Acute renal failure superimposed on chronic kidney disease (CMS-HCC) 03/21/2024    Adjustment disorder with depressed mood 02/17/2023    Edema of both lower legs due to peripheral venous insufficiency 02/10/2024    Schatzki's ring 03/21/2024    Coronary artery disease involving native coronary artery of native heart without angina pectoris 04/09/2024    Deep vein thrombosis (DVT) of left lower extremity (Multi) 04/09/2024    Macrocytosis 07/25/2024    Closed fracture of right hip requiring operative repair with routine healing 08/15/2024    S/P right hip fracture 09/03/2024    Chronic kidney disease, stage 5 (Multi) 09/03/2024    Asthmatic bronchitis without complication ([facility]) 10/29/2024     Resolved Ambulatory Problems     Diagnosis Date Noted    Acne 02/17/2023    Decreased GFR 02/17/2023    Esophageal mass 02/17/2023    Esophageal stricture 02/17/2023    Gastric ulcer 02/17/2023    History of DVT (deep vein thrombosis)  02/17/2023    Iron deficiency anemia 02/17/2023    Microalbuminuria 02/17/2023    Nocturia 02/17/2023    Rib pain on left side 02/17/2023    Sialoadenitis 02/17/2023    Dysphagia 02/17/2023    Obesity (BMI 30-39.9) 02/17/2023    Deep vein thrombosis (DVT) of popliteal vein of left lower extremity (Multi) 06/22/2023    Dislodged gastrostomy tube 06/22/2023    Empyema (Multi) 06/22/2023    Gastrostomy in place (Multi) 06/22/2023    Mediastinitis 06/22/2023    Fall 07/06/2023    Malfunction of jejunostomy tube (Multi) 07/10/2023    Esophageal perforation 07/20/2023    Chronic atrial fibrillation (Multi) 07/24/2023    Hyponatremia 07/28/2023    Malnutrition of moderate degree (Multi) 10/03/2023    Impaired oral gastric feeding tube, initial encounter 10/03/2023    Moderate protein-calorie malnutrition (Multi) 10/19/2023    Hypotension due to drugs 11/04/2023    Dysfunction of both eustachian tubes 11/21/2023    Elevated liver function tests 11/29/2023    Hypotension 11/30/2023    Infection requiring contact isolation precautions 12/07/2023    Confusion 01/22/2024    Encounter for immunization 02/04/2024    Hypotension 02/07/2024    Pedal edema 02/13/2024    At risk for falls 02/26/2024    Type 2 diabetes mellitus with diabetic polyneuropathy, with long-term current use of insulin 03/08/2024    Generalized abdominal pain 03/08/2024    Dizziness 03/08/2024    Syncope and collapse 03/08/2024    Abnormal ECG 11/26/2022    Prolonged QT interval 03/21/2024    ACS (acute coronary syndrome) (Multi) 08/16/2023    Acute posthemorrhagic anemia 08/15/2023    Acute respiratory failure 02/12/2023    CORA (acute kidney injury) (CMS-[facility]) 03/21/2024    Anemia 03/21/2024    Bacteremia 05/22/2023    Balance problems 11/20/2023    Bruise, trunk 03/21/2024    Closed head injury 03/21/2024    Coagulopathy (Multi) 03/21/2024    Difficulty walking 11/20/2023    Do not resuscitate 08/15/2023    Epistaxis 03/21/2024    Fatigue 09/06/2023    Food  bolus obstruction of intestine (Multi) 03/21/2024    History of anemia 03/21/2024    History of diabetes mellitus 03/21/2024    History of infectious disease 03/21/2024    Hypernatremia 03/21/2024    Hypocalcemia 03/21/2024    Hypokalemia 03/21/2024    Hypoxia 03/21/2024    Lactic acidosis 03/21/2024    Leukocytosis 03/21/2024    Low kidney function 02/17/2023    Lung field abnormal 05/24/2023    Malnutrition (Multi) 03/21/2024    Morbid obesity (Multi) 03/21/2024    Nosocomial condition 08/15/2023    Onychodystrophy 11/20/2023    Onychomycosis 11/20/2023    Pain in toes of both feet 11/20/2023    Pleural effusion exudative 03/21/2024    CAP (community acquired pneumonia) 08/15/2023    Post-operative pain 03/21/2024    Postoperative septic shock (CMS-HCC) 03/21/2024    Other pulmonary embolism without acute cor pulmonale 02/10/2024    Pulmonary embolism 02/12/2023    Respiratory distress 03/21/2024    Rib contusion, left, sequela 03/21/2024    History of inferior vena caval filter placement 03/21/2024    Status post endoscopy 03/21/2024    Vagal arrhythmia 07/23/2018    Mild left ventricular systolic dysfunction 04/09/2024    Upper respiratory tract infection 05/13/2024    Palpitations 05/21/2024    Weight loss 07/25/2024    Closed right hip fracture, initial encounter 08/15/2024     Past Medical History:   Diagnosis Date    Contusion of abdominal wall, initial encounter 01/12/2021    COPD (chronic obstructive pulmonary disease) (Multi)     Diabetes mellitus (Multi)     DVT (deep venous thrombosis) (Multi)     DVT (deep venous thrombosis) (Multi)     GERD (gastroesophageal reflux disease)     Hemoptysis 05/12/2020    Herpes labialis     Hiatal hernia     Irregular heart beat     Pain in left knee     Peripheral neuropathy     Personal history of other diseases of the respiratory system 06/19/2019    Sleep apnea        PHYSICAL EXAM  VS: /79   Pulse 89   Temp 36.6 °C (97.9 °F) (Temporal)   Resp 20   Ht  "1.854 m (6' 1\")   Wt 95.3 kg (210 lb)   SpO2 97%   BMI 27.71 kg/m²  Body mass index is 27.71 kg/m².  Physical Exam  Constitutional:       General: He is not in acute distress.     Appearance: Normal appearance.   HENT:      Head: Normocephalic and atraumatic.      Mouth/Throat:      Mouth: Mucous membranes are moist.      Pharynx: Oropharynx is clear.   Eyes:      General: No scleral icterus.     Extraocular Movements: Extraocular movements intact.      Conjunctiva/sclera: Conjunctivae normal.      Pupils: Pupils are equal, round, and reactive to light.   Cardiovascular:      Rate and Rhythm: Normal rate and regular rhythm.      Heart sounds: No murmur heard.  Pulmonary:      Effort: Pulmonary effort is normal.      Breath sounds: No wheezing or rhonchi.   Abdominal:      General: Bowel sounds are normal. There is no distension.      Palpations: Abdomen is soft. There is no mass.      Tenderness: There is no abdominal tenderness.      Hernia: No hernia is present.   Musculoskeletal:         General: No swelling or deformity.   Skin:     General: Skin is warm.      Coloration: Skin is not jaundiced.   Neurological:      General: No focal deficit present.      Mental Status: He is alert and oriented to person, place, and time.   Psychiatric:         Mood and Affect: Mood normal.          DATA  Recent blood work and relevant radiology and endoscopic studies were reviewed and discussed with the patient   Results from last 7 days   Lab Units 02/15/25  0654 02/13/25  1041 02/10/25  1114   WBC AUTO x10*3/uL 4.3*   < >  --    [facility] WBC AUTO Thousand/uL  --   --  7.9   RBC AUTO x10*6/uL 2.68*   < >  --    [facility] RBC AUTO Million/uL  --   --  2.68*   HEMOGLOBIN g/dL 7.7*   < >  --    [facility] HEMOGLOBIN g/dL  --   --  7.9*   HEMATOCRIT % 25.4*   < >  --    [facility] HEMATOCRIT %  --   --  26.0*   MCV fL 95   < >  --    [facility] MCV fL  --   --  97.0   MCHC g/dL 30.3*   < >  --    [facility] MCHC g/dL  --   --  30.4*   RDW % 14.1   < >  -- "    [facility] RDW %  --   --  12.3   PLATELETS AUTO x10*3/uL 161   < >  --    [facility] PLATELETS AUTO Thousand/uL  --   --  249   [facility] MPV fL  --   --  10.7    < > = values in this interval not displayed.       Results from last 72 hours   Lab Units 02/15/25  0654 02/14/25  0651   SODIUM mmol/L 136 133*   POTASSIUM mmol/L 4.4 4.1   CHLORIDE mmol/L 107 105   CO2 mmol/L 21 22   BUN mg/dL 23 31*   CREATININE mg/dL 1.44* 1.61*   CALCIUM mg/dL 7.9* 8.1*   PROTEIN TOTAL g/dL  --  6.1*   BILIRUBIN TOTAL mg/dL  --  0.4   ALK PHOS U/L  --  140*   AST U/L  --  30   ALT U/L  --  30                   RADIOLOGY REVIEW  NA    ENDOSCOPIC REVIEW  EGD by Dr Soria 2/14/25  Patient is s/p esophagectomy with gastric pull through  Large amount of coffee grounds with food debris in the stomach body and antrum that could not be cleared with lavage and suction. There was otherwise no fresh blood seen to extent reached. Procedure aborted in the antrum due to poor visualization and to minimize risk of aspiration.     IMPRESSION/RECOMMENDATIONS  Levy Gregory is a 68 yo male who has a PMH of COPD, T2DM, chronic cough, melena, T2 achalasia, DVTs s/p IVC filter, acalculous cholecystitis, Grade D esophagitis, paraesophageal hernia s/p robotic laparoscopic myotomy w/ fundoplication c/b esophageal perforation s/p esophagectomy c/b cardiac arrest (2023), Schatzki ring, food impaction, and dysphagia who presented to [facility]  on 2/13/25 with reports of GIB.     Anemia with melena - hgb (2/15) 7.7, admitted with hgb 7.7 baseline 8-9. S/p 1u PRBC on 2/13. EGD noted coffee ground debris that was unable to be cleared endoscopically  Hx of esophagectomy with gastric pull through  Concern for cirrhosis -based on imaging, no hepatic lesions, HE, or ascites. No varices noted on EGD but incomplete exam. Negative hep C. Chronically elevated alk phos    PLAN  -Recommend repeat EGD 2/17/25 or sooner pending clinical course. Patient is aware and understands   -Will  order INR. Will need follow up for FibroScan and chronic liver disease panel  -Currnetly on pantoprazole 40 mg BID  -Currently on octreotide drip in setting of possible cirrhosis  -Ceftraixone prohphylaxis 2g for SBP prophylaxis, small amount of ascites noted on CT  -Currently on clear liquid diet  -Continue supportive care per primary team    Discussed with NICHOLE Cifuentes to follow    (Electronically signed byTrisha Joshi PA-C on 2/15/2025 at 11:12 AM)

## 2025-02-16 ENCOUNTER — APPOINTMENT (OUTPATIENT)
Dept: CARDIOLOGY | Facility: HOSPITAL | Age: 70
End: 2025-02-16
Payer: MEDICARE

## 2025-02-16 VITALS
HEIGHT: 73 IN | RESPIRATION RATE: 16 BRPM | DIASTOLIC BLOOD PRESSURE: 78 MMHG | WEIGHT: 210 LBS | TEMPERATURE: 96.8 F | SYSTOLIC BLOOD PRESSURE: 112 MMHG | HEART RATE: 69 BPM | BODY MASS INDEX: 27.83 KG/M2 | OXYGEN SATURATION: 96 %

## 2025-02-16 LAB
ANION GAP SERPL CALC-SCNC: 13 MMOL/L (ref 10–20)
BASOPHILS # BLD AUTO: 0.02 X10*3/UL (ref 0–0.1)
BASOPHILS NFR BLD AUTO: 0.4 %
BUN SERPL-MCNC: 18 MG/DL (ref 6–23)
CALCIUM SERPL-MCNC: 7.9 MG/DL (ref 8.6–10.3)
CHLORIDE SERPL-SCNC: 106 MMOL/L (ref 98–107)
CO2 SERPL-SCNC: 20 MMOL/L (ref 21–32)
CREAT SERPL-MCNC: 1.44 MG/DL (ref 0.5–1.3)
EGFRCR SERPLBLD CKD-EPI 2021: 53 ML/MIN/1.73M*2
EOSINOPHIL # BLD AUTO: 0.11 X10*3/UL (ref 0–0.7)
EOSINOPHIL NFR BLD AUTO: 2.5 %
ERYTHROCYTE [DISTWIDTH] IN BLOOD BY AUTOMATED COUNT: 14.1 % (ref 11.5–14.5)
GLUCOSE BLD MANUAL STRIP-MCNC: 119 MG/DL (ref 74–99)
GLUCOSE BLD MANUAL STRIP-MCNC: 140 MG/DL (ref 74–99)
GLUCOSE BLD MANUAL STRIP-MCNC: 152 MG/DL (ref 74–99)
GLUCOSE BLD MANUAL STRIP-MCNC: 184 MG/DL (ref 74–99)
GLUCOSE SERPL-MCNC: 163 MG/DL (ref 74–99)
HCT VFR BLD AUTO: 25.9 % (ref 41–52)
HGB BLD-MCNC: 7.7 G/DL (ref 13.5–17.5)
IMM GRANULOCYTES # BLD AUTO: 0.02 X10*3/UL (ref 0–0.7)
IMM GRANULOCYTES NFR BLD AUTO: 0.4 % (ref 0–0.9)
INR PPP: 1.1 (ref 0.9–1.1)
LYMPHOCYTES # BLD AUTO: 1.16 X10*3/UL (ref 1.2–4.8)
LYMPHOCYTES NFR BLD AUTO: 26 %
MAGNESIUM SERPL-MCNC: 1.52 MG/DL (ref 1.6–2.4)
MCH RBC QN AUTO: 28.3 PG (ref 26–34)
MCHC RBC AUTO-ENTMCNC: 29.7 G/DL (ref 32–36)
MCV RBC AUTO: 95 FL (ref 80–100)
MONOCYTES # BLD AUTO: 0.38 X10*3/UL (ref 0.1–1)
MONOCYTES NFR BLD AUTO: 8.5 %
NEUTROPHILS # BLD AUTO: 2.78 X10*3/UL (ref 1.2–7.7)
NEUTROPHILS NFR BLD AUTO: 62.2 %
NRBC BLD-RTO: 0 /100 WBCS (ref 0–0)
PLATELET # BLD AUTO: 180 X10*3/UL (ref 150–450)
POTASSIUM SERPL-SCNC: 4.1 MMOL/L (ref 3.5–5.3)
PROTHROMBIN TIME: 12.6 SECONDS (ref 9.8–12.8)
RBC # BLD AUTO: 2.72 X10*6/UL (ref 4.5–5.9)
SODIUM SERPL-SCNC: 135 MMOL/L (ref 136–145)
WBC # BLD AUTO: 4.5 X10*3/UL (ref 4.4–11.3)

## 2025-02-16 PROCEDURE — 93005 ELECTROCARDIOGRAM TRACING: CPT

## 2025-02-16 PROCEDURE — 2500000004 HC RX 250 GENERAL PHARMACY W/ HCPCS (ALT 636 FOR OP/ED)

## 2025-02-16 PROCEDURE — 2500000004 HC RX 250 GENERAL PHARMACY W/ HCPCS (ALT 636 FOR OP/ED): Performed by: INTERNAL MEDICINE

## 2025-02-16 PROCEDURE — 1200000002 HC GENERAL ROOM WITH TELEMETRY DAILY

## 2025-02-16 PROCEDURE — 80048 BASIC METABOLIC PNL TOTAL CA: CPT | Performed by: INTERNAL MEDICINE

## 2025-02-16 PROCEDURE — 85025 COMPLETE CBC W/AUTO DIFF WBC: CPT | Performed by: INTERNAL MEDICINE

## 2025-02-16 PROCEDURE — 2500000002 HC RX 250 W HCPCS SELF ADMINISTERED DRUGS (ALT 637 FOR MEDICARE OP, ALT 636 FOR OP/ED): Performed by: INTERNAL MEDICINE

## 2025-02-16 PROCEDURE — 2500000004 HC RX 250 GENERAL PHARMACY W/ HCPCS (ALT 636 FOR OP/ED): Performed by: NURSE PRACTITIONER

## 2025-02-16 PROCEDURE — 99232 SBSQ HOSP IP/OBS MODERATE 35: CPT | Performed by: PHYSICIAN ASSISTANT

## 2025-02-16 PROCEDURE — 85610 PROTHROMBIN TIME: CPT | Performed by: PHYSICIAN ASSISTANT

## 2025-02-16 PROCEDURE — 83735 ASSAY OF MAGNESIUM: CPT | Performed by: INTERNAL MEDICINE

## 2025-02-16 PROCEDURE — 2500000002 HC RX 250 W HCPCS SELF ADMINISTERED DRUGS (ALT 637 FOR MEDICARE OP, ALT 636 FOR OP/ED): Performed by: NURSE PRACTITIONER

## 2025-02-16 PROCEDURE — 99232 SBSQ HOSP IP/OBS MODERATE 35: CPT | Performed by: INTERNAL MEDICINE

## 2025-02-16 PROCEDURE — 2500000001 HC RX 250 WO HCPCS SELF ADMINISTERED DRUGS (ALT 637 FOR MEDICARE OP): Performed by: NURSE PRACTITIONER

## 2025-02-16 PROCEDURE — 94640 AIRWAY INHALATION TREATMENT: CPT

## 2025-02-16 PROCEDURE — 36415 COLL VENOUS BLD VENIPUNCTURE: CPT | Performed by: INTERNAL MEDICINE

## 2025-02-16 PROCEDURE — 82947 ASSAY GLUCOSE BLOOD QUANT: CPT

## 2025-02-16 RX ORDER — MAGNESIUM SULFATE HEPTAHYDRATE 40 MG/ML
2 INJECTION, SOLUTION INTRAVENOUS ONCE
Status: COMPLETED | OUTPATIENT
Start: 2025-02-16 | End: 2025-02-16

## 2025-02-16 RX ADMIN — PANTOPRAZOLE SODIUM 40 MG: 40 INJECTION, POWDER, FOR SOLUTION INTRAVENOUS at 20:03

## 2025-02-16 RX ADMIN — MAGNESIUM SULFATE HEPTAHYDRATE 2 G: 40 INJECTION, SOLUTION INTRAVENOUS at 11:18

## 2025-02-16 RX ADMIN — INSULIN LISPRO 2 UNITS: 100 INJECTION, SOLUTION INTRAVENOUS; SUBCUTANEOUS at 15:39

## 2025-02-16 RX ADMIN — GABAPENTIN 100 MG: 100 CAPSULE ORAL at 15:39

## 2025-02-16 RX ADMIN — FORMOTEROL FUMARATE 20 MCG: 20 SOLUTION RESPIRATORY (INHALATION) at 21:53

## 2025-02-16 RX ADMIN — BUDESONIDE 0.25 MG: 0.25 INHALANT RESPIRATORY (INHALATION) at 21:53

## 2025-02-16 RX ADMIN — INSULIN LISPRO 2 UNITS: 100 INJECTION, SOLUTION INTRAVENOUS; SUBCUTANEOUS at 08:32

## 2025-02-16 RX ADMIN — FERROUS SULFATE TAB 325 MG (65 MG ELEMENTAL FE) 325 MG: 325 (65 FE) TAB at 08:32

## 2025-02-16 RX ADMIN — ATORVASTATIN CALCIUM 80 MG: 80 TABLET, FILM COATED ORAL at 08:32

## 2025-02-16 RX ADMIN — FORMOTEROL FUMARATE 20 MCG: 20 SOLUTION RESPIRATORY (INHALATION) at 09:05

## 2025-02-16 RX ADMIN — FERROUS SULFATE TAB 325 MG (65 MG ELEMENTAL FE) 325 MG: 325 (65 FE) TAB at 11:18

## 2025-02-16 RX ADMIN — TAMSULOSIN HYDROCHLORIDE 0.4 MG: 0.4 CAPSULE ORAL at 08:32

## 2025-02-16 RX ADMIN — OCTREOTIDE ACETATE 50 MCG/HR: 500 INJECTION, SOLUTION INTRAVENOUS; SUBCUTANEOUS at 17:19

## 2025-02-16 RX ADMIN — OCTREOTIDE ACETATE 50 MCG/HR: 500 INJECTION, SOLUTION INTRAVENOUS; SUBCUTANEOUS at 07:05

## 2025-02-16 RX ADMIN — GABAPENTIN 100 MG: 100 CAPSULE ORAL at 20:03

## 2025-02-16 RX ADMIN — FERROUS SULFATE TAB 325 MG (65 MG ELEMENTAL FE) 325 MG: 325 (65 FE) TAB at 15:39

## 2025-02-16 RX ADMIN — SERTRALINE HYDROCHLORIDE 50 MG: 50 TABLET ORAL at 08:32

## 2025-02-16 RX ADMIN — BUDESONIDE 0.25 MG: 0.25 INHALANT RESPIRATORY (INHALATION) at 09:05

## 2025-02-16 RX ADMIN — GABAPENTIN 100 MG: 100 CAPSULE ORAL at 08:32

## 2025-02-16 RX ADMIN — PANTOPRAZOLE SODIUM 40 MG: 40 INJECTION, POWDER, FOR SOLUTION INTRAVENOUS at 08:32

## 2025-02-16 RX ADMIN — CEFTRIAXONE 2 G: 2 INJECTION, POWDER, FOR SOLUTION INTRAMUSCULAR; INTRAVENOUS at 13:11

## 2025-02-16 ASSESSMENT — COGNITIVE AND FUNCTIONAL STATUS - GENERAL
MOBILITY SCORE: 24
DAILY ACTIVITIY SCORE: 24

## 2025-02-16 ASSESSMENT — PAIN - FUNCTIONAL ASSESSMENT: PAIN_FUNCTIONAL_ASSESSMENT: 0-10

## 2025-02-16 ASSESSMENT — PAIN SCALES - GENERAL
PAINLEVEL_OUTOF10: 0 - NO PAIN
PAINLEVEL_OUTOF10: 0 - NO PAIN

## 2025-02-16 NOTE — PROGRESS NOTES
Department of Internal Medicine  Gastroenterology  Progress note      Subjective  GI is following for melena with anemia    Today, he denies abdominal pain, nausea, or vomiting. He has a black bowel movement this morning. Last episode was yesterday mid day      Current Medication    Current Facility-Administered Medications:     acetaminophen (Tylenol) tablet 650 mg, 650 mg, oral, q4h PRN, MAGDALENA Faith    albuterol 2.5 mg /3 mL (0.083 %) nebulizer solution 2.5 mg, 2.5 mg, nebulization, q6h PRN, IVÁN Faith-CNP, 2.5 mg at 02/14/25 0921    albuterol 2.5 mg /3 mL (0.083 %) nebulizer solution 2.5 mg, 2.5 mg, nebulization, Once PRN, Jeremiah Aldridge DO    [Held by provider] aspirin chewable tablet 81 mg, 81 mg, oral, Daily, MAGDALENA Faith    atorvastatin (Lipitor) tablet 80 mg, 80 mg, oral, Daily, MAGDALENA Faith, 80 mg at 02/16/25 0832    budesonide (Pulmicort) 0.25 mg/2 mL nebulizer solution 0.25 mg, 0.25 mg, nebulization, BID, Kyle Hassan MD, 0.25 mg at 02/16/25 0905    cefTRIAXone (Rocephin) 2 g in sodium chloride 0.9% IV 50 mL, 2 g, intravenous, q24h, Chary Peterson, APRN-CNP, Stopped at 02/15/25 1443    dextrose 50 % injection 12.5 g, 12.5 g, intravenous, q15 min PRN, MAGDALENA Faith    dextrose 50 % injection 25 g, 25 g, intravenous, q15 min PRN, MAGDALENA Faith    diphenhydrAMINE (BENADryl) injection 12.5 mg, 12.5 mg, intravenous, Once PRN, Jeremiah Aldridge DO    fentaNYL PF (Sublimaze) injection 50 mcg, 50 mcg, intravenous, q5 min PRN, Jeremiah Aldridge, DO    ferrous sulfate (325 mg ferrous sulfate) tablet 325 mg, 325 mg, oral, TID, MAGDALENA Faiht, 325 mg at 02/16/25 1118    formoterol (Perforomist) 20 mcg/2 mL nebulizer solution 20 mcg, 20 mcg, nebulization, BID, Kyle Hassan MD, 20 mcg at 02/16/25 0905    gabapentin (Neurontin) capsule 100 mg, 100 mg, oral, TID, MAGDALENA Faith, 100 mg at 02/16/25 0832     glucagon (Glucagen) injection 1 mg, 1 mg, intramuscular, q15 min PRN, IVÁN Faith-CNP    glucagon (Glucagen) injection 1 mg, 1 mg, intramuscular, q15 min PRN, MAGDALENA Faith    HYDROmorphone (Dilaudid) injection 0.5 mg, 0.5 mg, intravenous, q5 min PRN, Jeremiah Aldridge, DO    insulin lispro injection 0-10 Units, 0-10 Units, subcutaneous, TID AC, IVÁN Faith-CNP, 2 Units at 02/16/25 0832    labetaloL (Normodyne,Trandate) injection 5 mg, 5 mg, intravenous, Once PRN, Jeremiah Aldridge DO    lidocaine PF (Xylocaine) 10 mg/mL (1 %) injection 1 mg, 0.1 mL, subcutaneous, Once, Jeremiah Aldridge, DO    octreotide (SandoSTATIN) 500 mcg in sodium chloride 0.9% 100 mL (5 mcg/mL) infusion, 50 mcg/hr, intravenous, Continuous, Chary Peterson, APRN-CNP, Last Rate: 10 mL/hr at 02/16/25 1132, 50 mcg/hr at 02/16/25 1132    ondansetron (Zofran) tablet 4 mg, 4 mg, oral, q8h PRN **OR** ondansetron (Zofran) injection 4 mg, 4 mg, intravenous, q8h PRN, MAGDALENA Faith    ondansetron (Zofran) injection 4 mg, 4 mg, intravenous, Once PRN, Jeremiah Aldridge DO    oxyCODONE (Roxicodone) immediate release tablet 5 mg, 5 mg, oral, q4h PRN, Jeremiah Aldridge, DO    oxygen (O2) therapy, , inhalation, Continuous PRN - O2/gases, Jeremiah Aldridge DO    pantoprazole (ProtoNix) injection 40 mg, 40 mg, intravenous, BID, MAGDALENA Faith, 40 mg at 02/16/25 0832    [Held by provider] pioglitazone (Actos) tablet 15 mg, 15 mg, oral, Daily, MAGDALENA Faith    polyethylene glycol (Glycolax, Miralax) packet 17 g, 17 g, oral, Daily PRN, MAGDALENA Faith    promethazine (Phenergan) 6.25 mg in sodium chloride 0.9% 50 mL IV, 6.25 mg, intravenous, Once PRN, Jeremiah Aldridge DO    sertraline (Zoloft) tablet 50 mg, 50 mg, oral, Daily, MAGDALENA Faith, 50 mg at 02/16/25 0832    tamsulosin (Flomax) 24 hr capsule 0.4 mg, 0.4 mg, oral, Daily, MAGDALENA Faith, 0.4 mg at 02/16/25  0832    Past Medical History  Active Ambulatory Problems     Diagnosis Date Noted    Colon polyp 02/17/2023    Diabetic neuropathy associated with type 2 diabetes mellitus (Multi) 02/17/2023    ED (erectile dysfunction) 02/17/2023    High serum bone-specific alkaline phosphatase 02/17/2023    Gout 02/17/2023    History of pulmonary embolism 02/17/2023    Hyperlipidemia 02/17/2023    Obstructive sleep apnea, adult 02/17/2023    Osteopenia 02/17/2023    Vitamin D deficiency 02/17/2023    Achalasia, esophageal 06/22/2023    Type 2 diabetes mellitus with hyperglycemia, without long-term current use of insulin 06/22/2023    Orthostatic hypotension 07/06/2023    Solar purpura (CMS-HCC) 07/24/2023    Anticoagulant long-term use 11/29/2023    Other dysphagia 10/31/2023    Hypertensive heart disease with heart failure 02/04/2024    Chronic obstructive pulmonary disease, unspecified COPD type (Multi) 02/04/2024    Acute renal failure superimposed on chronic kidney disease (CMS-HCC) 03/21/2024    Adjustment disorder with depressed mood 02/17/2023    Edema of both lower legs due to peripheral venous insufficiency 02/10/2024    Schatzki's ring 03/21/2024    Coronary artery disease involving native coronary artery of native heart without angina pectoris 04/09/2024    Deep vein thrombosis (DVT) of left lower extremity (Multi) 04/09/2024    Macrocytosis 07/25/2024    Closed fracture of right hip requiring operative repair with routine healing 08/15/2024    S/P right hip fracture 09/03/2024    Chronic kidney disease, stage 5 (Multi) 09/03/2024    Asthmatic bronchitis without complication ([facility]) 10/29/2024     Resolved Ambulatory Problems     Diagnosis Date Noted    Acne 02/17/2023    Decreased GFR 02/17/2023    Esophageal mass 02/17/2023    Esophageal stricture 02/17/2023    Gastric ulcer 02/17/2023    History of DVT (deep vein thrombosis) 02/17/2023    Iron deficiency anemia 02/17/2023    Microalbuminuria 02/17/2023    Nocturia  02/17/2023    Rib pain on left side 02/17/2023    Sialoadenitis 02/17/2023    Dysphagia 02/17/2023    Obesity (BMI 30-39.9) 02/17/2023    Deep vein thrombosis (DVT) of popliteal vein of left lower extremity (Multi) 06/22/2023    Dislodged gastrostomy tube 06/22/2023    Empyema (Multi) 06/22/2023    Gastrostomy in place (Multi) 06/22/2023    Mediastinitis 06/22/2023    Fall 07/06/2023    Malfunction of jejunostomy tube (Multi) 07/10/2023    Esophageal perforation 07/20/2023    Chronic atrial fibrillation (Multi) 07/24/2023    Hyponatremia 07/28/2023    Malnutrition of moderate degree (Multi) 10/03/2023    Impaired oral gastric feeding tube, initial encounter 10/03/2023    Moderate protein-calorie malnutrition (Multi) 10/19/2023    Hypotension due to drugs 11/04/2023    Dysfunction of both eustachian tubes 11/21/2023    Elevated liver function tests 11/29/2023    Hypotension 11/30/2023    Infection requiring contact isolation precautions 12/07/2023    Confusion 01/22/2024    Encounter for immunization 02/04/2024    Hypotension 02/07/2024    Pedal edema 02/13/2024    At risk for falls 02/26/2024    Type 2 diabetes mellitus with diabetic polyneuropathy, with long-term current use of insulin 03/08/2024    Generalized abdominal pain 03/08/2024    Dizziness 03/08/2024    Syncope and collapse 03/08/2024    Abnormal ECG 11/26/2022    Prolonged QT interval 03/21/2024    ACS (acute coronary syndrome) (Multi) 08/16/2023    Acute posthemorrhagic anemia 08/15/2023    Acute respiratory failure 02/12/2023    CORA (acute kidney injury) (CMS-[facility]) 03/21/2024    Anemia 03/21/2024    Bacteremia 05/22/2023    Balance problems 11/20/2023    Bruise, trunk 03/21/2024    Closed head injury 03/21/2024    Coagulopathy (Multi) 03/21/2024    Difficulty walking 11/20/2023    Do not resuscitate 08/15/2023    Epistaxis 03/21/2024    Fatigue 09/06/2023    Food bolus obstruction of intestine (Multi) 03/21/2024    History of anemia 03/21/2024     "History of diabetes mellitus 03/21/2024    History of infectious disease 03/21/2024    Hypernatremia 03/21/2024    Hypocalcemia 03/21/2024    Hypokalemia 03/21/2024    Hypoxia 03/21/2024    Lactic acidosis 03/21/2024    Leukocytosis 03/21/2024    Low kidney function 02/17/2023    Lung field abnormal 05/24/2023    Malnutrition (Multi) 03/21/2024    Morbid obesity (Multi) 03/21/2024    Nosocomial condition 08/15/2023    Onychodystrophy 11/20/2023    Onychomycosis 11/20/2023    Pain in toes of both feet 11/20/2023    Pleural effusion exudative 03/21/2024    CAP (community acquired pneumonia) 08/15/2023    Post-operative pain 03/21/2024    Postoperative septic shock (CMS-HCC) 03/21/2024    Other pulmonary embolism without acute cor pulmonale 02/10/2024    Pulmonary embolism 02/12/2023    Respiratory distress 03/21/2024    Rib contusion, left, sequela 03/21/2024    History of inferior vena caval filter placement 03/21/2024    Status post endoscopy 03/21/2024    Vagal arrhythmia 07/23/2018    Mild left ventricular systolic dysfunction 04/09/2024    Upper respiratory tract infection 05/13/2024    Palpitations 05/21/2024    Weight loss 07/25/2024    Closed right hip fracture, initial encounter 08/15/2024     Past Medical History:   Diagnosis Date    Contusion of abdominal wall, initial encounter 01/12/2021    COPD (chronic obstructive pulmonary disease) (Multi)     Diabetes mellitus (Multi)     DVT (deep venous thrombosis) (Multi)     DVT (deep venous thrombosis) (Multi)     GERD (gastroesophageal reflux disease)     Hemoptysis 05/12/2020    Herpes labialis     Hiatal hernia     Irregular heart beat     Pain in left knee     Peripheral neuropathy     Personal history of other diseases of the respiratory system 06/19/2019    Sleep apnea        PHYSICAL EXAM  VS: /77 (BP Location: Right arm, Patient Position: Lying)   Pulse 71   Temp 36.5 °C (97.7 °F) (Temporal)   Resp 18   Ht 1.854 m (6' 1\")   Wt 95.3 kg (210 lb)  "  SpO2 100%   BMI 27.71 kg/m²  Body mass index is 27.71 kg/m².  Physical Exam  Constitutional:       General: He is not in acute distress.     Appearance: Normal appearance.   HENT:      Head: Normocephalic and atraumatic.      Mouth/Throat:      Mouth: Mucous membranes are moist.      Pharynx: Oropharynx is clear.   Eyes:      General: No scleral icterus.     Extraocular Movements: Extraocular movements intact.      Conjunctiva/sclera: Conjunctivae normal.      Pupils: Pupils are equal, round, and reactive to light.   Cardiovascular:      Rate and Rhythm: Normal rate and regular rhythm.      Heart sounds: No murmur heard.  Pulmonary:      Effort: Pulmonary effort is normal.      Breath sounds: No wheezing or rhonchi.   Abdominal:      General: Bowel sounds are normal. There is no distension.      Palpations: Abdomen is soft. There is no mass.      Tenderness: There is no abdominal tenderness.      Hernia: No hernia is present.   Musculoskeletal:         General: No swelling or deformity.   Skin:     General: Skin is warm.      Coloration: Skin is not jaundiced.   Neurological:      General: No focal deficit present.      Mental Status: He is alert and oriented to person, place, and time.   Psychiatric:         Mood and Affect: Mood normal.          DATA  Recent blood work was reviewed and discussed with the patient   Results from last 7 days   Lab Units 02/16/25  0635 02/13/25  1041 02/10/25  1114   WBC AUTO x10*3/uL 4.5   < >  --    [facility] WBC AUTO Thousand/uL  --   --  7.9   RBC AUTO x10*6/uL 2.72*   < >  --    [facility] RBC AUTO Million/uL  --   --  2.68*   HEMOGLOBIN g/dL 7.7*   < >  --    [facility] HEMOGLOBIN g/dL  --   --  7.9*   HEMATOCRIT % 25.9*   < >  --    [facility] HEMATOCRIT %  --   --  26.0*   MCV fL 95   < >  --    [facility] MCV fL  --   --  97.0   MCHC g/dL 29.7*   < >  --    [facility] MCHC g/dL  --   --  30.4*   RDW % 14.1   < >  --    [facility] RDW %  --   --  12.3   PLATELETS AUTO x10*3/uL 180   < >  --    [facility]  PLATELETS AUTO Thousand/uL  --   --  249   [facility] MPV fL  --   --  10.7    < > = values in this interval not displayed.       Results from last 72 hours   Lab Units 02/16/25  0635 02/15/25  0654 02/14/25  0651   SODIUM mmol/L 135*   < > 133*   POTASSIUM mmol/L 4.1   < > 4.1   CHLORIDE mmol/L 106   < > 105   CO2 mmol/L 20*   < > 22   BUN mg/dL 18   < > 31*   CREATININE mg/dL 1.44*   < > 1.61*   CALCIUM mg/dL 7.9*   < > 8.1*   PROTEIN TOTAL g/dL  --   --  6.1*   BILIRUBIN TOTAL mg/dL  --   --  0.4   ALK PHOS U/L  --   --  140*   AST U/L  --   --  30   ALT U/L  --   --  30    < > = values in this interval not displayed.       Results from last 72 hours   Lab Units 02/16/25  0635   INR  1.1             RADIOLOGY REVIEW  NA    ENDOSCOPIC REVIEW  EGD by Dr Soria 2/14/25  Patient is s/p esophagectomy with gastric pull through  Large amount of coffee grounds with food debris in the stomach body and antrum that could not be cleared with lavage and suction. There was otherwise no fresh blood seen to extent reached. Procedure aborted in the antrum due to poor visualization and to minimize risk of aspiration.     IMPRESSION/RECOMMENDATIONS  Levy Gregory is a 68 yo male who has a PMH of COPD, T2DM, chronic cough, melena, T2 achalasia, DVTs s/p IVC filter, acalculous cholecystitis, Grade D esophagitis, paraesophageal hernia s/p robotic laparoscopic myotomy w/ fundoplication c/b esophageal perforation s/p esophagectomy c/b cardiac arrest (2023), Schatzki ring, food impaction, and dysphagia who presented to [facility]  on 2/13/25 with reports of GIB.     Anemia with melena - hgb (2/16) 7.7, admitted with hgb 7.7 baseline 8-9. S/p 1u PRBC on 2/13. EGD noted coffee ground debris that was unable to be cleared endoscopically  Hx of esophagectomy with gastric pull through  Concern for cirrhosis -based on imaging, no hepatic lesions, HE, or ascites. No varices noted on EGD but incomplete exam. Negative hep C. Chronically elevated alk  phos    PLAN  -Recommend repeat EGD 2/17/25 Patient is agreeable  -Will need follow up for FibroScan and chronic liver disease panel  -Currnetly on pantoprazole 40 mg BID  -Currently on octreotide drip in setting of possible cirrhosis  -Ceftraixone prohphylaxis 2g for SBP prophylaxis, small amount of ascites noted on CT  -Currently on clear liquid diet with NPO at midnight  -Continue supportive care per primary team    Discussed with Dr Haines GI to follow    (Electronically signed byTrisha Joshi PA-C on 2/16/2025 at 12:55 PM)

## 2025-02-16 NOTE — PROGRESS NOTES
Levy Gregory is a 69 y.o. male on day 3 of admission presenting with GI bleed.      Subjective   Had 1 black bowel movement last night.  Talked about hemoglobin again down at 7.7 but was 8.5 yesterday in the afternoon.  Remains on octreotide drip.  Talked about being n.p.o. after midnight       Objective     Last Recorded Vitals  /77 (BP Location: Right arm, Patient Position: Lying)   Pulse 74   Temp 36.8 °C (98.2 °F) (Temporal)   Resp 18   Wt 95.3 kg (210 lb)   SpO2 93%   Intake/Output last 3 Shifts:    Intake/Output Summary (Last 24 hours) at 2/16/2025 1042  Last data filed at 2/15/2025 1830  Gross per 24 hour   Intake 120 ml   Output 800 ml   Net -680 ml       Admission Weight  Weight: 95.3 kg (210 lb) (02/13/25 0958)    Daily Weight  02/13/25 : 95.3 kg (210 lb)      Physical Exam:  General: Not in acute distress, alert  HEENT: PERRLA, head intact and normocephalic  Neck: Normal to inspection  Lungs: Clear to auscultation, work of breathing within normal limit  Cardiac: Regular rate and rhythm  Abdomen: Soft nontender, positive bowel sounds  : Exam deferred  Skin: Intact  Hematology: No petechia or excessive ecchymosis  Musculoskeletal: Without significant trauma  Neurological: Alert awake oriented, no focal deficit, cranial nerves grossly intact  Psych: No suicidal ideation or homicidal ideation    Relevant Results  Scheduled medications  [Held by provider] aspirin, 81 mg, oral, Daily  atorvastatin, 80 mg, oral, Daily  budesonide, 0.25 mg, nebulization, BID  cefTRIAXone, 2 g, intravenous, q24h  ferrous sulfate (325 mg ferrous sulfate), 325 mg, oral, TID  formoterol, 20 mcg, nebulization, BID  gabapentin, 100 mg, oral, TID  insulin lispro, 0-10 Units, subcutaneous, TID AC  lidocaine, 0.1 mL, subcutaneous, Once  pantoprazole, 40 mg, intravenous, BID  [Held by provider] pioglitazone, 15 mg, oral, Daily  sertraline, 50 mg, oral, Daily  tamsulosin, 0.4 mg, oral, Daily      Continuous  medications  octreotide, 50 mcg/hr, Last Rate: 50 mcg/hr (02/16/25 0705)      PRN medications  PRN medications: acetaminophen, albuterol, albuterol, dextrose, dextrose, diphenhydrAMINE, fentaNYL PF, glucagon, glucagon, HYDROmorphone, labetaloL, ondansetron **OR** ondansetron, ondansetron, oxyCODONE, oxygen, polyethylene glycol, promethazine   Labs  Results from last 7 days   Lab Units 02/16/25  0635 02/15/25  1302 02/15/25  0654   WBC AUTO x10*3/uL 4.5 4.9 4.3*   HEMOGLOBIN g/dL 7.7* 8.5* 7.7*   HEMATOCRIT % 25.9* 28.7* 25.4*   PLATELETS AUTO x10*3/uL 180 174 161     Results from last 7 days   Lab Units 02/16/25  0635 02/15/25  0654 02/14/25  0651 02/13/25  1041 02/13/25  1041 02/10/25  1114   [facility] SODIUM mmol/L  --   --   --   --   --  138   SODIUM mmol/L 135* 136 133*   < > 133*  --    [facility] POTASSIUM mmol/L  --   --   --   --   --  5.2   POTASSIUM mmol/L 4.1 4.4 4.1   < > 5.1  --    [facility] CHLORIDE mmol/L  --   --   --   --   --  108   CHLORIDE mmol/L 106 107 105   < > 104  --    [facility] CO2 mmol/L  --   --   --   --   --  22   CO2 mmol/L 20* 21 22   < > 20*  --    BUN mg/dL 18 23 31*   < > 40*  --    [facility] BUN mg/dL  --   --   --   --   --  47*   CREATININE mg/dL 1.44* 1.44* 1.61*   < > 2.14*  --    [facility] CREATININE mg/dL  --   --   --   --   --  1.62*   [facility] CALCIUM mg/dL  --   --   --   --   --  8.3*   CALCIUM mg/dL 7.9* 7.9* 8.1*   < > 8.2*  --    [facility] PROTEIN TOTAL g/dL  --   --   --   --   --  6.3   PROTEIN TOTAL g/dL  --   --  6.1*  --  6.8  --    BILIRUBIN TOTAL mg/dL  --   --  0.4  --  0.4  --    [facility] BILIRUBIN TOTAL mg/dL  --   --   --   --   --  0.3   [facility] ALK PHOS U/L  --   --   --   --   --  152*   ALK PHOS U/L  --   --  140*  --  151*  --    [facility] ALT U/L  --   --   --   --   --  24   ALT U/L  --   --  30  --  38  --    [facility] AST U/L  --   --   --   --   --  27   AST U/L  --   --  30  --  58*  --    [facility] GLUCOSE mg/dL  --   --   --   --   --  242*   GLUCOSE mg/dL 163* 151* 149*   < > 210*  --      < > = values in this interval not displayed.       ECG 12 lead    Result Date: 2/14/2025  Normal sinus rhythm Right bundle branch block Left anterior fascicular block  Bifascicular block  Possible Lateral infarct (cited on or before 06-AUG-2023) Inferior infarct (cited on or before 06-AUG-2023) Abnormal ECG When compared with ECG of 15-AUG-2024 07:20, Left anterior fascicular block is now Present ST no longer elevated in Inferior leads Non-specific change in ST segment in Lateral leads Confirmed by Kaiser Hernandez ([id number]) on 2/14/2025 11:00:07 AM    ECG 12 lead    Result Date: 2/14/2025  Normal sinus rhythm Left axis deviation Right bundle branch block Possible Lateral infarct (cited on or before 06-AUG-2023) Inferior infarct (cited on or before 06-AUG-2023) Abnormal ECG When compared with ECG of 15-AUG-2024 07:20, No significant change was found    CT angio abdomen pelvis w and or wo IV IV contrast    Result Date: 2/13/2025  Interpreted By:  Alvarado Lane, STUDY: CT ANGIO ABDOMEN PELVIS W AND/OR WO IV IV CONTRAST;  2/13/2025 1:15 pm   INDICATION: Signs/Symptoms:black stools x2 weeks, concern for GI bleed   COMPARISON: 08/06/2023   ACCESSION NUMBER(S): [id number]   ORDERING CLINICIAN: SANJIV HOOVER   TECHNIQUE: Thin-section axial images of the abdomen and pelvis in the arterial phase after intravenous administration of  90 mL Omnipaque 350. Sagittal and coronal reconstructions. 3D reconstructions were obtained at a separate workstation.   FINDINGS: Vascular:  No aortic aneurysm or dissection.   The celiac trunk, superior mesenteric artery, renal arteries and inferior mesenteric artery are patent.   LOWER CHEST: Bibasilar scarring. BONES: No acute osseous abnormality. Right hip arthroplasty. Compression deformities of L3, L1, and T12. The T12 and L1 deformities have progressed slightly over the interval.   ABDOMEN:   LIVER: Cirrhotic hepatic contours. No focal lesions appreciated. BILE DUCTS: Normal caliber.  GALLBLADDER: Surgically absent. PANCREAS: Within normal limits. SPLEEN: Granulomatous calcifications. Spleen otherwise unremarkable. ADRENALS: Within normal limits. KIDNEYS and URETERS: Symmetric renal enhancement. No hydronephrosis. 3 mm non-obstructing stone about the inferior pole of the right kidney.   RETROPERITONEUM: No pathologically enlarged retroperitoneal lymph nodes.   PELVIS:   REPRODUCTIVE ORGANS: No pelvic masses. BLADDER: Within normal limits.   BOWEL: Patient is status post esophagectomy with anterior gastric pull-through which is only partially visualized. No focal inflammatory change identified within the bowel. No active hemorrhage. Appendix unremarkable. PERITONEUM: No ascites or free air, no fluid collection.       1. No active hemorrhage within the GI tract. 2. Patient is status post esophagectomy with anterior gastric pull-through which is only partly visualized about the lung bases. 3. 3 mm non-obstructing right renal stone. 4. Cirrhotic appearing liver. 5. Nonacute appearing compression deformities of T12, L1, and L3.   MACRO: none   Signed by: Alvarado Lane 2/13/2025 1:41 PM Dictation workstation:   [id number]    XR chest 1 view    Result Date: 2/13/2025  Interpreted By:  Carmen Robledo, STUDY: XR CHEST 1 VIEW;  2/13/2025 12:05 pm   INDICATION: Signs/Symptoms:shortness of breath.   COMPARISON: 10/24/2024   ACCESSION NUMBER(S): [id number]   ORDERING CLINICIAN: SANJIV HOOVER   FINDINGS: AP radiograph of the chest was provided.       CARDIOMEDIASTINAL SILHOUETTE: Persistently enlarged cardiomediastinal silhouette.   LUNGS: Unchanged mild blunting of the bilateral costophrenic angles. No consolidation, pulmonary edema, or pneumothorax.   ABDOMEN: No remarkable upper abdominal findings.   BONES: No acute osseous changes.       Unchanged small pleural effusions and cardiomegaly/pericardial effusion.   Signed by: Carmen Robledo 2/13/2025 12:13 PM Dictation workstation:   [id number]                     Assessment/Plan   69-year-old female with past medical history of COPD, type 2 diabetes mellitus, GERD, DVT status post IVC filter, acalculous cholecystitis, type II achalasia, paraesophageal hernia status post robotic laparoscopic myotomy with fundoplication complicated by esophageal perforation presented due to concern for GI bleed with dark stool for past 2 weeks.  Assessment & Plan  GI bleed    Upper GI bleed with acute blood loss anemia  Type and screen is done  Status post 1 unit of packed RBC since admission  Will repeat hemoglobin tomorrow  No signs and symptoms of active bleeding  Will transfuse if any acute bleeding or hemoglobin below 7  N.p.o. after midnight for EGD tomorrow  Clear liquid diet in the meantime  Continue with SBP prophylaxis with ceftriaxone  Continue with octreotide drip  CT angiogram results are noted for no active hemorrhage trend H&H daily  PPI twice a day  Hold aspirin  Patient will need outpatient FibroScan for chronic liver disease    Type 2 diabetes mellitus  Accu-Chek  Sliding scale insulin    Diabetes with neuropathy  Continue gabapentin    COPD  Stable  Continue with breathing treatment    Hyperlipidemia  Continue with atorvastatin    DVT prophylaxis  SCDs       Plan discussed with patient at bedside    Moderate level of MDM based on above issue and discussing plan    This note is created using voice recognition software. All efforts are made to minimize errors, if there are errors there due to transcription.    Shane Brown  Hospitalist

## 2025-02-16 NOTE — NURSING NOTE
Pt calm and cooperative through shift. Pt did not complain of pain. Pt up to bathroom this shift and tolerated clear liquid diet. Pt had octreotide drip run through shift.

## 2025-02-16 NOTE — NURSING NOTE
0048: ST elevation noted on telemetry monitor after leads were adjusted. Notified Dr. Rodriguez. The ST elevation had been happening for the entirety of the day (2/15). Patient asymptomatic. 12 lead EKG proformed. Cardiology consulted.    EOS: Patient rested well throughout the night. Patient had no complaints of pain. Safety has been maintained. Patient is stable at the time of writing.

## 2025-02-16 NOTE — CARE PLAN
The patient's goals for the shift include      The clinical goals for the shift include see care plan      Problem: Pain - Adult  Goal: Verbalizes/displays adequate comfort level or baseline comfort level  Flowsheets (Taken 2/16/2025 0739)  Verbalizes/displays adequate comfort level or baseline comfort level: Encourage patient to monitor pain and request assistance     Problem: Safety - Adult  Goal: Free from fall injury  Flowsheets (Taken 2/16/2025 0739)  Free from fall injury: Instruct family/caregiver on patient safety     Problem: Discharge Planning  Goal: Discharge to home or other facility with appropriate resources  Flowsheets (Taken 2/16/2025 0739)  Discharge to home or other facility with appropriate resources: Identify barriers to discharge with patient and caregiver

## 2025-02-17 ENCOUNTER — APPOINTMENT (OUTPATIENT)
Dept: GASTROENTEROLOGY | Facility: HOSPITAL | Age: 70
End: 2025-02-17
Payer: MEDICARE

## 2025-02-17 ENCOUNTER — ANESTHESIA EVENT (OUTPATIENT)
Dept: GASTROENTEROLOGY | Facility: HOSPITAL | Age: 70
End: 2025-02-17
Payer: MEDICARE

## 2025-02-17 ENCOUNTER — APPOINTMENT (OUTPATIENT)
Dept: PRIMARY CARE | Facility: CLINIC | Age: 70
End: 2025-02-17
Payer: MEDICARE

## 2025-02-17 ENCOUNTER — ANESTHESIA (OUTPATIENT)
Dept: GASTROENTEROLOGY | Facility: HOSPITAL | Age: 70
End: 2025-02-17
Payer: MEDICARE

## 2025-02-17 LAB
ANION GAP SERPL CALC-SCNC: 11 MMOL/L (ref 10–20)
ATRIAL RATE: 312 BPM
ATRIAL RATE: 87 BPM
BASOPHILS # BLD AUTO: 0.02 X10*3/UL (ref 0–0.1)
BASOPHILS NFR BLD AUTO: 0.5 %
BLOOD EXPIRATION DATE: NORMAL
BUN SERPL-MCNC: 14 MG/DL (ref 6–23)
CALCIUM SERPL-MCNC: 8 MG/DL (ref 8.6–10.3)
CHLORIDE SERPL-SCNC: 107 MMOL/L (ref 98–107)
CO2 SERPL-SCNC: 20 MMOL/L (ref 21–32)
CREAT SERPL-MCNC: 1.37 MG/DL (ref 0.5–1.3)
DISPENSE STATUS: NORMAL
EGFRCR SERPLBLD CKD-EPI 2021: 56 ML/MIN/1.73M*2
EOSINOPHIL # BLD AUTO: 0.13 X10*3/UL (ref 0–0.7)
EOSINOPHIL NFR BLD AUTO: 3 %
ERYTHROCYTE [DISTWIDTH] IN BLOOD BY AUTOMATED COUNT: 14.5 % (ref 11.5–14.5)
ERYTHROCYTE [DISTWIDTH] IN BLOOD BY AUTOMATED COUNT: 14.6 % (ref 11.5–14.5)
GLUCOSE BLD MANUAL STRIP-MCNC: 126 MG/DL (ref 74–99)
GLUCOSE BLD MANUAL STRIP-MCNC: 141 MG/DL (ref 74–99)
GLUCOSE BLD MANUAL STRIP-MCNC: 262 MG/DL (ref 74–99)
GLUCOSE BLD MANUAL STRIP-MCNC: 93 MG/DL (ref 74–99)
GLUCOSE SERPL-MCNC: 144 MG/DL (ref 74–99)
HCT VFR BLD AUTO: 27.1 % (ref 41–52)
HCT VFR BLD AUTO: 28.5 % (ref 41–52)
HGB BLD-MCNC: 8.2 G/DL (ref 13.5–17.5)
HGB BLD-MCNC: 8.5 G/DL (ref 13.5–17.5)
IMM GRANULOCYTES # BLD AUTO: 0.03 X10*3/UL (ref 0–0.7)
IMM GRANULOCYTES NFR BLD AUTO: 0.7 % (ref 0–0.9)
LYMPHOCYTES # BLD AUTO: 1.34 X10*3/UL (ref 1.2–4.8)
LYMPHOCYTES NFR BLD AUTO: 30.8 %
MAGNESIUM SERPL-MCNC: 1.85 MG/DL (ref 1.6–2.4)
MCH RBC QN AUTO: 29.1 PG (ref 26–34)
MCH RBC QN AUTO: 29.1 PG (ref 26–34)
MCHC RBC AUTO-ENTMCNC: 29.8 G/DL (ref 32–36)
MCHC RBC AUTO-ENTMCNC: 30.3 G/DL (ref 32–36)
MCV RBC AUTO: 96 FL (ref 80–100)
MCV RBC AUTO: 98 FL (ref 80–100)
MONOCYTES # BLD AUTO: 0.4 X10*3/UL (ref 0.1–1)
MONOCYTES NFR BLD AUTO: 9.2 %
NEUTROPHILS # BLD AUTO: 2.43 X10*3/UL (ref 1.2–7.7)
NEUTROPHILS NFR BLD AUTO: 55.8 %
NRBC BLD-RTO: 0 /100 WBCS (ref 0–0)
NRBC BLD-RTO: 0 /100 WBCS (ref 0–0)
P AXIS: 24 DEGREES
P AXIS: 3 DEGREES
P OFFSET: 157 MS
P OFFSET: 175 MS
P ONSET: 129 MS
P ONSET: 93 MS
PLATELET # BLD AUTO: 197 X10*3/UL (ref 150–450)
PLATELET # BLD AUTO: 202 X10*3/UL (ref 150–450)
POTASSIUM SERPL-SCNC: 4 MMOL/L (ref 3.5–5.3)
PR INTERVAL: 162 MS
PRODUCT BLOOD TYPE: 5100
PRODUCT CODE: NORMAL
Q ONSET: 210 MS
Q ONSET: 211 MS
QRS COUNT: 13 BEATS
QRS COUNT: 15 BEATS
QRS DURATION: 134 MS
QRS DURATION: 134 MS
QT INTERVAL: 386 MS
QT INTERVAL: 390 MS
QTC CALCULATION(BAZETT): 444 MS
QTC CALCULATION(BAZETT): 464 MS
QTC FREDERICIA: 425 MS
QTC FREDERICIA: 436 MS
R AXIS: -63 DEGREES
R AXIS: -70 DEGREES
RBC # BLD AUTO: 2.82 X10*6/UL (ref 4.5–5.9)
RBC # BLD AUTO: 2.92 X10*6/UL (ref 4.5–5.9)
SODIUM SERPL-SCNC: 134 MMOL/L (ref 136–145)
T AXIS: 21 DEGREES
T AXIS: 40 DEGREES
T OFFSET: 403 MS
T OFFSET: 406 MS
UNIT ABO: NORMAL
UNIT NUMBER: NORMAL
UNIT RH: NORMAL
UNIT VOLUME: 350
VENTRICULAR RATE: 78 BPM
VENTRICULAR RATE: 87 BPM
WBC # BLD AUTO: 4.4 X10*3/UL (ref 4.4–11.3)
WBC # BLD AUTO: 5.4 X10*3/UL (ref 4.4–11.3)
XM INTEP: NORMAL

## 2025-02-17 PROCEDURE — 85027 COMPLETE CBC AUTOMATED: CPT | Performed by: INTERNAL MEDICINE

## 2025-02-17 PROCEDURE — 1200000002 HC GENERAL ROOM WITH TELEMETRY DAILY

## 2025-02-17 PROCEDURE — A43235 PR ESOPHAGOGASTRODUODENOSCOPY TRANSORAL DIAGNOSTIC: Performed by: ANESTHESIOLOGIST ASSISTANT

## 2025-02-17 PROCEDURE — 36415 COLL VENOUS BLD VENIPUNCTURE: CPT | Performed by: INTERNAL MEDICINE

## 2025-02-17 PROCEDURE — 3700000001 HC GENERAL ANESTHESIA TIME - INITIAL BASE CHARGE

## 2025-02-17 PROCEDURE — 0DJ08ZZ INSPECTION OF UPPER INTESTINAL TRACT, VIA NATURAL OR ARTIFICIAL OPENING ENDOSCOPIC: ICD-10-PCS | Performed by: STUDENT IN AN ORGANIZED HEALTH CARE EDUCATION/TRAINING PROGRAM

## 2025-02-17 PROCEDURE — 7100000001 HC RECOVERY ROOM TIME - INITIAL BASE CHARGE

## 2025-02-17 PROCEDURE — 2500000005 HC RX 250 GENERAL PHARMACY W/O HCPCS: Performed by: ANESTHESIOLOGY

## 2025-02-17 PROCEDURE — 94640 AIRWAY INHALATION TREATMENT: CPT

## 2025-02-17 PROCEDURE — 2500000004 HC RX 250 GENERAL PHARMACY W/ HCPCS (ALT 636 FOR OP/ED): Performed by: INTERNAL MEDICINE

## 2025-02-17 PROCEDURE — 99232 SBSQ HOSP IP/OBS MODERATE 35: CPT | Performed by: INTERNAL MEDICINE

## 2025-02-17 PROCEDURE — 85025 COMPLETE CBC W/AUTO DIFF WBC: CPT | Performed by: INTERNAL MEDICINE

## 2025-02-17 PROCEDURE — 2500000004 HC RX 250 GENERAL PHARMACY W/ HCPCS (ALT 636 FOR OP/ED)

## 2025-02-17 PROCEDURE — 3700000002 HC GENERAL ANESTHESIA TIME - EACH INCREMENTAL 1 MINUTE

## 2025-02-17 PROCEDURE — 2500000004 HC RX 250 GENERAL PHARMACY W/ HCPCS (ALT 636 FOR OP/ED): Performed by: NURSE PRACTITIONER

## 2025-02-17 PROCEDURE — 7100000002 HC RECOVERY ROOM TIME - EACH INCREMENTAL 1 MINUTE

## 2025-02-17 PROCEDURE — 2500000002 HC RX 250 W HCPCS SELF ADMINISTERED DRUGS (ALT 637 FOR MEDICARE OP, ALT 636 FOR OP/ED): Performed by: NURSE PRACTITIONER

## 2025-02-17 PROCEDURE — 2500000004 HC RX 250 GENERAL PHARMACY W/ HCPCS (ALT 636 FOR OP/ED): Performed by: ANESTHESIOLOGIST ASSISTANT

## 2025-02-17 PROCEDURE — 43235 EGD DIAGNOSTIC BRUSH WASH: CPT | Performed by: STUDENT IN AN ORGANIZED HEALTH CARE EDUCATION/TRAINING PROGRAM

## 2025-02-17 PROCEDURE — 83735 ASSAY OF MAGNESIUM: CPT | Performed by: INTERNAL MEDICINE

## 2025-02-17 PROCEDURE — 82947 ASSAY GLUCOSE BLOOD QUANT: CPT

## 2025-02-17 PROCEDURE — 2500000001 HC RX 250 WO HCPCS SELF ADMINISTERED DRUGS (ALT 637 FOR MEDICARE OP): Performed by: NURSE PRACTITIONER

## 2025-02-17 PROCEDURE — 2500000002 HC RX 250 W HCPCS SELF ADMINISTERED DRUGS (ALT 637 FOR MEDICARE OP, ALT 636 FOR OP/ED): Performed by: INTERNAL MEDICINE

## 2025-02-17 PROCEDURE — 80048 BASIC METABOLIC PNL TOTAL CA: CPT | Performed by: INTERNAL MEDICINE

## 2025-02-17 PROCEDURE — A43235 PR ESOPHAGOGASTRODUODENOSCOPY TRANSORAL DIAGNOSTIC: Performed by: ANESTHESIOLOGY

## 2025-02-17 RX ORDER — ALBUTEROL SULFATE 0.83 MG/ML
2.5 SOLUTION RESPIRATORY (INHALATION) ONCE AS NEEDED
Status: DISCONTINUED | OUTPATIENT
Start: 2025-02-17 | End: 2025-02-18 | Stop reason: WASHOUT

## 2025-02-17 RX ORDER — FENTANYL CITRATE 50 UG/ML
25 INJECTION, SOLUTION INTRAMUSCULAR; INTRAVENOUS EVERY 5 MIN PRN
Status: DISCONTINUED | OUTPATIENT
Start: 2025-02-17 | End: 2025-02-18 | Stop reason: WASHOUT

## 2025-02-17 RX ORDER — SODIUM CHLORIDE 9 MG/ML
100 INJECTION, SOLUTION INTRAVENOUS CONTINUOUS
Status: ACTIVE | OUTPATIENT
Start: 2025-02-17 | End: 2025-02-18

## 2025-02-17 RX ORDER — ONDANSETRON HYDROCHLORIDE 2 MG/ML
4 INJECTION, SOLUTION INTRAVENOUS ONCE AS NEEDED
Status: DISCONTINUED | OUTPATIENT
Start: 2025-02-17 | End: 2025-02-18 | Stop reason: WASHOUT

## 2025-02-17 RX ORDER — LIDOCAINE HYDROCHLORIDE 20 MG/ML
INJECTION, SOLUTION EPIDURAL; INFILTRATION; INTRACAUDAL; PERINEURAL AS NEEDED
Status: DISCONTINUED | OUTPATIENT
Start: 2025-02-17 | End: 2025-02-17

## 2025-02-17 RX ORDER — FENTANYL CITRATE 50 UG/ML
12.5 INJECTION, SOLUTION INTRAMUSCULAR; INTRAVENOUS EVERY 5 MIN PRN
Status: DISCONTINUED | OUTPATIENT
Start: 2025-02-17 | End: 2025-02-18 | Stop reason: WASHOUT

## 2025-02-17 RX ORDER — HYDRALAZINE HYDROCHLORIDE 20 MG/ML
5 INJECTION INTRAMUSCULAR; INTRAVENOUS EVERY 30 MIN PRN
Status: DISCONTINUED | OUTPATIENT
Start: 2025-02-17 | End: 2025-02-18 | Stop reason: WASHOUT

## 2025-02-17 RX ORDER — PROPOFOL 10 MG/ML
INJECTION, EMULSION INTRAVENOUS AS NEEDED
Status: DISCONTINUED | OUTPATIENT
Start: 2025-02-17 | End: 2025-02-17

## 2025-02-17 RX ADMIN — BUDESONIDE 0.25 MG: 0.25 INHALANT RESPIRATORY (INHALATION) at 21:30

## 2025-02-17 RX ADMIN — BUDESONIDE 0.25 MG: 0.25 INHALANT RESPIRATORY (INHALATION) at 08:39

## 2025-02-17 RX ADMIN — SODIUM CHLORIDE 100 ML/HR: 9 INJECTION, SOLUTION INTRAVENOUS at 20:29

## 2025-02-17 RX ADMIN — ATORVASTATIN CALCIUM 80 MG: 80 TABLET, FILM COATED ORAL at 08:05

## 2025-02-17 RX ADMIN — TAMSULOSIN HYDROCHLORIDE 0.4 MG: 0.4 CAPSULE ORAL at 08:05

## 2025-02-17 RX ADMIN — FORMOTEROL FUMARATE 20 MCG: 20 SOLUTION RESPIRATORY (INHALATION) at 08:39

## 2025-02-17 RX ADMIN — ALBUTEROL SULFATE 2.5 MG: 2.5 SOLUTION RESPIRATORY (INHALATION) at 21:30

## 2025-02-17 RX ADMIN — PROPOFOL 160 MCG/KG/MIN: 10 INJECTION, EMULSION INTRAVENOUS at 10:24

## 2025-02-17 RX ADMIN — CEFTRIAXONE 2 G: 2 INJECTION, POWDER, FOR SOLUTION INTRAMUSCULAR; INTRAVENOUS at 15:56

## 2025-02-17 RX ADMIN — INSULIN LISPRO 6 UNITS: 100 INJECTION, SOLUTION INTRAVENOUS; SUBCUTANEOUS at 15:58

## 2025-02-17 RX ADMIN — Medication 2 L/MIN: at 11:50

## 2025-02-17 RX ADMIN — FERROUS SULFATE TAB 325 MG (65 MG ELEMENTAL FE) 325 MG: 325 (65 FE) TAB at 08:05

## 2025-02-17 RX ADMIN — FORMOTEROL FUMARATE 20 MCG: 20 SOLUTION RESPIRATORY (INHALATION) at 21:30

## 2025-02-17 RX ADMIN — GABAPENTIN 100 MG: 100 CAPSULE ORAL at 20:29

## 2025-02-17 RX ADMIN — SODIUM CHLORIDE 1000 ML: 9 INJECTION, SOLUTION INTRAVENOUS at 17:16

## 2025-02-17 RX ADMIN — PROPOFOL 100 MG: 10 INJECTION, EMULSION INTRAVENOUS at 10:23

## 2025-02-17 RX ADMIN — FERROUS SULFATE TAB 325 MG (65 MG ELEMENTAL FE) 325 MG: 325 (65 FE) TAB at 16:03

## 2025-02-17 RX ADMIN — FERROUS SULFATE TAB 325 MG (65 MG ELEMENTAL FE) 325 MG: 325 (65 FE) TAB at 11:49

## 2025-02-17 RX ADMIN — SERTRALINE HYDROCHLORIDE 50 MG: 50 TABLET ORAL at 08:05

## 2025-02-17 RX ADMIN — GABAPENTIN 100 MG: 100 CAPSULE ORAL at 15:58

## 2025-02-17 RX ADMIN — GABAPENTIN 100 MG: 100 CAPSULE ORAL at 08:05

## 2025-02-17 RX ADMIN — OCTREOTIDE ACETATE 50 MCG/HR: 500 INJECTION, SOLUTION INTRAVENOUS; SUBCUTANEOUS at 03:45

## 2025-02-17 RX ADMIN — LIDOCAINE HYDROCHLORIDE 100 MG: 20 INJECTION, SOLUTION EPIDURAL; INFILTRATION; INTRACAUDAL; PERINEURAL at 10:23

## 2025-02-17 RX ADMIN — PANTOPRAZOLE SODIUM 40 MG: 40 INJECTION, POWDER, FOR SOLUTION INTRAVENOUS at 20:29

## 2025-02-17 RX ADMIN — PANTOPRAZOLE SODIUM 40 MG: 40 INJECTION, POWDER, FOR SOLUTION INTRAVENOUS at 08:05

## 2025-02-17 SDOH — HEALTH STABILITY: MENTAL HEALTH: CURRENT SMOKER: 0

## 2025-02-17 ASSESSMENT — COGNITIVE AND FUNCTIONAL STATUS - GENERAL
DAILY ACTIVITIY SCORE: 24
MOBILITY SCORE: 24

## 2025-02-17 ASSESSMENT — PAIN - FUNCTIONAL ASSESSMENT
PAIN_FUNCTIONAL_ASSESSMENT: 0-10

## 2025-02-17 ASSESSMENT — PAIN SCALES - GENERAL
PAINLEVEL_OUTOF10: 0 - NO PAIN
PAIN_LEVEL: 0
PAINLEVEL_OUTOF10: 0 - NO PAIN

## 2025-02-17 NOTE — ANESTHESIA POSTPROCEDURE EVALUATION
Patient: Levy Gregory    Procedure Summary       Date: 02/17/25 Room / Location: [facility] [place]    Anesthesia Start: 1017 Anesthesia Stop: 1040    Procedure: EGD Diagnosis:       GI bleed      Melena    Scheduled Providers: Alcides Colorado MD Responsible Provider: Jefe Addison MD    Anesthesia Type: MAC ASA Status: 3            Anesthesia Type: MAC    Vitals Value Taken Time   BP 84/53 02/17/25 1040   Temp 36.3 °C (97.3 °F) 02/17/25 1038   Pulse 83 02/17/25 1045   Resp 16 02/17/25 1045   SpO2 94 % 02/17/25 1045   Vitals shown include unfiled device data.    Anesthesia Post Evaluation    Patient location during evaluation: PACU  Patient participation: complete - patient participated  Level of consciousness: sleepy but conscious, responsive to light touch, responsive to verbal stimuli, responsive to physical stimuli and sedated  Pain score: 0  Pain management: satisfactory to patient  Airway patency: patent  Two or more strategies used to mitigate risk of obstructive sleep apnea  Cardiovascular status: acceptable, hemodynamically stable and stable  Respiratory status: acceptable, unassisted, spontaneous ventilation and nonlabored ventilation  Hydration status: acceptable  Postoperative Nausea and Vomiting: none        No notable events documented.

## 2025-02-17 NOTE — NURSING NOTE
EOS: Patient rested well throughout the night. Patient remained NPO after midnight in preparation for EGD. Patient had no complaints of pain. Safety has been maintained. Patient is stable at the time of writing.

## 2025-02-17 NOTE — ANESTHESIA PREPROCEDURE EVALUATION
Patient: Levy Gregory    Procedure Information       Date/Time: 02/17/25 1030    Scheduled providers: Alcides Colorado MD    Procedure: EGD    Location: [facility]            Relevant Problems   Cardiac   (+) Coronary artery disease involving native coronary artery of native heart without angina pectoris   (+) Hyperlipidemia   (+) Solar purpura (CMS-HCC)      Pulmonary   (+) Asthmatic bronchitis without complication (HHS-HCC)   (+) Chronic obstructive pulmonary disease, unspecified COPD type (Multi)      GI   (+) GI bleed   (+) Other dysphagia      Endocrine   (+) Diabetic neuropathy associated with type 2 diabetes mellitus (Multi)   (+) Type 2 diabetes mellitus with hyperglycemia, without long-term current use of insulin      Hematology   (+) Anticoagulant long-term use   (+) Deep vein thrombosis (DVT) of left lower extremity (Multi)       Clinical information reviewed:   Tobacco  Allergies  Meds   Med Hx  Surg Hx   Fam Hx  Soc Hx        NPO Detail:  No data recorded     Physical Exam    Airway  Mallampati: III  TM distance: >3 FB  Neck ROM: full     Cardiovascular   Rhythm: regular  Rate: normal     Dental - normal exam     Pulmonary   Breath sounds clear to auscultation  (+) decreased breath sounds     Abdominal   (+) obese  Abdomen: soft  Bowel sounds: normal           Anesthesia Plan    History of general anesthesia?: yes  History of complications of general anesthesia?: no    ASA 3     general and MAC   (MAC or TIVA)  The patient is not a current smoker.  Patient was previously instructed to abstain from smoking on day of procedure.  Patient did not smoke on day of procedure.  Education provided regarding risk of obstructive sleep apnea.  intravenous induction   Anesthetic plan and risks discussed with patient.  Use of blood products discussed with patient who consented to blood products.    Plan discussed with [facility].

## 2025-02-17 NOTE — PROGRESS NOTES
Spiritual Care Visit  Spiritual Care Request    Reason for Visit:        Request Received From:       Focus of Care:  Visited With: Patient not available         Refer to :          Spiritual Care Assessment    Spiritual Assessment:                      Care Provided:       Sense of Community and or Congregational Affiliation:  Sabianist         Addressed Needs/Concerns and/or Roxie Through:          Outcome:        Advance Directives:         Spiritual Care Annotation    Annotation:  The patient was busy when I tried to visit. I will try again.

## 2025-02-17 NOTE — CARE PLAN
GI consulted for melena with anemia.  Patient underwent EGD with Dr. Huizar today noting esophagectomy with gastric pull-through, moderate amount of food and debris with retained gastric fluid in the stomach.  Edematous pyloric mucosa.    Plan:   - continue supportive care  - clear liquids diet  - daily PPI- no need for octreotide drip given no varices on EGD   - pt can follow up with Dr. Haines in GI clinic    Plan has been discussed with Dr. Haines. GI will sign off.    Simi Bonilla, IVÁN/CNP

## 2025-02-17 NOTE — NURSING NOTE
1500: this nurse took over care for this pt  1530: MD called wife and updated her  1545: pt ambulated to bathroom got very dizzy and stated he almost passed out- notified MD. Pt back in bed safely.   1615: Pt started on IV fluids, will obtain orthos on this patient as well. Pt remained stable otherwise during shift- unable to do EGD, Hgb stable.   IV bolus started. Orthos positive- stat labs obtained

## 2025-02-17 NOTE — PROGRESS NOTES
Levy Gregory is a 69 y.o. male on day 4 of admission presenting with GI bleed.      Subjective   Patient is doing well.  Talked about hemoglobin being better at 8.2.  Also talked about seeing hematology oncology as outpatient once he has recovered from this and seeing where the baseline hemoglobin settles in.       Objective     Last Recorded Vitals  /73   Pulse 80   Temp 36.3 °C (97.3 °F)   Resp 18   Wt 95.3 kg (210 lb)   SpO2 97%   Intake/Output last 3 Shifts:    Intake/Output Summary (Last 24 hours) at 2/17/2025 0917  Last data filed at 2/16/2025 1700  Gross per 24 hour   Intake 454.16 ml   Output --   Net 454.16 ml       Admission Weight  Weight: 95.3 kg (210 lb) (02/13/25 0958)    Daily Weight  02/13/25 : 95.3 kg (210 lb)      Physical Exam:  General: Not in acute distress, alert  HEENT: PERRLA, head intact and normocephalic  Neck: Normal to inspection  Lungs: Clear to auscultation, work of breathing within normal limit  Cardiac: Regular rate and rhythm  Abdomen: Soft nontender, positive bowel sounds  : Exam deferred  Skin: Intact  Hematology: No petechia or excessive ecchymosis  Musculoskeletal: Without significant trauma  Neurological: Alert awake oriented, no focal deficit, cranial nerves grossly intact  Psych: No suicidal ideation or homicidal ideation    Relevant Results  Scheduled medications  [Held by provider] aspirin, 81 mg, oral, Daily  atorvastatin, 80 mg, oral, Daily  budesonide, 0.25 mg, nebulization, BID  cefTRIAXone, 2 g, intravenous, q24h  ferrous sulfate (325 mg ferrous sulfate), 325 mg, oral, TID  formoterol, 20 mcg, nebulization, BID  gabapentin, 100 mg, oral, TID  insulin lispro, 0-10 Units, subcutaneous, TID AC  lidocaine, 0.1 mL, subcutaneous, Once  pantoprazole, 40 mg, intravenous, BID  [Held by provider] pioglitazone, 15 mg, oral, Daily  sertraline, 50 mg, oral, Daily  tamsulosin, 0.4 mg, oral, Daily      Continuous medications  octreotide, 50 mcg/hr, Last Rate: 50  mcg/hr (02/17/25 0345)      PRN medications  PRN medications: acetaminophen, albuterol, albuterol, dextrose, dextrose, diphenhydrAMINE, fentaNYL PF, glucagon, glucagon, HYDROmorphone, labetaloL, ondansetron **OR** ondansetron, ondansetron, oxyCODONE, oxygen, polyethylene glycol, promethazine   Labs  Results from last 7 days   Lab Units 02/17/25  0623 02/16/25  0635 02/15/25  1302   WBC AUTO x10*3/uL 4.4 4.5 4.9   HEMOGLOBIN g/dL 8.2* 7.7* 8.5*   HEMATOCRIT % 27.1* 25.9* 28.7*   PLATELETS AUTO x10*3/uL 197 180 174     Results from last 7 days   Lab Units 02/17/25  0623 02/16/25  0635 02/15/25  0654 02/14/25  0651 02/13/25  1041 02/13/25  1041 02/10/25  1114   [facility] SODIUM mmol/L  --   --   --   --   --   --  138   SODIUM mmol/L 134* 135* 136 133*   < > 133*  --    [facility] POTASSIUM mmol/L  --   --   --   --   --   --  5.2   POTASSIUM mmol/L 4.0 4.1 4.4 4.1   < > 5.1  --    [facility] CHLORIDE mmol/L  --   --   --   --   --   --  108   CHLORIDE mmol/L 107 106 107 105   < > 104  --    [facility] CO2 mmol/L  --   --   --   --   --   --  22   CO2 mmol/L 20* 20* 21 22   < > 20*  --    BUN mg/dL 14 18 23 31*   < > 40*  --    [facility] BUN mg/dL  --   --   --   --   --   --  47*   CREATININE mg/dL 1.37* 1.44* 1.44* 1.61*   < > 2.14*  --    [facility] CREATININE mg/dL  --   --   --   --   --   --  1.62*   [facility] CALCIUM mg/dL  --   --   --   --   --   --  8.3*   CALCIUM mg/dL 8.0* 7.9* 7.9* 8.1*   < > 8.2*  --    [facility] PROTEIN TOTAL g/dL  --   --   --   --   --   --  6.3   PROTEIN TOTAL g/dL  --   --   --  6.1*  --  6.8  --    BILIRUBIN TOTAL mg/dL  --   --   --  0.4  --  0.4  --    [facility] BILIRUBIN TOTAL mg/dL  --   --   --   --   --   --  0.3   [facility] ALK PHOS U/L  --   --   --   --   --   --  152*   ALK PHOS U/L  --   --   --  140*  --  151*  --    [facility] ALT U/L  --   --   --   --   --   --  24   ALT U/L  --   --   --  30  --  38  --    [facility] AST U/L  --   --   --   --   --   --  27   AST U/L  --   --   --  30  --  58*  --    [facility] GLUCOSE mg/dL   --   --   --   --   --   --  242*   GLUCOSE mg/dL 144* 163* 151* 149*   < > 210*  --     < > = values in this interval not displayed.       ECG 12 lead    Result Date: 2/14/2025  Normal sinus rhythm Right bundle branch block Left anterior fascicular block  Bifascicular block  Possible Lateral infarct (cited on or before 06-AUG-2023) Inferior infarct (cited on or before 06-AUG-2023) Abnormal ECG When compared with ECG of 15-AUG-2024 07:20, Left anterior fascicular block is now Present ST no longer elevated in Inferior leads Non-specific change in ST segment in Lateral leads Confirmed by Kaiser Hernandez ([id number]) on 2/14/2025 11:00:07 AM    ECG 12 lead    Result Date: 2/14/2025  Normal sinus rhythm Left axis deviation Right bundle branch block Possible Lateral infarct (cited on or before 06-AUG-2023) Inferior infarct (cited on or before 06-AUG-2023) Abnormal ECG When compared with ECG of 15-AUG-2024 07:20, No significant change was found    CT angio abdomen pelvis w and or wo IV IV contrast    Result Date: 2/13/2025  Interpreted By:  Alvarado Lane, STUDY: CT ANGIO ABDOMEN PELVIS W AND/OR WO IV IV CONTRAST;  2/13/2025 1:15 pm   INDICATION: Signs/Symptoms:black stools x2 weeks, concern for GI bleed   COMPARISON: 08/06/2023   ACCESSION NUMBER(S): [id number]   ORDERING CLINICIAN: SANJIV HOOVER   TECHNIQUE: Thin-section axial images of the abdomen and pelvis in the arterial phase after intravenous administration of  90 mL Omnipaque 350. Sagittal and coronal reconstructions. 3D reconstructions were obtained at a separate workstation.   FINDINGS: Vascular:  No aortic aneurysm or dissection.   The celiac trunk, superior mesenteric artery, renal arteries and inferior mesenteric artery are patent.   LOWER CHEST: Bibasilar scarring. BONES: No acute osseous abnormality. Right hip arthroplasty. Compression deformities of L3, L1, and T12. The T12 and L1 deformities have progressed slightly over the interval.   ABDOMEN:   LIVER:  Cirrhotic hepatic contours. No focal lesions appreciated. BILE DUCTS: Normal caliber. GALLBLADDER: Surgically absent. PANCREAS: Within normal limits. SPLEEN: Granulomatous calcifications. Spleen otherwise unremarkable. ADRENALS: Within normal limits. KIDNEYS and URETERS: Symmetric renal enhancement. No hydronephrosis. 3 mm non-obstructing stone about the inferior pole of the right kidney.   RETROPERITONEUM: No pathologically enlarged retroperitoneal lymph nodes.   PELVIS:   REPRODUCTIVE ORGANS: No pelvic masses. BLADDER: Within normal limits.   BOWEL: Patient is status post esophagectomy with anterior gastric pull-through which is only partially visualized. No focal inflammatory change identified within the bowel. No active hemorrhage. Appendix unremarkable. PERITONEUM: No ascites or free air, no fluid collection.       1. No active hemorrhage within the GI tract. 2. Patient is status post esophagectomy with anterior gastric pull-through which is only partly visualized about the lung bases. 3. 3 mm non-obstructing right renal stone. 4. Cirrhotic appearing liver. 5. Nonacute appearing compression deformities of T12, L1, and L3.   MACRO: none   Signed by: Alvarado Lane 2/13/2025 1:41 PM Dictation workstation:   [id number]    XR chest 1 view    Result Date: 2/13/2025  Interpreted By:  Carmen Robledo, STUDY: XR CHEST 1 VIEW;  2/13/2025 12:05 pm   INDICATION: Signs/Symptoms:shortness of breath.   COMPARISON: 10/24/2024   ACCESSION NUMBER(S): [id number]   ORDERING CLINICIAN: SANJIV HOOVER   FINDINGS: AP radiograph of the chest was provided.       CARDIOMEDIASTINAL SILHOUETTE: Persistently enlarged cardiomediastinal silhouette.   LUNGS: Unchanged mild blunting of the bilateral costophrenic angles. No consolidation, pulmonary edema, or pneumothorax.   ABDOMEN: No remarkable upper abdominal findings.   BONES: No acute osseous changes.       Unchanged small pleural effusions and cardiomegaly/pericardial effusion.    Signed by: Carmen Robledo 2/13/2025 12:13 PM Dictation workstation:   [id number]                    Assessment/Plan   69-year-old female with past medical history of COPD, type 2 diabetes mellitus, GERD, DVT status post IVC filter, acalculous cholecystitis, type II achalasia, paraesophageal hernia status post robotic laparoscopic myotomy with fundoplication complicated by esophageal perforation presented due to concern for GI bleed with dark stool for past 2 weeks.  Assessment & Plan  GI bleed    Upper GI bleed with acute blood loss anemia  Type and screen is done  Status post 1 unit of packed RBC since admission  No signs of active bleeding  Will transfuse if any acute bleeding or hemoglobin below 7  N.p.o. today for EGD  Continue with SBP prophylaxis with ceftriaxone  Continue with octreotide drip  CT angiogram results are noted for no active hemorrhage trend H&H daily  PPI twice a day  Hold aspirin  Patient will need outpatient FibroScan for chronic liver disease    Chronic anemia  Had acute on chronic anemia requiring 1 unit of packed RBC  H&H is stable  See hematology oncology as outpatient for anemia workup and treatment once patient has recovered from this incident    Type 2 diabetes mellitus  Accu-Chek  Sliding scale insulin    Diabetes with neuropathy  Continue gabapentin    COPD  Stable  Continue with breathing treatment    Hyperlipidemia  Continue with atorvastatin    DVT prophylaxis  SCDs       Plan discussed with patient at bedside    Moderate level of MDM based on above issue and discussing plan    This note is created using voice recognition software. All efforts are made to minimize errors, if there are errors there due to transcription.    Shane Brown  Hospitalist

## 2025-02-17 NOTE — CARE PLAN
The clinical goals for the shift include patient will be free from signs of bleeding throughout shift

## 2025-02-18 ENCOUNTER — APPOINTMENT (OUTPATIENT)
Dept: CARDIOLOGY | Facility: CLINIC | Age: 70
End: 2025-02-18
Payer: MEDICARE

## 2025-02-18 LAB
ANION GAP SERPL CALC-SCNC: 12 MMOL/L (ref 10–20)
BASOPHILS # BLD AUTO: 0.02 X10*3/UL (ref 0–0.1)
BASOPHILS NFR BLD AUTO: 0.4 %
BUN SERPL-MCNC: 14 MG/DL (ref 6–23)
CALCIUM SERPL-MCNC: 7.6 MG/DL (ref 8.6–10.3)
CHLORIDE SERPL-SCNC: 111 MMOL/L (ref 98–107)
CO2 SERPL-SCNC: 20 MMOL/L (ref 21–32)
CREAT SERPL-MCNC: 1.5 MG/DL (ref 0.5–1.3)
EGFRCR SERPLBLD CKD-EPI 2021: 50 ML/MIN/1.73M*2
EOSINOPHIL # BLD AUTO: 0.09 X10*3/UL (ref 0–0.7)
EOSINOPHIL NFR BLD AUTO: 1.9 %
ERYTHROCYTE [DISTWIDTH] IN BLOOD BY AUTOMATED COUNT: 15 % (ref 11.5–14.5)
FLUAV RNA RESP QL NAA+PROBE: DETECTED
FLUBV RNA RESP QL NAA+PROBE: NOT DETECTED
GLUCOSE BLD MANUAL STRIP-MCNC: 126 MG/DL (ref 74–99)
GLUCOSE BLD MANUAL STRIP-MCNC: 137 MG/DL (ref 74–99)
GLUCOSE BLD MANUAL STRIP-MCNC: 142 MG/DL (ref 74–99)
GLUCOSE BLD MANUAL STRIP-MCNC: 143 MG/DL (ref 74–99)
GLUCOSE SERPL-MCNC: 152 MG/DL (ref 74–99)
HCT VFR BLD AUTO: 26.4 % (ref 41–52)
HGB BLD-MCNC: 8 G/DL (ref 13.5–17.5)
IMM GRANULOCYTES # BLD AUTO: 0.06 X10*3/UL (ref 0–0.7)
IMM GRANULOCYTES NFR BLD AUTO: 1.3 % (ref 0–0.9)
LYMPHOCYTES # BLD AUTO: 1.41 X10*3/UL (ref 1.2–4.8)
LYMPHOCYTES NFR BLD AUTO: 30 %
MAGNESIUM SERPL-MCNC: 1.68 MG/DL (ref 1.6–2.4)
MCH RBC QN AUTO: 29.5 PG (ref 26–34)
MCHC RBC AUTO-ENTMCNC: 30.3 G/DL (ref 32–36)
MCV RBC AUTO: 97 FL (ref 80–100)
MONOCYTES # BLD AUTO: 0.36 X10*3/UL (ref 0.1–1)
MONOCYTES NFR BLD AUTO: 7.7 %
NEUTROPHILS # BLD AUTO: 2.76 X10*3/UL (ref 1.2–7.7)
NEUTROPHILS NFR BLD AUTO: 58.7 %
NRBC BLD-RTO: 0 /100 WBCS (ref 0–0)
PLATELET # BLD AUTO: 187 X10*3/UL (ref 150–450)
POTASSIUM SERPL-SCNC: 4 MMOL/L (ref 3.5–5.3)
RBC # BLD AUTO: 2.71 X10*6/UL (ref 4.5–5.9)
SARS-COV-2 RNA RESP QL NAA+PROBE: NOT DETECTED
SODIUM SERPL-SCNC: 139 MMOL/L (ref 136–145)
WBC # BLD AUTO: 4.7 X10*3/UL (ref 4.4–11.3)

## 2025-02-18 PROCEDURE — 36415 COLL VENOUS BLD VENIPUNCTURE: CPT | Performed by: INTERNAL MEDICINE

## 2025-02-18 PROCEDURE — 97161 PT EVAL LOW COMPLEX 20 MIN: CPT | Mod: GP

## 2025-02-18 PROCEDURE — 99233 SBSQ HOSP IP/OBS HIGH 50: CPT | Performed by: INTERNAL MEDICINE

## 2025-02-18 PROCEDURE — 2500000004 HC RX 250 GENERAL PHARMACY W/ HCPCS (ALT 636 FOR OP/ED): Performed by: NURSE PRACTITIONER

## 2025-02-18 PROCEDURE — 94640 AIRWAY INHALATION TREATMENT: CPT

## 2025-02-18 PROCEDURE — 2500000001 HC RX 250 WO HCPCS SELF ADMINISTERED DRUGS (ALT 637 FOR MEDICARE OP): Performed by: INTERNAL MEDICINE

## 2025-02-18 PROCEDURE — 2500000004 HC RX 250 GENERAL PHARMACY W/ HCPCS (ALT 636 FOR OP/ED)

## 2025-02-18 PROCEDURE — 97165 OT EVAL LOW COMPLEX 30 MIN: CPT | Mod: GO | Performed by: OCCUPATIONAL THERAPIST

## 2025-02-18 PROCEDURE — RXMED WILLOW AMBULATORY MEDICATION CHARGE

## 2025-02-18 PROCEDURE — 1200000002 HC GENERAL ROOM WITH TELEMETRY DAILY

## 2025-02-18 PROCEDURE — 2500000002 HC RX 250 W HCPCS SELF ADMINISTERED DRUGS (ALT 637 FOR MEDICARE OP, ALT 636 FOR OP/ED): Performed by: INTERNAL MEDICINE

## 2025-02-18 PROCEDURE — 87636 SARSCOV2 & INF A&B AMP PRB: CPT | Performed by: INTERNAL MEDICINE

## 2025-02-18 PROCEDURE — 2500000001 HC RX 250 WO HCPCS SELF ADMINISTERED DRUGS (ALT 637 FOR MEDICARE OP): Performed by: NURSE PRACTITIONER

## 2025-02-18 PROCEDURE — 2500000002 HC RX 250 W HCPCS SELF ADMINISTERED DRUGS (ALT 637 FOR MEDICARE OP, ALT 636 FOR OP/ED): Performed by: NURSE PRACTITIONER

## 2025-02-18 PROCEDURE — 80048 BASIC METABOLIC PNL TOTAL CA: CPT | Performed by: INTERNAL MEDICINE

## 2025-02-18 PROCEDURE — 82947 ASSAY GLUCOSE BLOOD QUANT: CPT

## 2025-02-18 PROCEDURE — 83735 ASSAY OF MAGNESIUM: CPT | Performed by: INTERNAL MEDICINE

## 2025-02-18 PROCEDURE — 85025 COMPLETE CBC W/AUTO DIFF WBC: CPT | Performed by: INTERNAL MEDICINE

## 2025-02-18 RX ORDER — OSELTAMIVIR PHOSPHATE 30 MG/1
30 CAPSULE ORAL 2 TIMES DAILY
Qty: 10 CAPSULE | Refills: 0 | Status: SHIPPED | OUTPATIENT
Start: 2025-02-18 | End: 2025-02-24

## 2025-02-18 RX ORDER — GUAIFENESIN/DEXTROMETHORPHAN 100-10MG/5
5 SYRUP ORAL EVERY 4 HOURS PRN
Status: DISCONTINUED | OUTPATIENT
Start: 2025-02-18 | End: 2025-02-19 | Stop reason: HOSPADM

## 2025-02-18 RX ORDER — PANTOPRAZOLE SODIUM 40 MG/1
40 TABLET, DELAYED RELEASE ORAL 2 TIMES DAILY
Qty: 60 TABLET | Refills: 0 | Status: SHIPPED | OUTPATIENT
Start: 2025-02-18 | End: 2025-03-21

## 2025-02-18 RX ORDER — FERROUS SULFATE 325(65) MG
1 TABLET, DELAYED RELEASE (ENTERIC COATED) ORAL
Qty: 30 TABLET | Refills: 0 | Status: SHIPPED | OUTPATIENT
Start: 2025-02-18 | End: 2025-03-21

## 2025-02-18 RX ORDER — BENZONATATE 100 MG/1
100 CAPSULE ORAL 3 TIMES DAILY PRN
Status: DISCONTINUED | OUTPATIENT
Start: 2025-02-18 | End: 2025-02-19 | Stop reason: HOSPADM

## 2025-02-18 RX ORDER — MIDODRINE HYDROCHLORIDE 5 MG/1
5 TABLET ORAL
Status: DISCONTINUED | OUTPATIENT
Start: 2025-02-18 | End: 2025-02-19

## 2025-02-18 RX ORDER — OSELTAMIVIR PHOSPHATE 30 MG/1
30 CAPSULE ORAL 2 TIMES DAILY
Status: DISCONTINUED | OUTPATIENT
Start: 2025-02-18 | End: 2025-02-19 | Stop reason: HOSPADM

## 2025-02-18 RX ADMIN — FERROUS SULFATE TAB 325 MG (65 MG ELEMENTAL FE) 325 MG: 325 (65 FE) TAB at 09:43

## 2025-02-18 RX ADMIN — TAMSULOSIN HYDROCHLORIDE 0.4 MG: 0.4 CAPSULE ORAL at 09:43

## 2025-02-18 RX ADMIN — OSELTAMAVIR PHOSPHATE 30 MG: 30 CAPSULE ORAL at 12:10

## 2025-02-18 RX ADMIN — GABAPENTIN 100 MG: 100 CAPSULE ORAL at 09:43

## 2025-02-18 RX ADMIN — GABAPENTIN 100 MG: 100 CAPSULE ORAL at 14:33

## 2025-02-18 RX ADMIN — OSELTAMAVIR PHOSPHATE 30 MG: 30 CAPSULE ORAL at 20:07

## 2025-02-18 RX ADMIN — FERROUS SULFATE TAB 325 MG (65 MG ELEMENTAL FE) 325 MG: 325 (65 FE) TAB at 17:09

## 2025-02-18 RX ADMIN — MIDODRINE HYDROCHLORIDE 5 MG: 5 TABLET ORAL at 17:09

## 2025-02-18 RX ADMIN — ALBUTEROL SULFATE 2.5 MG: 2.5 SOLUTION RESPIRATORY (INHALATION) at 20:31

## 2025-02-18 RX ADMIN — MIDODRINE HYDROCHLORIDE 5 MG: 5 TABLET ORAL at 12:10

## 2025-02-18 RX ADMIN — BUDESONIDE 0.25 MG: 0.25 INHALANT RESPIRATORY (INHALATION) at 08:21

## 2025-02-18 RX ADMIN — PANTOPRAZOLE SODIUM 40 MG: 40 INJECTION, POWDER, FOR SOLUTION INTRAVENOUS at 09:38

## 2025-02-18 RX ADMIN — BENZONATATE 100 MG: 100 CAPSULE ORAL at 12:10

## 2025-02-18 RX ADMIN — ATORVASTATIN CALCIUM 80 MG: 80 TABLET, FILM COATED ORAL at 09:43

## 2025-02-18 RX ADMIN — BUDESONIDE 0.25 MG: 0.25 INHALANT RESPIRATORY (INHALATION) at 20:31

## 2025-02-18 RX ADMIN — CEFTRIAXONE 2 G: 2 INJECTION, POWDER, FOR SOLUTION INTRAMUSCULAR; INTRAVENOUS at 14:33

## 2025-02-18 RX ADMIN — PANTOPRAZOLE SODIUM 40 MG: 40 INJECTION, POWDER, FOR SOLUTION INTRAVENOUS at 20:07

## 2025-02-18 RX ADMIN — FERROUS SULFATE TAB 325 MG (65 MG ELEMENTAL FE) 325 MG: 325 (65 FE) TAB at 12:10

## 2025-02-18 RX ADMIN — FORMOTEROL FUMARATE 20 MCG: 20 SOLUTION RESPIRATORY (INHALATION) at 08:21

## 2025-02-18 RX ADMIN — GABAPENTIN 100 MG: 100 CAPSULE ORAL at 20:07

## 2025-02-18 RX ADMIN — SERTRALINE HYDROCHLORIDE 50 MG: 50 TABLET ORAL at 09:43

## 2025-02-18 RX ADMIN — FORMOTEROL FUMARATE 20 MCG: 20 SOLUTION RESPIRATORY (INHALATION) at 20:30

## 2025-02-18 RX ADMIN — GUAIFENESIN SYRUP AND DEXTROMETHORPHAN 5 ML: 100; 10 SYRUP ORAL at 09:28

## 2025-02-18 ASSESSMENT — COGNITIVE AND FUNCTIONAL STATUS - GENERAL
CLIMB 3 TO 5 STEPS WITH RAILING: A LITTLE
MOVING TO AND FROM BED TO CHAIR: A LITTLE
STANDING UP FROM CHAIR USING ARMS: A LITTLE
DAILY ACTIVITIY SCORE: 24
DAILY ACTIVITIY SCORE: 20
DRESSING REGULAR LOWER BODY CLOTHING: A LITTLE
MOVING TO AND FROM BED TO CHAIR: A LITTLE
WALKING IN HOSPITAL ROOM: A LITTLE
TOILETING: A LITTLE
DAILY ACTIVITIY SCORE: 21
DRESSING REGULAR LOWER BODY CLOTHING: A LITTLE
MOBILITY SCORE: 17
HELP NEEDED FOR BATHING: A LITTLE
CLIMB 3 TO 5 STEPS WITH RAILING: A LITTLE
STANDING UP FROM CHAIR USING ARMS: A LITTLE
DRESSING REGULAR UPPER BODY CLOTHING: A LITTLE
TURNING FROM BACK TO SIDE WHILE IN FLAT BAD: A LITTLE
DAILY ACTIVITIY SCORE: 24
MOBILITY SCORE: 24
TOILETING: A LITTLE
MOBILITY SCORE: 20
WALKING IN HOSPITAL ROOM: A LITTLE
HELP NEEDED FOR BATHING: A LITTLE
CLIMB 3 TO 5 STEPS WITH RAILING: TOTAL
MOBILITY SCORE: 23

## 2025-02-18 ASSESSMENT — PAIN - FUNCTIONAL ASSESSMENT
PAIN_FUNCTIONAL_ASSESSMENT: 0-10

## 2025-02-18 ASSESSMENT — PAIN SCALES - GENERAL
PAINLEVEL_OUTOF10: 0 - NO PAIN

## 2025-02-18 ASSESSMENT — ACTIVITIES OF DAILY LIVING (ADL): ADL_ASSISTANCE: INDEPENDENT

## 2025-02-18 NOTE — NURSING NOTE
Patient alert and oriented x 3-4 forgetful.  No acute events this shift.  Iv fluids infusing this shift.  Bed alarm on for safety,  Patient complains of dizziness with ambulating to BR.

## 2025-02-18 NOTE — PROGRESS NOTES
Physical Therapy    Physical Therapy Evaluation    Patient Name: Levy Gregory  MRN: [id number]  Today's Date: 2/18/2025   Time Calculation  Start Time: 1038  Stop Time: 1048  Time Calculation (min): 10 min  3030/3030-A    Assessment/Plan   PT Assessment  PT Assessment Results: Decreased strength, Decreased range of motion, Decreased endurance, Impaired balance, Decreased mobility, Decreased safety awareness  Rehab Prognosis: Good  Medical Staff Made Aware: Yes  Strengths: Access to adaptive/assistive products, Capable of completing ADLs semi/independent, Housing layout, Support and attitude of living partners, Support of extended family/friends  End of Session Communication: Bedside nurse  Assessment Comment: Pt is a 70 y/o male admitted for GIB. Pt presents with decreased strength, endurance and balance. Pt able to tolerate bed mobility and transfers this date. Mobility limited 2/2 hypotension this date.  He is functioning below baseline level of function and will benefit from skilled therapy during stay to improve overall functional mobility and safety awareness. Upon discharge pt will benefit from low intensity therapy for continued improvement in functional mobility.     End of Session Patient Position: Bed, 3 rail up, Alarm on (call light within reach)  IP OR SWING BED PT PLAN  Inpatient or Swing Bed: Inpatient  PT Plan  Treatment/Interventions: Bed mobility, Transfer training, Gait training, Stair training, Balance training, Neuromuscular re-education, Endurance training, Strengthening, Range of motion, Therapeutic exercise, Therapeutic activity, Home exercise program, Positioning, Postural re-education  PT Plan: Ongoing PT  PT Frequency: 4 times per week  PT Discharge Recommendations: Low intensity level of continued care  Equipment Recommended upon Discharge: Wheeled walker  PT Recommended Transfer Status: Assist x1, Assistive device, Contact guard  PT - OK to Discharge: Yes (Once medically  cleared)    Subjective     Current Problem:  1. GI bleed  Esophagogastroduodenoscopy (EGD)    Esophagogastroduodenoscopy (EGD)    oseltamivir (Tamiflu) 30 mg capsule    Referral to Primary [facility]    Referral To Hematology and Oncology      2. Melena  Esophagogastroduodenoscopy (EGD)    Esophagogastroduodenoscopy (EGD)    Esophagogastroduodenoscopy (EGD)    Esophagogastroduodenoscopy (EGD)    oseltamivir (Tamiflu) 30 mg capsule    Referral to Primary [facility]    Referral To Hematology and Oncology      3. Gastroesophageal reflux disease without esophagitis  pantoprazole (ProtoNix) 40 mg EC tablet      4. Anemia, unspecified type  ferrous sulfate 325 (65 Fe) MG EC tablet    oseltamivir (Tamiflu) 30 mg capsule    Referral to Primary [facility]    Referral To Hematology and Oncology        Patient Active Problem List   Diagnosis    Colon polyp    Diabetic neuropathy associated with type 2 diabetes mellitus (Multi)    ED (erectile dysfunction)    High serum bone-specific alkaline phosphatase    Gout    History of pulmonary embolism    Hyperlipidemia    Obstructive sleep apnea, adult    Osteopenia    Vitamin D deficiency    Achalasia, esophageal    Type 2 diabetes mellitus with hyperglycemia, without long-term current use of insulin    Orthostatic hypotension    Solar purpura (CMS-HCC)    Anticoagulant long-term use    Other dysphagia    Hypertensive heart disease with heart failure    Chronic obstructive pulmonary disease, unspecified COPD type (Multi)    Acute renal failure superimposed on chronic kidney disease (CMS-HCC)    Adjustment disorder with depressed mood    Edema of both lower legs due to peripheral venous insufficiency    Schatzki's ring    Coronary artery disease involving native coronary artery of native heart without angina pectoris    Deep vein thrombosis (DVT) of left lower extremity (Multi)    Macrocytosis    Closed fracture of right hip requiring operative  repair with routine healing    S/P right hip fracture    Chronic kidney disease, stage 5 (Multi)    Asthmatic bronchitis without complication ([facility]-HCC)    GI bleed     General Visit Information:  General  Reason for Referral: P/w concerns for GIB. Pt states e has been having black stools with each bowel movement for the past 2 weeks. Patient denies nausea and vomiting. Patient states he does have abdominal pain however this is not new due to the multiple surgeries he has had in the past. Patient states he did have a syncopal episode this morning. Patient states he also has been short of breath for 2 weeks. Patient states has been having chills but denies fevers and bodyaches. Pt admitted for GIB.  Referred By: Dr. Brown  Past Medical History Relevant to Rehab: COPD, type 2 diabetes, GERD, DVTs s/p IVC filter, acalculous cholecystitis, type II achalasia, paraesophageal hernia s/p robotic laparoscopic myotomy with fundoplication C/B esophageal perforation  Family/Caregiver Present: No  Co-Treatment: OT  Co-Treatment Reason: To maximize pt safety with functional mobility and discharge planning  Prior to Session Communication: Bedside nurse  Patient Position Received: Bed, 3 rail up, Alarm on  Preferred Learning Style: verbal  General Comment: Pt pleasant and agreeable to work with therapy. Mobility limited this session 2/2 hypotension and dizziness. RN notified and aware.    Home Living:  Home Living  Type of Home: House  Lives With: Spouse  Home Adaptive Equipment: Walker rolling or standard, Cane  Home Layout: Two level, Able to live on main level with bedroom/bathroom  Home Access: Stairs to enter with rails  Entrance Stairs-Number of Steps: 2  Bathroom Shower/Tub: Walk-in shower  Bathroom Equipment: Built-in shower seat    Prior Level of Function:  Prior Function Per Pt/Caregiver Report  Level of [place]: Independent with ADLs and functional transfers, Independent with homemaking with ambulation  Receives Help  From: Family  ADL Assistance: Independent  Homemaking Assistance: Independent  Ambulatory Assistance: Independent (Pt reports he occassionally uses cane)  Prior Function Comments: (-) falls    Precautions:  Precautions  Medical Precautions: Fall precautions  Precautions Comment: Droplet and contact precautions    Vital Signs:  Vital Signs  Vitals Session: During PT  BP Location: Left arm  BP Method: Automatic  Vital Signs Comment: Pt endorsing dizziness upon standing. Pt returned to sitting and BP taken. Seated: 102/63 (74), Standin/45 (50), Supine: 109/66 (78) (Pt returned to supine and RN notified and aware)    Objective     Pain:  Pain Assessment  Pain Assessment: 0-10  0-10 (Numeric) Pain Score: 0 - No pain    Cognition:  Cognition  Overall Cognitive Status: Within Functional Limits  Orientation Level: Oriented X4    General Assessments:      Activity Tolerance  Endurance: Decreased tolerance for upright activites    Sensation  Light Touch: No apparent deficits  Sensation Comment: Pt denies any n/t.     Static Sitting Balance  Static Sitting-Balance Support: Bilateral upper extremity supported, Feet supported  Static Sitting-Level of Assistance: Contact guard, Close supervision  Static Standing Balance  Static Standing-Balance Support: Bilateral upper extremity supported  Static Standing-Level of Assistance: Contact guard  Dynamic Standing Balance  Dynamic Standing-Balance Support: Bilateral upper extremity supported  Dynamic Standing-Level of Assistance: Contact guard    Functional Assessments:     Bed Mobility  Bed Mobility: Yes  Bed Mobility 1  Bed Mobility 1: Supine to sitting, Scooting  Level of Assistance 1: Modified independent  Bed Mobility 2  Bed Mobility  2: Sitting to supine  Level of Assistance 2: Modified independent    Transfers  Transfer: Yes  Transfer 1  Transfer From 1: Bed to, Stand to  Transfer to 1: Stand, Bed  Technique 1: Sit to stand, Stand to sit  Transfer Device 1: Gait  belt  Transfer Level of Assistance 1:  (SBA)  Trials/Comments 1: x2 trials, pt endorsing dizziness and vital assessed    Ambulation/Gait Training  Ambulation/Gait Training Performed: No (Pt not appropriate to ambulate this date 2/2 hypotension)     Extremity/Trunk Assessments:  RUE   RUE : Within Functional Limits  LUE   LUE: Within Functional Limits  RLE   RLE : Within Functional Limits  LLE   LLE : Within Functional Limits    Outcome Measures:     [facility] Basic Mobility  Turning from your back to your side while in a flat bed without using bedrails: None  Moving from lying on your back to sitting on the side of a flat bed without using bedrails: A little  Moving to and from bed to chair (including a wheelchair): A little  Standing up from a chair using your arms (e.g. wheelchair or bedside chair): A little  To walk in hospital room: A little  Climbing 3-5 steps with railing: Total  Basic Mobility - Total Score: 17     Goals:  Encounter Problems       Encounter Problems (Active)       Mobility       Pt will be able to ambulate >/= 50 ft with LRD SBA with good safety awareness.        Start:  02/18/25    Expected End:  03/04/25            Pt will complete supine, seated and standing exercises to maintain/improve overall strength with minimal verbal cues.         Start:  02/18/25    Expected End:  03/04/25            Pt will be able to negotiate ascending/descending 4 SWATI with use of unilateral HR SBA with sufficient foot clearance and good safety awareness for safe home going.         Start:  02/18/25    Expected End:  03/04/25               PT Transfers       Pt will be able to complete all transfers with LRD mod I demonstrating good safety awareness and proper body mechanics.        Start:  02/18/25    Expected End:  03/04/25                 Education Documentation  Home Exercise Program, taught by Carlota Jones, PT at 2/18/2025  3:46 PM.  Learner: Patient  Readiness: Acceptance  Method: Explanation  Response:  Verbalizes Understanding, Demonstrated Understanding, Needs Reinforcement    Precautions, taught by Carlota Jones PT at 2/18/2025  3:46 PM.  Learner: Patient  Readiness: Acceptance  Method: Explanation  Response: Verbalizes Understanding, Demonstrated Understanding, Needs Reinforcement    Body Mechanics, taught by Carlota Jones PT at 2/18/2025  3:46 PM.  Learner: Patient  Readiness: Acceptance  Method: Explanation  Response: Verbalizes Understanding, Demonstrated Understanding, Needs Reinforcement    Mobility Training, taught by Carlota Jones PT at 2/18/2025  3:46 PM.  Learner: Patient  Readiness: Acceptance  Method: Explanation  Response: Verbalizes Understanding, Demonstrated Understanding, Needs Reinforcement    Education Comments  No comments found.

## 2025-02-18 NOTE — CARE PLAN
The patient's goals for the shift include      The clinical goals for the shift include monitor patients flu symptoms and treat symptomatically    EOS note:  Pt tested positive for flu this shift. Treating with tamiflu and symptom management, encouraging fluid intake. No reports of pain. Patient remains ortho positive with therapy this morning. Pt was symptomatic with dizziness as well. Started on midodrine and compression stockings placed on patient. Pt remains on RA with bad cough.Continuing with sugar checks ACHS    Pt resting at this time. Bed is in lowest position and locked with alarm on. Call light and personal possesions are within reach.

## 2025-02-18 NOTE — CARE PLAN
The patient's goals for the shift include    Problem: Pain - Adult  Goal: Verbalizes/displays adequate comfort level or baseline comfort level  Outcome: Progressing     Problem: Chronic Conditions and Co-morbidities  Goal: Patient's chronic conditions and co-morbidity symptoms are monitored and maintained or improved  Outcome: Progressing     Problem: Diabetes  Goal: Achieve decreasing blood glucose levels by end of shift  Outcome: Progressing       The clinical goals for the shift include patient will be free from signs of bleeding throughout shift  .

## 2025-02-18 NOTE — PROGRESS NOTES
Occupational Therapy  Evaluation    Patient Name: Levy Gregory  MRN: [id number]  Today's Date: 2/18/2025  Time Calculation  Start Time: 1037  Stop Time: 1049  Time Calculation (min): 12 min  3030/3030-A    Assessment  IP OT Assessment  OT Assessment: Patient demonstrates decreased independence with self care, decreased independence with functional transfers, decreased balance and decreased endurance.  Patient will benefit from skilled OT to address deficits.  Anticipate patient will progress to return to prior level of living with low intensity therapy.  Prognosis: Good  Barriers to Discharge Home: No anticipated barriers  Evaluation/Treatment Tolerance: Other (Comment) (orthostatic hypotension)  Medical Staff Made Aware: Yes  End of Session Communication: Bedside nurse  End of Session Patient Position: Bed, 2 rail up, Alarm on    Plan:  Treatment Interventions: ADL retraining, Functional transfer training, UE strengthening/ROM, Endurance training, Patient/family training  OT Frequency: 3 times per week  OT Discharge Recommendations: Low intensity level of continued care  Equipment Recommended upon Discharge: Straight cane  OT Recommended Transfer Status: Stand by assist  OT - OK to Discharge: Yes (to next level of care when medically cleared by physician)    Subjective     Current Problem:  1. GI bleed  Esophagogastroduodenoscopy (EGD)    Esophagogastroduodenoscopy (EGD)    oseltamivir (Tamiflu) 30 mg capsule    Referral to Primary Care - Family Practice    Referral To Hematology and Oncology      2. Melena  Esophagogastroduodenoscopy (EGD)    Esophagogastroduodenoscopy (EGD)    Esophagogastroduodenoscopy (EGD)    Esophagogastroduodenoscopy (EGD)    oseltamivir (Tamiflu) 30 mg capsule    Referral to Primary [facility]    Referral To Hematology and Oncology      3. Gastroesophageal reflux disease without esophagitis  pantoprazole (ProtoNix) 40 mg EC tablet      4. Anemia, unspecified type  ferrous  sulfate 325 (65 Fe) MG EC tablet    oseltamivir (Tamiflu) 30 mg capsule    Referral to Primary Care - Family Practice    Referral To Hematology and Oncology          General:  General  Reason for Referral: impaired self care  Referred By: Dr. Brown  Past Medical History Relevant to Rehab: COPD, DM, GERD, DVT, paraesophageal hernia  Co-Treatment: PT  Co-Treatment Reason: dc plan  Prior to Session Communication: Bedside nurse  Patient Position Received: Bed, 2 rail up, Alarm on  Preferred Learning Style: verbal, visual  General Comment: Patient agreeable to therapy.    Precautions:  Medical Precautions: Fall precautions, Infection precautions  Precautions Comment: droplet, contact    Vital Signs:  BP:  (orthostatic +-See PT note)    Pain:  Pain Assessment  Pain Assessment: 0-10  0-10 (Numeric) Pain Score: 0 - No pain    Objective     Cognition:  Orientation Level: Oriented X4             Home Living:  Type of Home: House  Lives With: Spouse  Home Adaptive Equipment: Cane  Home Layout: Able to live on main level with bedroom/bathroom  Home Access: Stairs to enter with rails  Entrance Stairs-Number of Steps: 2  Bathroom Shower/Tub: Walk-in shower  Bathroom Equipment: Built-in shower seat     Prior Function:  Level of [place]: Independent with ADLs and functional transfers  Prior Function Comments: uses cane at times           ADL:  LE Dressing Assistance: Stand by    Activity Tolerance:  Endurance: Tolerates less than 10 min exercise with changes in vital signs    Bed Mobility/Transfers:   Bed Mobility  Bed Mobility: Yes  Bed Mobility 1  Bed Mobility 1: Supine to sitting  Level of Assistance 1: Modified independent  Transfers  Transfer: Yes  Transfer 1  Transfer From 1: Sit to  Transfer to 1: Stand  Technique 1: Sit to stand, Stand to sit  Transfer Device 1: Gait belt  Transfer Level of Assistance 1:  (SBA)  Trials/Comments 1: patient dizzy and lightheaded when standing         Extremities: RUE   RUE : Within  Functional Limits and LUE   LUE: Within Functional Limits    Outcome Measures: [facility] Daily Activity  Putting on and taking off regular lower body clothing: A little  Bathing (including washing, rinsing, drying): A little  Putting on and taking off regular upper body clothing: None  Toileting, which includes using toilet, bedpan or urinal: A little  Taking care of personal grooming such as brushing teeth: None  Eating Meals: None  Daily Activity - Total Score: 21           EDUCATION:  Education  Individual(s) Educated: Patient  Education Provided: Fall precautons, Risk and benefits of OT discussed with patient or other  Plan of Care Discussed and Agreed Upon: yes  Patient Response to Education: Patient/Caregiver Verbalized Understanding of Information      Goals:   Encounter Problems       Encounter Problems (Active)       OT Goals       Patient will complete functional transfers with mod I. (Progressing)       Start:  02/18/25    Expected End:  03/04/25            Patient will complete toileting with mod I. (Progressing)       Start:  02/18/25    Expected End:  03/04/25            Patient will complete LE dressing with mod I. (Progressing)       Start:  02/18/25    Expected End:  03/04/25

## 2025-02-18 NOTE — PROGRESS NOTES
Levy Gregory is a 69 y.o. male on day 5 of admission presenting with GI bleed.      Subjective   Blood pressure had dropped yesterday when trying to walk.  Eating regular diet.  Hemoglobin is stable after receiving 2 L of fluid yesterday.  Coughing of and talked about checking for flu and patient tested positive for influenza A.  Again tried walking with  physical therapy and blood pressure systolic went from 100 down to 55 and patient felt profoundly lightheaded       Objective     Last Recorded Vitals  /89   Pulse 92   Temp 36.3 °C (97.3 °F)   Resp 18   Wt 95.3 kg (210 lb)   SpO2 95%   Intake/Output last 3 Shifts:    Intake/Output Summary (Last 24 hours) at 2/18/2025 1201  Last data filed at 2/18/2025 0500  Gross per 24 hour   Intake 120 ml   Output 300 ml   Net -180 ml       Admission Weight  Weight: 95.3 kg (210 lb) (02/13/25 0958)    Daily Weight  02/13/25 : 95.3 kg (210 lb)      Physical Exam:  General: Not in acute distress, alert  HEENT: PERRLA, head intact and normocephalic  Neck: Normal to inspection  Lungs: Clear to auscultation, work of breathing within normal limit  Cardiac: Regular rate and rhythm  Abdomen: Soft nontender, positive bowel sounds  : Exam deferred  Skin: Intact  Hematology: No petechia or excessive ecchymosis  Musculoskeletal: Without significant trauma  Neurological: Alert awake oriented, no focal deficit, cranial nerves grossly intact  Psych: No suicidal ideation or homicidal ideation    Relevant Results  Scheduled medications  [Held by provider] aspirin, 81 mg, oral, Daily  atorvastatin, 80 mg, oral, Daily  budesonide, 0.25 mg, nebulization, BID  cefTRIAXone, 2 g, intravenous, q24h  ferrous sulfate (325 mg ferrous sulfate), 325 mg, oral, TID  formoterol, 20 mcg, nebulization, BID  gabapentin, 100 mg, oral, TID  insulin lispro, 0-10 Units, subcutaneous, TID AC  midodrine, 5 mg, oral, BID  oseltamivir, 30 mg, oral, BID  oxygen, , inhalation, Continuous -  02/gases  pantoprazole, 40 mg, intravenous, BID  [Held by provider] pioglitazone, 15 mg, oral, Daily  sertraline, 50 mg, oral, Daily  tamsulosin, 0.4 mg, oral, Daily      Continuous medications       PRN medications  PRN medications: acetaminophen, albuterol, albuterol, benzonatate, dextromethorphan-guaifenesin, dextrose, dextrose, fentaNYL PF, fentaNYL PF, fentaNYL PF, glucagon, glucagon, hydrALAZINE, ondansetron **OR** ondansetron, ondansetron, oxygen, polyethylene glycol, promethazine   Labs  Results from last 7 days   Lab Units 02/18/25  0547 02/17/25  1717 02/17/25  0623   WBC AUTO x10*3/uL 4.7 5.4 4.4   HEMOGLOBIN g/dL 8.0* 8.5* 8.2*   HEMATOCRIT % 26.4* 28.5* 27.1*   PLATELETS AUTO x10*3/uL 187 202 197     Results from last 7 days   Lab Units 02/18/25  0547 02/17/25  0623 02/16/25  0635 02/15/25  0654 02/14/25  0651 02/13/25  1041   SODIUM mmol/L 139 134* 135*   < > 133* 133*   POTASSIUM mmol/L 4.0 4.0 4.1   < > 4.1 5.1   CHLORIDE mmol/L 111* 107 106   < > 105 104   CO2 mmol/L 20* 20* 20*   < > 22 20*   BUN mg/dL 14 14 18   < > 31* 40*   CREATININE mg/dL 1.50* 1.37* 1.44*   < > 1.61* 2.14*   CALCIUM mg/dL 7.6* 8.0* 7.9*   < > 8.1* 8.2*   PROTEIN TOTAL g/dL  --   --   --   --  6.1* 6.8   BILIRUBIN TOTAL mg/dL  --   --   --   --  0.4 0.4   ALK PHOS U/L  --   --   --   --  140* 151*   ALT U/L  --   --   --   --  30 38   AST U/L  --   --   --   --  30 58*   GLUCOSE mg/dL 152* 144* 163*   < > 149* 210*    < > = values in this interval not displayed.       ECG 12 lead    Result Date: 2/14/2025  Normal sinus rhythm Right bundle branch block Left anterior fascicular block  Bifascicular block  Possible Lateral infarct (cited on or before 06-AUG-2023) Inferior infarct (cited on or before 06-AUG-2023) Abnormal ECG When compared with ECG of 15-AUG-2024 07:20, Left anterior fascicular block is now Present ST no longer elevated in Inferior leads Non-specific change in ST segment in Lateral leads Confirmed by Kaiser Hernandez  ([id number]) on 2/14/2025 11:00:07 AM    ECG 12 lead    Result Date: 2/14/2025  Normal sinus rhythm Left axis deviation Right bundle branch block Possible Lateral infarct (cited on or before 06-AUG-2023) Inferior infarct (cited on or before 06-AUG-2023) Abnormal ECG When compared with ECG of 15-AUG-2024 07:20, No significant change was found    CT angio abdomen pelvis w and or wo IV IV contrast    Result Date: 2/13/2025  Interpreted By:  Alvarado Lane, STUDY: CT ANGIO ABDOMEN PELVIS W AND/OR WO IV IV CONTRAST;  2/13/2025 1:15 pm   INDICATION: Signs/Symptoms:black stools x2 weeks, concern for GI bleed   COMPARISON: 08/06/2023   ACCESSION NUMBER(S): [id number]   ORDERING CLINICIAN: SANJIV HOOVER   TECHNIQUE: Thin-section axial images of the abdomen and pelvis in the arterial phase after intravenous administration of  90 mL Omnipaque 350. Sagittal and coronal reconstructions. 3D reconstructions were obtained at a separate workstation.   FINDINGS: Vascular:  No aortic aneurysm or dissection.   The celiac trunk, superior mesenteric artery, renal arteries and inferior mesenteric artery are patent.   LOWER CHEST: Bibasilar scarring. BONES: No acute osseous abnormality. Right hip arthroplasty. Compression deformities of L3, L1, and T12. The T12 and L1 deformities have progressed slightly over the interval.   ABDOMEN:   LIVER: Cirrhotic hepatic contours. No focal lesions appreciated. BILE DUCTS: Normal caliber. GALLBLADDER: Surgically absent. PANCREAS: Within normal limits. SPLEEN: Granulomatous calcifications. Spleen otherwise unremarkable. ADRENALS: Within normal limits. KIDNEYS and URETERS: Symmetric renal enhancement. No hydronephrosis. 3 mm non-obstructing stone about the inferior pole of the right kidney.   RETROPERITONEUM: No pathologically enlarged retroperitoneal lymph nodes.   PELVIS:   REPRODUCTIVE ORGANS: No pelvic masses. BLADDER: Within normal limits.   BOWEL: Patient is status post esophagectomy with anterior  gastric pull-through which is only partially visualized. No focal inflammatory change identified within the bowel. No active hemorrhage. Appendix unremarkable. PERITONEUM: No ascites or free air, no fluid collection.       1. No active hemorrhage within the GI tract. 2. Patient is status post esophagectomy with anterior gastric pull-through which is only partly visualized about the lung bases. 3. 3 mm non-obstructing right renal stone. 4. Cirrhotic appearing liver. 5. Nonacute appearing compression deformities of T12, L1, and L3.   MACRO: none   Signed by: Alvarado Lane 2/13/2025 1:41 PM Dictation workstation:   [id number]    XR chest 1 view    Result Date: 2/13/2025  Interpreted By:  Carmen Robledo, STUDY: XR CHEST 1 VIEW;  2/13/2025 12:05 pm   INDICATION: Signs/Symptoms:shortness of breath.   COMPARISON: 10/24/2024   ACCESSION NUMBER(S): [id number]   ORDERING CLINICIAN: SANJIV HOOVER   FINDINGS: AP radiograph of the chest was provided.       CARDIOMEDIASTINAL SILHOUETTE: Persistently enlarged cardiomediastinal silhouette.   LUNGS: Unchanged mild blunting of the bilateral costophrenic angles. No consolidation, pulmonary edema, or pneumothorax.   ABDOMEN: No remarkable upper abdominal findings.   BONES: No acute osseous changes.       Unchanged small pleural effusions and cardiomegaly/pericardial effusion.   Signed by: Carmen Robledo 2/13/2025 12:13 PM Dictation workstation:   [id number]                    Assessment/Plan   69-year-old female with past medical history of COPD, type 2 diabetes mellitus, GERD, DVT status post IVC filter, acalculous cholecystitis, type II achalasia, paraesophageal hernia status post robotic laparoscopic myotomy with fundoplication complicated by esophageal perforation presented due to concern for GI bleed with dark stool for past 2 weeks.  Assessment & Plan  GI bleed    Upper GI bleed with acute blood loss anemia  H&H is stable  GI cleared patient to resume aspirin daily status  post 2 repeat EGD  Outpatient FibroScan along with Possible gastric emptying study  No signs of active bleeding  Will transfuse if any acute bleeding or hemoglobin below 7  No varices seen on EGD and octreotide can be stopped  CT angiogram results are noted for no active hemorrhage trend H&H daily  Will switch PPI to p.o. twice daily    Acute influenza A positive  Currently on room air but having profound coughing  Cough medication  Incentive spirometry  Will start patient on Tamiflu for 5 days    Orthostatic vitals  Feeling dizzy because of significant drop in blood pressure  Will start patient on midodrine 5 mg twice daily  Compression stockings  Encouraging oral intake  If blood pressure drops again, will do a bolus  No signs of active bleeding    Chronic anemia  Had acute on chronic anemia requiring 1 unit of packed RBC  H&H is stable  Outpatient hematology oncology referral provided    Type 2 diabetes mellitus  Accu-Chek  Sliding scale insulin    Diabetes with neuropathy  Continue gabapentin    COPD  Stable  Continue with breathing treatment    Hyperlipidemia  Continue with atorvastatin    DVT prophylaxis  SCDs       Plan discussed with patient at bedside  Disposition: Awaiting improvement in symptoms before considering discharge at this point  High level of MDM based on above issue and discussing plan    This note is created using voice recognition software. All efforts are made to minimize errors, if there are errors there due to transcription.    Shane Brown  Hospitalist

## 2025-02-19 ENCOUNTER — PHARMACY VISIT (OUTPATIENT)
Dept: PHARMACY | Facility: CLINIC | Age: 70
End: 2025-02-19
Payer: COMMERCIAL

## 2025-02-19 VITALS
TEMPERATURE: 97.5 F | SYSTOLIC BLOOD PRESSURE: 138 MMHG | WEIGHT: 210 LBS | RESPIRATION RATE: 20 BRPM | BODY MASS INDEX: 27.83 KG/M2 | DIASTOLIC BLOOD PRESSURE: 84 MMHG | HEIGHT: 73 IN | HEART RATE: 91 BPM | OXYGEN SATURATION: 98 %

## 2025-02-19 LAB
ANION GAP SERPL CALC-SCNC: 11 MMOL/L (ref 10–20)
BASOPHILS # BLD AUTO: 0.03 X10*3/UL (ref 0–0.1)
BASOPHILS NFR BLD AUTO: 0.5 %
BUN SERPL-MCNC: 18 MG/DL (ref 6–23)
CALCIUM SERPL-MCNC: 8 MG/DL (ref 8.6–10.3)
CHLORIDE SERPL-SCNC: 111 MMOL/L (ref 98–107)
CO2 SERPL-SCNC: 21 MMOL/L (ref 21–32)
CREAT SERPL-MCNC: 1.37 MG/DL (ref 0.5–1.3)
EGFRCR SERPLBLD CKD-EPI 2021: 56 ML/MIN/1.73M*2
EOSINOPHIL # BLD AUTO: 0.2 X10*3/UL (ref 0–0.7)
EOSINOPHIL NFR BLD AUTO: 3.2 %
ERYTHROCYTE [DISTWIDTH] IN BLOOD BY AUTOMATED COUNT: 15.1 % (ref 11.5–14.5)
GLUCOSE BLD MANUAL STRIP-MCNC: 162 MG/DL (ref 74–99)
GLUCOSE BLD MANUAL STRIP-MCNC: 165 MG/DL (ref 74–99)
GLUCOSE SERPL-MCNC: 142 MG/DL (ref 74–99)
HCT VFR BLD AUTO: 28.6 % (ref 41–52)
HGB BLD-MCNC: 8.5 G/DL (ref 13.5–17.5)
IMM GRANULOCYTES # BLD AUTO: 0.14 X10*3/UL (ref 0–0.7)
IMM GRANULOCYTES NFR BLD AUTO: 2.2 % (ref 0–0.9)
LYMPHOCYTES # BLD AUTO: 2.28 X10*3/UL (ref 1.2–4.8)
LYMPHOCYTES NFR BLD AUTO: 36 %
MAGNESIUM SERPL-MCNC: 1.68 MG/DL (ref 1.6–2.4)
MCH RBC QN AUTO: 29.2 PG (ref 26–34)
MCHC RBC AUTO-ENTMCNC: 29.7 G/DL (ref 32–36)
MCV RBC AUTO: 98 FL (ref 80–100)
MONOCYTES # BLD AUTO: 0.49 X10*3/UL (ref 0.1–1)
MONOCYTES NFR BLD AUTO: 7.7 %
NEUTROPHILS # BLD AUTO: 3.2 X10*3/UL (ref 1.2–7.7)
NEUTROPHILS NFR BLD AUTO: 50.4 %
NRBC BLD-RTO: 0 /100 WBCS (ref 0–0)
PLATELET # BLD AUTO: 217 X10*3/UL (ref 150–450)
POTASSIUM SERPL-SCNC: 4.4 MMOL/L (ref 3.5–5.3)
RBC # BLD AUTO: 2.91 X10*6/UL (ref 4.5–5.9)
SODIUM SERPL-SCNC: 139 MMOL/L (ref 136–145)
WBC # BLD AUTO: 6.3 X10*3/UL (ref 4.4–11.3)

## 2025-02-19 PROCEDURE — 94640 AIRWAY INHALATION TREATMENT: CPT

## 2025-02-19 PROCEDURE — 2500000002 HC RX 250 W HCPCS SELF ADMINISTERED DRUGS (ALT 637 FOR MEDICARE OP, ALT 636 FOR OP/ED): Performed by: INTERNAL MEDICINE

## 2025-02-19 PROCEDURE — 82947 ASSAY GLUCOSE BLOOD QUANT: CPT

## 2025-02-19 PROCEDURE — 2500000002 HC RX 250 W HCPCS SELF ADMINISTERED DRUGS (ALT 637 FOR MEDICARE OP, ALT 636 FOR OP/ED): Performed by: NURSE PRACTITIONER

## 2025-02-19 PROCEDURE — 80048 BASIC METABOLIC PNL TOTAL CA: CPT | Performed by: INTERNAL MEDICINE

## 2025-02-19 PROCEDURE — 99239 HOSP IP/OBS DSCHRG MGMT >30: CPT | Performed by: INTERNAL MEDICINE

## 2025-02-19 PROCEDURE — 85025 COMPLETE CBC W/AUTO DIFF WBC: CPT | Performed by: INTERNAL MEDICINE

## 2025-02-19 PROCEDURE — 83735 ASSAY OF MAGNESIUM: CPT | Performed by: INTERNAL MEDICINE

## 2025-02-19 PROCEDURE — 2500000004 HC RX 250 GENERAL PHARMACY W/ HCPCS (ALT 636 FOR OP/ED): Performed by: NURSE PRACTITIONER

## 2025-02-19 PROCEDURE — 2500000001 HC RX 250 WO HCPCS SELF ADMINISTERED DRUGS (ALT 637 FOR MEDICARE OP): Performed by: INTERNAL MEDICINE

## 2025-02-19 PROCEDURE — RXMED WILLOW AMBULATORY MEDICATION CHARGE

## 2025-02-19 PROCEDURE — 2500000001 HC RX 250 WO HCPCS SELF ADMINISTERED DRUGS (ALT 637 FOR MEDICARE OP): Performed by: NURSE PRACTITIONER

## 2025-02-19 PROCEDURE — 36415 COLL VENOUS BLD VENIPUNCTURE: CPT | Performed by: INTERNAL MEDICINE

## 2025-02-19 PROCEDURE — 97110 THERAPEUTIC EXERCISES: CPT | Mod: GP,CQ

## 2025-02-19 RX ORDER — MIDODRINE HYDROCHLORIDE 5 MG/1
5 TABLET ORAL
Qty: 90 TABLET | Refills: 0 | Status: SHIPPED | OUTPATIENT
Start: 2025-02-19 | End: 2025-03-21

## 2025-02-19 RX ORDER — MIDODRINE HYDROCHLORIDE 5 MG/1
5 TABLET ORAL
Status: DISCONTINUED | OUTPATIENT
Start: 2025-02-19 | End: 2025-02-19 | Stop reason: HOSPADM

## 2025-02-19 RX ADMIN — SERTRALINE HYDROCHLORIDE 50 MG: 50 TABLET ORAL at 09:30

## 2025-02-19 RX ADMIN — INSULIN LISPRO 2 UNITS: 100 INJECTION, SOLUTION INTRAVENOUS; SUBCUTANEOUS at 09:29

## 2025-02-19 RX ADMIN — TAMSULOSIN HYDROCHLORIDE 0.4 MG: 0.4 CAPSULE ORAL at 09:30

## 2025-02-19 RX ADMIN — GUAIFENESIN SYRUP AND DEXTROMETHORPHAN 5 ML: 100; 10 SYRUP ORAL at 06:11

## 2025-02-19 RX ADMIN — GUAIFENESIN SYRUP AND DEXTROMETHORPHAN 5 ML: 100; 10 SYRUP ORAL at 15:01

## 2025-02-19 RX ADMIN — OSELTAMAVIR PHOSPHATE 30 MG: 30 CAPSULE ORAL at 09:30

## 2025-02-19 RX ADMIN — ATORVASTATIN CALCIUM 80 MG: 80 TABLET, FILM COATED ORAL at 09:30

## 2025-02-19 RX ADMIN — FERROUS SULFATE TAB 325 MG (65 MG ELEMENTAL FE) 325 MG: 325 (65 FE) TAB at 12:09

## 2025-02-19 RX ADMIN — PANTOPRAZOLE SODIUM 40 MG: 40 INJECTION, POWDER, FOR SOLUTION INTRAVENOUS at 09:31

## 2025-02-19 RX ADMIN — GABAPENTIN 100 MG: 100 CAPSULE ORAL at 09:30

## 2025-02-19 RX ADMIN — BUDESONIDE 0.25 MG: 0.25 INHALANT RESPIRATORY (INHALATION) at 07:46

## 2025-02-19 RX ADMIN — MIDODRINE HYDROCHLORIDE 5 MG: 5 TABLET ORAL at 09:30

## 2025-02-19 RX ADMIN — FERROUS SULFATE TAB 325 MG (65 MG ELEMENTAL FE) 325 MG: 325 (65 FE) TAB at 09:30

## 2025-02-19 RX ADMIN — INSULIN LISPRO 2 UNITS: 100 INJECTION, SOLUTION INTRAVENOUS; SUBCUTANEOUS at 12:09

## 2025-02-19 RX ADMIN — FORMOTEROL FUMARATE 20 MCG: 20 SOLUTION RESPIRATORY (INHALATION) at 07:46

## 2025-02-19 RX ADMIN — MIDODRINE HYDROCHLORIDE 5 MG: 5 TABLET ORAL at 12:09

## 2025-02-19 ASSESSMENT — PAIN SCALES - GENERAL
PAINLEVEL_OUTOF10: 0 - NO PAIN

## 2025-02-19 ASSESSMENT — COGNITIVE AND FUNCTIONAL STATUS - GENERAL
TOILETING: A LITTLE
STANDING UP FROM CHAIR USING ARMS: A LITTLE
CLIMB 3 TO 5 STEPS WITH RAILING: A LOT
DAILY ACTIVITIY SCORE: 20
HELP NEEDED FOR BATHING: A LITTLE
TURNING FROM BACK TO SIDE WHILE IN FLAT BAD: A LITTLE
CLIMB 3 TO 5 STEPS WITH RAILING: A LITTLE
MOVING TO AND FROM BED TO CHAIR: A LITTLE
DRESSING REGULAR LOWER BODY CLOTHING: A LITTLE
MOBILITY SCORE: 20
DRESSING REGULAR UPPER BODY CLOTHING: A LITTLE
STANDING UP FROM CHAIR USING ARMS: A LITTLE
MOBILITY SCORE: 18
MOVING TO AND FROM BED TO CHAIR: A LITTLE
WALKING IN HOSPITAL ROOM: A LITTLE
WALKING IN HOSPITAL ROOM: A LITTLE

## 2025-02-19 ASSESSMENT — PAIN - FUNCTIONAL ASSESSMENT
PAIN_FUNCTIONAL_ASSESSMENT: 0-10

## 2025-02-19 NOTE — DISCHARGE SUMMARY
Discharge Diagnosis  GI bleed    Issues Requiring Follow-Up  Follow-up with primary care provider and GI as outpatient  We recommend outpatient FibroScan to evaluate for cirrhosis and possible gastric emptying study  We are prescribing you medication called midodrine to help you increase the blood pressure  We also talked about getting up slowly and moving slowly before you start walking in order to get the blood pressure up  We recommended wearing tight compression stockings and staying well-hydrated.    Discharge Meds     Medication List      START taking these medications     midodrine 5 mg tablet; Commonly known as: Proamatine; Take 1 tablet (5   mg) by mouth 3 times daily (morning, midday, late afternoon).   oseltamivir 30 mg capsule; Commonly known as: Tamiflu; Take 1 capsule   (30 mg) by mouth 2 times a day.     CHANGE how you take these medications     ferrous sulfate 325 (65 Fe) MG EC tablet; Take 1 tablet by mouth once   daily with breakfast. Do not crush, chew, or split.; What changed: when to   take this   pantoprazole 40 mg EC tablet; Commonly known as: ProtoNix; Take 1 tablet   (40 mg) by mouth 2 times a day. Do not crush, chew, or split.; What   changed: when to take this     CONTINUE taking these medications     albuterol 90 mcg/actuation inhaler; Commonly known as: ProAir HFA; two   puffs four times daily for two weeks   aspirin 81 mg chewable tablet; Chew 1 tablet (81 mg) once daily.   atorvastatin 80 mg tablet; Commonly known as: Lipitor; Take 1 tablet (80   mg) by mouth once daily.   blood-glucose meter misc; Check glucose before meals and call if less   than 80 or over 300.   fluticasone propion-salmeteroL 100-50 mcg/dose diskus inhaler; Commonly   known as: Advair Diskus; Inhale 1 puff 2 times a day. Rinse mouth with   water after use to reduce aftertaste and incidence of candidiasis. Do not   swallow.   FreeStyle Kellie 2 [place] misc; Generic drug: flash glucose scanning   reader; Use as  instructed   FreeStyle Kellie 2 Sensor kit; Generic drug: flash glucose sensor kit;   Use as instructed   gabapentin 100 mg capsule; Commonly known as: Neurontin; Take 1 capsule   (100 mg) by mouth 3 times a day.   OneTouch Ultra Test strip; Generic drug: blood sugar diagnostic; Test   glucose twice daily or as directed   pioglitazone 15 mg tablet; Commonly known as: Actos; Take 1 tablet (15   mg) by mouth once daily.   sertraline 50 mg tablet; Commonly known as: Zoloft; Take 1 tablet (50   mg) by mouth once daily.   tamsulosin 0.4 mg 24 hr capsule; Commonly known as: Flomax; Take 1   capsule (0.4 mg) by mouth once daily.       Test Results Pending At Discharge  Pending Labs       No current pending labs.            Hospital Course  69-year-old female with past medical history of COPD, type 2 diabetes mellitus, GERD, DVT status post IVC filter, acalculous cholecystitis, type II achalasia, paraesophageal hernia status post robotic laparoscopic myotomy with fundoplication complicated by esophageal perforation presented due to concern for GI bleed with dark stool for past 2 weeks.  Patient was admitted and had to EGD but was found to have old debris's in for particle and no active bleeding or varices were seen.  Patient will need evaluation as outpatient for gastroparesis and also will get FibroScan for underlying cirrhosis.  He reported having history of chronic orthostasis and was on midodrine but that was taken off.  He was started on this because of dizziness upon standing and a positive orthostatic with compression stockings.  He is feeling better and wants to go home.  He can resume his aspirin and follow-up with GI as outpatient.  He also tested positive for influenza A and was started on Tamiflu but has no other respiratory symptoms related to this.    39 minutes spent in discharge timing    Pertinent Physical Exam At Time of Discharge  General: Not in acute distress, alert  HEENT: PERRLA, head intact and  normocephalic  Neck: Normal to inspection  Lungs: Clear to auscultation, work of breathing within normal limit  Cardiac: Regular rate and rhythm  Abdomen: Soft nontender, positive bowel sounds  : Exam deferred  Skin: Intact  Hematology: No petechia or excessive ecchymosis  Musculoskeletal: Without significant trauma  Neurological: Alert awake oriented, no focal deficit, cranial nerves grossly intact  Psych: No suicidal ideation or homicidal ideation    Outpatient Follow-Up  Future Appointments   Date Time Provider Department [place]   3/18/2025 11:15 AM Papito Maldonado MD [id number] [place]   3/18/2025  4:00 PM Kayli Gotti [facility] [id number] [place]   4/10/2025  1:00 PM Dilip Zayas MD [id number] [place]   4/17/2025  2:20 PM Ivonne Carter [facility] [id number] [place]   4/25/2025  1:00 PM Yuly Morton [facility] [id number] [place]   10/16/2025  1:30 PM [id number]         Shane Brown MD

## 2025-02-19 NOTE — PROGRESS NOTES
02/19/25 0940   Discharge Planning   Expected Discharge Disposition Home   Intensity of Service   Intensity of Service 0-30 min     Therapy recommending low intensity rehab at d/c. Met with patient at bedside to discuss HHC. He declines HHC and confirms that he feels safe to return home at discharge. Updated nursing and MD.

## 2025-02-19 NOTE — HOSPITAL COURSE
69-year-old female with past medical history of COPD, type 2 diabetes mellitus, GERD, DVT status post IVC filter, acalculous cholecystitis, type II achalasia, paraesophageal hernia status post robotic laparoscopic myotomy with fundoplication complicated by esophageal perforation presented due to concern for GI bleed with dark stool for past 2 weeks.  Patient was admitted and had to EGD but was found to have old debris's in for particle and no active bleeding or varices were seen.  Patient will need evaluation as outpatient for gastroparesis and also will get FibroScan for underlying cirrhosis.  He reported having history of chronic orthostasis and was on midodrine but that was taken off.  He was started on this because of dizziness upon standing and a positive orthostatic with compression stockings.  He is feeling better and wants to go home.  He can resume his aspirin and follow-up with GI as outpatient.  He also tested positive for influenza A and was started on Tamiflu but has no other respiratory symptoms related to this.    39 minutes spent in discharge timing

## 2025-02-19 NOTE — CARE PLAN
The patient's goals for the shift include      The clinical goals for the shift include see care plan      Problem: Pain - Adult  Goal: Verbalizes/displays adequate comfort level or baseline comfort level  Outcome: Met     Problem: Safety - Adult  Goal: Free from fall injury  Outcome: Met     Problem: Discharge Planning  Goal: Discharge to home or other facility with appropriate resources  Outcome: Met     Problem: Chronic Conditions and Co-morbidities  Goal: Patient's chronic conditions and co-morbidity symptoms are monitored and maintained or improved  Outcome: Met     Problem: Nutrition  Goal: Nutrient intake appropriate for maintaining nutritional needs  Outcome: Met     Problem: Diabetes  Goal: Achieve decreasing blood glucose levels by end of shift  Outcome: Met  Goal: Increase stability of blood glucose readings by end of shift  Outcome: Met  Goal: Maintain electrolyte levels within acceptable range throughout shift  Outcome: Met  Goal: No changes in neurological exam by end of shift  Outcome: Met  Goal: Learn about and adhere to nutrition recommendations by end of shift  Outcome: Met  Goal: Vital signs within normal range for age by end of shift  Outcome: Met  Goal: Increase self care and/or family involovement by end of shift  Outcome: Met     Problem: Fall/Injury  Goal: Not fall by end of shift  Outcome: Met  Goal: Be free from injury by end of the shift  Outcome: Met  Goal: Verbalize understanding of personal risk factors for fall in the hospital  Outcome: Met  Goal: Verbalize understanding of risk factor reduction measures to prevent injury from fall in the home  Outcome: Met  Goal: Use assistive devices by end of the shift  Outcome: Met  Goal: Pace activities to prevent fatigue by end of the shift  Outcome: Met       EOS note: Pt discharging home at this time with wife. Went over discharge paperwork with wife and patient - no further questions. Educated about midodrine and tamiflu, encouraged fluids and  symptom management for the flu. VSS this shift. Patient is understanding of needing rest and slow movements. No further concerns prior to discharge.

## 2025-02-19 NOTE — PROGRESS NOTES
Physical Therapy    Physical Therapy Treatment    Patient Name: Levy Gregory  MRN: [id number]  Today's Date: 2/19/2025  Time Calculation  Start Time: 0940  Stop Time: 1005  Time Calculation (min): 25 min     3030/3030-A       02/19/25 0940   PT  Visit   PT Received On 02/19/25   Response to Previous Treatment Patient with no complaints from previous session.   General   Reason for Referral P/w concerns for GIB. Pt states e has been having black stools with each bowel movement for the past 2 weeks. Patient denies nausea and vomiting. Patient states he does have abdominal pain however this is not new due to the multiple surgeries he has had in the past. Patient states he did have a syncopal episode this morning. Patient states he also has been short of breath for 2 weeks. Patient states has been having chills but denies fevers and bodyaches. Pt admitted for GIB.   Referred By Dr. Brown   Past Medical History Relevant to Rehab COPD, type 2 diabetes, GERD, DVTs s/p IVC filter, acalculous cholecystitis, type II achalasia, paraesophageal hernia s/p robotic laparoscopic myotomy with fundoplication C/B esophageal perforation   Family/Caregiver Present No   Prior to Session Communication Bedside nurse   Patient Position Received Bed, 3 rail up;Alarm on   Preferred Learning Style verbal   General Comment patient agreeable to therex only at this time. patient seen up ambulating with nursing.   Precautions   Medical Precautions Fall precautions   Pain Assessment   Pain Assessment 0-10   0-10 (Numeric) Pain Score 0 - No pain   Cognition   Overall Cognitive Status WFL   Orientation Level Oriented X4   Processing Speed WFL   Coordination   Movements are Fluid and Coordinated Yes   Therapeutic Exercise   Therapeutic Exercise Performed Yes   Therapeutic Exercise Activity 1 Ankle pumps x15   Therapeutic Exercise Activity 2 Heel slides x15   Therapeutic Exercise Activity 3 Pillow squeeze x15   Therapeutic Exercise Activity 4 Hip ABD  x15   Therapeutic Exercise Activity 5 Resisted leg press x15   Activity Tolerance   Endurance Tolerates 10 - 20 min exercise with multiple rests   Early Mobility/Exercise Safety Screen Proceed with mobilization - No exclusion criteria met   PT Assessment   PT Assessment Results Decreased strength;Decreased range of motion;Decreased mobility   Rehab Prognosis Good   End of Session Communication Bedside nurse   Assessment Comment Pt demonstrates improved strength with supine exercises. Pt will continue to benefit from PT interventions to improve overall strength and endurance with therapeutic activities.   End of Session Patient Position Bed, 3 rail up;Alarm on   Outpatient Education   Individual(s) Educated Patient   Education Provided POC;Fall Risk   Risk and Benefits Discussed with Patient/Caregiver/Other yes   Patient/Caregiver Demonstrated Understanding yes   Plan of Care Discussed and Agreed Upon yes   Patient Response to Education Patient/Caregiver Verbalized Understanding of Information   PT Plan   Inpatient/Swing Bed or Outpatient Inpatient   PT Plan   Treatment/Interventions Therapeutic exercise   PT Plan Ongoing PT   PT Frequency 4 times per week   PT Discharge Recommendations Low intensity level of continued care   Equipment Recommended upon Discharge Wheeled walker   PT Recommended Transfer Status Assist x1       Outcome Measures:  [facility] Basic Mobility  Turning from your back to your side while in a flat bed without using bedrails: None  Moving from lying on your back to sitting on the side of a flat bed without using bedrails: A little  Moving to and from bed to chair (including a wheelchair): A little  Standing up from a chair using your arms (e.g. wheelchair or bedside chair): A little  To walk in hospital room: A little  Climbing 3-5 steps with railing: A lot  Basic Mobility - Total Score: 18           EDUCATION:  Outpatient Education  Individual(s) Educated: Patient  Education Provided: POC, Fall  Risk  Risk and Benefits Discussed with Patient/Caregiver/Other: yes  Patient/Caregiver Demonstrated Understanding: yes  Plan of Care Discussed and Agreed Upon: yes  Patient Response to Education: Patient/Caregiver Verbalized Understanding of Information    GOALS:  Encounter Problems       Encounter Problems (Active)       Mobility       Pt will be able to ambulate >/= 50 ft with LRD SBA with good safety awareness.        Start:  02/18/25    Expected End:  03/04/25            Pt will complete supine, seated and standing exercises to maintain/improve overall strength with minimal verbal cues.         Start:  02/18/25    Expected End:  03/04/25            Pt will be able to negotiate ascending/descending 4 SWATI with use of unilateral HR SBA with sufficient foot clearance and good safety awareness for safe home going.         Start:  02/18/25    Expected End:  03/04/25               PT Transfers       Pt will be able to complete all transfers with LRD mod I demonstrating good safety awareness and proper body mechanics.        Start:  02/18/25    Expected End:  03/04/25               Pain - Adult              I personally have reviewed data entered by the student into the flowsheet and agree with this treatment session of this patient.    Axel Sanders, PTA

## 2025-02-19 NOTE — CARE PLAN
Problem: Pain - Adult  Goal: Verbalizes/displays adequate comfort level or baseline comfort level  Outcome: Progressing     Problem: Safety - Adult  Goal: Free from fall injury  Outcome: Progressing     Problem: Chronic Conditions and Co-morbidities  Goal: Patient's chronic conditions and co-morbidity symptoms are monitored and maintained or improved  Outcome: Progressing    The patient's goals for the shift include  get rest tonight    The clinical goals for the shift include Patient to maintain HDS throughout end of shift    Patient having okay shift overall. Patient Aox4, RA, stand by assist. Patient found to be significantly orthostatic on day shift with physical therapy. Patient aware that he needs to call for assistance prior to getting up, walked okay to the bathroom without feeling dizzy. Patient VSS. Safety maintained, bed alarm active due to orthostatic positive.

## 2025-02-21 ENCOUNTER — PATIENT OUTREACH (OUTPATIENT)
Dept: PRIMARY CARE | Facility: CLINIC | Age: 70
End: 2025-02-21
Payer: MEDICARE

## 2025-02-21 NOTE — PROGRESS NOTES
Discharge Facility: [facility]  Discharge Diagnosis: GI bleed  Admission Date: 2/13/2025  Discharge Date: 2/19/2025    PCP Appointment Date:  -3/10/2025 1100; pt declines sooner appt    Specialist Appointment Date:   -3/7/2025 1050 hem/onc  -3/18/2025 1115 cardiology  -3/18/2025 1600 urology  -4/10/2025 1300 gastroenterology    Hospital Encounter and Summary Linked: Yes    ED to Hosp-Admission (Discharged) with Shane Brown MD; Zandra Shukla MD (02/13/2025)     See discharge assessment below for further details    Wrap Up  Wrap Up Additional Comments: Pt was admitted to [facility] 2/13-2/19/2025 for gi bleed. Pt reports feeling much better since discharge and states he is less tired and less dizzy. Pt discharged with prescriptions for tamiflu, midodrine, ferrous sulfate, and protonix; he denies any questions or issues regarding medication. Pt reports understanding of discharge instructions. Verified pt upcoming hem/onc appt 3/7/2025 1050. Verified upcoming PCP appt 3/10/2025 1100; pt declines sooner appt at this time. Pt denies any questions, needs, or concerns. He is encouraged to call if questions or needs arise. (2/21/2025  9:19 AM)  Call End Time: 0921 (2/21/2025  9:19 AM)    Engagement  Call Start Time: 0918 (2/21/2025  9:19 AM)    Medications  Medications reviewed with patient/caregiver?: Yes (new prescriptions reviewed; ferrous sulfate, midodrine, oseltamivir, pantoprazole) (2/21/2025  9:19 AM)  Is the patient having any side effects they believe may be caused by any medication additions or changes?: No (2/21/2025  9:19 AM)  Does the patient have all medications ordered at discharge?: Yes (2/21/2025  9:19 AM)  Care Management Interventions: No intervention needed (2/21/2025  9:19 AM)  Is the patient taking all medications as directed (includes completed medication regime)?: Yes (2/21/2025  9:19 AM)    Appointments  Does the patient have a primary care provider?: Yes (2/21/2025  9:19 AM)  Care Management  Interventions: Verified appointment date/time/provider (2/21/2025  9:19 AM)  Has the patient kept scheduled appointments due by today?: Yes (2/21/2025  9:19 AM)    Self Management  Has home health visited the patient within 72 hours of discharge?: Not applicable (2/21/2025  9:19 AM)    Patient Teaching  Does the patient have access to their discharge instructions?: Yes (2/21/2025  9:19 AM)  Care Management Interventions: Reviewed instructions with patient (2/21/2025  9:19 AM)  What is the patient's perception of their health status since discharge?: Improving (2/21/2025  9:19 AM)  Is the patient/caregiver able to teach back the hierarchy of who to call/visit for symptoms/problems? PCP, Specialist, Home Health nurse, Urgent Care, ED, 911: Yes (2/21/2025  9:19 AM)

## 2025-02-24 LAB
ATRIAL RATE: 312 BPM
ATRIAL RATE: 87 BPM
P AXIS: 24 DEGREES
P AXIS: 3 DEGREES
P OFFSET: 157 MS
P OFFSET: 175 MS
P ONSET: 129 MS
P ONSET: 93 MS
PR INTERVAL: 162 MS
Q ONSET: 210 MS
Q ONSET: 211 MS
QRS COUNT: 13 BEATS
QRS COUNT: 15 BEATS
QRS DURATION: 134 MS
QRS DURATION: 134 MS
QT INTERVAL: 386 MS
QT INTERVAL: 390 MS
QTC CALCULATION(BAZETT): 444 MS
QTC CALCULATION(BAZETT): 464 MS
QTC FREDERICIA: 425 MS
QTC FREDERICIA: 436 MS
R AXIS: -63 DEGREES
R AXIS: -70 DEGREES
T AXIS: 21 DEGREES
T AXIS: 40 DEGREES
T OFFSET: 403 MS
T OFFSET: 406 MS
VENTRICULAR RATE: 78 BPM
VENTRICULAR RATE: 87 BPM

## 2025-02-27 DIAGNOSIS — J45.909 ASTHMATIC BRONCHITIS WITHOUT COMPLICATION, UNSPECIFIED ASTHMA SEVERITY, UNSPECIFIED WHETHER PERSISTENT (HHS-HCC): ICD-10-CM

## 2025-02-27 DIAGNOSIS — I11.0 HYPERTENSIVE HEART DISEASE WITH HEART FAILURE: ICD-10-CM

## 2025-02-27 DIAGNOSIS — E11.65 TYPE 2 DIABETES MELLITUS WITH HYPERGLYCEMIA, WITHOUT LONG-TERM CURRENT USE OF INSULIN: Primary | ICD-10-CM

## 2025-02-27 DIAGNOSIS — J44.9 CHRONIC OBSTRUCTIVE PULMONARY DISEASE, UNSPECIFIED COPD TYPE (MULTI): ICD-10-CM

## 2025-03-07 ENCOUNTER — APPOINTMENT (OUTPATIENT)
Dept: PRIMARY CARE | Facility: CLINIC | Age: 70
End: 2025-03-07
Payer: MEDICARE

## 2025-03-07 ENCOUNTER — SOCIAL WORK (OUTPATIENT)
Dept: HEMATOLOGY/ONCOLOGY | Facility: CLINIC | Age: 70
End: 2025-03-07

## 2025-03-07 ENCOUNTER — OFFICE VISIT (OUTPATIENT)
Dept: HEMATOLOGY/ONCOLOGY | Facility: CLINIC | Age: 70
End: 2025-03-07
Payer: MEDICARE

## 2025-03-07 VITALS
HEART RATE: 85 BPM | SYSTOLIC BLOOD PRESSURE: 91 MMHG | TEMPERATURE: 97 F | BODY MASS INDEX: 28.36 KG/M2 | OXYGEN SATURATION: 97 % | RESPIRATION RATE: 18 BRPM | WEIGHT: 214.95 LBS | DIASTOLIC BLOOD PRESSURE: 59 MMHG

## 2025-03-07 DIAGNOSIS — D64.9 ANEMIA, UNSPECIFIED TYPE: Primary | ICD-10-CM

## 2025-03-07 DIAGNOSIS — K92.1 MELENA: ICD-10-CM

## 2025-03-07 DIAGNOSIS — K92.2 GI BLEED: ICD-10-CM

## 2025-03-07 PROCEDURE — 3078F DIAST BP <80 MM HG: CPT

## 2025-03-07 PROCEDURE — 99204 OFFICE O/P NEW MOD 45 MIN: CPT

## 2025-03-07 PROCEDURE — 1126F AMNT PAIN NOTED NONE PRSNT: CPT

## 2025-03-07 PROCEDURE — 1159F MED LIST DOCD IN RCRD: CPT

## 2025-03-07 PROCEDURE — 1123F ACP DISCUSS/DSCN MKR DOCD: CPT

## 2025-03-07 PROCEDURE — 99214 OFFICE O/P EST MOD 30 MIN: CPT

## 2025-03-07 PROCEDURE — 1111F DSCHRG MED/CURRENT MED MERGE: CPT

## 2025-03-07 PROCEDURE — 3074F SYST BP LT 130 MM HG: CPT

## 2025-03-07 ASSESSMENT — ENCOUNTER SYMPTOMS
LOSS OF SENSATION IN FEET: 1
DEPRESSION: 0
OCCASIONAL FEELINGS OF UNSTEADINESS: 1

## 2025-03-07 ASSESSMENT — PATIENT HEALTH QUESTIONNAIRE - PHQ9
9. THOUGHTS THAT YOU WOULD BE BETTER OFF DEAD, OR OF HURTING YOURSELF: MORE THAN HALF THE DAYS
1. LITTLE INTEREST OR PLEASURE IN DOING THINGS: NEARLY EVERY DAY
7. TROUBLE CONCENTRATING ON THINGS, SUCH AS READING THE NEWSPAPER OR WATCHING TELEVISION: NEARLY EVERY DAY
10. IF YOU CHECKED OFF ANY PROBLEMS, HOW DIFFICULT HAVE THESE PROBLEMS MADE IT FOR YOU TO DO YOUR WORK, TAKE CARE OF THINGS AT HOME, OR GET ALONG WITH OTHER PEOPLE: SOMEWHAT DIFFICULT
SUM OF ALL RESPONSES TO PHQ9 QUESTIONS 1 AND 2: 4
5. POOR APPETITE OR OVEREATING: NEARLY EVERY DAY
8. MOVING OR SPEAKING SO SLOWLY THAT OTHER PEOPLE COULD HAVE NOTICED. OR THE OPPOSITE, BEING SO FIGETY OR RESTLESS THAT YOU HAVE BEEN MOVING AROUND A LOT MORE THAN USUAL: NEARLY EVERY DAY
6. FEELING BAD ABOUT YOURSELF - OR THAT YOU ARE A FAILURE OR HAVE LET YOURSELF OR YOUR FAMILY DOWN: NEARLY EVERY DAY
3. TROUBLE FALLING OR STAYING ASLEEP OR SLEEPING TOO MUCH: NEARLY EVERY DAY
SUM OF ALL RESPONSES TO PHQ QUESTIONS 1-9: 24
4. FEELING TIRED OR HAVING LITTLE ENERGY: NEARLY EVERY DAY
2. FEELING DOWN, DEPRESSED OR HOPELESS: SEVERAL DAYS

## 2025-03-07 ASSESSMENT — PAIN SCALES - GENERAL: PAINLEVEL_OUTOF10: 0-NO PAIN

## 2025-03-07 NOTE — PROGRESS NOTES
Patient ID: Levy Gregory is a 69 y.o. male.  Referring Physician: Shane Brown MD  [id number] [place] [address]  Primary Care Provider: Chris Huizar MD  Visit Type: Initial Visit    Location: [facility]  Diagnosis/Reason: Anemia    HPI:  Levy Gregory is a 69 y.o. male referred for consultation of anemia. Denies self or family history of cancers, blood disorders or autoimmune conditions.     Per review of records:  Hemoglobin intermittently low (11.4-15.3 g/dL) in 2019, 2021  Normal hemoglobin in 2022  Anemia noted in 2023 during prolonged hospital stay   Mild hemoglobin noted in 2024 (hgb 9.8 - 13.8 g/dL)   Hemoglobin dropped to 7.7 - 8.5 g/dL so far in 2025 (MCV 98, MCHC 29.7)    Previous Hematological Background:  Hx of hematological disorders: No - Patient denies prior hematologic history, none in family either   Hx of blood transfusions: Yes -    Hx of iron supplementation: Yes - Oral supplementation - Denies adverse effect and reaction - Agent: OTC iron pill 65 mg daily for sometime, now increased to TID. Never needed IV iron.  Hx of B12 supplementation: No - Denies any prior supplementation  Hx of folate supplementation: No - Denies any prior supplementation    Relevant medications:  He is on baby asprin 81 mg daily in the morning by cardiologist   Currently using NSAID's (Naproxen, Ibuprofen etc.): No  Currently taking any supplements: One a day MV for men. OTC oral iron TID   Pantoprazole 40 mg BID     HPI:   Levy Gregory is a 70 y/o male referred for anemia, PMH COPD, T2DM, GERD, DVT (now off AC), acalculous cholecystitis, peripheral neuropathy, type II achalasia, paraesophageal hernia s/p robotic laparoscopic myotomy w/ fundoplication c/b esophageal perforation. He is accompanied by his wife today. He went to the ED on 2/13/25 after having syncope at home and black stools (for about 2 weeks). Reports having lower GI bleed approximately 6 years ago and having syncope at that  time as well. EGD found to have old debris and dried blood and no active bleeding or varices. CT angiogram was negative for active hemorrhage. He had 1 unit PRBC. Octreotide drip stopped once confirmed no esophageal varices.     He was started on Midodrine while inpatient for low BP. Recommended to wear compression stockings, stay well-hydrated, follow up with PCP and GI, and have fibroscan to evaluate for cirrhosis and possible gastric emptying study. Started on tamiflu for Flu A.     Patient reports being sick and tired of hospitals and needles. At times feels depressed. States his legs are weak and he notices OSHEA easily, dizziness at times, lack of appetite, difficulty walking due to neuropathy and fatigue. He does watch his BP at home. Says it has been running low since 2023 health issues. Tries to hydrate well but thinks about 20 ounces or so per day.     Denies chest pain, palpitations, SOB at rest, fevers, chills, drenching night sweats, pain, recent infections, nausea, vomiting, constipation, diarrhea, dysphagia, new lumps/bumps, urinary changes, further melena, hematochezia, hematuria, epistaxis, hemoptysis or worsening in LE edema. Denies other concerns.       PMHx:  Active Ambulatory Problems     Diagnosis Date Noted    Colon polyp 02/17/2023    Diabetic neuropathy associated with type 2 diabetes mellitus (Multi) 02/17/2023    ED (erectile dysfunction) 02/17/2023    High serum bone-specific alkaline phosphatase 02/17/2023    Gout 02/17/2023    History of pulmonary embolism 02/17/2023    Hyperlipidemia 02/17/2023    Obstructive sleep apnea, adult 02/17/2023    Osteopenia 02/17/2023    Vitamin D deficiency 02/17/2023    Achalasia, esophageal 06/22/2023    Type 2 diabetes mellitus with hyperglycemia, without long-term current use of insulin 06/22/2023    Orthostatic hypotension 07/06/2023    Solar purpura (CMS-HCC) 07/24/2023    Anticoagulant long-term use 11/29/2023    Other dysphagia 10/31/2023     Hypertensive heart disease with heart failure 02/04/2024    Chronic obstructive pulmonary disease, unspecified COPD type (Multi) 02/04/2024    Acute renal failure superimposed on chronic kidney disease (CMS-HCC) 03/21/2024    Adjustment disorder with depressed mood 02/17/2023    Edema of both lower legs due to peripheral venous insufficiency 02/10/2024    Schatzki's ring 03/21/2024    Coronary artery disease involving native coronary artery of native heart without angina pectoris 04/09/2024    Deep vein thrombosis (DVT) of left lower extremity (Multi) 04/09/2024    Macrocytosis 07/25/2024    Closed fracture of right hip requiring operative repair with routine healing 08/15/2024    S/P right hip fracture 09/03/2024    Chronic kidney disease, stage 5 (Multi) 09/03/2024    Asthmatic bronchitis without complication ([facility]) 10/29/2024    GI bleed 02/13/2025     Resolved Ambulatory Problems     Diagnosis Date Noted    Acne 02/17/2023    Decreased GFR 02/17/2023    Esophageal mass 02/17/2023    Esophageal stricture 02/17/2023    Gastric ulcer 02/17/2023    History of DVT (deep vein thrombosis) 02/17/2023    Iron deficiency anemia 02/17/2023    Microalbuminuria 02/17/2023    Nocturia 02/17/2023    Rib pain on left side 02/17/2023    Sialoadenitis 02/17/2023    Dysphagia 02/17/2023    Obesity (BMI 30-39.9) 02/17/2023    Deep vein thrombosis (DVT) of popliteal vein of left lower extremity (Multi) 06/22/2023    Dislodged gastrostomy tube 06/22/2023    Empyema (Multi) 06/22/2023    Gastrostomy in place (Multi) 06/22/2023    Mediastinitis 06/22/2023    Fall 07/06/2023    Malfunction of jejunostomy tube (Multi) 07/10/2023    Esophageal perforation 07/20/2023    Chronic atrial fibrillation (Multi) 07/24/2023    Hyponatremia 07/28/2023    Malnutrition of moderate degree (Multi) 10/03/2023    Impaired oral gastric feeding tube, initial encounter 10/03/2023    Moderate protein-calorie malnutrition (Multi) 10/19/2023    Hypotension  due to drugs 11/04/2023    Dysfunction of both eustachian tubes 11/21/2023    Elevated liver function tests 11/29/2023    Hypotension 11/30/2023    Infection requiring contact isolation precautions 12/07/2023    Confusion 01/22/2024    Encounter for immunization 02/04/2024    Hypotension 02/07/2024    Pedal edema 02/13/2024    At risk for falls 02/26/2024    Type 2 diabetes mellitus with diabetic polyneuropathy, with long-term current use of insulin 03/08/2024    Generalized abdominal pain 03/08/2024    Dizziness 03/08/2024    Syncope and collapse 03/08/2024    Abnormal ECG 11/26/2022    Prolonged QT interval 03/21/2024    ACS (acute coronary syndrome) (Multi) 08/16/2023    Acute posthemorrhagic anemia 08/15/2023    Acute respiratory failure 02/12/2023    CORA (acute kidney injury) (CMS-HCC) 03/21/2024    Anemia 03/21/2024    Bacteremia 05/22/2023    Balance problems 11/20/2023    Bruise, trunk 03/21/2024    Closed head injury 03/21/2024    Coagulopathy (Multi) 03/21/2024    Difficulty walking 11/20/2023    Do not resuscitate 08/15/2023    Epistaxis 03/21/2024    Fatigue 09/06/2023    Food bolus obstruction of intestine (Multi) 03/21/2024    History of anemia 03/21/2024    History of diabetes mellitus 03/21/2024    History of infectious disease 03/21/2024    Hypernatremia 03/21/2024    Hypocalcemia 03/21/2024    Hypokalemia 03/21/2024    Hypoxia 03/21/2024    Lactic acidosis 03/21/2024    Leukocytosis 03/21/2024    Low kidney function 02/17/2023    Lung field abnormal 05/24/2023    Malnutrition (Multi) 03/21/2024    Morbid obesity (Multi) 03/21/2024    Nosocomial condition 08/15/2023    Onychodystrophy 11/20/2023    Onychomycosis 11/20/2023    Pain in toes of both feet 11/20/2023    Pleural effusion exudative 03/21/2024    CAP (community acquired pneumonia) 08/15/2023    Post-operative pain 03/21/2024    Postoperative septic shock (CMS-HCC) 03/21/2024    Other pulmonary embolism without acute cor pulmonale 02/10/2024     Pulmonary embolism 02/12/2023    Respiratory distress 03/21/2024    Rib contusion, left, sequela 03/21/2024    History of inferior vena caval filter placement 03/21/2024    Status post endoscopy 03/21/2024    Vagal arrhythmia 07/23/2018    Mild left ventricular systolic dysfunction 04/09/2024    Upper respiratory tract infection 05/13/2024    Palpitations 05/21/2024    Weight loss 07/25/2024    Closed right hip fracture, initial encounter 08/15/2024     Past Medical History:   Diagnosis Date    Contusion of abdominal wall, initial encounter 01/12/2021    COPD (chronic obstructive pulmonary disease) (Multi)     Diabetes mellitus (Multi)     DVT (deep venous thrombosis) (Multi)     DVT (deep venous thrombosis) (Multi)     GERD (gastroesophageal reflux disease)     Hemoptysis 05/12/2020    Herpes labialis     Hiatal hernia     Irregular heart beat     Pain in left knee     Peripheral neuropathy     Personal history of other diseases of the respiratory system 06/19/2019    Sleep apnea       PSHx:  Past Surgical History:   Procedure Laterality Date    CONDUIT REPLACEMENT  12/07/2023    Gastric conduit revision; mini laparotomy, General Ex-lap, ISA, Gastric pull up with anastmosis in neck, MIGUELINA to bulb sx. New J tube.    ESOPHAGOGASTRECTOMY      ESOPHAGOGASTRODUODENOSCOPY      with stents x2    GASTROSTOMY TUBE PLACEMENT N/A     IR CVC PICC  08/16/2023    IR CVC PICC 8/16/2023 [facility] INPATIENT LEGACY    OTHER SURGICAL HISTORY  01/21/2019    [place] filter placement    UMBILICAL HERNIA REPAIR N/A 2013      FHx:   Maternal Grandmother: DM  Mother: No medical issues   Father: Heart disease  Siblings: 1 full biological sister. 4 half brother/sisters. No cancer or blood disorders.   Children: 2 boys, age 36 and 39. Healthy     Social Hx:  Levy Rosenthalnandez    reports that he quit smoking about 2 years ago. His smoking use included cigars. He started smoking about 35 years ago. He has been exposed to tobacco smoke. He has  never used smokeless tobacco.  He  reports no history of alcohol use.  He  reports no history of drug use.    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 From [place]. No special diets or restrictions.     Social History     Socioeconomic History    Marital status:    Tobacco Use    Smoking status: Former     Types: Cigars     Start date: 1989     Quit date: 2023     Years since quittin.0     Passive exposure: Past    Smokeless tobacco: Never   Vaping Use    Vaping status: Never Used   Substance and Sexual Activity    Alcohol use: Never    Drug use: Never    Sexual activity: Defer     Social Drivers of Health     Financial Resource Strain: Low Risk  (2025)    Overall Financial Resource Strain (CARDIA)     Difficulty of Paying Living Expenses: Not hard at all   Food Insecurity: No Food Insecurity (2025)    Hunger Vital Sign     Worried About Running Out of Food in the Last Year: Never true     Ran Out of Food in the Last Year: Never true   Transportation Needs: No Transportation Needs (2025)    PRAPARE - Transportation     Lack of Transportation (Medical): No     Lack of Transportation (Non-Medical): No   Physical Activity: Unknown (10/20/2023)    Exercise Vital Sign     Minutes of Exercise per Session: 30 min   Stress: Stress Concern Present (10/20/2023)    [place] [employer] - Occupational Stress Questionnaire     Feeling of Stress : To some extent   Social Connections: Socially Isolated (10/20/2023)    Social Connection and Isolation Panel [NHANES]     Frequency of Communication with Friends and Family: More than three times a week     Attends Hindu Services: Never     Active Member of Clubs or Organizations: No     Attends Club or Organization Meetings: Never     Marital Status:    Intimate Partner Violence: Not At Risk (2025)    Humiliation, Afraid, Rape, and Kick questionnaire     Fear of Current or  Ex-Partner: No     Emotionally Abused: No     Physically Abused: No     Sexually Abused: No   Housing Stability: Low Risk  (2/13/2025)    Housing Stability Vital Sign     Unable to Pay for Housing in the Last Year: No     Number of Times Moved in the Last Year: 1     Homeless in the Last Year: No       Cancer Screenings:  Upper EGD: 1/4/21, 11/25/22, 2/11/23, 2/23/23  More Recently:   - 2/14/25: Findings  Patient is s/p esophagectomy with gastric pull through  Large amount of old blood and food debris in the stomach body and antrum that could not be lavaged or suctioned. There was otherwise no fresh blood seen to extent reached. Procedure aborted in the antrum due to poor visualization and to minimize risk of aspiration  - 2/17/25: Findings  Patient is s/p esophagectomy with gastric pull through  Moderate amount of old food debris and retained gastric fluid in the stomach body and antrum precluding visualization of stomach body. There was otherwise no evidence of active bleeding. The pylorus was characterized by edematous mucosa but traversable with minimal resistance.  The duodenum appeared normal.  Colonoscopy: 3/12/2021 (repeat in 5 years)      Medications and allergies reviewed in EMR.    ROS:  Review of Systems - Oncology   10 point review of systems negative except as state in HPI.    Vitals & Statistics:  Objective   Visit Vitals  BP 91/59   Pulse 85   Temp 36.1 °C (97 °F)   Resp 18   Wt 97.5 kg (214 lb 15.2 oz)   SpO2 97%   BMI 28.36 kg/m²   Smoking Status Former   BSA 2.24 m²     Physical Exam:  Physical Exam  Vitals reviewed.   Constitutional:       Appearance: Normal appearance.   HENT:      Head: Normocephalic and atraumatic.      Nose: Nose normal.      Mouth/Throat:      Mouth: Mucous membranes are moist.      Pharynx: Oropharynx is clear.   Eyes:      Extraocular Movements: Extraocular movements intact.      Conjunctiva/sclera: Conjunctivae normal.      Pupils: Pupils are equal, round, and reactive to  "light.   Cardiovascular:      Rate and Rhythm: Normal rate and regular rhythm.      Pulses: Normal pulses.      Heart sounds: Normal heart sounds.   Pulmonary:      Effort: Pulmonary effort is normal.      Breath sounds: Normal breath sounds.   Abdominal:      General: Bowel sounds are normal.      Palpations: Abdomen is soft.   Musculoskeletal:         General: Normal range of motion.      Cervical back: Normal range of motion.      Right lower leg: Edema present.      Left lower leg: Edema present.   Skin:     General: Skin is warm and dry.   Neurological:      General: No focal deficit present.      Mental Status: He is alert and oriented to person, place, and time.   Psychiatric:         Mood and Affect: Mood normal.         Behavior: Behavior normal.         Thought Content: Thought content normal.         Judgment: Judgment normal.       Results:  Lab Results   Component Value Date    WBC 6.3 02/19/2025    NEUTROABS 3.20 02/19/2025    IGABSOL 0.14 02/19/2025    LYMPHSABS 2.28 02/19/2025    MONOSABS 0.49 02/19/2025    EOSABS 0.20 02/19/2025    BASOSABS 0.03 02/19/2025    RBC 2.91 (L) 02/19/2025    MCV 98 02/19/2025    MCHC 29.7 (L) 02/19/2025    HGB 8.5 (L) 02/19/2025    HCT 28.6 (L) 02/19/2025     02/19/2025     No results found for: \"RETICCTPCT\"   Lab Results   Component Value Date    CREATININE 1.37 (H) 02/19/2025    BUN 18 02/19/2025    EGFR 56 (L) 02/19/2025     02/19/2025    K 4.4 02/19/2025     (H) 02/19/2025    CO2 21 02/19/2025      Lab Results   Component Value Date    ALT 30 02/14/2025    AST 30 02/14/2025    ALKPHOS 140 (H) 02/14/2025    BILITOT 0.4 02/14/2025      Lab Results   Component Value Date    TSH 1.92 05/25/2023     Lab Results   Component Value Date    TSH 1.92 05/25/2023    THYROIDPAB <28 07/24/2023     Lab Results   Component Value Date    IRON 39 07/24/2023    TIBC 253 07/24/2023    FERRITIN 2764 (H) 07/27/2023      Lab Results   Component Value Date    [id number] " "479 07/29/2024      Lab Results   Component Value Date    FOLATE 7.8 07/29/2024     Lab Results   Component Value Date    SEDRATE 19 05/25/2023      Lab Results   Component Value Date    CRP 6.18 (A) 05/25/2023      No results found for: \"JOSELUIS\"  Lab Results   Component Value Date     08/10/2023     No results found for: \"HAPTOGLOBIN\"  Lab Results   Component Value Date    SPEP SEE COMMENT 06/25/2018     No results found for: \"IGG\", \"IGM\", \"IGA\"  Lab Results   Component Value Date    HEPAIGM NONREACTIVE 07/24/2023    HEPBCIGM NONREACTIVE 07/24/2023    HEPBSAG NONREACTIVE 07/24/2023    HEPCAB REACTIVE (A) 07/24/2023     No results found for: \"HIV1X2\"    Assessment:  Levy Gregory is a 70 y/o referred for consultation of anemia 2/2 recent GI bleed. Reports no further melena occurring since hospital visit.      I reviewed patient's chart including but not limited to labs, imaging, surgical/procedure notes, pathology, hospital notes, doctor's notes.    2/19/2025 results:  WBC WNL  Normocytic, hypochromic anemia. Hgb 8.5, RBC 2.91, Hematocrit 28.6%  Platelets WNL  Creatinine 1.37, GFR 56, calcium 8.0    Plan:  Discussed possible etiologies including multifactorial, nutritional deficiencies, anemia of chronic disease, malabsorption, hemopathy, medications, bleeding, malignancy, etc. Will start hematological workup today.    Anemia   Patient declined recommended labs today due to not having enough time. States he has a FUV on Monday with his primary care, he will go for labs after that visit in case PCP also orders testing since patient reports he is a very difficult stick.  Continue oral iron as already prescribed, TID, monitor for side effects, which were discussed  SW to meet with patient today for depression score/report   Has FUV with GI on 4/10/25  RTC on 3/18/25 via virtual/telehealth visit - F/U sooner if needed/urgent    I had an extensive discussion with the patient regarding the diagnosis and discussed " the plan of therapy, including general considerations regarding side effects and outcomes. Pt understood and gave appropriate teach back about the plan of care. All questions were answered to the patient's satisfaction. The patient is instructed to contact us at any time if questions or problems arise. Thank you for the opportunity to participate in the care of this very pleasant patient.    Total time = 50 minutes. 50% or more of this time was spent in counseling and/or coordination of care including reviewing medical history/radiology/labs, examining patient, formulating outlined plan with team, and discussing plan with patient/family.      Diagnoses and all orders for this visit:  Anemia, unspecified type  -     Referral To Hematology and Oncology  -     Clinic Appointment Request Follow up (phone); GINGER SHABAZZ (phone); Future  -     FERNANDO with Reflex to SUZANNA; Future  -     CBC and Auto Differential; Future  -     Comprehensive Metabolic Panel; Future  -     C-Reactive Protein; Future  -     Direct Antiglobulin Test; Future  -     Erythropoietin; Future  -     Ferritin; Future  -     [place]/Lambda Free Light Chain, Serum; Future  -     Iron and TIBC; Future  -     Immunoglobulins (IgG, IgA, IgM); Future  -     Lactate Dehydrogenase; Future  -     Haptoglobin; Future  -     Folate; Future  -     Reticulocytes; Future  -     Rheumatoid Factor; Future  -     Sedimentation Rate; Future  -     Serum Protein Electrophoresis; Future  -     TSH with reflex to Free T4 if abnormal; Future  -     Vitamin B12; Future  GI bleed  -     Referral To Hematology and Oncology  -     Clinic Appointment Request Follow up (phone); GINGER SHABAZZ (phone); Future  -     FERNANDO with Reflex to SUZANNA; Future  -     CBC and Auto Differential; Future  -     Comprehensive Metabolic Panel; Future  -     C-Reactive Protein; Future  -     Direct Antiglobulin Test; Future  -     Erythropoietin; Future  -     Ferritin; Future  -     Kappa/Lambda  Free Light Chain, Serum; Future  -     Iron and TIBC; Future  -     Immunoglobulins (IgG, IgA, IgM); Future  -     Lactate Dehydrogenase; Future  -     Haptoglobin; Future  -     Folate; Future  -     Reticulocytes; Future  -     Rheumatoid Factor; Future  -     Sedimentation Rate; Future  -     Serum Protein Electrophoresis; Future  -     TSH with reflex to Free T4 if abnormal; Future  -     Vitamin B12; Future  Melena  -     Referral To Hematology and Oncology  -     Clinic Appointment Request Follow up (phone); GINGER SHABAZZ (phone); Future      IVÁN Boyd-CNP

## 2025-03-07 NOTE — PROGRESS NOTES
The patient is a 69 year old man who has been diagnosed with anemia. I met with him following his appointment with Freddie Osborn CNP today. He was present with his wife, who is supportive. They were very pleasant and easily engaged in conversation. The patient scored high on depression screen today upon check-in. We discussed this further. The patient shared about previous extensive hospitalization and medical issues causing depression. I provided support as he shared. He takes medication for depression that is managed by his PCP. His wife shared he has SAD and feels better with sunlight; the patient agreed. He plans to return to work next week as a . The patient shared that he feels safe and denies thoughts of harming himself or others. He denied need or interest in counseling or other resources at this time. He expressed appreciation for the visit and denies needs at this time. I provided my contact information and encouraged them to reach out to me if I can be of further assistance. Social work will remain available to assist this patient.    Natalia Smith, RADHAMES, ALKA-S

## 2025-03-10 ENCOUNTER — APPOINTMENT (OUTPATIENT)
Dept: PRIMARY CARE | Facility: CLINIC | Age: 70
End: 2025-03-10
Payer: MEDICARE

## 2025-03-10 VITALS — TEMPERATURE: 97.5 F | DIASTOLIC BLOOD PRESSURE: 44 MMHG | SYSTOLIC BLOOD PRESSURE: 92 MMHG

## 2025-03-10 DIAGNOSIS — J10.1 INFLUENZA A: ICD-10-CM

## 2025-03-10 DIAGNOSIS — D64.9 ANEMIA, UNSPECIFIED TYPE: Primary | ICD-10-CM

## 2025-03-10 DIAGNOSIS — K92.2 GASTROINTESTINAL HEMORRHAGE, UNSPECIFIED GASTROINTESTINAL HEMORRHAGE TYPE: Primary | ICD-10-CM

## 2025-03-10 DIAGNOSIS — K92.2 GI BLEED: ICD-10-CM

## 2025-03-10 PROCEDURE — 86880 COOMBS TEST DIRECT: CPT

## 2025-03-10 PROCEDURE — 99213 OFFICE O/P EST LOW 20 MIN: CPT | Performed by: FAMILY MEDICINE

## 2025-03-10 PROCEDURE — 1159F MED LIST DOCD IN RCRD: CPT | Performed by: FAMILY MEDICINE

## 2025-03-10 PROCEDURE — 1111F DSCHRG MED/CURRENT MED MERGE: CPT | Performed by: FAMILY MEDICINE

## 2025-03-10 PROCEDURE — 3074F SYST BP LT 130 MM HG: CPT | Performed by: FAMILY MEDICINE

## 2025-03-10 PROCEDURE — 1160F RVW MEDS BY RX/DR IN RCRD: CPT | Performed by: FAMILY MEDICINE

## 2025-03-10 PROCEDURE — 3078F DIAST BP <80 MM HG: CPT | Performed by: FAMILY MEDICINE

## 2025-03-10 PROCEDURE — 1123F ACP DISCUSS/DSCN MKR DOCD: CPT | Performed by: FAMILY MEDICINE

## 2025-03-10 NOTE — PROGRESS NOTES
Subjective   Patient ID: [id number]     Levy Gregory is a 69 y.o. male who presents for ER Follow-up.    HPI  Levy was discharged on [id number]:      Discharge Diagnosis  GI bleed     Issues Requiring Follow-Up  Follow-up with primary care provider and GI as outpatient  We recommend outpatient FibroScan to evaluate for cirrhosis and possible gastric emptying study  We are prescribing you medication called midodrine to help you increase the blood pressure  We also talked about getting up slowly and moving slowly before you start walking in order to get the blood pressure up  We recommended wearing tight compression stockings and staying well-hydrated.      While inpatient he tested positive for FLU A  but no source of bleeding was identified .  He is to follow up with GI.    Review of Systems  CARDIO- No chest pain or pressure, nausea, diaphoresis, paresthesias, dizziness, or syncope with or without exertion  GI-No blood in stool, tarry stools, pain, vomiting, heartburn, constipation or diarrhea  PULM-No wheezing, or shortness of breath;  still coughing since the flu  UROL-No frequency, urgency, blood in urine, or incontinence; nocturia not present    Objective     BP (!) 92/44 (BP Location: Right arm, Patient Position: Sitting)   Temp 36.4 °C (97.5 °F) (Oral)      Physical Exam  Neck-supple without lymphadenopathy or thyromegaly; no carotid bruits  Heart- regular rate and rhythm, normal s1 and s2 without murmur or gallop; no edema  Lungs-clear to auscultation  Abdomen-soft, positive bowel sounds, without masses, HSmegaly or pain     Assessment/Plan     Problem List Items Addressed This Visit       GI bleed - Primary    Relevant Orders    CBC and Auto Differential     Other Visit Diagnoses       Influenza A              Follow up fasting (no alcohol for 48 hours and just water for 14 hours) in three months for your next routine appointment.  In general, take any medications on schedule (except for types of  Insulin).      Chris Huizar MD

## 2025-03-11 ENCOUNTER — TELEPHONE (OUTPATIENT)
Dept: HEMATOLOGY/ONCOLOGY | Facility: CLINIC | Age: 70
End: 2025-03-11
Payer: MEDICARE

## 2025-03-11 ENCOUNTER — LAB (OUTPATIENT)
Dept: LAB | Facility: HOSPITAL | Age: 70
End: 2025-03-11
Payer: MEDICARE

## 2025-03-11 ENCOUNTER — PATIENT OUTREACH (OUTPATIENT)
Dept: PRIMARY CARE | Facility: CLINIC | Age: 70
End: 2025-03-11
Payer: MEDICARE

## 2025-03-11 DIAGNOSIS — K92.2 GASTROINTESTINAL HEMORRHAGE, UNSPECIFIED: Primary | ICD-10-CM

## 2025-03-11 DIAGNOSIS — D50.0 IRON DEFICIENCY ANEMIA DUE TO CHRONIC BLOOD LOSS: Primary | ICD-10-CM

## 2025-03-11 LAB
ALBUMIN SERPL BCP-MCNC: 3.2 G/DL (ref 3.4–5)
ALP SERPL-CCNC: 205 U/L (ref 33–136)
ALT SERPL W P-5'-P-CCNC: 37 U/L (ref 10–52)
ANA PATTERN: ABNORMAL
ANA SER QL HEP2 SUBST: POSITIVE
ANA TITR SER IF: ABNORMAL {TITER}
ANION GAP SERPL CALC-SCNC: 14 MMOL/L (ref 10–20)
AST SERPL W P-5'-P-CCNC: 32 U/L (ref 9–39)
BASOPHILS # BLD AUTO: 0.03 X10*3/UL (ref 0–0.1)
BASOPHILS NFR BLD AUTO: 0.5 %
BILIRUB SERPL-MCNC: 0.3 MG/DL (ref 0–1.2)
BUN SERPL-MCNC: 34 MG/DL (ref 6–23)
CALCIUM SERPL-MCNC: 8.1 MG/DL (ref 8.6–10.6)
CENTROMERE B AB SER-ACNC: <0.2 AI
CHLORIDE SERPL-SCNC: 106 MMOL/L (ref 98–107)
CHROMATIN AB SERPL-ACNC: <0.2 AI
CO2 SERPL-SCNC: 21 MMOL/L (ref 21–32)
CREAT SERPL-MCNC: 1.47 MG/DL (ref 0.5–1.3)
CRP SERPL-MCNC: 0.73 MG/DL
DAT-POLYSPECIFIC: NORMAL
DSDNA AB SER-ACNC: <1 IU/ML
EGFRCR SERPLBLD CKD-EPI 2021: 52 ML/MIN/1.73M*2
ENA JO1 AB SER QL IA: <0.2 AI
ENA RNP AB SER IA-ACNC: <0.2 AI
ENA SCL70 AB SER QL IA: <0.2 AI
ENA SM AB SER IA-ACNC: <0.2 AI
ENA SM+RNP AB SER QL IA: <0.2 AI
ENA SS-A AB SER IA-ACNC: <0.2 AI
ENA SS-B AB SER IA-ACNC: <0.2 AI
EOSINOPHIL # BLD AUTO: 0.09 X10*3/UL (ref 0–0.7)
EOSINOPHIL NFR BLD AUTO: 1.6 %
ERYTHROCYTE [DISTWIDTH] IN BLOOD BY AUTOMATED COUNT: 16.6 % (ref 11.5–14.5)
ERYTHROCYTE [SEDIMENTATION RATE] IN BLOOD BY WESTERGREN METHOD: 22 MM/H (ref 0–20)
FERRITIN SERPL-MCNC: 46 NG/ML (ref 20–300)
FOLATE SERPL-MCNC: 15.8 NG/ML
GLUCOSE SERPL-MCNC: 259 MG/DL (ref 74–99)
HAPTOGLOB SERPL NEPH-MCNC: 135 MG/DL (ref 30–200)
HCT VFR BLD AUTO: 30.7 % (ref 41–52)
HGB BLD-MCNC: 8.8 G/DL (ref 13.5–17.5)
HGB RETIC QN: 30 PG (ref 28–38)
IGA SERPL-MCNC: 186 MG/DL (ref 70–400)
IGG SERPL-MCNC: 1670 MG/DL (ref 700–1600)
IGM SERPL-MCNC: 78 MG/DL (ref 40–230)
IMM GRANULOCYTES # BLD AUTO: 0.02 X10*3/UL (ref 0–0.7)
IMM GRANULOCYTES NFR BLD AUTO: 0.4 % (ref 0–0.9)
IMMATURE RETIC FRACTION: 27.5 %
IRON SATN MFR SERPL: 6 % (ref 25–45)
IRON SERPL-MCNC: 21 UG/DL (ref 35–150)
KAPPA LC SERPL-MCNC: 7.08 MG/DL (ref 0.33–1.94)
KAPPA LC/LAMBDA SER: 0.97 {RATIO} (ref 0.26–1.65)
LAMBDA LC SERPL-MCNC: 7.27 MG/DL (ref 0.57–2.63)
LDH SERPL L TO P-CCNC: 136 U/L (ref 84–246)
LYMPHOCYTES # BLD AUTO: 1.23 X10*3/UL (ref 1.2–4.8)
LYMPHOCYTES NFR BLD AUTO: 22 %
MCH RBC QN AUTO: 29.4 PG (ref 26–34)
MCHC RBC AUTO-ENTMCNC: 28.7 G/DL (ref 32–36)
MCV RBC AUTO: 103 FL (ref 80–100)
MONOCYTES # BLD AUTO: 0.53 X10*3/UL (ref 0.1–1)
MONOCYTES NFR BLD AUTO: 9.5 %
NEUTROPHILS # BLD AUTO: 3.7 X10*3/UL (ref 1.2–7.7)
NEUTROPHILS NFR BLD AUTO: 66 %
NRBC BLD-RTO: 0 /100 WBCS (ref 0–0)
PLATELET # BLD AUTO: 184 X10*3/UL (ref 150–450)
POTASSIUM SERPL-SCNC: 4.1 MMOL/L (ref 3.5–5.3)
PROT SERPL-MCNC: 6.1 G/DL (ref 6.4–8.2)
PROT SERPL-MCNC: 6.1 G/DL (ref 6.4–8.2)
RBC # BLD AUTO: 2.99 X10*6/UL (ref 4.5–5.9)
RETICS #: 0.1 X10*6/UL (ref 0.02–0.11)
RETICS/RBC NFR AUTO: 3.2 % (ref 0.5–2)
RHEUMATOID FACT SER NEPH-ACNC: <10 IU/ML (ref 0–15)
RIBOSOMAL P AB SER-ACNC: <0.2 AI
SODIUM SERPL-SCNC: 137 MMOL/L (ref 136–145)
TIBC SERPL-MCNC: 379 UG/DL (ref 240–445)
TSH SERPL-ACNC: 2.51 MIU/L (ref 0.44–3.98)
UIBC SERPL-MCNC: 358 UG/DL (ref 110–370)
VIT B12 SERPL-MCNC: 734 PG/ML (ref 211–911)
WBC # BLD AUTO: 5.6 X10*3/UL (ref 4.4–11.3)

## 2025-03-11 RX ORDER — HEPARIN 100 UNIT/ML
500 SYRINGE INTRAVENOUS AS NEEDED
OUTPATIENT
Start: 2025-03-11

## 2025-03-11 RX ORDER — HEPARIN SODIUM,PORCINE/PF 10 UNIT/ML
50 SYRINGE (ML) INTRAVENOUS AS NEEDED
OUTPATIENT
Start: 2025-03-11

## 2025-03-11 RX ORDER — ALBUTEROL SULFATE 0.83 MG/ML
3 SOLUTION RESPIRATORY (INHALATION) AS NEEDED
OUTPATIENT
Start: 2025-03-21

## 2025-03-11 RX ORDER — EPINEPHRINE 0.3 MG/.3ML
0.3 INJECTION SUBCUTANEOUS EVERY 5 MIN PRN
OUTPATIENT
Start: 2025-03-21

## 2025-03-11 RX ORDER — FAMOTIDINE 10 MG/ML
20 INJECTION, SOLUTION INTRAVENOUS ONCE AS NEEDED
OUTPATIENT
Start: 2025-03-21

## 2025-03-11 RX ORDER — DIPHENHYDRAMINE HYDROCHLORIDE 50 MG/ML
50 INJECTION INTRAMUSCULAR; INTRAVENOUS AS NEEDED
OUTPATIENT
Start: 2025-03-21

## 2025-03-11 NOTE — TELEPHONE ENCOUNTER
Called patient to review that his hemoglobin came back at 8.8 and iron saturation low at 6%, serum iron low at 21 and ferritin 46. Discussed IV iron and patient would like to proceed, discussed Venofer and provided drug education including clinic process and risk of hypersensitivity reactions that can range from mild to severe, he verbalized understanding. He is taking iron orally TID and advised him to continue and when he starts IV iron he can hold or decrease if he'd like. He still has lab work pending that we will review on Tuesday's phone visit. He is aware he will hear from scheduling to set up Venofer 300 mg x3. He report he is a difficult stick, encouraged oral hydration and aware we have a max of 4 IV attempts and if no success we discuss from there.

## 2025-03-12 ENCOUNTER — TELEPHONE (OUTPATIENT)
Dept: PRIMARY CARE | Facility: CLINIC | Age: 70
End: 2025-03-12
Payer: MEDICARE

## 2025-03-12 DIAGNOSIS — K21.9 GASTROESOPHAGEAL REFLUX DISEASE WITHOUT ESOPHAGITIS: ICD-10-CM

## 2025-03-12 LAB — EPO SERPL-ACNC: 22 MU/ML (ref 4–27)

## 2025-03-12 NOTE — TELEPHONE ENCOUNTER
Refill request     Med name-pantoprazole  Med dose-40mg  Med directions-take 1 tablet twice a day. Do not crush, chew or split     Pharmacy-[facility]  Pharmacy address-[facility]    LR-02/18/25(was prescribed to him while in the hospital  LV-03/10/25  Nv-06/10/25    Thanks

## 2025-03-13 LAB
ALBUMIN: 3 G/DL (ref 3.4–5)
ALPHA 1 GLOBULIN: 0.3 G/DL (ref 0.2–0.6)
ALPHA 2 GLOBULIN: 0.6 G/DL (ref 0.4–1.1)
BETA GLOBULIN: 0.8 G/DL (ref 0.5–1.2)
GAMMA GLOBULIN: 1.4 G/DL (ref 0.5–1.4)
IMMUNOFIXATION COMMENT: NORMAL
PATH REVIEW - SERUM IMMUNOFIXATION: NORMAL
PATH REVIEW-SERUM PROTEIN ELECTROPHORESIS: ABNORMAL
PROTEIN ELECTROPHORESIS COMMENT: ABNORMAL

## 2025-03-13 RX ORDER — PANTOPRAZOLE SODIUM 40 MG/1
40 TABLET, DELAYED RELEASE ORAL 2 TIMES DAILY
Qty: 180 TABLET | Refills: 0 | Status: SHIPPED | OUTPATIENT
Start: 2025-03-13

## 2025-03-18 ENCOUNTER — APPOINTMENT (OUTPATIENT)
Dept: UROLOGY | Facility: CLINIC | Age: 70
End: 2025-03-18
Payer: MEDICARE

## 2025-03-18 ENCOUNTER — TELEMEDICINE (OUTPATIENT)
Dept: HEMATOLOGY/ONCOLOGY | Facility: CLINIC | Age: 70
End: 2025-03-18
Payer: MEDICARE

## 2025-03-18 ENCOUNTER — OFFICE VISIT (OUTPATIENT)
Dept: CARDIOLOGY | Facility: CLINIC | Age: 70
End: 2025-03-18
Payer: MEDICARE

## 2025-03-18 VITALS
HEART RATE: 68 BPM | WEIGHT: 223 LBS | SYSTOLIC BLOOD PRESSURE: 98 MMHG | BODY MASS INDEX: 29.55 KG/M2 | DIASTOLIC BLOOD PRESSURE: 58 MMHG | HEIGHT: 73 IN

## 2025-03-18 DIAGNOSIS — D64.9 ANEMIA, UNSPECIFIED TYPE: ICD-10-CM

## 2025-03-18 DIAGNOSIS — K92.2 GI BLEED: ICD-10-CM

## 2025-03-18 DIAGNOSIS — I95.1 ORTHOSTATIC HYPOTENSION: ICD-10-CM

## 2025-03-18 DIAGNOSIS — K92.1 MELENA: ICD-10-CM

## 2025-03-18 DIAGNOSIS — I25.10 CORONARY ARTERY DISEASE INVOLVING NATIVE CORONARY ARTERY OF NATIVE HEART WITHOUT ANGINA PECTORIS: ICD-10-CM

## 2025-03-18 PROCEDURE — 1123F ACP DISCUSS/DSCN MKR DOCD: CPT | Performed by: INTERNAL MEDICINE

## 2025-03-18 PROCEDURE — 3074F SYST BP LT 130 MM HG: CPT | Performed by: INTERNAL MEDICINE

## 2025-03-18 PROCEDURE — 1111F DSCHRG MED/CURRENT MED MERGE: CPT

## 2025-03-18 PROCEDURE — 99213 OFFICE O/P EST LOW 20 MIN: CPT

## 2025-03-18 PROCEDURE — 1123F ACP DISCUSS/DSCN MKR DOCD: CPT

## 2025-03-18 PROCEDURE — 1160F RVW MEDS BY RX/DR IN RCRD: CPT | Performed by: INTERNAL MEDICINE

## 2025-03-18 PROCEDURE — G2211 COMPLEX E/M VISIT ADD ON: HCPCS | Performed by: INTERNAL MEDICINE

## 2025-03-18 PROCEDURE — 1036F TOBACCO NON-USER: CPT | Performed by: INTERNAL MEDICINE

## 2025-03-18 PROCEDURE — 3078F DIAST BP <80 MM HG: CPT | Performed by: INTERNAL MEDICINE

## 2025-03-18 PROCEDURE — 1159F MED LIST DOCD IN RCRD: CPT | Performed by: INTERNAL MEDICINE

## 2025-03-18 PROCEDURE — 99214 OFFICE O/P EST MOD 30 MIN: CPT | Performed by: INTERNAL MEDICINE

## 2025-03-18 PROCEDURE — 1111F DSCHRG MED/CURRENT MED MERGE: CPT | Performed by: INTERNAL MEDICINE

## 2025-03-18 PROCEDURE — 3008F BODY MASS INDEX DOCD: CPT | Performed by: INTERNAL MEDICINE

## 2025-03-18 NOTE — PROGRESS NOTES
"Chief Complaint:   Please see below.     History Of Present Illness:    Levy Gregory is a 69 y.o. male presenting with CAD.    This unfortunate 69-year-old diabetic, hyperlipidemic former cigar smoker has coronary artery disease and a history of  orthostatic hypotension.   He spent the greater portion of 2023 either in the hospital or lying in bed at a rehab facility.  Please see my prior note dated April 9, 2024 for complete details regarding his long and complex medical history from 2023.  His SPECT on May 15, 2024 disclosed inferior wall infarction with moderate lateral wall ischemia, and an ejection fraction of 56% with inferior wall hypokinesis.  He has had syncope as a consequence of debilitating orthostatic hypotension as outlined in my outpatient notes.  An outpatient monitor study between July 18 and July 25, 2024 disclosed 4 episodes of SVT the longest being 6 beats in duration, and 2 runs of nonsustained ventricular tachycardia the longest being 10 beats in duration.  There was no evidence of conduction system disease or sinus pauses.  He follows with Dr. Maldonado of vascular medicine who advised him that he no longer needed to take Eliquis which was previously prescribed for DVTs.     The patient denies chest discomfort, dyspnea, palpitations, orthopnea, PND, syncope, and near syncope.    He was hospitalized at [facility] due to anemia with red blood per rectum.  He underwent endoscopic evaluation.  Please see the discharge summary dated 2/19/2025 for complete details.         Last Recorded Vitals:  Vitals:    03/18/25 1100   BP: 98/58   BP Location: Left arm   Patient Position: Sitting   Pulse: 68   Weight: 101 kg (223 lb)   Height: 1.854 m (6' 1\")       Past Medical History:  He has a past medical history of Achalasia, esophageal, Anemia, Contusion of abdominal wall, initial encounter (01/12/2021), COPD (chronic obstructive pulmonary disease) (Multi), Diabetes mellitus (Multi), DVT (deep venous " thrombosis) (Multi), DVT (deep venous thrombosis) (Multi), Dysphagia, GERD (gastroesophageal reflux disease), Hemoptysis (05/12/2020), Herpes labialis, Hiatal hernia, Hyperlipidemia, Hypotension due to drugs, Impaired oral gastric feeding tube, initial encounter (10/03/2023), Irregular heart beat, Pain in left knee, Peripheral neuropathy, Personal history of other diseases of the respiratory system (06/19/2019), Sleep apnea, and Solar purpura (CMS-[facility]).    Past Surgical History:  He has a past surgical history that includes Other surgical history (01/21/2019); IR CVC PICC (08/16/2023); Umbilical hernia repair (N/A, 2013); Gastrostomy tube placement (N/A); Esophagogastroduodenoscopy; Esophagogastrectomy; and Conduit replacement (12/07/2023).      Social History:  He reports that he quit smoking about 2 years ago. His smoking use included cigars. He started smoking about 35 years ago. He has been exposed to tobacco smoke. He has never used smokeless tobacco. He reports that he does not drink alcohol and does not use drugs.    Family History:  Family History   Problem Relation Name Age of Onset    Diabetes Mother      Hypertension Mother      Diabetes type I Father          Allergies:  Patient has no known allergies.    Outpatient Medications:  Current Outpatient Medications   Medication Instructions    albuterol (ProAir HFA) 90 mcg/actuation inhaler two puffs four times daily for two weeks    aspirin 81 mg, oral, Daily    atorvastatin (LIPITOR) 80 mg, oral, Daily    blood sugar diagnostic (OneTouch Ultra Test) strip Test glucose twice daily or as directed    blood-glucose meter misc Check glucose before meals and call if less than 80 or over 300.    ferrous sulfate 325 (65 Fe) MG EC tablet 1 tablet, oral, Daily with breakfast, Do not crush, chew, or split.    flash glucose sensor kit (FreeStyle Kellie 2 Sensor) kit Use as instructed    fluticasone propion-salmeteroL (Advair Diskus) 100-50 mcg/dose diskus inhaler 1  puff, inhalation, 2 times daily RT, Rinse mouth with water after use to reduce aftertaste and incidence of candidiasis. Do not swallow.    FreeStyle Kellie 2 [place] misc Use as instructed    gabapentin (NEURONTIN) 100 mg, oral, 3 times daily    midodrine (PROAMATINE) 5 mg, oral, 3 times daily (morning, midday, late afternoon)    pantoprazole (PROTONIX) 40 mg, oral, 2 times daily, Do not crush, chew, or split.    pioglitazone (ACTOS) 15 mg, oral, Daily    sertraline (ZOLOFT) 50 mg, oral, Daily    tamsulosin (FLOMAX) 0.4 mg, oral, Daily       Physical Exam:  GENERAL:  pleasant 69 year-old  HEENT: No xanthelasma  NECK: Supple, no palpable adenopathy or thyromegaly  CHEST: Clear to auscultation, respiratory effort unlabored  CARDIAC: RRR, normal S1 and S2, no audible murmur, rub, gallop, carotids are brisk, PMI is not displaced  ABD: Active bowel sounds, nontender, no organomegaly, no evidence of ascites  EXT: No clubbing, cyanosis, edema, or tenderness  NEURO: Awake, alert, appropriate, speech is fluent       Last Labs:  CBC -  Lab Results   Component Value Date    WBC 5.6 03/10/2025    WBC 7.9 02/10/2025    HGB 8.8 (L) 03/10/2025    HGB 7.9 (L) 02/10/2025    HCT 30.7 (L) 03/10/2025    HCT 26.0 (L) 02/10/2025     (H) 03/10/2025    MCV 97.0 02/10/2025     03/10/2025     02/10/2025       CMP -  Lab Results   Component Value Date    CALCIUM 8.1 (L) 03/10/2025    CALCIUM 8.3 (L) 02/10/2025    PHOS 3.8 02/14/2025    PROT 6.1 (L) 03/10/2025    PROT 6.1 (L) 03/10/2025    PROT 6.3 02/10/2025    ALBUMIN 3.2 (L) 03/10/2025    ALBUMIN 3.4 (L) 02/10/2025    AST 32 03/10/2025    AST 27 02/10/2025    ALT 37 03/10/2025    ALT 24 02/10/2025    ALKPHOS 205 (H) 03/10/2025    ALKPHOS 152 (H) 02/10/2025    BILITOT 0.3 03/10/2025    BILITOT 0.3 02/10/2025       LIPID PANEL -   Lab Results   Component Value Date    CHOL 146 02/14/2024    TRIG 149 02/14/2024    HDL 38.3 02/14/2024    CHHDL 3.8 02/14/2024    LDLF 72  03/05/2019    VLDL 30 02/14/2024    NHDL 108 02/14/2024       RENAL FUNCTION PANEL -   Lab Results   Component Value Date    GLUCOSE 259 (H) 03/10/2025    GLUCOSE 242 (H) 02/10/2025     03/10/2025     02/10/2025    K 4.1 03/10/2025    K 5.2 02/10/2025     03/10/2025     02/10/2025    CO2 21 03/10/2025    CO2 22 02/10/2025    ANIONGAP 14 03/10/2025    ANIONGAP 8 02/10/2025    BUN 34 (H) 03/10/2025    BUN 47 (H) 02/10/2025    CREATININE 1.47 (H) 03/10/2025    CREATININE 1.62 (H) 02/10/2025    GFRMALE 43 (A) 09/05/2023    CALCIUM 8.1 (L) 03/10/2025    CALCIUM 8.3 (L) 02/10/2025    PHOS 3.8 02/14/2025    ALBUMIN 3.2 (L) 03/10/2025    ALBUMIN 3.4 (L) 02/10/2025        Lab Results   Component Value Date    BNP 32 09/05/2023    HGBA1C 6.3 12/13/2024         Lab review: I have Chemistry CMP:   Lab Results   Component Value Date    ALBUMIN 3.2 (L) 03/10/2025    ALBUMIN 3.4 (L) 02/10/2025    CALCIUM 8.1 (L) 03/10/2025    CALCIUM 8.3 (L) 02/10/2025    CO2 21 03/10/2025    CO2 22 02/10/2025    CREATININE 1.47 (H) 03/10/2025    CREATININE 1.62 (H) 02/10/2025    GLUCOSE 259 (H) 03/10/2025    GLUCOSE 242 (H) 02/10/2025    BILITOT 0.3 03/10/2025    BILITOT 0.3 02/10/2025    PROT 6.1 (L) 03/10/2025    PROT 6.1 (L) 03/10/2025    PROT 6.3 02/10/2025    ALT 37 03/10/2025    ALT 24 02/10/2025    AST 32 03/10/2025    AST 27 02/10/2025    ALKPHOS 205 (H) 03/10/2025    ALKPHOS 152 (H) 02/10/2025   , Chemistry BMP   Lab Results   Component Value Date    GLUCOSE 259 (H) 03/10/2025    GLUCOSE 242 (H) 02/10/2025    CALCIUM 8.1 (L) 03/10/2025    CALCIUM 8.3 (L) 02/10/2025    CO2 21 03/10/2025    CO2 22 02/10/2025    CREATININE 1.47 (H) 03/10/2025    CREATININE 1.62 (H) 02/10/2025   , CBC:  Lab Results   Component Value Date    WBC 5.6 03/10/2025    WBC 7.9 02/10/2025    RBC 2.99 (L) 03/10/2025    RBC 2.68 (L) 02/10/2025    HGB 8.8 (L) 03/10/2025    HGB 7.9 (L) 02/10/2025    HCT 30.7 (L) 03/10/2025    HCT 26.0 (L)  02/10/2025     (H) 03/10/2025    MCV 97.0 02/10/2025    MCH 29.4 03/10/2025    MCH 29.5 02/10/2025    MCHC 28.7 (L) 03/10/2025    MCHC 30.4 (L) 02/10/2025    RDW 16.6 (H) 03/10/2025    RDW 12.3 02/10/2025    MPV 10.7 02/10/2025    NRBC 0.0 03/10/2025   , and Lipids:   Lab Results   Component Value Date    CHOL 146 02/14/2024    HDL 38.3 02/14/2024    LDLCALC 78 02/14/2024    TRIG 149 02/14/2024       Assessment/Plan   Assessment & Plan  Coronary artery disease involving native coronary artery of native heart without angina pectoris  CAD: The patient has stable, functional class I CAD, and is doing well.  No additional testing is necessary at present. Important aspects of lifestyle modification were discussed in detail with the patient.  Recent labs and testing have been reviewed.    Orders:    Follow Up In Cardiology    Lipid panel; Future    Follow Up In Cardiology; Future    Orthostatic hypotension  Stable on Midodrine.  Orders:    Follow Up In Cardiology    Follow Up In Cardiology; Future          Papito Maldonado MD

## 2025-03-18 NOTE — PROGRESS NOTES
Patient ID: Levy Gregory is a 69 y.o. male.  Referring Physician: No referring provider defined for this encounter.  Primary Care Provider: Chris Huizar MD  Visit Type: Initial Visit    Location: [facility]  Diagnosis/Reason: Anemia    HPI:  Levy Gregory is a 69 y.o. male referred for consultation of anemia. Denies self or family history of cancers, blood disorders or autoimmune conditions.     Per review of records:  Hemoglobin intermittently low (11.4-15.3 g/dL) in 2019, 2021  Normal hemoglobin in 2022  Anemia noted in 2023 during prolonged hospital stay   Mild hemoglobin noted in 2024 (hgb 9.8 - 13.8 g/dL)   Hemoglobin dropped to 7.7 - 8.5 g/dL so far in 2025 (MCV 98, MCHC 29.7)    Previous Hematological Background:  Hx of hematological disorders: No - Patient denies prior hematologic history, none in family either   Hx of blood transfusions: Yes -    Hx of iron supplementation: Yes - Oral supplementation - Denies adverse effect and reaction - Agent: OTC iron pill 65 mg daily for sometime, now increased to TID. Never needed IV iron.  Hx of B12 supplementation: No - Denies any prior supplementation  Hx of folate supplementation: No - Denies any prior supplementation    Relevant medications:  He is on baby asprin 81 mg daily in the morning by cardiologist   Currently using NSAID's (Naproxen, Ibuprofen etc.): No  Currently taking any supplements: One a day MV for men. OTC oral iron TID   Pantoprazole 40 mg BID     HPI:   Levy Gregory is a 68 y/o male referred for anemia, PMH COPD, T2DM, GERD, DVT (now off AC), acalculous cholecystitis, peripheral neuropathy, type II achalasia, paraesophageal hernia s/p robotic laparoscopic myotomy w/ fundoplication c/b esophageal perforation. He is accompanied by his wife today. He went to the ED on 2/13/25 after having syncope at home and black stools (for about 2 weeks). Reports having lower GI bleed approximately 6 years ago and having syncope at that time as well.  EGD found to have old debris and dried blood and no active bleeding or varices. CT angiogram was negative for active hemorrhage. He had 1 unit PRBC. Octreotide drip stopped once confirmed no esophageal varices.     He was started on Midodrine while inpatient for low BP. Recommended to wear compression stockings, stay well-hydrated, follow up with PCP and GI, and have fibroscan to evaluate for cirrhosis and possible gastric emptying study. Started on tamiflu for Flu A.     Patient reports being sick and tired of hospitals and needles. At times feels depressed. States his legs are weak and he notices OSHEA easily, dizziness at times, lack of appetite, difficulty walking due to neuropathy and fatigue. He does watch his BP at home. Says it has been running low since 2023 health issues. Tries to hydrate well but thinks about 20 ounces or so per day.     Denies chest pain, palpitations, SOB at rest, fevers, chills, drenching night sweats, pain, recent infections, nausea, vomiting, constipation, diarrhea, dysphagia, new lumps/bumps, urinary changes, further melena, hematochezia, hematuria, epistaxis, hemoptysis or worsening in LE edema. Denies other concerns.     Interval history:  3/18/2025:  Virtual or Telephone Consent  Verbal consent was requested and obtained from Levy Gregory on this date, 03/20/25 for a telehealth visit and the patient's location was confirmed at the time of the visit.  - Patient states he continues to feel fatigued and weak  - Gets tired quickly  - Saw cardiology and will continue on Midodrine for BP   - Says no further signs of bleeding including melena or hematochezia  - Denies chest pain, palpitations, cough, fevers, SOB at rest, rashes, signs of blood loss or other concerns      PMHx:  Active Ambulatory Problems     Diagnosis Date Noted    Colon polyp 02/17/2023    Diabetic neuropathy associated with type 2 diabetes mellitus (Multi) 02/17/2023    ED (erectile dysfunction) 02/17/2023    High  serum bone-specific alkaline phosphatase 02/17/2023    Gout 02/17/2023    History of pulmonary embolism 02/17/2023    Hyperlipidemia 02/17/2023    Iron deficiency anemia 02/17/2023    Obstructive sleep apnea, adult 02/17/2023    Osteopenia 02/17/2023    Vitamin D deficiency 02/17/2023    Achalasia, esophageal 06/22/2023    Type 2 diabetes mellitus with hyperglycemia, without long-term current use of insulin 06/22/2023    Orthostatic hypotension 07/06/2023    Solar purpura (CMS-HCC) 07/24/2023    Anticoagulant long-term use 11/29/2023    Other dysphagia 10/31/2023    Hypertensive heart disease with heart failure 02/04/2024    Chronic obstructive pulmonary disease, unspecified COPD type (Multi) 02/04/2024    Acute renal failure superimposed on chronic kidney disease (CMS-HCC) 03/21/2024    Adjustment disorder with depressed mood 02/17/2023    Edema of both lower legs due to peripheral venous insufficiency 02/10/2024    Schatzki's ring 03/21/2024    Coronary artery disease involving native coronary artery of native heart without angina pectoris 04/09/2024    Deep vein thrombosis (DVT) of left lower extremity (Multi) 04/09/2024    Macrocytosis 07/25/2024    Closed fracture of right hip requiring operative repair with routine healing 08/15/2024    S/P right hip fracture 09/03/2024    Chronic kidney disease, stage 5 (Multi) 09/03/2024    Asthmatic bronchitis without complication ([facility]) 10/29/2024    GI bleed 02/13/2025     Resolved Ambulatory Problems     Diagnosis Date Noted    Acne 02/17/2023    Decreased GFR 02/17/2023    Esophageal mass 02/17/2023    Esophageal stricture 02/17/2023    Gastric ulcer 02/17/2023    History of DVT (deep vein thrombosis) 02/17/2023    Microalbuminuria 02/17/2023    Nocturia 02/17/2023    Rib pain on left side 02/17/2023    Sialoadenitis 02/17/2023    Dysphagia 02/17/2023    Obesity (BMI 30-39.9) 02/17/2023    Deep vein thrombosis (DVT) of popliteal vein of left lower extremity (Multi)  06/22/2023    Dislodged gastrostomy tube 06/22/2023    Empyema (Multi) 06/22/2023    Gastrostomy in place (Multi) 06/22/2023    Mediastinitis 06/22/2023    Fall 07/06/2023    Malfunction of jejunostomy tube (Multi) 07/10/2023    Esophageal perforation 07/20/2023    Chronic atrial fibrillation (Multi) 07/24/2023    Hyponatremia 07/28/2023    Malnutrition of moderate degree (Multi) 10/03/2023    Impaired oral gastric feeding tube, initial encounter 10/03/2023    Moderate protein-calorie malnutrition (Multi) 10/19/2023    Hypotension due to drugs 11/04/2023    Dysfunction of both eustachian tubes 11/21/2023    Elevated liver function tests 11/29/2023    Hypotension 11/30/2023    Infection requiring contact isolation precautions 12/07/2023    Confusion 01/22/2024    Encounter for immunization 02/04/2024    Hypotension 02/07/2024    Pedal edema 02/13/2024    At risk for falls 02/26/2024    Type 2 diabetes mellitus with diabetic polyneuropathy, with long-term current use of insulin 03/08/2024    Generalized abdominal pain 03/08/2024    Dizziness 03/08/2024    Syncope and collapse 03/08/2024    Abnormal ECG 11/26/2022    Prolonged QT interval 03/21/2024    ACS (acute coronary syndrome) (Multi) 08/16/2023    Acute posthemorrhagic anemia 08/15/2023    Acute respiratory failure 02/12/2023    CORA (acute kidney injury) (CMS-[facility]) 03/21/2024    Anemia 03/21/2024    Bacteremia 05/22/2023    Balance problems 11/20/2023    Bruise, trunk 03/21/2024    Closed head injury 03/21/2024    Coagulopathy (Multi) 03/21/2024    Difficulty walking 11/20/2023    Do not resuscitate 08/15/2023    Epistaxis 03/21/2024    Fatigue 09/06/2023    Food bolus obstruction of intestine (Multi) 03/21/2024    History of anemia 03/21/2024    History of diabetes mellitus 03/21/2024    History of infectious disease 03/21/2024    Hypernatremia 03/21/2024    Hypocalcemia 03/21/2024    Hypokalemia 03/21/2024    Hypoxia 03/21/2024    Lactic acidosis 03/21/2024     Leukocytosis 03/21/2024    Low kidney function 02/17/2023    Lung field abnormal 05/24/2023    Malnutrition (Multi) 03/21/2024    Morbid obesity (Multi) 03/21/2024    Nosocomial condition 08/15/2023    Onychodystrophy 11/20/2023    Onychomycosis 11/20/2023    Pain in toes of both feet 11/20/2023    Pleural effusion exudative 03/21/2024    CAP (community acquired pneumonia) 08/15/2023    Post-operative pain 03/21/2024    Postoperative septic shock (CMS-HCC) 03/21/2024    Other pulmonary embolism without acute cor pulmonale 02/10/2024    Pulmonary embolism 02/12/2023    Respiratory distress 03/21/2024    Rib contusion, left, sequela 03/21/2024    History of inferior vena caval filter placement 03/21/2024    Status post endoscopy 03/21/2024    Vagal arrhythmia 07/23/2018    Mild left ventricular systolic dysfunction 04/09/2024    Upper respiratory tract infection 05/13/2024    Palpitations 05/21/2024    Weight loss 07/25/2024    Closed right hip fracture, initial encounter 08/15/2024     Past Medical History:   Diagnosis Date    Contusion of abdominal wall, initial encounter 01/12/2021    COPD (chronic obstructive pulmonary disease) (Multi)     Diabetes mellitus (Multi)     DVT (deep venous thrombosis) (Multi)     DVT (deep venous thrombosis) (Multi)     GERD (gastroesophageal reflux disease)     Hemoptysis 05/12/2020    Herpes labialis     Hiatal hernia     Irregular heart beat     Pain in left knee     Peripheral neuropathy     Personal history of other diseases of the respiratory system 06/19/2019    Sleep apnea       PSHx:  Past Surgical History:   Procedure Laterality Date    CONDUIT REPLACEMENT  12/07/2023    Gastric conduit revision; mini laparotomy, General Ex-lap, ISA, Gastric pull up with anastmosis in neck, MIGUELINA to bulb sx. New J tube.    ESOPHAGOGASTRECTOMY      ESOPHAGOGASTRODUODENOSCOPY      with stents x2    GASTROSTOMY TUBE PLACEMENT N/A     IR CVC PICC  08/16/2023    IR CVC PICC 8/16/2023 [facility]    OTHER SURGICAL HISTORY  2019    [place] filter placement    UMBILICAL HERNIA REPAIR N/A       FHx:   Maternal Grandmother: DM  Mother: No medical issues   Father: Heart disease  Siblings: 1 full biological sister. 4 half brother/sisters. No cancer or blood disorders.   Children: 2 boys, age 36 and 39. Healthy     Social Hx:  eLvy Gregory    reports that he quit smoking about 2 years ago. His smoking use included cigars. He started smoking about 35 years ago. He has been exposed to tobacco smoke. He has never used smokeless tobacco.  He  reports no history of alcohol use.  He  reports no history of drug use.    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 From [employer]. No special diets or restrictions.     Social History     Socioeconomic History    Marital status:    Tobacco Use    Smoking status: Former     Types: Cigars     Start date: 1989     Quit date: 2023     Years since quittin.0     Passive exposure: Past    Smokeless tobacco: Never   Vaping Use    Vaping status: Never Used   Substance and Sexual Activity    Alcohol use: Never    Drug use: Never    Sexual activity: Defer     Social Drivers of Health     Financial Resource Strain: Low Risk  (2025)    Overall Financial Resource Strain (CARDIA)     Difficulty of Paying Living Expenses: Not hard at all   Food Insecurity: No Food Insecurity (2025)    Hunger Vital Sign     Worried About Running Out of Food in the Last Year: Never true     Ran Out of Food in the Last Year: Never true   Transportation Needs: No Transportation Needs (2025)    PRAPARE - Transportation     Lack of Transportation (Medical): No     Lack of Transportation (Non-Medical): No   Physical Activity: Unknown (10/20/2023)    Exercise Vital Sign     Minutes of Exercise per Session: 30 min   Stress: Stress Concern Present (10/20/2023)    [employer] - Occupational Stress  Questionnaire     Feeling of Stress : To some extent   Social Connections: Socially Isolated (10/20/2023)    Social Connection and Isolation Panel [NHANES]     Frequency of Communication with Friends and Family: More than three times a week     Attends Zoroastrian Services: Never     Active Member of Clubs or Organizations: No     Attends Club or Organization Meetings: Never     Marital Status:    Intimate Partner Violence: Not At Risk (2/13/2025)    Humiliation, Afraid, Rape, and Kick questionnaire     Fear of Current or Ex-Partner: No     Emotionally Abused: No     Physically Abused: No     Sexually Abused: No   Housing Stability: Low Risk  (2/13/2025)    Housing Stability Vital Sign     Unable to Pay for Housing in the Last Year: No     Number of Times Moved in the Last Year: 1     Homeless in the Last Year: No       Cancer Screenings:  Upper EGD: 1/4/21, 11/25/22, 2/11/23, 2/23/23  More Recently:   - 2/14/25: Findings  Patient is s/p esophagectomy with gastric pull through  Large amount of old blood and food debris in the stomach body and antrum that could not be lavaged or suctioned. There was otherwise no fresh blood seen to extent reached. Procedure aborted in the antrum due to poor visualization and to minimize risk of aspiration  - 2/17/25: Findings  Patient is s/p esophagectomy with gastric pull through  Moderate amount of old food debris and retained gastric fluid in the stomach body and antrum precluding visualization of stomach body. There was otherwise no evidence of active bleeding. The pylorus was characterized by edematous mucosa but traversable with minimal resistance.  The duodenum appeared normal.  Colonoscopy: 3/12/2021 (repeat in 5 years)      Medications and allergies reviewed in EMR.    ROS:  Review of Systems - Oncology   10 point review of systems negative except as state in HPI.    Vitals & Statistics:  Objective   Visit Vitals  Smoking Status Former     Physical Exam & VS not  "completed due to telephone visit     Results:  Lab Results   Component Value Date    WBC 5.6 03/10/2025    NEUTROABS 3.70 03/10/2025    IGABSOL 0.02 03/10/2025    LYMPHSABS 1.23 03/10/2025    MONOSABS 0.53 03/10/2025    EOSABS 0.09 03/10/2025    BASOSABS 0.03 03/10/2025    RBC 2.99 (L) 03/10/2025     (H) 03/10/2025    MCHC 28.7 (L) 03/10/2025    HGB 8.8 (L) 03/10/2025    HCT 30.7 (L) 03/10/2025     03/10/2025     Lab Results   Component Value Date    RETICCTPCT 3.2 (H) 03/10/2025      Lab Results   Component Value Date    CREATININE 1.47 (H) 03/10/2025    BUN 34 (H) 03/10/2025    EGFR 52 (L) 03/10/2025     03/10/2025    K 4.1 03/10/2025     03/10/2025    CO2 21 03/10/2025      Lab Results   Component Value Date    ALT 37 03/10/2025    AST 32 03/10/2025    ALKPHOS 205 (H) 03/10/2025    BILITOT 0.3 03/10/2025      Lab Results   Component Value Date    TSH 2.51 03/10/2025     Lab Results   Component Value Date    TSH 2.51 03/10/2025    THYROIDPAB <28 07/24/2023     Lab Results   Component Value Date    IRON 21 (L) 03/10/2025    TIBC 379 03/10/2025    FERRITIN 46 03/10/2025      Lab Results   Component Value Date    OGOLWXVP29 734 03/10/2025      Lab Results   Component Value Date    FOLATE 15.8 03/10/2025     Lab Results   Component Value Date    FERNANDO Positive (A) 03/10/2025    RF <10 03/10/2025    SEDRATE 22 (H) 03/10/2025      Lab Results   Component Value Date    CRP 0.73 03/10/2025      No results found for: \"JOSELUIS\"  Lab Results   Component Value Date     03/10/2025     Lab Results   Component Value Date    HAPTOGLOBIN 135 03/10/2025     Lab Results   Component Value Date    SPEP  03/10/2025     Aberrant bands detected. See immunofixation. Hypoalbuminemia.        Lab Results   Component Value Date    IGG 1,670 (H) 03/10/2025    IGM 78 03/10/2025     03/10/2025     Lab Results   Component Value Date    HEPAIGM NONREACTIVE 07/24/2023    HEPBCIGM NONREACTIVE 07/24/2023    HEPBSAG " "NONREACTIVE 07/24/2023    HEPCAB REACTIVE (A) 07/24/2023     No results found for: \"HIV1X2\"    Assessment:  Levy Gregory is a 68 y/o referred for consultation of anemia 2/2 recent GI bleed. Reports no further melena occurring since hospital visit.      I reviewed patient's chart including but not limited to labs, imaging, surgical/procedure notes, pathology, hospital notes, doctor's notes.    2/19/2025 results:  WBC WNL  Normocytic, hypochromic anemia. Hgb 8.5, RBC 2.91, Hematocrit 28.6%  Platelets WNL  Creatinine 1.37, GFR 56, calcium 8.0    Plan:  Discussed possible etiologies including multifactorial, nutritional deficiencies, anemia of chronic disease, malabsorption, hemopathy, medications, bleeding, malignancy, etc. Will start hematological workup today.    Anemia   Patient declined recommended labs today due to not having enough time. States he has a FUV on Monday with his primary care, he will go for labs after that visit in case PCP also orders testing since patient reports he is a very difficult stick.  Continue oral iron as already prescribed, TID, monitor for side effects, which were discussed  SW to meet with patient today for depression score/report   Has FUV with GI on 4/10/25  RTC on 3/18/25 via virtual/telehealth visit - F/U sooner if needed/urgent    I had an extensive discussion with the patient regarding the diagnosis and discussed the plan of therapy, including general considerations regarding side effects and outcomes. Pt understood and gave appropriate teach back about the plan of care. All questions were answered to the patient's satisfaction. The patient is instructed to contact us at any time if questions or problems arise. Thank you for the opportunity to participate in the care of this very pleasant patient.    Total time = 50 minutes. 50% or more of this time was spent in counseling and/or coordination of care including reviewing medical history/radiology/labs, examining patient, " "formulating outlined plan with team, and discussing plan with patient/family.    Interval History:   3/18/2025:   - Labs from initial workup showed: WBC 5.6, normal dif, hgb 8.8, hematocrit 30.7%, , MCHC 28.7, plt 184,000, retic 3.2%, sed rate 22, FERNANDO + with negative reflex, Cr 1.47, GFR 52, calcium 8.1, alk phos 205, ALT 37, AST 32, glucose 259, iron saturation 6%, ferritin 46, serum iron 21, , CRP 0.73, haptoglobin 135, JOSELUIS neg, IgG 1,670, IgA 186, IgM 78, Ig Kappa FLC 7.08, Ig Lambda FLC 7.27, FLC ratio 0.97, SPEP \"Several discrete bands are detected within the gamma region suggesting an oligoclonal or reactive pattern.  Repeat testing is recommended after the resolution of any acute illness to monitor the evolution of this band.\"  - Labs show SHARONA   - He has been on oral iron   - SHARONA likely 2/2 recent GI bleeding  - Sees GI next month for follow up   - Discussed we will proceed with IV Venofer   - Drug information including risks and benefits discussed with patient including risk of hypersensitivity, patient provided informed consent to proceed   - Discussed that we will closely monitor his labs and once iron is improved if he remains anemic we will likely recommend additional workup   - He can continue his oral iron   - RTC in 8 weeks after IV venofer, early June, for repeat labs and FUV       Diagnoses and all orders for this visit:  GI bleed  -     Clinic Appointment Request Follow up (phone); [facility] (phone)  Melena  -     Clinic Appointment Request Follow up (phone); [facility] (phone)  Anemia, unspecified type  -     Clinic Appointment Request Follow up (phone); [facility] (phone)      IVÁN Boyd-CNP     "

## 2025-03-18 NOTE — ASSESSMENT & PLAN NOTE
CAD: The patient has stable, functional class I CAD, and is doing well.  No additional testing is necessary at present. Important aspects of lifestyle modification were discussed in detail with the patient.  Recent labs and testing have been reviewed.    Orders:    Follow Up In Cardiology    Lipid panel; Future    Follow Up In Cardiology; Future

## 2025-03-20 DIAGNOSIS — D50.0 IRON DEFICIENCY ANEMIA DUE TO CHRONIC BLOOD LOSS: ICD-10-CM

## 2025-03-20 DIAGNOSIS — D64.9 ANEMIA, UNSPECIFIED TYPE: ICD-10-CM

## 2025-03-21 ENCOUNTER — INFUSION (OUTPATIENT)
Dept: HEMATOLOGY/ONCOLOGY | Facility: CLINIC | Age: 70
End: 2025-03-21
Payer: MEDICARE

## 2025-03-21 VITALS
HEART RATE: 64 BPM | TEMPERATURE: 97.2 F | BODY MASS INDEX: 29.03 KG/M2 | DIASTOLIC BLOOD PRESSURE: 80 MMHG | OXYGEN SATURATION: 95 % | WEIGHT: 220.02 LBS | SYSTOLIC BLOOD PRESSURE: 117 MMHG | RESPIRATION RATE: 16 BRPM

## 2025-03-21 DIAGNOSIS — D50.0 IRON DEFICIENCY ANEMIA DUE TO CHRONIC BLOOD LOSS: ICD-10-CM

## 2025-03-21 PROCEDURE — 96365 THER/PROPH/DIAG IV INF INIT: CPT | Mod: INF

## 2025-03-21 PROCEDURE — 2500000004 HC RX 250 GENERAL PHARMACY W/ HCPCS (ALT 636 FOR OP/ED)

## 2025-03-21 PROCEDURE — 96374 THER/PROPH/DIAG INJ IV PUSH: CPT | Mod: INF

## 2025-03-21 RX ORDER — HEPARIN SODIUM,PORCINE/PF 10 UNIT/ML
50 SYRINGE (ML) INTRAVENOUS AS NEEDED
OUTPATIENT
Start: 2025-03-21

## 2025-03-21 RX ORDER — DIPHENHYDRAMINE HYDROCHLORIDE 50 MG/ML
50 INJECTION, SOLUTION INTRAMUSCULAR; INTRAVENOUS AS NEEDED
Status: DISCONTINUED | OUTPATIENT
Start: 2025-03-21 | End: 2025-03-21 | Stop reason: HOSPADM

## 2025-03-21 RX ORDER — EPINEPHRINE 0.3 MG/.3ML
0.3 INJECTION SUBCUTANEOUS EVERY 5 MIN PRN
Status: DISCONTINUED | OUTPATIENT
Start: 2025-03-21 | End: 2025-03-21 | Stop reason: HOSPADM

## 2025-03-21 RX ORDER — ALBUTEROL SULFATE 0.83 MG/ML
3 SOLUTION RESPIRATORY (INHALATION) AS NEEDED
Status: DISCONTINUED | OUTPATIENT
Start: 2025-03-21 | End: 2025-03-21 | Stop reason: HOSPADM

## 2025-03-21 RX ORDER — ALBUTEROL SULFATE 0.83 MG/ML
3 SOLUTION RESPIRATORY (INHALATION) AS NEEDED
OUTPATIENT
Start: 2025-03-28

## 2025-03-21 RX ORDER — HEPARIN 100 UNIT/ML
500 SYRINGE INTRAVENOUS AS NEEDED
OUTPATIENT
Start: 2025-03-21

## 2025-03-21 RX ORDER — FAMOTIDINE 10 MG/ML
20 INJECTION, SOLUTION INTRAVENOUS ONCE AS NEEDED
Status: DISCONTINUED | OUTPATIENT
Start: 2025-03-21 | End: 2025-03-21 | Stop reason: HOSPADM

## 2025-03-21 RX ORDER — FAMOTIDINE 10 MG/ML
20 INJECTION, SOLUTION INTRAVENOUS ONCE AS NEEDED
OUTPATIENT
Start: 2025-03-28

## 2025-03-21 RX ORDER — DIPHENHYDRAMINE HYDROCHLORIDE 50 MG/ML
50 INJECTION, SOLUTION INTRAMUSCULAR; INTRAVENOUS AS NEEDED
OUTPATIENT
Start: 2025-03-28

## 2025-03-21 RX ORDER — EPINEPHRINE 0.3 MG/.3ML
0.3 INJECTION SUBCUTANEOUS EVERY 5 MIN PRN
OUTPATIENT
Start: 2025-03-28

## 2025-03-21 RX ADMIN — IRON SUCROSE 300 MG: 20 INJECTION, SOLUTION INTRAVENOUS at 09:30

## 2025-03-21 ASSESSMENT — PAIN SCALES - GENERAL: PAINLEVEL_OUTOF10: 0-NO PAIN

## 2025-03-24 ENCOUNTER — APPOINTMENT (OUTPATIENT)
Dept: UROLOGY | Facility: CLINIC | Age: 70
End: 2025-03-24
Payer: MEDICARE

## 2025-03-24 VITALS — SYSTOLIC BLOOD PRESSURE: 103 MMHG | TEMPERATURE: 97.1 F | DIASTOLIC BLOOD PRESSURE: 73 MMHG | HEART RATE: 80 BPM

## 2025-03-24 DIAGNOSIS — N40.1 BPH WITH OBSTRUCTION/LOWER URINARY TRACT SYMPTOMS: ICD-10-CM

## 2025-03-24 DIAGNOSIS — N13.8 BPH WITH OBSTRUCTION/LOWER URINARY TRACT SYMPTOMS: ICD-10-CM

## 2025-03-24 DIAGNOSIS — N32.81 OAB (OVERACTIVE BLADDER): ICD-10-CM

## 2025-03-24 PROCEDURE — 1123F ACP DISCUSS/DSCN MKR DOCD: CPT | Performed by: NURSE PRACTITIONER

## 2025-03-24 PROCEDURE — 3074F SYST BP LT 130 MM HG: CPT | Performed by: NURSE PRACTITIONER

## 2025-03-24 PROCEDURE — 99213 OFFICE O/P EST LOW 20 MIN: CPT | Performed by: NURSE PRACTITIONER

## 2025-03-24 PROCEDURE — 1036F TOBACCO NON-USER: CPT | Performed by: NURSE PRACTITIONER

## 2025-03-24 PROCEDURE — 1159F MED LIST DOCD IN RCRD: CPT | Performed by: NURSE PRACTITIONER

## 2025-03-24 PROCEDURE — 3078F DIAST BP <80 MM HG: CPT | Performed by: NURSE PRACTITIONER

## 2025-03-24 RX ORDER — VIBEGRON 75 MG/1
75 TABLET, FILM COATED ORAL DAILY
Qty: 90 TABLET | Refills: 3 | Status: SHIPPED | OUTPATIENT
Start: 2025-03-24 | End: 2026-03-24

## 2025-03-24 NOTE — PROGRESS NOTES
Subjective   Patient ID: Levy Gregory is a 69 y.o. male who presents for oab.  Hx of paraesophageal hernia and achalasia in March 2023 complicated by sepsis. He had multiple complications afterwards requiring lengthy ICU stay. He has also had several feeding tubes, etc.  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 Recovering from hip fracture s/p fall in August 2024.      Unsure exactly when catheter was initially placed, however he has failed multiple TOVs per his recollection. Denies urinary difficulties prior to surgeries in 2023. Passed in office TOV in Jan 2024. NTF up to 4 since cath removal, very bothersome to him. Wakes him up, some dysuria if he delays urination. Moderate volume voids. Tried stopping tamsulosin. Remains on myrbetriq and finasteride. No improvement. Did not complete in lab sleep study Switched to gemtesa from myrbetriq in Sept. Significant improvement in nocturia, down to 2.      Denies family history of prostate cancer.      Drinks an energy drink a few times a week, 32 oz of water daily, coffee in the AM, and apple juice. Stops drinking at least 2 hours before bed.                 Review of Systems   All other systems reviewed and are negative.      Objective   Physical Exam  Vitals reviewed.     Alert and oriented x3  Moist mucous membranes  Breathes easily on room air  Abdomen soft, nondistended  No edema  No scleral icterus  No focal neurological deficits  Appears stated age, no acute distress    PVR=45ml    Assessment/Plan   Diagnoses and all orders for this visit:  OAB (overactive bladder)  -     vibegron (Gemtesa) 75 mg tablet; Take 1 tablet (75 mg) by mouth once daily.  BPH with obstruction/lower urinary tract symptoms  -     Post-Void Residual  -     PSA; Future    Patient is pleased with control of symptoms on gemtesa in conjunction with tamsulosin. Briefly discussed stopping med, would like to continue at present. Plan for 6 month follow up or sooner if needed.           Kayli Gotti, IVÁN-CNP 03/24/25 2:01 PM

## 2025-03-28 ENCOUNTER — INFUSION (OUTPATIENT)
Dept: HEMATOLOGY/ONCOLOGY | Facility: CLINIC | Age: 70
End: 2025-03-28
Payer: MEDICARE

## 2025-03-28 VITALS
OXYGEN SATURATION: 96 % | TEMPERATURE: 97.2 F | RESPIRATION RATE: 14 BRPM | DIASTOLIC BLOOD PRESSURE: 71 MMHG | SYSTOLIC BLOOD PRESSURE: 113 MMHG | BODY MASS INDEX: 29.81 KG/M2 | WEIGHT: 225.97 LBS | HEART RATE: 73 BPM

## 2025-03-28 DIAGNOSIS — D50.0 IRON DEFICIENCY ANEMIA DUE TO CHRONIC BLOOD LOSS: ICD-10-CM

## 2025-03-28 DIAGNOSIS — D50.0 IRON DEFICIENCY ANEMIA DUE TO CHRONIC BLOOD LOSS: Primary | ICD-10-CM

## 2025-03-28 LAB
BASOPHILS # BLD AUTO: 0.04 X10*3/UL (ref 0–0.1)
BASOPHILS NFR BLD AUTO: 0.8 %
EOSINOPHIL # BLD AUTO: 0.12 X10*3/UL (ref 0–0.7)
EOSINOPHIL NFR BLD AUTO: 2.3 %
ERYTHROCYTE [DISTWIDTH] IN BLOOD BY AUTOMATED COUNT: 16.7 % (ref 11.5–14.5)
HCT VFR BLD AUTO: 29.5 % (ref 41–52)
HGB BLD-MCNC: 8.8 G/DL (ref 13.5–17.5)
IMM GRANULOCYTES # BLD AUTO: 0.02 X10*3/UL (ref 0–0.7)
IMM GRANULOCYTES NFR BLD AUTO: 0.4 % (ref 0–0.9)
LYMPHOCYTES # BLD AUTO: 1.08 X10*3/UL (ref 1.2–4.8)
LYMPHOCYTES NFR BLD AUTO: 21.1 %
MCH RBC QN AUTO: 30 PG (ref 26–34)
MCHC RBC AUTO-ENTMCNC: 29.8 G/DL (ref 32–36)
MCV RBC AUTO: 101 FL (ref 80–100)
MONOCYTES # BLD AUTO: 0.59 X10*3/UL (ref 0.1–1)
MONOCYTES NFR BLD AUTO: 11.5 %
NEUTROPHILS # BLD AUTO: 3.27 X10*3/UL (ref 1.2–7.7)
NEUTROPHILS NFR BLD AUTO: 63.9 %
PLATELET # BLD AUTO: 166 X10*3/UL (ref 150–450)
RBC # BLD AUTO: 2.93 X10*6/UL (ref 4.5–5.9)
WBC # BLD AUTO: 5.1 X10*3/UL (ref 4.4–11.3)

## 2025-03-28 PROCEDURE — 2500000004 HC RX 250 GENERAL PHARMACY W/ HCPCS (ALT 636 FOR OP/ED)

## 2025-03-28 PROCEDURE — 96365 THER/PROPH/DIAG IV INF INIT: CPT | Mod: INF

## 2025-03-28 PROCEDURE — 85025 COMPLETE CBC W/AUTO DIFF WBC: CPT

## 2025-03-28 RX ORDER — EPINEPHRINE 0.3 MG/.3ML
0.3 INJECTION SUBCUTANEOUS EVERY 5 MIN PRN
OUTPATIENT
Start: 2025-04-04

## 2025-03-28 RX ORDER — HEPARIN 100 UNIT/ML
500 SYRINGE INTRAVENOUS AS NEEDED
Status: DISCONTINUED | OUTPATIENT
Start: 2025-03-28 | End: 2025-03-28 | Stop reason: HOSPADM

## 2025-03-28 RX ORDER — EPINEPHRINE 0.3 MG/.3ML
0.3 INJECTION SUBCUTANEOUS EVERY 5 MIN PRN
Status: DISCONTINUED | OUTPATIENT
Start: 2025-03-28 | End: 2025-03-28 | Stop reason: HOSPADM

## 2025-03-28 RX ORDER — DIPHENHYDRAMINE HYDROCHLORIDE 50 MG/ML
50 INJECTION, SOLUTION INTRAMUSCULAR; INTRAVENOUS AS NEEDED
OUTPATIENT
Start: 2025-04-04

## 2025-03-28 RX ORDER — HEPARIN SODIUM,PORCINE/PF 10 UNIT/ML
50 SYRINGE (ML) INTRAVENOUS AS NEEDED
OUTPATIENT
Start: 2025-03-28

## 2025-03-28 RX ORDER — FAMOTIDINE 10 MG/ML
20 INJECTION, SOLUTION INTRAVENOUS ONCE AS NEEDED
Status: DISCONTINUED | OUTPATIENT
Start: 2025-03-28 | End: 2025-03-28 | Stop reason: HOSPADM

## 2025-03-28 RX ORDER — HEPARIN 100 UNIT/ML
500 SYRINGE INTRAVENOUS AS NEEDED
OUTPATIENT
Start: 2025-03-28

## 2025-03-28 RX ORDER — ALBUTEROL SULFATE 0.83 MG/ML
3 SOLUTION RESPIRATORY (INHALATION) AS NEEDED
OUTPATIENT
Start: 2025-04-04

## 2025-03-28 RX ORDER — DIPHENHYDRAMINE HYDROCHLORIDE 50 MG/ML
50 INJECTION, SOLUTION INTRAMUSCULAR; INTRAVENOUS AS NEEDED
Status: DISCONTINUED | OUTPATIENT
Start: 2025-03-28 | End: 2025-03-28 | Stop reason: HOSPADM

## 2025-03-28 RX ORDER — HEPARIN SODIUM,PORCINE/PF 10 UNIT/ML
50 SYRINGE (ML) INTRAVENOUS AS NEEDED
Status: DISCONTINUED | OUTPATIENT
Start: 2025-03-28 | End: 2025-03-28 | Stop reason: HOSPADM

## 2025-03-28 RX ORDER — FAMOTIDINE 10 MG/ML
20 INJECTION, SOLUTION INTRAVENOUS ONCE AS NEEDED
OUTPATIENT
Start: 2025-04-04

## 2025-03-28 RX ORDER — ALBUTEROL SULFATE 0.83 MG/ML
3 SOLUTION RESPIRATORY (INHALATION) AS NEEDED
Status: DISCONTINUED | OUTPATIENT
Start: 2025-03-28 | End: 2025-03-28 | Stop reason: HOSPADM

## 2025-03-28 RX ADMIN — IRON SUCROSE 300 MG: 20 INJECTION, SOLUTION INTRAVENOUS at 12:38

## 2025-03-28 RX ADMIN — SODIUM CHLORIDE 1000 ML: 9 INJECTION, SOLUTION INTRAVENOUS at 12:35

## 2025-03-28 ASSESSMENT — PAIN SCALES - GENERAL: PAINLEVEL_OUTOF10: 0-NO PAIN

## 2025-03-28 NOTE — PROGRESS NOTES
IV fluids and cbc entered due to slightly lower than baseline BP. Patient told infusion RN he'd like to proceed with iron infusion and new/worsening symptoms

## 2025-03-28 NOTE — PROGRESS NOTES
Pt here for dose #2 venofer. Hypotensive on arrival, BP 86/58. Pt does c/o occasional lightheadedness/dizziness, but states that this is normal for him. Freddie Osborn NP notified. Ok to give venofer today. Pt also given 1000ml NS over 2 hrs concurrently with venofer. CBC re-checked. Hgb stable at 8.8. Pt received venofer and fluids without incident. BP post infusion was 113/71. Pt discharged home in stable condition. Pt will RTC next week for dose #3.

## 2025-03-28 NOTE — PATIENT INSTRUCTIONS
Pt received dose #2 venofer today, along with IVF's, without incident. Will RTC next week for dose #3.

## 2025-03-31 ENCOUNTER — PATIENT MESSAGE (OUTPATIENT)
Dept: PRIMARY CARE | Facility: CLINIC | Age: 70
End: 2025-03-31
Payer: MEDICARE

## 2025-03-31 DIAGNOSIS — I95.1 ORTHOSTATIC HYPOTENSION: ICD-10-CM

## 2025-03-31 DIAGNOSIS — N32.81 OAB (OVERACTIVE BLADDER): Primary | ICD-10-CM

## 2025-03-31 RX ORDER — MIDODRINE HYDROCHLORIDE 5 MG/1
5 TABLET ORAL
Qty: 270 TABLET | Refills: 0 | Status: SHIPPED | OUTPATIENT
Start: 2025-03-31

## 2025-04-04 ENCOUNTER — INFUSION (OUTPATIENT)
Dept: HEMATOLOGY/ONCOLOGY | Facility: CLINIC | Age: 70
End: 2025-04-04
Payer: MEDICARE

## 2025-04-04 ENCOUNTER — APPOINTMENT (OUTPATIENT)
Dept: GASTROENTEROLOGY | Facility: CLINIC | Age: 70
End: 2025-04-04
Payer: MEDICARE

## 2025-04-04 VITALS
RESPIRATION RATE: 16 BRPM | DIASTOLIC BLOOD PRESSURE: 87 MMHG | TEMPERATURE: 97.3 F | HEART RATE: 64 BPM | WEIGHT: 220.24 LBS | BODY MASS INDEX: 29.06 KG/M2 | SYSTOLIC BLOOD PRESSURE: 134 MMHG | OXYGEN SATURATION: 100 %

## 2025-04-04 DIAGNOSIS — D50.0 IRON DEFICIENCY ANEMIA DUE TO CHRONIC BLOOD LOSS: ICD-10-CM

## 2025-04-04 PROCEDURE — 2500000004 HC RX 250 GENERAL PHARMACY W/ HCPCS (ALT 636 FOR OP/ED)

## 2025-04-04 PROCEDURE — 96365 THER/PROPH/DIAG IV INF INIT: CPT | Mod: INF

## 2025-04-04 RX ORDER — ALBUTEROL SULFATE 0.83 MG/ML
3 SOLUTION RESPIRATORY (INHALATION) AS NEEDED
Status: DISCONTINUED | OUTPATIENT
Start: 2025-04-04 | End: 2025-04-04 | Stop reason: HOSPADM

## 2025-04-04 RX ORDER — ALBUTEROL SULFATE 0.83 MG/ML
3 SOLUTION RESPIRATORY (INHALATION) AS NEEDED
OUTPATIENT
Start: 2025-04-04

## 2025-04-04 RX ORDER — FAMOTIDINE 10 MG/ML
20 INJECTION, SOLUTION INTRAVENOUS ONCE AS NEEDED
Status: DISCONTINUED | OUTPATIENT
Start: 2025-04-04 | End: 2025-04-04 | Stop reason: HOSPADM

## 2025-04-04 RX ORDER — FAMOTIDINE 10 MG/ML
20 INJECTION, SOLUTION INTRAVENOUS ONCE AS NEEDED
OUTPATIENT
Start: 2025-04-04

## 2025-04-04 RX ORDER — EPINEPHRINE 0.3 MG/.3ML
0.3 INJECTION SUBCUTANEOUS EVERY 5 MIN PRN
Status: DISCONTINUED | OUTPATIENT
Start: 2025-04-04 | End: 2025-04-04 | Stop reason: HOSPADM

## 2025-04-04 RX ORDER — HEPARIN 100 UNIT/ML
500 SYRINGE INTRAVENOUS AS NEEDED
OUTPATIENT
Start: 2025-04-04

## 2025-04-04 RX ORDER — DIPHENHYDRAMINE HYDROCHLORIDE 50 MG/ML
50 INJECTION, SOLUTION INTRAMUSCULAR; INTRAVENOUS AS NEEDED
Status: DISCONTINUED | OUTPATIENT
Start: 2025-04-04 | End: 2025-04-04 | Stop reason: HOSPADM

## 2025-04-04 RX ORDER — EPINEPHRINE 0.3 MG/.3ML
0.3 INJECTION SUBCUTANEOUS EVERY 5 MIN PRN
OUTPATIENT
Start: 2025-04-04

## 2025-04-04 RX ORDER — DIPHENHYDRAMINE HYDROCHLORIDE 50 MG/ML
50 INJECTION, SOLUTION INTRAMUSCULAR; INTRAVENOUS AS NEEDED
OUTPATIENT
Start: 2025-04-04

## 2025-04-04 RX ORDER — HEPARIN SODIUM,PORCINE/PF 10 UNIT/ML
50 SYRINGE (ML) INTRAVENOUS AS NEEDED
OUTPATIENT
Start: 2025-04-04

## 2025-04-04 RX ADMIN — IRON SUCROSE 300 MG: 20 INJECTION, SOLUTION INTRAVENOUS at 12:02

## 2025-04-04 ASSESSMENT — PAIN SCALES - GENERAL: PAINLEVEL_OUTOF10: 0-NO PAIN

## 2025-04-08 NOTE — PROGRESS NOTES
Patient ID: eLvy Gregory is a 69 y.o. male who presents for No chief complaint on file..    Pt is here for First appointment.     Referring Provider: Kayli Gotti AP*  Reason for Referral: ***    Subjective     Medication and allergy reconciliation completed     Drug Interactions  {Drug Interactions:81548}    Medication System Management  Patient's preferred pharmacy:     [id number] - [place], [place] - [address]  [address] [address]  Phone: [phone] Fax: [phone]    [facility] - [place], [place] - [address]  [address]  [address]  [facility] [address]  Phone: [phone] Fax: [phone]    [facility] - [facility] [address] [address] [facility]  [address] [address] [facility] [address]  Phone: [phone] Fax: [phone]    Adherence/Organization: ***  Affordability/Accessibility: Assess for [facility]      Current Outpatient Medications on File Prior to Visit   Medication Sig Dispense Refill    albuterol (ProAir HFA) 90 mcg/actuation inhaler two puffs four times daily for two weeks 8.5 g 0    aspirin 81 mg chewable tablet Chew 1 tablet (81 mg) once daily. 30 tablet 11    atorvastatin (Lipitor) 80 mg tablet Take 1 tablet (80 mg) by mouth once daily. 90 tablet 3    blood sugar diagnostic (OneTouch Ultra Test) strip Test glucose twice daily or as directed 200 strip 1    blood-glucose meter misc Check glucose before meals and call if less than 80 or over 300. 1 each 0    flash glucose sensor kit (FreeStyle Kellie 2 Sensor) kit Use as instructed 6 each 1    fluticasone propion-salmeteroL (Advair Diskus) 100-50 mcg/dose diskus inhaler Inhale 1 puff 2 times a day. Rinse mouth with water after use to reduce aftertaste and incidence of candidiasis. Do not swallow. 60 each 2    FreeStyle Kellie 2 [place] misc Use as instructed 1 each 0    gabapentin (Neurontin) 100 mg capsule Take 1 capsule (100 mg) by mouth 3 times a day. 270  "capsule 0    midodrine (Proamatine) 5 mg tablet Take 1 tablet (5 mg) by mouth 3 times daily (morning, midday, late afternoon). 270 tablet 0    pantoprazole (ProtoNix) 40 mg EC tablet Take 1 tablet (40 mg) by mouth 2 times a day. Do not crush, chew, or split. 180 tablet 0    pioglitazone (Actos) 15 mg tablet Take 1 tablet (15 mg) by mouth once daily. 30 tablet 11    sertraline (Zoloft) 50 mg tablet Take 1 tablet (50 mg) by mouth once daily. 90 tablet 0    tamsulosin (Flomax) 0.4 mg 24 hr capsule Take 1 capsule (0.4 mg) by mouth once daily. 90 capsule 1    vibegron (Gemtesa) 75 mg tablet Take 1 tablet (75 mg) by mouth once daily. 90 tablet 3     No current facility-administered medications on file prior to visit.           Vaginal Symptoms  Vaginal Dryness:   Dysuria (pain, burning, stinging, or itching with urination):   Vaginal and/or vulvar irritation/itching:   Recurrent urinary tract infections:   No results found for: \"ESTRADIOLFRE\", \"PROGESTERONE\", \"ESTRADIOL\", \"FSH\"  Pessary:   If pessary, patient should be using the estradiol vaginal cream applicator     Non Pharm Mgmt:     Previous Treatment:     Current Treatment:       Urinary Symptoms  Medications that may contribute to symptoms:   diuretics, anticholinergics, alpha blockers (doxazosin, prazosin, terazosin), tricyclic antidepressants, antipsychotics (lithium, clozapine), calcium channel blockers (causes bladder to relax and affects ability to empty properly), opioids (impair bladder contraction), antihistamines (diphenhydramine, chlorpheniramine), pseudoephedrine, phenylephrine, SGLT2 inhibitors (increases the amount of glucose and fluid excreted through kidneys).   Any history of:   Narrow-angle glaucoma:  Impaired gastric emptying:  Urinary retention:  If yes to any of the above use caution/avoid antimuscarinic medications  Prediabetes or diabetes:    Lab Results   Component Value Date    GLUCOSE 259 (H) 03/10/2025    HGBA1C 6.3 12/13/2024    HGBA1C 6.8 " "(H) 07/29/2024    HGBA1C 5.4 02/07/2024     No results found for: \"LEPTIN\", \"INSULFAST\", \"GLUF\"  Frequently of bowel movement:   Tobacco use:  How many protective undergarments are you utilizing daily:  Recommend Coloplast Joanne Protect moisture barrier cream for incontinence associated skin irritation/breakdown.     What medications have been tried/stopped?     Current medication?   When started?   Side effects?   Improvement in symptoms?       Cardiovascular Health  The ASCVD Risk score (Yosvany ESPARZA, et al., 2019) failed to calculate for the following reasons:    Risk score cannot be calculated because patient has a medical history suggesting prior/existing ASCVD    Lab Results   Component Value Date    CHOL 146 02/14/2024     Lab Results   Component Value Date    HDL 38.3 02/14/2024     Lab Results   Component Value Date    LDLCALC 78 02/14/2024     Lab Results   Component Value Date    TRIG 149 02/14/2024     No components found for: \"CHOLHDL\"     Vitals  BP Readings from Last 2 Encounters:   04/04/25 134/87   03/28/25 113/71     BMI Readings from Last 1 Encounters:   04/04/25 29.06 kg/m²        BMP  Lab Results   Component Value Date    CALCIUM 8.1 (L) 03/10/2025     03/10/2025    K 4.1 03/10/2025    CO2 21 03/10/2025     03/10/2025    BUN 34 (H) 03/10/2025    CREATININE 1.47 (H) 03/10/2025    EGFR 52 (L) 03/10/2025     Make sure GFR >15 ml/min or dose adjust    LFTs  Lab Results   Component Value Date    ALT 37 03/10/2025    AST 32 03/10/2025    ALKPHOS 205 (H) 03/10/2025    BILITOT 0.3 03/10/2025         Assessment/Plan     Patient is experiencing urinary frequency, urgency, and incontinence. ***  Has tried before ***  [facility] [phone] or [phone] via email ***  Patient confirms their income is within the below guidelines  Patient confirms they live in [facility]  Patient confirms they have current insurance that covers medications  Patient confirms they are not part of " the Medicare Prescription Payment Program (MPPP)***    2025 Poverty Guidelines     Persons in family/household 400% federal Poverty Limit    1 62,600    2 84,600    3 106,600    4 128,600    5 150,600    6 172,600    7 194,600    8 216,600        Gemtesa   Discussed MOA: works by activating beta-3 adrenergic receptors in the bladder resulting in relaxation of the detrusor smooth muscle during the urine storage phase, thus increasing bladder capacity.  Provided education on administration and potential side effects including but not limited to hypertension 9%, hot flashes <2%, constipation/diarrhea <2%, dry mouth <2%, and headache <4%.   Gemtesa (vibegron) starts working almost immediately - within a few days of first taking it, with noticeable improvements in urinary urgency, frequency, and incontinence noted in clinical trials at 2 weeks which were reported as significant by 12 weeks.    Myrbetriq  Discussed MOA: activates beta-3 adrenergic receptors in the bladder resulting in relaxation of the detrusor smooth muscle during the urine storage phase, thus increasing bladder capacity.  Provided education on administration (swallow whole with water; do not chew, divide, or crush) and potential side effects including but not limited to hypertension 8-11%, UTI 3-6%, headache 2-4%, nose or throat irritation 3%, dry mouth 3-4%, and constipation 1-3%.   Any signs or symptoms of angioedema- immediately discontinue use and seek immediate medical attention.   Monitor blood pressure and heart rate. May notice a slight increase. Please call me if blood pressure becomes >120/80 mmHg or pulse significantly elevates from baseline.   BP Readings from Last 3 Encounters:   04/04/25 134/87   03/28/25 113/71   03/24/25 103/73     Caution with other meds that prolong QT interval- many drug:drug interactions ***  Patient is in agreement that they will notify me if starting any new medications  Efficacy is seen within 8 weeks; steady  state achieved within 7 days.        START  Myrbetriq 25 mg once daily. May increase to 50 mg once daily after 4 to 8 weeks based on response and tolerability.  Gemtesa 75 mg once daily    Follow-up: ***     Time spent with pt: Total length of time *** (minutes) of the encounter and more than 50% was spent counseling the patient.      Sharon Arriaga, Pharm.D, [facility], [facility], [facility]  Clinical Pharmacist  Pharmacy Services  [phone]    Continue all meds under the continuation of care with the referring provider and clinical pharmacy team.    Verbal consent to manage patient's drug therapy was obtained from {patient rights:97759}. They were informed they may decline to participate or withdraw from participation in pharmacy services at any time.

## 2025-04-10 ENCOUNTER — APPOINTMENT (OUTPATIENT)
Dept: GASTROENTEROLOGY | Facility: CLINIC | Age: 70
End: 2025-04-10
Payer: MEDICARE

## 2025-04-10 ENCOUNTER — APPOINTMENT (OUTPATIENT)
Dept: PHARMACY | Facility: HOSPITAL | Age: 70
End: 2025-04-10
Payer: MEDICARE

## 2025-04-10 ENCOUNTER — TELEPHONE (OUTPATIENT)
Dept: HEMATOLOGY/ONCOLOGY | Facility: CLINIC | Age: 70
End: 2025-04-10

## 2025-04-10 ENCOUNTER — TELEPHONE (OUTPATIENT)
Dept: GASTROENTEROLOGY | Facility: CLINIC | Age: 70
End: 2025-04-10

## 2025-04-10 VITALS
WEIGHT: 223.6 LBS | DIASTOLIC BLOOD PRESSURE: 71 MMHG | SYSTOLIC BLOOD PRESSURE: 105 MMHG | HEIGHT: 73 IN | HEART RATE: 83 BPM | TEMPERATURE: 98.2 F | OXYGEN SATURATION: 98 % | BODY MASS INDEX: 29.63 KG/M2

## 2025-04-10 DIAGNOSIS — K92.2 GASTROINTESTINAL HEMORRHAGE, UNSPECIFIED GASTROINTESTINAL HEMORRHAGE TYPE: Primary | ICD-10-CM

## 2025-04-10 DIAGNOSIS — K74.69 OTHER CIRRHOSIS OF LIVER: ICD-10-CM

## 2025-04-10 PROCEDURE — 1159F MED LIST DOCD IN RCRD: CPT | Performed by: INTERNAL MEDICINE

## 2025-04-10 PROCEDURE — 3078F DIAST BP <80 MM HG: CPT | Performed by: INTERNAL MEDICINE

## 2025-04-10 PROCEDURE — 1123F ACP DISCUSS/DSCN MKR DOCD: CPT | Performed by: INTERNAL MEDICINE

## 2025-04-10 PROCEDURE — 99214 OFFICE O/P EST MOD 30 MIN: CPT | Performed by: INTERNAL MEDICINE

## 2025-04-10 PROCEDURE — 3074F SYST BP LT 130 MM HG: CPT | Performed by: INTERNAL MEDICINE

## 2025-04-10 PROCEDURE — 3008F BODY MASS INDEX DOCD: CPT | Performed by: INTERNAL MEDICINE

## 2025-04-10 NOTE — TELEPHONE ENCOUNTER
Patient seen in office today and scheduled for an EGD with Dr. Zayas at [facility]. Patient advised that per Dr. Zayas his to also be a clear liquid diet 24 hours prior to procedure.

## 2025-04-10 NOTE — PROGRESS NOTES
Gastroenterology Office Visit     History of Present Illness:   Levy Gregory is a 69 y.o. male who presents to GI clinic for follow up of UGIB.  Admitted to [facility] for 1 week in 2/25.  Presented with melena x 2 wks and Hb 13 -> 7.7.  Transfused 1u pRBCs.  EGD x 2 with poor visualization of the stomach.  Has had 3 iv Fe infusions since discharge.      Has h/o type II achalasia, paraesophageal hernia s/p robotic laparoscopic myotomy with fundoplication C/B esophageal perforation in 3/23.       Denies melena since discharge.  Still taking PPI bid. Reports having remote bleeding  about 10 yrs ago from NSAIDs.      Imaging was suggestive of cirrhosis.  Never drank.     Review of Systems  Constitutional: denies fever/ chills, night sweats, wt loss and fatigue  Respiratory: denies SOB, OSHEA  CV: denies chest pain and LE edema  Neuro: denies weakness and difficulty walking      Past Medical History   has a past medical history of Achalasia, esophageal, Anemia, Contusion of abdominal wall, initial encounter (01/12/2021), COPD (chronic obstructive pulmonary disease) (Multi), Diabetes mellitus (Multi), DVT (deep venous thrombosis) (Multi), DVT (deep venous thrombosis) (Multi), Dysphagia, GERD (gastroesophageal reflux disease), Hemoptysis (05/12/2020), Herpes labialis, Hiatal hernia, Hyperlipidemia, Hypotension due to drugs, Impaired oral gastric feeding tube, initial encounter (10/03/2023), Irregular heart beat, Pain in left knee, Peripheral neuropathy, Personal history of other diseases of the respiratory system (06/19/2019), Sleep apnea, and Solar purpura (CMS-HCC).     Problem List  Patient Active Problem List   Diagnosis    Colon polyp    Diabetic neuropathy associated with type 2 diabetes mellitus    ED (erectile dysfunction)    High serum bone-specific alkaline phosphatase    Gout    History of pulmonary embolism    Hyperlipidemia    Iron deficiency anemia    Obstructive sleep apnea, adult    Osteopenia    Vitamin D  deficiency    Achalasia, esophageal    Type 2 diabetes mellitus with hyperglycemia, without long-term current use of insulin    Orthostatic hypotension    Solar purpura (CMS-HCC)    Anticoagulant long-term use    Other dysphagia    Hypertensive heart disease with heart failure    Chronic obstructive pulmonary disease, unspecified COPD type (Multi)    Acute renal failure superimposed on chronic kidney disease    Adjustment disorder with depressed mood    Edema of both lower legs due to peripheral venous insufficiency    Schatzki's ring    Coronary artery disease involving native coronary artery of native heart without angina pectoris    Deep vein thrombosis (DVT) of left lower extremity    Macrocytosis    Closed fracture of right hip requiring operative repair with routine healing    S/P right hip fracture    Chronic kidney disease, stage 5 (Multi)    Asthmatic bronchitis without complication ([facility])    GI bleed    Other cirrhosis of liver       Past Surgical History  Past Surgical History:   Procedure Laterality Date    CONDUIT REPLACEMENT  12/07/2023    Gastric conduit revision; mini laparotomy, General Ex-lap, ISA, Gastric pull up with anastmosis in neck, MIGUELINA to bulb sx. New J tube.    ESOPHAGOGASTRECTOMY      ESOPHAGOGASTRODUODENOSCOPY      with stents x2    GASTROSTOMY TUBE PLACEMENT N/A     IR CVC PICC  08/16/2023    IR CVC PICC 8/16/2023 [facility] IN[facility]IENT [facility]    OTHER SURGICAL HISTORY  01/21/2019    Hardin filter placement    UMBILICAL HERNIA REPAIR N/A 2013       Social History   reports that he quit smoking about 2 years ago. His smoking use included cigars. He started smoking about 35 years ago. He has been exposed to tobacco smoke. He has never used smokeless tobacco. He reports that he does not drink alcohol and does not use drugs.     Family History  family history includes Diabetes in his mother; Diabetes type I in his father; Hypertension in his mother.       Allergies  No Known  "Allergies    Medications  Current Outpatient Medications   Medication Instructions    albuterol (ProAir HFA) 90 mcg/actuation inhaler two puffs four times daily for two weeks    aspirin 81 mg, oral, Daily    atorvastatin (LIPITOR) 80 mg, oral, Daily    blood sugar diagnostic (OneTouch Ultra Test) strip Test glucose twice daily or as directed    blood-glucose meter misc Check glucose before meals and call if less than 80 or over 300.    flash glucose sensor kit (FreeStyle Kellie 2 Sensor) kit Use as instructed    fluticasone propion-salmeteroL (Advair Diskus) 100-50 mcg/dose diskus inhaler 1 puff, inhalation, 2 times daily RT, Rinse mouth with water after use to reduce aftertaste and incidence of candidiasis. Do not swallow.    FreeStyle Kellie 2 [place] misc Use as instructed    gabapentin (NEURONTIN) 100 mg, oral, 3 times daily    Gemtesa 75 mg, oral, Daily    midodrine (PROAMATINE) 5 mg, oral, 3 times daily (morning, midday, late afternoon)    pantoprazole (PROTONIX) 40 mg, oral, 2 times daily, Do not crush, chew, or split.    pioglitazone (ACTOS) 15 mg, oral, Daily    sertraline (ZOLOFT) 50 mg, oral, Daily    tamsulosin (FLOMAX) 0.4 mg, oral, Daily        Objective   Blood pressure 105/71, pulse 83, temperature 36.8 °C (98.2 °F), temperature source Temporal, height 1.854 m (6' 1\"), weight 101 kg (223 lb 9.6 oz), SpO2 98%.      General: no acute distress, well-nourished  Skin: no jaundice, rash or liver stigmata  Respiratory: normal breath sounds bilaterally, no wheezes or rales  CV: RRR, no murmurs; no LE edema    LABS  Component      Latest Ref Rng 2/18/2025 2/19/2025 3/10/2025 3/28/2025   HEMOGLOBIN      13.5 - 17.5 g/dL 8.0 (L)  8.5 (L)  8.8 (L)  8.8 (L)           Radiology  CT A/P 2/25: no active GIB, s/p esophagectomy; cirrhotic liver    Endoscopy    Colonoscopy 3/21 (h/o polyps): adequate pre, hems, tics, no polyps; repeat in 5 yrs      Assessment/Plan   Levy A Colt is a 69 y.o. male who presents to GI " clinic for recent melena and acute blood loss anemia.  Cirrhosis on imaging.  Hb only 8.8, last Fe infusion on 4/4.     GI bleed  Melena resolved.  H/o remote bleeding .  Schedule EGD at [facility] after 24 hrs clear liquid diet.     Other cirrhosis of liver  Schedule Fibroscan.  Return to clinic in 3 months.        Dilip Zayas MD

## 2025-04-10 NOTE — TELEPHONE ENCOUNTER
I spoke with Levy, he states that during his 3rd dose of IV iron on 4/3/25, he noticed that the IV iron was burning as it was infusing at the IV site in the right arm. He states that he did not notify his infusion RN of the burning because he did not want to possibly be stuck for a new IV, so he continued with the infusion as it burned for 2 hours. He states that post infusion, his arm was puffy/swollen, there was pain to touch. He states that the site has improved since the infusion, no puffiness or swelling, no issues with functionality in the right arm, no bruising. He states that when he runs his hand over the IV site there is a small bump, but it is improving. There is residual tenderness when he pushes on the site.     He then stated that he felt better after his second dose of IV iron (more energy), but that after the third dose he feels that he has regressed a little bit. He is still tired, weak, has pain in his legs, some dizziness, SOB when up and walking. He states that all these symptoms had improved post second dose of IV iron.     He would like to confirm if he should continue his PO iron.    I notified him that I would update Freddie GIL of the above and give him a call back. He gave verbal understanding.

## 2025-04-10 NOTE — TELEPHONE ENCOUNTER
I spoke with Levy, he is aware that he can continue his PO iron at home (see Freddie's note from 3/18/25). He is also aware that Freddie is recommending blood work (cbcd,cmp,ferritin,iron TIBC) next Wednesday (4/16/25) with a in-person or video visit with Freddie on Friday (4/18/25). Levy gave verbal understanding, stating that he will be out of town for ten days (he is unsure of the specific details including dates of travel), stating that he will return at the end of the month. He states that when he returns he will call the clinic to schedule his appt. He is aware that if his symptoms worsen and he develops chest pain, worsening SOB, then it is recommended that he go to the ER to be assessed. He gave verbal understanding. He is also aware to continue to monitor his right arm at the old IV site and that he can use warm compresses as needed up to 3 times a day for the residual symptoms. We did discuss DVTs and cellulitis;he is aware of that to look out for and when to call the clinic. He states the right arm is not red or swollen, and he is denying fevers. He was appreciative of the call and all questions were answered at this time.

## 2025-04-10 NOTE — H&P (VIEW-ONLY)
Gastroenterology Office Visit     History of Present Illness:   Levy Gregory is a 69 y.o. male who presents to GI clinic for follow up of UGIB.  Admitted to [facility] for 1 week in 2/25.  Presented with melena x 2 wks and Hb 13 -> 7.7.  Transfused 1u pRBCs.  EGD x 2 with poor visualization of the stomach.  Has had 3 iv Fe infusions since discharge.      Has h/o type II achalasia, paraesophageal hernia s/p robotic laparoscopic myotomy with fundoplication C/B esophageal perforation in 3/23.       Denies melena since discharge.  Still taking PPI bid. Reports having remote bleeding  about 10 yrs ago from NSAIDs.      Imaging was suggestive of cirrhosis.  Never drank.     Review of Systems  Constitutional: denies fever/ chills, night sweats, wt loss and fatigue  Respiratory: denies SOB, OSHEA  CV: denies chest pain and LE edema  Neuro: denies weakness and difficulty walking      Past Medical History   has a past medical history of Achalasia, esophageal, Anemia, Contusion of abdominal wall, initial encounter (01/12/2021), COPD (chronic obstructive pulmonary disease) (Multi), Diabetes mellitus (Multi), DVT (deep venous thrombosis) (Multi), DVT (deep venous thrombosis) (Multi), Dysphagia, GERD (gastroesophageal reflux disease), Hemoptysis (05/12/2020), Herpes labialis, Hiatal hernia, Hyperlipidemia, Hypotension due to drugs, Impaired oral gastric feeding tube, initial encounter (10/03/2023), Irregular heart beat, Pain in left knee, Peripheral neuropathy, Personal history of other diseases of the respiratory system (06/19/2019), Sleep apnea, and Solar purpura (CMS-HCC).     Problem List  Patient Active Problem List   Diagnosis    Colon polyp    Diabetic neuropathy associated with type 2 diabetes mellitus    ED (erectile dysfunction)    High serum bone-specific alkaline phosphatase    Gout    History of pulmonary embolism    Hyperlipidemia    Iron deficiency anemia    Obstructive sleep apnea, adult    Osteopenia    Vitamin D  deficiency    Achalasia, esophageal    Type 2 diabetes mellitus with hyperglycemia, without long-term current use of insulin    Orthostatic hypotension    Solar purpura (CMS-HCC)    Anticoagulant long-term use    Other dysphagia    Hypertensive heart disease with heart failure    Chronic obstructive pulmonary disease, unspecified COPD type (Multi)    Acute renal failure superimposed on chronic kidney disease    Adjustment disorder with depressed mood    Edema of both lower legs due to peripheral venous insufficiency    Schatzki's ring    Coronary artery disease involving native coronary artery of native heart without angina pectoris    Deep vein thrombosis (DVT) of left lower extremity    Macrocytosis    Closed fracture of right hip requiring operative repair with routine healing    S/P right hip fracture    Chronic kidney disease, stage 5 (Multi)    Asthmatic bronchitis without complication ([facility])    GI bleed    Other cirrhosis of liver       Past Surgical History  Past Surgical History:   Procedure Laterality Date    CONDUIT REPLACEMENT  12/07/2023    Gastric conduit revision; mini laparotomy, General Ex-lap, ISA, Gastric pull up with anastmosis in neck, IMGUELINA to bulb sx. New J tube.    ESOPHAGOGASTRECTOMY      ESOPHAGOGASTRODUODENOSCOPY      with stents x2    GASTROSTOMY TUBE PLACEMENT N/A     IR CVC PICC  08/16/2023    IR CVC PICC 8/16/2023 [facility] INPATIENT LEGACY    OTHER SURGICAL HISTORY  01/21/2019    [place] filter placement    UMBILICAL HERNIA REPAIR N/A 2013       Social History   reports that he quit smoking about 2 years ago. His smoking use included cigars. He started smoking about 35 years ago. He has been exposed to tobacco smoke. He has never used smokeless tobacco. He reports that he does not drink alcohol and does not use drugs.     Family History  family history includes Diabetes in his mother; Diabetes type I in his father; Hypertension in his mother.       Allergies  No Known  "Allergies    Medications  Current Outpatient Medications   Medication Instructions    albuterol (ProAir HFA) 90 mcg/actuation inhaler two puffs four times daily for two weeks    aspirin 81 mg, oral, Daily    atorvastatin (LIPITOR) 80 mg, oral, Daily    blood sugar diagnostic (OneTouch Ultra Test) strip Test glucose twice daily or as directed    blood-glucose meter misc Check glucose before meals and call if less than 80 or over 300.    flash glucose sensor kit (FreeStyle Kellie 2 Sensor) kit Use as instructed    fluticasone propion-salmeteroL (Advair Diskus) 100-50 mcg/dose diskus inhaler 1 puff, inhalation, 2 times daily RT, Rinse mouth with water after use to reduce aftertaste and incidence of candidiasis. Do not swallow.    FreeStyle Kellie 2 [place] misc Use as instructed    gabapentin (NEURONTIN) 100 mg, oral, 3 times daily    Gemtesa 75 mg, oral, Daily    midodrine (PROAMATINE) 5 mg, oral, 3 times daily (morning, midday, late afternoon)    pantoprazole (PROTONIX) 40 mg, oral, 2 times daily, Do not crush, chew, or split.    pioglitazone (ACTOS) 15 mg, oral, Daily    sertraline (ZOLOFT) 50 mg, oral, Daily    tamsulosin (FLOMAX) 0.4 mg, oral, Daily        Objective   Blood pressure 105/71, pulse 83, temperature 36.8 °C (98.2 °F), temperature source Temporal, height 1.854 m (6' 1\"), weight 101 kg (223 lb 9.6 oz), SpO2 98%.      General: no acute distress, well-nourished  Skin: no jaundice, rash or liver stigmata  Respiratory: normal breath sounds bilaterally, no wheezes or rales  CV: RRR, no murmurs; no LE edema    LABS  Component      Latest Ref Rng 2/18/2025 2/19/2025 3/10/2025 3/28/2025   HEMOGLOBIN      13.5 - 17.5 g/dL 8.0 (L)  8.5 (L)  8.8 (L)  8.8 (L)           Radiology  CT A/P 2/25: no active GIB, s/p esophagectomy; cirrhotic liver    Endoscopy    Colonoscopy 3/21 (h/o polyps): adequate pre, hems, tics, no polyps; repeat in 5 yrs      Assessment/Plan   Levy A Colt is a 69 y.o. male who presents to GI " clinic for recent melena and acute blood loss anemia.  Cirrhosis on imaging.  Hb only 8.8, last Fe infusion on 4/4.     GI bleed  Melena resolved.  H/o remote bleeding .  Schedule EGD at [facility] after 24 hrs clear liquid diet.     Other cirrhosis of liver  Schedule Fibroscan.  Return to clinic in 3 months.        Dilip Zayas MD

## 2025-04-10 NOTE — PATIENT INSTRUCTIONS
Melena resolved.  H/o remote bleeding .  Schedule EGD at [facility] after 24 hrs clear liquid diet.  Schedule Fibroscan.

## 2025-04-10 NOTE — ASSESSMENT & PLAN NOTE
Melena resolved.  H/o remote bleeding .  Schedule EGD at [facility] after 24 hrs clear liquid diet.

## 2025-04-14 ENCOUNTER — PATIENT OUTREACH (OUTPATIENT)
Dept: PRIMARY CARE | Facility: CLINIC | Age: 70
End: 2025-04-14
Payer: MEDICARE

## 2025-04-17 ENCOUNTER — CLINICAL SUPPORT (OUTPATIENT)
Dept: PREADMISSION TESTING | Facility: HOSPITAL | Age: 70
End: 2025-04-17
Payer: MEDICARE

## 2025-04-17 ENCOUNTER — APPOINTMENT (OUTPATIENT)
Dept: GASTROENTEROLOGY | Facility: CLINIC | Age: 70
End: 2025-04-17
Payer: MEDICARE

## 2025-04-17 NOTE — PREPROCEDURE INSTRUCTIONS

## 2025-04-25 ENCOUNTER — APPOINTMENT (OUTPATIENT)
Dept: NEUROLOGY | Facility: CLINIC | Age: 70
End: 2025-04-25
Payer: MEDICARE

## 2025-04-30 ENCOUNTER — HOSPITAL ENCOUNTER (OUTPATIENT)
Dept: GASTROENTEROLOGY | Facility: HOSPITAL | Age: 70
Discharge: HOME | End: 2025-04-30
Payer: MEDICARE

## 2025-04-30 ENCOUNTER — ANESTHESIA (OUTPATIENT)
Dept: GASTROENTEROLOGY | Facility: HOSPITAL | Age: 70
End: 2025-04-30
Payer: MEDICARE

## 2025-04-30 ENCOUNTER — ANESTHESIA EVENT (OUTPATIENT)
Dept: GASTROENTEROLOGY | Facility: HOSPITAL | Age: 70
End: 2025-04-30
Payer: MEDICARE

## 2025-04-30 VITALS
HEART RATE: 77 BPM | HEIGHT: 73 IN | SYSTOLIC BLOOD PRESSURE: 145 MMHG | WEIGHT: 213.85 LBS | BODY MASS INDEX: 28.34 KG/M2 | TEMPERATURE: 96.8 F | OXYGEN SATURATION: 96 % | RESPIRATION RATE: 18 BRPM | DIASTOLIC BLOOD PRESSURE: 72 MMHG

## 2025-04-30 DIAGNOSIS — K92.2 GASTROINTESTINAL HEMORRHAGE, UNSPECIFIED GASTROINTESTINAL HEMORRHAGE TYPE: ICD-10-CM

## 2025-04-30 LAB — GLUCOSE BLD MANUAL STRIP-MCNC: 105 MG/DL (ref 74–99)

## 2025-04-30 PROCEDURE — 82947 ASSAY GLUCOSE BLOOD QUANT: CPT

## 2025-04-30 PROCEDURE — 2500000004 HC RX 250 GENERAL PHARMACY W/ HCPCS (ALT 636 FOR OP/ED): Mod: JZ | Performed by: NURSE ANESTHETIST, CERTIFIED REGISTERED

## 2025-04-30 PROCEDURE — 3700000001 HC GENERAL ANESTHESIA TIME - INITIAL BASE CHARGE

## 2025-04-30 PROCEDURE — 7100000010 HC PHASE TWO TIME - EACH INCREMENTAL 1 MINUTE

## 2025-04-30 PROCEDURE — 43235 EGD DIAGNOSTIC BRUSH WASH: CPT | Performed by: INTERNAL MEDICINE

## 2025-04-30 PROCEDURE — 3700000002 HC GENERAL ANESTHESIA TIME - EACH INCREMENTAL 1 MINUTE

## 2025-04-30 PROCEDURE — 7100000009 HC PHASE TWO TIME - INITIAL BASE CHARGE

## 2025-04-30 RX ORDER — PROPOFOL 10 MG/ML
INJECTION, EMULSION INTRAVENOUS AS NEEDED
Status: DISCONTINUED | OUTPATIENT
Start: 2025-04-30 | End: 2025-04-30

## 2025-04-30 RX ORDER — LIDOCAINE HYDROCHLORIDE 20 MG/ML
INJECTION, SOLUTION INFILTRATION; PERINEURAL AS NEEDED
Status: DISCONTINUED | OUTPATIENT
Start: 2025-04-30 | End: 2025-04-30

## 2025-04-30 RX ADMIN — PROPOFOL 100 MG: 10 INJECTION, EMULSION INTRAVENOUS at 10:26

## 2025-04-30 RX ADMIN — PROPOFOL 50 MG: 10 INJECTION, EMULSION INTRAVENOUS at 10:30

## 2025-04-30 RX ADMIN — PROPOFOL 50 MG: 10 INJECTION, EMULSION INTRAVENOUS at 10:29

## 2025-04-30 RX ADMIN — SODIUM CHLORIDE, POTASSIUM CHLORIDE, SODIUM LACTATE AND CALCIUM CHLORIDE: 600; 310; 30; 20 INJECTION, SOLUTION INTRAVENOUS at 10:22

## 2025-04-30 RX ADMIN — LIDOCAINE HYDROCHLORIDE 4 ML: 20 INJECTION, SOLUTION INFILTRATION; PERINEURAL at 10:26

## 2025-04-30 ASSESSMENT — PAIN SCALES - GENERAL
PAIN_LEVEL: 0
PAINLEVEL_OUTOF10: 0 - NO PAIN

## 2025-04-30 ASSESSMENT — PAIN - FUNCTIONAL ASSESSMENT
PAIN_FUNCTIONAL_ASSESSMENT: 0-10
PAIN_FUNCTIONAL_ASSESSMENT: 0-10

## 2025-04-30 NOTE — DISCHARGE INSTRUCTIONS
Patient Instructions after an endoscopy or colonoscopy    Physician Phone List Provided      The anesthetics, sedatives or narcotics which were given to you today will be acting in your body for the next 24 hours, so you might feel a little sleepy or groggy.  This feeling should slowly wear off. Carefully read and follow the instructions.     You received sedation today:  - Do not drive or operate any machinery or power tools of any kind.   - No alcoholic beverages today, not even beer or wine.  - No over the counter medications that contain alcohol or that may cause drowsiness.  - Do not make any important decisions or sign any legal documents.    While it is common to experience mild to moderate abdominal distention, gas, or belching after your procedure, if any of these symptoms occur following discharge from the GI Lab or within one week of having your procedure, call the [facility] to be advised whether a visit to your nearest Urgent Care or Emergency Department is indicated.  Take this paper with you if you go.     - If you develop an allergic reaction to the medications that were given during your procedure such as difficulty breathing, rash, hives, severe nausea, vomiting or lightheadedness.- If you experience chest pain, shortness of breath, severe abdominal pain, fevers and chills.    -If you develop signs and symptoms of bleeding such as blood in your spit, if your stools turn black, tarry, or bloody        High Fiber Diet    About this topic  Dietary fiber helps many illnesses. It can help you if you cannot have a bowel movement or if you have loose stools. Fiber can also lower your risk of diabetes and heart disease. Fiber can help with weight loss by helping you feel fletcher after meals.  You can find fiber in fruits, vegetables, nuts and seeds, whole grains, and legumes. The fiber is the part of the plant food that your body cannot break down and absorb. It passes through your stomach,  small bowel, colon, and out your body.  There are two kinds of fiber: Insoluble and soluble fiber. Insoluble fiber helps you pass foods through your digestive system. Insoluble fiber can help you with hard stools. Soluble fiber draws water in and turns it into a gel-like form making digestion slow down. Both are important.    What will the results be?  A high fiber diet can help you with bowel problems like stools that are too hard or too loose. It can also help prevent hemorrhoids and other colon problems. A high fiber diet can also help control your weight and lower blood sugar and cholesterol levels.  What changes to diet are needed?  The amount of fiber you need is based on your age, gender, and health.  Try to get 20 to 35 grams of fiber in your diet each day. Most people in the [place] only eat 15 grams of fiber daily.  Drink at least 8 cups (1920 mL) of fluid each day.  When is this diet used?  Your doctor may talk with you about this diet if you have belly problems.  Who should use this diet?  Older children, young people, and adults can have this diet.  Who should not use this diet?  Some people should not use this diet. Check with your doctor if you have:  Diverticulitis  Active Crohn's disease  Ulcerative colitis  Bowel inflammation  Certain types of GI surgery  Talk to your child's doctor before starting your child on a high fiber diet.  What foods are good to eat?  To get the most from fiber in your diet, eat a wide variety of high fiber foods. Some examples are:  Vegetables like:  Spinach  Peas  Artichoke  Sweet potatoes with skin  Broccoli  Fruits like:  Raspberries  Blueberries  Blackberries  Apples with skin  Dried fruits  Grains like:  Oat bran  Barley  Whole wheat products  Wheat bran  Dried beans and nuts like:  Sunflower seeds  Almonds  Black beans  Chickpeas  What problems could happen?  Sudden increase of fiber intake can lead to gas, pain, fullness in your belly, and loose stools. Increase fiber  gradually while drinking plenty of fluids.  Do not eat too much fiber. Your body will not take in vitamins and minerals as well if you eat too much fiber.  When do I need to call the doctor?  Health problem is not better or you are feeling worse.  Helpful tips  Start slow as you add more fiber to your diet. This may help prevent gas or cramps.  Try to eat the same amount of fiber each day. Aim to get your fiber from nutritious foods. Supplements do not offer the same benefits as food.  Read food labels with care to learn how much fiber is in the food you are eating.  If possible, do not peel fruits or vegetables before you eat them. Eating the peel gives you more fiber.  Where can I learn more?  Eat Right  https://www.eatright.org/food/vitamins-and-supplements/types-of-vitamins-and-nutrients/easy-ways-to-boost-fiber-in-your-daily-diet  Last Reviewed Date  2021-09-23

## 2025-04-30 NOTE — ANESTHESIA POSTPROCEDURE EVALUATION
Patient: Levy Gregory    Procedure Summary       Date: 04/30/25 Room / Location: [facility]    Anesthesia Start: 1022 Anesthesia Stop:     Procedure: EGD Diagnosis: Gastrointestinal hemorrhage, unspecified gastrointestinal hemorrhage type    Scheduled Providers: Dilip Zyaas MD; Joe Euceda MD; Justine Marks RN Responsible Provider: Joe Euceda MD    Anesthesia Type: MAC ASA Status: 3            Anesthesia Type: MAC    Vitals Value Taken Time   /99 04/30/25 10:32   Temp  04/30/25 10:32   Pulse 71 04/30/25 10:32   Resp 15 04/30/25 10:32   SpO2 100 04/30/25 10:32       Anesthesia Post Evaluation    Patient location during evaluation: bedside  Patient participation: complete - patient participated  Level of consciousness: awake and alert  Pain score: 0  Pain management: adequate  Airway patency: patent  Cardiovascular status: acceptable and stable  Respiratory status: acceptable and room air  Hydration status: acceptable  Postoperative Nausea and Vomiting: none        No notable events documented.

## 2025-04-30 NOTE — Clinical Note
Patient tolerated procedure well. Appears comfortable with no complaints of pain. VS stable. Arousable prior to transport. Patient transported to [facility] via cart.  Report called              . Handoff completed

## 2025-04-30 NOTE — ANESTHESIA PREPROCEDURE EVALUATION
Levy Gregory is a 69 y.o. male here for:      Esophagogastroduodenoscopy (EGD)  With Dilip Zayas MD  Gastrointestinal hemorrhage, unspecified gastrointestinal hemorrhage type    Lab Results   Component Value Date    HGB 8.8 (L) 03/28/2025    HGB 7.9 (L) 02/10/2025    HCT 29.5 (L) 03/28/2025    HCT 26.0 (L) 02/10/2025    WBC 5.1 03/28/2025    WBC 7.9 02/10/2025     03/28/2025     02/10/2025     03/10/2025     02/10/2025    K 4.1 03/10/2025    K 5.2 02/10/2025     03/10/2025     02/10/2025    CREATININE 1.47 (H) 03/10/2025    CREATININE 1.62 (H) 02/10/2025    BUN 34 (H) 03/10/2025    BUN 47 (H) 02/10/2025       Social History     Substance and Sexual Activity   Drug Use Never      Tobacco Use: Medium Risk (4/17/2025)    Patient History     Smoking Tobacco Use: Former     Smokeless Tobacco Use: Never     Passive Exposure: Past      Social History     Substance and Sexual Activity   Alcohol Use Never        RX Allergies[1]    Current Outpatient Medications   Medication Instructions    aspirin 81 mg, oral, Daily    atorvastatin (LIPITOR) 80 mg, oral, Daily    blood sugar diagnostic (OneTouch Ultra Test) strip Test glucose twice daily or as directed    blood-glucose meter misc Check glucose before meals and call if less than 80 or over 300.    flash glucose sensor kit (FreeStyle Kellie 2 Sensor) kit Use as instructed    fluticasone propion-salmeteroL (Advair Diskus) 100-50 mcg/dose diskus inhaler 1 puff, inhalation, 2 times daily RT, Rinse mouth with water after use to reduce aftertaste and incidence of candidiasis. Do not swallow.    FreeStyle Kellie 2 [place] misc Use as instructed    gabapentin (NEURONTIN) 100 mg, oral, 3 times daily    Gemtesa 75 mg, oral, Daily    midodrine (PROAMATINE) 5 mg, oral, 3 times daily (morning, midday, late afternoon)    pantoprazole (PROTONIX) 40 mg, oral, 2 times daily, Do not crush, chew, or split.    pioglitazone (ACTOS) 15 mg, oral, Daily     sertraline (ZOLOFT) 50 mg, oral, Daily    tamsulosin (FLOMAX) 0.4 mg, oral, Daily       Medical History[2]    Surgical History[3]    Family History[4]    Relevant Problems   Cardiac   (+) Coronary artery disease involving native coronary artery of native heart without angina pectoris   (+) Hyperlipidemia   (+) Solar purpura (CMS-HCC)      Pulmonary   (+) Asthmatic bronchitis without complication (HHS-HCC)   (+) Chronic obstructive pulmonary disease, unspecified COPD type (Multi)      GI   (+) GI bleed   (+) Other dysphagia      Liver   (+) Other cirrhosis of liver      Endocrine   (+) Diabetic neuropathy associated with type 2 diabetes mellitus   (+) Type 2 diabetes mellitus with hyperglycemia, without long-term current use of insulin      Hematology   (+) Anticoagulant long-term use   (+) Deep vein thrombosis (DVT) of left lower extremity   (+) Iron deficiency anemia       Visit Vitals  Smoking Status Former       NPO Details:  No data recorded    Physical Exam  Airway  Mallampati: II    Cardiovascular - normal exam  Dental - normal exam  Pulmonary - normal exam  Neurological - normal exam  Abdominal - normal examAbdomen: soft          Anesthesia Plan    History of general anesthesia?: yes  History of complications of general anesthesia?: no    ASA 3     MAC     intravenous induction   Anesthetic plan and risks discussed with patient.    Plan discussed with CRNA.           [1] No Known Allergies  [2]   Past Medical History:  Diagnosis Date    Achalasia, esophageal     per patient of esophagus    Adjustment disorder with depressed mood     Anemia     history of iron infusions    ARF (acute renal failure)     Asthmatic bronchitis (HHS-HCC)     Bilateral leg edema     Cirrhosis of liver (Multi)     CKD (chronic kidney disease)     STAGE 5    Colon polyp     Contusion of abdominal wall, initial encounter 01/12/2021    Contusion, flank    COPD (chronic obstructive pulmonary disease) (Multi)     Coronary artery disease      COVID-19     VACCINATED    Diabetes mellitus (Multi)     F/W PCP    Diabetic neuropathy (Multi)     DVT (deep venous thrombosis) (Multi)     IVC filter placed on 04/20/2023 and on Eliquis    Dysphagia     Fractures     GERD (gastroesophageal reflux disease)     GI bleed     Gout     Heart failure     Hemoptysis 05/12/2020    Cough with hemoptysis    Herpes labialis     Hiatal hernia     Hernia Repair    Hyperlipidemia     Hypotension due to drugs     Impaired oral gastric feeding tube, initial encounter 10/03/2023    Irregular heart beat     AFIB: patient denies    Orthostatic hypotension     Osteopenia     Pain in left knee     PE (pulmonary thromboembolism) (Multi)     Peripheral neuropathy     Peripheral vascular insufficiency     Personal history of other diseases of the respiratory system 06/19/2019    History of bronchitis    Sleep apnea     Patient states does not have sleep apnea since martinez loss and does not use CPAP    Solar purpura (CMS-HCC)    [3]   Past Surgical History:  Procedure Laterality Date    CONDUIT REPLACEMENT  12/07/2023    Gastric conduit revision; mini laparotomy, General Ex-lap, ISA, Gastric pull up with anastmosis in neck, MIGUELINA to bulb sx. New J tube.    ESOPHAGOGASTRECTOMY      ESOPHAGOGASTRODUODENOSCOPY      with stents x2    GASTROSTOMY TUBE PLACEMENT N/A     REMOVED    IR CVC PICC  08/16/2023    IR CVC PICC 8/16/2023 [facility] - REMOVED    JOINT REPLACEMENT Right     RIGHT HIP REPLACEMENT    OTHER SURGICAL HISTORY  01/21/2019    [place] filter placement - REMOVED    UMBILICAL HERNIA REPAIR N/A 2013   [4]   Family History  Problem Relation Name Age of Onset    Diabetes Mother      Hypertension Mother      Diabetes type I Father

## 2025-04-30 NOTE — Clinical Note
Huddle and Timeout completed together with team. Patient wristband and VANESA information verified.  Anesthesia safety check completed. Patient was A&Ox3

## 2025-04-30 NOTE — INTERVAL H&P NOTE
H&P reviewed. The patient was examined and there are no changes to the H&P.  ASA 3, Mallampati 2.

## 2025-05-01 ENCOUNTER — TELEPHONE (OUTPATIENT)
Dept: PRIMARY CARE | Facility: CLINIC | Age: 70
End: 2025-05-01
Payer: MEDICARE

## 2025-05-01 DIAGNOSIS — D64.9 ANEMIA, UNSPECIFIED TYPE: ICD-10-CM

## 2025-05-01 DIAGNOSIS — F32.A DEPRESSION, UNSPECIFIED DEPRESSION TYPE: ICD-10-CM

## 2025-05-01 DIAGNOSIS — D50.0 IRON DEFICIENCY ANEMIA DUE TO CHRONIC BLOOD LOSS: ICD-10-CM

## 2025-05-01 RX ORDER — SERTRALINE HYDROCHLORIDE 50 MG/1
50 TABLET, FILM COATED ORAL DAILY
Qty: 90 TABLET | Refills: 0 | Status: SHIPPED | OUTPATIENT
Start: 2025-05-01

## 2025-05-01 NOTE — TELEPHONE ENCOUNTER
Refill request     Med name-sertraline  Med dose-50mg  Med directions-take 1 tablet daily    Pharmacy-[facility]  Pharmacy address-[facility]    LR-12/27/24  LV-02/10/25  Nv-06/10/25    Thanks

## 2025-05-02 ENCOUNTER — LAB (OUTPATIENT)
Dept: LAB | Facility: CLINIC | Age: 70
End: 2025-05-02
Payer: MEDICARE

## 2025-05-02 DIAGNOSIS — D64.9 ANEMIA, UNSPECIFIED TYPE: ICD-10-CM

## 2025-05-02 DIAGNOSIS — D50.0 IRON DEFICIENCY ANEMIA DUE TO CHRONIC BLOOD LOSS: ICD-10-CM

## 2025-05-02 LAB
ALBUMIN SERPL BCP-MCNC: 3.5 G/DL (ref 3.4–5)
ALP SERPL-CCNC: 142 U/L (ref 33–136)
ALT SERPL W P-5'-P-CCNC: 29 U/L (ref 10–52)
ANION GAP SERPL CALC-SCNC: 12 MMOL/L (ref 10–20)
AST SERPL W P-5'-P-CCNC: 25 U/L (ref 9–39)
BASOPHILS # BLD AUTO: 0.04 X10*3/UL (ref 0–0.1)
BASOPHILS NFR BLD AUTO: 0.7 %
BILIRUB SERPL-MCNC: 0.3 MG/DL (ref 0–1.2)
BUN SERPL-MCNC: 31 MG/DL (ref 6–23)
CALCIUM SERPL-MCNC: 8.5 MG/DL (ref 8.6–10.3)
CHLORIDE SERPL-SCNC: 106 MMOL/L (ref 98–107)
CO2 SERPL-SCNC: 22 MMOL/L (ref 21–32)
CREAT SERPL-MCNC: 1.53 MG/DL (ref 0.5–1.3)
EGFRCR SERPLBLD CKD-EPI 2021: 49 ML/MIN/1.73M*2
EOSINOPHIL # BLD AUTO: 0.13 X10*3/UL (ref 0–0.7)
EOSINOPHIL NFR BLD AUTO: 2.2 %
ERYTHROCYTE [DISTWIDTH] IN BLOOD BY AUTOMATED COUNT: 15.6 % (ref 11.5–14.5)
FERRITIN SERPL-MCNC: 63 NG/ML (ref 20–300)
GLUCOSE SERPL-MCNC: 287 MG/DL (ref 74–99)
HCT VFR BLD AUTO: 36.1 % (ref 41–52)
HGB BLD-MCNC: 11.2 G/DL (ref 13.5–17.5)
IMM GRANULOCYTES # BLD AUTO: 0.02 X10*3/UL (ref 0–0.7)
IMM GRANULOCYTES NFR BLD AUTO: 0.3 % (ref 0–0.9)
IRON SATN MFR SERPL: 13 % (ref 25–45)
IRON SERPL-MCNC: 47 UG/DL (ref 35–150)
LYMPHOCYTES # BLD AUTO: 1.67 X10*3/UL (ref 1.2–4.8)
LYMPHOCYTES NFR BLD AUTO: 28.1 %
MCH RBC QN AUTO: 30.4 PG (ref 26–34)
MCHC RBC AUTO-ENTMCNC: 31 G/DL (ref 32–36)
MCV RBC AUTO: 98 FL (ref 80–100)
MONOCYTES # BLD AUTO: 0.49 X10*3/UL (ref 0.1–1)
MONOCYTES NFR BLD AUTO: 8.2 %
NEUTROPHILS # BLD AUTO: 3.59 X10*3/UL (ref 1.2–7.7)
NEUTROPHILS NFR BLD AUTO: 60.5 %
PLATELET # BLD AUTO: 128 X10*3/UL (ref 150–450)
POTASSIUM SERPL-SCNC: 4.3 MMOL/L (ref 3.5–5.3)
PROT SERPL-MCNC: 6.2 G/DL (ref 6.4–8.2)
RBC # BLD AUTO: 3.69 X10*6/UL (ref 4.5–5.9)
SODIUM SERPL-SCNC: 136 MMOL/L (ref 136–145)
TIBC SERPL-MCNC: 366 UG/DL (ref 240–445)
UIBC SERPL-MCNC: 319 UG/DL (ref 110–370)
WBC # BLD AUTO: 5.9 X10*3/UL (ref 4.4–11.3)

## 2025-05-02 PROCEDURE — 36415 COLL VENOUS BLD VENIPUNCTURE: CPT

## 2025-05-02 PROCEDURE — 82728 ASSAY OF FERRITIN: CPT

## 2025-05-02 PROCEDURE — 83540 ASSAY OF IRON: CPT

## 2025-05-02 PROCEDURE — 80053 COMPREHEN METABOLIC PANEL: CPT

## 2025-05-02 PROCEDURE — 85025 COMPLETE CBC W/AUTO DIFF WBC: CPT

## 2025-05-02 NOTE — PROGRESS NOTES
Subjective   Patient ID: [id number]     Levy Gregory is a 68 y.o. male who presents for rehab follow up ([facility] D/C'd 8/30/24, right femur fracture).    HPI  Levy was discharged after a closed right hip fracture on 24AUG2024 and then was rehabbed in  [facility] until release on 29AUG2024  FLU shot obtained during hip hospitalization    Review of Systems  Allergy-clear copious phlegm that he coughs up at night and sneezes a couple times per week;  failed a zpak  ID-no fever  GI-no diarrhea  DERM-No rashes    Objective     /62 (BP Location: Right arm, Patient Position: Sitting)   Temp 36.8 °C (98.3 °F) (Oral)      Physical Exam  Neck-supple without lymphadenopathy or thyromegaly; no carotid bruits  Heart- regular rate and rhythm, normal s1 and s2 without murmur or gallop  Lungs-clear to auscultation  Abdomen-soft, positive bowel sounds, without masses, HSmegaly or pain       Assessment/Plan     Problem List Items Addressed This Visit       Diabetic neuropathy associated with type 2 diabetes mellitus (Multi)    Type 2 diabetes mellitus with hyperglycemia, without long-term current use of insulin (Multi)    Hypertensive heart disease with heart failure (Multi)    Relevant Orders    POCT Lipid Panel manually resulted    S/P right hip fracture - Primary    Chronic kidney disease, stage 5 (Multi)     Other Visit Diagnoses       Mild persistent asthmatic bronchitis without complication ([facility])        Relevant Medications    albuterol (ProAir HFA) 90 mcg/actuation inhaler    ciprofloxacin (Cipro) 250 mg tablet          Use your albuterol inhaler (two puffs four times daily) for the full two weeks even though you should be improving within two days. Stop your atorvastatin while taking the cipro.      Your kidney function is impaired.  Make sure that you do not become dehydrated, and take only Tylenol for pain, avoiding all NSAIDS.    Follow up in three months for your next routine appointment.    Chris Huizar,  MD      5

## 2025-05-04 NOTE — PROGRESS NOTES
Patient ID: Levy Gregory is a 69 y.o. male.  Referring Physician: No referring provider defined for this encounter.  Primary Care Provider: Chris Huizar MD  Visit Type: Follow up visit     Location: [facility]  Diagnosis/Reason: Anemia    HPI:  Levy Gregory is a 69 y.o. male referred for consultation of anemia. Denies self or family history of cancers, blood disorders or autoimmune conditions.     Per review of records:  Hemoglobin intermittently low (11.4-15.3 g/dL) in 2019, 2021  Normal hemoglobin in 2022  Anemia noted in 2023 during prolonged hospital stay   Mild hemoglobin noted in 2024 (hgb 9.8 - 13.8 g/dL)   Hemoglobin dropped to 7.7 - 8.5 g/dL so far in 2025 (MCV 98, MCHC 29.7)    Previous Hematological Background:  Hx of hematological disorders: No - Patient denies prior hematologic history, none in family either   Hx of blood transfusions: Yes -    Hx of iron supplementation: Yes - Oral supplementation - Denies adverse effect and reaction - Agent: OTC iron pill 65 mg daily for sometime, now increased to TID. Never needed IV iron.  Hx of B12 supplementation: No - Denies any prior supplementation  Hx of folate supplementation: No - Denies any prior supplementation    Relevant medications:  He is on baby asprin 81 mg daily in the morning by cardiologist   Currently using NSAID's (Naproxen, Ibuprofen etc.): No  Currently taking any supplements: One a day MV for men. OTC oral iron TID   Pantoprazole 40 mg BID     HPI:   Levy Gregory is a 70 y/o male referred for anemia, PMH COPD, T2DM, GERD, DVT (now off AC), acalculous cholecystitis, peripheral neuropathy, type II achalasia, paraesophageal hernia s/p robotic laparoscopic myotomy w/ fundoplication c/b esophageal perforation. He is accompanied by his wife today. He went to the ED on 2/13/25 after having syncope at home and black stools (for about 2 weeks). Reports having lower GI bleed approximately 6 years ago and having syncope at that time as  well. EGD found to have old debris and dried blood and no active bleeding or varices. CT angiogram was negative for active hemorrhage. He had 1 unit PRBC. Octreotide drip stopped once confirmed no esophageal varices.     He was started on Midodrine while inpatient for low BP. Recommended to wear compression stockings, stay well-hydrated, follow up with PCP and GI, and have fibroscan to evaluate for cirrhosis and possible gastric emptying study. Started on tamiflu for Flu A.     Patient reports being sick and tired of hospitals and needles. At times feels depressed. States his legs are weak and he notices OSHEA easily, dizziness at times, lack of appetite, difficulty walking due to neuropathy and fatigue. He does watch his BP at home. Says it has been running low since 2023 health issues. Tries to hydrate well but thinks about 20 ounces or so per day.     Denies chest pain, palpitations, SOB at rest, fevers, chills, drenching night sweats, pain, recent infections, nausea, vomiting, constipation, diarrhea, dysphagia, new lumps/bumps, urinary changes, further melena, hematochezia, hematuria, epistaxis, hemoptysis or worsening in LE edema. Denies other concerns.     Interval history:  3/18/2025:  Virtual or Telephone Consent  Verbal consent was requested and obtained from Levy Gregory on this date, 05/04/25 for a telehealth visit and the patient's location was confirmed at the time of the visit.  - Patient states he continues to feel fatigued and weak  - Gets tired quickly  - Saw cardiology and will continue on Midodrine for BP   - Says no further signs of bleeding including melena or hematochezia  - Denies chest pain, palpitations, cough, fevers, SOB at rest, rashes, signs of blood loss or other concerns    5/5/2025:   - Patient presents ahead of his scheduled follow-up visit due to feeling symptomatic and therefore we wanted to recheck his lab work  - He received IV Venofer 300 mg on 3/21, 3/28 and 4/4/2025  - Labs  on 5/2/2025 showed WBC 5.9, hemoglobin 11.2, MCV 98, platelets 128,000, iron saturation 13%, iron 47, ferritin 63, creatinine 1.53, alk phos 142, ALT 29, AST 25  - His hemoglobin, iron percent, iron and ferritin have improved  - He was to have a scope and VCE with Dr. Zayas but it was not fully completed due to food in the stomach   - He goes back to have a visit on 5/13/25  - He reports he continues to have some SOB, weakness/off balance, legs hurting while walking and fatigue   - Says he thinks he feels somewhat better since IV iron including some improvement in energy  - He is having a fibroscan on 6/23  - He has not been on oral iron since he started IV iron   - Continues on PPI BID, aspirin once per day and denies signs of bleeding such as melena, hematochezia, hematuria, epistaxis or other signs/symptoms   - Appetite is doing well, just can't eat much at a time due to GI surgery in the past   - Reports issues with last iron infusion, says IV burned and now has small bump where IV was, no skin changes or edema at this time he says   - Denies drenching night sweats, lymphadenopathy, dysphagia, fevers, recent or recurrent infections  - Denies chest pain, palpitations, cough, SOB at rest, rashes, worsening edema or other concerns      PMHx:  Active Ambulatory Problems     Diagnosis Date Noted    Colon polyp 02/17/2023    Diabetic neuropathy associated with type 2 diabetes mellitus 02/17/2023    ED (erectile dysfunction) 02/17/2023    High serum bone-specific alkaline phosphatase 02/17/2023    Gout 02/17/2023    History of pulmonary embolism 02/17/2023    Hyperlipidemia 02/17/2023    Iron deficiency anemia 02/17/2023    Obstructive sleep apnea, adult 02/17/2023    Osteopenia 02/17/2023    Vitamin D deficiency 02/17/2023    Achalasia, esophageal 06/22/2023    Type 2 diabetes mellitus with hyperglycemia, without long-term current use of insulin 06/22/2023    Orthostatic hypotension 07/06/2023    Solar purpura  (CMS-HCC) 07/24/2023    Anticoagulant long-term use 11/29/2023    Other dysphagia 10/31/2023    Hypertensive heart disease with heart failure 02/04/2024    Chronic obstructive pulmonary disease, unspecified COPD type (Multi) 02/04/2024    Acute renal failure superimposed on chronic kidney disease 03/21/2024    Adjustment disorder with depressed mood 02/17/2023    Edema of both lower legs due to peripheral venous insufficiency 02/10/2024    Schatzki's ring 03/21/2024    Coronary artery disease involving native coronary artery of native heart without angina pectoris 04/09/2024    Deep vein thrombosis (DVT) of left lower extremity 04/09/2024    Macrocytosis 07/25/2024    Closed fracture of right hip requiring operative repair with routine healing 08/15/2024    S/P right hip fracture 09/03/2024    Chronic kidney disease, stage 5 (Multi) 09/03/2024    Asthmatic bronchitis without complication ([facility]) 10/29/2024    GI bleed 02/13/2025    Other cirrhosis of liver 04/10/2025     Resolved Ambulatory Problems     Diagnosis Date Noted    Acne 02/17/2023    Decreased GFR 02/17/2023    Esophageal mass 02/17/2023    Esophageal stricture 02/17/2023    Gastric ulcer 02/17/2023    History of DVT (deep vein thrombosis) 02/17/2023    Microalbuminuria 02/17/2023    Nocturia 02/17/2023    Rib pain on left side 02/17/2023    Sialoadenitis 02/17/2023    Dysphagia 02/17/2023    Obesity (BMI 30-39.9) 02/17/2023    Deep vein thrombosis (DVT) of popliteal vein of left lower extremity 06/22/2023    Dislodged gastrostomy tube 06/22/2023    Empyema (Multi) 06/22/2023    Gastrostomy in place (Multi) 06/22/2023    Mediastinitis 06/22/2023    Fall 07/06/2023    Malfunction of jejunostomy tube (Multi) 07/10/2023    Esophageal perforation 07/20/2023    Chronic atrial fibrillation (Multi) 07/24/2023    Hyponatremia 07/28/2023    Malnutrition of moderate degree (Multi) 10/03/2023    Impaired oral gastric feeding tube, initial encounter 10/03/2023     Moderate protein-calorie malnutrition (Multi) 10/19/2023    Hypotension due to drugs 11/04/2023    Dysfunction of both eustachian tubes 11/21/2023    Elevated liver function tests 11/29/2023    Hypotension 11/30/2023    Infection requiring contact isolation precautions 12/07/2023    Confusion 01/22/2024    Encounter for immunization 02/04/2024    Hypotension 02/07/2024    Pedal edema 02/13/2024    At risk for falls 02/26/2024    Type 2 diabetes mellitus with diabetic polyneuropathy, with long-term current use of insulin 03/08/2024    Generalized abdominal pain 03/08/2024    Dizziness 03/08/2024    Syncope and collapse 03/08/2024    Abnormal ECG 11/26/2022    Prolonged QT interval 03/21/2024    ACS (acute coronary syndrome) (Multi) 08/16/2023    Acute posthemorrhagic anemia 08/15/2023    Acute respiratory failure 02/12/2023    CORA (acute kidney injury) 03/21/2024    Anemia 03/21/2024    Bacteremia 05/22/2023    Balance problems 11/20/2023    Bruise, trunk 03/21/2024    Closed head injury 03/21/2024    Coagulopathy (Multi) 03/21/2024    Difficulty walking 11/20/2023    Do not resuscitate 08/15/2023    Epistaxis 03/21/2024    Fatigue 09/06/2023    Food bolus obstruction of intestine (Multi) 03/21/2024    History of anemia 03/21/2024    History of diabetes mellitus 03/21/2024    History of infectious disease 03/21/2024    Hypernatremia 03/21/2024    Hypocalcemia 03/21/2024    Hypokalemia 03/21/2024    Hypoxia 03/21/2024    Lactic acidosis 03/21/2024    Leukocytosis 03/21/2024    Low kidney function 02/17/2023    Lung field abnormal 05/24/2023    Malnutrition (Multi) 03/21/2024    Morbid obesity (Multi) 03/21/2024    Nosocomial condition 08/15/2023    Onychodystrophy 11/20/2023    Onychomycosis 11/20/2023    Pain in toes of both feet 11/20/2023    Pleural effusion exudative 03/21/2024    CAP (community acquired pneumonia) 08/15/2023    Post-operative pain 03/21/2024    Postoperative septic shock 03/21/2024    Other  pulmonary embolism without acute cor pulmonale 02/10/2024    Pulmonary embolism 02/12/2023    Respiratory distress 03/21/2024    Rib contusion, left, sequela 03/21/2024    History of inferior vena caval filter placement 03/21/2024    Status post endoscopy 03/21/2024    Vagal arrhythmia 07/23/2018    Mild left ventricular systolic dysfunction 04/09/2024    Upper respiratory tract infection 05/13/2024    Palpitations 05/21/2024    Weight loss 07/25/2024    Closed right hip fracture, initial encounter 08/15/2024     Past Medical History:   Diagnosis Date    ARF (acute renal failure)     Asthmatic bronchitis (HHS-HCC)     Bilateral leg edema     Cirrhosis of liver (Multi)     CKD (chronic kidney disease)     Contusion of abdominal wall, initial encounter 01/12/2021    COPD (chronic obstructive pulmonary disease) (Multi)     Coronary artery disease     COVID-19     Diabetes mellitus (Multi)     Diabetic neuropathy (Multi)     DVT (deep venous thrombosis) (Multi)     Fractures     GERD (gastroesophageal reflux disease)     Heart failure     Hemoptysis 05/12/2020    Herpes labialis     Hiatal hernia     Irregular heart beat     Pain in left knee     PE (pulmonary thromboembolism) (Multi)     Peripheral neuropathy     Peripheral vascular insufficiency     Personal history of other diseases of the respiratory system 06/19/2019    Sleep apnea       PSHx:  Past Surgical History:   Procedure Laterality Date    CONDUIT REPLACEMENT  12/07/2023    Gastric conduit revision; mini laparotomy, General Ex-lap, ISA, Gastric pull up with anastmosis in neck, MIGUELINA to bulb sx. New J tube.    ESOPHAGOGASTRECTOMY      ESOPHAGOGASTRODUODENOSCOPY      with stents x2    GASTROSTOMY TUBE PLACEMENT N/A     REMOVED    IR CVC PICC  08/16/2023    IR CVC PICC 8/16/2023 [facility] [place] [facility]PAT[facility] - REMOVED    JOINT REPLACEMENT Right     RIGHT HIP REPLACEMENT    OTHER SURGICAL HISTORY  01/21/2019    [place] filter placement - REMOVED    UMBILICAL HERNIA  REPAIR N/A       FHx:   Maternal Grandmother: DM  Mother: No medical issues   Father: Heart disease  Siblings: 1 full biological sister. 4 half brother/sisters. No cancer or blood disorders.   Children: 2 boys, age 36 and 39. Healthy     Social Hx:  Levy Gregory    reports that he quit smoking about 2 years ago. His smoking use included cigars. He started smoking about 35 years ago. He has been exposed to tobacco smoke. He has never used smokeless tobacco.  He  reports no history of alcohol use.  He  reports no history of drug use.    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 From [place]. No special diets or restrictions.     Social History     Socioeconomic History    Marital status:    Tobacco Use    Smoking status: Former     Types: Cigars     Start date: 1989     Quit date: 2023     Years since quittin.1     Passive exposure: Past    Smokeless tobacco: Never   Vaping Use    Vaping status: Never Used   Substance and Sexual Activity    Alcohol use: Never    Drug use: Never    Sexual activity: Defer     Social Drivers of Health     Financial Resource Strain: Low Risk  (2025)    Overall Financial Resource Strain (CARDIA)     Difficulty of Paying Living Expenses: Not hard at all   Food Insecurity: No Food Insecurity (2025)    Hunger Vital Sign     Worried About Running Out of Food in the Last Year: Never true     Ran Out of Food in the Last Year: Never true   Transportation Needs: No Transportation Needs (2025)    PRAPARE - Transportation     Lack of Transportation (Medical): No     Lack of Transportation (Non-Medical): No   Physical Activity: Unknown (10/20/2023)    Exercise Vital Sign     Minutes of Exercise per Session: 30 min   Stress: Stress Concern Present (10/20/2023)    [employer] [place] [employer] - Occupational Stress Questionnaire     Feeling of Stress : To some extent   Social Connections: Socially Isolated  (10/20/2023)    Social Connection and Isolation Panel [NHANES]     Frequency of Communication with Friends and Family: More than three times a week     Attends Restoration Services: Never     Active Member of Clubs or Organizations: No     Attends Club or Organization Meetings: Never     Marital Status:    Intimate Partner Violence: Not At Risk (2/13/2025)    Humiliation, Afraid, Rape, and Kick questionnaire     Fear of Current or Ex-Partner: No     Emotionally Abused: No     Physically Abused: No     Sexually Abused: No   Housing Stability: Low Risk  (2/13/2025)    Housing Stability Vital Sign     Unable to Pay for Housing in the Last Year: No     Number of Times Moved in the Last Year: 1     Homeless in the Last Year: No       Cancer Screenings:  Upper EGD: 1/4/21, 11/25/22, 2/11/23, 2/23/23  More Recently:   - 2/14/25: Findings  Patient is s/p esophagectomy with gastric pull through  Large amount of old blood and food debris in the stomach body and antrum that could not be lavaged or suctioned. There was otherwise no fresh blood seen to extent reached. Procedure aborted in the antrum due to poor visualization and to minimize risk of aspiration  - 2/17/25: Findings  Patient is s/p esophagectomy with gastric pull through  Moderate amount of old food debris and retained gastric fluid in the stomach body and antrum precluding visualization of stomach body. There was otherwise no evidence of active bleeding. The pylorus was characterized by edematous mucosa but traversable with minimal resistance.  The duodenum appeared normal.  Colonoscopy: 3/12/2021 (repeat in 5 years)      Medications and allergies reviewed in EMR.    ROS:  Review of Systems - Oncology   10 point review of systems negative except as state in HPI.    Vitals & Statistics:  Objective   Visit Vitals  Smoking Status Former     Visit Vitals  /76 (BP Location: Left arm, Patient Position: Sitting, BP Cuff Size: Adult)   Pulse 74   Temp 36 °C  (96.8 °F) (Temporal)   Resp 16   Wt 101 kg (222 lb 0.1 oz) Comment: no coat,shoes   SpO2 96%   BMI 29.29 kg/m²   Smoking Status Former   BSA 2.28 m²     Physical Exam  Vitals reviewed.   Constitutional:       Appearance: Normal appearance.   HENT:      Head: Normocephalic and atraumatic.      Nose: Nose normal.      Mouth/Throat:      Mouth: Mucous membranes are moist.      Pharynx: Oropharynx is clear.   Eyes:      Extraocular Movements: Extraocular movements intact.      Conjunctiva/sclera: Conjunctivae normal.      Pupils: Pupils are equal, round, and reactive to light.   Cardiovascular:      Rate and Rhythm: Normal rate and regular rhythm.      Pulses: Normal pulses.      Heart sounds: Normal heart sounds.   Pulmonary:      Effort: Pulmonary effort is normal.      Breath sounds: Normal breath sounds.   Abdominal:      General: Bowel sounds are normal.      Palpations: Abdomen is soft.   Musculoskeletal:         General: Normal range of motion.      Cervical back: Normal range of motion.   Skin:     General: Skin is warm and dry.   Neurological:      General: No focal deficit present.      Mental Status: He is alert and oriented to person, place, and time.   Psychiatric:         Mood and Affect: Mood normal.         Behavior: Behavior normal.         Thought Content: Thought content normal.         Judgment: Judgment normal.       Results:  Lab Results   Component Value Date    WBC 5.9 05/02/2025    NEUTROABS 3.59 05/02/2025    IGABSOL 0.02 05/02/2025    LYMPHSABS 1.67 05/02/2025    MONOSABS 0.49 05/02/2025    EOSABS 0.13 05/02/2025    BASOSABS 0.04 05/02/2025    RBC 3.69 (L) 05/02/2025    MCV 98 05/02/2025    MCHC 31.0 (L) 05/02/2025    HGB 11.2 (L) 05/02/2025    HCT 36.1 (L) 05/02/2025     (L) 05/02/2025     Lab Results   Component Value Date    RETICCTPCT 3.2 (H) 03/10/2025      Lab Results   Component Value Date    CREATININE 1.53 (H) 05/02/2025    BUN 31 (H) 05/02/2025    EGFR 49 (L) 05/02/2025    NA  "136 05/02/2025    K 4.3 05/02/2025     05/02/2025    CO2 22 05/02/2025      Lab Results   Component Value Date    ALT 29 05/02/2025    AST 25 05/02/2025    ALKPHOS 142 (H) 05/02/2025    BILITOT 0.3 05/02/2025      Lab Results   Component Value Date    TSH 2.51 03/10/2025     Lab Results   Component Value Date    TSH 2.51 03/10/2025    THYROIDPAB <28 07/24/2023     Lab Results   Component Value Date    IRON 47 05/02/2025    TIBC 366 05/02/2025    FERRITIN 63 05/02/2025      Lab Results   Component Value Date    XVHHWJLU53 734 03/10/2025      Lab Results   Component Value Date    FOLATE 15.8 03/10/2025     Lab Results   Component Value Date    FERNANDO Positive (A) 03/10/2025    RF <10 03/10/2025    SEDRATE 22 (H) 03/10/2025      Lab Results   Component Value Date    CRP 0.73 03/10/2025      No results found for: \"JOSELUIS\"  Lab Results   Component Value Date     03/10/2025     Lab Results   Component Value Date    HAPTOGLOBIN 135 03/10/2025     Lab Results   Component Value Date    SPEP  03/10/2025     Aberrant bands detected. See immunofixation. Hypoalbuminemia.        Lab Results   Component Value Date    IGG 1,670 (H) 03/10/2025    IGM 78 03/10/2025     03/10/2025     Lab Results   Component Value Date    HEPAIGM NONREACTIVE 07/24/2023    HEPBCIGM NONREACTIVE 07/24/2023    HEPBSAG NONREACTIVE 07/24/2023    HEPCAB REACTIVE (A) 07/24/2023       Assessment/Plan:  Levy Gregory is a 68 y/o referred for consultation of anemia 2/2 recent GI bleed. Reports no further melena occurring since hospital visit.      I reviewed patient's chart including but not limited to labs, imaging, surgical/procedure notes, pathology, hospital notes, doctor's notes.    2/19/2025 results:  WBC WNL  Normocytic, hypochromic anemia. Hgb 8.5, RBC 2.91, Hematocrit 28.6%  Platelets WNL  Creatinine 1.37, GFR 56, calcium 8.0    Plan:  Discussed possible etiologies including multifactorial, nutritional deficiencies, anemia of chronic " "disease, malabsorption, hemopathy, medications, bleeding, malignancy, etc. Will start hematological workup today.    Anemia   Patient declined recommended labs today due to not having enough time. States he has a FUV on Monday with his primary care, he will go for labs after that visit in case PCP also orders testing since patient reports he is a very difficult stick.  Continue oral iron as already prescribed, TID, monitor for side effects, which were discussed  SW to meet with patient today for depression score/report   Has FUV with GI on 4/10/25  RTC on 3/18/25 via virtual/telehealth visit - F/U sooner if needed/urgent    I had an extensive discussion with the patient regarding the diagnosis and discussed the plan of therapy, including general considerations regarding side effects and outcomes. Pt understood and gave appropriate teach back about the plan of care. All questions were answered to the patient's satisfaction. The patient is instructed to contact us at any time if questions or problems arise. Thank you for the opportunity to participate in the care of this very pleasant patient.    Total time = 50 minutes. 50% or more of this time was spent in counseling and/or coordination of care including reviewing medical history/radiology/labs, examining patient, formulating outlined plan with team, and discussing plan with patient/family.    Interval History:   3/18/2025:   - Labs from initial workup showed: WBC 5.6, normal dif, hgb 8.8, hematocrit 30.7%, , MCHC 28.7, plt 184,000, retic 3.2%, sed rate 22, FERNANDO + with negative reflex, Cr 1.47, GFR 52, calcium 8.1, alk phos 205, ALT 37, AST 32, glucose 259, iron saturation 6%, ferritin 46, serum iron 21, , CRP 0.73, haptoglobin 135, JOSELUIS neg, IgG 1,670, IgA 186, IgM 78, Ig Kappa FLC 7.08, Ig Lambda FLC 7.27, FLC ratio 0.97, SPEP \"Several discrete bands are detected within the gamma region suggesting an oligoclonal or reactive pattern.  Repeat testing is " "recommended after the resolution of any acute illness to monitor the evolution of this band.\"  - Labs show SHARONA   - He has been on oral iron   - SHARONA likely 2/2 recent GI bleeding  - Sees GI next month for follow up   - Discussed we will proceed with IV Venofer   - Drug information including risks and benefits discussed with patient including risk of hypersensitivity, patient provided informed consent to proceed   - Discussed that we will closely monitor his labs and once iron is improved if he remains anemic we will likely recommend additional workup   - He can continue his oral iron   - RTC in 8 weeks after IV venofer, early June, for repeat labs and FUV     5/5/2025:   - Patient presents ahead of his scheduled follow-up visit due to feeling symptomatic and therefore we wanted to recheck his lab work  - He received IV Venofer 300 mg on 3/21, 3/28 and 4/4/2025  - Stopped oral iron after starting IV venofer   - Labs on 5/2/2025 showed WBC 5.9, hemoglobin 11.2, MCV 98, platelets 128,000, iron saturation 13%, iron 47, ferritin 63, creatinine 1.53, alk phos 142, ALT 29, AST 25  - His hemoglobin, iron percent, iron and ferritin have improved  - With iron saturation still below 15% and some mild anemia we discussed 1-2 IV iron doses or restarting oral iron, he opted for oral iron   - Discussed potential side effects,  from PPI by at least two hours and taking vitamin C 500 mg with iron tab (ferrous sulfate 325 mg/daily)   - He is aware I will confirm plan for oral iron with Dr. Zayas to ensure no contraindications from a GI standpoint   - He does have an isolated thrombocytopenia which we will repeat with next blood work, may be 2/2 possible cirrhosis noted on Fibroscan - has repeat Fibroscan on 6/23   - He continues to follow with urology for BPH and overactive bladder  - He has seen gastroenterology, Dr. Zayas, since last visit and continues on PPI twice a day  - He was to have scoping done on 4/30/2025 but it " was not completed due to food in his stomach, has a visit on 5/13/25 with Dr. Zayas for office visit   - We will continue to closely monitor his labs and symptoms, so he will RTC in 6 weeks with repeat cbc-d, cmp, iron panel, ferritin, immunoglobulins, FLC, SPEP a few days before (repeat SPEP due to reactive results, may be improved with no further GI blood loss).       Diagnoses and all orders for this visit:  Anemia, unspecified type  -     CBC and Auto Differential; Future  -     Comprehensive Metabolic Panel; Future  -     Ferritin; Future  -     Iron and TIBC; Future  -     Serum Protein Electrophoresis; Future  -     Immunoglobulins (IgG, IgA, IgM); Future  -     House/Lambda Free Light Chain, Serum; Future  Iron deficiency anemia due to chronic blood loss  -     CBC and Auto Differential; Future  -     Comprehensive Metabolic Panel; Future  -     Ferritin; Future  -     Iron and TIBC; Future  -     Serum Protein Electrophoresis; Future  -     Immunoglobulins (IgG, IgA, IgM); Future  -     House/Lambda Free Light Chain, Serum; Future        Freddie Osborn, IVÁN-CNP

## 2025-05-05 ENCOUNTER — OFFICE VISIT (OUTPATIENT)
Dept: HEMATOLOGY/ONCOLOGY | Facility: CLINIC | Age: 70
End: 2025-05-05
Payer: MEDICARE

## 2025-05-05 VITALS
HEART RATE: 74 BPM | OXYGEN SATURATION: 96 % | SYSTOLIC BLOOD PRESSURE: 117 MMHG | BODY MASS INDEX: 29.29 KG/M2 | TEMPERATURE: 96.8 F | RESPIRATION RATE: 16 BRPM | DIASTOLIC BLOOD PRESSURE: 76 MMHG | WEIGHT: 222 LBS

## 2025-05-05 DIAGNOSIS — D64.9 ANEMIA, UNSPECIFIED TYPE: Primary | ICD-10-CM

## 2025-05-05 DIAGNOSIS — D50.0 IRON DEFICIENCY ANEMIA DUE TO CHRONIC BLOOD LOSS: ICD-10-CM

## 2025-05-05 PROCEDURE — 1126F AMNT PAIN NOTED NONE PRSNT: CPT

## 2025-05-05 PROCEDURE — 1159F MED LIST DOCD IN RCRD: CPT

## 2025-05-05 PROCEDURE — 3078F DIAST BP <80 MM HG: CPT

## 2025-05-05 PROCEDURE — 99214 OFFICE O/P EST MOD 30 MIN: CPT

## 2025-05-05 PROCEDURE — 1123F ACP DISCUSS/DSCN MKR DOCD: CPT

## 2025-05-05 PROCEDURE — 3074F SYST BP LT 130 MM HG: CPT

## 2025-05-05 ASSESSMENT — PAIN SCALES - GENERAL: PAINLEVEL_OUTOF10: 0-NO PAIN

## 2025-05-05 NOTE — PATIENT INSTRUCTIONS
- Before you start the iron and vitamin C I would like Dr. Zayas to give his input so I will call you with his response.     - If we do begin the iron please follow below:   - Please do not take the oral iron the day before or the morning of your future lab work   - If you can, do not take the iron for at least 2 hours after the Protonix (pantoprazole)  - You may experience dark/green stools from the iron. If you have having issues like constipation you can try a stool softener called colace (sold over the counter) and increase hydration/fiber through diet. If the constipation is not getting better or causing issues please stop the iron and notify me to discuss other options such as IV iron or taking every other day.   - Call in the meantime for questions or concerns at [phone]    Thank you,   IVÁN Body-CNP

## 2025-05-07 ENCOUNTER — APPOINTMENT (OUTPATIENT)
Dept: GASTROENTEROLOGY | Facility: HOSPITAL | Age: 70
End: 2025-05-07
Payer: MEDICARE

## 2025-05-07 ENCOUNTER — TELEPHONE (OUTPATIENT)
Dept: HEMATOLOGY/ONCOLOGY | Facility: CLINIC | Age: 70
End: 2025-05-07
Payer: MEDICARE

## 2025-05-07 NOTE — TELEPHONE ENCOUNTER
"I called patient at request of Freddie Osborn NP to provide him with updated recommendations after follow up visit. I spoke with Levy and updated him Freddie spoke with Dr. Zayas and they are in agreement that \"he is good to start the oral iron and vitamin C\". Patient verbalized understanding and was appreciative of call.   "

## 2025-05-09 ENCOUNTER — PATIENT OUTREACH (OUTPATIENT)
Dept: PRIMARY CARE | Facility: CLINIC | Age: 70
End: 2025-05-09
Payer: MEDICARE

## 2025-05-09 NOTE — PROGRESS NOTES
Final call. Called and spoke with patient to address any questions or concerns regarding hospitalization.   Patient reports their condition has slightly improved but he has been scoped three times and has not found out answers. Verified pt upcoming gastroenterology appt with Dr. Zayas 5/13/2025 0830.     Pt reports he has his medication and everything he needs at home. Pt denies any current needs from PCP.  Verified pt next PCP appt 6/10/2025.    Pt denies any questions, needs, or concerns. He is encouraged to call if questions or needs arise.

## 2025-05-13 ENCOUNTER — APPOINTMENT (OUTPATIENT)
Dept: GASTROENTEROLOGY | Facility: CLINIC | Age: 70
End: 2025-05-13
Payer: MEDICARE

## 2025-05-13 VITALS
HEIGHT: 72 IN | DIASTOLIC BLOOD PRESSURE: 77 MMHG | BODY MASS INDEX: 29.26 KG/M2 | WEIGHT: 216 LBS | OXYGEN SATURATION: 94 % | SYSTOLIC BLOOD PRESSURE: 110 MMHG | HEART RATE: 79 BPM

## 2025-05-13 DIAGNOSIS — R13.19 OTHER DYSPHAGIA: Primary | ICD-10-CM

## 2025-05-13 DIAGNOSIS — K22.0 ACHALASIA, ESOPHAGEAL: ICD-10-CM

## 2025-05-13 DIAGNOSIS — K92.2 GASTROINTESTINAL HEMORRHAGE, UNSPECIFIED GASTROINTESTINAL HEMORRHAGE TYPE: ICD-10-CM

## 2025-05-13 PROCEDURE — 1159F MED LIST DOCD IN RCRD: CPT | Performed by: INTERNAL MEDICINE

## 2025-05-13 PROCEDURE — 3078F DIAST BP <80 MM HG: CPT | Performed by: INTERNAL MEDICINE

## 2025-05-13 PROCEDURE — 99214 OFFICE O/P EST MOD 30 MIN: CPT | Performed by: INTERNAL MEDICINE

## 2025-05-13 PROCEDURE — 3074F SYST BP LT 130 MM HG: CPT | Performed by: INTERNAL MEDICINE

## 2025-05-13 PROCEDURE — 3008F BODY MASS INDEX DOCD: CPT | Performed by: INTERNAL MEDICINE

## 2025-05-13 PROCEDURE — 1036F TOBACCO NON-USER: CPT | Performed by: INTERNAL MEDICINE

## 2025-05-13 RX ORDER — METOCLOPRAMIDE 5 MG/1
5 TABLET ORAL DAILY
Qty: 2 TABLET | Refills: 0 | Status: SHIPPED | OUTPATIENT
Start: 2025-05-13

## 2025-05-13 NOTE — H&P (VIEW-ONLY)
Gastroenterology Office Visit     History of Present Illness:   Since last OV in 4/25, patient has had repeat EGD; see below for complete results.  Still had poor visualization of stomach.      Fibroscan scheduled for 6/23.  Taking PPI bid.  Has had 1 episode of dysphagia since last OV-last week.  No further melena.     Had been on reglan in early 2024.  No adverse events per him/wife.     OV in 4/25:  Levy Gregory is a 69 y.o. male who presents to GI clinic for follow up of UGIB.  Admitted to [facility] for 1 week in 2/25.  Presented with melena x 2 wks and Hb 13 -> 7.7.  Transfused 1u pRBCs.  EGD x 2 with poor visualization of the stomach.  Has had 3 iv Fe infusions since discharge.      Has h/o type II achalasia, paraesophageal hernia s/p robotic laparoscopic myotomy with fundoplication C/B esophageal perforation in 3/23.       Denies melena since discharge.  Still taking PPI bid. Reports having remote bleeding  about 10 yrs ago from NSAIDs.      Imaging was suggestive of cirrhosis.  Never drank.     Review of Systems  Constitutional: denies fever/ chills, night sweats, wt loss and fatigue  Respiratory: denies SOB, OSHEA  CV: denies chest pain and LE edema  Neuro: denies weakness and difficulty walking      Past Medical History   has a past medical history of Achalasia, esophageal, Anemia, Contusion of abdominal wall, initial encounter (01/12/2021), COPD (chronic obstructive pulmonary disease) (Multi), Diabetes mellitus (Multi), DVT (deep venous thrombosis) (Multi), DVT (deep venous thrombosis) (Multi), Dysphagia, GERD (gastroesophageal reflux disease), Hemoptysis (05/12/2020), Herpes labialis, Hiatal hernia, Hyperlipidemia, Hypotension due to drugs, Impaired oral gastric feeding tube, initial encounter (10/03/2023), Irregular heart beat, Pain in left knee, Peripheral neuropathy, Personal history of other diseases of the respiratory system (06/19/2019), Sleep apnea, and Solar purpura (CMS-[facility]).     Problem  List  Patient Active Problem List   Diagnosis    Colon polyp    Diabetic neuropathy associated with type 2 diabetes mellitus    ED (erectile dysfunction)    High serum bone-specific alkaline phosphatase    Gout    History of pulmonary embolism    Hyperlipidemia    Iron deficiency anemia    Obstructive sleep apnea, adult    Osteopenia    Vitamin D deficiency    Achalasia, esophageal    Type 2 diabetes mellitus with hyperglycemia, without long-term current use of insulin    Orthostatic hypotension    Solar purpura (CMS-HCC)    Anticoagulant long-term use    Other dysphagia    Hypertensive heart disease with heart failure    Chronic obstructive pulmonary disease, unspecified COPD type (Multi)    Acute renal failure superimposed on chronic kidney disease    Adjustment disorder with depressed mood    Edema of both lower legs due to peripheral venous insufficiency    Schatzki's ring    Coronary artery disease involving native coronary artery of native heart without angina pectoris    Deep vein thrombosis (DVT) of left lower extremity    Macrocytosis    Closed fracture of right hip requiring operative repair with routine healing    S/P right hip fracture    Chronic kidney disease, stage 5 (Multi)    Asthmatic bronchitis without complication ([facility]-HCC)    GI bleed    Other cirrhosis of liver       Past Surgical History  Past Surgical History:   Procedure Laterality Date    CONDUIT REPLACEMENT  12/07/2023    Gastric conduit revision; mini laparotomy, General Ex-lap, ISA, Gastric pull up with anastmosis in neck, MIGUELINA to bulb sx. New J tube.    ESOPHAGOGASTRECTOMY      ESOPHAGOGASTRODUODENOSCOPY      with stents x2    GASTROSTOMY TUBE PLACEMENT N/A     IR CVC PICC  08/16/2023    IR CVC PICC 8/16/2023 [facility]    OTHER SURGICAL HISTORY  01/21/2019    [place] filter placement    UMBILICAL HERNIA REPAIR N/A 2013       Social History   reports that he quit smoking about 2 years ago. His smoking use included cigars. He  started smoking about 35 years ago. He has been exposed to tobacco smoke. He has never used smokeless tobacco. He reports that he does not drink alcohol and does not use drugs.     Family History  family history includes Diabetes in his mother; Diabetes type I in his father; Hypertension in his mother.       Allergies  No Known Allergies    Medications  Medications Ordered Prior to Encounter[1]       Objective   /77   Pulse 79   Ht 1.829 m (6')   Wt 98 kg (216 lb)   SpO2 94%   BMI 29.29 kg/m²         General: no acute distress, well-nourished  HEENT: Mallampati 3  Skin: no jaundice, rash or liver stigmata  Respiratory: normal breath sounds bilaterally, no wheezes or rales  CV: RRR, no murmurs; no LE edema    LABS  Component      Latest Ref Rng 3/28/2025 5/2/2025   WBC      4.4 - 11.3 x10*3/uL 5.1  5.9    RBC      4.50 - 5.90 x10*6/uL 2.93 (L)  3.69 (L)    HEMOGLOBIN      13.5 - 17.5 g/dL 8.8 (L)  11.2 (L)    HEMATOCRIT      41.0 - 52.0 % 29.5 (L)  36.1 (L)    MCV      80 - 100 fL 101 (H)  98    MCH      26.0 - 34.0 pg 30.0  30.4    MCHC      32.0 - 36.0 g/dL 29.8 (L)  31.0 (L)    RED CELL DISTRIBUTION WIDTH      11.5 - 14.5 % 16.7 (H)  15.6 (H)    Platelets      150 - 450 x10*3/uL 166  128 (L)              Radiology  CT A/P 2/25: no active GIB, s/p esophagectomy; cirrhotic liver    Endoscopy    Colonoscopy 3/21 (h/o polyps): adequate pre, hems, tics, no polyps; repeat in 5 yrs    EGD 4/25:   There was evidence of an esophagectomy with gastric pull-through.  The anastomosis was stenosed, but the scope passed without resistance.  Grade C esophagitis with mucosal breaks measuring 5 mm or more, continuous between folds, covering less than 75% of the circumference in the anastomosis of the esophagus  There was a large amount of food and fluid in the stomach, precluding visualization.    Assessment/Plan   Levy Gregory is a 69 y.o. male who presents to GI clinic for recent melena and acute blood loss anemia  in 2/25.  Cirrhosis on imaging.  Hb 11.2 at last check in early 5/25, last Fe infusion on 4/4.     GI bleed  Melena resolved.  H/o remote bleeding .  Schedule EGD at [facility] after 48 hrs clear liquid diet and reglan x 2 days; will intubate and dilate stricture.       Dysphagia with anastomotic stricture- as above    Other cirrhosis of liver  Keep Fibroscan appt next month.  Return to clinic in 3 months.        Dilip Zayas MD            [1]   Current Outpatient Medications on File Prior to Visit   Medication Sig Dispense Refill    aspirin 81 mg chewable tablet Chew 1 tablet (81 mg) once daily. 30 tablet 11    atorvastatin (Lipitor) 80 mg tablet Take 1 tablet (80 mg) by mouth once daily. 90 tablet 3    blood sugar diagnostic (OneTouch Ultra Test) strip Test glucose twice daily or as directed 200 strip 1    blood-glucose meter misc Check glucose before meals and call if less than 80 or over 300. 1 each 0    flash glucose sensor kit (FreeStyle Kellie 2 Sensor) kit Use as instructed 6 each 1    fluticasone propion-salmeteroL (Advair Diskus) 100-50 mcg/dose diskus inhaler Inhale 1 puff 2 times a day. Rinse mouth with water after use to reduce aftertaste and incidence of candidiasis. Do not swallow. 60 each 2    FreeStyle Kellie 2 [place] misc Use as instructed 1 each 0    gabapentin (Neurontin) 100 mg capsule Take 1 capsule (100 mg) by mouth 3 times a day. 270 capsule 0    midodrine (Proamatine) 5 mg tablet Take 1 tablet (5 mg) by mouth 3 times daily (morning, midday, late afternoon). 270 tablet 0    pantoprazole (ProtoNix) 40 mg EC tablet Take 1 tablet (40 mg) by mouth 2 times a day. Do not crush, chew, or split. 180 tablet 0    pioglitazone (Actos) 15 mg tablet Take 1 tablet (15 mg) by mouth once daily. 30 tablet 11    sertraline (Zoloft) 50 mg tablet Take 1 tablet (50 mg) by mouth once daily. 90 tablet 0    tamsulosin (Flomax) 0.4 mg 24 hr capsule Take 1 capsule (0.4 mg) by mouth once daily. 90 capsule 1     vibegron (Gemtesa) 75 mg tablet Take 1 tablet (75 mg) by mouth once daily. 90 tablet 3     No current facility-administered medications on file prior to visit.

## 2025-05-13 NOTE — PATIENT INSTRUCTIONS
Schedule EGD with dilation at [place].  Will need to be intubated as his stomach was full of food the last 3 times.  Clear liquid diet x 48 hrs prior to EGD.  Take reglan both days prior to EGD.

## 2025-05-13 NOTE — ASSESSMENT & PLAN NOTE
Schedule EGD with dilation at [place].  Will need to be intubated as his stomach was full of food the last 3 times.  Clear liquid diet x 48 hrs prior to EGD.  Take reglan both days prior to EGD.

## 2025-05-13 NOTE — PROGRESS NOTES
Gastroenterology Office Visit     History of Present Illness:   Since last OV in 4/25, patient has had repeat EGD; see below for complete results.  Still had poor visualization of stomach.      Fibroscan scheduled for 6/23.  Taking PPI bid.  Has had 1 episode of dysphagia since last OV-last week.  No further melena.     Had been on reglan in early 2024.  No adverse events per him/wife.     OV in 4/25:  Levy Gregory is a 69 y.o. male who presents to GI clinic for follow up of UGIB.  Admitted to [facility] for 1 week in 2/25.  Presented with melena x 2 wks and Hb 13 -> 7.7.  Transfused 1u pRBCs.  EGD x 2 with poor visualization of the stomach.  Has had 3 iv Fe infusions since discharge.      Has h/o type II achalasia, paraesophageal hernia s/p robotic laparoscopic myotomy with fundoplication C/B esophageal perforation in 3/23.       Denies melena since discharge.  Still taking PPI bid. Reports having remote bleeding  about 10 yrs ago from NSAIDs.      Imaging was suggestive of cirrhosis.  Never drank.     Review of Systems  Constitutional: denies fever/ chills, night sweats, wt loss and fatigue  Respiratory: denies SOB, OSHEA  CV: denies chest pain and LE edema  Neuro: denies weakness and difficulty walking      Past Medical History   has a past medical history of Achalasia, esophageal, Anemia, Contusion of abdominal wall, initial encounter (01/12/2021), COPD (chronic obstructive pulmonary disease) (Multi), Diabetes mellitus (Multi), DVT (deep venous thrombosis) (Multi), DVT (deep venous thrombosis) (Multi), Dysphagia, GERD (gastroesophageal reflux disease), Hemoptysis (05/12/2020), Herpes labialis, Hiatal hernia, Hyperlipidemia, Hypotension due to drugs, Impaired oral gastric feeding tube, initial encounter (10/03/2023), Irregular heart beat, Pain in left knee, Peripheral neuropathy, Personal history of other diseases of the respiratory system (06/19/2019), Sleep apnea, and Solar purpura (CMS-[facility]).     Problem  List  Patient Active Problem List   Diagnosis    Colon polyp    Diabetic neuropathy associated with type 2 diabetes mellitus    ED (erectile dysfunction)    High serum bone-specific alkaline phosphatase    Gout    History of pulmonary embolism    Hyperlipidemia    Iron deficiency anemia    Obstructive sleep apnea, adult    Osteopenia    Vitamin D deficiency    Achalasia, esophageal    Type 2 diabetes mellitus with hyperglycemia, without long-term current use of insulin    Orthostatic hypotension    Solar purpura (CMS-HCC)    Anticoagulant long-term use    Other dysphagia    Hypertensive heart disease with heart failure    Chronic obstructive pulmonary disease, unspecified COPD type (Multi)    Acute renal failure superimposed on chronic kidney disease    Adjustment disorder with depressed mood    Edema of both lower legs due to peripheral venous insufficiency    Schatzki's ring    Coronary artery disease involving native coronary artery of native heart without angina pectoris    Deep vein thrombosis (DVT) of left lower extremity    Macrocytosis    Closed fracture of right hip requiring operative repair with routine healing    S/P right hip fracture    Chronic kidney disease, stage 5 (Multi)    Asthmatic bronchitis without complication ([facility]-HCC)    GI bleed    Other cirrhosis of liver       Past Surgical History  Past Surgical History:   Procedure Laterality Date    CONDUIT REPLACEMENT  12/07/2023    Gastric conduit revision; mini laparotomy, General Ex-lap, ISA, Gastric pull up with anastmosis in neck, MIGUELINA to bulb sx. New J tube.    ESOPHAGOGASTRECTOMY      ESOPHAGOGASTRODUODENOSCOPY      with stents x2    GASTROSTOMY TUBE PLACEMENT N/A     IR CVC PICC  08/16/2023    IR CVC PICC 8/16/2023 [facility] INPATIENT LEGACY    OTHER SURGICAL HISTORY  01/21/2019    [place] filter placement    UMBILICAL HERNIA REPAIR N/A 2013       Social History   reports that he quit smoking about 2 years ago. His smoking use included cigars. He  started smoking about 35 years ago. He has been exposed to tobacco smoke. He has never used smokeless tobacco. He reports that he does not drink alcohol and does not use drugs.     Family History  family history includes Diabetes in his mother; Diabetes type I in his father; Hypertension in his mother.       Allergies  No Known Allergies    Medications  Medications Ordered Prior to Encounter[1]       Objective   /77   Pulse 79   Ht 1.829 m (6')   Wt 98 kg (216 lb)   SpO2 94%   BMI 29.29 kg/m²         General: no acute distress, well-nourished  HEENT: Mallampati 3  Skin: no jaundice, rash or liver stigmata  Respiratory: normal breath sounds bilaterally, no wheezes or rales  CV: RRR, no murmurs; no LE edema    LABS  Component      Latest Ref Rng 3/28/2025 5/2/2025   WBC      4.4 - 11.3 x10*3/uL 5.1  5.9    RBC      4.50 - 5.90 x10*6/uL 2.93 (L)  3.69 (L)    HEMOGLOBIN      13.5 - 17.5 g/dL 8.8 (L)  11.2 (L)    HEMATOCRIT      41.0 - 52.0 % 29.5 (L)  36.1 (L)    MCV      80 - 100 fL 101 (H)  98    MCH      26.0 - 34.0 pg 30.0  30.4    MCHC      32.0 - 36.0 g/dL 29.8 (L)  31.0 (L)    RED CELL DISTRIBUTION WIDTH      11.5 - 14.5 % 16.7 (H)  15.6 (H)    Platelets      150 - 450 x10*3/uL 166  128 (L)              Radiology  CT A/P 2/25: no active GIB, s/p esophagectomy; cirrhotic liver    Endoscopy    Colonoscopy 3/21 (h/o polyps): adequate pre, hems, tics, no polyps; repeat in 5 yrs    EGD 4/25:   There was evidence of an esophagectomy with gastric pull-through.  The anastomosis was stenosed, but the scope passed without resistance.  Grade C esophagitis with mucosal breaks measuring 5 mm or more, continuous between folds, covering less than 75% of the circumference in the anastomosis of the esophagus  There was a large amount of food and fluid in the stomach, precluding visualization.    Assessment/Plan   Levy Gregory is a 69 y.o. male who presents to GI clinic for recent melena and acute blood loss anemia  in 2/25.  Cirrhosis on imaging.  Hb 11.2 at last check in early 5/25, last Fe infusion on 4/4.     GI bleed  Melena resolved.  H/o remote bleeding .  Schedule EGD at [facility] after 48 hrs clear liquid diet and reglan x 2 days; will intubate and dilate stricture.       Dysphagia with anastomotic stricture- as above    Other cirrhosis of liver  Keep Fibroscan appt next month.  Return to clinic in 3 months.        Dilip Zayas MD            [1]   Current Outpatient Medications on File Prior to Visit   Medication Sig Dispense Refill    aspirin 81 mg chewable tablet Chew 1 tablet (81 mg) once daily. 30 tablet 11    atorvastatin (Lipitor) 80 mg tablet Take 1 tablet (80 mg) by mouth once daily. 90 tablet 3    blood sugar diagnostic (OneTouch Ultra Test) strip Test glucose twice daily or as directed 200 strip 1    blood-glucose meter misc Check glucose before meals and call if less than 80 or over 300. 1 each 0    flash glucose sensor kit (FreeStyle Kellie 2 Sensor) kit Use as instructed 6 each 1    fluticasone propion-salmeteroL (Advair Diskus) 100-50 mcg/dose diskus inhaler Inhale 1 puff 2 times a day. Rinse mouth with water after use to reduce aftertaste and incidence of candidiasis. Do not swallow. 60 each 2    FreeStyle Kellie 2 [place] misc Use as instructed 1 each 0    gabapentin (Neurontin) 100 mg capsule Take 1 capsule (100 mg) by mouth 3 times a day. 270 capsule 0    midodrine (Proamatine) 5 mg tablet Take 1 tablet (5 mg) by mouth 3 times daily (morning, midday, late afternoon). 270 tablet 0    pantoprazole (ProtoNix) 40 mg EC tablet Take 1 tablet (40 mg) by mouth 2 times a day. Do not crush, chew, or split. 180 tablet 0    pioglitazone (Actos) 15 mg tablet Take 1 tablet (15 mg) by mouth once daily. 30 tablet 11    sertraline (Zoloft) 50 mg tablet Take 1 tablet (50 mg) by mouth once daily. 90 tablet 0    tamsulosin (Flomax) 0.4 mg 24 hr capsule Take 1 capsule (0.4 mg) by mouth once daily. 90 capsule 1     vibegron (Gemtesa) 75 mg tablet Take 1 tablet (75 mg) by mouth once daily. 90 tablet 3     No current facility-administered medications on file prior to visit.

## 2025-05-14 ENCOUNTER — TELEPHONE (OUTPATIENT)
Dept: PRIMARY CARE | Facility: CLINIC | Age: 70
End: 2025-05-14
Payer: MEDICARE

## 2025-05-14 DIAGNOSIS — Z79.4 TYPE 2 DIABETES MELLITUS WITH HYPERGLYCEMIA, WITH LONG-TERM CURRENT USE OF INSULIN: ICD-10-CM

## 2025-05-14 DIAGNOSIS — E11.65 TYPE 2 DIABETES MELLITUS WITH HYPERGLYCEMIA, WITH LONG-TERM CURRENT USE OF INSULIN: ICD-10-CM

## 2025-05-14 NOTE — TELEPHONE ENCOUNTER
Refill request     Med name-freestyle sammie 2 sensor  Med dose  Med directions-use as directed      Pharmacy-[facility]  Pharmacy address-[facility]    LR-08/12/24  LV-03/10/25  Nv-06/10/25    Thanks

## 2025-05-15 RX ORDER — FLASH GLUCOSE SENSOR
KIT MISCELLANEOUS
Refills: 1 | OUTPATIENT
Start: 2025-05-15

## 2025-05-15 RX ORDER — FLASH GLUCOSE SENSOR
KIT MISCELLANEOUS
Qty: 6 EACH | Refills: 1 | Status: SHIPPED | OUTPATIENT
Start: 2025-05-15

## 2025-05-16 ENCOUNTER — CLINICAL SUPPORT (OUTPATIENT)
Dept: PREADMISSION TESTING | Facility: HOSPITAL | Age: 70
End: 2025-05-16
Payer: MEDICARE

## 2025-05-16 VITALS — WEIGHT: 216 LBS | BODY MASS INDEX: 29.26 KG/M2 | HEIGHT: 72 IN

## 2025-05-16 NOTE — PREPROCEDURE INSTRUCTIONS
OSCOPY PRE-OPERATIVE INSTRUCTIONS    You will receive notification one business day prior to your procedure to confirm your arrival time and any additional information between 2 P.M. - 5 P.M. It is important that you answer your phone and/or check your messages during this time.    You may see in [employer] your procedure start time changes several times even up to the day before your procedure. Please disregard those times and only follow the time given by the  who will be notifying you via phone and not a text.    Please arrive at your scheduled time to avoid delay or cancellation.    Please enter the building through either the Main Entrance in front of the hospital or from the Parking Garage Walkway Bridge. From the parking garage, which is free, take the 2nd Floor Walkway Bridge into the hospital and check in at the [facility] Desk as you enter the hospital directly in front of you. If you enter through the Main Entrance, take the elevator off the lobby on the right labeled “A” to the 2nd floor and check in at the [facility] desk as you exit the elevator. Wheelchairs are available for use if using the Main Entrance.  Handicap parking in the land lot, in front of hospital by main entrance, wheelchairs are available at this entrance    INSTRUCTIONS:  Please contact your doctor’s office, who is doing procedure, about any changes in your health, bad cold, fever, sore throat, or COVID within the last 4 weeks.    Talk to your doctor doing the procedure for instructions if you should stop your aspirin, NSAID’s, blood thinner, diabetes medicines, weight loss medications, multivitamins or over the counter supplements since many doctors have you adjust or hold these medications prior to procedure. The doctor’s office may have you contact the prescribing doctor of certain medications related to stopping these as well.    If you take certain medications such as a Beta Blocker or Anti-Seizure medication,  you may be instructed to take them in the morning of procedure with a sip of water. If this is the case your doctor’s office should let you know, and the PAT nurses will follow up when they speak to you to verify you are aware    You are allowed 2 adults over the age of 18 to come with you on the day of your procedure. Only one person may be allowed to go back into the pre-procedure area with you at a time.    If you are not being admitted, and you are receiving any type of anesthesia (general, sedation, local, MAC etc.) with your procedure, you must have an adult (age 18 or older) immediately available to drive you home after your procedure or your procedure will be cancelled. You may be discharged home after procedure with Uber, Lyft, Taxi or any other transportation service as long as the responsible adult (18 or older) is in the vehicle with you at time of discharge. The  of these transportation services is not responsible for your care and cannot be considered a responsible adult. We also highly recommend you have someone stay with you for the entire day and night of your procedure.    All jewelry and piercings must be removed. If you are unable to remove an item or have a dermal piercing, please be sure to tell the nurse when you arrive for surgery.    Nail polish must be removed off one finger of each hand.    Make-up and/or any other beauty products (lotion, deodorant, hairspray, perfume, etc.) must be removed or not used on the day of procedure.    Avoid smoking, consuming alcohol, or any medical or recreational drug use for 24 hours before surgery.    To help prevent infection, please take a shower/bath and wash your hair the night before and/or morning of surgery. You can brush your teeth, just do not swallow any water.    For patients who are unable to consent or make medical decisions for themselves, a legal guardian or Power of  must accompany them to the hospital on the day of surgery.  If this is not possible, please call [phone] to make additional arrangements. Please bring any guardianship or legal Power of  paperwork with you on the day of procedure.    Wear comfortable, loose-fitting clothing.    Do not bring any home medications to the hospital.    Additional instructions about eating, drinking, and bowel prep for colonoscopy procedures prior to surgery:    If you did not receive your bowel prep instructions, please call the doctor to do your procedures. Most preps start limiting what you eat two days before the procedure.    Do not eat any solid foods the day before your procedure, clear liquids only as stated on your bowel prep information. Also, do not eat or drink anything after midnight the night prior to your procedure unless stated to do so on your bowel prep instructions. Milk, nutritional drinks/supplements, and infant formula are considered solid foods. This also includes no gum, candy, lozenges, ice, or any other oral consumption.    Many bowel prep instructions may have you finish some of the bowel prep the morning of your procedure prior to coming to the hospital. You would just not consume any other additional food or liquids besides what is stated to do so on your instructions given by the surgeon    If your instructions tell you to take some medication the morning of the procedure, you are allowed to do so with a small sip of water    Avoid eating or drinking things that are highly pigmented with food dyes- Red, Blue, Purples    If you are unable to complete the bowel prep, i.e. vomiting, results not clear from bowel prep, please contact the doctor who is doing your procedure.    If you have any questions or concerns, please call Pre-Admission Testing at [phone] or your physician’s office   Dr. Melquiades Zayas [phone]

## 2025-05-21 ENCOUNTER — HOSPITAL ENCOUNTER (OUTPATIENT)
Dept: GASTROENTEROLOGY | Facility: HOSPITAL | Age: 70
Discharge: HOME | End: 2025-05-21
Payer: MEDICARE

## 2025-05-21 ENCOUNTER — ANESTHESIA (OUTPATIENT)
Dept: GASTROENTEROLOGY | Facility: HOSPITAL | Age: 70
End: 2025-05-21
Payer: MEDICARE

## 2025-05-21 ENCOUNTER — ANESTHESIA EVENT (OUTPATIENT)
Dept: GASTROENTEROLOGY | Facility: HOSPITAL | Age: 70
End: 2025-05-21
Payer: MEDICARE

## 2025-05-21 VITALS
BODY MASS INDEX: 29 KG/M2 | RESPIRATION RATE: 18 BRPM | DIASTOLIC BLOOD PRESSURE: 77 MMHG | TEMPERATURE: 97.5 F | HEART RATE: 58 BPM | SYSTOLIC BLOOD PRESSURE: 129 MMHG | WEIGHT: 213.85 LBS | OXYGEN SATURATION: 98 %

## 2025-05-21 DIAGNOSIS — R13.19 OTHER DYSPHAGIA: ICD-10-CM

## 2025-05-21 LAB
GLUCOSE BLD MANUAL STRIP-MCNC: 110 MG/DL (ref 74–99)
GLUCOSE BLD MANUAL STRIP-MCNC: 148 MG/DL (ref 74–99)

## 2025-05-21 PROCEDURE — C1726 CATH, BAL DIL, NON-VASCULAR: HCPCS

## 2025-05-21 PROCEDURE — 82947 ASSAY GLUCOSE BLOOD QUANT: CPT

## 2025-05-21 PROCEDURE — 2500000005 HC RX 250 GENERAL PHARMACY W/O HCPCS: Performed by: NURSE ANESTHETIST, CERTIFIED REGISTERED

## 2025-05-21 PROCEDURE — 3700000001 HC GENERAL ANESTHESIA TIME - INITIAL BASE CHARGE

## 2025-05-21 PROCEDURE — 7100000009 HC PHASE TWO TIME - INITIAL BASE CHARGE

## 2025-05-21 PROCEDURE — 43249 ESOPH EGD DILATION <30 MM: CPT | Performed by: INTERNAL MEDICINE

## 2025-05-21 PROCEDURE — 7100000010 HC PHASE TWO TIME - EACH INCREMENTAL 1 MINUTE

## 2025-05-21 PROCEDURE — 7100000002 HC RECOVERY ROOM TIME - EACH INCREMENTAL 1 MINUTE

## 2025-05-21 PROCEDURE — 7100000001 HC RECOVERY ROOM TIME - INITIAL BASE CHARGE

## 2025-05-21 PROCEDURE — 2500000004 HC RX 250 GENERAL PHARMACY W/ HCPCS (ALT 636 FOR OP/ED): Performed by: NURSE ANESTHETIST, CERTIFIED REGISTERED

## 2025-05-21 PROCEDURE — 3700000002 HC GENERAL ANESTHESIA TIME - EACH INCREMENTAL 1 MINUTE

## 2025-05-21 PROCEDURE — 2720000007 HC OR 272 NO HCPCS

## 2025-05-21 RX ORDER — LIDOCAINE HYDROCHLORIDE 10 MG/ML
0.1 INJECTION, SOLUTION EPIDURAL; INFILTRATION; INTRACAUDAL; PERINEURAL ONCE
Status: DISCONTINUED | OUTPATIENT
Start: 2025-05-21 | End: 2025-05-22 | Stop reason: HOSPADM

## 2025-05-21 RX ORDER — METOPROLOL TARTRATE 1 MG/ML
5 INJECTION, SOLUTION INTRAVENOUS ONCE
Status: DISCONTINUED | OUTPATIENT
Start: 2025-05-21 | End: 2025-05-22 | Stop reason: HOSPADM

## 2025-05-21 RX ORDER — SUCCINYLCHOLINE CHLORIDE 100 MG/5ML
SYRINGE (ML) INTRAVENOUS AS NEEDED
Status: DISCONTINUED | OUTPATIENT
Start: 2025-05-21 | End: 2025-05-21

## 2025-05-21 RX ORDER — LIDOCAINE HYDROCHLORIDE 20 MG/ML
INJECTION, SOLUTION INFILTRATION; PERINEURAL AS NEEDED
Status: DISCONTINUED | OUTPATIENT
Start: 2025-05-21 | End: 2025-05-21

## 2025-05-21 RX ORDER — PROPOFOL 10 MG/ML
INJECTION, EMULSION INTRAVENOUS AS NEEDED
Status: DISCONTINUED | OUTPATIENT
Start: 2025-05-21 | End: 2025-05-21

## 2025-05-21 RX ORDER — OXYCODONE HYDROCHLORIDE 5 MG/1
5 TABLET ORAL EVERY 4 HOURS PRN
Status: DISCONTINUED | OUTPATIENT
Start: 2025-05-21 | End: 2025-05-22 | Stop reason: HOSPADM

## 2025-05-21 RX ORDER — SODIUM CHLORIDE, SODIUM LACTATE, POTASSIUM CHLORIDE, CALCIUM CHLORIDE 600; 310; 30; 20 MG/100ML; MG/100ML; MG/100ML; MG/100ML
100 INJECTION, SOLUTION INTRAVENOUS CONTINUOUS
Status: DISCONTINUED | OUTPATIENT
Start: 2025-05-21 | End: 2025-05-22 | Stop reason: HOSPADM

## 2025-05-21 RX ORDER — ALBUTEROL SULFATE 0.83 MG/ML
2.5 SOLUTION RESPIRATORY (INHALATION) ONCE AS NEEDED
Status: DISCONTINUED | OUTPATIENT
Start: 2025-05-21 | End: 2025-05-22 | Stop reason: HOSPADM

## 2025-05-21 RX ORDER — OXYCODONE HYDROCHLORIDE 5 MG/1
10 TABLET ORAL EVERY 4 HOURS PRN
Status: DISCONTINUED | OUTPATIENT
Start: 2025-05-21 | End: 2025-05-22 | Stop reason: HOSPADM

## 2025-05-21 RX ORDER — ACETAMINOPHEN 325 MG/1
650 TABLET ORAL EVERY 4 HOURS PRN
Status: DISCONTINUED | OUTPATIENT
Start: 2025-05-21 | End: 2025-05-22 | Stop reason: HOSPADM

## 2025-05-21 RX ORDER — MEPERIDINE HYDROCHLORIDE 25 MG/ML
12.5 INJECTION INTRAMUSCULAR; INTRAVENOUS; SUBCUTANEOUS EVERY 10 MIN PRN
Status: DISCONTINUED | OUTPATIENT
Start: 2025-05-21 | End: 2025-05-22 | Stop reason: HOSPADM

## 2025-05-21 RX ORDER — ONDANSETRON HYDROCHLORIDE 2 MG/ML
4 INJECTION, SOLUTION INTRAVENOUS ONCE AS NEEDED
Status: DISCONTINUED | OUTPATIENT
Start: 2025-05-21 | End: 2025-05-22 | Stop reason: HOSPADM

## 2025-05-21 RX ORDER — ONDANSETRON HYDROCHLORIDE 2 MG/ML
INJECTION, SOLUTION INTRAVENOUS AS NEEDED
Status: DISCONTINUED | OUTPATIENT
Start: 2025-05-21 | End: 2025-05-21

## 2025-05-21 RX ORDER — SODIUM CHLORIDE, SODIUM LACTATE, POTASSIUM CHLORIDE, CALCIUM CHLORIDE 600; 310; 30; 20 MG/100ML; MG/100ML; MG/100ML; MG/100ML
INJECTION, SOLUTION INTRAVENOUS CONTINUOUS PRN
Status: DISCONTINUED | OUTPATIENT
Start: 2025-05-21 | End: 2025-05-21

## 2025-05-21 RX ORDER — FENTANYL CITRATE 50 UG/ML
25 INJECTION, SOLUTION INTRAMUSCULAR; INTRAVENOUS EVERY 5 MIN PRN
Status: DISCONTINUED | OUTPATIENT
Start: 2025-05-21 | End: 2025-05-22 | Stop reason: HOSPADM

## 2025-05-21 RX ORDER — DIPHENHYDRAMINE HYDROCHLORIDE 50 MG/ML
12.5 INJECTION, SOLUTION INTRAMUSCULAR; INTRAVENOUS ONCE AS NEEDED
Status: DISCONTINUED | OUTPATIENT
Start: 2025-05-21 | End: 2025-05-22 | Stop reason: HOSPADM

## 2025-05-21 RX ADMIN — DEXAMETHASONE SODIUM PHOSPHATE 4 MG: 4 INJECTION, SOLUTION INTRAMUSCULAR; INTRAVENOUS at 12:33

## 2025-05-21 RX ADMIN — PROPOFOL 200 MG: 10 INJECTION, EMULSION INTRAVENOUS at 12:29

## 2025-05-21 RX ADMIN — Medication 140 MG: at 12:29

## 2025-05-21 RX ADMIN — ONDANSETRON 4 MG: 2 INJECTION, SOLUTION INTRAMUSCULAR; INTRAVENOUS at 12:34

## 2025-05-21 RX ADMIN — SODIUM CHLORIDE, POTASSIUM CHLORIDE, SODIUM LACTATE AND CALCIUM CHLORIDE: 600; 310; 30; 20 INJECTION, SOLUTION INTRAVENOUS at 12:24

## 2025-05-21 RX ADMIN — LIDOCAINE HYDROCHLORIDE 100 MG: 20 INJECTION, SOLUTION INFILTRATION; PERINEURAL at 12:29

## 2025-05-21 ASSESSMENT — PAIN - FUNCTIONAL ASSESSMENT
PAIN_FUNCTIONAL_ASSESSMENT: 0-10

## 2025-05-21 ASSESSMENT — PAIN SCALES - GENERAL
PAINLEVEL_OUTOF10: 0 - NO PAIN
PAINLEVEL_OUTOF10: 0 - NO PAIN
PAIN_LEVEL: 0
PAINLEVEL_OUTOF10: 0 - NO PAIN

## 2025-05-21 NOTE — ANESTHESIA PROCEDURE NOTES
Airway  Date/Time: 5/21/2025 12:34 PM  Reason: elective    Airway not difficult    Staffing  Performed: CRNA   Authorized by: Yue Alicea MD    Performed by: IVÁN Olivo-AMY  Patient location during procedure: patient's room    Patient Condition  Indications for airway management: anesthesia  Patient position: sniffing  MILS maintained throughout  Planned trial extubation  Sedation level: deep     Final Airway Details   Preoxygenated: yes  Final airway type: endotracheal airway  Successful airway: ETT  Cuffed: yes   Successful intubation technique: video laryngoscopy  Adjuncts used in placement: intubating stylet  Endotracheal tube insertion site: oral  Blade type: InCrowd.  Blade size: #4  ETT size (mm): 7.5  Cormack-Lehane Classification: grade I - full view of glottis  Placement verified by: chest auscultation and capnometry   Cuff volume (mL): 7  Measured from: gums  ETT to gums (cm): 22  Number of attempts at approach: 1    Additional Comments  atraumatic

## 2025-05-21 NOTE — ANESTHESIA POSTPROCEDURE EVALUATION
Patient: Levy Gregory    Procedure Summary       Date: 05/21/25 Room / Location: [facility]    Anesthesia Start: 1224 Anesthesia Stop:     Procedure: EGD Diagnosis: Other dysphagia    Scheduled Providers: Dilip Zayas MD; Yue Alicea MD; Deisy Mcdonald RN; Rolando Strange Responsible Provider: Yue Alicea MD    Anesthesia Type: MAC ASA Status: 3            Anesthesia Type: MAC    Vitals Value Taken Time   /90 05/21/25 12:54   Temp 36.2 °C (97.2 °F) 05/21/25 12:54   Pulse 76 05/21/25 12:54   Resp 16 05/21/25 12:54   SpO2 98 % 05/21/25 12:54       Anesthesia Post Evaluation    Patient location during evaluation: bedside  Patient participation: complete - patient participated  Level of consciousness: awake and alert  Pain score: 0  Pain management: adequate  Airway patency: patent  Cardiovascular status: acceptable and stable  Respiratory status: acceptable and room air  Hydration status: acceptable  Postoperative Nausea and Vomiting: none        No notable events documented.

## 2025-05-21 NOTE — ANESTHESIA PREPROCEDURE EVALUATION
Patient: Levy Gregory    Procedure Information       Date/Time: 05/21/25 1230    Scheduled providers: Dilip Zayas MD; Yue Alicea MD; Deisy Mcdonald RN; Rolando Strange    Procedure: EGD    Location: [facility]            Relevant Problems   Cardiac   (+) Coronary artery disease involving native coronary artery of native heart without angina pectoris   (+) Hyperlipidemia   (+) Solar purpura      Pulmonary   (+) Asthmatic bronchitis without complication (HHS-HCC)   (+) Chronic obstructive pulmonary disease, unspecified COPD type (Multi)      GI   (+) GI bleed   (+) Other dysphagia      Liver   (+) Other cirrhosis of liver      Endocrine   (+) Diabetic neuropathy associated with type 2 diabetes mellitus   (+) Type 2 diabetes mellitus with hyperglycemia, without long-term current use of insulin      Hematology   (+) Anticoagulant long-term use   (+) Deep vein thrombosis (DVT) of left lower extremity   (+) Iron deficiency anemia       Clinical information reviewed:   Tobacco  Allergies  Meds  Problems  Med Hx  Surg Hx   Fam Hx  Soc   Hx        NPO Detail:  NPO/Void Status  Carbohydrate Drink Given Prior to Surgery? : N  Date of Last Liquid: 05/21/25  Time of Last Liquid: 0845  Date of Last Solid: 05/18/25  Time of Last Solid: 1800  Last Intake Type: Clear fluids  Time of Last Void: 0900         Physical Exam    Airway  Mallampati: II  TM distance: >3 FB  Neck ROM: full  Mouth opening: 3 or more finger widths     Cardiovascular - normal exam   Dental    Pulmonary - normal exam   Abdominal - normal exam           Anesthesia Plan    History of general anesthesia?: yes  History of complications of general anesthesia?: no    ASA 3     MAC     Patient did not smoke on day of procedure.    intravenous induction   Anesthetic plan and risks discussed with patient.    Plan discussed with CRNA and attending.

## 2025-05-21 NOTE — Clinical Note
Huddle and Timeout completed together with team. Patient wristband and VANESA information verified.  Anesthesia safety check completed. Patient was awake and oriented x4

## 2025-05-21 NOTE — DISCHARGE INSTRUCTIONS
Patient Instructions after an endoscopy or colonoscopy    Physician Phone List Provided      The anesthetics, sedatives or narcotics which were given to you today will be acting in your body for the next 24 hours, so you might feel a little sleepy or groggy.  This feeling should slowly wear off. Carefully read and follow the instructions.     You received sedation today:  - Do not drive or operate any machinery or power tools of any kind.   - No alcoholic beverages today, not even beer or wine.  - No over the counter medications that contain alcohol or that may cause drowsiness.  - Do not make any important decisions or sign any legal documents.    While it is common to experience mild to moderate abdominal distention, gas, or belching after your procedure, if any of these symptoms occur following discharge from the GI Lab or within one week of having your procedure, call the [facility] to be advised whether a visit to your nearest Urgent Care or Emergency Department is indicated.  Take this paper with you if you go.     - If you develop an allergic reaction to the medications that were given during your procedure such as difficulty breathing, rash, hives, severe nausea, vomiting or lightheadedness.- If you experience chest pain, shortness of breath, severe abdominal pain, fevers and chills.    -If you develop signs and symptoms of bleeding such as blood in your spit, if your stools turn black, tarry, or bloody        High Fiber Diet    About this topic  Dietary fiber helps many illnesses. It can help you if you cannot have a bowel movement or if you have loose stools. Fiber can also lower your risk of diabetes and heart disease. Fiber can help with weight loss by helping you feel fletcher after meals.  You can find fiber in fruits, vegetables, nuts and seeds, whole grains, and legumes. The fiber is the part of the plant food that your body cannot break down and absorb. It passes through your stomach,  small bowel, colon, and out your body.  There are two kinds of fiber: Insoluble and soluble fiber. Insoluble fiber helps you pass foods through your digestive system. Insoluble fiber can help you with hard stools. Soluble fiber draws water in and turns it into a gel-like form making digestion slow down. Both are important.    What will the results be?  A high fiber diet can help you with bowel problems like stools that are too hard or too loose. It can also help prevent hemorrhoids and other colon problems. A high fiber diet can also help control your weight and lower blood sugar and cholesterol levels.  What changes to diet are needed?  The amount of fiber you need is based on your age, gender, and health.  Try to get 20 to 35 grams of fiber in your diet each day. Most people in the [place] only eat 15 grams of fiber daily.  Drink at least 8 cups (1920 mL) of fluid each day.  When is this diet used?  Your doctor may talk with you about this diet if you have belly problems.  Who should use this diet?  Older children, young people, and adults can have this diet.  Who should not use this diet?  Some people should not use this diet. Check with your doctor if you have:  Diverticulitis  Active Crohn's disease  Ulcerative colitis  Bowel inflammation  Certain types of GI surgery  Talk to your child's doctor before starting your child on a high fiber diet.  What foods are good to eat?  To get the most from fiber in your diet, eat a wide variety of high fiber foods. Some examples are:  Vegetables like:  Spinach  Peas  Artichoke  Sweet potatoes with skin  Broccoli  Fruits like:  Raspberries  Blueberries  Blackberries  Apples with skin  Dried fruits  Grains like:  Oat bran  Barley  Whole wheat products  Wheat bran  Dried beans and nuts like:  Sunflower seeds  Almonds  Black beans  Chickpeas  What problems could happen?  Sudden increase of fiber intake can lead to gas, pain, fullness in your belly, and loose stools. Increase fiber  gradually while drinking plenty of fluids.  Do not eat too much fiber. Your body will not take in vitamins and minerals as well if you eat too much fiber.  When do I need to call the doctor?  Health problem is not better or you are feeling worse.  Helpful tips  Start slow as you add more fiber to your diet. This may help prevent gas or cramps.  Try to eat the same amount of fiber each day. Aim to get your fiber from nutritious foods. Supplements do not offer the same benefits as food.  Read food labels with care to learn how much fiber is in the food you are eating.  If possible, do not peel fruits or vegetables before you eat them. Eating the peel gives you more fiber.  Where can I learn more?  Eat Right  https://www.eatright.org/food/vitamins-and-supplements/types-of-vitamins-and-nutrients/easy-ways-to-boost-fiber-in-your-daily-diet  Last Reviewed Date  2021-09-23

## 2025-05-21 NOTE — POST-PROCEDURE NOTE
Pt returned from PACU  at 1335 s/p EGD w/dilation (was intubated). Alert, oriented, VSS, tolerating po fluid and snack. Denies pain or nausea. Discharge instructions printed and reviewed with pt and pt's notification person, including physician phone list. Pt to discharge home via w/c accompanied by transport

## 2025-05-21 NOTE — INTERVAL H&P NOTE
H&P reviewed. The patient was examined and there are no changes to the H&P.  ASA 3, Mallampati 3.

## 2025-05-21 NOTE — NURSING NOTE
Dr Alicea in to start pt IV and found pt had cough drop in mouth, pt education given to why pt unable to have cough drops before procedure.

## 2025-06-03 ENCOUNTER — APPOINTMENT (OUTPATIENT)
Dept: HEMATOLOGY/ONCOLOGY | Facility: CLINIC | Age: 70
End: 2025-06-03
Payer: MEDICARE

## 2025-06-09 NOTE — PATIENT INSTRUCTIONS
See your eye doctor for a dilated eye exam at least every year to screen for the gradual loss of vision that can occur with Glaucoma and the potentially catastrophic effects of Diabetes.    Please check with your insurance to verify whether you should get your shingles vaccination here or at the pharmacy, and whether it is covered.  The purpose of this shot is to prevent the permanent unremitting pain of Post Herpetic Neuralgia which becomes more common in elderly patients that get Shingles.  This shot can be very expensive.    You are eligible for the COVID booster.  Without a recent (within 90 days) challenge (booster or infection) your immune system will be three days behind in protecting you from the infection.  You will infect approximately five people by that time.    Follow up fasting (no alcohol for 48 hours and just water for 14 hours) in three months for your next routine appointment.  In general, take any medications on schedule (except for types of Insulin).        Ways to Help Prevent Falls at Home    Quick Tips   ? Ask for help if you need it. Most people want to help!   ? Get up slowly after sitting or laying down   ? Wear a medical alert device or keep cell phone in your pocket   ? Use night lights, especially areas near a bathroom   ? Keep the items you use often within reach on a small stool or end table   ? Use an assistive device such as walker or cane, as directed by provider/physical therapy   ? Use a non-slip mat and grab bars in your bathroom. Look for home health sections for best options     Other Areas to Focus On   ? Exercise and nutrition: Regular exercise or taking a falls prevention class are great ways improve strength and balance. Don’t forget to stay hydrated and bring a snack!   ? Medicine side effects: Some medicines can make you sleepy or dizzy, which could cause a fall. Ask your healthcare provider about the side effects your medicines could cause. Be sure to let them know if  you take any vitamins or supplements as well.   ? Tripping hazards: Remove items you could trip on, such as loose mats, rugs, cords, and clutter. Wear closed toe shoes with rubber soles.   ? Health and wellness: Get regular checkups with your healthcare provider, plus routine vision and hearing screenings. Talk with your healthcare provider about:   o Your medicines and the possible side effects - bring them in a bag if that is easier!   o Problems with balance or feeling dizzy   o Ways to promote bone health, such as Vitamin D and calcium supplements   o Questions or concerns about falling     *Ask your healthcare team if you have questions     ©[facility], 2022

## 2025-06-09 NOTE — PROGRESS NOTES
Subjective   Patient ID: [id number]     Levy Gregory is a 69 y.o. male who presents for No chief complaint on file..    HPI  Taking meds as directed without tolerability or affordability issues; no complaints today.    Review of Systems  CARDIO- No chest pain or pressure, nausea, diaphoresis, paresthesias, dizziness, or syncope with or without exertion  GI-No blood in stool, tarry stools, pain, vomiting, heartburn, constipation or diarrhea  PULM-No wheezing, coughing or shortness of breath  UROL-No frequency, urgency, blood in urine, or incontinence; nocturia not present    Objective     There were no vitals taken for this visit.     Physical Exam  Neck-supple without lymphadenopathy or thyromegaly; no carotid bruits  Heart- regular rate and rhythm, normal s1 and s2 without murmur or gallop; no edema  Lungs-clear to auscultation  Abdomen-soft, positive bowel sounds, without masses, HSmegaly or pain   Rectal- normal ampulla and anus; hemetest negative; prostate smooth, normal size, and symmetrical    Assessment/Plan     Problem List Items Addressed This Visit       Colon polyp    Diabetic neuropathy associated with type 2 diabetes mellitus    Relevant Orders    Referral to Clinical Pharmacy    Gout    Relevant Orders    Uric Acid    Iron deficiency anemia    Relevant Orders    CBC and Auto Differential    Osteopenia    Vitamin D deficiency    Relevant Orders    Vitamin D 25-Hydroxy,Total (for eval of Vitamin D levels)    Type 2 diabetes mellitus with hyperglycemia, without long-term current use of insulin - Primary    Relevant Orders    Referral to Clinical Pharmacy    Comprehensive Metabolic Panel    Hemoglobin A1C    Thyroid Stimulating Hormone    Albumin-Creatinine Ratio, Urine Random    Hypertensive heart disease with heart failure    Chronic obstructive pulmonary disease, unspecified COPD type (Multi)    Acute renal failure superimposed on chronic kidney disease    Edema of both lower legs due to peripheral  venous insufficiency    Schatzki's ring    Coronary artery disease involving native coronary artery of native heart without angina pectoris    Relevant Orders    Transthoracic Echo Limited    Macrocytosis    Chronic kidney disease, stage 5 (Multi)    Asthmatic bronchitis without complication (HHS-HCC)    GI bleed    Other cirrhosis of liver     See your eye doctor for a dilated eye exam at least every year to screen for the gradual loss of vision that can occur with Glaucoma and the potentially catastrophic effects of Diabetes.    Please check with your insurance to verify whether you should get your shingles vaccination here or at the pharmacy, and whether it is covered.  The purpose of this shot is to prevent the permanent unremitting pain of Post Herpetic Neuralgia which becomes more common in elderly patients that get Shingles.  This shot can be very expensive.    You are eligible for the COVID booster.  Without a recent (within 90 days) challenge (booster or infection) your immune system will be three days behind in protecting you from the infection.  You will infect approximately five people by that time.    Follow up fasting (no alcohol for 48 hours and just water for 14 hours) in three months for your next routine appointment.  In general, take any medications on schedule (except for types of Insulin).      Chris Huizar MD Patient was identified as a fall risk. Risk prevention instructions provided.

## 2025-06-10 ENCOUNTER — APPOINTMENT (OUTPATIENT)
Dept: PRIMARY CARE | Facility: CLINIC | Age: 70
End: 2025-06-10
Payer: MEDICARE

## 2025-06-10 DIAGNOSIS — N18.9 ACUTE RENAL FAILURE SUPERIMPOSED ON CHRONIC KIDNEY DISEASE, UNSPECIFIED ACUTE RENAL FAILURE TYPE, UNSPECIFIED CKD STAGE: ICD-10-CM

## 2025-06-10 DIAGNOSIS — K74.69 OTHER CIRRHOSIS OF LIVER: ICD-10-CM

## 2025-06-10 DIAGNOSIS — D50.9 IRON DEFICIENCY ANEMIA, UNSPECIFIED IRON DEFICIENCY ANEMIA TYPE: ICD-10-CM

## 2025-06-10 DIAGNOSIS — R60.0 EDEMA OF BOTH LOWER LEGS DUE TO PERIPHERAL VENOUS INSUFFICIENCY: ICD-10-CM

## 2025-06-10 DIAGNOSIS — N17.9 ACUTE RENAL FAILURE SUPERIMPOSED ON CHRONIC KIDNEY DISEASE, UNSPECIFIED ACUTE RENAL FAILURE TYPE, UNSPECIFIED CKD STAGE: ICD-10-CM

## 2025-06-10 DIAGNOSIS — E55.9 VITAMIN D DEFICIENCY: ICD-10-CM

## 2025-06-10 DIAGNOSIS — J45.20 MILD INTERMITTENT ASTHMATIC BRONCHITIS WITHOUT COMPLICATION (HHS-HCC): ICD-10-CM

## 2025-06-10 DIAGNOSIS — I11.0 HYPERTENSIVE HEART DISEASE WITH HEART FAILURE: ICD-10-CM

## 2025-06-10 DIAGNOSIS — D75.89 MACROCYTOSIS: ICD-10-CM

## 2025-06-10 DIAGNOSIS — I25.10 CORONARY ARTERY DISEASE INVOLVING NATIVE CORONARY ARTERY OF NATIVE HEART WITHOUT ANGINA PECTORIS: ICD-10-CM

## 2025-06-10 DIAGNOSIS — K63.5 POLYP OF COLON, UNSPECIFIED PART OF COLON, UNSPECIFIED TYPE: ICD-10-CM

## 2025-06-10 DIAGNOSIS — I87.2 EDEMA OF BOTH LOWER LEGS DUE TO PERIPHERAL VENOUS INSUFFICIENCY: ICD-10-CM

## 2025-06-10 DIAGNOSIS — E11.65 TYPE 2 DIABETES MELLITUS WITH HYPERGLYCEMIA, WITHOUT LONG-TERM CURRENT USE OF INSULIN: Primary | ICD-10-CM

## 2025-06-10 DIAGNOSIS — K22.2 SCHATZKI'S RING: ICD-10-CM

## 2025-06-10 DIAGNOSIS — M85.80 OSTEOPENIA, UNSPECIFIED LOCATION: ICD-10-CM

## 2025-06-10 DIAGNOSIS — M10.9 GOUT, UNSPECIFIED CAUSE, UNSPECIFIED CHRONICITY, UNSPECIFIED SITE: ICD-10-CM

## 2025-06-10 DIAGNOSIS — J44.9 CHRONIC OBSTRUCTIVE PULMONARY DISEASE, UNSPECIFIED COPD TYPE (MULTI): ICD-10-CM

## 2025-06-10 DIAGNOSIS — K92.2 GASTROINTESTINAL HEMORRHAGE, UNSPECIFIED GASTROINTESTINAL HEMORRHAGE TYPE: ICD-10-CM

## 2025-06-10 DIAGNOSIS — N18.5 CHRONIC KIDNEY DISEASE, STAGE 5 (MULTI): ICD-10-CM

## 2025-06-10 DIAGNOSIS — E11.49 OTHER DIABETIC NEUROLOGICAL COMPLICATION ASSOCIATED WITH TYPE 2 DIABETES MELLITUS: ICD-10-CM

## 2025-06-10 PROCEDURE — 1160F RVW MEDS BY RX/DR IN RCRD: CPT | Performed by: FAMILY MEDICINE

## 2025-06-10 PROCEDURE — 1159F MED LIST DOCD IN RCRD: CPT | Performed by: FAMILY MEDICINE

## 2025-06-17 ENCOUNTER — APPOINTMENT (OUTPATIENT)
Dept: PRIMARY CARE | Facility: CLINIC | Age: 70
End: 2025-06-17
Payer: MEDICARE

## 2025-06-17 VITALS — DIASTOLIC BLOOD PRESSURE: 65 MMHG | BODY MASS INDEX: 29.6 KG/M2 | WEIGHT: 218.26 LBS | SYSTOLIC BLOOD PRESSURE: 98 MMHG

## 2025-06-17 DIAGNOSIS — M10.9 GOUT, UNSPECIFIED CAUSE, UNSPECIFIED CHRONICITY, UNSPECIFIED SITE: ICD-10-CM

## 2025-06-17 DIAGNOSIS — J44.9 CHRONIC OBSTRUCTIVE PULMONARY DISEASE, UNSPECIFIED COPD TYPE (MULTI): ICD-10-CM

## 2025-06-17 DIAGNOSIS — K63.5 POLYP OF COLON, UNSPECIFIED PART OF COLON, UNSPECIFIED TYPE: ICD-10-CM

## 2025-06-17 DIAGNOSIS — I25.10 CORONARY ARTERY DISEASE INVOLVING NATIVE CORONARY ARTERY OF NATIVE HEART WITHOUT ANGINA PECTORIS: ICD-10-CM

## 2025-06-17 DIAGNOSIS — K92.2 GASTROINTESTINAL HEMORRHAGE, UNSPECIFIED GASTROINTESTINAL HEMORRHAGE TYPE: ICD-10-CM

## 2025-06-17 DIAGNOSIS — E11.65 TYPE 2 DIABETES MELLITUS WITH HYPERGLYCEMIA, WITHOUT LONG-TERM CURRENT USE OF INSULIN: Primary | ICD-10-CM

## 2025-06-17 DIAGNOSIS — K22.0 ACHALASIA, ESOPHAGEAL: ICD-10-CM

## 2025-06-17 DIAGNOSIS — E11.49 OTHER DIABETIC NEUROLOGICAL COMPLICATION ASSOCIATED WITH TYPE 2 DIABETES MELLITUS: ICD-10-CM

## 2025-06-17 DIAGNOSIS — R60.0 EDEMA OF BOTH LOWER LEGS DUE TO PERIPHERAL VENOUS INSUFFICIENCY: ICD-10-CM

## 2025-06-17 DIAGNOSIS — I87.2 EDEMA OF BOTH LOWER LEGS DUE TO PERIPHERAL VENOUS INSUFFICIENCY: ICD-10-CM

## 2025-06-17 DIAGNOSIS — R35.1 NOCTURIA: ICD-10-CM

## 2025-06-17 DIAGNOSIS — J45.909 ASTHMATIC BRONCHITIS WITHOUT COMPLICATION, UNSPECIFIED ASTHMA SEVERITY, UNSPECIFIED WHETHER PERSISTENT (HHS-HCC): ICD-10-CM

## 2025-06-17 DIAGNOSIS — I95.1 ORTHOSTATIC HYPOTENSION: ICD-10-CM

## 2025-06-17 DIAGNOSIS — N52.9 ERECTILE DYSFUNCTION, UNSPECIFIED ERECTILE DYSFUNCTION TYPE: ICD-10-CM

## 2025-06-17 DIAGNOSIS — N18.5 CHRONIC KIDNEY DISEASE, STAGE 5 (MULTI): ICD-10-CM

## 2025-06-17 DIAGNOSIS — I11.0 HYPERTENSIVE HEART DISEASE WITH HEART FAILURE: ICD-10-CM

## 2025-06-17 DIAGNOSIS — E55.9 VITAMIN D DEFICIENCY: ICD-10-CM

## 2025-06-17 DIAGNOSIS — K74.69 OTHER CIRRHOSIS OF LIVER: ICD-10-CM

## 2025-06-17 DIAGNOSIS — D75.89 MACROCYTOSIS: ICD-10-CM

## 2025-06-17 DIAGNOSIS — Z86.711 HISTORY OF PULMONARY EMBOLISM: ICD-10-CM

## 2025-06-17 DIAGNOSIS — D50.9 IRON DEFICIENCY ANEMIA, UNSPECIFIED IRON DEFICIENCY ANEMIA TYPE: ICD-10-CM

## 2025-06-17 PROCEDURE — 99214 OFFICE O/P EST MOD 30 MIN: CPT | Performed by: FAMILY MEDICINE

## 2025-06-17 PROCEDURE — 1159F MED LIST DOCD IN RCRD: CPT | Performed by: FAMILY MEDICINE

## 2025-06-17 PROCEDURE — 1160F RVW MEDS BY RX/DR IN RCRD: CPT | Performed by: FAMILY MEDICINE

## 2025-06-17 PROCEDURE — 3078F DIAST BP <80 MM HG: CPT | Performed by: FAMILY MEDICINE

## 2025-06-17 PROCEDURE — G2211 COMPLEX E/M VISIT ADD ON: HCPCS | Performed by: FAMILY MEDICINE

## 2025-06-17 PROCEDURE — 3074F SYST BP LT 130 MM HG: CPT | Performed by: FAMILY MEDICINE

## 2025-06-17 ASSESSMENT — PATIENT HEALTH QUESTIONNAIRE - PHQ9
1. LITTLE INTEREST OR PLEASURE IN DOING THINGS: MORE THAN HALF THE DAYS
2. FEELING DOWN, DEPRESSED OR HOPELESS: MORE THAN HALF THE DAYS
SUM OF ALL RESPONSES TO PHQ9 QUESTIONS 1 AND 2: 4

## 2025-06-17 NOTE — PROGRESS NOTES
Subjective   Patient ID: [id number]     Levy Gregory is a 69 y.o. male who presents for Med Management.    HPI  Taking meds as directed without tolerability or affordability issues; no complaints today.    Review of Systems  CARDIO- No chest pain or pressure, nausea, diaphoresis, paresthesias, dizziness, or syncope with or without exertion  GI-No blood in stool, tarry stools, pain, vomiting, heartburn, constipation or diarrhea;  recent stretch of esophagus   PULM-No wheezing, coughing or shortness of breath  UROL-No frequency, urgency, blood in urine, or incontinence; nocturia three times per night    Objective     BP 98/65 (BP Location: Left arm, Patient Position: Sitting, BP Cuff Size: Large adult)   Wt 99 kg (218 lb 4.1 oz)   BMI 29.60 kg/m²      Physical Exam  Neck-supple without lymphadenopathy or thyromegaly; no carotid bruits  Heart- regular rate and rhythm, normal s1 and s2 without murmur or gallop; no edema  Lungs-clear to auscultation  Abdomen-soft, positive bowel sounds, without masses, HSmegaly or pain     Assessment/Plan     Problem List Items Addressed This Visit       Colon polyp    Diabetic neuropathy associated with type 2 diabetes mellitus    ED (erectile dysfunction)    Gout    Relevant Orders    Uric Acid    History of pulmonary embolism    Iron deficiency anemia    Nocturia    Vitamin D deficiency    Achalasia, esophageal    Type 2 diabetes mellitus with hyperglycemia, without long-term current use of insulin - Primary    Relevant Orders    Hemoglobin A1C    Thyroid Stimulating Hormone    Albumin-Creatinine Ratio, Urine Random    Orthostatic hypotension    Hypertensive heart disease with heart failure    Relevant Orders    Transthoracic Echo Limited    Chronic obstructive pulmonary disease, unspecified COPD type (Multi)    Edema of both lower legs due to peripheral venous insufficiency    Coronary artery disease involving native coronary artery of native heart without angina pectoris     Relevant Orders    Disability Placard    Macrocytosis    Relevant Orders    Folate    Chronic kidney disease, stage 5 (Multi)    RESOLVED: Asthmatic bronchitis without complication ([facility])    RESOLVED: GI bleed    Other cirrhosis of liver     See your eye doctor for a dilated eye exam at least every year to screen for the gradual loss of vision that can occur with Glaucoma and the potentially catastrophic effects of Diabetes.    You are eligible for the COVID booster.  Without a recent (within 90 days) challenge (booster or infection) your immune system will be three days behind in protecting you from the infection.  You will infect approximately five people by that time.    Please check with your insurance to verify whether you should get your shingles vaccination here or at the pharmacy, and whether it is covered.  The purpose of this shot is to prevent the permanent unremitting pain of Post Herpetic Neuralgia which becomes more common in elderly patients that get Shingles.  This shot can be very expensive.    Follow up fasting (no alcohol for 48 hours and just water for 14 hours) in three months for your next routine appointment.  In general, take any medications on schedule (except for types of Insulin).      Chris Huizar MD

## 2025-06-23 ENCOUNTER — CLINICAL SUPPORT (OUTPATIENT)
Dept: GASTROENTEROLOGY | Facility: CLINIC | Age: 70
End: 2025-06-23
Payer: MEDICARE

## 2025-06-23 ENCOUNTER — TELEPHONE (OUTPATIENT)
Dept: HEMATOLOGY/ONCOLOGY | Facility: CLINIC | Age: 70
End: 2025-06-23
Payer: MEDICARE

## 2025-06-23 DIAGNOSIS — K74.69 OTHER CIRRHOSIS OF LIVER: ICD-10-CM

## 2025-06-23 PROCEDURE — 91200 LIVER ELASTOGRAPHY: CPT | Performed by: STUDENT IN AN ORGANIZED HEALTH CARE EDUCATION/TRAINING PROGRAM

## 2025-06-23 NOTE — TELEPHONE ENCOUNTER
I called and spoke to Levy regarding lab work prior to appointment with Freddie tomorrow. Levy states he attempted blood work at his PCP office but they were unsuccessful. We discussed options to complete blood work - patient states he will come to [facility] lab tomorrow for second attempt. We rescheduled apt for one week from lab draw to ensure labs will be resulted and he will let us know if unable to complete blood work tomorrow.

## 2025-06-24 ENCOUNTER — LAB (OUTPATIENT)
Dept: LAB | Facility: CLINIC | Age: 70
End: 2025-06-24
Payer: MEDICARE

## 2025-06-24 ENCOUNTER — APPOINTMENT (OUTPATIENT)
Dept: HEMATOLOGY/ONCOLOGY | Facility: CLINIC | Age: 70
End: 2025-06-24
Payer: MEDICARE

## 2025-06-24 DIAGNOSIS — D50.0 IRON DEFICIENCY ANEMIA DUE TO CHRONIC BLOOD LOSS: ICD-10-CM

## 2025-06-24 DIAGNOSIS — D64.9 ANEMIA, UNSPECIFIED TYPE: ICD-10-CM

## 2025-06-24 LAB
ALBUMIN SERPL BCP-MCNC: 3.8 G/DL (ref 3.4–5)
ALP SERPL-CCNC: 140 U/L (ref 33–136)
ALT SERPL W P-5'-P-CCNC: 29 U/L (ref 10–52)
ANION GAP SERPL CALC-SCNC: 11 MMOL/L (ref 10–20)
AST SERPL W P-5'-P-CCNC: 27 U/L (ref 9–39)
BASOPHILS # BLD AUTO: 0.03 X10*3/UL (ref 0–0.1)
BASOPHILS NFR BLD AUTO: 0.6 %
BILIRUB SERPL-MCNC: 0.6 MG/DL (ref 0–1.2)
BUN SERPL-MCNC: 31 MG/DL (ref 6–23)
CALCIUM SERPL-MCNC: 8.9 MG/DL (ref 8.6–10.3)
CHLORIDE SERPL-SCNC: 104 MMOL/L (ref 98–107)
CO2 SERPL-SCNC: 26 MMOL/L (ref 21–32)
CREAT SERPL-MCNC: 1.47 MG/DL (ref 0.5–1.3)
EGFRCR SERPLBLD CKD-EPI 2021: 52 ML/MIN/1.73M*2
EOSINOPHIL # BLD AUTO: 0.11 X10*3/UL (ref 0–0.7)
EOSINOPHIL NFR BLD AUTO: 2.2 %
ERYTHROCYTE [DISTWIDTH] IN BLOOD BY AUTOMATED COUNT: 15.8 % (ref 11.5–14.5)
FERRITIN SERPL-MCNC: 75 NG/ML (ref 20–300)
GLUCOSE SERPL-MCNC: 190 MG/DL (ref 74–99)
HCT VFR BLD AUTO: 37.1 % (ref 41–52)
HGB BLD-MCNC: 11.5 G/DL (ref 13.5–17.5)
IMM GRANULOCYTES # BLD AUTO: 0.04 X10*3/UL (ref 0–0.7)
IMM GRANULOCYTES NFR BLD AUTO: 0.8 % (ref 0–0.9)
IRON SATN MFR SERPL: 24 % (ref 25–45)
IRON SERPL-MCNC: 101 UG/DL (ref 35–150)
LYMPHOCYTES # BLD AUTO: 1.57 X10*3/UL (ref 1.2–4.8)
LYMPHOCYTES NFR BLD AUTO: 30.8 %
MCH RBC QN AUTO: 30.8 PG (ref 26–34)
MCHC RBC AUTO-ENTMCNC: 31 G/DL (ref 32–36)
MCV RBC AUTO: 100 FL (ref 80–100)
MONOCYTES # BLD AUTO: 0.5 X10*3/UL (ref 0.1–1)
MONOCYTES NFR BLD AUTO: 9.8 %
NEUTROPHILS # BLD AUTO: 2.85 X10*3/UL (ref 1.2–7.7)
NEUTROPHILS NFR BLD AUTO: 55.8 %
PLATELET # BLD AUTO: 171 X10*3/UL (ref 150–450)
POTASSIUM SERPL-SCNC: 4.6 MMOL/L (ref 3.5–5.3)
PROT SERPL-MCNC: 6.9 G/DL (ref 6.4–8.2)
RBC # BLD AUTO: 3.73 X10*6/UL (ref 4.5–5.9)
SODIUM SERPL-SCNC: 136 MMOL/L (ref 136–145)
TIBC SERPL-MCNC: 413 UG/DL (ref 240–445)
TSH SERPL-ACNC: 1.22 MIU/L (ref 0.44–3.98)
UIBC SERPL-MCNC: 312 UG/DL (ref 110–370)
URATE SERPL-MCNC: 6.8 MG/DL (ref 4–7.5)
WBC # BLD AUTO: 5.1 X10*3/UL (ref 4.4–11.3)

## 2025-06-24 PROCEDURE — 80053 COMPREHEN METABOLIC PANEL: CPT

## 2025-06-24 PROCEDURE — 36415 COLL VENOUS BLD VENIPUNCTURE: CPT

## 2025-06-24 PROCEDURE — 83036 HEMOGLOBIN GLYCOSYLATED A1C: CPT | Performed by: FAMILY MEDICINE

## 2025-06-24 PROCEDURE — 84153 ASSAY OF PSA TOTAL: CPT | Performed by: FAMILY MEDICINE

## 2025-06-24 PROCEDURE — 83521 IG LIGHT CHAINS FREE EACH: CPT

## 2025-06-24 PROCEDURE — 82746 ASSAY OF FOLIC ACID SERUM: CPT | Performed by: FAMILY MEDICINE

## 2025-06-24 PROCEDURE — 82784 ASSAY IGA/IGD/IGG/IGM EACH: CPT

## 2025-06-24 PROCEDURE — 85025 COMPLETE CBC W/AUTO DIFF WBC: CPT

## 2025-06-24 PROCEDURE — 84155 ASSAY OF PROTEIN SERUM: CPT

## 2025-06-24 PROCEDURE — 84443 ASSAY THYROID STIM HORMONE: CPT | Performed by: FAMILY MEDICINE

## 2025-06-24 PROCEDURE — 86320 SERUM IMMUNOELECTROPHORESIS: CPT | Performed by: STUDENT IN AN ORGANIZED HEALTH CARE EDUCATION/TRAINING PROGRAM

## 2025-06-24 PROCEDURE — 84165 PROTEIN E-PHORESIS SERUM: CPT | Performed by: STUDENT IN AN ORGANIZED HEALTH CARE EDUCATION/TRAINING PROGRAM

## 2025-06-24 PROCEDURE — 86334 IMMUNOFIX E-PHORESIS SERUM: CPT

## 2025-06-24 PROCEDURE — 84550 ASSAY OF BLOOD/URIC ACID: CPT | Performed by: FAMILY MEDICINE

## 2025-06-24 PROCEDURE — 83540 ASSAY OF IRON: CPT

## 2025-06-24 PROCEDURE — 82728 ASSAY OF FERRITIN: CPT

## 2025-06-24 PROCEDURE — 84165 PROTEIN E-PHORESIS SERUM: CPT

## 2025-06-25 DIAGNOSIS — E11.65 TYPE 2 DIABETES MELLITUS WITH HYPERGLYCEMIA, WITHOUT LONG-TERM CURRENT USE OF INSULIN: ICD-10-CM

## 2025-06-25 LAB
EST. AVERAGE GLUCOSE BLD GHB EST-MCNC: 171 MG/DL
FOLATE SERPL-MCNC: 17.3 NG/ML
HBA1C MFR BLD: 7.6 % (ref ?–5.7)
IGA SERPL-MCNC: 194 MG/DL (ref 70–400)
IGG SERPL-MCNC: 1540 MG/DL (ref 700–1600)
IGM SERPL-MCNC: 91 MG/DL (ref 40–230)
KAPPA LC SERPL-MCNC: 6.5 MG/DL (ref 0.33–1.94)
KAPPA LC/LAMBDA SER: 0.97 {RATIO} (ref 0.26–1.65)
LAMBDA LC SERPL-MCNC: 6.69 MG/DL (ref 0.57–2.63)
PROT SERPL-MCNC: 6.7 G/DL (ref 6.4–8.2)
PSA SERPL-MCNC: 0.44 NG/ML

## 2025-06-25 RX ORDER — PIOGLITAZONE 30 MG/1
30 TABLET ORAL DAILY
Qty: 90 TABLET | Refills: 0 | Status: SHIPPED | OUTPATIENT
Start: 2025-06-25 | End: 2025-09-23

## 2025-06-29 LAB
ALBUMIN: 3.5 G/DL (ref 3.4–5)
ALPHA 1 GLOBULIN: 0.3 G/DL (ref 0.2–0.6)
ALPHA 2 GLOBULIN: 0.6 G/DL (ref 0.4–1.1)
BETA GLOBULIN: 0.9 G/DL (ref 0.5–1.2)
GAMMA GLOBULIN: 1.4 G/DL (ref 0.5–1.4)
IMMUNOFIXATION COMMENT: NORMAL
PATH REVIEW - SERUM IMMUNOFIXATION: NORMAL
PATH REVIEW-SERUM PROTEIN ELECTROPHORESIS: NORMAL
PROTEIN ELECTROPHORESIS COMMENT: NORMAL

## 2025-07-01 ENCOUNTER — TELEPHONE (OUTPATIENT)
Dept: HEMATOLOGY/ONCOLOGY | Facility: CLINIC | Age: 70
End: 2025-07-01
Payer: MEDICARE

## 2025-07-01 ENCOUNTER — APPOINTMENT (OUTPATIENT)
Dept: HEMATOLOGY/ONCOLOGY | Facility: CLINIC | Age: 70
End: 2025-07-01
Payer: MEDICARE

## 2025-07-01 DIAGNOSIS — N13.8 BPH WITH OBSTRUCTION/LOWER URINARY TRACT SYMPTOMS: ICD-10-CM

## 2025-07-01 DIAGNOSIS — N40.1 BPH WITH OBSTRUCTION/LOWER URINARY TRACT SYMPTOMS: ICD-10-CM

## 2025-07-01 NOTE — TELEPHONE ENCOUNTER
7/1/2025 @ 7:13AM:  Reschedule appointment with [place] today at 9:30am.  Please call to reschedule.

## 2025-07-07 ENCOUNTER — OFFICE VISIT (OUTPATIENT)
Dept: HEMATOLOGY/ONCOLOGY | Facility: CLINIC | Age: 70
End: 2025-07-07
Payer: MEDICARE

## 2025-07-07 VITALS
HEART RATE: 90 BPM | OXYGEN SATURATION: 95 % | RESPIRATION RATE: 16 BRPM | TEMPERATURE: 97.3 F | DIASTOLIC BLOOD PRESSURE: 63 MMHG | SYSTOLIC BLOOD PRESSURE: 94 MMHG | BODY MASS INDEX: 29.45 KG/M2 | WEIGHT: 217.15 LBS

## 2025-07-07 DIAGNOSIS — D64.9 ANEMIA, UNSPECIFIED TYPE: ICD-10-CM

## 2025-07-07 DIAGNOSIS — D50.0 IRON DEFICIENCY ANEMIA DUE TO CHRONIC BLOOD LOSS: ICD-10-CM

## 2025-07-07 PROCEDURE — 99214 OFFICE O/P EST MOD 30 MIN: CPT

## 2025-07-07 PROCEDURE — 3051F HG A1C>EQUAL 7.0%<8.0%: CPT

## 2025-07-07 PROCEDURE — 1126F AMNT PAIN NOTED NONE PRSNT: CPT

## 2025-07-07 PROCEDURE — 3078F DIAST BP <80 MM HG: CPT

## 2025-07-07 PROCEDURE — 3074F SYST BP LT 130 MM HG: CPT

## 2025-07-07 PROCEDURE — 1159F MED LIST DOCD IN RCRD: CPT

## 2025-07-07 ASSESSMENT — PAIN SCALES - GENERAL: PAINLEVEL_OUTOF10: 0-NO PAIN

## 2025-07-07 NOTE — PROGRESS NOTES
Patient ID: Levy Gregory is a 69 y.o. male.  Referring Physician: Freddie Osborn, APRN-CNP  [address] [facility] [address] [place] [address]  Primary Care Provider: Chris Huizar MD  Visit Type: Follow up visit     Location: [facility]  Diagnosis/Reason: Anemia    HPI:  Levy Gregory is a 69 y.o. male referred for consultation of anemia, PMH cirrhosis, achalasia, paraesophageal hernia s/p robotic laparoscopic myotomy with fundoplication C/B esophageal perforation in 3/23, COPD, gout, SHARONA, vitamin D deficiency, orthostatic hypotension, T2DM, DVT, GERD, HLD. Denies self or family history of cancers, blood disorders or autoimmune conditions.     Per review of records:  Hemoglobin intermittently low (11.4-15.3 g/dL) in 2019, 2021  Normal hemoglobin in 2022  Anemia noted in 2023 during prolonged hospital stay   Mild hemoglobin noted in 2024 (hgb 9.8 - 13.8 g/dL)   Hemoglobin dropped to 7.7 - 8.5 g/dL so far in 2025 (MCV 98, MCHC 29.7)    Previous Hematological Background:  Hx of hematological disorders: No - Patient denies prior hematologic history, none in family either   Hx of blood transfusions: Yes -    Hx of iron supplementation: Yes - Oral supplementation - Denies adverse effect and reaction - Agent: OTC iron pill 65 mg daily for sometime, now increased to TID. Never needed IV iron.  Hx of B12 supplementation: No - Denies any prior supplementation  Hx of folate supplementation: No - Denies any prior supplementation    Relevant medications:  He is on baby asprin 81 mg daily in the morning by cardiologist   Currently using NSAID's (Naproxen, Ibuprofen etc.): No  Currently taking any supplements: One a day MV for men. OTC oral iron TID   Pantoprazole 40 mg BID     HPI:   Levy Gregory is a 70 y/o male referred for anemia, PMH COPD, T2DM, GERD, DVT (now off AC), acalculous cholecystitis, peripheral neuropathy, type II achalasia, paraesophageal hernia s/p robotic laparoscopic myotomy w/ fundoplication  c/b esophageal perforation. He is accompanied by his wife today. He went to the ED on 2/13/25 after having syncope at home and black stools (for about 2 weeks). Reports having lower GI bleed approximately 6 years ago and having syncope at that time as well. EGD found to have old debris and dried blood and no active bleeding or varices. CT angiogram was negative for active hemorrhage. He had 1 unit PRBC. Octreotide drip stopped once confirmed no esophageal varices.     He was started on Midodrine while inpatient for low BP. Recommended to wear compression stockings, stay well-hydrated, follow up with PCP and GI, and have fibroscan to evaluate for cirrhosis and possible gastric emptying study. Started on tamiflu for Flu A.     Patient reports being sick and tired of hospitals and needles. At times feels depressed. States his legs are weak and he notices OSHEA easily, dizziness at times, lack of appetite, difficulty walking due to neuropathy and fatigue. He does watch his BP at home. Says it has been running low since 2023 health issues. Tries to hydrate well but thinks about 20 ounces or so per day.     Denies chest pain, palpitations, SOB at rest, fevers, chills, drenching night sweats, pain, recent infections, nausea, vomiting, constipation, diarrhea, dysphagia, new lumps/bumps, urinary changes, further melena, hematochezia, hematuria, epistaxis, hemoptysis or worsening in LE edema. Denies other concerns.     Interval history:  3/18/2025:  Virtual or Telephone Consent  Verbal consent was requested and obtained from Levy Gregory on this date, 07/06/25 for a telehealth visit and the patient's location was confirmed at the time of the visit.  - Patient states he continues to feel fatigued and weak  - Gets tired quickly  - Saw cardiology and will continue on Midodrine for BP   - Says no further signs of bleeding including melena or hematochezia  - Denies chest pain, palpitations, cough, fevers, SOB at rest, rashes,  "signs of blood loss or other concerns    5/5/2025:   - Patient presents ahead of his scheduled follow-up visit due to feeling symptomatic and therefore we wanted to recheck his lab work  - He received IV Venofer 300 mg on 3/21, 3/28 and 4/4/2025  - Labs on 5/2/2025 showed WBC 5.9, hemoglobin 11.2, MCV 98, platelets 128,000, iron saturation 13%, iron 47, ferritin 63, creatinine 1.53, alk phos 142, ALT 29, AST 25  - His hemoglobin, iron percent, iron and ferritin have improved  - He was to have a scope and VCE with Dr. Zayas but it was not fully completed due to food in the stomach   - He goes back to have a visit on 5/13/25  - He reports he continues to have some SOB, weakness/off balance, legs hurting while walking and fatigue   - Says he thinks he feels somewhat better since IV iron including some improvement in energy  - He is having a fibroscan on 6/23  - He has not been on oral iron since he started IV iron   - Continues on PPI BID, aspirin once per day and denies signs of bleeding such as melena, hematochezia, hematuria, epistaxis or other signs/symptoms   - Appetite is doing well, just can't eat much at a time due to GI surgery in the past   - Reports issues with last iron infusion, says IV burned and now has small bump where IV was, no skin changes or edema at this time he says   - Denies drenching night sweats, lymphadenopathy, dysphagia, fevers, recent or recurrent infections  - Denies chest pain, palpitations, cough, SOB at rest, rashes, worsening edema or other concerns    7/7/2025:   - Patient presents for follow up visit of anemia  - Labs done on 6/24/25 shows WBC 5.1, hgb 11.5, , plt 171,000, ANC 2.85, cr 1.47, GFR 52, iron saturation 24%, iron 101, ferritin 75, A1C 7.6%, TSH 1.22, PSA 0.44  - Ruiz FLC 6.50, Lambda FLC 6.69, FLC ratio 0.97, SPEP shows \"6/24/25 Several discrete bands are detected within the gamma region suggesting an oligoclonal or reactive pattern. Unchanged from the previous " "analysis on 3/10/25.\"   - Since last visit pt had EGD   - He has been continued on PPI BID   - Started iron tab daily last visit & has continued   - He reports ongoing lightheaded/dizziness, says he got up too quick when sitting while fishing last week and blacked out, denies hitting his head or injuring himself   - No changes in appetite or weight   - His wife says he doesn't take his afternoon dose of Midodrine and doesn't feel thirsty often   - He denies SOB, chest pain, palpitations, melena, hematochezia, hematuria, epistaxis or other signs of bleeding. Denies drenching night sweats, lymphadenopathy, fevers, recurrent/recent infections        PMHx:  Active Ambulatory Problems     Diagnosis Date Noted    Colon polyp 02/17/2023    Diabetic neuropathy associated with type 2 diabetes mellitus 02/17/2023    ED (erectile dysfunction) 02/17/2023    High serum bone-specific alkaline phosphatase 02/17/2023    Gout 02/17/2023    History of pulmonary embolism 02/17/2023    Hyperlipidemia 02/17/2023    Iron deficiency anemia 02/17/2023    Nocturia 02/17/2023    Obstructive sleep apnea, adult 02/17/2023    Osteopenia 02/17/2023    Vitamin D deficiency 02/17/2023    Achalasia, esophageal 06/22/2023    Type 2 diabetes mellitus with hyperglycemia, without long-term current use of insulin 06/22/2023    Orthostatic hypotension 07/06/2023    Solar purpura 07/24/2023    Anticoagulant long-term use 11/29/2023    Other dysphagia 10/31/2023    Hypertensive heart disease with heart failure 02/04/2024    Chronic obstructive pulmonary disease, unspecified COPD type (Multi) 02/04/2024    Acute renal failure superimposed on chronic kidney disease 03/21/2024    Adjustment disorder with depressed mood 02/17/2023    Edema of both lower legs due to peripheral venous insufficiency 02/10/2024    Schatzki's ring 03/21/2024    Coronary artery disease involving native coronary artery of native heart without angina pectoris 04/09/2024    Deep vein " thrombosis (DVT) of left lower extremity 04/09/2024    Macrocytosis 07/25/2024    Closed fracture of right hip requiring operative repair with routine healing 08/15/2024    S/P right hip fracture 09/03/2024    Chronic kidney disease, stage 5 (Multi) 09/03/2024    Other cirrhosis of liver 04/10/2025     Resolved Ambulatory Problems     Diagnosis Date Noted    Acne 02/17/2023    Decreased GFR 02/17/2023    Esophageal mass 02/17/2023    Esophageal stricture 02/17/2023    Gastric ulcer 02/17/2023    History of DVT (deep vein thrombosis) 02/17/2023    Microalbuminuria 02/17/2023    Rib pain on left side 02/17/2023    Sialoadenitis 02/17/2023    Dysphagia 02/17/2023    Obesity (BMI 30-39.9) 02/17/2023    Deep vein thrombosis (DVT) of popliteal vein of left lower extremity 06/22/2023    Dislodged gastrostomy tube 06/22/2023    Empyema (Multi) 06/22/2023    Gastrostomy in place (Multi) 06/22/2023    Mediastinitis 06/22/2023    Fall 07/06/2023    Malfunction of jejunostomy tube (Multi) 07/10/2023    Esophageal perforation 07/20/2023    Chronic atrial fibrillation (Multi) 07/24/2023    Hyponatremia 07/28/2023    Malnutrition of moderate degree (Multi) 10/03/2023    Impaired oral gastric feeding tube, initial encounter 10/03/2023    Moderate protein-calorie malnutrition (Multi) 10/19/2023    Hypotension due to drugs 11/04/2023    Dysfunction of both eustachian tubes 11/21/2023    Elevated liver function tests 11/29/2023    Hypotension 11/30/2023    Infection requiring contact isolation precautions 12/07/2023    Confusion 01/22/2024    Encounter for immunization 02/04/2024    Hypotension 02/07/2024    Pedal edema 02/13/2024    At risk for falls 02/26/2024    Type 2 diabetes mellitus with diabetic polyneuropathy, with long-term current use of insulin 03/08/2024    Generalized abdominal pain 03/08/2024    Dizziness 03/08/2024    Syncope and collapse 03/08/2024    Abnormal ECG 11/26/2022    Prolonged QT interval 03/21/2024    ACS  (acute coronary syndrome) (Multi) 08/16/2023    Acute posthemorrhagic anemia 08/15/2023    Acute respiratory failure 02/12/2023    CORA (acute kidney injury) 03/21/2024    Anemia 03/21/2024    Bacteremia 05/22/2023    Balance problems 11/20/2023    Bruise, trunk 03/21/2024    Closed head injury 03/21/2024    Coagulopathy (Multi) 03/21/2024    Difficulty walking 11/20/2023    Do not resuscitate 08/15/2023    Epistaxis 03/21/2024    Fatigue 09/06/2023    Food bolus obstruction of intestine (Multi) 03/21/2024    History of anemia 03/21/2024    History of diabetes mellitus 03/21/2024    History of infectious disease 03/21/2024    Hypernatremia 03/21/2024    Hypocalcemia 03/21/2024    Hypokalemia 03/21/2024    Hypoxia 03/21/2024    Lactic acidosis 03/21/2024    Leukocytosis 03/21/2024    Low kidney function 02/17/2023    Lung field abnormal 05/24/2023    Malnutrition (Multi) 03/21/2024    Morbid obesity (Multi) 03/21/2024    Nosocomial condition 08/15/2023    Onychodystrophy 11/20/2023    Onychomycosis 11/20/2023    Pain in toes of both feet 11/20/2023    Pleural effusion exudative 03/21/2024    CAP (community acquired pneumonia) 08/15/2023    Post-operative pain 03/21/2024    Postoperative septic shock 03/21/2024    Other pulmonary embolism without acute cor pulmonale 02/10/2024    Pulmonary embolism 02/12/2023    Respiratory distress 03/21/2024    Rib contusion, left, sequela 03/21/2024    History of inferior vena caval filter placement 03/21/2024    Status post endoscopy 03/21/2024    Vagal arrhythmia 07/23/2018    Mild left ventricular systolic dysfunction 04/09/2024    Upper respiratory tract infection 05/13/2024    Palpitations 05/21/2024    Weight loss 07/25/2024    Closed right hip fracture, initial encounter 08/15/2024    Asthmatic bronchitis without complication ([facility]) 10/29/2024    GI bleed 02/13/2025     Past Medical History:   Diagnosis Date    ARF (acute renal failure)     Arthritis     Asthmatic  bronchitis ([facility]-HCC)     Bilateral leg edema     Cirrhosis (Multi)     Cirrhosis of liver (Multi)     CKD (chronic kidney disease)     Contusion of abdominal wall, initial encounter 01/12/2021    COPD (chronic obstructive pulmonary disease) (Multi)     Coronary artery disease     COVID-19     Depression     Diabetes mellitus (Multi)     Diabetic neuropathy (Multi)     DVT (deep venous thrombosis) (Multi)     Erectile dysfunction     Fractures     GERD (gastroesophageal reflux disease)     Heart failure     Hemoptysis 05/12/2020    Herpes labialis     Hiatal hernia     Irregular heart beat     OAB (overactive bladder)     Pain in left knee     PE (pulmonary thromboembolism) (Multi)     Peripheral neuropathy     Peripheral vascular insufficiency     Personal history of other diseases of the respiratory system 06/19/2019    Sleep apnea     Type 2 diabetes mellitus     Wears glasses       PSHx:  Past Surgical History:   Procedure Laterality Date    COLONOSCOPY      CONDUIT REPLACEMENT  12/07/2023    Gastric conduit revision; mini laparotomy, General Ex-lap, ISA, Gastric pull up with anastmosis in neck, MIGUELINA to bulb sx. New J tube.    ESOPHAGOGASTRECTOMY      with pull through    ESOPHAGOGASTRODUODENOSCOPY      with stents x2    GASTROSTOMY TUBE PLACEMENT N/A     REMOVED    HIP ARTHROPLASTY Right     IR CVC PICC  08/16/2023    IR CVC PICC 8/16/2023 [facility] [place] - REMOVED    JOINT REPLACEMENT Right     RIGHT HIP REPLACEMENT    OTHER SURGICAL HISTORY  01/21/2019    Giovanna filter placement - REMOVED    PARAESOPHAGEAL HERNIA REPAIR      with fundoplication    UMBILICAL HERNIA REPAIR N/A 2013    UPPER GASTROINTESTINAL ENDOSCOPY        FHx:   Maternal Grandmother: DM  Mother: No medical issues   Father: Heart disease  Siblings: 1 full biological sister. 4 half brother/sisters. No cancer or blood disorders.   Children: 2 boys, age 36 and 39. Healthy     Social Hx:  Levy Gregory    reports that he quit smoking  about 2 years ago. His smoking use included cigars. He started smoking about 35 years ago. He has been exposed to tobacco smoke. He has never used smokeless tobacco.  He  reports no history of alcohol use.  He  reports no history of drug use.    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 From [place]. No special diets or restrictions.     Social History     Socioeconomic History    Marital status:    Tobacco Use    Smoking status: Former     Types: Cigars     Start date: 1989     Quit date: 2023     Years since quittin.3     Passive exposure: Past    Smokeless tobacco: Never   Vaping Use    Vaping status: Never Used   Substance and Sexual Activity    Alcohol use: Never    Drug use: Never    Sexual activity: Defer     Social Drivers of Health     Financial Resource Strain: Low Risk  (2025)    Overall Financial Resource Strain (CARDIA)     Difficulty of Paying Living Expenses: Not hard at all   Food Insecurity: No Food Insecurity (2025)    Hunger Vital Sign     Worried About Running Out of Food in the Last Year: Never true     Ran Out of Food in the Last Year: Never true   Transportation Needs: No Transportation Needs (2025)    PRAPARE - Transportation     Lack of Transportation (Medical): No     Lack of Transportation (Non-Medical): No   Physical Activity: Unknown (10/20/2023)    Exercise Vital Sign     Minutes of Exercise per Session: 30 min   Stress: Stress Concern Present (10/20/2023)    [employer] - Occupational Stress Questionnaire     Feeling of Stress : To some extent   Social Connections: Socially Isolated (10/20/2023)    Social Connection and Isolation Panel [NHANES]     Frequency of Communication with Friends and Family: More than three times a week     Attends Holiness Services: Never     Active Member of Clubs or Organizations: No     Attends Club or Organization Meetings: Never     Marital Status:     Intimate Partner Violence: Not At Risk (2/13/2025)    Humiliation, Afraid, Rape, and Kick questionnaire     Fear of Current or Ex-Partner: No     Emotionally Abused: No     Physically Abused: No     Sexually Abused: No   Housing Stability: Low Risk  (2/13/2025)    Housing Stability Vital Sign     Unable to Pay for Housing in the Last Year: No     Number of Times Moved in the Last Year: 1     Homeless in the Last Year: No       Cancer Screenings:  Upper EGD: 1/4/21, 11/25/22, 2/11/23, 2/23/23  More Recently:   - 2/14/25: Findings  Patient is s/p esophagectomy with gastric pull through  Large amount of old blood and food debris in the stomach body and antrum that could not be lavaged or suctioned. There was otherwise no fresh blood seen to extent reached. Procedure aborted in the antrum due to poor visualization and to minimize risk of aspiration  - 2/17/25: Findings  Patient is s/p esophagectomy with gastric pull through  Moderate amount of old food debris and retained gastric fluid in the stomach body and antrum precluding visualization of stomach body. There was otherwise no evidence of active bleeding. The pylorus was characterized by edematous mucosa but traversable with minimal resistance.  The duodenum appeared normal.  Colonoscopy: 3/12/2021 (repeat in 5 years)    - 5/21/25:  Impression  The duodenum appeared normal.  Moderate mosaic mucosa in the stomach  S/p esophagectomy with gastric pull-through  Stricture with length of less than 1 cm in the esophagus; dilated to 15 mm end size.  Dilation caused mucosal tears and improved lumen appearance; post-dilation mucosal tears were superficial    Medications and allergies reviewed in EMR.    ROS:  Review of Systems - Oncology   10 point review of systems negative except as state in HPI.    Vitals & Statistics:  Objective   Visit Vitals  Smoking Status Former     Visit Vitals  BP 94/63 Comment: NP notified   Pulse 90   Temp 36.3 °C (97.3 °F)   Resp 16   Wt  98.5 kg (217 lb 2.5 oz)   SpO2 95%   BMI 29.45 kg/m²   Smoking Status Former   BSA 2.24 m²       Physical Exam  Vitals reviewed.   Constitutional:       Appearance: Normal appearance.   HENT:      Head: Normocephalic and atraumatic.      Nose: Nose normal.      Mouth/Throat:      Mouth: Mucous membranes are moist.      Pharynx: Oropharynx is clear.   Eyes:      Extraocular Movements: Extraocular movements intact.      Conjunctiva/sclera: Conjunctivae normal.      Pupils: Pupils are equal, round, and reactive to light.   Cardiovascular:      Rate and Rhythm: Normal rate and regular rhythm.      Pulses: Normal pulses.      Heart sounds: Normal heart sounds.   Pulmonary:      Effort: Pulmonary effort is normal.      Breath sounds: Normal breath sounds.   Abdominal:      General: Bowel sounds are normal.      Palpations: Abdomen is soft.   Musculoskeletal:         General: Normal range of motion.      Cervical back: Normal range of motion.   Skin:     General: Skin is warm and dry.   Neurological:      General: No focal deficit present.      Mental Status: He is alert and oriented to person, place, and time.   Psychiatric:         Mood and Affect: Mood normal.         Behavior: Behavior normal.         Thought Content: Thought content normal.         Judgment: Judgment normal.     Results:  Lab Results   Component Value Date    WBC 5.1 06/24/2025    NEUTROABS 2.85 06/24/2025    IGABSOL 0.04 06/24/2025    LYMPHSABS 1.57 06/24/2025    MONOSABS 0.50 06/24/2025    EOSABS 0.11 06/24/2025    BASOSABS 0.03 06/24/2025    RBC 3.73 (L) 06/24/2025     06/24/2025    MCHC 31.0 (L) 06/24/2025    HGB 11.5 (L) 06/24/2025    HCT 37.1 (L) 06/24/2025     06/24/2025     Lab Results   Component Value Date    RETICCTPCT 3.2 (H) 03/10/2025      Lab Results   Component Value Date    CREATININE 1.47 (H) 06/24/2025    BUN 31 (H) 06/24/2025    EGFR 52 (L) 06/24/2025     06/24/2025    K 4.6 06/24/2025     06/24/2025     "CO2 26 06/24/2025      Lab Results   Component Value Date    ALT 29 06/24/2025    AST 27 06/24/2025    ALKPHOS 140 (H) 06/24/2025    BILITOT 0.6 06/24/2025      Lab Results   Component Value Date    TSH 1.22 06/24/2025     Lab Results   Component Value Date    TSH 1.22 06/24/2025    THYROIDPAB <28 07/24/2023     Lab Results   Component Value Date    IRON 101 06/24/2025    TIBC 413 06/24/2025    FERRITIN 75 06/24/2025      Lab Results   Component Value Date    CDXMCCQC85 734 03/10/2025      Lab Results   Component Value Date    FOLATE 17.3 06/24/2025     Lab Results   Component Value Date    FERNANDO Positive (A) 03/10/2025    RF <10 03/10/2025    SEDRATE 22 (H) 03/10/2025      Lab Results   Component Value Date    CRP 0.73 03/10/2025      No results found for: \"JOSELUIS\"  Lab Results   Component Value Date     03/10/2025     Lab Results   Component Value Date    HAPTOGLOBIN 135 03/10/2025     Lab Results   Component Value Date    SPEP Aberrant band detected. See immunofixation.    06/24/2025     Lab Results   Component Value Date    IGG 1,540 06/24/2025    IGM 91 06/24/2025     06/24/2025     Lab Results   Component Value Date    HEPAIGM NONREACTIVE 07/24/2023    HEPBCIGM NONREACTIVE 07/24/2023    HEPBSAG NONREACTIVE 07/24/2023    HEPCAB REACTIVE (A) 07/24/2023     Fibroscan 6/23/25  - This study estimates a fibrosis stage of 3 and steatosis grade of 0.       Assessment/Plan:  Levy Gregory is a 69 y.o. male referred for consultation of anemia, PMH cirrhosis, achalasia, paraesophageal hernia s/p robotic laparoscopic myotomy with fundoplication C/B esophageal perforation in 3/23, COPD, gout, SHARONA, vitamin D deficiency, orthostatic hypotension, T2DM, DVT, GERD, HLD. Denies self or family history of cancers, blood disorders or autoimmune conditions.     I reviewed patient's chart including but not limited to labs, imaging, surgical/procedure notes, pathology, hospital notes, doctor's notes.    2/19/2025 " results:  WBC WNL  Normocytic, hypochromic anemia. Hgb 8.5, RBC 2.91, Hematocrit 28.6%  Platelets WNL  Creatinine 1.37, GFR 56, calcium 8.0    Plan:  Discussed possible etiologies including multifactorial, nutritional deficiencies, anemia of chronic disease, malabsorption, hemopathy, medications, bleeding, malignancy, etc. Will start hematological workup today.    Anemia   Patient declined recommended labs today due to not having enough time. States he has a FUV on Monday with his primary care, he will go for labs after that visit in case PCP also orders testing since patient reports he is a very difficult stick.  Continue oral iron as already prescribed, TID, monitor for side effects, which were discussed  SW to meet with patient today for depression score/report   Has FUV with GI on 4/10/25  RTC on 3/18/25 via virtual/telehealth visit - F/U sooner if needed/urgent    I had an extensive discussion with the patient regarding the diagnosis and discussed the plan of therapy, including general considerations regarding side effects and outcomes. Pt understood and gave appropriate teach back about the plan of care. All questions were answered to the patient's satisfaction. The patient is instructed to contact us at any time if questions or problems arise. Thank you for the opportunity to participate in the care of this very pleasant patient.    Total time = 50 minutes. 50% or more of this time was spent in counseling and/or coordination of care including reviewing medical history/radiology/labs, examining patient, formulating outlined plan with team, and discussing plan with patient/family.    Interval History:   3/18/2025:   - Labs from initial workup showed: WBC 5.6, normal dif, hgb 8.8, hematocrit 30.7%, , MCHC 28.7, plt 184,000, retic 3.2%, sed rate 22, FERNANDO + with negative reflex, Cr 1.47, GFR 52, calcium 8.1, alk phos 205, ALT 37, AST 32, glucose 259, iron saturation 6%, ferritin 46, serum iron 21, ,  "CRP 0.73, haptoglobin 135, JOSELUIS neg, IgG 1,670, IgA 186, IgM 78, Ig Kappa FLC 7.08, Ig Lambda FLC 7.27, FLC ratio 0.97, SPEP \"Several discrete bands are detected within the gamma region suggesting an oligoclonal or reactive pattern.  Repeat testing is recommended after the resolution of any acute illness to monitor the evolution of this band.\"  - Labs show SHARONA   - He has been on oral iron   - SHARONA likely 2/2 recent GI bleeding  - Sees GI next month for follow up   - Discussed we will proceed with IV Venofer   - Drug information including risks and benefits discussed with patient including risk of hypersensitivity, patient provided informed consent to proceed   - Discussed that we will closely monitor his labs and once iron is improved if he remains anemic we will likely recommend additional workup   - He can continue his oral iron   - RTC in 8 weeks after IV venofer, early June, for repeat labs and FUV     5/5/2025:   - Patient presents ahead of his scheduled follow-up visit due to feeling symptomatic and therefore we wanted to recheck his lab work  - He received IV Venofer 300 mg on 3/21, 3/28 and 4/4/2025  - Stopped oral iron after starting IV venofer   - Labs on 5/2/2025 showed WBC 5.9, hemoglobin 11.2, MCV 98, platelets 128,000, iron saturation 13%, iron 47, ferritin 63, creatinine 1.53, alk phos 142, ALT 29, AST 25  - His hemoglobin, iron percent, iron and ferritin have improved  - With iron saturation still below 15% and some mild anemia we discussed 1-2 IV iron doses or restarting oral iron, he opted for oral iron   - Discussed potential side effects,  from PPI by at least two hours and taking vitamin C 500 mg with iron tab (ferrous sulfate 325 mg/daily)   - He is aware I will confirm plan for oral iron with Dr. Zayas to ensure no contraindications from a GI standpoint   - He does have an isolated thrombocytopenia which we will repeat with next blood work, may be 2/2 possible cirrhosis noted on " "Fibroscan - has repeat Fibroscan on 6/23   - He continues to follow with urology for BPH and overactive bladder  - He has seen gastroenterology, Dr. Zayas, since last visit and continues on PPI twice a day  - He was to have scoping done on 4/30/2025 but it was not completed due to food in his stomach, has a visit on 5/13/25 with Dr. Zayas for office visit   - We will continue to closely monitor his labs and symptoms, so he will RTC in 6 weeks with repeat cbc-d, cmp, iron panel, ferritin, immunoglobulins, FLC, SPEP a few days before (repeat SPEP due to reactive results, may be improved with no further GI blood loss).       7/7/2025:   - Patient presents for follow up visit   - Labs done on 6/24/25 shows WBC 5.1, hgb 11.5, , plt 171,000, ANC 2.85, cr 1.47, GFR 52, iron saturation 24%, iron 101, ferritin 75, A1C 7.6%, TSH 1.22, PSA 0.44  - Ruiz FLC 6.50, Lambda FLC 6.69, FLC ratio 0.97, SPEP shows \"6/24/25 Several discrete bands are detected within the gamma region suggesting an oligoclonal or reactive pattern. Unchanged from the previous analysis on 3/10/25.\"   - Discussed SPEP findings with Dr. Alaniz, no further workup or interventions needed at this time   - Discussed with patient and his wife that hgb and iron studies have improved some   - He will continue oral iron daily   - He is on PPI BID   - He reports EGD did not show active bleeding   - He denies constitutional symptoms  - Discussed his anemia is likely multifactorial in the setting of hx DM, cirrhosis, CKD   - Discussed we cannot rule out a bone marrow abnormality, but he prefers to decline bone marrow biopsy right now and just monitor his blood counts   - We discussed RTC in 3-6 months, he opted for 6 months - advised him to call with any s/s of anemia or bleeding   - He prefers 6 months FUV, he will go a week early for labs         Diagnoses and all orders for this visit:  Anemia, unspecified type  -     Clinic Appointment Request Follow Up; " [facility]  -     Clinic Appointment Request Follow up; [facility]; Future  -     CBC and Auto Differential; Future  -     Comprehensive Metabolic Panel; Future  -     Ferritin; Future  -     Iron and TIBC; Future  -     Vitamin B12; Future  -     Serum Protein Electrophoresis; Future  -     C-Reactive Protein; Future  -     Erythropoietin; Future  -     Sedimentation Rate; Future  Iron deficiency anemia due to chronic blood loss  -     Clinic Appointment Request Follow Up; [facility]  -     Clinic Appointment Request Follow up; [facility]; Future  -     CBC and Auto Differential; Future  -     Comprehensive Metabolic Panel; Future  -     Ferritin; Future  -     Iron and TIBC; Future  -     Vitamin B12; Future  -     Serum Protein Electrophoresis; Future  -     C-Reactive Protein; Future  -     Erythropoietin; Future  -     Sedimentation Rate; Future        Ginger Shabazz, APRN-CNP

## 2025-07-23 ENCOUNTER — HOSPITAL ENCOUNTER (OUTPATIENT)
Dept: CARDIOLOGY | Facility: CLINIC | Age: 70
Discharge: HOME | End: 2025-07-23
Payer: MEDICARE

## 2025-07-23 LAB
AORTIC VALVE PEAK VELOCITY: 1.28 M/S
AV PEAK GRADIENT: 7 MMHG
MITRAL VALVE E/A RATIO: 1.22
RIGHT VENTRICLE FREE WALL PEAK S': 6.49 CM/S
TRICUSPID ANNULAR PLANE SYSTOLIC EXCURSION: 1.3 CM

## 2025-07-23 PROCEDURE — 93321 DOPPLER ECHO F-UP/LMTD STD: CPT | Performed by: STUDENT IN AN ORGANIZED HEALTH CARE EDUCATION/TRAINING PROGRAM

## 2025-07-23 PROCEDURE — 93321 DOPPLER ECHO F-UP/LMTD STD: CPT

## 2025-07-23 PROCEDURE — 93308 TTE F-UP OR LMTD: CPT | Performed by: STUDENT IN AN ORGANIZED HEALTH CARE EDUCATION/TRAINING PROGRAM

## 2025-07-23 PROCEDURE — 93325 DOPPLER ECHO COLOR FLOW MAPG: CPT | Performed by: STUDENT IN AN ORGANIZED HEALTH CARE EDUCATION/TRAINING PROGRAM

## 2025-07-30 ENCOUNTER — TELEPHONE (OUTPATIENT)
Dept: UROLOGY | Facility: CLINIC | Age: 70
End: 2025-07-30
Payer: MEDICARE

## 2025-07-30 DIAGNOSIS — R33.9 URINARY RETENTION: ICD-10-CM

## 2025-07-30 RX ORDER — TAMSULOSIN HYDROCHLORIDE 0.4 MG/1
0.4 CAPSULE ORAL DAILY
Qty: 90 CAPSULE | Refills: 1 | Status: SHIPPED | OUTPATIENT
Start: 2025-07-30

## 2025-07-30 NOTE — TELEPHONE ENCOUNTER
Wife calls for a refill of Flomax to be sent to [facility] in chart.    LOV with Norma 3/2025  Future 9/2025      Please advise

## 2025-09-23 ENCOUNTER — APPOINTMENT (OUTPATIENT)
Dept: PRIMARY CARE | Facility: CLINIC | Age: 70
End: 2025-09-23
Payer: MEDICARE

## 2025-09-25 ENCOUNTER — APPOINTMENT (OUTPATIENT)
Dept: UROLOGY | Facility: CLINIC | Age: 70
End: 2025-09-25
Payer: MEDICARE

## 2026-03-24 ENCOUNTER — APPOINTMENT (OUTPATIENT)
Dept: CARDIOLOGY | Facility: CLINIC | Age: 71
End: 2026-03-24
Payer: MEDICARE

## (undated) DEVICE — EVACUATOR, WOUND, SUCTION, CLOSED, JACKSON-PRATT, 100 CC, SILICONE

## (undated) DEVICE — SUTURE, STARTAFIX, SYMMETRIC, PDS PLUS, 60CM CT-1

## (undated) DEVICE — SYRINGE, 35 CC, LUER LOCK, MONOJECT, W/O CAP, LF

## (undated) DEVICE — GLOVE, SURGICAL, PROTEXIS PI W/NEU-THERA, 8.0, PF, LF

## (undated) DEVICE — ADAPTER, LUER STUB, 16 G

## (undated) DEVICE — SEALER, BIPOLAR, AQUA MANTYS 6.0

## (undated) DEVICE — COVER, TABLE, UHC

## (undated) DEVICE — Device

## (undated) DEVICE — SOLUTION, IRRIGATION, SODIUM CHLORIDE 0.9%, 1000 ML, POUR BOTTLE

## (undated) DEVICE — TUBE, FEEDING, 20FR GASTROSTOMY, ADULT

## (undated) DEVICE — DRESSING, MEPILEX BORDER, POST-OP AG, 4 X 12 IN

## (undated) DEVICE — STAPLER, ECHELON 3000, 60MM STD

## (undated) DEVICE — SUTURE, ETHIBOND, XTRA, 30 IN, 0, CT-1, GREEN

## (undated) DEVICE — GLOVE, SURGICAL, PROTEXIS PI MICRO, 8.0, PF, LF

## (undated) DEVICE — TAPE, CLOTH, HYPOALLERGENIC, MEDIPORE HI SOFT, 6 IN X 10 YD

## (undated) DEVICE — STAPLER, LINEAR, RELOADABLE, 60MM 4.8, GREEN

## (undated) DEVICE — SUTURE, SILK, 3-0, 18 IN SH/CR, BLACK

## (undated) DEVICE — BOWL, BASIN, 32 OZ, STERILE

## (undated) DEVICE — STAPLER,  LINEAR ENDO 60MM RELOAD, GREEN, DISP

## (undated) DEVICE — SUTURE, SILK, 1, 30 IN, LABYRINTH, BLACK

## (undated) DEVICE — GUIDEWIRE, .035 X 150 PTFE, FIXED CORE, 15CM BENTSON

## (undated) DEVICE — SUTURE, VICRYL, 2-0, 27 IN, CP-1, UNDYED

## (undated) DEVICE — COVER, CART, 45 X 27 X 48 IN, CLEAR

## (undated) DEVICE — DRESSING, GAUZE, PETROLATUM, VASELINE, 3 X 9 IN, STERILE

## (undated) DEVICE — TRAY, MINOR, SINGLE BASIN, STERILE

## (undated) DEVICE — SPONGE, HEMOSTATIC, CELLULOSE, SURGICEL, 2 X 14 IN

## (undated) DEVICE — MANIFOLD, 4 PORT NEPTUNE STANDARD

## (undated) DEVICE — DRESSING, GAUZE, PETROLATUM, PATCH, XEROFORM, 1 X 8 IN, STERILE

## (undated) DEVICE — COVER, EQUIPMENT, CAMERA, 5 X 96 IN, STERILE

## (undated) DEVICE — REST, HEAD, BAGEL, 9 IN

## (undated) DEVICE — APPLICATOR, CHLORAPREP, W/ORANGE TINT, 26ML

## (undated) DEVICE — SUTURE, PROLENE, 1, D, SPECIAL, TP-1

## (undated) DEVICE — SUTURE, VICRYL, 2-0, 36 IN, CT-1, UNDYED

## (undated) DEVICE — CLIP, LIGATING, W/ADHESIVE PAD, MEDIUM, TITANIUM

## (undated) DEVICE — SOLUTION, IRRIGATION, STERILE WATER, 1000 ML, POUR BOTTLE

## (undated) DEVICE — CUTTER,  LINEAR RELOAD, THICK TISSUE, 75MM, GREEN

## (undated) DEVICE — DRESSING, NON-ADHERENT, TELFA, OUCHLESS, 3 X 8 IN, STERILE

## (undated) DEVICE — CATHETER, URETHRAL, WHISTLE TIP, 14 FR, STERIL

## (undated) DEVICE — FEMORAL CANAL TIP FOR INTERPULSE HANDPIECE SET

## (undated) DEVICE — CATHETER, URETHRAL, FOLEY, 2 WAY, BARDEX IC, 22 FR, 30 CC, SILVER, LATEX

## (undated) DEVICE — DRESSING, ADHESIVE, ISLAND, TELFA, 4 X 10 IN

## (undated) DEVICE — DRAPE, INSTRUMENT, W/POUCH, STERI DRAPE, 7 X 11 IN, DISPOSABLE, STERILE

## (undated) DEVICE — STAPLER,  LINEAR RELOAD, 60MM, WHITE, DISP

## (undated) DEVICE — ANTIFOG, SOLUTION, FOG-OUT

## (undated) DEVICE — CLAMP, ALLIGATOR, 3 FR, 115 CM

## (undated) DEVICE — TOWEL PACK, STERILE, 4/PACK, BLUE

## (undated) DEVICE — ADAPTER, WATER BOTTLE, SMART CAP, 140/160/180 SERIES

## (undated) DEVICE — SKIN CLOSURE SYS, PREMIERPRO EXOFIN, 2-4CM X 30CM, 1.75G TUBE

## (undated) DEVICE — PLUG, CATHETER

## (undated) DEVICE — SUTURE, SURGIPRO, 1, LOOPED, 96 IN, GS26, BLUE

## (undated) DEVICE — BOOT, SUTURE-AID, YELLOW, STERILE, LF

## (undated) DEVICE — SUTURE, VICRYL, 2-0, 27 IN, UR-6, VIOLET

## (undated) DEVICE — SUTURE, MONOCRYL, 3-0, 18 IN, PS2, UNDYED

## (undated) DEVICE — BLADE, SAGITTAL, THIN, SHORT, LF

## (undated) DEVICE — SUTURE, SILK, 2-0, TIES, 12-30 IN, BLACK

## (undated) DEVICE — DRAPE, SHEET, U, STERI DRAPE, 47 X 51 IN, DISPOSABLE, STERILE

## (undated) DEVICE — COVER, LIGHT HANDLE, SURGICAL, FLEXIBLE, DISPOSABLE, STERILE

## (undated) DEVICE — HIGH FLOW TIP FOR INTERPULSE HANDPIECE SET

## (undated) DEVICE — SUTURE, SILK, 0, 24 IN, SUTUPAK, BLACK

## (undated) DEVICE — SUTURE, POLYSORB, 1, 36 IN, GS21, UNDYED

## (undated) DEVICE — SHEET, SPLIT, CARDIOVASCULAR TIBURON

## (undated) DEVICE — INTERPULSE HANDPIECE SET W/ 10FT SUCTION TUBING

## (undated) DEVICE — KIT, INTRODUCER MIC G-20 LAPAROSCOPIC

## (undated) DEVICE — STAPLER, SKIN PROXIMATE, 35 WIDE

## (undated) DEVICE — NEEDLE, TAPER, MAYO, 1/2 CIRCLE, DISPOSABLE, 3

## (undated) DEVICE — SUTURE, ETHIBOND XTRA, 1, 30 IN, CTX, LF

## (undated) DEVICE — CATHETER TRAY, SURESTEP, 16FR, URINE METER W/STATLOCK

## (undated) DEVICE — ROLL, DENTAL, 3/8 X 1-1/2, STERILE, 5/PK

## (undated) DEVICE — SUTURE, PROLENE, 1, 30 IN, CT-1, BLUE

## (undated) DEVICE — DRAINAGE SET, CLOSED WOUND, MED-LG, PVC, 3/16 IN, DAVOL, 15 FR, 400 CC

## (undated) DEVICE — DRAIN, WOUND, FLAT, HUBLESS, FULL LENGTH PERFORATION, 7 MM X 20 CM

## (undated) DEVICE — SHEARS, CURVED HARMONIC ACE 36CM

## (undated) DEVICE — SPONGE, DISSECTOR, PEANUT, 3/8, STERILE 5 FOAM HOLDER"

## (undated) DEVICE — SUTURE, SILK, 2, BLACK BR, SUTUPAK, LF

## (undated) DEVICE — TIP, SUCTION, YANKAUER, FLEXIBLE

## (undated) DEVICE — DISSECTOR, ULTRASONIC, CORDLESS, 39CM, DISP

## (undated) DEVICE — CATHETER, DRAINAGE, NASOGASTRIC, DOUBLE LUMEN, FUNNEL END, SUMP, SALEM, 18 FR, 48 IN, PVC, STERILE

## (undated) DEVICE — TIP, SUCTION, YANKAUER, BULB, ADULT

## (undated) DEVICE — DRESSING, ISLAND, TELFA, 4 X 5 IN

## (undated) DEVICE — SUTURE, STRATAFIX, SPIRAL MONOCRYL PLUS, 3-0, PS-2 45CM, UNDYED

## (undated) DEVICE — WOUND SYSTEM, DEBRIDEMENT & CLEANING, O.R DUOPAK

## (undated) DEVICE — DRESSING, GAUZE, DRAIN SPONGE, 6 PLY, EXCILON, 4 X 4 IN, STERILE

## (undated) DEVICE — GOWN, SURGICAL, SMARTGOWN, XLARGE, STERILE

## (undated) DEVICE — CEMENT MIX KIT, REVOLUTION, W/BREAKAWAY

## (undated) DEVICE — PILLOW, ABDUCTION, MEDIUM

## (undated) DEVICE — SYRINGE, 60 CC, IRRIGATION, PISTON, CATH TIP, W/LUER ADAPTER,DISP

## (undated) DEVICE — HOOD, STERISHIELD T4 SYSTEM

## (undated) DEVICE — SUTURE, ETHIBOND, 2-0, 18 IN, FS, GREEN

## (undated) DEVICE — SYRINGE, 60 CC, LUER LOCK, MONOJECT, W/O CAP, LF

## (undated) DEVICE — CLIP, LIGATING, HORIZON, LARGE, TITANIUM

## (undated) DEVICE — STAPLER,  LINEAR ENDO 60MM RELOAD, BLACK, DISP

## (undated) DEVICE — TOWEL, SURGICAL, NEURO, O/R, 16 X 26, BLUE, STERILE

## (undated) DEVICE — RETRIEVER, SUTURE, HEWSON

## (undated) DEVICE — DRAPE, TIBURON, SPLIT SHEET, REINF ADH STRIP, 77X122